# Patient Record
Sex: FEMALE | Race: WHITE | HISPANIC OR LATINO | URBAN - METROPOLITAN AREA
[De-identification: names, ages, dates, MRNs, and addresses within clinical notes are randomized per-mention and may not be internally consistent; named-entity substitution may affect disease eponyms.]

---

## 2020-10-12 ENCOUNTER — INPATIENT (INPATIENT)
Facility: HOSPITAL | Age: 85
LOS: 2 days | Discharge: HOME | DRG: 291 | End: 2020-10-15
Attending: INTERNAL MEDICINE | Admitting: INTERNAL MEDICINE
Payer: MEDICARE

## 2020-10-12 VITALS
DIASTOLIC BLOOD PRESSURE: 70 MMHG | OXYGEN SATURATION: 99 % | WEIGHT: 115.08 LBS | HEART RATE: 57 BPM | RESPIRATION RATE: 18 BRPM | HEIGHT: 62 IN | SYSTOLIC BLOOD PRESSURE: 168 MMHG | TEMPERATURE: 98 F

## 2020-10-12 DIAGNOSIS — I25.10 ATHEROSCLEROTIC HEART DISEASE OF NATIVE CORONARY ARTERY WITHOUT ANGINA PECTORIS: ICD-10-CM

## 2020-10-12 DIAGNOSIS — E11.9 TYPE 2 DIABETES MELLITUS WITHOUT COMPLICATIONS: ICD-10-CM

## 2020-10-12 DIAGNOSIS — I50.33 ACUTE ON CHRONIC DIASTOLIC (CONGESTIVE) HEART FAILURE: ICD-10-CM

## 2020-10-12 DIAGNOSIS — I10 ESSENTIAL (PRIMARY) HYPERTENSION: ICD-10-CM

## 2020-10-12 DIAGNOSIS — D64.9 ANEMIA, UNSPECIFIED: ICD-10-CM

## 2020-10-12 DIAGNOSIS — N17.9 ACUTE KIDNEY FAILURE, UNSPECIFIED: ICD-10-CM

## 2020-10-12 LAB
CK MB CFR SERPL CALC: 3.4 NG/ML — SIGNIFICANT CHANGE UP (ref 0–6.7)
CK SERPL-CCNC: 71 U/L — SIGNIFICANT CHANGE UP (ref 25–170)
CREAT ?TM UR-MCNC: 34 MG/DL — SIGNIFICANT CHANGE UP
OSMOLALITY UR: 247 MOSM/KG — LOW (ref 300–900)
PROT ?TM UR-MCNC: 196 MG/DL — HIGH (ref 0–12)
PROT/CREAT UR-RTO: 5.8 RATIO — HIGH (ref 0–0.2)
SODIUM UR-SCNC: 60 MMOL/L — SIGNIFICANT CHANGE UP
TROPONIN T SERPL-MCNC: <0.01 NG/ML — SIGNIFICANT CHANGE UP (ref 0–0.01)
UUN UR-MCNC: 262 MG/DL — SIGNIFICANT CHANGE UP

## 2020-10-12 PROCEDURE — 99285 EMERGENCY DEPT VISIT HI MDM: CPT | Mod: CS

## 2020-10-12 PROCEDURE — 99223 1ST HOSP IP/OBS HIGH 75: CPT

## 2020-10-12 PROCEDURE — 93306 TTE W/DOPPLER COMPLETE: CPT | Mod: 26

## 2020-10-12 PROCEDURE — 71045 X-RAY EXAM CHEST 1 VIEW: CPT | Mod: 26

## 2020-10-12 RX ORDER — METOPROLOL TARTRATE 50 MG
75 TABLET ORAL
Refills: 0 | Status: DISCONTINUED | OUTPATIENT
Start: 2020-10-12 | End: 2020-10-15

## 2020-10-12 RX ORDER — FUROSEMIDE 40 MG
40 TABLET ORAL
Refills: 0 | Status: DISCONTINUED | OUTPATIENT
Start: 2020-10-12 | End: 2020-10-14

## 2020-10-12 RX ORDER — DOXAZOSIN MESYLATE 4 MG
8 TABLET ORAL DAILY
Refills: 0 | Status: DISCONTINUED | OUTPATIENT
Start: 2020-10-12 | End: 2020-10-12

## 2020-10-12 RX ORDER — PANTOPRAZOLE SODIUM 20 MG/1
40 TABLET, DELAYED RELEASE ORAL
Refills: 0 | Status: DISCONTINUED | OUTPATIENT
Start: 2020-10-12 | End: 2020-10-15

## 2020-10-12 RX ORDER — LEVOTHYROXINE SODIUM 125 MCG
50 TABLET ORAL DAILY
Refills: 0 | Status: DISCONTINUED | OUTPATIENT
Start: 2020-10-12 | End: 2020-10-15

## 2020-10-12 RX ORDER — FUROSEMIDE 40 MG
40 TABLET ORAL ONCE
Refills: 0 | Status: COMPLETED | OUTPATIENT
Start: 2020-10-12 | End: 2020-10-12

## 2020-10-12 RX ORDER — POTASSIUM CHLORIDE 20 MEQ
40 PACKET (EA) ORAL ONCE
Refills: 0 | Status: COMPLETED | OUTPATIENT
Start: 2020-10-12 | End: 2020-10-12

## 2020-10-12 RX ORDER — HEPARIN SODIUM 5000 [USP'U]/ML
5000 INJECTION INTRAVENOUS; SUBCUTANEOUS EVERY 8 HOURS
Refills: 0 | Status: DISCONTINUED | OUTPATIENT
Start: 2020-10-12 | End: 2020-10-15

## 2020-10-12 RX ORDER — ASPIRIN/CALCIUM CARB/MAGNESIUM 324 MG
81 TABLET ORAL DAILY
Refills: 0 | Status: DISCONTINUED | OUTPATIENT
Start: 2020-10-12 | End: 2020-10-15

## 2020-10-12 RX ORDER — GABAPENTIN 400 MG/1
100 CAPSULE ORAL AT BEDTIME
Refills: 0 | Status: DISCONTINUED | OUTPATIENT
Start: 2020-10-12 | End: 2020-10-15

## 2020-10-12 RX ORDER — ATORVASTATIN CALCIUM 80 MG/1
20 TABLET, FILM COATED ORAL AT BEDTIME
Refills: 0 | Status: DISCONTINUED | OUTPATIENT
Start: 2020-10-12 | End: 2020-10-15

## 2020-10-12 RX ORDER — DONEPEZIL HYDROCHLORIDE 10 MG/1
5 TABLET, FILM COATED ORAL AT BEDTIME
Refills: 0 | Status: DISCONTINUED | OUTPATIENT
Start: 2020-10-12 | End: 2020-10-15

## 2020-10-12 RX ORDER — AMLODIPINE BESYLATE 2.5 MG/1
5 TABLET ORAL DAILY
Refills: 0 | Status: DISCONTINUED | OUTPATIENT
Start: 2020-10-12 | End: 2020-10-15

## 2020-10-12 RX ORDER — DOXAZOSIN MESYLATE 4 MG
8 TABLET ORAL DAILY
Refills: 0 | Status: DISCONTINUED | OUTPATIENT
Start: 2020-10-12 | End: 2020-10-13

## 2020-10-12 RX ADMIN — HEPARIN SODIUM 5000 UNIT(S): 5000 INJECTION INTRAVENOUS; SUBCUTANEOUS at 16:14

## 2020-10-12 RX ADMIN — GABAPENTIN 100 MILLIGRAM(S): 400 CAPSULE ORAL at 22:25

## 2020-10-12 RX ADMIN — Medication 40 MILLIGRAM(S): at 18:14

## 2020-10-12 RX ADMIN — Medication 40 MILLIGRAM(S): at 12:09

## 2020-10-12 RX ADMIN — Medication 81 MILLIGRAM(S): at 16:14

## 2020-10-12 RX ADMIN — HEPARIN SODIUM 5000 UNIT(S): 5000 INJECTION INTRAVENOUS; SUBCUTANEOUS at 22:25

## 2020-10-12 RX ADMIN — Medication 40 MILLIEQUIVALENT(S): at 16:14

## 2020-10-12 RX ADMIN — Medication 75 MILLIGRAM(S): at 17:41

## 2020-10-12 RX ADMIN — DONEPEZIL HYDROCHLORIDE 5 MILLIGRAM(S): 10 TABLET, FILM COATED ORAL at 22:25

## 2020-10-12 RX ADMIN — AMLODIPINE BESYLATE 5 MILLIGRAM(S): 2.5 TABLET ORAL at 16:14

## 2020-10-12 RX ADMIN — ATORVASTATIN CALCIUM 20 MILLIGRAM(S): 80 TABLET, FILM COATED ORAL at 22:25

## 2020-10-12 NOTE — ED ADULT NURSE REASSESSMENT NOTE - NS ED NURSE REASSESS COMMENT FT1
Awake and alert, cardiac monitor remains in place, when asleep HR 47-49.  Currently 57, admitting team at the bedside, tolerating nasal cannula well.

## 2020-10-12 NOTE — H&P ADULT - NSICDXFAMILYHX_GEN_ALL_CORE_FT
FAMILY HISTORY:  Family history of ischemic heart disease, Family history of coronary artery disease.

## 2020-10-12 NOTE — H&P ADULT - PROBLEM SELECTOR PLAN 2
- BP elevated on arrival, SBP 160s-170s, did not take AM meds  - continue amlodipine 5mg and Metoprolol tartrate 75mg BID

## 2020-10-12 NOTE — ED CLERICAL - NS ED CLERK NOTE PRE-ARRIVAL INFORMATION; ADDITIONAL PRE-ARRIVAL INFORMATION
86 Y/O F SHARON PEREZ BEING SENT IN BY DR. ONTIVEROS FOR PNEUMONIA AND CHF... PLEASE CALL AFTER EVALUATION AT [phone]

## 2020-10-12 NOTE — H&P ADULT - HISTORY OF PRESENT ILLNESS
84 y/o f hx HTN, HLD, CAD s/p CABG 2015, diastolic CHF, CKD (baseline creatinine 1.4) presents c/o shortness of breath, lower ext edema worsening over the past 2 weeks.  Pt afebrile in ED, +crackles on exam, no ischemic changes to EKG.  Labs significant for creatinine 3.05, BNP 17896.  Pt given lasix 40mg IVP, discussed with Dr. Crooks her cardiologist who will admit, Dr. Villegas consulted for nephrology and will see as inpatient.     In [facility] ED vital signs 168/70 57bpm, 97.7F, 99% on RA Labs significant for H/H  8.6/27.1 PT 14.3 PTT 40.5 INR 1.20 Cr 3.05, Bun 49, K 3.6 Trop negative x 1,  BNP 30k, CXR: wet read pulmonary edema, EKG:     Patient received Lasix 40mg IV x 1. Patient admitted to [facility] for acute on chronic diastolic CHF.    84 y/o female hx HTN, HLD, CAD s/p CABG 2015 Dr. Blunt , diastolic CHF, renal artery stenosis s/p renal artery stent >20yrs ago  CKD (baseline creatinine 1.4)  who presented to [facility] endorsing shortness of breath and LE edema and orthopnea requiring her to sleep in a chair for 1 week. Since experiencing the shortness of breath, Pts cardiologist Dr. Crooks and she increased her Lasix from 80mg M/W/F to daily for the last 4 days.Patient also endorsing dyspnea on exertion of less than 1 block for the last few months. Patients denies chest pain. Patient endorsing occasional papitations. Patient endorsing compliance w/ medications however daughter states that compliance with medication has been questionable over the last few months, as patient is becoming more forgetful. In [facility] ED vital signs 168/70 57bpm, 97.7F, 99% on RA Labs significant for H/H  8.6/27.1 PT 14.3 PTT 40.5 INR 1.20 Cr 3.05, Bun 49, K 3.6 Trop negative x 1,  BNP 30k, CXR: Stable cardiomegaly, status post median sternotomy, thoracic aortic and mitral annulus calcification. Bilateral opacities/pleural effusions EKG: Sinus bradycardia w/ PACs w/ incomplete LBBB and New TWI in Patient received Lasix 40mg IV x 1. Patient admitted to [facility] for acute on chronic diastolic CHF.    84 y/o female poor historian with PMHx HTN, HLD, CAD s/p CABG 2015 Dr. Blunt , diastolic CHF, renal artery stenosis s/p renal artery stent >20yrs ago, Carotid artery stenosis, Peripheral artery disease, Anemia w/ PUD,  CKD (baseline creatinine 1.4)  who presented to [facility] endorsing shortness of breath cough and LE edema and orthopnea requiring her to sleep in a chair for 2 week. She was intially started on tessalon perils which helped the cough but did not improve the shortness of breath.  Since experiencing the shortness of breath, Pts cardiologist Dr. Crooks and she increased her Lasix from 80mg M/W/F to daily for the last 4 days. Patient also endorsing dyspnea on exertion of less than 1 block for the last few months. Patients denies chest pain. Patient endorsing occasional palpitations Patient endorsing compliance w/ medications however daughter states that compliance with medication has been questionable over the last few months, as patient is becoming more forgetful. In [facility] ED vital signs 168/70 57bpm, 97.7F, 99% on RA Labs significant for H/H  8.6/27.1 PT 14.3 PTT 40.5 INR 1.20 Cr 3.05, Bun 49, K 3.6 Trop negative x 1,  BNP 30k, CXR: Stable cardiomegaly, status post median sternotomy, thoracic aortic and mitral annulus calcification. Bilateral opacities/pleural effusions EKG: Sinus bradycardia w/ PACs w/ incomplete LBBB and New TWI from office EKG earlier this AM  in Patient received Lasix 40mg IV x 1. Patient admitted to [facility] for acute on chronic diastolic CHF.

## 2020-10-12 NOTE — ED PROVIDER NOTE - CLINICAL SUMMARY MEDICAL DECISION MAKING FREE TEXT BOX
86 y/o f hx HTN, HLD, CAD s/p CABG 2015, diastolic CHF, CKD (baseline creatinine 1.4) presents c/o shortness of breath, lower ext edema worsening over the past 2 weeks.  Pt afebrile in ED, +crackles on exam, no ischemic changes to EKG.  Labs significant for creatinine 3.05, BNP 82873.  Pt given lasix 40mg IVP, discussed with Dr. Crooks her cardiologist who will admit, Dr. Villegas consulted for nephrology and will see as inpatient.

## 2020-10-12 NOTE — H&P ADULT - PROBLEM SELECTOR PLAN 1
Presented with SOB, orthopnea, +2pitting edema, Crackles in bases  BNP 30,000  trop negative x 1, f/u 6pm and 12 AM trop  CXR:  Stable cardiomegaly, status post median sternotomy, thoracic aortic and mitral annulus calcification. Bilateral opacities/pleural effusions  - Echo from 2/19/20 Normal LV systolic function EF 58%, no WMA, Borderline LVH, moderat AR, moderate MR, PulmHTN, PASP 70mmHg  Echo ordered f/u  CONTINUE Lasix 40 IV BID  Not starting ACE/ARB 2/2 NAV on CKD   Daily weight, strict I & Os, 1l fluid restriction Presented with SOB, orthopnea, +2pitting edema, Crackles in bases  BNP 30,000  trop negative x 1, f/u 6pm and 12 AM trop  CXR:  Stable cardiomegaly, status post median sternotomy, thoracic aortic and mitral annulus calcification. Bilateral opacities/pleural effusions  - Echo from 2/19/20 Normal LV systolic function EF 58%, no WMA, Borderline LVH, moderat AR, moderate MR, PulmHTN, PASP 70mmHg    Echo from 10/12/20 Mild AR, MR, TR. Pulm HTN PASP 47 There is mild concentric LVH ejection fraction of 55%. Left pleural effusion.  CONTINUE Lasix 40 IV BID  Not starting ACE/ARB 2/2 NAV on CKD   Daily weight, strict I & Os, 1l fluid restriction Presented with SOB, orthopnea, +2pitting edema, Crackles in bases  BNP 30,000  trop negative x 2, f/u 12 AM trop  CXR:  Stable cardiomegaly, status post median sternotomy, thoracic aortic and mitral annulus calcification. Bilateral opacities/pleural effusions  - Echo from 2/19/20 Normal LV systolic function EF 58%, no WMA, Borderline LVH, moderat AR, moderate MR, PulmHTN, PASP 70mmHg  Echo from 10/12/20 Mild AR, MR, TR. Pulm HTN PASP 47 There is mild concentric LVH ejection fraction of 55%. Left pleural effusion.  CONTINUE Lasix 40 IV BID  Not starting ACE/ARB 2/2 NAV on CKD   Daily weight, strict I & Os, 1l fluid restriction

## 2020-10-12 NOTE — CONSULT NOTE ADULT - SUBJECTIVE AND OBJECTIVE BOX
Patient is a 85y old  Female who presents with a chief complaint of shortness of breath (12 Oct 2020 13:11)      HPI:  85F PMHx CKD baseline Cr 1.4, HTN, HLD, CAD s/p CABG 2015, diastolic CHF, presents c/o shortness of breath, lower ext edema worsening over the past 2 weeks.  Patient received Lasix 40mg IV x 1 and has no complaints. Denies CP abdominal pain. Endorses normal urine output. Admitted for CHF exacerbation. Cr doubled from baseline. Nephrology consulted for NAV/CKD management.     PAST MEDICAL & SURGICAL HISTORY:  Essential hypertension  Hypertension    Type 2 diabetes mellitus  Diabetes    Hyperlipidemia  Hyperlipidemia    Disorder of kidney and ureter  Renal insufficiency    Peripheral vascular disease  PVD (peripheral vascular disease)    Anxiety state  Anxiety    Atherosclerosis of renal artery  with resulting stent placement    Atherosclerosis of renal artery  Renal artery stenosis          Allergies:  No Known Allergies      Home Medications:       Hospital Medications:   MEDICATIONS  (STANDING):      SOCIAL HISTORY:  Denies ETOh, Smoking,     Family History:  FAMILY HISTORY:  Family history of ischemic heart disease  Family history of coronary artery disease.          VITALS:  T(F): 97.7 (10-12-20 @ 10:27), Max: 97.7 (10-12-20 @ 10:27)  HR: 51 (10-12-20 @ 12:00)  BP: 165/72 (10-12-20 @ 12:00)  RR: 18 (10-12-20 @ 12:00)  SpO2: 99% (10-12-20 @ 12:00)  Wt(kg): --    Height (cm): 157.5 (10-12 @ 10:27)  Weight (kg): 52.2 (10-12 @ 10:27)  BMI (kg/m2): 21 (10-12 @ 10:27)  BSA (m2): 1.51 (10-12 @ 10:27)  CAPILLARY BLOOD GLUCOSE          Review of Systems:  CONSTITUTIONAL: No fever or chills.  RESPIRATORY: No shortness of breath, cough  CARDIOVASCULAR: No Chest pain, shortness of breath, palpitations  GASTROINTESTINAL: No abdominal pain, nausea, vomiting, diarrhea  GENITOURINARY: No urinary frequency, gross hematuria, dysuria  NEUROLOGICAL: No headache, weakness  SKIN: No rash or skin lesion  MUSCULOSKELETAL: No swelling  Psych: Denies suicidal or homicidal ideation    PHYSICAL EXAM:  GENERAL: Alert, awake, oriented x3   HEENT: TAJ, EOMI, neck supple, no JVP  CHEST/LUNG: Bilateral clear breath sounds  HEART: Regular rate and rhythm, no murmur, no gallops, no rub   ABDOMEN: Soft, nontender, non distended  : No flank or supra pubic tenderness.  EXTREMITIES: no pedal edema  Neurology: AAOx3, no focal neurological deficit  SKIN: No rash or skin lesion   ACCESS: LUE AVF w/bruit and thrill     LABS:  10-12    140  |  105  |  49<H>  ----------------------------<  105<H>  3.6   |  23  |  3.05<H>    Ca    8.5      12 Oct 2020 11:22    TPro  5.8<L>  /  Alb  3.2<L>  /  TBili  0.4  /  DBili      /  AST  21  /  ALT  36  /  AlkPhos  185<H>  10-12    Creatinine Trend: 3.05 <--                        8.6    6.87  )-----------( 171      ( 12 Oct 2020 11:22 )             27.1     Urine Studies:                85F PMHx CKD baseline Cr 1.4, HTN, HLD, CAD s/p CABG 2015, diastolic CHF, presents c/o shortness of breath, lower ext edema worsening over the past 2 weeks.  Nephrology consulted for NAV/CKD management.     Assessment/Plan:     #NAV on CKD, acute CHF exacerbation   baseline creatinine 1.4  NAV on CKD likely cardiorenal syndrome, pre-renal vs possibly ischemic ATN     Recommend:   daily CXR   diuresis with 40mg IV lasix q8h PRN to achieve euvolemia   q12h BMP and urine lytes including Na, Cr, and Urea   renal sono  strict I/Os   renal diet     Thank you for the opportunity to participate in the care of your patient. The nephrology service remains available to assist with any questions or concerns. Please feel free to reach us by paging the on-call nephrology fellow for urgent issues or as below.     Tobi Thompson M.D.   PGY-4, Nephrology Fellow   C: [phone]   P: [phone] Patient is a 85y old  Female who presents with a chief complaint of shortness of breath (12 Oct 2020 13:11)      HPI:  85F PMHx CKD baseline Cr 1.4, HTN, HLD, CAD s/p CABG 2015, diastolic CHF, presents c/o shortness of breath, lower ext edema worsening over the past 2 weeks.  Patient received Lasix 40mg IV x 1 and has no complaints. Denies CP abdominal pain. Endorses normal urine output. Admitted for CHF exacerbation. Cr doubled from baseline. Nephrology consulted for ANV/CKD management.     PAST MEDICAL & SURGICAL HISTORY:  Essential hypertension  Hypertension    Type 2 diabetes mellitus  Diabetes    Hyperlipidemia  Hyperlipidemia    Disorder of kidney and ureter  Renal insufficiency    Peripheral vascular disease  PVD (peripheral vascular disease)    Anxiety state  Anxiety    Atherosclerosis of renal artery  with resulting stent placement    Atherosclerosis of renal artery  Renal artery stenosis          Allergies:  No Known Allergies      Home Medications:       Hospital Medications:   MEDICATIONS  (STANDING):      SOCIAL HISTORY:  Denies ETOh, Smoking,     Family History:  FAMILY HISTORY:  Family history of ischemic heart disease  Family history of coronary artery disease.          VITALS:  T(F): 97.7 (10-12-20 @ 10:27), Max: 97.7 (10-12-20 @ 10:27)  HR: 51 (10-12-20 @ 12:00)  BP: 165/72 (10-12-20 @ 12:00)  RR: 18 (10-12-20 @ 12:00)  SpO2: 99% (10-12-20 @ 12:00)  Wt(kg): --    Height (cm): 157.5 (10-12 @ 10:27)  Weight (kg): 52.2 (10-12 @ 10:27)  BMI (kg/m2): 21 (10-12 @ 10:27)  BSA (m2): 1.51 (10-12 @ 10:27)  CAPILLARY BLOOD GLUCOSE          Review of Systems:  CONSTITUTIONAL: No fever or chills.  RESPIRATORY: No shortness of breath, cough  CARDIOVASCULAR: No Chest pain, shortness of breath, palpitations  GASTROINTESTINAL: No abdominal pain, nausea, vomiting, diarrhea  GENITOURINARY: No urinary frequency, gross hematuria, dysuria  NEUROLOGICAL: No headache, weakness  SKIN: No rash or skin lesion  MUSCULOSKELETAL: swollen legs  Psych: Denies suicidal or homicidal ideation    PHYSICAL EXAM:  GENERAL: Alert, awake, oriented x3   HEENT: TAJ, EOMI, neck supple, no JVP  CHEST/LUNG: Bilateral clear breath sounds  HEART: Regular rate and rhythm, no murmur, no gallops, no rub   ABDOMEN: Soft, nontender, non distended  : No flank or supra pubic tenderness.  EXTREMITIES: 2+ LE edema  Neurology: AAOx3, no focal neurological deficit  SKIN: No rash or skin lesion   ACCESS: LUE AVF w/bruit and thrill     LABS:  10-12    140  |  105  |  49<H>  ----------------------------<  105<H>  3.6   |  23  |  3.05<H>    Ca    8.5      12 Oct 2020 11:22    TPro  5.8<L>  /  Alb  3.2<L>  /  TBili  0.4  /  DBili      /  AST  21  /  ALT  36  /  AlkPhos  185<H>  10-12    Creatinine Trend: 3.05 <--                        8.6    6.87  )-----------( 171      ( 12 Oct 2020 11:22 )             27.1     Urine Studies:                85F PMHx CKD baseline Cr 1.4, HTN, HLD, CAD s/p CABG 2015, diastolic CHF, presents c/o shortness of breath, lower ext edema worsening over the past 2 weeks.  Nephrology consulted for NAV/CKD management.     Assessment/Plan:     #NAV on CKD, acute CHF exacerbation   baseline creatinine 1.4  NAV on CKD likely cardiorenal syndrome, pre-renal vs possibly ischemic ATN     Recommend:   daily CXR   diuresis with 40mg IV lasix q8h PRN to achieve euvolemia   q12h BMP and urine lytes including Na, Cr, and Urea   renal sono  strict I/Os   renal diet     Thank you for the opportunity to participate in the care of your patient. The nephrology service remains available to assist with any questions or concerns. Please feel free to reach us by paging the on-call nephrology fellow for urgent issues or as below.     Tobi Thompson M.D.   PGY-4, Nephrology Fellow   C: [phone]   P: [phone]

## 2020-10-12 NOTE — ED ADULT NURSE REASSESSMENT NOTE - NS ED NURSE REASSESS COMMENT FT1
Patient transported to ECHO, on cardiac monitor, accompanied by PA.  Report given Aysha GUTIERREZ - ED holding.

## 2020-10-12 NOTE — ED ADULT NURSE NOTE - OBJECTIVE STATEMENT
Patient arrives via EMS sent from MD's office due to SOB x 1 week/ pneumonia.  Denies fever at home.  Patient states "I can't breathe."  2L o2 in place via nasal cannula, cardiac monitor in place, denies chest pain/ dizziness.  EKG completed at time of triage.

## 2020-10-12 NOTE — ED PROVIDER NOTE - ATTENDING CONTRIBUTION TO CARE
85 year old female with history of CHF, HTN, HLD presents to ED with concern for lower leg swelling and shortness of breath - sent by Dr. Godoy.  No fever or chills.  + Cough.  On my face to face ED eval, patient is non toxic in appearance.  HRRR, lungs with bibasilar crackles on auscultation.  + Pitting edema to bilateral LEs.  Will check labs, CXR, give lasix and anticipate admission for diuresis and monitoring.

## 2020-10-12 NOTE — ED PROVIDER NOTE - OBJECTIVE STATEMENT
84 y/o f hx HTN, HLD, CAD s/p CABG 2015, diastolic CHF, CKD (baseline creatinine 1.4) presents sent by her cardiologist for evaluation of sob x 2 weeks, lower ext edema.  Pt reports her sx gradually worsening, also has had a few episodes of chest pain, last was a few days ago.  Pt stating her cardiologist has been changing her meds but sx haven't improved.  Denies fever, chills, n/v/d, recent travel, sick contacts, all other ROS negative.

## 2020-10-12 NOTE — H&P ADULT - PROBLEM SELECTOR PLAN 3
Baseline Cr around 1.4 per Dr. Crooks, Cr 3.05, BUN 49, Hx of renal artery stenosis s/p stenting >20 yrs ago   -Possible cardiorenal?  - Renal consulted, f/u res  - urine lytes ordered follow up F[facility]   - Renal artery US  to evaluate stent

## 2020-10-12 NOTE — H&P ADULT - PROBLEM SELECTOR PLAN 5
per Dr. Crooks Pt anemia to 8s starting earlier this summer, had EGD showing PUD- started on Omeprazole + Tx for H. Pylori   - continue

## 2020-10-12 NOTE — H&P ADULT - PROBLEM SELECTOR PLAN 4
How much?   - S/p CABG in 2015 for 3vCAD, Currently CP free  - Trop negative x 1  - EKG: Sinus bradycardia w/ PACs w/ incomplete LBBB and New TWI from office EKG earlier this AM - S/p CABG in 2015 for 3vCAD, Currently CP free  - Trop negative x 2  - EKG: Sinus bradycardia w/ PACs w/ incomplete LBBB and New TWI from office EKG earlier this AM  -continue aspirin and atorvastatin

## 2020-10-12 NOTE — H&P ADULT - NSICDXPASTMEDICALHX_GEN_ALL_CORE_FT
PAST MEDICAL HISTORY:  Anxiety state Anxiety    Atherosclerosis of renal artery with resulting stent placement    Disorder of kidney and ureter Renal insufficiency    Essential hypertension Hypertension    Hyperlipidemia Hyperlipidemia    Peripheral vascular disease PVD (peripheral vascular disease)    Type 2 diabetes mellitus Diabetes

## 2020-10-12 NOTE — H&P ADULT - ASSESSMENT
86 y/o female poor historian with PMHx HTN, HLD, CAD s/p CABG 2015 Dr. Blunt , diastolic CHF, renal artery stenosis s/p renal artery stent >20yrs ago, Carotid artery stenosis, Peripheral artery disease, Anemia w/ PUD,  CKD (baseline creatinine 1.4)  who presented to [facility] endorsing shortness of breath cough and LE edema and orthopnea requiring her to sleep in a chair for 2 week. She was intially started on tessalon perils which helped the cough but did not improve the shortness of breath.  Since experiencing the shortness of breath, Pts cardiologist Dr. Crooks and she increased her Lasix from 80mg M/W/F to daily for the last 4 days. Patient also endorsing dyspnea on exertion of less than 1 block for the last few months. Patients denies chest pain. Patient endorsing occasional palpitations Patient endorsing compliance w/ medications however daughter states that compliance with medication has been questionable over the last few months, as patient is becoming more forgetful. In [facility] ED vital signs 168/70 57bpm, 97.7F, 99% on RA Labs significant for H/H  8.6/27.1 PT 14.3 PTT 40.5 INR 1.20 Cr 3.05, Bun 49, K 3.6 Trop negative x 1,  BNP 30k, CXR: Stable cardiomegaly, status post median sternotomy, thoracic aortic and mitral annulus calcification. Bilateral opacities/pleural effusions EKG: Sinus bradycardia w/ PACs w/ incomplete LBBB and New TWI in Patient received Lasix 40mg IV x 1. Patient admitted to [facility] for acute on chronic diastolic CHF.    86 y/o female poor historian with PMHx HTN, HLD, CAD s/p CABG 2015 Dr. Blunt , diastolic CHF, renal artery stenosis s/p renal artery stent >20yrs ago, Carotid artery stenosis, Peripheral artery disease, Anemia w/ PUD,  CKD (baseline creatinine 1.4)  who presented to [facility] endorsing shortness of breath cough and LE edema and orthopnea requiring her to sleep in a chair for 2 week. Patient admitted to [facility] for acute on chronic diastolic CHF. Echo 10/12/20 showed  Mild AR, MR, TR. Pulm HTN PASP 47, EF 55%, not worsened from prior. Patient currently on IV diuresis.

## 2020-10-13 LAB
A1C WITH ESTIMATED AVERAGE GLUCOSE RESULT: 5.9 % — HIGH (ref 4–5.6)
ALBUMIN SERPL ELPH-MCNC: 2.8 G/DL — LOW (ref 3.3–5)
ALP SERPL-CCNC: 140 U/L — HIGH (ref 40–120)
ALT FLD-CCNC: 27 U/L — SIGNIFICANT CHANGE UP (ref 10–45)
ANION GAP SERPL CALC-SCNC: 13 MMOL/L — SIGNIFICANT CHANGE UP (ref 5–17)
ANION GAP SERPL CALC-SCNC: 14 MMOL/L — SIGNIFICANT CHANGE UP (ref 5–17)
AST SERPL-CCNC: 16 U/L — SIGNIFICANT CHANGE UP (ref 10–40)
BILIRUB SERPL-MCNC: 0.3 MG/DL — SIGNIFICANT CHANGE UP (ref 0.2–1.2)
BUN SERPL-MCNC: 50 MG/DL — HIGH (ref 7–23)
BUN SERPL-MCNC: 50 MG/DL — HIGH (ref 7–23)
CALCIUM SERPL-MCNC: 8.5 MG/DL — SIGNIFICANT CHANGE UP (ref 8.4–10.5)
CALCIUM SERPL-MCNC: 8.5 MG/DL — SIGNIFICANT CHANGE UP (ref 8.4–10.5)
CHLORIDE SERPL-SCNC: 106 MMOL/L — SIGNIFICANT CHANGE UP (ref 96–108)
CHLORIDE SERPL-SCNC: 109 MMOL/L — HIGH (ref 96–108)
CHOLEST SERPL-MCNC: 119 MG/DL — SIGNIFICANT CHANGE UP (ref 10–199)
CO2 SERPL-SCNC: 22 MMOL/L — SIGNIFICANT CHANGE UP (ref 22–31)
CO2 SERPL-SCNC: 22 MMOL/L — SIGNIFICANT CHANGE UP (ref 22–31)
CREAT SERPL-MCNC: 3.07 MG/DL — HIGH (ref 0.5–1.3)
CREAT SERPL-MCNC: 3.15 MG/DL — HIGH (ref 0.5–1.3)
ESTIMATED AVERAGE GLUCOSE: 123 MG/DL — HIGH (ref 68–114)
GLUCOSE SERPL-MCNC: 131 MG/DL — HIGH (ref 70–99)
GLUCOSE SERPL-MCNC: 79 MG/DL — SIGNIFICANT CHANGE UP (ref 70–99)
HCT VFR BLD CALC: 26.5 % — LOW (ref 34.5–45)
HDLC SERPL-MCNC: 51 MG/DL — SIGNIFICANT CHANGE UP
HGB BLD-MCNC: 8.2 G/DL — LOW (ref 11.5–15.5)
LIPID PNL WITH DIRECT LDL SERPL: 49 MG/DL — SIGNIFICANT CHANGE UP
MAGNESIUM SERPL-MCNC: 1.5 MG/DL — LOW (ref 1.6–2.6)
MAGNESIUM SERPL-MCNC: 2 MG/DL — SIGNIFICANT CHANGE UP (ref 1.6–2.6)
MCHC RBC-ENTMCNC: 26.5 PG — LOW (ref 27–34)
MCHC RBC-ENTMCNC: 30.9 GM/DL — LOW (ref 32–36)
MCV RBC AUTO: 85.8 FL — SIGNIFICANT CHANGE UP (ref 80–100)
NRBC # BLD: 0 /100 WBCS — SIGNIFICANT CHANGE UP (ref 0–0)
PHOSPHATE SERPL-MCNC: 4.3 MG/DL — SIGNIFICANT CHANGE UP (ref 2.5–4.5)
PLATELET # BLD AUTO: 148 K/UL — LOW (ref 150–400)
POTASSIUM SERPL-MCNC: 3.9 MMOL/L — SIGNIFICANT CHANGE UP (ref 3.5–5.3)
POTASSIUM SERPL-MCNC: 4.1 MMOL/L — SIGNIFICANT CHANGE UP (ref 3.5–5.3)
POTASSIUM SERPL-SCNC: 3.9 MMOL/L — SIGNIFICANT CHANGE UP (ref 3.5–5.3)
POTASSIUM SERPL-SCNC: 4.1 MMOL/L — SIGNIFICANT CHANGE UP (ref 3.5–5.3)
PROT SERPL-MCNC: 5.3 G/DL — LOW (ref 6–8.3)
RBC # BLD: 3.09 M/UL — LOW (ref 3.8–5.2)
RBC # FLD: 15.8 % — HIGH (ref 10.3–14.5)
SODIUM SERPL-SCNC: 142 MMOL/L — SIGNIFICANT CHANGE UP (ref 135–145)
SODIUM SERPL-SCNC: 144 MMOL/L — SIGNIFICANT CHANGE UP (ref 135–145)
T4 AB SER-ACNC: 6.76 UG/DL — SIGNIFICANT CHANGE UP (ref 3.17–11.72)
TOTAL CHOLESTEROL/HDL RATIO MEASUREMENT: 2.3 RATIO — LOW (ref 3.3–7.1)
TRIGL SERPL-MCNC: 94 MG/DL — SIGNIFICANT CHANGE UP (ref 10–149)
TROPONIN T SERPL-MCNC: <0.01 NG/ML — SIGNIFICANT CHANGE UP (ref 0–0.01)
TSH SERPL-MCNC: 1.75 UIU/ML — SIGNIFICANT CHANGE UP (ref 0.35–4.94)
WBC # BLD: 4.64 K/UL — SIGNIFICANT CHANGE UP (ref 3.8–10.5)
WBC # FLD AUTO: 4.64 K/UL — SIGNIFICANT CHANGE UP (ref 3.8–10.5)

## 2020-10-13 PROCEDURE — 99232 SBSQ HOSP IP/OBS MODERATE 35: CPT

## 2020-10-13 PROCEDURE — 71046 X-RAY EXAM CHEST 2 VIEWS: CPT | Mod: 26

## 2020-10-13 RX ORDER — DOXAZOSIN MESYLATE 4 MG
8 TABLET ORAL
Refills: 0 | Status: DISCONTINUED | OUTPATIENT
Start: 2020-10-13 | End: 2020-10-15

## 2020-10-13 RX ORDER — IPRATROPIUM/ALBUTEROL SULFATE 18-103MCG
3 AEROSOL WITH ADAPTER (GRAM) INHALATION EVERY 6 HOURS
Refills: 0 | Status: DISCONTINUED | OUTPATIENT
Start: 2020-10-13 | End: 2020-10-15

## 2020-10-13 RX ORDER — MAGNESIUM SULFATE 500 MG/ML
2 VIAL (ML) INJECTION ONCE
Refills: 0 | Status: COMPLETED | OUTPATIENT
Start: 2020-10-13 | End: 2020-10-13

## 2020-10-13 RX ADMIN — Medication 3 MILLILITER(S): at 11:59

## 2020-10-13 RX ADMIN — Medication 3 MILLILITER(S): at 21:34

## 2020-10-13 RX ADMIN — Medication 50 GRAM(S): at 12:21

## 2020-10-13 RX ADMIN — HEPARIN SODIUM 5000 UNIT(S): 5000 INJECTION INTRAVENOUS; SUBCUTANEOUS at 13:46

## 2020-10-13 RX ADMIN — Medication 3 MILLILITER(S): at 17:34

## 2020-10-13 RX ADMIN — GABAPENTIN 100 MILLIGRAM(S): 400 CAPSULE ORAL at 21:34

## 2020-10-13 RX ADMIN — Medication 40 MILLIGRAM(S): at 05:59

## 2020-10-13 RX ADMIN — Medication 8 MILLIGRAM(S): at 18:42

## 2020-10-13 RX ADMIN — Medication 50 MICROGRAM(S): at 05:58

## 2020-10-13 RX ADMIN — HEPARIN SODIUM 5000 UNIT(S): 5000 INJECTION INTRAVENOUS; SUBCUTANEOUS at 05:58

## 2020-10-13 RX ADMIN — HEPARIN SODIUM 5000 UNIT(S): 5000 INJECTION INTRAVENOUS; SUBCUTANEOUS at 21:34

## 2020-10-13 RX ADMIN — AMLODIPINE BESYLATE 5 MILLIGRAM(S): 2.5 TABLET ORAL at 05:57

## 2020-10-13 RX ADMIN — Medication 40 MILLIGRAM(S): at 18:42

## 2020-10-13 RX ADMIN — Medication 81 MILLIGRAM(S): at 12:20

## 2020-10-13 RX ADMIN — ATORVASTATIN CALCIUM 20 MILLIGRAM(S): 80 TABLET, FILM COATED ORAL at 21:34

## 2020-10-13 RX ADMIN — PANTOPRAZOLE SODIUM 40 MILLIGRAM(S): 20 TABLET, DELAYED RELEASE ORAL at 06:21

## 2020-10-13 RX ADMIN — Medication 75 MILLIGRAM(S): at 18:42

## 2020-10-13 RX ADMIN — Medication 8 MILLIGRAM(S): at 06:57

## 2020-10-13 RX ADMIN — DONEPEZIL HYDROCHLORIDE 5 MILLIGRAM(S): 10 TABLET, FILM COATED ORAL at 21:34

## 2020-10-13 RX ADMIN — Medication 75 MILLIGRAM(S): at 05:58

## 2020-10-13 NOTE — PHYSICAL THERAPY INITIAL EVALUATION ADULT - GENERAL OBSERVATIONS, REHAB EVAL
Pt received seated at EOB +hep lock +tele +primafit. PT received consent to treat from BRENDA Dinero. Pt ambulated 50 ft x2 with HHA using portable monitor on room air, desat to 87% was able to recover to 90% after 30 sec standing break. Pt was left as received with call bell in reach RN present.

## 2020-10-13 NOTE — PROGRESS NOTE ADULT - ASSESSMENT
86 y/o female poor historian with PMHx HTN, HLD, CAD s/p CABG 2015 Dr. Blunt , diastolic CHF, renal artery stenosis s/p renal artery stent >20yrs ago, Carotid artery stenosis, Peripheral artery disease, Anemia w/ PUD,  CKD (baseline creatinine 1.4)  who presented to [facility] endorsing shortness of breath cough and LE edema and orthopnea requiring her to sleep in a chair for 2 week. Patient admitted to [facility] for acute on chronic diastolic CHF. Echo 10/12/20 showed  Mild AR, MR, TR. Pulm HTN PASP 47, EF 55%, not worsened from prior. Patient currently on IV diuresis.    84 y/o female poor historian with PMHx HTN, HLD, CAD s/p CABG 2015 Dr. Blunt , diastolic CHF, renal artery stenosis s/p renal artery stent >20yrs ago, Carotid artery stenosis, Peripheral artery disease, Anemia w/ PUD,  CKD (baseline creatinine 2.4-3)  who presented to [facility] endorsing shortness of breath cough and LE edema and orthopnea requiring her to sleep in a chair for 2 week. Patient admitted to [facility] for acute on chronic diastolic CHF. Echo 10/12/20 showed  Mild AR, MR, TR. Pulm HTN PASP 47, EF 55%, not worsened from prior. Patient currently on IV diuresis.

## 2020-10-13 NOTE — PROGRESS NOTE ADULT - PROBLEM SELECTOR PLAN 3
Baseline Cr around 1.4 per Dr. Crooks, Cr stable at 3 BUN 49, Hx of renal artery stenosis s/p stenting >20 yrs ago   NAV on CKD likely cardiorenal syndrome, pre-renal vs possibly ischemic ATN   - Renal consulted, f/u recs  - Renal artery US  to evaluate stent  - daily BMP and urine lytes Baseline Cr around 2.4-3 per Dr. Crooks, Cr stable at 3 BUN 49, Hx of renal artery stenosis s/p stenting >20 yrs ago   NAV on CKD likely cardiorenal syndrome, pre-renal vs possibly ischemic ATN   - Renal consulted, f/u recs  - Renal artery US  to evaluate stent  - daily BMP and urine lytes

## 2020-10-13 NOTE — PROGRESS NOTE ADULT - PROBLEM SELECTOR PLAN 2
- BPs 150s/60s  - continue amlodipine 5mg and Metoprolol tartrate 75mg BID  -may need additional agent - BPs 150s/60s  - continue amlodipine 5mg and Metoprolol tartrate 75mg BID  -increase doxazosin to 8mg BID - BPs 150s/60s  - continue amlodipine 5mg and Metoprolol tartrate 75mg BID  -increased doxazosin to 8mg BID

## 2020-10-13 NOTE — PROGRESS NOTE ADULT - PROBLEM SELECTOR PLAN 5
per Dr. Crooks Pt anemia to 8s starting earlier this summer, had EGD showing PUD- started on Omeprazole + Tx for H. Pylori   - continue omeprazole

## 2020-10-13 NOTE — PROGRESS NOTE ADULT - PROBLEM SELECTOR PLAN 4
- S/p CABG in 2015 for 3vCAD, Currently CP free  - Trop negative x 3  - EKG: Sinus bradycardia w/ PACs w/ incomplete LBBB and New TWI from office EKG earlier this AM  -continue aspirin and atorvastatin

## 2020-10-13 NOTE — PROGRESS NOTE ADULT - ASSESSMENT
85F PMHx CKD baseline Cr 1.4, HTN, HLD, CAD s/p CABG 2015, diastolic CHF, presents c/o shortness of breath, lower ext edema worsening over the past 2 weeks.  Nephrology consulted for NAV/CKD management.     Assessment/Plan:     #NAV on CKD, acute CHF exacerbation   baseline creatinine 1.4, Cr likely peaked ~3   adequate urine output   edema and oxygen requirements improved   NAV on CKD likely cardiorenal syndrome, pre-renal vs possibly ischemic ATN     Recommend:   daily CXR   continue with 40mg IV lasix q12h    daily BMP and urine lytes including Na, Cr, and Urea   renal sono  strict I/Os   renal diet     Thank you for the opportunity to participate in the care of your patient. The nephrology service remains available to assist with any questions or concerns. Please feel free to reach us by paging the on-call nephrology fellow for urgent issues or as below.     Tobi Thompson M.D.   PGY-4, Nephrology Fellow   C: [phone]   P: [phone]

## 2020-10-13 NOTE — PHYSICAL THERAPY INITIAL EVALUATION ADULT - PERTINENT HX OF CURRENT PROBLEM, REHAB EVAL
pt is 84 yo female who presented to [facility] endorsing shortness of breath cough and LE edema and orthopnea requiring her to sleep in a chair for 2 week. Patient admitted to [facility] for acute on chronic diastolic CHF. Echo 10/12/20 showed  Mild AR, MR, TR. Pulm HTN PASP 47, EF 55%, not worsened from prior. Patient currently on IV diuresis.

## 2020-10-13 NOTE — PROGRESS NOTE ADULT - SUBJECTIVE AND OBJECTIVE BOX
Patient is a 85y Female seen and evaluated at bedside.       Meds:    albuterol/ipratropium for Nebulization 3 every 6 hours  amLODIPine   Tablet 5 daily  aspirin enteric coated 81 daily  atorvastatin 20 at bedtime  donepezil 5 at bedtime  doxazosin 8 daily  furosemide   Injectable 40 two times a day  gabapentin 100 at bedtime  heparin   Injectable 5000 every 8 hours  levothyroxine 50 daily  metoprolol tartrate 75 two times a day  pantoprazole    Tablet 40 before breakfast      T(C): , Max: 37 (10-13-20 @ 05:43)  T(F): , Max: 98.6 (10-13-20 @ 05:43)  HR: 50 (10-13-20 @ 08:56)  BP: 142/65 (10-13-20 @ 08:56)  BP(mean): 102 (10-12-20 @ 13:11)  RR: 18 (10-13-20 @ 08:56)  SpO2: 93% (10-13-20 @ 08:56)  Wt(kg): --    10-12 @ 07:01  -  10-13 @ 07:00  --------------------------------------------------------  IN: 0 mL / OUT: 600 mL / NET: -600 mL    10-13 @ 07:01  -  10-13 @ 10:38  --------------------------------------------------------  IN: 180 mL / OUT: 0 mL / NET: 180 mL      Height (cm): 157.5 (10-12 @ 17:34)  Weight (kg): 56 (10-12 @ 17:34)  BMI (kg/m2): 22.6 (10-12 @ 17:34)  BSA (m2): 1.56 (10-12 @ 17:34)    Review of Systems:  CONSTITUTIONAL: No fever.  RESPIRATORY: No shortness of breath, cough  CARDIOVASCULAR: No Chest pain, shortness of breath  GASTROINTESTINAL: No abdominal pain, nausea, vomiting, diarrhea  MUSCULOSKELETAL: +swollen legs      PHYSICAL EXAM:  GENERAL: Alert, awake, oriented x3, off supplemental oxygen   CHEST/LUNG: Bilateral clear breath sounds  HEART: Regular rate and rhythm, no murmur, no gallops, no rub   ABDOMEN: Soft, nontender, non distended  EXTREMITIES: 1+ LE edema          LABS:                        8.2    4.64  )-----------( 148      ( 13 Oct 2020 05:49 )             26.5     10-13    144  |  109<H>  |  50<H>  ----------------------------<  79  3.9   |  22  |  3.07<H>    Ca    8.5      13 Oct 2020 05:49  Phos  4.3     10-13  Mg     1.5     10-13    TPro  5.3<L>  /  Alb  2.8<L>  /  TBili  0.3  /  DBili  x   /  AST  16  /  ALT  27  /  AlkPhos  140<H>  10-13    Cholesterol, Serum: 119 mg/dL [10 - 199] (10-13 @ 05:49)    PT/INR - ( 12 Oct 2020 11:22 )   PT: 14.3 sec;   INR: 1.20          PTT - ( 12 Oct 2020 11:22 )  PTT:40.5 sec    Osmolality, Random Urine: 247 mosm/kg (10-12 @ 17:15)  Sodium, Random Urine: 60 mmol/L (10-12 @ 17:15)  Creatinine, Random Urine: 34 mg/dL (10-12 @ 17:15)  Protein/Creatinine Ratio Calculation: 5.8 Ratio (10-12 @ 17:15)        RADIOLOGY & ADDITIONAL STUDIES:

## 2020-10-13 NOTE — PHYSICAL THERAPY INITIAL EVALUATION ADULT - ADDITIONAL COMMENTS
Pt states she lives with son and daughter in private house. Pt stays on first floor and was independent with ADL's and ambulation PTA.

## 2020-10-13 NOTE — PHYSICAL THERAPY INITIAL EVALUATION ADULT - ASSISTIVE DEVICE FOR TRANSFER: GAIT, REHAB EVAL
HHA using portable monitor on room air. Pt desat to 87% was able to recover to 90% after 30 sec st anding break

## 2020-10-13 NOTE — PROGRESS NOTE ADULT - ATTENDING COMMENTS
NAV with dCHF  creat flat at 3- good  says she feels much better this AM  less edema and lung BS improving  c/w diuresis

## 2020-10-13 NOTE — PHYSICAL THERAPY INITIAL EVALUATION ADULT - DID THE PATIENT HAVE SURGERY?
n/a
For information on Fall & Injury Prevention, visit [email or web address]

## 2020-10-13 NOTE — PROGRESS NOTE ADULT - PROBLEM SELECTOR PLAN 1
Presented with SOB, orthopnea, +2pitting edema, Crackles in bases  -Net neg 1L   BNP 30,000  trop negative x 3  CXR:  Stable cardiomegaly, status post median sternotomy, thoracic aortic and mitral annulus calcification. Bilateral opacities/pleural effusions  - Echo from 2/19/20 Normal LV systolic function EF 58%, no WMA, Borderline LVH, moderat AR, moderate MR, PulmHTN, PASP 70mmHg  Echo from 10/12/20 Mild AR, MR, TR. Pulm HTN PASP 47 There is mild concentric LVH ejection fraction of 55%. Left pleural effusion.  CONTINUE Lasix 40 IV BID  Not starting ACE/ARB 2/2 NAV on CKD   Daily weight, strict I & Os, 1l fluid restriction, core measures Presented with SOB, orthopnea, +2pitting edema, Crackles in bases  -Net neg 1L   BNP 30,000  trop negative x 3  CXR:  Stable cardiomegaly, status post median sternotomy, thoracic aortic and mitral annulus calcification. Bilateral opacities/pleural effusions  - Echo from 2/19/20 Normal LV systolic function EF 58%, no WMA, Borderline LVH, moderat AR, moderate MR, PulmHTN, PASP 70mmHg  Echo from 10/12/20 Mild AR, MR, TR. Pulm HTN PASP 47 There is mild concentric LVH ejection fraction of 55%. Left pleural effusion.  CONTINUE Lasix 40 IV BID  Not starting ACE/ARB 2/2 NAV on CKD   -F/u repeat CXR may need repeat CT scan if not improved concern for malginancy given long time smoker  Daily weight, strict I & Os, 1l fluid restriction, core measures Presented with SOB, orthopnea, +2pitting edema, Crackles in bases  -Net neg 1L   BNP 30,000  trop negative x 3  CXR:  Stable cardiomegaly, status post median sternotomy, thoracic aortic and mitral annulus calcification. Bilateral opacities/pleural effusions  - Echo from 2/19/20 Normal LV systolic function EF 58%, no WMA, Borderline LVH, moderat AR, moderate MR, PulmHTN, PASP 70mmHg  Echo from 10/12/20 Mild AR, MR, TR. Pulm HTN PASP 47 There is mild concentric LVH ejection fraction of 55%. Left pleural effusion.  CONTINUE Lasix 40 IV BID  Not starting ACE/ARB 2/2 NAV on CKD   -F/u repeat CXR may need repeat CT scan if not improved concern for malignancy given long time smoker  Daily weight, strict I & Os, 1l fluid restriction, core measures

## 2020-10-13 NOTE — PROGRESS NOTE ADULT - SUBJECTIVE AND OBJECTIVE BOX
Interventional Cardiology PA Adult Progress Note    Subjective Assessment: Patient seen and examined at bedside, patient endorsing improvements in shortness of breath. still on 2L NC  	  MEDICATIONS:  amLODIPine   Tablet 5 milliGRAM(s) Oral daily  doxazosin 8 milliGRAM(s) Oral daily  furosemide   Injectable 40 milliGRAM(s) IV Push two times a day  metoprolol tartrate 75 milliGRAM(s) Oral two times a day  albuterol/ipratropium for Nebulization 3 milliLiter(s) Nebulizer every 6 hours  donepezil 5 milliGRAM(s) Oral at bedtime  gabapentin 100 milliGRAM(s) Oral at bedtime  pantoprazole    Tablet 40 milliGRAM(s) Oral before breakfast  atorvastatin 20 milliGRAM(s) Oral at bedtime  levothyroxine 50 MICROGram(s) Oral daily  aspirin enteric coated 81 milliGRAM(s) Oral daily  heparin   Injectable 5000 Unit(s) SubCutaneous every 8 hours      	    [PHYSICAL EXAM:  TELEMETRY:  T(C): 37.1 (10-13-20 @ 14:07), Max: 37.1 (10-13-20 @ 14:07)  HR: 63 (10-13-20 @ 13:54) (50 - 63)  BP: 156/63 (10-13-20 @ 13:54) (140/61 - 167/69)  RR: 18 (10-13-20 @ 10:49) (16 - 18)  SpO2: 91% (10-13-20 @ 13:54) (90% - 98%)  Wt(kg): --  I&O's Summary    12 Oct 2020 07:01  -  13 Oct 2020 07:00  --------------------------------------------------------  IN: 0 mL / OUT: 600 mL / NET: -600 mL    13 Oct 2020 07:01  -  13 Oct 2020 15:47  --------------------------------------------------------  IN: 360 mL / OUT: 1000 mL / NET: -640 mL      Height (cm): 157.5 (10-12 @ 17:34)  Weight (kg): 56 (10-12 @ 17:34)  BMI (kg/m2): 22.6 (10-12 @ 17:34)  BSA (m2): 1.56 (10-12 @ 17:34)  Cason:  Central/PICC/Mid Line:                                         Appearance: Normal	  HEENT:   Normal oral mucosa, PERRL, EOMI	  Neck: Supple, + JVD/ - JVD; Carotid Bruit   Cardiovascular: Normal S1 S2, No JVD, No murmurs,   Respiratory: Lungs clear to auscultation/Decreased Breath Sounds/No Rales, Rhonchi, Wheezing	  Gastrointestinal:  Soft, Non-tender, + BS	  Skin: No rashes, No ecchymoses, No cyanosis  Extremities: Normal range of motion, No clubbing, cyanosis or edema  Vascular: Peripheral pulses palpable 2+ bilaterally  Neurologic: Non-focal  Psychiatry: A & O x 3, Mood & affect appropriate                                    8.2    4.64  )-----------( 148      ( 13 Oct 2020 05:49 )             26.5     10-13    144  |  109<H>  |  50<H>  ----------------------------<  79  3.9   |  22  |  3.07<H>    Ca    8.5      13 Oct 2020 05:49  Phos  4.3     10-13  Mg     1.5     10-13    TPro  5.3<L>  /  Alb  2.8<L>  /  TBili  0.3  /  DBili  x   /  AST  16  /  ALT  27  /  AlkPhos  140<H>  10-13    proBNP:   Lipid Profile:   HgA1c:   TSH: Thyroid Stimulating Hormone, Serum: 1.749 uIU/mL (10-13 @ 05:49)    PT/INR - ( 12 Oct 2020 11:22 )   PT: 14.3 sec;   INR: 1.20          PTT - ( 12 Oct 2020 11:22 )  PTT:40.5 sec    ASSESSMENT/PLAN: 	        DVT ppx:  Dispo:     Interventional Cardiology PA Adult Progress Note    Subjective Assessment: Patient seen and examined at bedside, patient endorsing improvements in shortness of breath. still on 2L NC  	  MEDICATIONS:  amLODIPine   Tablet 5 milliGRAM(s) Oral daily  doxazosin 8 milliGRAM(s) Oral daily  furosemide   Injectable 40 milliGRAM(s) IV Push two times a day  metoprolol tartrate 75 milliGRAM(s) Oral two times a day  albuterol/ipratropium for Nebulization 3 milliLiter(s) Nebulizer every 6 hours  donepezil 5 milliGRAM(s) Oral at bedtime  gabapentin 100 milliGRAM(s) Oral at bedtime  pantoprazole    Tablet 40 milliGRAM(s) Oral before breakfast  atorvastatin 20 milliGRAM(s) Oral at bedtime  levothyroxine 50 MICROGram(s) Oral daily  aspirin enteric coated 81 milliGRAM(s) Oral daily  heparin   Injectable 5000 Unit(s) SubCutaneous every 8 hours      	    [PHYSICAL EXAM:  TELEMETRY:  T(C): 37.1 (10-13-20 @ 14:07), Max: 37.1 (10-13-20 @ 14:07)  HR: 63 (10-13-20 @ 13:54) (50 - 63)  BP: 156/63 (10-13-20 @ 13:54) (140/61 - 167/69)  RR: 18 (10-13-20 @ 10:49) (16 - 18)  SpO2: 91% (10-13-20 @ 13:54) (90% - 98%)  Wt(kg): --  I&O's Summary    12 Oct 2020 07:01  -  13 Oct 2020 07:00  --------------------------------------------------------  IN: 0 mL / OUT: 600 mL / NET: -600 mL    13 Oct 2020 07:01  -  13 Oct 2020 15:47  --------------------------------------------------------  IN: 360 mL / OUT: 1000 mL / NET: -640 mL      Height (cm): 157.5 (10-12 @ 17:34)  Weight (kg): 56 (10-12 @ 17:34)  BMI (kg/m2): 22.6 (10-12 @ 17:34)  BSA (m2): 1.56 (10-12 @ 17:34)  Cason:  Central/PICC/Mid Line:                                         Appearance: Normal	  Cardiovascular: Normal S1 S2, +JVD,   Respiratory: diffuse crackles to middle feilds  Skin: No rashes, No ecchymoses, No cyanosis  Extremities: +2 pitting edema b/l   Vascular: Peripheral pulses palpable +1 bilaterally  Neurologic: Non-focal  Psychiatry: A & O x 3, Mood & affect appropriate                                    8.2    4.64  )-----------( 148      ( 13 Oct 2020 05:49 )             26.5     10-13    144  |  109<H>  |  50<H>  ----------------------------<  79  3.9   |  22  |  3.07<H>    Ca    8.5      13 Oct 2020 05:49  Phos  4.3     10-13  Mg     1.5     10-13    TPro  5.3<L>  /  Alb  2.8<L>  /  TBili  0.3  /  DBili  x   /  AST  16  /  ALT  27  /  AlkPhos  140<H>  10-13  TSH: Thyroid Stimulating Hormone, Serum: 1.749 uIU/mL (10-13 @ 05:49)  PT/INR - ( 12 Oct 2020 11:22 )   PT: 14.3 sec;   INR: 1.20     PTT - ( 12 Oct 2020 11:22 )  PTT:40.5 sec

## 2020-10-14 ENCOUNTER — TRANSCRIPTION ENCOUNTER (OUTPATIENT)
Age: 85
End: 2020-10-14

## 2020-10-14 DIAGNOSIS — R91.8 OTHER NONSPECIFIC ABNORMAL FINDING OF LUNG FIELD: ICD-10-CM

## 2020-10-14 LAB
ANION GAP SERPL CALC-SCNC: 12 MMOL/L — SIGNIFICANT CHANGE UP (ref 5–17)
BUN SERPL-MCNC: 51 MG/DL — HIGH (ref 7–23)
CALCIUM SERPL-MCNC: 8.3 MG/DL — LOW (ref 8.4–10.5)
CHLORIDE SERPL-SCNC: 105 MMOL/L — SIGNIFICANT CHANGE UP (ref 96–108)
CO2 SERPL-SCNC: 24 MMOL/L — SIGNIFICANT CHANGE UP (ref 22–31)
CREAT SERPL-MCNC: 3.02 MG/DL — HIGH (ref 0.5–1.3)
GLUCOSE SERPL-MCNC: 111 MG/DL — HIGH (ref 70–99)
HCT VFR BLD CALC: 27.2 % — LOW (ref 34.5–45)
HGB BLD-MCNC: 8.5 G/DL — LOW (ref 11.5–15.5)
MAGNESIUM SERPL-MCNC: 1.9 MG/DL — SIGNIFICANT CHANGE UP (ref 1.6–2.6)
MCHC RBC-ENTMCNC: 26.8 PG — LOW (ref 27–34)
MCHC RBC-ENTMCNC: 31.3 GM/DL — LOW (ref 32–36)
MCV RBC AUTO: 85.8 FL — SIGNIFICANT CHANGE UP (ref 80–100)
NRBC # BLD: 0 /100 WBCS — SIGNIFICANT CHANGE UP (ref 0–0)
PLATELET # BLD AUTO: 155 K/UL — SIGNIFICANT CHANGE UP (ref 150–400)
POTASSIUM SERPL-MCNC: 3.9 MMOL/L — SIGNIFICANT CHANGE UP (ref 3.5–5.3)
POTASSIUM SERPL-SCNC: 3.9 MMOL/L — SIGNIFICANT CHANGE UP (ref 3.5–5.3)
PROCALCITONIN SERPL-MCNC: 0.09 NG/ML — SIGNIFICANT CHANGE UP (ref 0.02–0.1)
RBC # BLD: 3.17 M/UL — LOW (ref 3.8–5.2)
RBC # FLD: 15.8 % — HIGH (ref 10.3–14.5)
SODIUM SERPL-SCNC: 141 MMOL/L — SIGNIFICANT CHANGE UP (ref 135–145)
WBC # BLD: 5.58 K/UL — SIGNIFICANT CHANGE UP (ref 3.8–10.5)
WBC # FLD AUTO: 5.58 K/UL — SIGNIFICANT CHANGE UP (ref 3.8–10.5)

## 2020-10-14 PROCEDURE — 76770 US EXAM ABDO BACK WALL COMP: CPT | Mod: 26,59

## 2020-10-14 PROCEDURE — 93976 VASCULAR STUDY: CPT | Mod: 26

## 2020-10-14 PROCEDURE — 99232 SBSQ HOSP IP/OBS MODERATE 35: CPT

## 2020-10-14 PROCEDURE — 71250 CT THORAX DX C-: CPT | Mod: 26

## 2020-10-14 RX ORDER — FUROSEMIDE 40 MG
40 TABLET ORAL ONCE
Refills: 0 | Status: COMPLETED | OUTPATIENT
Start: 2020-10-14 | End: 2020-10-14

## 2020-10-14 RX ORDER — FUROSEMIDE 40 MG
80 TABLET ORAL DAILY
Refills: 0 | Status: DISCONTINUED | OUTPATIENT
Start: 2020-10-15 | End: 2020-10-15

## 2020-10-14 RX ORDER — POTASSIUM CHLORIDE 20 MEQ
20 PACKET (EA) ORAL ONCE
Refills: 0 | Status: COMPLETED | OUTPATIENT
Start: 2020-10-14 | End: 2020-10-14

## 2020-10-14 RX ORDER — TIOTROPIUM BROMIDE AND OLODATEROL 3.124; 2.736 UG/1; UG/1
2 SPRAY, METERED RESPIRATORY (INHALATION) DAILY
Refills: 0 | Status: DISCONTINUED | OUTPATIENT
Start: 2020-10-14 | End: 2020-10-15

## 2020-10-14 RX ADMIN — Medication 40 MILLIGRAM(S): at 05:26

## 2020-10-14 RX ADMIN — Medication 75 MILLIGRAM(S): at 21:30

## 2020-10-14 RX ADMIN — Medication 40 MILLIGRAM(S): at 18:15

## 2020-10-14 RX ADMIN — Medication 50 MICROGRAM(S): at 05:27

## 2020-10-14 RX ADMIN — Medication 8 MILLIGRAM(S): at 07:14

## 2020-10-14 RX ADMIN — Medication 3 MILLILITER(S): at 08:37

## 2020-10-14 RX ADMIN — HEPARIN SODIUM 5000 UNIT(S): 5000 INJECTION INTRAVENOUS; SUBCUTANEOUS at 05:25

## 2020-10-14 RX ADMIN — PANTOPRAZOLE SODIUM 40 MILLIGRAM(S): 20 TABLET, DELAYED RELEASE ORAL at 06:30

## 2020-10-14 RX ADMIN — DONEPEZIL HYDROCHLORIDE 5 MILLIGRAM(S): 10 TABLET, FILM COATED ORAL at 21:30

## 2020-10-14 RX ADMIN — Medication 3 MILLILITER(S): at 18:14

## 2020-10-14 RX ADMIN — Medication 81 MILLIGRAM(S): at 11:22

## 2020-10-14 RX ADMIN — ATORVASTATIN CALCIUM 20 MILLIGRAM(S): 80 TABLET, FILM COATED ORAL at 21:30

## 2020-10-14 RX ADMIN — HEPARIN SODIUM 5000 UNIT(S): 5000 INJECTION INTRAVENOUS; SUBCUTANEOUS at 14:00

## 2020-10-14 RX ADMIN — HEPARIN SODIUM 5000 UNIT(S): 5000 INJECTION INTRAVENOUS; SUBCUTANEOUS at 21:30

## 2020-10-14 RX ADMIN — AMLODIPINE BESYLATE 5 MILLIGRAM(S): 2.5 TABLET ORAL at 05:26

## 2020-10-14 RX ADMIN — Medication 3 MILLILITER(S): at 05:26

## 2020-10-14 RX ADMIN — Medication 3 MILLILITER(S): at 21:30

## 2020-10-14 RX ADMIN — TIOTROPIUM BROMIDE AND OLODATEROL 2 PUFF(S): 3.124; 2.736 SPRAY, METERED RESPIRATORY (INHALATION) at 21:34

## 2020-10-14 RX ADMIN — Medication 75 MILLIGRAM(S): at 11:22

## 2020-10-14 RX ADMIN — Medication 20 MILLIEQUIVALENT(S): at 07:36

## 2020-10-14 RX ADMIN — Medication 8 MILLIGRAM(S): at 18:14

## 2020-10-14 RX ADMIN — GABAPENTIN 100 MILLIGRAM(S): 400 CAPSULE ORAL at 21:30

## 2020-10-14 NOTE — PROGRESS NOTE ADULT - ASSESSMENT
86 y/o female poor historian with PMHx HTN, HLD, CAD s/p CABG 2015 Dr. Blunt , diastolic CHF, renal artery stenosis s/p renal artery stent >20yrs ago, Carotid artery stenosis, Peripheral artery disease, Anemia w/ PUD,  CKD (baseline creatinine 2.4-3)  who presented to [facility] endorsing shortness of breath cough and LE edema and orthopnea requiring her to sleep in a chair for 2 week. Patient admitted to [facility] for acute on chronic diastolic CHF. Echo 10/12/20 showed  Mild AR, MR, TR. Pulm HTN PASP 47, EF 55%, not worsened from prior. Patient currently on IV diuresis.    86 y/o female poor historian with PMHx HTN, HLD, CAD s/p CABG 2015 Dr. Blunt , diastolic CHF, renal artery stenosis s/p renal artery stent >20yrs ago, Carotid artery stenosis, Peripheral artery disease, Anemia w/ PUD,  CKD (baseline creatinine 2.4-3)  who presented to [facility] endorsing shortness of breath cough and LE edema and orthopnea requiring her to sleep in a chair for 2 week. Patient admitted to [facility] for acute on chronic diastolic CHF. Echo 10/12/20 showed  Mild AR, MR, TR. Pulm HTN PASP 47, EF 55%, not worsened from prior. Patient currently on IV diuresis.  Pt had CT chest showing ground glass opacity concerning for chronic inflammatory process

## 2020-10-14 NOTE — DISCHARGE NOTE PROVIDER - CARE PROVIDER_API CALL
Hermes Crooks S  CARDIOVASCULAR DISEASE  [facility] [place] [address], [address] [place] [id number]  Phone: [phone]  Fax: [phone]  Scheduled Appointment: 10/20/2020 09:00 AM

## 2020-10-14 NOTE — PROGRESS NOTE ADULT - SUBJECTIVE AND OBJECTIVE BOX
Interventional Cardiology PA Adult Progress Note  Subjective Assessment: patient seen and examined at bedside. feeling much better, shortness of breath significantly improved. Patient requesting to go home today  	  MEDICATIONS:  amLODIPine   Tablet 5 milliGRAM(s) Oral daily  doxazosin 8 milliGRAM(s) Oral <User Schedule>  furosemide    Tablet 40 milliGRAM(s) Oral once  metoprolol tartrate 75 milliGRAM(s) Oral two times a day  albuterol/ipratropium for Nebulization 3 milliLiter(s) Nebulizer every 6 hours  donepezil 5 milliGRAM(s) Oral at bedtime  gabapentin 100 milliGRAM(s) Oral at bedtime  pantoprazole    Tablet 40 milliGRAM(s) Oral before breakfast  atorvastatin 20 milliGRAM(s) Oral at bedtime  levothyroxine 50 MICROGram(s) Oral daily  aspirin enteric coated 81 milliGRAM(s) Oral daily  heparin   Injectable 5000 Unit(s) SubCutaneous every 8 hours      	    [PHYSICAL EXAM:  TELEMETRY:  T(C): 37 (10-14-20 @ 13:15), Max: 37 (10-14-20 @ 13:15)  HR: 52 (10-14-20 @ 13:36) (48 - 67)  BP: 130/62 (10-14-20 @ 13:36) (130/62 - 167/73)  RR: 18 (10-14-20 @ 13:36) (18 - 18)  SpO2: 92% (10-14-20 @ 13:36) (92% - 98%)  Wt(kg): --  I&O's Summary    13 Oct 2020 07:01  -  14 Oct 2020 07:00  --------------------------------------------------------  IN: 720 mL / OUT: 2400 mL / NET: -1680 mL    14 Oct 2020 07:01  -  14 Oct 2020 14:16  --------------------------------------------------------  IN: 240 mL / OUT: 600 mL / NET: -360 mL        Cason:  Central/PICC/Mid Line:                                         Appearance: Normal		  Neck: Supple, - JVD;   Cardiovascular: Normal S1 S2, No JVD, No murmurs,   Respiratory: Lungs clear to auscultation No Rales, Rhonchi, Wheezing	  Extremities: +1edema  Vascular: Peripheral pulses palpable 2+ bilaterally  Neurologic: Non-focal  Psychiatry: A & O x 3, Mood & affect appropriate      	    ECG:  	  RADIOLOGY:   DIAGNOSTIC TESTING:  [ ] Echocardiogram:  [ ]  Catheterization:  [ ] Stress Test:    [ ] SANJAY  OTHER: 	    LABS:	 	  CARDIAC MARKERS:                                  8.5    5.58  )-----------( 155      ( 14 Oct 2020 05:57 )             27.2     10-14    141  |  105  |  51<H>  ----------------------------<  111<H>  3.9   |  24  |  3.02<H>    Ca    8.3<L>      14 Oct 2020 05:57  Phos  4.3     10-13  Mg     1.9     10-14    TPro  5.3<L>  /  Alb  2.8<L>  /  TBili  0.3  /  DBili  x   /  AST  16  /  ALT  27  /  AlkPhos  140<H>  10-13    proBNP: Serum Pro-Brain Natriuretic Peptide: 58675 pg/mL (10-14 @ 05:57

## 2020-10-14 NOTE — DISCHARGE NOTE PROVIDER - HOSPITAL COURSE
84 y/o female poor historian with PMHx HTN, HLD, CAD s/p CABG 2015 Dr. Blunt , diastolic CHF, renal artery stenosis s/p renal artery stent >20yrs ago, Carotid artery stenosis, Peripheral artery disease, Anemia w/ PUD,  CKD (baseline creatinine 1.4)  who presented to [facility] endorsing shortness of breath cough and LE edema and orthopnea requiring her to sleep in a chair for 2 week. She was intially started on tessalon perils which helped the cough but did not improve the shortness of breath.  Since experiencing the shortness of breath, Pts cardiologist Dr. Crooks and she increased her Lasix from 80mg M/W/F to daily for the last 4 days. Patient also endorsing dyspnea on exertion of less than 1 block for the last few months. Patients denies chest pain. Patient endorsing occasional palpitations Patient endorsing compliance w/ medications however daughter states that compliance with medication has been questionable over the last few months, as patient is becoming more forgetful. In [facility] ED vital signs 168/70 57bpm, 97.7F, 99% on RA Labs significant for H/H  8.6/27.1 PT 14.3 PTT 40.5 INR 1.20 Cr 3.05, Bun 49, K 3.6 Trop negative x 1,  BNP 30k, CXR: Stable cardiomegaly, status post median sternotomy, thoracic aortic and mitral annulus calcification. Bilateral opacities/pleural effusions EKG: Sinus bradycardia w/ PACs w/ incomplete LBBB and New TWI from office EKG earlier this AM  in Patient received Lasix 40mg IV x 1. Patient admitted to [facility] for acute on chronic diastolic CHF         Problem/Plan - 1:  ·  Problem: Acute on chronic diastolic (congestive) heart failure.  Plan: Presented with SOB, orthopnea, +1 pitting edema, Crackles in bases  -Net neg 1L   BNP 30,000  trop negative x 3  CXR:  Stable cardiomegaly, status post median sternotomy, thoracic aortic and mitral annulus calcification. Bilateral opacities/pleural effusions  - Echo from 2/19/20 Normal LV systolic function EF 58%, no WMA, Borderline LVH, moderate AR, moderate MR, PulmHTN, PASP 70mmHg  Echo from 10/12/20 Mild AR, MR, TR. Pulm HTN PASP 47 There is mild concentric LVH ejection fraction of 55%. Left pleural effusion.  - Lasix 40 IV BID will get lasix 40 po for evening dose and transition to Lasix 80mg PO daily   Not starting ACE/ARB - per renal   Daily weight, strict I & Os, 1l fluid restriction, core measures.      Problem/Plan - 2:  ·  Problem: Essential hypertension.  Plan: - BPs improved to 130s/80s w/ increased doxazosin  - continue amlodipine 5mg and Metoprolol tartrate 75mg BID  - would benefit from increase betablocker as patient has frequent Atach and some NSVT however patient also has episodes of bradycardia to 40s  - continue doxazosin 8mg BID  -continue to monitor.      Problem/Plan - 3:  ·  Problem: Ground glass opacity present on imaging of lung.  Plan: - Pt when for CT chest for evaluation of pleural effusion  - CT chest 10/14/20 Cardiomegaly. Small bibasilar pleural effusions and interlobular septal thickening compatible with pulmonary venous congestion. More peripheral reticular, groundglass and some patchy areas of peripheral consolidation more pronounced in the upper and midlung zones are noted. There may be some bronchiectasis/bronchiolectasis to suggest pulmonary fibrosis. No honeycomb lung formation. Air-trapping is noted. These findings are not consistent with UIP/IPF. Abbreviated differential includes atypical infectious and/or inflammatory etiologies such as chronic hypersensitivity pneumonia, stage IV sarcoidosis, pneumoconiosis, and subacute and/or chronic sequela of atypical and viral pneumonias such as Covid 19. Mild mediastinal lymphadenopathy.  -Covid Antibody sent f/u result   -Procal sent f/u result  - pulm Dr. Cuevas consulted.       Problem/Plan - 4:  ·  Problem: NAV (acute kidney injury).  Plan: Baseline Cr around 2.4-3 per Dr. Crooks, Cr stable at 3 BUN 49, Hx of renal artery stenosis s/p stenting >20 yrs ago   NAV on CKD likely cardiorenal syndrome, pre-renal vs possibly ischemic ATN   - Renal consulted, f/u recs  - Renal artery US  to evaluate stent  - daily BMP and urine lytes.      Problem/Plan - 5:  ·  Problem: Coronary artery disease.  Plan: - S/p CABG in 2015 for 3vCAD, Currently CP free  - Trop negative x 3  - EKG: Sinus bradycardia w/ PACs w/ incomplete LBBB and New TWI from office EKG earlier this AM  -continue aspirin and atorvastatin.      Problem/Plan - 6:  Problem: Anemia. Plan: per Dr. Crooks Pt anemia to 8s starting earlier this summer, had EGD showing PUD- started on Omeprazole + Tx for H. Pylori   -stable at 8.5 today  - continue pantoprazole.     Problem/Plan - 7:  ·  Problem: Type 2 diabetes mellitus.  Plan: -on no home meds  a1c 5.9     Case discussed w/ Dr. Crooks   VTE: sub q heparin.        INCOMPLETE**    86 y/o female poor historian with PMHx HTN, HLD, CAD s/p CABG 2015 Dr. Blunt , diastolic CHF, renal artery stenosis s/p renal artery stent >20yrs ago, Carotid artery stenosis, Peripheral artery disease, Anemia w/ PUD,  CKD (baseline creatinine 1.4)  who presented to [facility] endorsing shortness of breath cough and LE edema and orthopnea requiring her to sleep in a chair for 2 week. She was intially started on tessalon perils which helped the cough but did not improve the shortness of breath.  Since experiencing the shortness of breath, Pts cardiologist Dr. Crooks and she increased her Lasix from 80mg M/W/F to daily for the last 4 days. Patient also endorsing dyspnea on exertion of less than 1 block for the last few months. Patients denies chest pain. Patient endorsing occasional palpitations Patient endorsing compliance w/ medications however daughter states that compliance with medication has been questionable over the last few months, as patient is becoming more forgetful. In [facility] ED vital signs 168/70 57bpm, 97.7F, 99% on RA Labs significant for H/H  8.6/27.1 PT 14.3 PTT 40.5 INR 1.20 Cr 3.05, Bun 49, K 3.6 Trop negative x 1,  BNP 30k, CXR: Stable cardiomegaly, status post median sternotomy, thoracic aortic and mitral annulus calcification. Bilateral opacities/pleural effusions EKG: Sinus bradycardia w/ PACs w/ incomplete LBBB and New TWI from office EKG earlier this AM  in Patient received Lasix 40mg IV x 1. Patient admitted to [facility] for acute on chronic diastolic CHF      # Acute on chronic diastolic (congestive) heart failure.   Patient presented with SOB, orthopnea, +1 pitting edema, Crackles in bases, BNP 30,000, trop negative x 3  chest Xray showed Stable cardiomegaly, status post median sternotomy, thoracic aortic and mitral annulus calcification. Bilateral opacities/pleural effusions  Echo from 2/19/20 Normal LV systolic function EF 58%, no WMA, Borderline LVH, moderate AR, moderate MR, PulmHTN, PASP 70mmHg  Echo from 10/12/20 Mild AR, MR, TR. Pulm HTN PASP 47 There is mild concentric LVH ejection fraction of 55%. Left pleural effusion.  Patient IV diuresised w/ Lasix 40mg IV BID and has now transitioned to PO lasix 80mg daily  Patient was not started on ACE/ARB giving CKD   Patient continued on Lopressor 75mg PO BID    # HTN  Patients blood pressure was elevated during admission  Patient continued on amlodipine 5mg and Metoprolol tartrate 75mg BID  patient doxazosin was increased to doxazosin 8mg BID with improvements in blood pressure      #Ground glass opacity present on imaging of lung.    Pt underwent CT chest for evaluation of pleural effusion on 10/14/20 it showed Cardiomegaly. Small bibasilar pleural effusions and interlobular septal thickening compatible with pulmonary venous congestion. More peripheral reticular, groundglass and some patchy areas of peripheral consolidation more pronounced in the upper and midlung zones are noted. There may be some bronchiectasis/bronchiolectasis to suggest pulmonary fibrosis. No honeycomb lung formation. Air-trapping is noted. These findings are not consistent with UIP/IPF. Abbreviated differential includes atypical infectious and/or inflammatory etiologies such as chronic hypersensitivity pneumonia, stage IV sarcoidosis, pneumoconiosis, and subacute and/or chronic sequela of atypical and viral pneumonias such as Covid 19. Mild mediastinal lymphadenopathy.  -Covid Antibody was  -Procal was within normal limits  - pulm Dr. Cuevas consulted with recommendation to _____________      #CKD  Patient has a known history of CKD, the baseline Cr was 2.4-3 and has history of renal artery stenosis w/ stenting >20 yrs ago   -Patients Cr has been stable at 3  -Patient was seen by renal team   -Patient had renal artery ultra sounds    ·  Problem: NAV (acute kidney injury).  Plan: Baseline Cr around 2.4-3 per Dr. Crooks, Cr stable at 3 BUN 49, Hx of renal artery stenosis s/p stenting >20 yrs ago   NAV on CKD likely cardiorenal syndrome, pre-renal vs possibly ischemic ATN   - Renal consulted, f/u recs  - Renal artery US  to evaluate stent  - daily BMP and urine lytes.      Problem/Plan - 5:  ·  Problem: Coronary artery disease.  Plan: - S/p CABG in 2015 for 3vCAD, Currently CP free  - Trop negative x 3  - EKG: Sinus bradycardia w/ PACs w/ incomplete LBBB and New TWI from office EKG earlier this AM  -continue aspirin and atorvastatin.      Problem/Plan - 6:  Problem: Anemia. Plan: per Dr. Crooks Pt anemia to 8s starting earlier this summer, had EGD showing PUD- started on Omeprazole + Tx for H. Pylori   -stable at 8.5 today  - continue pantoprazole.     Problem/Plan - 7:  ·  Problem: Type 2 diabetes mellitus.  Plan: -on no home meds  a1c 5.9     Case discussed w/ Dr. Crooks   VTE: sub q heparin.        INCOMPLETE**    84 y/o female poor historian with PMHx HTN, HLD, CAD s/p CABG 2015 Dr. Blunt , diastolic CHF, renal artery stenosis s/p renal artery stent >20yrs ago, Carotid artery stenosis, Peripheral artery disease, Anemia w/ PUD,  CKD (baseline creatinine 1.4)  who presented to [facility] endorsing shortness of breath cough and LE edema and orthopnea requiring her to sleep in a chair for 2 week. She was intially started on tessalon perils which helped the cough but did not improve the shortness of breath.  Since experiencing the shortness of breath, Pts cardiologist Dr. Crooks and she increased her Lasix from 80mg M/W/F to daily for the last 4 days. Patient also endorsing dyspnea on exertion of less than 1 block for the last few months. Patients denies chest pain. Patient endorsing occasional palpitations Patient endorsing compliance w/ medications however daughter states that compliance with medication has been questionable over the last few months, as patient is becoming more forgetful. In [facility] ED vital signs 168/70 57bpm, 97.7F, 99% on RA Labs significant for H/H  8.6/27.1 PT 14.3 PTT 40.5 INR 1.20 Cr 3.05, Bun 49, K 3.6 Trop negative x 1,  BNP 30k, CXR: Stable cardiomegaly, status post median sternotomy, thoracic aortic and mitral annulus calcification. Bilateral opacities/pleural effusions EKG: Sinus bradycardia w/ PACs w/ incomplete LBBB and New TWI from office EKG earlier this AM  in Patient received Lasix 40mg IV x 1. Patient admitted to [facility] for acute on chronic diastolic CHF      # Acute on chronic diastolic (congestive) heart failure.   Patient presented with SOB, orthopnea, +1 pitting edema, Crackles in bases, BNP 30,000, trop negative x 3  chest Xray showed Stable cardiomegaly, status post median sternotomy, thoracic aortic and mitral annulus calcification. Bilateral opacities/pleural effusions  Echo from 2/19/20 Normal LV systolic function EF 58%, no WMA, Borderline LVH, moderate AR, moderate MR, PulmHTN, PASP 70mmHg  Echo from 10/12/20 Mild AR, MR, TR. Pulm HTN PASP 47 There is mild concentric LVH ejection fraction of 55%. Left pleural effusion.  Patient IV diuresised w/ Lasix 40mg IV BID and has now transitioned to PO lasix 80mg daily  Patient was not started on ACE/ARB giving CKD   Patient continued on Lopressor 75mg PO BID  Patient will continue Lasix 80mg daily, and Lopressor 75mg BID on discharge  # HTN  Patients blood pressure was elevated during admission  Patient continued on amlodipine 5mg and Metoprolol tartrate 75mg BID  patient doxazosin was increased to doxazosin 8mg BID with improvements in blood pressure  Patient will continue these medications on discharge      #Ground glass opacity present on imaging of lung.    Pt underwent CT chest for evaluation of pleural effusion on 10/14/20 it showed Cardiomegaly. Small bibasilar pleural effusions and interlobular septal thickening compatible with pulmonary venous congestion. More peripheral reticular, groundglass and some patchy areas of peripheral consolidation more pronounced in the upper and midlung zones are noted. There may be some bronchiectasis/bronchiolectasis to suggest pulmonary fibrosis. No honeycomb lung formation. Air-trapping is noted. These findings are not consistent with UIP/IPF. Abbreviated differential includes atypical infectious and/or inflammatory etiologies such as chronic hypersensitivity pneumonia, stage IV sarcoidosis, pneumoconiosis, and subacute and/or chronic sequela of atypical and viral pneumonias such as Covid 19. Mild mediastinal lymphadenopathy.  -Covid Antibody was  -Procal was within normal limits  - pulm Dr. Cuevas consulted with recommendation to _____________      #CKD  Patient has a known history of CKD, the baseline Cr was 2.4-3 and has history of renal artery stenosis w/ stenting >20 yrs ago   -Patients Cr has been stable at 3  -Patient was seen by renal team   -Patient had renal U/S showing No renal artery stenosis. There is a definite right renal artery stent and a possible left renal artery stent. Small kidneys with increased echogenicity, consistent with chronic medical renal disease.        #Coronary artery disease  Patient is S/p CABG in 2015 for 3vCAD and has been chest pain free Trops negative x 3, EKG: Sinus bradycardia w/ PACs w/ incomplete LBBB and New TWI from office EKG earlier prior to arrival   patient continued on aspirin and atorvastatin and will continue on discharge     #Anemia  per Dr. Crooks Pt anemia to 8s starting earlier this summer, had EGD showing PUD- started on Omeprazole + Tx for H. Pylori Patients hemoglobin has been stable >8 on admission.  patient was on pantop  - continue pantoprazole.     Problem/Plan - 7:  ·  Problem: Type 2 diabetes mellitus.  Plan: -on no home meds  a1c 5.9     Case discussed w/ Dr. Crooks   VTE: sub q heparin.      86 y/o female poor historian with PMHx HTN, HLD, CAD s/p CABG 2015 Dr. Blunt , diastolic CHF, renal artery stenosis s/p renal artery stent >20yrs ago, Carotid artery stenosis, Peripheral artery disease, Anemia w/ PUD,  CKD (baseline creatinine 1.4)  who presented to [facility] endorsing shortness of breath cough and LE edema and orthopnea requiring her to sleep in a chair for 2 week. She was intially started on tessalon perils which helped the cough but did not improve the shortness of breath.  Since experiencing the shortness of breath, Pts cardiologist Dr. Crooks and she increased her Lasix from 80mg M/W/F to daily for the last 4 days. Patient also endorsing dyspnea on exertion of less than 1 block for the last few months. Patients denies chest pain. Patient endorsing occasional palpitations Patient endorsing compliance w/ medications however daughter states that compliance with medication has been questionable over the last few months, as patient is becoming more forgetful. In [facility] ED vital signs 168/70 57bpm, 97.7F, 99% on RA Labs significant for H/H  8.6/27.1 PT 14.3 PTT 40.5 INR 1.20 Cr 3.05, Bun 49, K 3.6 Trop negative x 1,  BNP 30k, CXR: Stable cardiomegaly, status post median sternotomy, thoracic aortic and mitral annulus calcification. Bilateral opacities/pleural effusions EKG: Sinus bradycardia w/ PACs w/ incomplete LBBB and New TWI from office EKG earlier this AM  in Patient received Lasix 40mg IV x 1. Patient admitted to [facility] for acute on chronic diastolic CHF      # Acute on chronic diastolic (congestive) heart failure.   Patient presented with SOB, orthopnea, +1 pitting edema, Crackles in bases, BNP 30,000, trop negative x 3  chest Xray showed Stable cardiomegaly, status post median sternotomy, thoracic aortic and mitral annulus calcification. Bilateral opacities/pleural effusions  Echo from 2/19/20 Normal LV systolic function EF 58%, no WMA, Borderline LVH, moderate AR, moderate MR, PulmHTN, PASP 70mmHg  Echo from 10/12/20 Mild AR, MR, TR. Pulm HTN PASP 47 There is mild concentric LVH ejection fraction of 55%. Left pleural effusion.  Patient IV diuresised w/ Lasix 40mg IV BID and has now transitioned to PO lasix 80mg daily  Patient was not started on ACE/ARB giving CKD   Patient continued on Lopressor 75mg PO BID  Patient will continue Lasix 80mg daily, and Lopressor 75mg BID on discharge  # HTN  Patients blood pressure was elevated during admission  Patient continued on amlodipine 5mg and Metoprolol tartrate 75mg BID  patient doxazosin was increased to doxazosin 8mg BID with improvements in blood pressure  Patient will continue these medications on discharge      #Ground glass opacity present on imaging of lung.    Pt underwent CT chest for evaluation of pleural effusion on 10/14/20 it showed Cardiomegaly. Small bibasilar pleural effusions and interlobular septal thickening compatible with pulmonary venous congestion. More peripheral reticular, groundglass and some patchy areas of peripheral consolidation more pronounced in the upper and midlung zones are noted. There may be some bronchiectasis/bronchiolectasis to suggest pulmonary fibrosis. No honeycomb lung formation. Air-trapping is noted. These findings are not consistent with UIP/IPF. Abbreviated differential includes atypical infectious and/or inflammatory etiologies such as chronic hypersensitivity pneumonia, stage IV sarcoidosis, pneumoconiosis, and subacute and/or chronic sequela of atypical and viral pneumonias such as Covid 19. Mild mediastinal lymphadenopathy.  -Covid Antibody was  -Procal was within normal limits  - pulm Dr. Cuevas consulted with recommendation to _____________      #CKD  Patient has a known history of CKD, the baseline Cr was 2.4-3 and has history of renal artery stenosis w/ stenting >20 yrs ago   -Patients Cr has been stable at 3  -Patient was seen by renal team   -Patient had renal U/S showing No renal artery stenosis. There is a definite right renal artery stent and a possible left renal artery stent. Small kidneys with increased echogenicity, consistent with chronic medical renal disease.        #Coronary artery disease  Patient is S/p CABG in 2015 for 3vCAD and has been chest pain free Trops negative x 3, EKG: Sinus bradycardia w/ PACs w/ incomplete LBBB and New TWI from office EKG earlier prior to arrival   patient continued on aspirin and atorvastatin and will continue on discharge     #Anemia  per Dr. Crooks Pt anemia to 8s starting earlier this summer, had EGD showing PUD- started on Omeprazole + Tx for H. Pylori Patients hemoglobin has been stable >8 on admission.  patient was on pantop  - continue pantoprazole.     Problem/Plan - 7:  ·  Problem: Type 2 diabetes mellitus.  Plan: -on no home meds  a1c 5.9     Case discussed w/ Dr. Crooks   VTE: sub q heparin.      86 y/o female poor historian with PMHx HTN, HLD, CAD s/p CABG 2015 Dr. Blunt , diastolic CHF, renal artery stenosis s/p renal artery stent >20yrs ago, Carotid artery stenosis, Peripheral artery disease, Anemia w/ PUD,  CKD (baseline creatinine 1.4)  presented to [facility] ED with c/o worsening who presented to [facility] endorsing shortness of breath cough and LE edema and orthopnea requiring her to sleep in a chair for 2 week and is admitted with acute on chronic diastolic CHF exacerbation in the setting of fluid overload.   Hospital course significant for elevated /70, BNP 30k and CXR revealing cardiomegaly, status post median sternotomy, thoracic aortic and mitral annulus calcification. Bilateral opacities/pleural effusions.  EKG revealing SB w/ PACs w/ incomplete LBBB, new TWI (changed from office EKG).  Repeat ECHO 10/12/20 Mild AR, MR, TR. Pulm HTN PASP 47 There is mild concentric LVH ejection fraction of 55%. Left pleural effusion.   Prior ECHO 2/19/20 Normal LV systolic function EF 58%, no WMA, Borderline LVH, moderate AR, moderate MR, Pulm HTN, PASP 70mmHg.  Pt is s/p diuresis w/ Lasix 40mg IV BID and has now transitioned to PO Lasix 80mg daily.  She is to continue on amlodipine 5mg and Metoprolol tartrate 75mg BID and is to follow up with Dr. Medina in one week for heart failure evaluation.   In addition, pt noted with persistent cough; likely in the setting of CHF.  CT Chest revealed Cardiomegaly. Small bibasilar pleural effusions, interlobular septal thickening compatible with pulmonary venous congestion. More peripheral reticular, groundglass and some patchy areas of peripheral consolidation more pronounced in the upper and midlung zones are noted. There may be some bronchiectasis/bronchiolectasis to suggest pulmonary fibrosis. No honeycomb lung formation. Air-trapping is noted.  COVID PCR and Antibody negative, Pulm Dr. Cuevas evaluated, no evidence of COVID, positive PAH, thinks pt is possibly in COPD exacerbation given history of smoking, recommends to continue nebulizers as prescribed.  Pt will be referred to outpatient Pulm, Dr. Olmedo for further evaluation.  Pt with history of CKD, the baseline Cr was 2.4-3 and has history of renal artery stenosis w/ stenting >20 yrs ago.   Renal U/S showing No renal artery stenosis. There is a definite right renal artery stent and a possible left renal artery stent. Small kidneys with increased echogenicity, consistent with chronic medical renal disease.  Pt with history of known CAD s/p CABG in 2015 for 3vCAD and has been chest pain free since admit.  R/O ACS with Trops negative x 3.  She is recommended to continue her current maintenance dose of ASA and atorvastatin.  Her anemia has remained stable since admit with H/H ( 8.3/26.4) at discharge  Pt seen at bedside today, examined found NAD, HD stable, denies any complaints of CP, SOB or palpitations at this time.  VSS.  Appears euvolemic, lungs clear.  Tele Monitor/Labs reviewed.   hematoma or bleed.    Pt is deemed stable for discharge per DR Medina today with follow up in one week for post discharge heart failure check-up.   Rx sent to pt preferred pharmacy.  Discharge plans and instruction discussed with pt who is agreeable with plan.  Pt is advised to return to the nearest ED if worsening symptoms of CP, SOB or palpitations or severe bleeding from access site occurs.             84 y/o female poor historian with PMHx HTN, HLD, CAD s/p CABG 2015 Dr. Blunt , diastolic CHF, renal artery stenosis s/p renal artery stent >20yrs ago, Carotid artery stenosis, Peripheral artery disease, Anemia w/ PUD,  CKD (baseline creatinine 1.4)  presented to [facility] ED with c/o worsening who presented to [facility] endorsing shortness of breath cough and LE edema and orthopnea requiring her to sleep in a chair for 2 week and is admitted with acute on chronic diastolic CHF exacerbation in the setting of fluid overload.   Hospital course significant for elevated /70, BNP 30k and CXR revealing cardiomegaly, status post median sternotomy, thoracic aortic and mitral annulus calcification. Bilateral opacities/pleural effusions.  EKG revealing SB w/ PACs w/ incomplete LBBB, new TWI (changed from office EKG).  Repeat ECHO 10/12/20 Mild AR, MR, TR. Pulm HTN PASP 47 There is mild concentric LVH ejection fraction of 55%. Left pleural effusion.   Prior ECHO 2/19/20 Normal LV systolic function EF 58%, no WMA, Borderline LVH, moderate AR, moderate MR, Pulm HTN, PASP 70mmHg.  Pt is s/p diuresis w/ Lasix 40mg IV BID and has now transitioned to PO Lasix 80mg daily.  She is to continue on amlodipine 5mg and Metoprolol tartrate 75mg BID and is to follow up with Dr. Medina in one week for heart failure evaluation.   In addition, pt noted with persistent cough; likely in the setting of CHF.  CT Chest revealed Cardiomegaly. Small bibasilar pleural effusions, interlobular septal thickening compatible with pulmonary venous congestion. More peripheral reticular, groundglass and some patchy areas of peripheral consolidation more pronounced in the upper and midlung zones are noted. There may be some bronchiectasis/bronchiolectasis to suggest pulmonary fibrosis. No honeycomb lung formation. Air-trapping is noted.  COVID PCR and Antibody negative, Pulm Dr. Cuevas evaluated, no evidence of COVID, positive PAH, thinks pt is possibly in COPD exacerbation given history of smoking, recommends to continue nebulizers as prescribed.  Pt will be referred to outpatient Pulm, Dr. Olmedo for further evaluation.  Pt with history of CKD, the baseline Cr was 2.4-3 and has history of renal artery stenosis w/ stenting >20 yrs ago.   Renal U/S showing No renal artery stenosis. There is a definite right renal artery stent and a possible left renal artery stent. Small kidneys with increased echogenicity, consistent with chronic medical renal disease.  Pt with history of known CAD s/p CABG in 2015 for 3vCAD and has been chest pain free since admit.  R/O ACS with Trops negative x 3.  She is recommended to continue her current maintenance dose of ASA and atorvastatin.  Her anemia has remained stable since admit with H/H ( 8.3/26.4) at discharge  Pt seen at bedside today, examined found NAD, HD stable, denies any complaints of CP, SOB or palpitations at this time.  VSS.  Appears euvolemic, lungs clear.  Tele Monitor/Labs reviewed.  Pt is deemed stable for discharge per DR Medina today with follow up in one week for post discharge heart failure check-up.   Rx sent to pt preferred pharmacy.  Discharge plans and instruction discussed with pt who is agreeable with plan.  Pt is advised to return to the nearest ED if worsening symptoms of CP, SOB or palpitations or severe bleeding from access site occurs.

## 2020-10-14 NOTE — PROGRESS NOTE ADULT - SUBJECTIVE AND OBJECTIVE BOX
PUD CCM  86 y/o female [place]  with PMHx HTN, HLD, CAD s/p CABG 2015 Dr. Blunt , diastolic CHF, renal artery stenosis s/p renal artery stent >20yrs ago, Carotid artery stenosis, Peripheral artery disease, Anemia w/ PUD,  CKD (baseline creatinine 1.4)  who presented to [facility] endorsing shortness of breath cough and LE edema and orthopnea requiring her to sleep in a chair for 2 week. She was initially started on tessalon perils which helped the cough but did not improve the shortness of breath.  Since experiencing the shortness of breath, Pts cardiologist Dr. Crooks and she increased her Lasix from 80mg M/W/F to daily for the last 4 days. Patient also endorsing dyspnea on exertion of less than 1 block for the last few months. Patients denies chest pain. Patient endorsing occasional palpitations Patient endorsing compliance w/ medications however daughter states that compliance with medication has been questionable over the last few months, as patient is becoming more forgetful. In [facility] ED vital signs 168/70 57bpm, 97.7F, 99% on RA Labs significant for H/H  8.6/27.1 PT 14.3 PTT 40.5 INR 1.20 Cr 3.05, Bun 49, K 3.6 Trop negative x 1,  BNP 30k, CXR: Stable cardiomegaly, status post median sternotomy, thoracic aortic and mitral annulus calcification. Bilateral opacities/pleural effusions EKG: Sinus bradycardia w/ PACs w/ incomplete LBBB and New TWI from office EKG earlier this AM  in Patient received Lasix 40mg IV x 1. Patient admitted to [facility] for acute on chronic diastolic CHF.    Review of Systems:  · General	negative  · Skin/Breast	negative  · Ophthalmologic	negative  · ENMT	negative  · Negative Respiratory and Thorax Symptoms	no wheezing  · Respiratory and Thorax Symptoms	dyspnea  cough  · Negative Cardiovascular Symptoms	no chest pain; no paroxysmal nocturnal dyspnea  · Cardiovascular Symptoms	palpitations; dyspnea on exertion; orthopnea; peripheral edema  · Gastrointestinal	negative  · Genitourinary	negative  · Musculoskeletal	negative  · Neurological	negative  · Psychiatric	negative    · Hematology/Lymphatics	negative  · Endocrine	negative  · Allergic/Immunologic	negative      Allergies and Intolerances:        Allergies:  	No Known Allergies:     Home Medications:   * Incomplete Medication History as of 12-Oct-2020 12:48 documented in Structured Notes  · 	Tessalon Perles 100 mg oral capsule: Last Dose Taken:  , 1 tab(s) orally every 6 hours  · 	atorvastatin 20 mg oral tablet: Last Dose Taken:  , 1 tab(s) orally once a day  · 	omeprazole 20 mg oral delayed release capsule: Last Dose Taken:  , 1 cap(s) orally once a day  · 	Aricept 5 mg oral tablet: Last Dose Taken:  , 1 tab(s) orally once a day (at bedtime)  · 	Voltaren 1% topical gel: Last Dose Taken:  , Apply topically to affected area 3 times a day  · 	amLODIPine 5 mg oral tablet: Last Dose Taken:  , 1 tab(s) orally once a day  · 	levothyroxine 50 mcg (0.05 mg) oral tablet: 1 tab(s) orally once a day  · 	LORazepam 1 mg oral tablet: Last Dose Taken:  , 1 tab(s) orally 3 times a day, As Needed  · 	furosemide 80 mg oral tablet: 1 tab(s) orally Monday, Wednesday, and Friday  · 	doxazosin 8 mg oral tablet: 1 tab(s) orally once a day  · 	Vitamin D2 50,000 intl units (1.25 mg) oral capsule: Last Dose Taken:  , 1 cap(s) orally once a week  · 	gabapentin 100 mg oral capsule: 1 cap(s) orally once a day (at bedtime)  · 	Metoprolol Tartrate 25 mg oral tablet: Last Dose Taken:  , 3 tab(s) orally 2 times a day  · 	Aspir 81 oral delayed release tablet: Last Dose Taken:  , 1 tab(s) orally once a day    Patient History:    Past Medical, Past Surgical, and Family History:  PAST MEDICAL HISTORY:  Anxiety state Anxiety    Atherosclerosis of renal artery with resulting stent placement    Disorder of kidney and ureter Renal insufficiency    Essential hypertension Hypertension    Hyperlipidemia Hyperlipidemia    Peripheral vascular disease PVD (peripheral vascular disease)    Type 2 diabetes mellitus Diabetes.     PAST SURGICAL HISTORY:  Atherosclerosis of renal artery Renal artery stenosis.     FAMILY HISTORY:  Family history of ischemic heart disease, Family history of coronary artery disease.     Social History:  Social History (marital status, living situation, occupation, tobacco use, alcohol and drug use, and sexual history): patient former smoker 3ppd x 20 yrs, denies ETOH and illicit drug use     Tobacco Screening:  · Core Measure Site	No    Risk Assessment:    Present on Admission:  Deep Venous Thrombosis	no  Pulmonary Embolus	no     Heart Failure:  Does this patient have a history of or has been diagnosed with heart failure? yes.     LV Function Assessment (LVS function was evaluated before arrival and/or during hospitalization) yes.     Is the Ejection Fraction >40% ? yes.     normal LV function.    Physical Exam:    T/HR/RR/BP:  · Heart Rate	58 /min  · BP Systolic	168 mm Hg  · BP Diastolic	70 mm Hg  · Blood Pressure - Method	electronic  · BP Noninvasive Mean	102 mm Hg  · SpO2 (%)	98 %  · O2 Delivery/Oxygen Delivery Method	nasal cannula w/ humidification  2L     Physical Exam:  · Constitutional	Well-developed, well nourished  · Eyes	EOMI; PERRL; no drainage or redness  · ENMT	not examined  · Neck	No bruits; no thyromegaly or nodules  · Breasts	not examined  · Back	not examined  · Respiratory	detailed exam  · Respiratory Details	rales  · Rales	lower L; lower R  · Cardiovascular	detailed exam  · Cardiovascular Details	regular rate and rhythm  · Cardiovascular Details	positive S1; positive S2  · Gastrointestinal	Soft, non-tender, no hepatosplenomegaly, normal bowel sounds  · Genitourinary	not examined  · Rectal	not examined  · Extremities	detailed exam  · Extremities Details	pedal edema  · Pedal Edema Severity	2+  · Pedal Edema Type	pitting, to knee b/l  · Vascular	Equal and normal pulses (carotid, femoral, dorsalis pedis)  · Neurological	detailed exam  · Neurological Details	alert and oriented x 3  · Skin	No lesions; no rash  · Lymph Nodes	not examined  · Musculoskeletal	not examined  · Psychiatric	Affect and characteristics of appearance, verbalizations, behaviors are appropriate    Findings:    Lungs and large airways: Normal.    Pleura:  There are small bibasilar pleural effusions right greater than left. There is interlobular septal thickening which extends into the interior of the lung to suggest a component of pulmonary venous congestion. Moreover there are peripheral reticular opacities with some associated groundglass component within the peripheral aspects of both lungs. More peripheral groundglass and consolidation is seen within the right upper left upper and right middle lobes. There is some pulmonary fibrosis with traction bronchiectasis and bronchiolectasis without jesús honeycomb lung formation. There is air trapping.    Mediastinum and hilar regions: Increased number of mildly enlarged prevascular (1.1 cm in short axis), right paratracheal (1.1 cm in short axis), and subcarinal (1.7 cm in short axis).    Heart and pericardium:  Cardiomegaly. No pericardial effusion. Extensive calcification of the mitral annulus.    Vessels:  Severe coronary artery calcification/stents. Severe callus  5. Atheromatous plaque formation throughout the aortic arch descending and proximal abdominal aorta and major side branches with extensive calcification of the origin of the renal arteries right greater than left. Calcification of the origin of the brachycephalic vessels are noted. No evidence of aneurysm    Chest wall and lower neck:  Punctate microcalcifications are noted in the breasts bilaterally left greater than right. Clinical and mammographic correlation.    Upper abdomen: Cholelithiasis. Extensive arterial vascular calcification of the major side branches of the abdominal aorta as above.    Bones: Moderate multilevel degenerative disc disease involving the lower thoracic spine. Poststernotomy.    Impression:    1. Cardiomegaly. Small bibasilar pleural effusions and interlobular septal thickening compatible with pulmonary venous congestion.  2. More peripheral reticular, ground glass and some patchy areas of peripheral consolidation more pronounced in the upper and midlung zones are noted. There may be some bronchiectasis/bronchiolectasis to suggest pulmonary fibrosis. No honeycomb lung formation. Air-trapping is noted. These findings are not consistent with UIP/IPF. Abbreviated differential includes atypical infectious and/or inflammatory etiologies such as chronic hypersensitivity pneumonia, stage IV sarcoidosis, pneumoconiosis, and subacute and/or chronic sequela of atypical and viral pneumonias such as Covid 19.  3. Mild mediastinal lymphadenopathy.    CONCLUSIONS:     1. Mild aortic regurgitation.   2. Mild mitral regurgitation.   3. There is mild tricuspid regurgitation. Pulmonary hypertension is present. Pulmonary artery systolic pressure (estimated using the tricuspid regurgitant gradient and an estimate of right atrial pressure) is 47 mmHg.   4. The right ventricle is borderline dilated. Right ventricular systolic function is probably normal.   5. There is mild concentric left ventricular hypertrophy. The left ventricle is normal in size and systolic function with a calculated ejection fraction of 55%.   6. Left pleural effusion.   7. No pericardial effusion.      · Assessment	   86 y/o female poor historian with PMHx HTN, HLD, CAD s/p CABG 2015 Dr. Blunt , diastolic CHF, renal artery stenosis s/p renal artery stent >20yrs ago, Carotid artery stenosis, Peripheral artery disease, Anemia w/ PUD,  CKD (baseline creatinine 1.4)  who presented to [facility] endorsing shortness of breath cough and LE edema and orthopnea requiring her to sleep in a chair for 2 week. Patient admitted to [facility] for acute on chronic diastolic CHF. Echo 10/12/20 showed  Mild AR, MR, TR. Pulm HTN PASP 47, EF 55%, not worsened from prior. Patient currently on IV diuresis.        Problem/Plan - 1:  ·  Problem: Acute on chronic diastolic (congestive) heart failure.  High BNP  Continue diuresis       Problem/Plan - 2:  ·  Problem: Essential hypertension.  Plan: - BP elevated on arrival, SBP 160s-170s, did not take AM meds  - continue amlodipine 5mg and Metoprolol tartrate 75mg BID.      Problem/Plan - 3:  ·  Problem: NAV.CRF. Hx of renal artery stenosis s/p stenting >20 yrs ago      Problem/Plan - 4:  ·  Problem:  Pleural effusions, intrathoracic edema    Problem/Plan - 5:  ·  Problem: Anemia.      Problem/Plan - 6:  Problem: ground glass infiltrates. No evidence of Covid    Continue duoneb 4 times daily    PAH    MEDIASTINAL LAP    VTE: sub q heparin 5000 q8.   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 Blunt , diastolic CHF, renal artery stenosis s/p renal artery stent >20yrs ago, Carotid artery stenosis, Peripheral artery disease, Anemia w/ PUD,  CKD (baseline creatinine 1.4)  who presented to [facility] endorsing shortness of breath cough and LE edema and orthopnea requiring her to sleep in a chair for 2 week. She was initially started on tessalon perils which helped the cough but did not improve the shortness of breath.  Since experiencing the shortness of breath, Pts cardiologist Dr. Crooks and she increased her Lasix from 80mg M/W/F to daily for the last 4 days. Patient also endorsing dyspnea on exertion of less than 1 block for the last few months. Patients denies chest pain. Patient endorsing occasional palpitations Patient endorsing compliance w/ medications however daughter states that compliance with medication has been questionable over the last few months, as patient is becoming more forgetful. In [facility] ED vital signs 168/70 57bpm, 97.7F, 99% on RA Labs significant for H/H  8.6/27.1 PT 14.3 PTT 40.5 INR 1.20 Cr 3.05, Bun 49, K 3.6 Trop negative x 1,  BNP 30k, CXR: Stable cardiomegaly, status post median sternotomy, thoracic aortic and mitral annulus calcification. Bilateral opacities/pleural effusions EKG: Sinus bradycardia w/ PACs w/ incomplete LBBB and New TWI from office EKG earlier this AM  in Patient received Lasix 40mg IV x 1. Patient admitted to [facility] for acute on chronic diastolic CHF.    Review of Systems:  · General	negative  · Skin/Breast	negative  · Ophthalmologic	negative  · ENMT	negative  · Negative Respiratory and Thorax Symptoms	no wheezing  · Respiratory and Thorax Symptoms	dyspnea  cough  · Negative Cardiovascular Symptoms	no chest pain; no paroxysmal nocturnal dyspnea  · Cardiovascular Symptoms	palpitations; dyspnea on exertion; orthopnea; peripheral edema  · Gastrointestinal	negative  · Genitourinary	negative  · Musculoskeletal	negative  · Neurological	negative  · Psychiatric	negative    · Hematology/Lymphatics	negative  · Endocrine	negative  · Allergic/Immunologic	negative      Allergies and Intolerances:        Allergies:  	No Known Allergies:     Home Medications:   * Incomplete Medication History as of 12-Oct-2020 12:48 documented in Structured Notes  · 	Tessalon Perles 100 mg oral capsule: Last Dose Taken:  , 1 tab(s) orally every 6 hours  · 	atorvastatin 20 mg oral tablet: Last Dose Taken:  , 1 tab(s) orally once a day  · 	omeprazole 20 mg oral delayed release capsule: Last Dose Taken:  , 1 cap(s) orally once a day  · 	Aricept 5 mg oral tablet: Last Dose Taken:  , 1 tab(s) orally once a day (at bedtime)  · 	Voltaren 1% topical gel: Last Dose Taken:  , Apply topically to affected area 3 times a day  · 	amLODIPine 5 mg oral tablet: Last Dose Taken:  , 1 tab(s) orally once a day  · 	levothyroxine 50 mcg (0.05 mg) oral tablet: 1 tab(s) orally once a day  · 	LORazepam 1 mg oral tablet: Last Dose Taken:  , 1 tab(s) orally 3 times a day, As Needed  · 	furosemide 80 mg oral tablet: 1 tab(s) orally Monday, Wednesday, and Friday  · 	doxazosin 8 mg oral tablet: 1 tab(s) orally once a day  · 	Vitamin D2 50,000 intl units (1.25 mg) oral capsule: Last Dose Taken:  , 1 cap(s) orally once a week  · 	gabapentin 100 mg oral capsule: 1 cap(s) orally once a day (at bedtime)  · 	Metoprolol Tartrate 25 mg oral tablet: Last Dose Taken:  , 3 tab(s) orally 2 times a day  · 	Aspir 81 oral delayed release tablet: Last Dose Taken:  , 1 tab(s) orally once a day    Patient History:    Past Medical, Past Surgical, and Family History:  PAST MEDICAL HISTORY:  Anxiety state Anxiety    Atherosclerosis of renal artery with resulting stent placement    Disorder of kidney and ureter Renal insufficiency    Essential hypertension Hypertension    Hyperlipidemia Hyperlipidemia    Peripheral vascular disease PVD (peripheral vascular disease)    Type 2 diabetes mellitus Diabetes.     PAST SURGICAL HISTORY:  Atherosclerosis of renal artery Renal artery stenosis.     FAMILY HISTORY:  Family history of ischemic heart disease, Family history of coronary artery disease.     Social History:  Social History (marital status, living situation, occupation, tobacco use, alcohol and drug use, and sexual history): patient former smoker 3ppd x 20 yrs, denies ETOH and illicit drug use     Tobacco Screening:  · Core Measure Site	No    Risk Assessment:    Present on Admission:  Deep Venous Thrombosis	no  Pulmonary Embolus	no     Heart Failure:  Does this patient have a history of or has been diagnosed with heart failure? yes.     LV Function Assessment (LVS function was evaluated before arrival and/or during hospitalization) yes.     Is the Ejection Fraction >40% ? yes.     normal LV function.    Physical Exam:    T/HR/RR/BP:  · Heart Rate	58 /min  · BP Systolic	168 mm Hg  · BP Diastolic	70 mm Hg  · Blood Pressure - Method	electronic  · BP Noninvasive Mean	102 mm Hg  · SpO2 (%)	98 %  · O2 Delivery/Oxygen Delivery Method	nasal cannula w/ humidification  2L     Physical Exam:  · Constitutional	Well-developed, well nourished  · Eyes	EOMI; PERRL; no drainage or redness  · ENMT	not examined  · Neck	No bruits; no thyromegaly or nodules  · Breasts	not examined  · Back	not examined  · Respiratory	detailed exam  · Respiratory Details	rales  · Rales	lower L; lower R  · Cardiovascular	detailed exam  · Cardiovascular Details	regular rate and rhythm  · Cardiovascular Details	positive S1; positive S2  · Gastrointestinal	Soft, non-tender, no hepatosplenomegaly, normal bowel sounds  · Genitourinary	not examined  · Rectal	not examined  · Extremities	detailed exam  · Extremities Details	pedal edema  · Pedal Edema Severity	2+  · Pedal Edema Type	pitting, to knee b/l  · Vascular	Equal and normal pulses (carotid, femoral, dorsalis pedis)  · Neurological	detailed exam  · Neurological Details	alert and oriented x 3  · Skin	No lesions; no rash  · Lymph Nodes	not examined  · Musculoskeletal	not examined  · Psychiatric	Affect and characteristics of appearance, verbalizations, behaviors are appropriate    Findings:    Lungs and large airways: Normal.    Pleura:  There are small bibasilar pleural effusions right greater than left. There is interlobular septal thickening which extends into the interior of the lung to suggest a component of pulmonary venous congestion. Moreover there are peripheral reticular opacities with some associated groundglass component within the peripheral aspects of both lungs. More peripheral groundglass and consolidation is seen within the right upper left upper and right middle lobes. There is some pulmonary fibrosis with traction bronchiectasis and bronchiolectasis without jesús honeycomb lung formation. There is air trapping.    Mediastinum and hilar regions: Increased number of mildly enlarged prevascular (1.1 cm in short axis), right paratracheal (1.1 cm in short axis), and subcarinal (1.7 cm in short axis).    Heart and pericardium:  Cardiomegaly. No pericardial effusion. Extensive calcification of the mitral annulus.    Vessels:  Severe coronary artery calcification/stents. Severe callus  5. Atheromatous plaque formation throughout the aortic arch descending and proximal abdominal aorta and major side branches with extensive calcification of the origin of the renal arteries right greater than left. Calcification of the origin of the brachycephalic vessels are noted. No evidence of aneurysm    Chest wall and lower neck:  Punctate microcalcifications are noted in the breasts bilaterally left greater than right. Clinical and mammographic correlation.    Upper abdomen: Cholelithiasis. Extensive arterial vascular calcification of the major side branches of the abdominal aorta as above.    Bones: Moderate multilevel degenerative disc disease involving the lower thoracic spine. Poststernotomy.    Impression:    1. Cardiomegaly. Small bibasilar pleural effusions and interlobular septal thickening compatible with pulmonary venous congestion.  2. More peripheral reticular, ground glass and some patchy areas of peripheral consolidation more pronounced in the upper and midlung zones are noted. There may be some bronchiectasis/bronchiolectasis to suggest pulmonary fibrosis. No honeycomb lung formation. Air-trapping is noted. These findings are not consistent with UIP/IPF. Abbreviated differential includes atypical infectious and/or inflammatory etiologies such as chronic hypersensitivity pneumonia, stage IV sarcoidosis, pneumoconiosis, and subacute and/or chronic sequela of atypical and viral pneumonias such as Covid 19.  3. Mild mediastinal lymphadenopathy.    CONCLUSIONS:     1. Mild aortic regurgitation.   2. Mild mitral regurgitation.   3. There is mild tricuspid regurgitation. Pulmonary hypertension is present. Pulmonary artery systolic pressure (estimated using the tricuspid regurgitant gradient and an estimate of right atrial pressure) is 47 mmHg.   4. The right ventricle is borderline dilated. Right ventricular systolic function is probably normal.   5. There is mild concentric left ventricular hypertrophy. The left ventricle is normal in size and systolic function with a calculated ejection fraction of 55%.   6. Left pleural effusion.   7. No pericardial effusion.      · Assessment	   84 y/o female poor historian with PMHx HTN, HLD, CAD s/p CABG 2015 Dr. Blunt , diastolic CHF, renal artery stenosis s/p renal artery stent >20yrs ago, Carotid artery stenosis, Peripheral artery disease, Anemia w/ PUD,  CKD (baseline creatinine 1.4)  who presented to [facility] endorsing shortness of breath cough and LE edema and orthopnea requiring her to sleep in a chair for 2 week. Patient admitted to [facility] for acute on chronic diastolic CHF. Echo 10/12/20 showed  Mild AR, MR, TR. Pulm HTN PASP 47, EF 55%, not worsened from prior. Patient currently on IV diuresis.    Problem/Plan - 1:  ·  Problem: Acute on chronic diastolic (congestive) heart failure.  High BNP  Continue diuresis  CAD, CABG     Problem/Plan - 2:  ·  Problem: Essential hypertension.  Plan: - BP elevated on arrival, SBP 160s-170s, did not take AM meds  - continue amlodipine 5mg and Metoprolol tartrate 75mg BID.      Problem/Plan - 3:  ·  Problem: NAV.CRF. Hx of renal artery stenosis s/p stenting >20 yrs ago. 9 cm and 8 cm.     Problem/Plan - 4:  ·  Problem:  Pleural effusions, intrathoracic edema      Problem/Plan - 5:  ·  Problem: Anemia.      Problem/Plan - 6:  Problem: ground glass infiltrates. No evidence of Covid    Continue duoneb 4 times daily    Collect sputum for culture and cytology.    PAH    MEDIASTINAL LAP    Hx SMOKING, LIKELY COPD: stiolto    VTE: sub q heparin 5000 q8.

## 2020-10-14 NOTE — PROGRESS NOTE ADULT - PROBLEM SELECTOR PLAN 6
-on no home meds  a1c 5.9     Case discussed w/ Dr. Crooks   VTE: sub q heparin per Dr. Crooks Pt anemia to 8s starting earlier this summer, had EGD showing PUD- started on Omeprazole + Tx for H. Pylori   -stable at 8.5 today  - continue pantoprazole

## 2020-10-14 NOTE — PROGRESS NOTE ADULT - SUBJECTIVE AND OBJECTIVE BOX
Patient is a 85y Female seen and evaluated at bedside.   no complaints  on RA  feels much better  ready to go home  likely d/c tomorrow     Meds:    albuterol/ipratropium for Nebulization 3 every 6 hours  amLODIPine   Tablet 5 daily  aspirin enteric coated 81 daily  atorvastatin 20 at bedtime  donepezil 5 at bedtime  doxazosin 8 <User Schedule>  furosemide    Tablet 40 once  gabapentin 100 at bedtime  heparin   Injectable 5000 every 8 hours  levothyroxine 50 daily  metoprolol tartrate 75 two times a day  pantoprazole    Tablet 40 before breakfast      T(C): , Max: 37.1 (10-13-20 @ 14:07)  T(F): , Max: 98.8 (10-13-20 @ 14:07)  HR: 48 (10-14-20 @ 08:37)  BP: 133/84 (10-14-20 @ 08:37)  BP(mean): --  RR: 18 (10-14-20 @ 08:37)  SpO2: 93% (10-14-20 @ 08:37)  Wt(kg): --    10-13 @ 07:01  -  10-14 @ 07:00  --------------------------------------------------------  IN: 720 mL / OUT: 2400 mL / NET: -1680 mL    10-14 @ 07:01  -  10-14 @ 10:36  --------------------------------------------------------  IN: 240 mL / OUT: 0 mL / NET: 240 mL          Review of Systems:  CONSTITUTIONAL: No fever.  RESPIRATORY: No shortness of breath, cough  CARDIOVASCULAR: No Chest pain, shortness of breath  GASTROINTESTINAL: No abdominal pain, nausea, vomiting, diarrhea      PHYSICAL EXAM:  GENERAL: Alert, awake, oriented x3, on RA   CHEST/LUNG: Bilateral clear breath sounds  HEART: Regular rate and rhythm, no murmur, no gallops, no rub   ABDOMEN: Soft, nontender, non distended  EXTREMITIES: improved, now trace LE edema    LABS:                        8.5    5.58  )-----------( 155      ( 14 Oct 2020 05:57 )             27.2     10-14    141  |  105  |  51<H>  ----------------------------<  111<H>  3.9   |  24  |  3.02<H>    Ca    8.3<L>      14 Oct 2020 05:57  Phos  4.3     10-13  Mg     1.9     10-14    TPro  5.3<L>  /  Alb  2.8<L>  /  TBili  0.3  /  DBili  x   /  AST  16  /  ALT  27  /  AlkPhos  140<H>  10-13      PT/INR - ( 12 Oct 2020 11:22 )   PT: 14.3 sec;   INR: 1.20          PTT - ( 12 Oct 2020 11:22 )  PTT:40.5 sec    Osmolality, Random Urine: 247 mosm/kg (10-12 @ 17:15)  Sodium, Random Urine: 60 mmol/L (10-12 @ 17:15)  Creatinine, Random Urine: 34 mg/dL (10-12 @ 17:15)  Protein/Creatinine Ratio Calculation: 5.8 Ratio (10-12 @ 17:15)        RADIOLOGY & ADDITIONAL STUDIES:

## 2020-10-14 NOTE — DISCHARGE NOTE PROVIDER - NSDCFUADDAPPT_GEN_ALL_CORE_FT
Please follow up with your Cardiology Provider, Dr. Hermes Crooks at [address] [place] [address], [address], [place] [address] on 10/20/2020 at 9:00am.    Appointment was scheduled by Ms. GEM Sellers, Referral Coordinator.

## 2020-10-14 NOTE — PROGRESS NOTE ADULT - PROBLEM SELECTOR PLAN 2
- BPs improved to 130s/80s w/ increased doxazosin  - continue amlodipine 5mg and Metoprolol tartrate 75mg BID  - would benefit from increase betablocker as patient has frequent atachy and some NSVT however patient also has episodes of bradycardia to 40s  - continue doxazosin 8mg BID  -continue to monitor - BPs improved to 130s/80s w/ increased doxazosin  - continue amlodipine 5mg and Metoprolol tartrate 75mg BID  - would benefit from increase betablocker as patient has frequent Atach and some NSVT however patient also has episodes of bradycardia to 40s  - continue doxazosin 8mg BID  -continue to monitor

## 2020-10-14 NOTE — DISCHARGE NOTE PROVIDER - NSDCCPCAREPLAN_GEN_ALL_CORE_FT
PRINCIPAL DISCHARGE DIAGNOSIS  Diagnosis: Acute on chronic diastolic heart failure  Assessment and Plan of Treatment:        PRINCIPAL DISCHARGE DIAGNOSIS  Diagnosis: Acute on chronic diastolic heart failure  Assessment and Plan of Treatment: While you were admitted to the hospital you received an IV  medication called Lasix to help remove the fluid build up in your lungs and legs.   -Please continue Lasix 80mg oral daily at home to prevent fluid build up in lungs and legs.  It is extremely important you adhere to your medication regimen.   -Please weigh yourself daily: if you have gained more than 2-3 lbs in one day or 5 lbs in one week contact your doctor immediately as you  might be in heart failure exacerbation.     -In addition, restrict your salt intake to less than 2 grams a day   -If you develop worsening shortening of breath, leg swelling, fatigue, may be retaining water weight   chest pain, difficulty sleeping at night due to shortness of breath, contact your cardiologist immediately.  -Please make sure you follow up with your cardiologist Dr Medina on October 20th, 2020 at 9AM in office for further heart failure evaluation.           SECONDARY DISCHARGE DIAGNOSES  Diagnosis: Chronic anemia  Assessment and Plan of Treatment: Hemoglobin stable at discharge, continue to follow up with Dr. Medina for continued monitoring    Diagnosis: Acute kidney injury superimposed on CKD  Assessment and Plan of Treatment: Stable Cr 3.1 at discharge. Continue to follow up with Dr. Medina for continued monitoring.    Diagnosis: HTN (hypertension)  Assessment and Plan of Treatment: You have a history of elevated blood pressure and you should continue your blood pressure medications as prescribed.  Continue Amlodipine 5mg daily Metoprolol Tartrate 75mg twice daily and Doxazosin 8mg daily to better control your blood pressure.       Diagnosis: Ground glass opacity present on imaging of lung  Assessment and Plan of Treatment: Abnormal CT Chest showing some ground glass opacity as well as Pulmonary HTN.   Your COVID test was negative and you were evaluated by Pulmonologist Dr. Cuevas.  Continue current nebulizers as prescribed.  Dr. Medina will scheduled a follow up with Pulmonologist, Dr. Olmedo in the near future.

## 2020-10-14 NOTE — PROGRESS NOTE ADULT - PROBLEM SELECTOR PLAN 1
Presented with SOB, orthopnea, +1 pitting edema, Crackles in bases  -Net neg 1L   BNP 30,000  trop negative x 3  CXR:  Stable cardiomegaly, status post median sternotomy, thoracic aortic and mitral annulus calcification. Bilateral opacities/pleural effusions  - Echo from 2/19/20 Normal LV systolic function EF 58%, no WMA, Borderline LVH, moderate AR, moderate MR, PulmHTN, PASP 70mmHg  Echo from 10/12/20 Mild AR, MR, TR. Pulm HTN PASP 47 There is mild concentric LVH ejection fraction of 55%. Left pleural effusion.  - Lasix 40 IV BID will get lasix 40 po for evening dose and transition to Lasix 80mg PO daily   Not starting ACE/ARB 2/2 NAV on CKD     Daily weight, strict I & Os, 1l fluid restriction, core measures Presented with SOB, orthopnea, +1 pitting edema, Crackles in bases  -Net neg 1L   BNP 30,000  trop negative x 3  CXR:  Stable cardiomegaly, status post median sternotomy, thoracic aortic and mitral annulus calcification. Bilateral opacities/pleural effusions  - Echo from 2/19/20 Normal LV systolic function EF 58%, no WMA, Borderline LVH, moderate AR, moderate MR, PulmHTN, PASP 70mmHg  Echo from 10/12/20 Mild AR, MR, TR. Pulm HTN PASP 47 There is mild concentric LVH ejection fraction of 55%. Left pleural effusion.  - Lasix 40 IV BID will get lasix 40 po for evening dose and transition to Lasix 80mg PO daily   Not starting ACE/ARB - per renal   Daily weight, strict I & Os, 1l fluid restriction, core measures

## 2020-10-14 NOTE — PROGRESS NOTE ADULT - PROBLEM SELECTOR PLAN 4
- S/p CABG in 2015 for 3vCAD, Currently CP free  - Trop negative x 3  - EKG: Sinus bradycardia w/ PACs w/ incomplete LBBB and New TWI from office EKG earlier this AM  -continue aspirin and atorvastatin Baseline Cr around 2.4-3 per Dr. Crooks, Cr stable at 3 BUN 49, Hx of renal artery stenosis s/p stenting >20 yrs ago   NAV on CKD likely cardiorenal syndrome, pre-renal vs possibly ischemic ATN   - Renal consulted, f/u recs  - Renal artery US  to evaluate stent  - daily BMP and urine lytes

## 2020-10-14 NOTE — PROGRESS NOTE ADULT - PROBLEM SELECTOR PLAN 3
Baseline Cr around 2.4-3 per Dr. Crooks, Cr stable at 3 BUN 49, Hx of renal artery stenosis s/p stenting >20 yrs ago   NAV on CKD likely cardiorenal syndrome, pre-renal vs possibly ischemic ATN   - Renal consulted, f/u recs  - Renal artery US  to evaluate stent  - daily BMP and urine lytes - Pt when for CT chest for evaluation of pleural effusion  - CT chest 10/14/20 Cardiomegaly. Small bibasilar pleural effusions and interlobular septal thickening compatible with pulmonary venous congestion. More peripheral reticular, groundglass and some patchy areas of peripheral consolidation more pronounced in the upper and midlung zones are noted. There may be some bronchiectasis/bronchiolectasis to suggest pulmonary fibrosis. No honeycomb lung formation. Air-trapping is noted. These findings are not consistent with UIP/IPF. Abbreviated differential includes atypical infectious and/or inflammatory etiologies such as chronic hypersensitivity pneumonia, stage IV sarcoidosis, pneumoconiosis, and subacute and/or chronic sequela of atypical and viral pneumonias such as Covid 19. Mild mediastinal lymphadenopathy.  -Covid Antibody sent  -Procal sent  - pulm Dr. Cuevas consulted - Pt when for CT chest for evaluation of pleural effusion  - CT chest 10/14/20 Cardiomegaly. Small bibasilar pleural effusions and interlobular septal thickening compatible with pulmonary venous congestion. More peripheral reticular, groundglass and some patchy areas of peripheral consolidation more pronounced in the upper and midlung zones are noted. There may be some bronchiectasis/bronchiolectasis to suggest pulmonary fibrosis. No honeycomb lung formation. Air-trapping is noted. These findings are not consistent with UIP/IPF. Abbreviated differential includes atypical infectious and/or inflammatory etiologies such as chronic hypersensitivity pneumonia, stage IV sarcoidosis, pneumoconiosis, and subacute and/or chronic sequela of atypical and viral pneumonias such as Covid 19. Mild mediastinal lymphadenopathy.  -Covid Antibody sent f/u result   -Procal sent f/u result  - pulm Dr. Cuevas consulted

## 2020-10-14 NOTE — DISCHARGE NOTE PROVIDER - NSDCMRMEDTOKEN_GEN_ALL_CORE_FT
amLODIPine 5 mg oral tablet: 1 tab(s) orally once a day  Aricept 5 mg oral tablet: 1 tab(s) orally once a day (at bedtime)  Aspir 81 oral delayed release tablet: 1 tab(s) orally once a day  Aspir 81 oral delayed release tablet: 1 tab(s) orally once a day  atorvastatin 20 mg oral tablet: 1 tab(s) orally once a day  doxazosin 8 mg oral tablet: 1 tab(s) orally once a day  furosemide 80 mg oral tablet: 1 tab(s) orally Monday, Wednesday, and Friday  gabapentin 100 mg oral capsule: 1 cap(s) orally once a day (at bedtime)  levothyroxine 50 mcg (0.05 mg) oral tablet: 1 tab(s) orally once a day  LORazepam 1 mg oral tablet: 1 tab(s) orally 3 times a day, As Needed  Metoprolol Tartrate 25 mg oral tablet: 3 tab(s) orally 2 times a day  omeprazole 20 mg oral delayed release capsule: 1 cap(s) orally once a day  rollator: Rollator  MALIA: 99  Diagnosis: CHF   Tessalon Perles 100 mg oral capsule: 1 tab(s) orally every 6 hours  Vitamin D2 50,000 intl units (1.25 mg) oral capsule: 1 cap(s) orally once a week  Voltaren 1% topical gel: Apply topically to affected area 3 times a day   amLODIPine 5 mg oral tablet: 1 tab(s) orally once a day  Aricept 5 mg oral tablet: 1 tab(s) orally once a day (at bedtime)  Aspir 81 oral delayed release tablet: 1 tab(s) orally once a day  atorvastatin 20 mg oral tablet: 1 tab(s) orally once a day  doxazosin 8 mg oral tablet: 1 tab(s) orally once a day  furosemide 80 mg oral tablet: 1 tab(s) orally once a day   gabapentin 100 mg oral capsule: 1 cap(s) orally once a day (at bedtime)  ipratropium-albuterol 0.5 mg-2.5 mg/3 mLinhalation solution: 3 milliliter(s) inhaled every 6 hours  levothyroxine 50 mcg (0.05 mg) oral tablet: 1 tab(s) orally once a day  LORazepam 1 mg oral tablet: 1 tab(s) orally 3 times a day, As Needed  Metoprolol Tartrate 25 mg oral tablet: 3 tab(s) orally 2 times a day  omeprazole 20 mg oral delayed release capsule: 1 cap(s) orally once a day  rollator: Rollator  MALIA: 99  Diagnosis: CHF   Symbicort 80 mcg-4.5 mcg/inh inhalation aerosol: 2 puff(s) inhaled every 6 hours, As Needed   Tessalon Perles 100 mg oral capsule: 1 tab(s) orally every 6 hours  Vitamin D2 50,000 intl units (1.25 mg) oral capsule: 1 cap(s) orally once a week

## 2020-10-14 NOTE — PROGRESS NOTE ADULT - ASSESSMENT
85F PMHx CKD baseline Cr 1.4, HTN, HLD, CAD s/p CABG 2015, diastolic CHF, presents c/o shortness of breath, lower ext edema worsening over the past 2 weeks.  Nephrology consulted for NAV/CKD management.     Assessment/Plan:     #NAV on CKD, acute CHF exacerbation   baseline creatinine 1.4, Cr likely peaked ~3   adequate urine output   edema and oxygen requirements improved   NAV on CKD likely cardiorenal syndrome, pre-renal vs possibly ischemic ATN     Recommend:   daily CXR   continue with 80mg daily starting tomorrow (received 40 IV today and plan for additional 40mg PO tonight)     daily BMP and urine lytes including Na, Cr, and Urea   renal sono pending   strict I/Os   renal diet     Thank you for the opportunity to participate in the care of your patient. The nephrology service remains available to assist with any questions or concerns. Please feel free to reach us by paging the on-call nephrology fellow for urgent issues or as below.     Tobi Thompson M.D.   PGY-4, Nephrology Fellow   C: [phone]   P: [phone]

## 2020-10-14 NOTE — PROGRESS NOTE ADULT - PROBLEM SELECTOR PLAN 5
per Dr. Crooks Pt anemia to 8s starting earlier this summer, had EGD showing PUD- started on Omeprazole + Tx for H. Pylori   -stable at 8.5 today  - continue pantoprazole - S/p CABG in 2015 for 3vCAD, Currently CP free  - Trop negative x 3  - EKG: Sinus bradycardia w/ PACs w/ incomplete LBBB and New TWI from office EKG earlier this AM  -continue aspirin and atorvastatin

## 2020-10-15 ENCOUNTER — TRANSCRIPTION ENCOUNTER (OUTPATIENT)
Age: 85
End: 2020-10-15

## 2020-10-15 VITALS — TEMPERATURE: 99 F

## 2020-10-15 LAB
ANION GAP SERPL CALC-SCNC: 14 MMOL/L — SIGNIFICANT CHANGE UP (ref 5–17)
BUN SERPL-MCNC: 61 MG/DL — HIGH (ref 7–23)
CALCIUM SERPL-MCNC: 8.7 MG/DL — SIGNIFICANT CHANGE UP (ref 8.4–10.5)
CHLORIDE SERPL-SCNC: 104 MMOL/L — SIGNIFICANT CHANGE UP (ref 96–108)
CO2 SERPL-SCNC: 23 MMOL/L — SIGNIFICANT CHANGE UP (ref 22–31)
CREAT SERPL-MCNC: 3.16 MG/DL — HIGH (ref 0.5–1.3)
GLUCOSE SERPL-MCNC: 128 MG/DL — HIGH (ref 70–99)
HCT VFR BLD CALC: 26.4 % — LOW (ref 34.5–45)
HGB BLD-MCNC: 8.3 G/DL — LOW (ref 11.5–15.5)
MAGNESIUM SERPL-MCNC: 1.8 MG/DL — SIGNIFICANT CHANGE UP (ref 1.6–2.6)
MCHC RBC-ENTMCNC: 27.1 PG — SIGNIFICANT CHANGE UP (ref 27–34)
MCHC RBC-ENTMCNC: 31.4 GM/DL — LOW (ref 32–36)
MCV RBC AUTO: 86.3 FL — SIGNIFICANT CHANGE UP (ref 80–100)
NRBC # BLD: 0 /100 WBCS — SIGNIFICANT CHANGE UP (ref 0–0)
PHOSPHATE SERPL-MCNC: 4.1 MG/DL — SIGNIFICANT CHANGE UP (ref 2.5–4.5)
PLATELET # BLD AUTO: 143 K/UL — LOW (ref 150–400)
POTASSIUM SERPL-MCNC: 4 MMOL/L — SIGNIFICANT CHANGE UP (ref 3.5–5.3)
POTASSIUM SERPL-SCNC: 4 MMOL/L — SIGNIFICANT CHANGE UP (ref 3.5–5.3)
RBC # BLD: 3.06 M/UL — LOW (ref 3.8–5.2)
RBC # FLD: 16 % — HIGH (ref 10.3–14.5)
SARS-COV-2 IGG SERPL QL IA: NEGATIVE — SIGNIFICANT CHANGE UP
SARS-COV-2 IGM SERPL IA-ACNC: 0.09 INDEX — SIGNIFICANT CHANGE UP
SODIUM SERPL-SCNC: 141 MMOL/L — SIGNIFICANT CHANGE UP (ref 135–145)
WBC # BLD: 4.94 K/UL — SIGNIFICANT CHANGE UP (ref 3.8–10.5)
WBC # FLD AUTO: 4.94 K/UL — SIGNIFICANT CHANGE UP (ref 3.8–10.5)

## 2020-10-15 PROCEDURE — 99232 SBSQ HOSP IP/OBS MODERATE 35: CPT

## 2020-10-15 RX ORDER — IPRATROPIUM/ALBUTEROL SULFATE 18-103MCG
3 AEROSOL WITH ADAPTER (GRAM) INHALATION
Qty: 0 | Refills: 0 | DISCHARGE
Start: 2020-10-15

## 2020-10-15 RX ORDER — IPRATROPIUM/ALBUTEROL SULFATE 18-103MCG
3 AEROSOL WITH ADAPTER (GRAM) INHALATION
Qty: 360 | Refills: 3
Start: 2020-10-15 | End: 2021-02-11

## 2020-10-15 RX ORDER — ASPIRIN/CALCIUM CARB/MAGNESIUM 324 MG
1 TABLET ORAL
Qty: 0 | Refills: 0 | DISCHARGE

## 2020-10-15 RX ORDER — BUDESONIDE AND FORMOTEROL FUMARATE DIHYDRATE 160; 4.5 UG/1; UG/1
2 AEROSOL RESPIRATORY (INHALATION)
Qty: 240 | Refills: 0
Start: 2020-10-15 | End: 2020-11-13

## 2020-10-15 RX ORDER — FUROSEMIDE 40 MG
1 TABLET ORAL
Qty: 0 | Refills: 0 | DISCHARGE

## 2020-10-15 RX ORDER — FUROSEMIDE 40 MG
1 TABLET ORAL
Qty: 30 | Refills: 3
Start: 2020-10-15 | End: 2021-02-11

## 2020-10-15 RX ORDER — METOPROLOL TARTRATE 50 MG
3 TABLET ORAL
Qty: 180 | Refills: 3
Start: 2020-10-15 | End: 2021-02-11

## 2020-10-15 RX ORDER — AMLODIPINE BESYLATE 2.5 MG/1
1 TABLET ORAL
Qty: 0 | Refills: 0 | DISCHARGE

## 2020-10-15 RX ORDER — ASPIRIN/CALCIUM CARB/MAGNESIUM 324 MG
1 TABLET ORAL
Qty: 30 | Refills: 3
Start: 2020-10-15 | End: 2021-02-11

## 2020-10-15 RX ORDER — AMLODIPINE BESYLATE 2.5 MG/1
1 TABLET ORAL
Qty: 30 | Refills: 3
Start: 2020-10-15 | End: 2021-02-11

## 2020-10-15 RX ORDER — MAGNESIUM OXIDE 400 MG ORAL TABLET 241.3 MG
400 TABLET ORAL ONCE
Refills: 0 | Status: COMPLETED | OUTPATIENT
Start: 2020-10-15 | End: 2020-10-15

## 2020-10-15 RX ORDER — DICLOFENAC SODIUM 30 MG/G
1 GEL TOPICAL
Qty: 0 | Refills: 0 | DISCHARGE

## 2020-10-15 RX ORDER — METOPROLOL TARTRATE 50 MG
3 TABLET ORAL
Qty: 0 | Refills: 0 | DISCHARGE

## 2020-10-15 RX ADMIN — MAGNESIUM OXIDE 400 MG ORAL TABLET 400 MILLIGRAM(S): 241.3 TABLET ORAL at 08:47

## 2020-10-15 RX ADMIN — Medication 75 MILLIGRAM(S): at 06:28

## 2020-10-15 RX ADMIN — Medication 50 MICROGRAM(S): at 05:40

## 2020-10-15 RX ADMIN — AMLODIPINE BESYLATE 5 MILLIGRAM(S): 2.5 TABLET ORAL at 05:40

## 2020-10-15 RX ADMIN — PANTOPRAZOLE SODIUM 40 MILLIGRAM(S): 20 TABLET, DELAYED RELEASE ORAL at 06:20

## 2020-10-15 RX ADMIN — HEPARIN SODIUM 5000 UNIT(S): 5000 INJECTION INTRAVENOUS; SUBCUTANEOUS at 12:50

## 2020-10-15 RX ADMIN — Medication 8 MILLIGRAM(S): at 07:08

## 2020-10-15 RX ADMIN — Medication 3 MILLILITER(S): at 08:47

## 2020-10-15 RX ADMIN — Medication 81 MILLIGRAM(S): at 12:50

## 2020-10-15 RX ADMIN — TIOTROPIUM BROMIDE AND OLODATEROL 2 PUFF(S): 3.124; 2.736 SPRAY, METERED RESPIRATORY (INHALATION) at 12:50

## 2020-10-15 RX ADMIN — Medication 80 MILLIGRAM(S): at 05:40

## 2020-10-15 RX ADMIN — HEPARIN SODIUM 5000 UNIT(S): 5000 INJECTION INTRAVENOUS; SUBCUTANEOUS at 05:40

## 2020-10-15 NOTE — PROGRESS NOTE ADULT - PROBLEM SELECTOR PLAN 7
-Diet controlled, Hga1c 5.9     -VTE ppx:  HSQ    -Full Code    -Dispo:  pending clinically progression    Case discussed w/ Dr. Crooks   VTE: sub q heparin
-on no home meds  a1c 5.9     Case discussed w/ Dr. Crooks   VTE: sub q heparin

## 2020-10-15 NOTE — PROGRESS NOTE ADULT - PROBLEM SELECTOR PLAN 5
- S/p CABG in 2015 for 3vCAD, Currently CP free. R/O ACS with Trop negative x 3  - EKG: Sinus bradycardia w/ PACs w/ incomplete LBBB and New TWI from office EKG earlier this AM  -continue aspirin and atorvastatin

## 2020-10-15 NOTE — DISCHARGE NOTE NURSING/CASE MANAGEMENT/SOCIAL WORK - NSDCFUADDAPPT_GEN_ALL_CORE_FT
Please follow up with your Cardiology Provider, Dr. Hermes Crooks at [address] [place] [address], [address], [place] [address] on 10/20/2020 at 9:00am.    Appointment was scheduled by Ms. GEM Sellers, Referral Coordinator.

## 2020-10-15 NOTE — PROGRESS NOTE ADULT - PROBLEM SELECTOR PLAN 4
-CKD (Baseline Cr 2.4-3 per Dr. Crooks).  Hx of renal artery stenosis s/p stenting >20 yrs ago   -Admit BUN/Cr (45/3.06)   -NAV on CKD likely cardiorenal syndrome, pre-renal vs possibly ischemic ATN   - Renal consulted, recs appreciated.   - Retroperitoneal US 10/14:  1.  No renal artery stenosis. Definite right renal artery stent and a possible left renal artery stent.  Small kidneys with increased echogenicity, consistent with chronic medical renal disease.   Moderate pleural effusions bilaterally.  There is a 106 cc post void residual.  - Monitor daily BUN/Cr and lytes

## 2020-10-15 NOTE — PROGRESS NOTE ADULT - SUBJECTIVE AND OBJECTIVE BOX
S: Pt seen and examined bedside, found NAD, HD stable, denies C/P, SOB, N/V, dizziness, palpitations, and diaphoresis.  Pt denies fever/chills, dysuria, abdominal pain, diarrhea, and cough.    12 Point ROS otherwise negative except as per HPI/subjective.     O: Vital Signs Last 24 Hrs  T(C): 36.4 (15 Oct 2020 06:24), Max: 37 (14 Oct 2020 13:15)  T(F): 97.6 (15 Oct 2020 06:24), Max: 98.6 (14 Oct 2020 13:15)  HR: 61 (15 Oct 2020 05:26) (48 - 61)  BP: 137/63 (15 Oct 2020 05:26) (130/62 - 153/82)  BP(mean): --  RR: 18 (15 Oct 2020 05:26) (18 - 18)  SpO2: 98% (15 Oct 2020 05:26) (91% - 98%)    PHYSICAL EXAM:  GEN: NAD  HEENT: No JVD  PULM:  CTA B/L  CARD:  RRR, S1 and S2   ABD: +BS, NT, soft/ND	  EXT: No Edema B/L LE  NEURO: A+Ox3, no focal deficit  PSYCH: Mood Appropriate    LABS:                        8.3    4.94  )-----------( 143      ( 15 Oct 2020 07:32 )             26.4     10-15    141  |  104  |  61<H>  ----------------------------<  128<H>  4.0   |  23  |  3.16<H>    Ca    8.7      15 Oct 2020 07:32  Phos  4.1     10-15  Mg     1.8     10-15        Troponin T, Serum: <0.01 ng/mL (10-13-20 @ 05:49)  Troponin T, Serum: <0.01 ng/mL (10-12-20 @ 18:46)  Troponin T, Serum: <0.01 ng/mL (10-12-20 @ 11:22)      10-14 @ 07:01  -  10-15 @ 07:00  --------------------------------------------------------  IN: 420 mL / OUT: 1250 mL / NET: -830 mL      Daily     Daily Weight in k.5 (15 Oct 2020 06:24)

## 2020-10-15 NOTE — PROGRESS NOTE ADULT - PROBLEM SELECTOR PLAN 1
-Appears clinically stable, SOB resolved.   lungs congestion improving, O2 Sat @ 98% 2L NC.   -R/O ACS with trop negative x 3.  Admit BNP 30,000  CXR:  Stable cardiomegaly, status post median sternotomy, thoracic aortic and mitral annulus calcification. Bilateral opacities/pleural effusions  - Echo from 2/19/20 Normal LV systolic function EF 58%, no WMA, Borderline LVH, moderate AR, moderate MR, PulmHTN, PASP 70mmHg  Echo from 10/12/20 Mild AR, MR, TR. Pulm HTN PASP 47 There is mild concentric LVH ejection fraction of 55%. Left pleural effusion.  - Lasix 40 IV BID, transitioned to Lasix 80mg PO daily.  Holding ACE/ARB for now per renal in the setting of worsening CKD  -Daily weight, strict I & Os, Net neg:

## 2020-10-15 NOTE — PROGRESS NOTE ADULT - SUBJECTIVE AND OBJECTIVE BOX
Patient is a 85y Female seen and evaluated at bedside.   no complaints   on RA   BP acceptable   is feeling even better today   ready to go home  likely d/c today     Meds:    albuterol/ipratropium for Nebulization 3 every 6 hours  amLODIPine   Tablet 5 daily  aspirin enteric coated 81 daily  atorvastatin 20 at bedtime  donepezil 5 at bedtime  doxazosin 8 <User Schedule>  furosemide    Tablet 80 daily  gabapentin 100 at bedtime  heparin   Injectable 5000 every 8 hours  levothyroxine 50 daily  metoprolol tartrate 75 two times a day  pantoprazole    Tablet 40 before breakfast  tiotropium 2.5 MICROgram(s)/olodaterol 2.5 MICROgram(s) (STIOLTO) Inhaler 2 daily      T(C): , Max: 37 (10-14-20 @ 13:15)  T(F): , Max: 98.6 (10-14-20 @ 13:15)  HR: 61 (10-15-20 @ 05:26)  BP: 137/63 (10-15-20 @ 05:26)  BP(mean): --  RR: 18 (10-15-20 @ 05:26)  SpO2: 98% (10-15-20 @ 05:26)  Wt(kg): --    10-14 @ 07:01  -  10-15 @ 07:00  --------------------------------------------------------  IN: 420 mL / OUT: 1250 mL / NET: -830 mL    10-15 @ 07:01  -  10-15 @ 10:25  --------------------------------------------------------  IN: 180 mL / OUT: 0 mL / NET: 180 mL          Review of Systems:  CONSTITUTIONAL: No fever.  RESPIRATORY: No shortness of breath, cough  CARDIOVASCULAR: No Chest pain, shortness of breath  GASTROINTESTINAL: No abdominal pain, nausea, vomiting, diarrhea      PHYSICAL EXAM:  GENERAL: Alert, awake, oriented x3, on RA   CHEST/LUNG: Bilateral clear breath sounds  HEART: Regular rate and rhythm, no murmur, no gallops, no rub   ABDOMEN: Soft, nontender, non distended  EXTREMITIES: no LE edema      LABS:                        8.3    4.94  )-----------( 143      ( 15 Oct 2020 07:32 )             26.4     10-15    141  |  104  |  61<H>  ----------------------------<  128<H>  4.0   |  23  |  3.16<H>    Ca    8.7      15 Oct 2020 07:32  Phos  4.1     10-15  Mg     1.8     10-15                  RADIOLOGY & ADDITIONAL STUDIES:

## 2020-10-15 NOTE — PROGRESS NOTE ADULT - ASSESSMENT
85F PMHx CKD baseline Cr 1.4, renal artery stenosis s/p stent(s)?, HTN, HLD, CAD s/p CABG 2015, diastolic CHF, presents c/o shortness of breath, lower ext edema worsening over the past 2 weeks.  Nephrology consulted for NAV/CKD management.     Assessment/Plan:     #NAV on CKD, acute CHF exacerbation   baseline creatinine 1.4, Cr likely peaked ~3   adequate urine output   edema and oxygen requirements improved   NAV on CKD likely cardiorenal syndrome w/probable ischemic ATN component     Recommend:   daily CXR   continue with 80mg daily Lasix PO   daily BMP and urine lytes including Na, Cr, and Urea   renal sono non-contributory   strict I/Os   renal diet     Nephrologically stable for discharge with out-patient follow-up.     Thank you for the opportunity to participate in the care of your patient. The nephrology service remains available to assist with any questions or concerns. Please feel free to reach us by paging the on-call nephrology fellow for urgent issues or as below.     Tobi Thompson M.D.   PGY-4, Nephrology Fellow   C: [phone]   P: [phone]

## 2020-10-15 NOTE — PROGRESS NOTE ADULT - ATTENDING COMMENTS
creat 3.14-  no recovery yet-   respiratory distress and edema resolved  will f/u as outpatient  diuretic dosage as above  will d/w Dr. Crooks

## 2020-10-15 NOTE — PROGRESS NOTE ADULT - PROBLEM SELECTOR PLAN 6
-History of anemia to 8s starting earlier this summer per Dr. Medina.  s/p EGD showing PUD,  + Tx for H. Pylori.  -Stable H/H (803/26.4).  Continue to monitor daily  -On Omeprazole 40mg daily interchanged to pantoprazole 4omg daily

## 2020-10-15 NOTE — PROGRESS NOTE ADULT - PROBLEM SELECTOR PLAN 3
- Pt when for CT chest for evaluation of pleural effusion  - CT chest 10/14/20 Cardiomegaly. Small bibasilar pleural effusions and interlobular septal thickening compatible with pulmonary venous congestion. More peripheral reticular, groundglass and some patchy areas of peripheral consolidation more pronounced in the upper and midlung zones are noted. There may be some bronchiectasis/bronchiolectasis to suggest pulmonary fibrosis. No honeycomb lung formation. Air-trapping is noted. These findings are not consistent with UIP/IPF. Abbreviated differential includes atypical infectious and/or inflammatory etiologies such as chronic hypersensitivity pneumonia, stage IV sarcoidosis, pneumoconiosis, and subacute and/or chronic sequela of atypical and viral pneumonias such as Covid 19. Mild mediastinal lymphadenopathy.  -Covid Antibody neg. Procal WNL  -Pulm Dr. Cuevas consulted for abnormal CT findings

## 2020-10-15 NOTE — PROGRESS NOTE ADULT - PROBLEM SELECTOR PLAN 2
- BPs improved to 130- 150's/60-80s w/ increased doxazosin  - continue amlodipine 5mg and Metoprolol tartrate 75mg BID  - would benefit from increase betablocker as patient has frequent Atach and some NSVT however patient also has episodes of bradycardia to 40s  - continue doxazosin 8mg BID  -continue to monitor

## 2020-10-18 ENCOUNTER — NON-APPOINTMENT (OUTPATIENT)
Age: 85
End: 2020-10-18

## 2020-10-19 ENCOUNTER — NON-APPOINTMENT (OUTPATIENT)
Age: 85
End: 2020-10-19

## 2020-10-20 DIAGNOSIS — J84.10 PULMONARY FIBROSIS, UNSPECIFIED: ICD-10-CM

## 2020-10-20 DIAGNOSIS — I27.20 PULMONARY HYPERTENSION, UNSPECIFIED: ICD-10-CM

## 2020-10-20 DIAGNOSIS — D64.9 ANEMIA, UNSPECIFIED: ICD-10-CM

## 2020-10-20 DIAGNOSIS — I25.10 ATHEROSCLEROTIC HEART DISEASE OF NATIVE CORONARY ARTERY WITHOUT ANGINA PECTORIS: ICD-10-CM

## 2020-10-20 DIAGNOSIS — Z95.820 PERIPHERAL VASCULAR ANGIOPLASTY STATUS WITH IMPLANTS AND GRAFTS: ICD-10-CM

## 2020-10-20 DIAGNOSIS — I47.2 VENTRICULAR TACHYCARDIA: ICD-10-CM

## 2020-10-20 DIAGNOSIS — J44.9 CHRONIC OBSTRUCTIVE PULMONARY DISEASE, UNSPECIFIED: ICD-10-CM

## 2020-10-20 DIAGNOSIS — I44.7 LEFT BUNDLE-BRANCH BLOCK, UNSPECIFIED: ICD-10-CM

## 2020-10-20 DIAGNOSIS — N18.30 CHRONIC KIDNEY DISEASE, STAGE 3 UNSPECIFIED: ICD-10-CM

## 2020-10-20 DIAGNOSIS — I70.0 ATHEROSCLEROSIS OF AORTA: ICD-10-CM

## 2020-10-20 DIAGNOSIS — I50.33 ACUTE ON CHRONIC DIASTOLIC (CONGESTIVE) HEART FAILURE: ICD-10-CM

## 2020-10-20 DIAGNOSIS — K27.9 PEPTIC ULCER, SITE UNSPECIFIED, UNSPECIFIED AS ACUTE OR CHRONIC, WITHOUT HEMORRHAGE OR PERFORATION: ICD-10-CM

## 2020-10-20 DIAGNOSIS — Z82.49 FAMILY HISTORY OF ISCHEMIC HEART DISEASE AND OTHER DISEASES OF THE CIRCULATORY SYSTEM: ICD-10-CM

## 2020-10-20 DIAGNOSIS — I51.7 CARDIOMEGALY: ICD-10-CM

## 2020-10-20 DIAGNOSIS — J47.9 BRONCHIECTASIS, UNCOMPLICATED: ICD-10-CM

## 2020-10-20 DIAGNOSIS — Z87.891 PERSONAL HISTORY OF NICOTINE DEPENDENCE: ICD-10-CM

## 2020-10-20 DIAGNOSIS — Z95.1 PRESENCE OF AORTOCORONARY BYPASS GRAFT: ICD-10-CM

## 2020-10-20 DIAGNOSIS — N17.9 ACUTE KIDNEY FAILURE, UNSPECIFIED: ICD-10-CM

## 2020-10-20 DIAGNOSIS — E11.51 TYPE 2 DIABETES MELLITUS WITH DIABETIC PERIPHERAL ANGIOPATHY WITHOUT GANGRENE: ICD-10-CM

## 2020-10-20 DIAGNOSIS — R06.02 SHORTNESS OF BREATH: ICD-10-CM

## 2020-10-20 DIAGNOSIS — I13.0 HYPERTENSIVE HEART AND CHRONIC KIDNEY DISEASE WITH HEART FAILURE AND STAGE 1 THROUGH STAGE 4 CHRONIC KIDNEY DISEASE, OR UNSPECIFIED CHRONIC KIDNEY DISEASE: ICD-10-CM

## 2020-10-21 PROCEDURE — 94640 AIRWAY INHALATION TREATMENT: CPT

## 2020-10-21 PROCEDURE — 85610 PROTHROMBIN TIME: CPT

## 2020-10-21 PROCEDURE — 86850 RBC ANTIBODY SCREEN: CPT

## 2020-10-21 PROCEDURE — 83036 HEMOGLOBIN GLYCOSYLATED A1C: CPT

## 2020-10-21 PROCEDURE — 93306 TTE W/DOPPLER COMPLETE: CPT

## 2020-10-21 PROCEDURE — 83735 ASSAY OF MAGNESIUM: CPT

## 2020-10-21 PROCEDURE — 84540 ASSAY OF URINE/UREA-N: CPT

## 2020-10-21 PROCEDURE — 93976 VASCULAR STUDY: CPT

## 2020-10-21 PROCEDURE — 84156 ASSAY OF PROTEIN URINE: CPT

## 2020-10-21 PROCEDURE — 84484 ASSAY OF TROPONIN QUANT: CPT

## 2020-10-21 PROCEDURE — 82330 ASSAY OF CALCIUM: CPT

## 2020-10-21 PROCEDURE — 71250 CT THORAX DX C-: CPT

## 2020-10-21 PROCEDURE — 84436 ASSAY OF TOTAL THYROXINE: CPT

## 2020-10-21 PROCEDURE — 83880 ASSAY OF NATRIURETIC PEPTIDE: CPT

## 2020-10-21 PROCEDURE — 87040 BLOOD CULTURE FOR BACTERIA: CPT

## 2020-10-21 PROCEDURE — U0003: CPT

## 2020-10-21 PROCEDURE — 84443 ASSAY THYROID STIM HORMONE: CPT

## 2020-10-21 PROCEDURE — 76770 US EXAM ABDO BACK WALL COMP: CPT

## 2020-10-21 PROCEDURE — 99285 EMERGENCY DEPT VISIT HI MDM: CPT | Mod: 25

## 2020-10-21 PROCEDURE — 84100 ASSAY OF PHOSPHORUS: CPT

## 2020-10-21 PROCEDURE — 80053 COMPREHEN METABOLIC PANEL: CPT

## 2020-10-21 PROCEDURE — 71046 X-RAY EXAM CHEST 2 VIEWS: CPT

## 2020-10-21 PROCEDURE — 84300 ASSAY OF URINE SODIUM: CPT

## 2020-10-21 PROCEDURE — 82570 ASSAY OF URINE CREATININE: CPT

## 2020-10-21 PROCEDURE — 82803 BLOOD GASES ANY COMBINATION: CPT

## 2020-10-21 PROCEDURE — 86900 BLOOD TYPING SEROLOGIC ABO: CPT

## 2020-10-21 PROCEDURE — 96374 THER/PROPH/DIAG INJ IV PUSH: CPT

## 2020-10-21 PROCEDURE — 97162 PT EVAL MOD COMPLEX 30 MIN: CPT

## 2020-10-21 PROCEDURE — 85025 COMPLETE CBC W/AUTO DIFF WBC: CPT

## 2020-10-21 PROCEDURE — 80061 LIPID PANEL: CPT

## 2020-10-21 PROCEDURE — 82553 CREATINE MB FRACTION: CPT

## 2020-10-21 PROCEDURE — 85027 COMPLETE CBC AUTOMATED: CPT

## 2020-10-21 PROCEDURE — 83605 ASSAY OF LACTIC ACID: CPT

## 2020-10-21 PROCEDURE — 85730 THROMBOPLASTIN TIME PARTIAL: CPT

## 2020-10-21 PROCEDURE — 82550 ASSAY OF CK (CPK): CPT

## 2020-10-21 PROCEDURE — 84145 PROCALCITONIN (PCT): CPT

## 2020-10-21 PROCEDURE — 86901 BLOOD TYPING SEROLOGIC RH(D): CPT

## 2020-10-21 PROCEDURE — 84132 ASSAY OF SERUM POTASSIUM: CPT

## 2020-10-21 PROCEDURE — 83935 ASSAY OF URINE OSMOLALITY: CPT

## 2020-10-21 PROCEDURE — 97116 GAIT TRAINING THERAPY: CPT

## 2020-10-21 PROCEDURE — 36415 COLL VENOUS BLD VENIPUNCTURE: CPT

## 2020-10-21 PROCEDURE — 71045 X-RAY EXAM CHEST 1 VIEW: CPT

## 2020-10-21 PROCEDURE — 84295 ASSAY OF SERUM SODIUM: CPT

## 2020-10-21 PROCEDURE — 80048 BASIC METABOLIC PNL TOTAL CA: CPT

## 2020-10-21 PROCEDURE — 86769 SARS-COV-2 COVID-19 ANTIBODY: CPT

## 2020-10-23 ENCOUNTER — NON-APPOINTMENT (OUTPATIENT)
Age: 85
End: 2020-10-23

## 2020-10-30 ENCOUNTER — NON-APPOINTMENT (OUTPATIENT)
Age: 85
End: 2020-10-30

## 2020-11-06 ENCOUNTER — NON-APPOINTMENT (OUTPATIENT)
Age: 85
End: 2020-11-06

## 2020-11-14 ENCOUNTER — NON-APPOINTMENT (OUTPATIENT)
Age: 85
End: 2020-11-14

## 2021-01-15 ENCOUNTER — INPATIENT (INPATIENT)
Facility: HOSPITAL | Age: 86
LOS: 22 days | Discharge: HOME | DRG: 291 | End: 2021-02-07
Attending: INTERNAL MEDICINE | Admitting: INTERNAL MEDICINE
Payer: MEDICARE

## 2021-01-15 VITALS
HEART RATE: 80 BPM | TEMPERATURE: 98 F | DIASTOLIC BLOOD PRESSURE: 69 MMHG | SYSTOLIC BLOOD PRESSURE: 170 MMHG | OXYGEN SATURATION: 80 % | RESPIRATION RATE: 26 BRPM | HEIGHT: 62 IN | WEIGHT: 119.05 LBS

## 2021-01-15 DIAGNOSIS — N18.4 CHRONIC KIDNEY DISEASE, STAGE 4 (SEVERE): ICD-10-CM

## 2021-01-15 DIAGNOSIS — I10 ESSENTIAL (PRIMARY) HYPERTENSION: ICD-10-CM

## 2021-01-15 DIAGNOSIS — F03.90 UNSPECIFIED DEMENTIA, UNSPECIFIED SEVERITY, WITHOUT BEHAVIORAL DISTURBANCE, PSYCHOTIC DISTURBANCE, MOOD DISTURBANCE, AND ANXIETY: ICD-10-CM

## 2021-01-15 DIAGNOSIS — I25.10 ATHEROSCLEROTIC HEART DISEASE OF NATIVE CORONARY ARTERY WITHOUT ANGINA PECTORIS: ICD-10-CM

## 2021-01-15 DIAGNOSIS — J44.9 CHRONIC OBSTRUCTIVE PULMONARY DISEASE, UNSPECIFIED: ICD-10-CM

## 2021-01-15 DIAGNOSIS — I50.33 ACUTE ON CHRONIC DIASTOLIC (CONGESTIVE) HEART FAILURE: ICD-10-CM

## 2021-01-15 DIAGNOSIS — J18.9 PNEUMONIA, UNSPECIFIED ORGANISM: ICD-10-CM

## 2021-01-15 DIAGNOSIS — E11.9 TYPE 2 DIABETES MELLITUS WITHOUT COMPLICATIONS: ICD-10-CM

## 2021-01-15 DIAGNOSIS — D64.9 ANEMIA, UNSPECIFIED: ICD-10-CM

## 2021-01-15 LAB
ALBUMIN SERPL ELPH-MCNC: 3.4 G/DL — SIGNIFICANT CHANGE UP (ref 3.3–5)
ALP SERPL-CCNC: 162 U/L — HIGH (ref 40–120)
ALT FLD-CCNC: 17 U/L — SIGNIFICANT CHANGE UP (ref 10–45)
ANION GAP SERPL CALC-SCNC: 17 MMOL/L — SIGNIFICANT CHANGE UP (ref 5–17)
APTT BLD: 32.6 SEC — SIGNIFICANT CHANGE UP (ref 27.5–35.5)
AST SERPL-CCNC: 21 U/L — SIGNIFICANT CHANGE UP (ref 10–40)
BASE EXCESS BLDV CALC-SCNC: -2.2 MMOL/L — SIGNIFICANT CHANGE UP
BASOPHILS # BLD AUTO: 0.05 K/UL — SIGNIFICANT CHANGE UP (ref 0–0.2)
BASOPHILS NFR BLD AUTO: 0.3 % — SIGNIFICANT CHANGE UP (ref 0–2)
BILIRUB SERPL-MCNC: 0.7 MG/DL — SIGNIFICANT CHANGE UP (ref 0.2–1.2)
BUN SERPL-MCNC: 56 MG/DL — HIGH (ref 7–23)
CA-I SERPL-SCNC: 1.12 MMOL/L — SIGNIFICANT CHANGE UP (ref 1.12–1.3)
CALCIUM SERPL-MCNC: 9 MG/DL — SIGNIFICANT CHANGE UP (ref 8.4–10.5)
CHLORIDE SERPL-SCNC: 98 MMOL/L — SIGNIFICANT CHANGE UP (ref 96–108)
CK MB CFR SERPL CALC: 2.7 NG/ML — SIGNIFICANT CHANGE UP (ref 0–6.7)
CK MB CFR SERPL CALC: 2.8 NG/ML — SIGNIFICANT CHANGE UP (ref 0–6.7)
CK SERPL-CCNC: 56 U/L — SIGNIFICANT CHANGE UP (ref 25–170)
CK SERPL-CCNC: 64 U/L — SIGNIFICANT CHANGE UP (ref 25–170)
CO2 SERPL-SCNC: 21 MMOL/L — LOW (ref 22–31)
CREAT SERPL-MCNC: 3.54 MG/DL — HIGH (ref 0.5–1.3)
CRP SERPL-MCNC: 4.86 MG/DL — HIGH (ref 0–0.4)
D DIMER BLD IA.RAPID-MCNC: 491 NG/ML DDU — HIGH
EOSINOPHIL # BLD AUTO: 0.03 K/UL — SIGNIFICANT CHANGE UP (ref 0–0.5)
EOSINOPHIL NFR BLD AUTO: 0.2 % — SIGNIFICANT CHANGE UP (ref 0–6)
FERRITIN SERPL-MCNC: 180 NG/ML — HIGH (ref 15–150)
GAS PNL BLDV: 133 MMOL/L — LOW (ref 138–146)
GAS PNL BLDV: SIGNIFICANT CHANGE UP
GAS PNL BLDV: SIGNIFICANT CHANGE UP
GLUCOSE SERPL-MCNC: 171 MG/DL — HIGH (ref 70–99)
HCO3 BLDV-SCNC: 23 MMOL/L — SIGNIFICANT CHANGE UP (ref 20–27)
HCT VFR BLD CALC: 30.6 % — LOW (ref 34.5–45)
HGB BLD-MCNC: 9.8 G/DL — LOW (ref 11.5–15.5)
IMM GRANULOCYTES NFR BLD AUTO: 0.5 % — SIGNIFICANT CHANGE UP (ref 0–1.5)
INR BLD: 1.2 — HIGH (ref 0.88–1.16)
LACTATE SERPL-SCNC: 0.7 MMOL/L — SIGNIFICANT CHANGE UP (ref 0.5–2)
LACTATE SERPL-SCNC: 2.4 MMOL/L — HIGH (ref 0.5–2)
LYMPHOCYTES # BLD AUTO: 1.28 K/UL — SIGNIFICANT CHANGE UP (ref 1–3.3)
LYMPHOCYTES # BLD AUTO: 8.8 % — LOW (ref 13–44)
MCHC RBC-ENTMCNC: 27.4 PG — SIGNIFICANT CHANGE UP (ref 27–34)
MCHC RBC-ENTMCNC: 32 GM/DL — SIGNIFICANT CHANGE UP (ref 32–36)
MCV RBC AUTO: 85.5 FL — SIGNIFICANT CHANGE UP (ref 80–100)
MONOCYTES # BLD AUTO: 0.3 K/UL — SIGNIFICANT CHANGE UP (ref 0–0.9)
MONOCYTES NFR BLD AUTO: 2.1 % — SIGNIFICANT CHANGE UP (ref 2–14)
NEUTROPHILS # BLD AUTO: 12.77 K/UL — HIGH (ref 1.8–7.4)
NEUTROPHILS NFR BLD AUTO: 88.1 % — HIGH (ref 43–77)
NRBC # BLD: 0 /100 WBCS — SIGNIFICANT CHANGE UP (ref 0–0)
NT-PROBNP SERPL-SCNC: HIGH PG/ML (ref 0–300)
PCO2 BLDV: 41 MMHG — SIGNIFICANT CHANGE UP (ref 41–51)
PH BLDV: 7.37 — SIGNIFICANT CHANGE UP (ref 7.32–7.43)
PLATELET # BLD AUTO: 251 K/UL — SIGNIFICANT CHANGE UP (ref 150–400)
PO2 BLDV: 25 MMHG — SIGNIFICANT CHANGE UP
POTASSIUM BLDV-SCNC: 4 MMOL/L — SIGNIFICANT CHANGE UP (ref 3.5–4.9)
POTASSIUM SERPL-MCNC: 4.2 MMOL/L — SIGNIFICANT CHANGE UP (ref 3.5–5.3)
POTASSIUM SERPL-SCNC: 4.2 MMOL/L — SIGNIFICANT CHANGE UP (ref 3.5–5.3)
PROCALCITONIN SERPL-MCNC: 0.29 NG/ML — HIGH (ref 0.02–0.1)
PROT SERPL-MCNC: 6.9 G/DL — SIGNIFICANT CHANGE UP (ref 6–8.3)
PROTHROM AB SERPL-ACNC: 14.3 SEC — HIGH (ref 10.6–13.6)
RBC # BLD: 3.58 M/UL — LOW (ref 3.8–5.2)
RBC # FLD: 15.9 % — HIGH (ref 10.3–14.5)
SAO2 % BLDV: 43 % — SIGNIFICANT CHANGE UP
SARS-COV-2 RNA SPEC QL NAA+PROBE: SIGNIFICANT CHANGE UP
SODIUM SERPL-SCNC: 136 MMOL/L — SIGNIFICANT CHANGE UP (ref 135–145)
TROPONIN T SERPL-MCNC: 0.04 NG/ML — CRITICAL HIGH (ref 0–0.01)
TROPONIN T SERPL-MCNC: 0.05 NG/ML — CRITICAL HIGH (ref 0–0.01)
WBC # BLD: 14.5 K/UL — HIGH (ref 3.8–10.5)
WBC # FLD AUTO: 14.5 K/UL — HIGH (ref 3.8–10.5)

## 2021-01-15 PROCEDURE — 93010 ELECTROCARDIOGRAM REPORT: CPT

## 2021-01-15 PROCEDURE — 99291 CRITICAL CARE FIRST HOUR: CPT

## 2021-01-15 PROCEDURE — 71045 X-RAY EXAM CHEST 1 VIEW: CPT | Mod: 26

## 2021-01-15 RX ORDER — ERGOCALCIFEROL 1.25 MG/1
1 CAPSULE ORAL
Qty: 0 | Refills: 0 | DISCHARGE

## 2021-01-15 RX ORDER — BUDESONIDE AND FORMOTEROL FUMARATE DIHYDRATE 160; 4.5 UG/1; UG/1
2 AEROSOL RESPIRATORY (INHALATION)
Refills: 0 | Status: DISCONTINUED | OUTPATIENT
Start: 2021-01-15 | End: 2021-02-04

## 2021-01-15 RX ORDER — AZITHROMYCIN 500 MG/1
500 TABLET, FILM COATED ORAL ONCE
Refills: 0 | Status: COMPLETED | OUTPATIENT
Start: 2021-01-15 | End: 2021-01-15

## 2021-01-15 RX ORDER — GABAPENTIN 400 MG/1
1 CAPSULE ORAL
Qty: 0 | Refills: 0 | DISCHARGE

## 2021-01-15 RX ORDER — FUROSEMIDE 40 MG
80 TABLET ORAL ONCE
Refills: 0 | Status: COMPLETED | OUTPATIENT
Start: 2021-01-15 | End: 2021-01-15

## 2021-01-15 RX ORDER — CEFTRIAXONE 500 MG/1
1000 INJECTION, POWDER, FOR SOLUTION INTRAMUSCULAR; INTRAVENOUS ONCE
Refills: 0 | Status: COMPLETED | OUTPATIENT
Start: 2021-01-15 | End: 2021-01-15

## 2021-01-15 RX ORDER — AZITHROMYCIN 500 MG/1
500 TABLET, FILM COATED ORAL EVERY 24 HOURS
Refills: 0 | Status: DISCONTINUED | OUTPATIENT
Start: 2021-01-15 | End: 2021-01-15

## 2021-01-15 RX ORDER — DONEPEZIL HYDROCHLORIDE 10 MG/1
1 TABLET, FILM COATED ORAL
Qty: 0 | Refills: 0 | DISCHARGE

## 2021-01-15 RX ORDER — DONEPEZIL HYDROCHLORIDE 10 MG/1
5 TABLET, FILM COATED ORAL AT BEDTIME
Refills: 0 | Status: DISCONTINUED | OUTPATIENT
Start: 2021-01-15 | End: 2021-02-07

## 2021-01-15 RX ORDER — ATORVASTATIN CALCIUM 80 MG/1
20 TABLET, FILM COATED ORAL AT BEDTIME
Refills: 0 | Status: DISCONTINUED | OUTPATIENT
Start: 2021-01-15 | End: 2021-02-07

## 2021-01-15 RX ORDER — LEVOTHYROXINE SODIUM 125 MCG
1 TABLET ORAL
Qty: 0 | Refills: 0 | DISCHARGE

## 2021-01-15 RX ORDER — ATORVASTATIN CALCIUM 80 MG/1
1 TABLET, FILM COATED ORAL
Qty: 0 | Refills: 0 | DISCHARGE

## 2021-01-15 RX ORDER — FUROSEMIDE 40 MG
80 TABLET ORAL EVERY 12 HOURS
Refills: 0 | Status: DISCONTINUED | OUTPATIENT
Start: 2021-01-16 | End: 2021-01-17

## 2021-01-15 RX ORDER — CEFTRIAXONE 500 MG/1
1000 INJECTION, POWDER, FOR SOLUTION INTRAMUSCULAR; INTRAVENOUS EVERY 24 HOURS
Refills: 0 | Status: DISCONTINUED | OUTPATIENT
Start: 2021-01-16 | End: 2021-01-19

## 2021-01-15 RX ORDER — DOXAZOSIN MESYLATE 4 MG
1 TABLET ORAL
Qty: 0 | Refills: 0 | DISCHARGE

## 2021-01-15 RX ORDER — OMEPRAZOLE 10 MG/1
1 CAPSULE, DELAYED RELEASE ORAL
Qty: 0 | Refills: 0 | DISCHARGE

## 2021-01-15 RX ORDER — DEXAMETHASONE 0.5 MG/5ML
6 ELIXIR ORAL ONCE
Refills: 0 | Status: COMPLETED | OUTPATIENT
Start: 2021-01-15 | End: 2021-01-15

## 2021-01-15 RX ORDER — HEPARIN SODIUM 5000 [USP'U]/ML
5000 INJECTION INTRAVENOUS; SUBCUTANEOUS EVERY 12 HOURS
Refills: 0 | Status: DISCONTINUED | OUTPATIENT
Start: 2021-01-15 | End: 2021-02-07

## 2021-01-15 RX ORDER — METOPROLOL TARTRATE 50 MG
50 TABLET ORAL
Refills: 0 | Status: DISCONTINUED | OUTPATIENT
Start: 2021-01-15 | End: 2021-01-22

## 2021-01-15 RX ORDER — AMLODIPINE BESYLATE 2.5 MG/1
5 TABLET ORAL DAILY
Refills: 0 | Status: DISCONTINUED | OUTPATIENT
Start: 2021-01-15 | End: 2021-01-17

## 2021-01-15 RX ORDER — LEVOTHYROXINE SODIUM 125 MCG
50 TABLET ORAL DAILY
Refills: 0 | Status: DISCONTINUED | OUTPATIENT
Start: 2021-01-15 | End: 2021-02-07

## 2021-01-15 RX ORDER — AZITHROMYCIN 500 MG/1
500 TABLET, FILM COATED ORAL EVERY 24 HOURS
Refills: 0 | Status: COMPLETED | OUTPATIENT
Start: 2021-01-16 | End: 2021-01-21

## 2021-01-15 RX ORDER — NITROGLYCERIN 6.5 MG
0.4 CAPSULE, EXTENDED RELEASE ORAL ONCE
Refills: 0 | Status: COMPLETED | OUTPATIENT
Start: 2021-01-15 | End: 2021-01-15

## 2021-01-15 RX ADMIN — Medication 80 MILLIGRAM(S): at 18:43

## 2021-01-15 RX ADMIN — Medication 6 MILLIGRAM(S): at 18:50

## 2021-01-15 RX ADMIN — Medication 0.4 MILLIGRAM(S): at 18:44

## 2021-01-15 RX ADMIN — CEFTRIAXONE 100 MILLIGRAM(S): 500 INJECTION, POWDER, FOR SOLUTION INTRAMUSCULAR; INTRAVENOUS at 19:26

## 2021-01-15 RX ADMIN — AZITHROMYCIN 255 MILLIGRAM(S): 500 TABLET, FILM COATED ORAL at 20:50

## 2021-01-15 NOTE — ED ADULT NURSE NOTE - CHPI ED NUR SYMPTOMS NEG
no back pain/no chest pain/no chills/no congestion/no diaphoresis/no dizziness/no fever/no nausea/no syncope/no vomiting

## 2021-01-15 NOTE — ED PROVIDER NOTE - CRITICAL CARE PROVIDED
direct patient care (not related to procedure)/documentation/consultation with other physicians/consult w/ pt's family directly relating to pts condition

## 2021-01-15 NOTE — ED PROVIDER NOTE - CLINICAL SUMMARY MEDICAL DECISION MAKING FREE TEXT BOX
Pt is an 86F with CHF exacerbation, COPD. Pt given IV lasix, nitro sublingual. Decadron 6mg IV given. Pt O2 sat improved to 99% on NRB. Case discussed with Dr. Crooks, agrees with plan. Accepts pt to her service.

## 2021-01-15 NOTE — ED ADULT NURSE NOTE - OBJECTIVE STATEMENT
Pt presents hypoxic to the 80s, tachypneic, reporting weakness at home. Pt has hx of CHF, reports recent hx of exacerbation. Pt took 80mg of lasix at home without relief of symptoms.

## 2021-01-15 NOTE — H&P ADULT - PROBLEM SELECTOR PLAN 1
+JVD, diffuse crackles mid lobes to bases, 1+ pitting LE edema bilaterally, O2 sat 80s on RA upon arrival to ED.  --BNP >64,671, CXR with alveolar edema, bilateral patchy opacities/consolidation.  --received Lasix 80mg IV x 1 dose in ED, will continue Lasix 80mg IV q 12 hours, strict I/Os and daily weights.  --will obtain LE venous duplex.     ***Prior Echo 10/12/20 revealed mild concentric LVH, LV normal in size/systolic function, EF:55%. Mild AR/MR/TR, pulm HTN with PASP 47 mmHg.

## 2021-01-15 NOTE — ED ADULT NURSE NOTE - NSIMPLEMENTINTERV_GEN_ALL_ED
Implemented All Fall Risk Interventions:  [place] to call system. Call bell, personal items and telephone within reach. Instruct patient to call for assistance. Room bathroom lighting operational. Non-slip footwear when patient is off stretcher. Physically safe environment: no spills, clutter or unnecessary equipment. Stretcher in lowest position, wheels locked, appropriate side rails in place. Provide visual cue, wrist band, yellow gown, etc. Monitor gait and stability. Monitor for mental status changes and reorient to person, place, and time. Review medications for side effects contributing to fall risk. Reinforce activity limits and safety measures with patient and family.

## 2021-01-15 NOTE — H&P ADULT - PROBLEM SELECTOR PLAN 8
continue Aricept 5mg PO daily    VTE PPx: on Heparin subcut  PT consulted: s/p recent fall ~3 weeks ago

## 2021-01-15 NOTE — H&P ADULT - PROBLEM SELECTOR PLAN 7
Baseline Hgb 8-9, likely anemia of chronic disease. H/H currently 9.8/30.6, no signs/symptoms of bleed.  --will F/U iron panel, Vit B12 and folate levels.

## 2021-01-15 NOTE — H&P ADULT - ASSESSMENT
86 yr old F, POOR HISTORIAN/early dementia, former heavy smoker with PMHx HTN, hyperlipidemia, COPD, hypothyroidism, Stage IV CKD (baseline Cr ~3.0), anemia of chronic disease, CAD s/p CABG 2015 Dr. Blunt, chronic diastolic CHF(EF:55% by Echo 10/2020), renal artery stenosis s/p renal artery stent >20yrs ago, Carotid artery stenosis, PAD, who presents to [facility] ED 1/15/21 c/o progressively worsening SOB and cough x 3 days, admitted to cardiac telemetry for management of acute on chronic diastolic CHF exacerbation in setting of suspected CAP.

## 2021-01-15 NOTE — H&P ADULT - HISTORY OF PRESENT ILLNESS
86 yr old F, POOR HISTORIAN, former smoker with PMHx HTN, hyperlipidemia, COPD, hypothyroidism, Stage IV CKD (baseline Cr ~3.0), anemia of chronic disease, CAD s/p CABG 2015 Dr. Blunt, chronic diastolic CHF(EF:55% by Echo 10/2020), renal artery stenosis s/p renal artery stent >20yrs ago, Carotid artery stenosis, PAD, who presents to [facility] ED 1/15/21 c/o progressively worsening SOB and cough x 3 days.   Patient reports she can barely walk 10 feet prior to having to stop and rest. Patient further admits to significant orthopnea and she has been sleeping upright in a chair the past week. Patient was instructed to increase her Lasix 80mg PO daily dose to Lasix 80mg PO BID by her cardiologist Dr. Horvath.     Patient endorsing compliance w/ medications however daughter states that compliance with medication has been questionable over the last few months, as patient is becoming more forgetful.   In [facility] ED, BP: 170/68, HR: 80, RR:26, Temp: 98.4F, O2 sat: 80% on RA, improved to 99% with 10L NRB. EKG revealed NSR@67BPM with incomplete LBBB, TWI Lead I, AVL (similar to prior EKG 10/2020). CXR prelim read consistent with alveolar edema, bilateral patchy opacities/consolidation.    Labs notable for: WBC 14.5 with left shift, H/H 9.8/30.6, D-dimer 491, BUN/Cr 56/3.54, , CRP 4.86, Ferritin 180, Procalcitonin 0.29, Lactate 2.4, Troponin T 0.05, BNP 64,671.    Patient treated with Lasix 80mg IV x 1 dose, SL NTG 0.4mg PO x 1 dose, Decadron 6mg IV x 1 dose, Ceftriaxone 1g IV x 1 dose and Azithromycin 500mg IV x 1 dose.  Patient now admitted to cardiac telemetry for management of acute on chronic diastolic CHF exacerbation in setting of suspected CAP.   86 yr old F, POOR HISTORIAN/early dementia, former heavy smoker with PMHx HTN, hyperlipidemia, COPD, hypothyroidism, Stage IV CKD (baseline Cr ~3.0), anemia of chronic disease, CAD s/p CABG 2015 Dr. Blunt, chronic diastolic CHF(EF:55% by Echo 10/2020), renal artery stenosis s/p renal artery stent >20yrs ago, Carotid artery stenosis, PAD, who presents to [facility] ED 1/15/21 c/o progressively worsening SOB and cough x 3 days. Patient reports she can barely walk 10 feet prior to having to stop and rest. Patient further admits to chronic bilateral LE edema and significant orthopnea and she has been sleeping upright in a chair the past week. Patient also admits to chills and a nonproductive cough over the past few days. Patient was instructed to increase her Lasix 80mg PO daily dose to Lasix 80mg PO BID and start Metalazone 10mg PO BID prior to each dose by her cardiologist Dr. Horvath. Patient denies any chest pain, dizziness, palpitations, recent travel or sick contacts. Patient endorsing compliance w/ medications however daughter states that compliance with medication has been questionable over the last few months, as patient is becoming more forgetful.    In [facility] ED, BP: 170/68, HR: 80, RR:26, Temp: 98.4F, O2 sat: 80% on RA, improved to 99% with 10L NRB. EKG revealed NSR@67BPM with incomplete LBBB, TWI Lead I, AVL (similar to prior EKG 10/2020). CXR prelim read consistent with alveolar edema, bilateral patchy opacities/consolidation.    Labs notable for: WBC 14.5 with left shift, H/H 9.8/30.6, D-dimer 491, BUN/Cr 56/3.54, , CRP 4.86, Ferritin 180, Procalcitonin 0.29, Lactate 2.4, Troponin T 0.05, BNP 64,671, Initial COVID PCR negative.    Patient treated with Lasix 80mg IV x 1 dose, SL NTG 0.4mg PO x 1 dose, Decadron 6mg IV x 1 dose, Ceftriaxone 1g IV x 1 dose and Azithromycin 500mg IV x 1 dose.  Patient now admitted to cardiac telemetry for management of acute on chronic diastolic CHF exacerbation in setting of suspected CAP.    **Of note: Patient had a fall ~3 weeks ago for which she was admitted to [facility], CT imaging was negative as per daughter and fall thought to be secondary to hypotension.

## 2021-01-15 NOTE — H&P ADULT - PROBLEM SELECTOR PLAN 4
afebrile, WBC 14.5 with left shift, CRP 4.86, Ferritin 180, Procalcitonin 0.29, Lactate 2.4, D-dimer 491. Initial COVID PCR negative, will F/U repeat COVID swab in setting of high suspicion with elevated inflammatory markers.  --received Decadron 6mg IV x 1 dose, Ceftriaxone 1g IV x 1 dose and Azithromycin 500mg IV x 1 dose in ED.  --will continue Ceftriaxone 1g IV daily and Azithromycin 500mg IV afebrile, WBC 14.5 with left shift, CRP 4.86, Ferritin 180, Procalcitonin 0.29, Lactate 2.4, D-dimer 491. Initial COVID PCR negative, will F/U repeat COVID swab in setting of high suspicion with elevated covid inflammatory markers.  --received Decadron 6mg IV x 1 dose, Ceftriaxone 1g IV x 1 dose and Azithromycin 500mg IV x 1 dose in ED.  --will continue Ceftriaxone 1g IV daily and Azithromycin 500mg IV daily for now.  --F/U blood cultures.

## 2021-01-15 NOTE — ED PROVIDER NOTE - PMH
Anxiety state  Anxiety  Atherosclerosis of renal artery  with resulting stent placement  Disorder of kidney and ureter  Renal insufficiency  Essential hypertension  Hypertension  Hyperlipidemia  Hyperlipidemia  Peripheral vascular disease  PVD (peripheral vascular disease)  Type 2 diabetes mellitus  Diabetes

## 2021-01-15 NOTE — ED PROVIDER NOTE - CARE PLAN
Principal Discharge DX:	CHF (congestive heart failure)  Secondary Diagnosis:	Hypoxia  Secondary Diagnosis:	COPD (chronic obstructive pulmonary disease)

## 2021-01-15 NOTE — H&P ADULT - PROBLEM SELECTOR PLAN 6
baseline Cr ~3.0, BUN/Cr currently 56/3.54.  --will F/U UA and urine electrolytes.  --avoid nephrotoxic agents

## 2021-01-15 NOTE — ED PROVIDER NOTE - OBJECTIVE STATEMENT
Pt is an 86F who presents to ED for shortness of breath for the past 3 days associated with cough. Pt has significant hx of CHF and COPD, has been taking additional lasix for diureses at home with no improvement. Pt sent to ED for evaluation and admission. Pt hypoxic on RA. No fever, no chest pain.

## 2021-01-15 NOTE — ED ADULT TRIAGE NOTE - CHIEF COMPLAINT QUOTE
Patient c/o shortness of breath and cough for 3 days . history of CHF . Was sent by cardiologists for evaluation. Had lasix 80 mg po this am. Pt. noted tachypneic .

## 2021-01-15 NOTE — ED CLERICAL - NS ED CLERK NOTE PRE-ARRIVAL INFORMATION; ADDITIONAL PRE-ARRIVAL INFORMATION
- congestive heart failure  - hx of cad  - mild renal insufficiency  - had bypass surg 3 yrs ago  - call

## 2021-01-15 NOTE — H&P ADULT - PROBLEM SELECTOR PLAN 2
Hx of CABG in 2015 with Dr. Blunt.  --chest pain free, EKG revealed NSR@67BPM with incomplete LBBB, TWI Lead I, AVL (similar to prior EKG 10/2020), Initial Troponin T 0.05, likely in setting of CHF exacerbation.  --will F/U cardiac enzymes@10PM and 5AM.  --continue BB/Statin, per patient's daughter ASA was discontinued ~3 weeks ago during admission to [facility] s/p fall.

## 2021-01-16 LAB
A1C WITH ESTIMATED AVERAGE GLUCOSE RESULT: 5.1 % — SIGNIFICANT CHANGE UP (ref 4–5.6)
ANION GAP SERPL CALC-SCNC: 16 MMOL/L — SIGNIFICANT CHANGE UP (ref 5–17)
APPEARANCE UR: CLEAR — SIGNIFICANT CHANGE UP
BACTERIA # UR AUTO: PRESENT /HPF
BILIRUB UR-MCNC: NEGATIVE — SIGNIFICANT CHANGE UP
BUN SERPL-MCNC: 62 MG/DL — HIGH (ref 7–23)
CALCIUM SERPL-MCNC: 8.7 MG/DL — SIGNIFICANT CHANGE UP (ref 8.4–10.5)
CHLORIDE SERPL-SCNC: 100 MMOL/L — SIGNIFICANT CHANGE UP (ref 96–108)
CHLORIDE UR-SCNC: 126 MMOL/L — SIGNIFICANT CHANGE UP
CHLORIDE UR-SCNC: 129 MMOL/L — SIGNIFICANT CHANGE UP
CHOLEST SERPL-MCNC: 191 MG/DL — SIGNIFICANT CHANGE UP
CK MB CFR SERPL CALC: 2.9 NG/ML — SIGNIFICANT CHANGE UP (ref 0–6.7)
CK SERPL-CCNC: 44 U/L — SIGNIFICANT CHANGE UP (ref 25–170)
CO2 SERPL-SCNC: 22 MMOL/L — SIGNIFICANT CHANGE UP (ref 22–31)
COLOR SPEC: YELLOW — SIGNIFICANT CHANGE UP
CREAT ?TM UR-MCNC: 29 MG/DL — SIGNIFICANT CHANGE UP
CREAT SERPL-MCNC: 3.82 MG/DL — HIGH (ref 0.5–1.3)
DIFF PNL FLD: ABNORMAL
EPI CELLS # UR: SIGNIFICANT CHANGE UP /HPF (ref 0–5)
ESTIMATED AVERAGE GLUCOSE: 100 MG/DL — SIGNIFICANT CHANGE UP (ref 68–114)
FERRITIN SERPL-MCNC: 201 NG/ML — HIGH (ref 15–150)
FOLATE SERPL-MCNC: 10.9 NG/ML — SIGNIFICANT CHANGE UP
GLUCOSE BLDC GLUCOMTR-MCNC: 161 MG/DL — HIGH (ref 70–99)
GLUCOSE SERPL-MCNC: 155 MG/DL — HIGH (ref 70–99)
GLUCOSE UR QL: NEGATIVE — SIGNIFICANT CHANGE UP
HCT VFR BLD CALC: 27.2 % — LOW (ref 34.5–45)
HDLC SERPL-MCNC: 69 MG/DL — SIGNIFICANT CHANGE UP
HGB BLD-MCNC: 8.4 G/DL — LOW (ref 11.5–15.5)
IRON SATN MFR SERPL: 17 UG/DL — LOW (ref 30–160)
IRON SATN MFR SERPL: 8 % — LOW (ref 14–50)
KETONES UR-MCNC: NEGATIVE — SIGNIFICANT CHANGE UP
LEUKOCYTE ESTERASE UR-ACNC: NEGATIVE — SIGNIFICANT CHANGE UP
LIPID PNL WITH DIRECT LDL SERPL: 102 MG/DL — HIGH
MAGNESIUM SERPL-MCNC: 1.9 MG/DL — SIGNIFICANT CHANGE UP (ref 1.6–2.6)
MCHC RBC-ENTMCNC: 26.2 PG — LOW (ref 27–34)
MCHC RBC-ENTMCNC: 30.9 GM/DL — LOW (ref 32–36)
MCV RBC AUTO: 84.7 FL — SIGNIFICANT CHANGE UP (ref 80–100)
NITRITE UR-MCNC: NEGATIVE — SIGNIFICANT CHANGE UP
NON HDL CHOLESTEROL: 122 MG/DL — SIGNIFICANT CHANGE UP
NRBC # BLD: 0 /100 WBCS — SIGNIFICANT CHANGE UP (ref 0–0)
OSMOLALITY UR: 360 MOSM/KG — SIGNIFICANT CHANGE UP (ref 300–900)
PH UR: 6 — SIGNIFICANT CHANGE UP (ref 5–8)
PLATELET # BLD AUTO: 200 K/UL — SIGNIFICANT CHANGE UP (ref 150–400)
POTASSIUM SERPL-MCNC: 3.9 MMOL/L — SIGNIFICANT CHANGE UP (ref 3.5–5.3)
POTASSIUM SERPL-SCNC: 3.9 MMOL/L — SIGNIFICANT CHANGE UP (ref 3.5–5.3)
POTASSIUM UR-SCNC: 32 MMOL/L — SIGNIFICANT CHANGE UP
PROT ?TM UR-MCNC: 282 MG/DL — SIGNIFICANT CHANGE UP (ref 0–12)
PROT UR-MCNC: 100 MG/DL
RBC # BLD: 3.21 M/UL — LOW (ref 3.8–5.2)
RBC # FLD: 15.6 % — HIGH (ref 10.3–14.5)
RBC CASTS # UR COMP ASSIST: < 5 /HPF — SIGNIFICANT CHANGE UP
SARS-COV-2 RNA SPEC QL NAA+PROBE: SIGNIFICANT CHANGE UP
SODIUM SERPL-SCNC: 138 MMOL/L — SIGNIFICANT CHANGE UP (ref 135–145)
SODIUM UR-SCNC: 116 MMOL/L — SIGNIFICANT CHANGE UP
SP GR SPEC: >=1.03 — SIGNIFICANT CHANGE UP (ref 1–1.03)
TIBC SERPL-MCNC: 203 UG/DL — LOW (ref 220–430)
TRIGL SERPL-MCNC: 98 MG/DL — SIGNIFICANT CHANGE UP
TROPONIN T SERPL-MCNC: 0.04 NG/ML — CRITICAL HIGH (ref 0–0.01)
UIBC SERPL-MCNC: 186 UG/DL — SIGNIFICANT CHANGE UP (ref 110–370)
UROBILINOGEN FLD QL: 0.2 E.U./DL — SIGNIFICANT CHANGE UP
UUN UR-MCNC: 173 MG/DL — SIGNIFICANT CHANGE UP
VIT B12 SERPL-MCNC: 851 PG/ML — SIGNIFICANT CHANGE UP (ref 232–1245)
WBC # BLD: 4.55 K/UL — SIGNIFICANT CHANGE UP (ref 3.8–10.5)
WBC # FLD AUTO: 4.55 K/UL — SIGNIFICANT CHANGE UP (ref 3.8–10.5)
WBC UR QL: < 5 /HPF — SIGNIFICANT CHANGE UP

## 2021-01-16 RX ORDER — INFLUENZA VIRUS VACCINE 15; 15; 15; 15 UG/.5ML; UG/.5ML; UG/.5ML; UG/.5ML
0.5 SUSPENSION INTRAMUSCULAR ONCE
Refills: 0 | Status: DISCONTINUED | OUTPATIENT
Start: 2021-01-16 | End: 2021-01-16

## 2021-01-16 RX ORDER — INFLUENZA VIRUS VACCINE 15; 15; 15; 15 UG/.5ML; UG/.5ML; UG/.5ML; UG/.5ML
0.7 SUSPENSION INTRAMUSCULAR ONCE
Refills: 0 | Status: DISCONTINUED | OUTPATIENT
Start: 2021-01-16 | End: 2021-02-07

## 2021-01-16 RX ORDER — IPRATROPIUM/ALBUTEROL SULFATE 18-103MCG
3 AEROSOL WITH ADAPTER (GRAM) INHALATION EVERY 6 HOURS
Refills: 0 | Status: DISCONTINUED | OUTPATIENT
Start: 2021-01-16 | End: 2021-02-07

## 2021-01-16 RX ADMIN — HEPARIN SODIUM 5000 UNIT(S): 5000 INJECTION INTRAVENOUS; SUBCUTANEOUS at 05:36

## 2021-01-16 RX ADMIN — HEPARIN SODIUM 5000 UNIT(S): 5000 INJECTION INTRAVENOUS; SUBCUTANEOUS at 17:16

## 2021-01-16 RX ADMIN — Medication 50 MICROGRAM(S): at 05:36

## 2021-01-16 RX ADMIN — DONEPEZIL HYDROCHLORIDE 5 MILLIGRAM(S): 10 TABLET, FILM COATED ORAL at 20:34

## 2021-01-16 RX ADMIN — BUDESONIDE AND FORMOTEROL FUMARATE DIHYDRATE 2 PUFF(S): 160; 4.5 AEROSOL RESPIRATORY (INHALATION) at 02:20

## 2021-01-16 RX ADMIN — Medication 3 MILLILITER(S): at 20:34

## 2021-01-16 RX ADMIN — ATORVASTATIN CALCIUM 20 MILLIGRAM(S): 80 TABLET, FILM COATED ORAL at 20:34

## 2021-01-16 RX ADMIN — BUDESONIDE AND FORMOTEROL FUMARATE DIHYDRATE 2 PUFF(S): 160; 4.5 AEROSOL RESPIRATORY (INHALATION) at 23:10

## 2021-01-16 RX ADMIN — Medication 50 MILLIGRAM(S): at 17:17

## 2021-01-16 RX ADMIN — AZITHROMYCIN 255 MILLIGRAM(S): 500 TABLET, FILM COATED ORAL at 20:38

## 2021-01-16 RX ADMIN — BUDESONIDE AND FORMOTEROL FUMARATE DIHYDRATE 2 PUFF(S): 160; 4.5 AEROSOL RESPIRATORY (INHALATION) at 12:23

## 2021-01-16 RX ADMIN — AMLODIPINE BESYLATE 5 MILLIGRAM(S): 2.5 TABLET ORAL at 05:36

## 2021-01-16 RX ADMIN — Medication 50 MILLIGRAM(S): at 05:36

## 2021-01-16 RX ADMIN — CEFTRIAXONE 100 MILLIGRAM(S): 500 INJECTION, POWDER, FOR SOLUTION INTRAMUSCULAR; INTRAVENOUS at 18:36

## 2021-01-16 RX ADMIN — Medication 80 MILLIGRAM(S): at 17:15

## 2021-01-16 RX ADMIN — Medication 100 MILLIGRAM(S): at 20:34

## 2021-01-16 RX ADMIN — Medication 80 MILLIGRAM(S): at 05:36

## 2021-01-16 NOTE — PROGRESS NOTE ADULT - PROBLEM SELECTOR PLAN 4
afebrile, WBC 14.5 with left shift, CRP 4.86, Ferritin 180, Procalcitonin 0.29, Lactate 2.4, D-dimer 491. COVID PCR negative x2.  -CXR with alveolar edema, bilateral patchy opacities/consolidation.  -continue Ceftriaxone 1g IV daily and Azithromycin 500mg IV daily  -BCx NGTD  -Dr. Cuevas consulted f/u recs

## 2021-01-16 NOTE — PROGRESS NOTE ADULT - PROBLEM SELECTOR PLAN 2
Hx of CABG in 2015 with Dr. Blunt.  -chest pain free, EKG revealed NSR@67BPM with incomplete LBBB, TWI Lead I, AVL (similar to prior EKG 10/2020)  -Trops 0.04 x2 likely in setting of CHF exacerbation  --continue BB/Statin, per patient's daughter ASA was discontinued ~3 weeks ago during admission to [facility] s/p fall.

## 2021-01-16 NOTE — PROGRESS NOTE ADULT - PROBLEM SELECTOR PLAN 3
stable, continue Metoprolol Tartrate 50mg PO BID and Norvasc 5mg PO daily SBP 160s, continue Metoprolol Tartrate 50mg PO BID and Norvasc 5mg PO daily

## 2021-01-16 NOTE — PATIENT PROFILE ADULT - VISION (WITH CORRECTIVE LENSES IF THE PATIENT USUALLY WEARS THEM):
wears reading glasses./Normal vision: sees adequately in most situations; can see medication labels, newsprint

## 2021-01-16 NOTE — PROGRESS NOTE ADULT - ASSESSMENT
86 yr old F, POOR HISTORIAN/early dementia, former heavy smoker with PMHx HTN, hyperlipidemia, COPD, hypothyroidism, Stage IV CKD (baseline Cr ~3.0), anemia of chronic disease, CAD s/p CABG 2015 Dr. Blunt, chronic diastolic CHF(EF:55% by Echo 10/2020), renal artery stenosis s/p renal artery stent >20yrs ago, Carotid artery stenosis, PAD, who presents to [facility] ED 1/15/21 c/o progressively worsening SOB and cough x 3 days, admitted to cardiac telemetry for management of acute on chronic diastolic CHF exacerbation in setting of suspected CAP.

## 2021-01-16 NOTE — PROGRESS NOTE ADULT - PROBLEM SELECTOR PLAN 6
-Known to Dr. Nicolas  -baseline Cr ~3.0, BUN/Cr currently 62/3.82.  -UA +protein and bacteria, f/u urine lytes.  -avoid nephrotoxic agents  -if Cr. continues to worsen consult renal per Dr. Crooks

## 2021-01-16 NOTE — PROGRESS NOTE ADULT - PROBLEM SELECTOR PLAN 7
Baseline Hgb 8-9, likely anemia of chronic disease. H/H currently 8.4/27.2, no signs/symptoms of bleed.  -l F/U iron panel, Vit B12 and folate levels.

## 2021-01-16 NOTE — INPATIENT CERTIFICATION FOR MEDICARE PATIENTS - PHYSICIAN CONCUR
OK.  Erx done   I concur with the Admission Order and I certify that services are provided in accordance with Section 42 CFR § 412.3

## 2021-01-16 NOTE — PROGRESS NOTE ADULT - SUBJECTIVE AND OBJECTIVE BOX
CCM PUD   86 yr old F  retired , well known to me, early dementia, former heavy smoker with PMHx HTN, hyperlipidemia, COPD, hypothyroidism, Stage IV CKD (baseline Cr ~3.0), anemia of chronic disease, CAD s/p CABG 2015 Dr. Blunt, chronic diastolic CHF(EF:55% by Echo 10/2020), renal artery stenosis s/p renal artery stent >20yrs ago, Carotid artery stenosis, PAD, who presents to [facility] ED 1/15/21 c/o progressively worsening SOB and cough x 3 days. Patient reports she can barely walk 10 feet prior to having to stop and rest. Patient further admits to chronic bilateral LE edema and significant orthopnea and she has been sleeping upright in a chair the past week. Patient also admits to chills and a nonproductive cough over the past few days. Patient was instructed to increase her Lasix 80mg PO daily dose to Lasix 80mg PO BID and start Metalazone 10mg PO BID prior to each dose by her cardiologist Dr. Horvath. Patient denies any chest pain, dizziness, palpitations, recent travel or sick contacts. Patient endorsing compliance w/ medications however daughter states that compliance with medication has been questionable over the last few months, as patient is becoming more forgetful.    Discussed with Dr Velázquez and cardiac PA. Leucocytosis has resolved. She is receiving MARY/CEF.  In [facility] ED, BP: 170/68, HR: 80, RR:26, Temp: 98.4F, O2 sat: 80% on RA, improved to 99% with 10L NRB. EKG revealed NSR@67BPM with incomplete LBBB, TWI Lead I, AVL (similar to prior EKG 10/2020). CXR prelim read consistent with alveolar edema, bilateral patchy opacities/cosolidation.  Labs notable for: WBC 14.5 with left shift, H/H 9.8/30.6, D-dimer 491, BUN/Cr 56/3.54, , CRP 4.86, Ferritin 180, Procalcitonin 0.29, Lactate 2.4, Troponin T 0.05, BNP 64,671, Initial COVID PCR negative.  WBC has resolved and she is afebrile. She has dry cough without expectoration.  Patient treated with Lasix 80mg IV x 1 dose, SL NTG 0.4mg PO x 1 dose, Decadron 6mg IV x 1 dose, Ceftriaxone 1g IV x 1 dose and Azithromycin 500mg IV x 1 dose.  Patient now admitted to cardiac telemetry for management of acute on chronic diastolic CHF exacerbation in setting of suspected CAP.  **Of note: Patient had a fall ~3 weeks ago for which she was admitted to [facility], CT imaging was negative as per daughter and fall thought to be secondary to hypotension.   Review of Systems:  Other Review of Systems: All other review of systems negative, except as noted in HPI      Allergies and Intolerances:        Allergies:  	No Known Allergies:     Home Medications:   * Patient Currently Takes Medications as of 15-Mark-2021 22:22 documented in Structured Notes  · 	ipratropium-albuterol 0.5 mg-2.5 mg/3 mLinhalation solution: Last Dose Taken:  , 3 milliliter(s) inhaled every 6 hours  · 	furosemide 80 mg oral tablet: Last Dose Taken:  , 1 tab(s) orally once a day ( recently increased to Lasix 80mg PO BID for the past 3 days)  · 	amLODIPine 5 mg oral tablet: Last Dose Taken:  , 1 tab(s) orally once a day  · 	Symbicort 80 mcg-4.5 mcg/inh inhalation aerosol: Last Dose Taken:  , 2 puff(s) inhaled every 6 hours, As Needed   · 	Metoprolol Tartrate 50 mg oral tablet: Last Dose Taken:  , 1 tab(s) orally 2 times a day  · 	atorvastatin 20 mg oral tablet: Last Dose Taken:  , 1 tab(s) orally once a day  · 	Aricept 5 mg oral tablet: Last Dose Taken:  , 1 tab(s) orally once a day (at bedtime)  · 	levothyroxine 50 mcg (0.05 mg) oral tablet: Last Dose Taken:  , 1 tab(s) orally once a day    .    Patient History:    Past Medical, Past Surgical, and Family History:  PAST MEDICAL HISTORY:  Anxiety state Anxiety    Atherosclerosis of renal artery with resulting stent placement    Disorder of kidney and ureter Renal insufficiency    Essential hypertension Hypertension    Hyperlipidemia Hyperlipidemia    Peripheral vascular disease PVD (peripheral vascular disease)    Type 2 diabetes mellitus Diabetes.     PAST SURGICAL HISTORY:  Atherosclerosis of renal artery Renal artery stenosis.     FAMILY HISTORY:  Family history of ischemic heart disease, Family history of coronary artery disease.     Social History:  Social History (marital status, living situation, occupation, tobacco use, alcohol and drug use, and sexual history): Tobacco: Quit 20 yrs ago, previously ~1 ppd x 40 yrs  ETOH/Illicit drugs: denies     Tobacco Screening:  · Core Measure Site	No    Risk Assessment:    Present on Admission:  Deep Venous Thrombosis	no  Pulmonary Embolus	no     Heart Failure:  Does this patient have a history of or has been diagnosed with heart failure? yes.     LV Function Assessment (LVS function was evaluated before arrival and/or during hospitalization) yes.     Is the Ejection Fraction >40% ? yes.     normal LV function.    Physical Exam:    Height/Weight/BSA/BMI:  · Height (CENTIMETERS)	157.5 Centimeter(s)  · Dosing Weight (KILOGRAMS)	54 kg  · Dosing Weight  (POUNDS)	119 Pound(s)  · BSA (m2)	1.53 Meter Squared  · BMI (kG/m2)	21.8     T/HR/RR/BP:  · Temp (F)	98.4 Degrees F  · Temp (C)	36.9 Degrees C  · Heart Rate	64 /min  · Respiration Rate (breaths/min)	18 /min  · BP Systolic	141 mm Hg  · BP Diastolic	70 mm Hg  · Blood Pressure - Method	electronic  · BP Noninvasive Mean	93 mm Hg  · SpO2 (%)	99 %  · O2 Delivery/Oxygen Delivery Method	mask, nonrebreather     Physical Exam:  · Constitutional	Well-developed, well nourished  · Eyes	EOMI; PERRL; no drainage or redness  · ENMT	No oral lesions; no gross abnormalities  · Neck	detailed exam  · Neck Details	JVD  · Breasts	not examined  · Back	No deformity or limitation of movement  · Respiratory	detailed exam  · Respiratory Comments	diffuse crackles from mid lobes to bases, bibasilar rales, endexpiratory wheezing both lower lungs. Intermtttent cough. Bronchitic rhonchi.  · Cardiovascular	detailed exam  · Cardiovascular Details	regular rate and rhythm  · Cardiovascular Details	positive S1; positive S2  · Gastrointestinal	detailed exam  · GI Normal	normal; soft; nontender; no distention  · Genitourinary	not examined  · Rectal	not examined  · Extremities	detailed exam  · Extremities Details	normal; pedal edema  · Pedal Edema Severity	1+  · Pedal Edema Type	pitting, bilateral LE 1+ pitting edema  · Vascular	Equal and normal pulses (carotid, femoral, dorsalis pedis)  · Neurological	detailed exam  · Neurological Details	alert and oriented x 3  · Mental Status	A+O x 3, poor memory and concentration noted  · Skin	No lesions; no rash  · Lymph Nodes	not examined  · Musculoskeletal	No joint pain, swelling or deformity; no limitation of movement  · Psychiatric	Affect and characteristics of appearance, verbalizations, behaviors are appropriate    Assessment and Plan:    Assessment:  · Assessment	  86 yr old F, POOR HISTORIAN/early dementia, former heavy smoker with PMHx HTN, hyperlipidemia, COPD, hypothyroidism, Stage IV CKD (baseline Cr ~3.0), anemia of chronic disease, CAD s/p CABG 2015 Dr. Blunt, chronic diastolic CHF(EF:55% by Echo 10/2020), renal artery stenosis s/p renal artery stent >20yrs ago, Carotid artery stenosis, PAD, who presents to [facility] ED 1/15/21 c/o progressively worsening SOB and cough x 3 days, admitted to cardiac telemetry for management of acute on chronic diastolic CHF exacerbation in setting of suspected CAP.     Problem/Plan - 1:  ·  Problem: Acute on chronic diastolic congestive heart failure.  Plan: +JVD, diffuse crackles mid lobes to bases, 1+ pitting LE edema bilaterally, O2 sat 80s on RA upon arrival to ED.  --BNP >64,671, CXR with alveolar edema, bilateral patchy opacities/consolidation.  --received Lasix 80mg IV x 1 dose in ED, will continue Lasix 80mg IV q 12 hours, strict I/Os and daily weights.  --will obtain LE venous duplex.     ***Prior Echo 10/12/20 revealed mild concentric LVH, LV normal in size/systolic function, EF:55%. Mild AR/MR/TR, pulm HTN with PASP 47 mmHg.      Problem/Plan - 2:  ·  Problem: CAD (coronary artery disease).  Plan: Hx of CABG in 2015 with Dr. Blunt.  --chest pain free, EKG revealed NSR@67BPM with incomplete LBBB, TWI Lead I, AVL (similar to prior EKG 10/2020), Initial Troponin T 0.05, likely in setting of CHF exacerbation.  --will F/U cardiac enzymes@10PM and 5AM.  --continue BB/Statin, per patient's daughter ASA was discontinued ~3 weeks ago during admission to [facility] s/p fall.      Problem/Plan - 3:  ·  Problem: HTN (hypertension).  Plan: stable, continue Metoprolol Tartrate 50mg PO BID and Norvasc 5mg PO daily.      Problem/Plan - 4:  ·  Problem: presumed CAP (community acquired pneumonia). She has had cough for 2-3 days.  Plan: afebrile, WBC 14.5 with left shift, CRP 4.86, Ferritin 180, Procalcitonin 0.29, Lactate 2.4, D-dimer 491. Initial COVID PCR negative, will F/U repeat COVID swab in setting of high suspicion with elevated covid inflammatory markers.  --received Decadron 6mg IV x 1 dose, Ceftriaxone 1g IV x 1 dose and Azithromycin 500mg IV x 1 dose in ED.  --will continue Ceftriaxone 1g IV daily and Azithromycin 500mg IV daily for now.  --F/U blood cultures.   Problem/Plan - 5:  ·  Problem: COPD (chronic obstructive pulmonary disease).   --continue Symbicort inhaler BID. Duonebs.     Problem/Plan -   Problem: CKD (chronic kidney disease), stage IV. Plan: baseline Cr ~3.0, BUN/Cr currently 56/3.54.  --will F/U UA and urine electrolytes.  --avoid nephrotoxic agents.     Problem/Plan - 7:  ·  Problem: Anemia.  Plan: Baseline Hgb 8-9, likely anemia of chronic disease. H/H currently 9.8/30.6, no signs/symptoms of bleed.  --will F/U iron panel, Vit B12 and folate levels.      Problem/Plan - 8:  ·  Problem: Dementia. Plan: continue Aricept 5mg PO daily    VTE PPx: on Heparin subcut  PT consulted: s/p recent fall ~3 weeks ago.

## 2021-01-16 NOTE — PROGRESS NOTE ADULT - PROBLEM SELECTOR PLAN 8
continue Aricept 5mg PO daily    VTE PPx: on Heparin subcut  PT consulted: s/p recent fall ~3 weeks ago    Dispo: continue diuresis, and follow up pulm recs for CAP    Case d/w Dr. Crooks and Dr. Cuevas

## 2021-01-16 NOTE — PROGRESS NOTE ADULT - PROBLEM SELECTOR PLAN 1
-BNP >64,671  -continue IV Lasix 80mg BID, if urine output decreased will add metolazone   -Prior Echo 10/12/20 revealed mild concentric LVH, LV normal in size/systolic function, EF:55%. Mild AR/MR/TR, pulm HTN with PASP 47 mmHg. -BNP >64,671  -continue IV Lasix 80mg BID, if urine output decreased will add metolazone   -Prior Echo 10/12/20 revealed mild concentric LVH, LV normal in size/systolic function, EF:55%. Mild AR/MR/TR, pulm HTN with PASP 47 mmHg.  -core measures ordered, strict I/Os, fluid restriction, and daily weights

## 2021-01-16 NOTE — PROGRESS NOTE ADULT - SUBJECTIVE AND OBJECTIVE BOX
1. Have you been to the ER, urgent care clinic since your last visit? Hospitalized since your last visit? Yes [facility] for dialysis    2. Have you seen or consulted any other health care providers outside of the [address] [place] [address] since your last visit? Include any pap smears or colon screening.  [facility] for dialysis    Chief Complaint   Patient presents with    Follow-up     Visit Vitals  Temp 97.5 °F (36.4 °C) (Rectal)   Resp 40   Ht 1' 7\" (0.483 m)   Wt 6 lb 10.5 oz (3.019 kg)   HC 33.5 cm   BMI 12.96 kg/m² Interventional Cardiology PA Adult Progress Note    CC: SOB and cough on admission  Subjective Assessment: Patient examined at bedside this AM, continued c/o SOB and cough, although feeling better since admission. Denies CP, fever, chills, or any other complaints  ROS negative except as per subjective    	  MEDICATIONS:  amLODIPine   Tablet 5 milliGRAM(s) Oral daily  furosemide   Injectable 80 milliGRAM(s) IV Push every 12 hours  metoprolol tartrate 50 milliGRAM(s) Oral two times a day  azithromycin  IVPB 500 milliGRAM(s) IV Intermittent every 24 hours  cefTRIAXone   IVPB 1000 milliGRAM(s) IV Intermittent every 24 hours  budesonide  80 MICROgram(s)/formoterol 4.5 MICROgram(s) Inhaler 2 Puff(s) Inhalation two times a day  donepezil 5 milliGRAM(s) Oral at bedtime  atorvastatin 20 milliGRAM(s) Oral at bedtime  levothyroxine 50 MICROGram(s) Oral daily  heparin   Injectable 5000 Unit(s) SubCutaneous every 12 hours  influenza  Vaccine (HIGH DOSE) 0.7 milliLiter(s) IntraMuscular once      [PHYSICAL EXAM:  TELEMETRY:  T(C): 36.6 (01-16-21 @ 09:44), Max: 36.9 (01-15-21 @ 18:21)  HR: 57 (01-16-21 @ 09:10) (57 - 80)  BP: 155/68 (01-16-21 @ 09:10) (141/70 - 170/69)  RR: 19 (01-16-21 @ 09:10) (18 - 26)  SpO2: 100% (01-16-21 @ 06:22) (80% - 100%)    I&O's Summary    15 Mark 2021 07:01  -  16 Jan 2021 07:00  --------------------------------------------------------  IN: 0 mL / OUT: 600 mL / NET: -600 mL    16 Jan 2021 07:01  -  16 Jan 2021 10:35  --------------------------------------------------------  IN: 240 mL / OUT: 0 mL / NET: 240 mL      Height (cm): 157.5 (01-15 @ 21:30)  Weight (kg): 54 (01-15 @ 21:30)  BMI (kg/m2): 21.8 (01-15 @ 21:30)  BSA (m2): 1.53 (01-15 @ 21:30)                                      Appearance: elderly female	  HEENT:   Normal oral mucosa, PERRL, EOMI	  Neck: Supple, + JVD   Cardiovascular: Normal S1 S2, No JVD, No murmurs,   Respiratory: Lungs clear to auscultation/Decreased Breath Sounds/No Rales, Rhonchi, Wheezing	  Gastrointestinal:  Soft, Non-tender, + BS	  Skin: No rashes, No ecchymoses, No cyanosis  Extremities: 1+ pitting edema  Vascular: Peripheral pulses palpable 2+ bilaterally  Neurologic: Non-focal  Psychiatry: A & O x 3, Mood & affect appropriate      	    	    LABS:	 	                          8.4    4.55  )-----------( 200      ( 16 Jan 2021 05:27 )             27.2     01-16    138  |  100  |  62<H>  ----------------------------<  155<H>  3.9   |  22  |  3.82<H>    Ca    8.7      16 Jan 2021 05:27  Mg     1.9     01-16    TPro  6.9  /  Alb  3.4  /  TBili  0.7  /  DBili  x   /  AST  21  /  ALT  17  /  AlkPhos  162<H>  01-15    proBNP: Serum Pro-Brain Natriuretic Peptide: 89387 pg/mL (01-15 @ 18:33)      PT/INR - ( 15 Mark 2021 18:33 )   PT: 14.3 sec;   INR: 1.20          PTT - ( 15 Mark 2021 18:33 )  PTT:32.6 sec         Interventional Cardiology PA Adult Progress Note    CC: SOB and cough on admission  Subjective Assessment: Patient examined at bedside this AM, continued c/o SOB and cough, although feeling better since admission. Denies CP, fever, chills, or any other complaints  ROS negative except as per subjective    	  MEDICATIONS:  amLODIPine   Tablet 5 milliGRAM(s) Oral daily  furosemide   Injectable 80 milliGRAM(s) IV Push every 12 hours  metoprolol tartrate 50 milliGRAM(s) Oral two times a day  azithromycin  IVPB 500 milliGRAM(s) IV Intermittent every 24 hours  cefTRIAXone   IVPB 1000 milliGRAM(s) IV Intermittent every 24 hours  budesonide  80 MICROgram(s)/formoterol 4.5 MICROgram(s) Inhaler 2 Puff(s) Inhalation two times a day  donepezil 5 milliGRAM(s) Oral at bedtime  atorvastatin 20 milliGRAM(s) Oral at bedtime  levothyroxine 50 MICROGram(s) Oral daily  heparin   Injectable 5000 Unit(s) SubCutaneous every 12 hours  influenza  Vaccine (HIGH DOSE) 0.7 milliLiter(s) IntraMuscular once      [PHYSICAL EXAM:  TELEMETRY:  T(C): 36.6 (01-16-21 @ 09:44), Max: 36.9 (01-15-21 @ 18:21)  HR: 57 (01-16-21 @ 09:10) (57 - 80)  BP: 155/68 (01-16-21 @ 09:10) (141/70 - 170/69)  RR: 19 (01-16-21 @ 09:10) (18 - 26)  SpO2: 100% (01-16-21 @ 06:22) (80% - 100%)    I&O's Summary    15 Mark 2021 07:01  -  16 Jan 2021 07:00  --------------------------------------------------------  IN: 0 mL / OUT: 600 mL / NET: -600 mL    16 Jan 2021 07:01  -  16 Jan 2021 10:35  --------------------------------------------------------  IN: 240 mL / OUT: 0 mL / NET: 240 mL      Height (cm): 157.5 (01-15 @ 21:30)  Weight (kg): 54 (01-15 @ 21:30)  BMI (kg/m2): 21.8 (01-15 @ 21:30)  BSA (m2): 1.53 (01-15 @ 21:30)                                      Appearance: elderly female	  HEENT:   Normal oral mucosa, PERRL, EOMI	  Neck: Supple, + JVD   Cardiovascular: Normal S1 S2,No murmurs,   Respiratory: diffuse crackles through mid and lower lobes  Gastrointestinal:  Soft, Non-tender, + BS	  Skin: No rashes, No ecchymoses, No cyanosis  Extremities: 1+ pitting edema  Vascular: Peripheral pulses palpable 2+ bilaterally  Neurologic: Non-focal  Psychiatry: A & O x 3, Mood & affect appropriate      	    	    LABS:	 	                          8.4    4.55  )-----------( 200      ( 16 Jan 2021 05:27 )             27.2     01-16    138  |  100  |  62<H>  ----------------------------<  155<H>  3.9   |  22  |  3.82<H>    Ca    8.7      16 Jan 2021 05:27  Mg     1.9     01-16    TPro  6.9  /  Alb  3.4  /  TBili  0.7  /  DBili  x   /  AST  21  /  ALT  17  /  AlkPhos  162<H>  01-15    proBNP: Serum Pro-Brain Natriuretic Peptide: 84375 pg/mL (01-15 @ 18:33)      PT/INR - ( 15 Mark 2021 18:33 )   PT: 14.3 sec;   INR: 1.20          PTT - ( 15 Mark 2021 18:33 )  PTT:32.6 sec

## 2021-01-17 LAB
ALBUMIN SERPL ELPH-MCNC: 2.8 G/DL — LOW (ref 3.3–5)
ALP SERPL-CCNC: 117 U/L — SIGNIFICANT CHANGE UP (ref 40–120)
ALT FLD-CCNC: 10 U/L — SIGNIFICANT CHANGE UP (ref 10–45)
ANION GAP SERPL CALC-SCNC: 19 MMOL/L — HIGH (ref 5–17)
AST SERPL-CCNC: 16 U/L — SIGNIFICANT CHANGE UP (ref 10–40)
BASOPHILS # BLD AUTO: 0.03 K/UL — SIGNIFICANT CHANGE UP (ref 0–0.2)
BASOPHILS NFR BLD AUTO: 0.3 % — SIGNIFICANT CHANGE UP (ref 0–2)
BILIRUB SERPL-MCNC: 0.3 MG/DL — SIGNIFICANT CHANGE UP (ref 0.2–1.2)
BLD GP AB SCN SERPL QL: NEGATIVE — SIGNIFICANT CHANGE UP
BUN SERPL-MCNC: 78 MG/DL — HIGH (ref 7–23)
CALCIUM SERPL-MCNC: 8.3 MG/DL — LOW (ref 8.4–10.5)
CALCIUM UR-MCNC: 1.8 MG/DL — SIGNIFICANT CHANGE UP
CHLORIDE SERPL-SCNC: 96 MMOL/L — SIGNIFICANT CHANGE UP (ref 96–108)
CO2 SERPL-SCNC: 21 MMOL/L — LOW (ref 22–31)
CREAT ?TM UR-MCNC: 20 MG/DL — SIGNIFICANT CHANGE UP
CREAT SERPL-MCNC: 4.47 MG/DL — HIGH (ref 0.5–1.3)
EOSINOPHIL # BLD AUTO: 0.07 K/UL — SIGNIFICANT CHANGE UP (ref 0–0.5)
EOSINOPHIL NFR BLD AUTO: 0.6 % — SIGNIFICANT CHANGE UP (ref 0–6)
GLUCOSE SERPL-MCNC: 124 MG/DL — HIGH (ref 70–99)
HAV IGM SER-ACNC: SIGNIFICANT CHANGE UP
HBV CORE AB SER-ACNC: SIGNIFICANT CHANGE UP
HBV CORE IGM SER-ACNC: SIGNIFICANT CHANGE UP
HBV SURFACE AB SER-ACNC: SIGNIFICANT CHANGE UP
HBV SURFACE AG SER-ACNC: SIGNIFICANT CHANGE UP
HCT VFR BLD CALC: 27.6 % — LOW (ref 34.5–45)
HCV AB S/CO SERPL IA: 0.06 S/CO — SIGNIFICANT CHANGE UP
HCV AB SERPL-IMP: SIGNIFICANT CHANGE UP
HGB BLD-MCNC: 8.7 G/DL — LOW (ref 11.5–15.5)
IMM GRANULOCYTES NFR BLD AUTO: 0.6 % — SIGNIFICANT CHANGE UP (ref 0–1.5)
LYMPHOCYTES # BLD AUTO: 1.54 K/UL — SIGNIFICANT CHANGE UP (ref 1–3.3)
LYMPHOCYTES # BLD AUTO: 13.4 % — SIGNIFICANT CHANGE UP (ref 13–44)
MAGNESIUM SERPL-MCNC: 1.8 MG/DL — SIGNIFICANT CHANGE UP (ref 1.6–2.6)
MCHC RBC-ENTMCNC: 26.3 PG — LOW (ref 27–34)
MCHC RBC-ENTMCNC: 31.5 GM/DL — LOW (ref 32–36)
MCV RBC AUTO: 83.4 FL — SIGNIFICANT CHANGE UP (ref 80–100)
MONOCYTES # BLD AUTO: 0.53 K/UL — SIGNIFICANT CHANGE UP (ref 0–0.9)
MONOCYTES NFR BLD AUTO: 4.6 % — SIGNIFICANT CHANGE UP (ref 2–14)
NEUTROPHILS # BLD AUTO: 9.28 K/UL — HIGH (ref 1.8–7.4)
NEUTROPHILS NFR BLD AUTO: 80.5 % — HIGH (ref 43–77)
NRBC # BLD: 0 /100 WBCS — SIGNIFICANT CHANGE UP (ref 0–0)
OSMOLALITY UR: 313 MOSM/KG — SIGNIFICANT CHANGE UP (ref 300–900)
PHOSPHATE 24H UR-MCNC: 31.1 MG/DL — SIGNIFICANT CHANGE UP
PLATELET # BLD AUTO: 235 K/UL — SIGNIFICANT CHANGE UP (ref 150–400)
POTASSIUM SERPL-MCNC: 3.7 MMOL/L — SIGNIFICANT CHANGE UP (ref 3.5–5.3)
POTASSIUM SERPL-SCNC: 3.7 MMOL/L — SIGNIFICANT CHANGE UP (ref 3.5–5.3)
PROT ?TM UR-MCNC: 118 MG/DL — HIGH (ref 0–12)
PROT ?TM UR-MCNC: 118 MG/DL — HIGH (ref 0–12)
PROT SERPL-MCNC: 6.1 G/DL — SIGNIFICANT CHANGE UP (ref 6–8.3)
PROT/CREAT UR-RTO: 5.9 RATIO — HIGH (ref 0–0.2)
RBC # BLD: 3.31 M/UL — LOW (ref 3.8–5.2)
RBC # FLD: 15.8 % — HIGH (ref 10.3–14.5)
RH IG SCN BLD-IMP: POSITIVE — SIGNIFICANT CHANGE UP
SARS-COV-2 IGG SERPL QL IA: NEGATIVE — SIGNIFICANT CHANGE UP
SARS-COV-2 IGM SERPL IA-ACNC: <0.1 INDEX — SIGNIFICANT CHANGE UP
SODIUM SERPL-SCNC: 136 MMOL/L — SIGNIFICANT CHANGE UP (ref 135–145)
SODIUM UR-SCNC: 107 MMOL/L — SIGNIFICANT CHANGE UP
UUN UR-MCNC: 154 MG/DL — SIGNIFICANT CHANGE UP
WBC # BLD: 11.52 K/UL — HIGH (ref 3.8–10.5)
WBC # FLD AUTO: 11.52 K/UL — HIGH (ref 3.8–10.5)

## 2021-01-17 PROCEDURE — 76770 US EXAM ABDO BACK WALL COMP: CPT | Mod: 26

## 2021-01-17 PROCEDURE — 99223 1ST HOSP IP/OBS HIGH 75: CPT | Mod: GC

## 2021-01-17 PROCEDURE — 71045 X-RAY EXAM CHEST 1 VIEW: CPT | Mod: 26

## 2021-01-17 RX ORDER — AMLODIPINE BESYLATE 2.5 MG/1
5 TABLET ORAL ONCE
Refills: 0 | Status: COMPLETED | OUTPATIENT
Start: 2021-01-17 | End: 2021-01-17

## 2021-01-17 RX ORDER — FLUTICASONE PROPIONATE 50 MCG
1 SPRAY, SUSPENSION NASAL
Refills: 0 | Status: DISCONTINUED | OUTPATIENT
Start: 2021-01-17 | End: 2021-02-07

## 2021-01-17 RX ORDER — IRON SUCROSE 20 MG/ML
200 INJECTION, SOLUTION INTRAVENOUS EVERY 24 HOURS
Refills: 0 | Status: DISCONTINUED | OUTPATIENT
Start: 2021-01-17 | End: 2021-01-19

## 2021-01-17 RX ORDER — MAGNESIUM SULFATE 500 MG/ML
1 VIAL (ML) INJECTION ONCE
Refills: 0 | Status: COMPLETED | OUTPATIENT
Start: 2021-01-17 | End: 2021-01-17

## 2021-01-17 RX ORDER — AMLODIPINE BESYLATE 2.5 MG/1
10 TABLET ORAL DAILY
Refills: 0 | Status: DISCONTINUED | OUTPATIENT
Start: 2021-01-17 | End: 2021-02-07

## 2021-01-17 RX ORDER — ERYTHROPOIETIN 10000 [IU]/ML
4000 INJECTION, SOLUTION INTRAVENOUS; SUBCUTANEOUS ONCE
Refills: 0 | Status: COMPLETED | OUTPATIENT
Start: 2021-01-17 | End: 2021-01-17

## 2021-01-17 RX ORDER — FERROUS SULFATE 325(65) MG
325 TABLET ORAL DAILY
Refills: 0 | Status: DISCONTINUED | OUTPATIENT
Start: 2021-01-17 | End: 2021-01-17

## 2021-01-17 RX ADMIN — Medication 50 MICROGRAM(S): at 05:52

## 2021-01-17 RX ADMIN — Medication 3 MILLILITER(S): at 17:02

## 2021-01-17 RX ADMIN — ERYTHROPOIETIN 4000 UNIT(S): 10000 INJECTION, SOLUTION INTRAVENOUS; SUBCUTANEOUS at 15:22

## 2021-01-17 RX ADMIN — BUDESONIDE AND FORMOTEROL FUMARATE DIHYDRATE 2 PUFF(S): 160; 4.5 AEROSOL RESPIRATORY (INHALATION) at 21:12

## 2021-01-17 RX ADMIN — CEFTRIAXONE 100 MILLIGRAM(S): 500 INJECTION, POWDER, FOR SOLUTION INTRAMUSCULAR; INTRAVENOUS at 17:02

## 2021-01-17 RX ADMIN — Medication 3 MILLILITER(S): at 21:11

## 2021-01-17 RX ADMIN — AMLODIPINE BESYLATE 5 MILLIGRAM(S): 2.5 TABLET ORAL at 05:53

## 2021-01-17 RX ADMIN — Medication 3 MILLILITER(S): at 10:16

## 2021-01-17 RX ADMIN — AZITHROMYCIN 255 MILLIGRAM(S): 500 TABLET, FILM COATED ORAL at 19:00

## 2021-01-17 RX ADMIN — ATORVASTATIN CALCIUM 20 MILLIGRAM(S): 80 TABLET, FILM COATED ORAL at 21:12

## 2021-01-17 RX ADMIN — Medication 50 MILLIGRAM(S): at 17:03

## 2021-01-17 RX ADMIN — Medication 1 SPRAY(S): at 17:03

## 2021-01-17 RX ADMIN — IRON SUCROSE 110 MILLIGRAM(S): 20 INJECTION, SOLUTION INTRAVENOUS at 15:22

## 2021-01-17 RX ADMIN — HEPARIN SODIUM 5000 UNIT(S): 5000 INJECTION INTRAVENOUS; SUBCUTANEOUS at 05:53

## 2021-01-17 RX ADMIN — Medication 80 MILLIGRAM(S): at 06:13

## 2021-01-17 RX ADMIN — HEPARIN SODIUM 5000 UNIT(S): 5000 INJECTION INTRAVENOUS; SUBCUTANEOUS at 17:02

## 2021-01-17 RX ADMIN — Medication 50 MILLIGRAM(S): at 05:53

## 2021-01-17 RX ADMIN — AMLODIPINE BESYLATE 5 MILLIGRAM(S): 2.5 TABLET ORAL at 11:39

## 2021-01-17 RX ADMIN — Medication 3 MILLILITER(S): at 05:53

## 2021-01-17 RX ADMIN — Medication 325 MILLIGRAM(S): at 11:08

## 2021-01-17 RX ADMIN — Medication 100 GRAM(S): at 08:23

## 2021-01-17 RX ADMIN — DONEPEZIL HYDROCHLORIDE 5 MILLIGRAM(S): 10 TABLET, FILM COATED ORAL at 21:11

## 2021-01-17 NOTE — PROGRESS NOTE ADULT - PROBLEM SELECTOR PLAN 6
Hx of CABG in 2015 with Dr. Blunt. Currently CP free and HD stable  - EKG: SR @67BPM w/ incomplete LBBB, TWI in I, AVL (similar to prior EKG 10/2020)  - Trops 0.04 x2 likely in setting of CHF exacerbation  - C/w BB/Statin. Per Dr Crooks, pt had ASA d/c at [facility] after fall ~3 weeks ago. Dr Crooks also reports prior h/o ICH. As such holding ASA at this time

## 2021-01-17 NOTE — PROGRESS NOTE ADULT - ASSESSMENT
86 yr old F, POOR HISTORIAN/early dementia, former heavy smoker with PMHx HTN, hyperlipidemia, COPD, hypothyroidism, Stage IV CKD (baseline Cr ~3.0), anemia of chronic disease, CAD s/p CABG 2015 Dr. Blunt, chronic diastolic CHF(EF:55% by Echo 10/2020), renal artery stenosis s/p renal artery stent >20yrs ago, Carotid artery stenosis, PAD, who presents to [facility] ED 1/15/21 c/o progressively worsening SOB and cough x 3 days, admitted to cardiac telemetry for management of acute on chronic diastolic CHF exacerbation in setting of suspected CAP.

## 2021-01-17 NOTE — PROGRESS NOTE ADULT - SUBJECTIVE AND OBJECTIVE BOX
Interventional Cardiology PA Adult Progress Note    C.C.: SOB    Subjective Assessment: Pt seen and examined at bedside this AM without complaints. Pt denies fevers, chills, cough, CP, SOB, or any other symptoms at this time.      12 Point ROS Negative except as per Subjective and HPI  	  MEDICATIONS:  amLODIPine   Tablet 10 milliGRAM(s) Oral daily  metoprolol tartrate 50 milliGRAM(s) Oral two times a day  azithromycin  IVPB 500 milliGRAM(s) IV Intermittent every 24 hours  cefTRIAXone   IVPB 1000 milliGRAM(s) IV Intermittent every 24 hours  albuterol/ipratropium for Nebulization 3 milliLiter(s) Nebulizer every 6 hours  benzonatate 100 milliGRAM(s) Oral every 8 hours PRN  budesonide  80 MICROgram(s)/formoterol 4.5 MICROgram(s) Inhaler 2 Puff(s) Inhalation two times a day  donepezil 5 milliGRAM(s) Oral at bedtime  atorvastatin 20 milliGRAM(s) Oral at bedtime  levothyroxine 50 MICROGram(s) Oral daily  epoetin demetri-epbx (RETACRIT) Injectable 4000 Unit(s) SubCutaneous once  fluticasone propionate 50 MICROgram(s)/spray Nasal Spray 1 Spray(s) Both Nostrils two times a day  heparin   Injectable 5000 Unit(s) SubCutaneous every 12 hours  influenza  Vaccine (HIGH DOSE) 0.7 milliLiter(s) IntraMuscular once  iron sucrose IVPB 200 milliGRAM(s) IV Intermittent every 24 hours      PHYSICAL EXAM:  TELEMETRY:  T(C): 36.1 (01-17-21 @ 09:30), Max: 37.3 (01-17-21 @ 05:42)  HR: 77 (01-17-21 @ 13:00) (56 - 77)  BP: 165/84 (01-17-21 @ 13:00) (139/63 - 187/79)  RR: 18 (01-17-21 @ 13:00) (17 - 20)  SpO2: 92% (01-17-21 @ 13:00) (89% - 95%)  Wt(kg): --  I&O's Summary    16 Jan 2021 07:01  -  17 Jan 2021 07:00  --------------------------------------------------------  IN: 840 mL / OUT: 1600 mL / NET: -760 mL    17 Jan 2021 07:01  -  17 Jan 2021 13:15  --------------------------------------------------------  IN: 240 mL / OUT: 0 mL / NET: 240 mL                                       Appearance: NAD	  HEENT: Normal oral mucosa, PERRL, EOMI	  Neck: Supple, - JVD; No Carotid Bruit   Cardiovascular: Normal S1 S2, No JVD, No murmurs,   Respiratory: Lungs clear to auscultation/Decreased Breath Sounds/No Rales, Rhonchi, Wheezing	  Gastrointestinal:  Soft, Non-tender, + BS	  Skin: No rashes, No ecchymoses, No cyanosis  Extremities: Normal range of motion, No clubbing, cyanosis or edema  Vascular: Peripheral pulses palpable 2+ bilaterally  Neurologic: Non-focal  Psychiatry: A & O x 3, Mood & affect appropriate                            8.7    11.52 )-----------( 235      ( 17 Jan 2021 06:07 )             27.6     01-17    136  |  96  |  78<H>  ----------------------------<  124<H>  3.7   |  21<L>  |  4.47<H>    Ca    8.3<L>      17 Jan 2021 06:07  Mg     1.8     01-17    TPro  6.1  /  Alb  2.8<L>  /  TBili  0.3  /  DBili  x   /  AST  16  /  ALT  10  /  AlkPhos  117  01-17    proBNP:   Lipid Profile:   HgA1c:   TSH:   PT/INR - ( 15 Mark 2021 18:33 )   PT: 14.3 sec;   INR: 1.20          PTT - ( 15 Mark 2021 18:33 )  PTT:32.6 sec    ASSESSMENT/PLAN: 	        DVT ppx:  Dispo:     Interventional Cardiology PA Adult Progress Note    C.C.: SOB    Subjective Assessment: Pt seen and examined at bedside this AM without complaints. Pt denies fevers, chills, cough, CP, SOB, or any other symptoms at this time.      12 Point ROS Negative except as per Subjective and HPI  	  MEDICATIONS:  amLODIPine   Tablet 10 milliGRAM(s) Oral daily  metoprolol tartrate 50 milliGRAM(s) Oral two times a day  azithromycin  IVPB 500 milliGRAM(s) IV Intermittent every 24 hours  cefTRIAXone   IVPB 1000 milliGRAM(s) IV Intermittent every 24 hours  albuterol/ipratropium for Nebulization 3 milliLiter(s) Nebulizer every 6 hours  benzonatate 100 milliGRAM(s) Oral every 8 hours PRN  budesonide  80 MICROgram(s)/formoterol 4.5 MICROgram(s) Inhaler 2 Puff(s) Inhalation two times a day  donepezil 5 milliGRAM(s) Oral at bedtime  atorvastatin 20 milliGRAM(s) Oral at bedtime  levothyroxine 50 MICROGram(s) Oral daily  epoetin demetri-epbx (RETACRIT) Injectable 4000 Unit(s) SubCutaneous once  fluticasone propionate 50 MICROgram(s)/spray Nasal Spray 1 Spray(s) Both Nostrils two times a day  heparin   Injectable 5000 Unit(s) SubCutaneous every 12 hours  influenza  Vaccine (HIGH DOSE) 0.7 milliLiter(s) IntraMuscular once  iron sucrose IVPB 200 milliGRAM(s) IV Intermittent every 24 hours      PHYSICAL EXAM:  TELEMETRY:  T(C): 36.1 (01-17-21 @ 09:30), Max: 37.3 (01-17-21 @ 05:42)  HR: 77 (01-17-21 @ 13:00) (56 - 77)  BP: 165/84 (01-17-21 @ 13:00) (139/63 - 187/79)  RR: 18 (01-17-21 @ 13:00) (17 - 20)  SpO2: 92% (01-17-21 @ 13:00) (89% - 95%)  Wt(kg): --  I&O's Summary    16 Jan 2021 07:01  -  17 Jan 2021 07:00  --------------------------------------------------------  IN: 840 mL / OUT: 1600 mL / NET: -760 mL    17 Jan 2021 07:01  -  17 Jan 2021 13:15  --------------------------------------------------------  IN: 240 mL / OUT: 0 mL / NET: 240 mL                                       Appearance: NAD	  HEENT: Normal oral mucosa, PERRL, EOMI	  Neck: Supple, - JVD; No Carotid Bruit   Cardiovascular: Normal S1 S2, No JVD, No murmurs,   Respiratory: Mild wheezing, otherwise clear  Gastrointestinal:  Soft, Non-tender, + BS	  Skin: No rashes, No ecchymoses, No cyanosis  Extremities: Normal range of motion, No clubbing, cyanosis or edema  Vascular: Peripheral pulses palpable 2+ bilaterally  Neurologic: Non-focal  Psychiatry: A & O x 3, Mood & affect appropriate                            8.7    11.52 )-----------( 235      ( 17 Jan 2021 06:07 )             27.6     01-17    136  |  96  |  78<H>  ----------------------------<  124<H>  3.7   |  21<L>  |  4.47<H>    Ca    8.3<L>      17 Jan 2021 06:07  Mg     1.8     01-17    TPro  6.1  /  Alb  2.8<L>  /  TBili  0.3  /  DBili  x   /  AST  16  /  ALT  10  /  AlkPhos  117  01-17    PT/INR - ( 15 Mark 2021 18:33 )   PT: 14.3 sec;   INR: 1.20          PTT - ( 15 Mark 2021 18:33 )  PTT:32.6 sec

## 2021-01-17 NOTE — PROGRESS NOTE ADULT - SUBJECTIVE AND OBJECTIVE BOX
26-Oct-2018 18:00 CCM PUD   86 yr old F  retired , well known to me, early dementia, former heavy smoker with PMHx HTN, hyperlipidemia, COPD, hypothyroidism, Stage IV CKD (baseline Cr ~3.0), anemia of chronic disease, CAD s/p CABG 2015 Dr. Blunt, chronic diastolic CHF(EF:55% by Echo 10/2020), renal artery stenosis s/p renal artery stent >20yrs ago, Carotid artery stenosis, PAD, who presents to [facility] ED 1/15/21 c/o progressively worsening SOB and cough x 3 days. Patient reports she can barely walk 10 feet prior to having to stop and rest. Patient further admits to chronic bilateral LE edema and significant orthopnea and she has been sleeping upright in a chair the past week. Patient also admits to chills and a nonproductive cough over the past few days. Patient was instructed to increase her Lasix 80mg PO daily dose to Lasix 80mg PO BID and start Metalazone 10mg PO BID prior to each dose by her cardiologist Dr. Horvath. Patient denies any chest pain, dizziness, palpitations, recent travel or sick contacts. Patient endorsing compliance w/ medications however daughter states that compliance with medication has been questionable over the last few months, as patient is becoming more forgetful.    Discussed with Dr Velázquez and cardiac PA. Leucocytosis has resolved. She is receiving MARY/CEF.  In [facility] ED, BP: 170/68, HR: 80, RR:26, Temp: 98.4F, O2 sat: 80% on RA, improved to 99% with 10L NRB. EKG revealed NSR@67BPM with incomplete LBBB, TWI Lead I, AVL (similar to prior EKG 10/2020). CXR prelim read consistent with alveolar edema, bilateral patchy opacities/cosolidation.  Labs notable for: WBC 14.5 with left shift, H/H 9.8/30.6, D-dimer 491, BUN/Cr 56/3.54, , CRP 4.86, Ferritin 180, Procalcitonin 0.29, Lactate 2.4, Troponin T 0.05, BNP 64,671, Initial COVID PCR negative.  WBC has resolved and she is afebrile. She has dry cough without expectoration.  Patient treated with Lasix 80mg IV x 1 dose, SL NTG 0.4mg PO x 1 dose, Decadron 6mg IV x 1 dose, Ceftriaxone 1g IV x 1 dose and Azithromycin 500mg IV x 1 dose.  Patient now admitted to cardiac telemetry for management of acute on chronic diastolic CHF exacerbation in setting of suspected CAP.  **Of note: Patient had a fall ~3 weeks ago for which she was admitted to [facility], CT imaging was negative as per daughter and fall thought to be secondary to hypotension.    All new data reviewed, including VS, lab, Rx, imaging and documenation. Noter written at bedside, ongoing computer problems.   Review of Systems:  Other Review of Systems: All other review of systems negative, except as noted in HPI      Allergies and Intolerances:        Allergies:  	No Known Allergies:     Home Medications:   * Patient Currently Takes Medications as of 15-Mark-2021 22:22 documented in Structured Notes  · 	ipratropium-albuterol 0.5 mg-2.5 mg/3 mLinhalation solution: Last Dose Taken:  , 3 milliliter(s) inhaled every 6 hours  · 	furosemide 80 mg oral tablet: Last Dose Taken:  , 1 tab(s) orally once a day ( recently increased to Lasix 80mg PO BID for the past 3 days)  · 	amLODIPine 5 mg oral tablet: Last Dose Taken:  , 1 tab(s) orally once a day  · 	Symbicort 80 mcg-4.5 mcg/inh inhalation aerosol: Last Dose Taken:  , 2 puff(s) inhaled every 6 hours, As Needed   · 	Metoprolol Tartrate 50 mg oral tablet: Last Dose Taken:  , 1 tab(s) orally 2 times a day  · 	atorvastatin 20 mg oral tablet: Last Dose Taken:  , 1 tab(s) orally once a day  · 	Aricept 5 mg oral tablet: Last Dose Taken:  , 1 tab(s) orally once a day (at bedtime)  · 	levothyroxine 50 mcg (0.05 mg) oral tablet: Last Dose Taken:  , 1 tab(s) orally once a day    .    Patient History:    Past Medical, Past Surgical, and Family History:  PAST MEDICAL HISTORY:  Anxiety state Anxiety    Atherosclerosis of renal artery with resulting stent placement    Disorder of kidney and ureter Renal insufficiency    Essential hypertension Hypertension    Hyperlipidemia Hyperlipidemia    Peripheral vascular disease PVD (peripheral vascular disease)    Type 2 diabetes mellitus Diabetes.     PAST SURGICAL HISTORY:  Atherosclerosis of renal artery Renal artery stenosis.     FAMILY HISTORY:  Family history of ischemic heart disease, Family history of coronary artery disease.     Social History:  Social History (marital status, living situation, occupation, tobacco use, alcohol and drug use, and sexual history): Tobacco: Quit 20 yrs ago, previously ~1 ppd x 40 yrs  ETOH/Illicit drugs: denies     Tobacco Screening:  · Core Measure Site	No    Risk Assessment:    Present on Admission:  Deep Venous Thrombosis	no  Pulmonary Embolus	no     Heart Failure:  Does this patient have a history of or has been diagnosed with heart failure? yes.     LV Function Assessment (LVS function was evaluated before arrival and/or during hospitalization) yes.     Is the Ejection Fraction >40% ? yes.     normal LV function.    Physical Exam:    Height/Weight/BSA/BMI:  · Height (CENTIMETERS)	157.5 Centimeter(s)  · Dosing Weight (KILOGRAMS)	54 kg  · Dosing Weight  (POUNDS)	119 Pound(s)  · BSA (m2)	1.53 Meter Squared  · BMI (kG/m2)	21.8     T/HR/RR/BP:  · Temp (F)	98.4 Degrees F  · Temp (C)	36.9 Degrees C  · Heart Rate	64 /min  · Respiration Rate (breaths/min)	18 /min  · BP Systolic	141 mm Hg  · BP Diastolic	70 mm Hg  · Blood Pressure - Method	electronic  · BP Noninvasive Mean	93 mm Hg  · SpO2 (%)	99 %  · O2 Delivery/Oxygen Delivery Method	mask, nonrebreather     Physical Exam:  · Constitutional	Well-developed, well nourished  · Eyes	EOMI; PERRL; no drainage or redness  · ENMT	No oral lesions; no gross abnormalities  · Neck	detailed exam  · Neck Details	JVD  · Breasts	not examined  · Back	No deformity or limitation of movement  · Respiratory	detailed exam  · Respiratory Comments	diffuse crackles from mid lobes to bases, bibasilar rales, endexpiratory wheezing both lower lungs. Intermtttent cough. Bronchitic rhonchi.  · Cardiovascular	detailed exam  · Cardiovascular Details	regular rate and rhythm  · Cardiovascular Details	positive S1; positive S2  · Gastrointestinal	detailed exam  · GI Normal	normal; soft; nontender; no distention  · Genitourinary	not examined  · Rectal	not examined  · Extremities	detailed exam  · Extremities Details	normal; pedal edema  · Pedal Edema Severity	1+  · Pedal Edema Type	pitting, bilateral LE 1+ pitting edema  · Vascular	Equal and normal pulses (carotid, femoral, dorsalis pedis)  · Neurological	detailed exam  · Neurological Details	alert and oriented x 3  · Mental Status	A+O x 3, poor memory and concentration noted  · Skin	No lesions; no rash  · Lymph Nodes	not examined  · Musculoskeletal	No joint pain, swelling or deformity; no limitation of movement  · Psychiatric	Affect and characteristics of appearance, verbalizations, behaviors are appropriate    Assessment and Plan:    Assessment:  · Assessment	  86 yr old F, POOR HISTORIAN/early dementia, former heavy smoker with PMHx HTN, hyperlipidemia, COPD, hypothyroidism, Stage IV CKD (baseline Cr ~3.0), anemia of chronic disease, CAD s/p CABG 2015 Dr. Blunt, chronic diastolic CHF(EF:55% by Echo 10/2020), renal artery stenosis s/p renal artery stent >20yrs ago, Carotid artery stenosis, PAD, who presents to [facility] ED 1/15/21 c/o progressively worsening SOB and cough x 3 days, admitted to cardiac telemetry for management of acute on chronic diastolic CHF exacerbation in setting of suspected CAP.     Problem/Plan - 1:  ·  Problem: Acute on chronic diastolic congestive heart failure.  Plan: +JVD, diffuse crackles mid lobes to bases, 1+ pitting LE edema bilaterally, O2 sat 80s on RA upon arrival to ED.  --BNP >64,671, CXR with alveolar edema, bilateral patchy opacities/consolidation.  --received Lasix 80mg IV x 1 dose in ED, will continue Lasix 80mg IV q 12 hours, strict I/Os and daily weights.  --will obtain LE venous duplex.     ***Prior Echo 10/12/20 revealed mild concentric LVH, LV normal in size/systolic function, EF:55%. Mild AR/MR/TR, pulm HTN with PASP 47 mmHg.      Problem/Plan - 2:  ·  Problem: CAD (coronary artery disease).  Plan: Hx of CABG in 2015 with Dr. Blunt.  --chest pain free, EKG revealed NSR@67BPM with incomplete LBBB, TWI Lead I, AVL (similar to prior EKG 10/2020), Initial Troponin T 0.05, likely in setting of CHF exacerbation.  --will F/U cardiac enzymes@10PM and 5AM.  --continue BB/Statin, per patient's daughter ASA was discontinued ~3 weeks ago during admission to [facility] s/p fall.      Problem/Plan - 3:  ·  Problem: HTN (hypertension).  Plan: stable, continue Metoprolol Tartrate 50mg PO BID and Norvasc 5mg PO daily.      Problem/Plan - 4:  ·  Problem: presumed CAP (community acquired pneumonia). She has had cough for 2-3 days.  Plan: afebrile, WBC 14.5 with left shift, CRP 4.86, Ferritin 180, Procalcitonin 0.29, Lactate 2.4, D-dimer 491. Initial COVID PCR negative, will F/U repeat COVID swab in setting of high suspicion with elevated covid inflammatory markers.  --received Decadron 6mg IV x 1 dose, Ceftriaxone 1g IV x 1 dose and Azithromycin 500mg IV x 1 dose in ED.  --will continue Ceftriaxone 1g IV daily and Azithromycin 500mg IV daily for now.  --F/U blood cultures  increased leucocytosis noted; clinically improved.  -    Problem/Plan - 5:  ·  Problem: COPD (chronic obstructive pulmonary disease).   --continue Symbicort inhaler BID. Duonebs.     Problem/Plan -   Problem: CKD (chronic kidney disease), stage IV. Plan: baseline Cr ~3.0, BUN/Cr currently 56/3.54.  --will F/U UA and urine electrolytes.  --avoid nephrotoxic agents.     Problem/Plan - 7:  ·  Problem: Anemia.  Plan: Baseline Hgb 8-9, likely anemia of chronic disease. H/H currently 9.8/30.6, no signs/symptoms of bleed.  --will F/U iron panel, Vit B12 and folate levels.      Problem/Plan - 8:  ·  Problem: Dementia. Plan: continue Aricept 5mg PO daily    VTE PPx: on Heparin subcut  PT consulted: s/p recent fall ~3 weeks ago.

## 2021-01-17 NOTE — PROGRESS NOTE ADULT - PROBLEM SELECTOR PLAN 7
Baseline Hgb 8-9, likely SVETLANA and ACOD. H/H 8.8.7/27.6 on 1/17  - Iron studies reveal SVETLANA. Per Renal started IV Iron x 5 days (1/17-1/22) and EPO  - Maintain active T/S

## 2021-01-17 NOTE — PHYSICAL THERAPY INITIAL EVALUATION ADULT - ADDITIONAL COMMENTS
lives with daughter and son, first floor, daughter on second floor, no steps, no falls in past year, no use of AD inside, occasionally rolling walker outdoors

## 2021-01-17 NOTE — CONSULT NOTE ADULT - SUBJECTIVE AND OBJECTIVE BOX
Patient is a 86y old  Female who presents with a chief complaint of progressively worsening SOB and cough x 3 days (16 Jan 2021 14:07)      HPI: 86F POOR HISTORIAN/early dementia, former heavy smoker PMHx HTN, hyperlipidemia, COPD, hypothyroidism, CKD (baseline Cr ~3.0), anemia of chronic disease, CAD s/p CABG 2015 Dr. Blunt, chronic diastolic CHF(EF:55% by Echo 10/2020), renal artery stenosis s/p renal artery stent >20yrs ago, Carotid artery stenosis, PAD, who presents c/o progressively worsening SOB and cough x 3 days. Patient reports she can barely walk 10 feet prior to having to stop and rest. Patient further admits to chronic bilateral LE edema and significant orthopnea and she has been sleeping upright in a chair the past week. Patient also admits to chills and a nonproductive cough over the past few days. Patient was instructed to increase her Lasix 80mg PO daily dose to Lasix 80mg PO BID and start Metalazone 10mg PO BID prior to each dose by her cardiologist Dr. Horvath. Patient denies any chest pain, dizziness, palpitations, recent travel or sick contacts. Patient endorsing compliance w/ medications however daughter states that compliance with medication has been questionable over the last few months, as patient is becoming more forgetful. Patient currently has no complaints. SOB improved. No CP, abdominal pain, or fever. Nephrology consulted for NAV.       PAST MEDICAL & SURGICAL HISTORY:  Essential hypertension  Hypertension    Type 2 diabetes mellitus  Diabetes    Hyperlipidemia  Hyperlipidemia    Disorder of kidney and ureter  Renal insufficiency    Peripheral vascular disease  PVD (peripheral vascular disease)    Anxiety state  Anxiety    Atherosclerosis of renal artery  with resulting stent placement    Atherosclerosis of renal artery  Renal artery stenosis          Allergies:  No Known Allergies      Home Medications:   albuterol/ipratropium for Nebulization 3 milliLiter(s) Nebulizer every 6 hours  amLODIPine   Tablet 10 milliGRAM(s) Oral daily  atorvastatin 20 milliGRAM(s) Oral at bedtime  azithromycin  IVPB 500 milliGRAM(s) IV Intermittent every 24 hours  benzonatate 100 milliGRAM(s) Oral every 8 hours PRN  budesonide  80 MICROgram(s)/formoterol 4.5 MICROgram(s) Inhaler 2 Puff(s) Inhalation two times a day  cefTRIAXone   IVPB 1000 milliGRAM(s) IV Intermittent every 24 hours  donepezil 5 milliGRAM(s) Oral at bedtime  epoetin demetri-epbx (RETACRIT) Injectable 4000 Unit(s) SubCutaneous once  fluticasone propionate 50 MICROgram(s)/spray Nasal Spray 1 Spray(s) Both Nostrils two times a day  furosemide   Injectable 80 milliGRAM(s) IV Push every 12 hours  heparin   Injectable 5000 Unit(s) SubCutaneous every 12 hours  influenza  Vaccine (HIGH DOSE) 0.7 milliLiter(s) IntraMuscular once  iron sucrose IVPB 200 milliGRAM(s) IV Intermittent every 24 hours  levothyroxine 50 MICROGram(s) Oral daily  metoprolol tartrate 50 milliGRAM(s) Oral two times a day      Hospital Medications:   MEDICATIONS  (STANDING):  albuterol/ipratropium for Nebulization 3 milliLiter(s) Nebulizer every 6 hours  amLODIPine   Tablet 10 milliGRAM(s) Oral daily  atorvastatin 20 milliGRAM(s) Oral at bedtime  azithromycin  IVPB 500 milliGRAM(s) IV Intermittent every 24 hours  budesonide  80 MICROgram(s)/formoterol 4.5 MICROgram(s) Inhaler 2 Puff(s) Inhalation two times a day  cefTRIAXone   IVPB 1000 milliGRAM(s) IV Intermittent every 24 hours  donepezil 5 milliGRAM(s) Oral at bedtime  epoetin demetri-epbx (RETACRIT) Injectable 4000 Unit(s) SubCutaneous once  fluticasone propionate 50 MICROgram(s)/spray Nasal Spray 1 Spray(s) Both Nostrils two times a day  furosemide   Injectable 80 milliGRAM(s) IV Push every 12 hours  heparin   Injectable 5000 Unit(s) SubCutaneous every 12 hours  influenza  Vaccine (HIGH DOSE) 0.7 milliLiter(s) IntraMuscular once  iron sucrose IVPB 200 milliGRAM(s) IV Intermittent every 24 hours  levothyroxine 50 MICROGram(s) Oral daily  metoprolol tartrate 50 milliGRAM(s) Oral two times a day      SOCIAL HISTORY:  Denies ETOh, Smoking,     Family History:  FAMILY HISTORY:  Family history of ischemic heart disease  Family history of coronary artery disease.          VITALS:  T(F): 96.9 (01-17-21 @ 09:30), Max: 99.1 (01-17-21 @ 05:42)  HR: 66 (01-17-21 @ 12:03)  BP: 167/74 (01-17-21 @ 12:03)  RR: 17 (01-17-21 @ 12:13)  SpO2: 92% (01-17-21 @ 12:13)  Wt(kg): --    01-16 @ 07:01  -  01-17 @ 07:00  --------------------------------------------------------  IN: 840 mL / OUT: 1600 mL / NET: -760 mL    01-17 @ 07:01 - 01-17 @ 12:30  --------------------------------------------------------  IN: 240 mL / OUT: 0 mL / NET: 240 mL        CAPILLARY BLOOD GLUCOSE          Review of Systems:  CONSTITUTIONAL: No fever  RESPIRATORY: No shortness of breath, cough  CARDIOVASCULAR: No Chest pain  GASTROINTESTINAL: No abdominal pain  NEUROLOGICAL: No headache  MUSCULOSKELETAL: No swelling      PHYSICAL EXAM:  GENERAL: Alert, awake, NAD   HEENT: no JVP  CHEST/LUNG: Bilateral clear breath sounds  HEART: Regular rate and rhythm, no murmur, no gallops, no rub   ABDOMEN: Soft, nontender, non distended  EXTREMITIES: +trace pedal edema  Neurology: no asterixis     LABS:  01-17    136  |  96  |  78<H>  ----------------------------<  124<H>  3.7   |  21<L>  |  4.47<H>    Ca    8.3<L>      17 Jan 2021 06:07  Mg     1.8     01-17    TPro  6.1  /  Alb  2.8<L>  /  TBili  0.3  /  DBili      /  AST  16  /  ALT  10  /  AlkPhos  117  01-17    Creatinine Trend: 4.47 <--, 3.82 <--, 3.54 <--                        8.7    11.52 )-----------( 235      ( 17 Jan 2021 06:07 )             27.6     Urine Studies:  Urinalysis Basic - ( 16 Jan 2021 04:41 )    Color:  / Appearance:  / SG:  / pH:   Gluc:  / Ketone:   / Bili:  / Urobili:    Blood:  / Protein:  / Nitrite:    Leuk Esterase:  / RBC: < 5 /HPF / WBC < 5 /HPF   Sq Epi:  / Non Sq Epi: 0-5 /HPF / Bacteria: Present /HPF      Osmolality, Random Urine: 313 mosm/kg (01-17 @ 08:40)  Sodium, Random Urine: 107 mmol/L (01-17 @ 08:40)  Creatinine, Random Urine: 20 mg/dL (01-17 @ 08:40)  Protein/Creatinine Ratio Calculation: 5.9 Ratio (01-17 @ 08:40)  Calcium, Random Urine: 1.8 mg/dL (01-16 @ 20:23)  Chloride, Random Urine: 126 mmol/L (01-16 @ 13:07)  Sodium, Random Urine: 116 mmol/L (01-16 @ 04:37)  Chloride, Random Urine: 129 mmol/L (01-16 @ 04:37)  Creatinine, Random Urine: 29 mg/dL (01-16 @ 04:37)  Osmolality, Random Urine: 360 mosm/kg (01-16 @ 04:37)  Potassium, Random Urine: 32 mmol/L (01-16 @ 04:37)              86F POOR HISTORIAN/early dementia, former heavy smoker PMHx HTN, hyperlipidemia, COPD, hypothyroidism, CKD (baseline Cr ~3.0), anemia of chronic disease, CAD s/p CABG 2015 Dr. Blunt, chronic diastolic CHF(EF:55% by Echo 10/2020), renal artery stenosis s/p renal artery stent >20yrs ago, Carotid artery stenosis, PAD, who presents c/o progressively worsening SOB and cough x 3 days. Nephrology consulted for NAV.     Assessment/Plan:     #NAV on CKD, acute CHF exacerbation, anemia of chronic disease   baseline creatinine ~3   nephrotic syndrome, uPCR 5.9, albumin 2.8   NAV on CKD secondary to cardiorenal physiology vs underlying NS   dyspnea secondary to CRS vs underlying lung disease     Recommend:   daily CXR   hold diuresis   daily BMP + urine lytes (Na, Cr, Urea, Osm)   PETRONA serum + immunofixation urine   renal dopplers given WILBER + stent hx   DINA, ANCA, C3/4, anti-PLA2R, hepatitis panel   IV iron 200mg x5 days + EPO 4k u TIW   renal sono   TTE   strict I/Os   renal diet     Thank you for the opportunity to participate in the care of your patient. The nephrology service remains available to assist with any questions or concerns. Please feel free to reach us by paging the on-call nephrology fellow for urgent issues or as below.     Tobi Thompson M.D.   PGY-4, Nephrology Fellow   C: [phone]   P: [phone]

## 2021-01-17 NOTE — PHYSICAL THERAPY INITIAL EVALUATION ADULT - PERTINENT HX OF CURRENT PROBLEM, REHAB EVAL
86F who presents to [facility] ED 1/15/21 c/o progressively worsening SOB and cough x 3 days. Patient reports she can barely walk 10 feet prior to having to stop and rest.

## 2021-01-17 NOTE — PHYSICAL THERAPY INITIAL EVALUATION ADULT - GENERAL OBSERVATIONS, REHAB EVAL
Received supine in NAD + O2 NC 6L, EKG, IV hep. Left as found supine on stretcher +RN Maryjane aware, call ramos

## 2021-01-17 NOTE — PROGRESS NOTE ADULT - ASSESSMENT
Being asked to reevaluate for hypoxia and worsening Aa gradient.  She is on 6 L nasal cannula and her saturation is lower than 90%.  Acute on chronic renal failure and lasix on hold.  Bilateral diffuse infiltrates.  CTPA contraindicated with renal failure.  D-dimer relatively low. Legs are normal.   She is able to eat.  No evidence of pulmonary embolism at this time.   Anticoagulation currently not indicated.

## 2021-01-17 NOTE — PROGRESS NOTE ADULT - PROBLEM SELECTOR PLAN 1
Appears euvolemic on exam with worsening renal function  - s/p IV diuresis w/ Lasix 80mg IV BID d/c on 1/17 as per Renal recs  - C/w Lopressor 50mg BID  - Echo ordered, f/u results. Prior Echo 10/12/20: EF 55%, mild concentric LVH, mild AR/MR/TR, pulm HTN with PASP 47 mmHg  - Daily CXR  -  Core measures ordered, strict I/Os, fluid restriction, and daily weights

## 2021-01-17 NOTE — PROGRESS NOTE ADULT - PROBLEM SELECTOR PLAN 2
Currently afebrile w/ leukocytosis CXR w/ bilateral patchy opacities/congestion  - C/w Ceftriaxone 1g IV QD (d/c 1/22) and Azithromycin 500mg IV QD (d/c 1/21)  - Dr. Cuevas, consulted f/u recs. Per Dr Cuevas, c/w ABx w/ daily ESR/CRP levels

## 2021-01-17 NOTE — PROGRESS NOTE ADULT - PROBLEM SELECTOR PLAN 3
Known to Dr. Nicolas. Renal consulted. Baseline Cr ~3.0, BUN/Cr currently 78/4.47  - Per Renal recs, holding diuresis at this time  - Daily BMP + urine lytes (Na, Cr, Urea, Osm)   - F/u PETRONA serum + immunofixation urine, DINA, ANCA, C3/4, anti-PLA2R, Hep Panel  - F/u Renal Sono/Dopplers given WILBER + stent hx

## 2021-01-17 NOTE — PROGRESS NOTE ADULT - PROBLEM SELECTOR PLAN 5
Known to Dr. Louis Olmedo/Dr. Cuevas. Currently satting well on 4L  - Continue Symbicort inhaler BID and [place]

## 2021-01-18 DIAGNOSIS — Z86.79 PERSONAL HISTORY OF OTHER DISEASES OF THE CIRCULATORY SYSTEM: ICD-10-CM

## 2021-01-18 DIAGNOSIS — J81.0 ACUTE PULMONARY EDEMA: ICD-10-CM

## 2021-01-18 DIAGNOSIS — I10 ESSENTIAL (PRIMARY) HYPERTENSION: ICD-10-CM

## 2021-01-18 DIAGNOSIS — E87.6 HYPOKALEMIA: ICD-10-CM

## 2021-01-18 DIAGNOSIS — N17.9 ACUTE KIDNEY FAILURE, UNSPECIFIED: ICD-10-CM

## 2021-01-18 DIAGNOSIS — R09.02 HYPOXEMIA: ICD-10-CM

## 2021-01-18 LAB
% ALBUMIN: 46.2 % — SIGNIFICANT CHANGE UP
% ALPHA 1: 9.8 % — SIGNIFICANT CHANGE UP
% ALPHA 2: 18.1 % — SIGNIFICANT CHANGE UP
% BETA: 13.9 % — SIGNIFICANT CHANGE UP
% GAMMA: 12 % — SIGNIFICANT CHANGE UP
ALBUMIN SERPL ELPH-MCNC: 2.7 G/DL — LOW (ref 3.6–5.5)
ALBUMIN/GLOB SERPL ELPH: 0.8 RATIO — SIGNIFICANT CHANGE UP
ALPHA1 GLOB SERPL ELPH-MCNC: 0.6 G/DL — HIGH (ref 0.1–0.4)
ALPHA2 GLOB SERPL ELPH-MCNC: 1.1 G/DL — HIGH (ref 0.5–1)
ANION GAP SERPL CALC-SCNC: 16 MMOL/L — SIGNIFICANT CHANGE UP (ref 5–17)
ANION GAP SERPL CALC-SCNC: 17 MMOL/L — SIGNIFICANT CHANGE UP (ref 5–17)
ANION GAP SERPL CALC-SCNC: 18 MMOL/L — HIGH (ref 5–17)
B-GLOBULIN SERPL ELPH-MCNC: 0.8 G/DL — SIGNIFICANT CHANGE UP (ref 0.5–1)
BASOPHILS # BLD AUTO: 0.03 K/UL — SIGNIFICANT CHANGE UP (ref 0–0.2)
BASOPHILS NFR BLD AUTO: 0.3 % — SIGNIFICANT CHANGE UP (ref 0–2)
BLD GP AB SCN SERPL QL: NEGATIVE — SIGNIFICANT CHANGE UP
BUN SERPL-MCNC: 74 MG/DL — HIGH (ref 7–23)
BUN SERPL-MCNC: 75 MG/DL — HIGH (ref 7–23)
BUN SERPL-MCNC: 77 MG/DL — HIGH (ref 7–23)
CALCIUM SERPL-MCNC: 8.2 MG/DL — LOW (ref 8.4–10.5)
CALCIUM SERPL-MCNC: 8.3 MG/DL — LOW (ref 8.4–10.5)
CALCIUM SERPL-MCNC: 8.8 MG/DL — SIGNIFICANT CHANGE UP (ref 8.4–10.5)
CHLORIDE SERPL-SCNC: 95 MMOL/L — LOW (ref 96–108)
CHLORIDE SERPL-SCNC: 96 MMOL/L — SIGNIFICANT CHANGE UP (ref 96–108)
CHLORIDE SERPL-SCNC: 97 MMOL/L — SIGNIFICANT CHANGE UP (ref 96–108)
CO2 SERPL-SCNC: 25 MMOL/L — SIGNIFICANT CHANGE UP (ref 22–31)
CO2 SERPL-SCNC: 26 MMOL/L — SIGNIFICANT CHANGE UP (ref 22–31)
CO2 SERPL-SCNC: 26 MMOL/L — SIGNIFICANT CHANGE UP (ref 22–31)
CREAT ?TM UR-MCNC: 30 MG/DL — SIGNIFICANT CHANGE UP
CREAT SERPL-MCNC: 4.15 MG/DL — HIGH (ref 0.5–1.3)
CREAT SERPL-MCNC: 4.22 MG/DL — HIGH (ref 0.5–1.3)
CREAT SERPL-MCNC: 4.38 MG/DL — HIGH (ref 0.5–1.3)
CRP SERPL-MCNC: 14.94 MG/DL — HIGH (ref 0–0.4)
EOSINOPHIL # BLD AUTO: 0.1 K/UL — SIGNIFICANT CHANGE UP (ref 0–0.5)
EOSINOPHIL NFR BLD AUTO: 1.1 % — SIGNIFICANT CHANGE UP (ref 0–6)
ERYTHROCYTE [SEDIMENTATION RATE] IN BLOOD: 116 MM/HR — HIGH
GAMMA GLOBULIN: 0.7 G/DL — SIGNIFICANT CHANGE UP (ref 0.6–1.6)
GLUCOSE SERPL-MCNC: 108 MG/DL — HIGH (ref 70–99)
GLUCOSE SERPL-MCNC: 110 MG/DL — HIGH (ref 70–99)
GLUCOSE SERPL-MCNC: 111 MG/DL — HIGH (ref 70–99)
HCT VFR BLD CALC: 24.8 % — LOW (ref 34.5–45)
HGB BLD-MCNC: 7.8 G/DL — LOW (ref 11.5–15.5)
IMM GRANULOCYTES NFR BLD AUTO: 0.4 % — SIGNIFICANT CHANGE UP (ref 0–1.5)
LYMPHOCYTES # BLD AUTO: 0.95 K/UL — LOW (ref 1–3.3)
LYMPHOCYTES # BLD AUTO: 10.1 % — LOW (ref 13–44)
MAGNESIUM SERPL-MCNC: 2 MG/DL — SIGNIFICANT CHANGE UP (ref 1.6–2.6)
MCHC RBC-ENTMCNC: 26.4 PG — LOW (ref 27–34)
MCHC RBC-ENTMCNC: 31.5 GM/DL — LOW (ref 32–36)
MCV RBC AUTO: 83.8 FL — SIGNIFICANT CHANGE UP (ref 80–100)
MONOCYTES # BLD AUTO: 0.44 K/UL — SIGNIFICANT CHANGE UP (ref 0–0.9)
MONOCYTES NFR BLD AUTO: 4.7 % — SIGNIFICANT CHANGE UP (ref 2–14)
MYOGLOBIN UR-MCNC: 3 NG/ML — SIGNIFICANT CHANGE UP (ref 0–13)
NEUTROPHILS # BLD AUTO: 7.87 K/UL — HIGH (ref 1.8–7.4)
NEUTROPHILS NFR BLD AUTO: 83.4 % — HIGH (ref 43–77)
NRBC # BLD: 0 /100 WBCS — SIGNIFICANT CHANGE UP (ref 0–0)
OSMOLALITY UR: 328 MOSM/KG — SIGNIFICANT CHANGE UP (ref 300–900)
PHOSPHATE SERPL-MCNC: 5.7 MG/DL — HIGH (ref 2.5–4.5)
PLATELET # BLD AUTO: 220 K/UL — SIGNIFICANT CHANGE UP (ref 150–400)
POTASSIUM SERPL-MCNC: 2.9 MMOL/L — CRITICAL LOW (ref 3.5–5.3)
POTASSIUM SERPL-MCNC: 3 MMOL/L — LOW (ref 3.5–5.3)
POTASSIUM SERPL-MCNC: 3.7 MMOL/L — SIGNIFICANT CHANGE UP (ref 3.5–5.3)
POTASSIUM SERPL-SCNC: 2.9 MMOL/L — CRITICAL LOW (ref 3.5–5.3)
POTASSIUM SERPL-SCNC: 3 MMOL/L — LOW (ref 3.5–5.3)
POTASSIUM SERPL-SCNC: 3.7 MMOL/L — SIGNIFICANT CHANGE UP (ref 3.5–5.3)
PROT ?TM UR-MCNC: 189 MG/DL — HIGH (ref 0–12)
PROT ?TM UR-MCNC: 189 MG/DL — HIGH (ref 0–12)
PROT ?TM UR-MCNC: 30 MG/DL — HIGH (ref 0–12)
PROT PATTERN SERPL ELPH-IMP: SIGNIFICANT CHANGE UP
PROT SERPL-MCNC: 5.9 G/DL — LOW (ref 6–8.3)
PROT SERPL-MCNC: 5.9 G/DL — LOW (ref 6–8.3)
PROT/CREAT UR-RTO: 6.3 RATIO — HIGH (ref 0–0.2)
RBC # BLD: 2.96 M/UL — LOW (ref 3.8–5.2)
RBC # FLD: 15.8 % — HIGH (ref 10.3–14.5)
RH IG SCN BLD-IMP: POSITIVE — SIGNIFICANT CHANGE UP
SODIUM SERPL-SCNC: 137 MMOL/L — SIGNIFICANT CHANGE UP (ref 135–145)
SODIUM SERPL-SCNC: 139 MMOL/L — SIGNIFICANT CHANGE UP (ref 135–145)
SODIUM SERPL-SCNC: 140 MMOL/L — SIGNIFICANT CHANGE UP (ref 135–145)
SODIUM UR-SCNC: 98 MMOL/L — SIGNIFICANT CHANGE UP
UUN UR-MCNC: 237 MG/DL — SIGNIFICANT CHANGE UP
WBC # BLD: 9.43 K/UL — SIGNIFICANT CHANGE UP (ref 3.8–10.5)
WBC # FLD AUTO: 9.43 K/UL — SIGNIFICANT CHANGE UP (ref 3.8–10.5)

## 2021-01-18 PROCEDURE — 71045 X-RAY EXAM CHEST 1 VIEW: CPT | Mod: 26

## 2021-01-18 PROCEDURE — 99233 SBSQ HOSP IP/OBS HIGH 50: CPT | Mod: GC

## 2021-01-18 PROCEDURE — 93971 EXTREMITY STUDY: CPT | Mod: 26,LT

## 2021-01-18 RX ORDER — IPRATROPIUM/ALBUTEROL SULFATE 18-103MCG
3 AEROSOL WITH ADAPTER (GRAM) INHALATION ONCE
Refills: 0 | Status: COMPLETED | OUTPATIENT
Start: 2021-01-18 | End: 2021-01-18

## 2021-01-18 RX ORDER — POTASSIUM CHLORIDE 20 MEQ
40 PACKET (EA) ORAL ONCE
Refills: 0 | Status: COMPLETED | OUTPATIENT
Start: 2021-01-18 | End: 2021-01-18

## 2021-01-18 RX ORDER — POTASSIUM CHLORIDE 20 MEQ
10 PACKET (EA) ORAL
Refills: 0 | Status: COMPLETED | OUTPATIENT
Start: 2021-01-18 | End: 2021-01-18

## 2021-01-18 RX ORDER — SEVELAMER CARBONATE 2400 MG/1
800 POWDER, FOR SUSPENSION ORAL
Refills: 0 | Status: DISCONTINUED | OUTPATIENT
Start: 2021-01-18 | End: 2021-01-21

## 2021-01-18 RX ADMIN — Medication 100 MILLIEQUIVALENT(S): at 15:53

## 2021-01-18 RX ADMIN — BUDESONIDE AND FORMOTEROL FUMARATE DIHYDRATE 2 PUFF(S): 160; 4.5 AEROSOL RESPIRATORY (INHALATION) at 21:25

## 2021-01-18 RX ADMIN — IRON SUCROSE 110 MILLIGRAM(S): 20 INJECTION, SOLUTION INTRAVENOUS at 15:53

## 2021-01-18 RX ADMIN — Medication 3 MILLILITER(S): at 16:10

## 2021-01-18 RX ADMIN — AMLODIPINE BESYLATE 10 MILLIGRAM(S): 2.5 TABLET ORAL at 06:11

## 2021-01-18 RX ADMIN — Medication 40 MILLIEQUIVALENT(S): at 21:39

## 2021-01-18 RX ADMIN — AZITHROMYCIN 255 MILLIGRAM(S): 500 TABLET, FILM COATED ORAL at 19:16

## 2021-01-18 RX ADMIN — Medication 3 MILLILITER(S): at 06:11

## 2021-01-18 RX ADMIN — Medication 100 MILLIEQUIVALENT(S): at 09:18

## 2021-01-18 RX ADMIN — Medication 1 SPRAY(S): at 06:12

## 2021-01-18 RX ADMIN — Medication 3 MILLILITER(S): at 19:51

## 2021-01-18 RX ADMIN — Medication 50 MICROGRAM(S): at 06:11

## 2021-01-18 RX ADMIN — Medication 1 SPRAY(S): at 17:20

## 2021-01-18 RX ADMIN — Medication 100 MILLIEQUIVALENT(S): at 11:01

## 2021-01-18 RX ADMIN — Medication 100 MILLIGRAM(S): at 22:44

## 2021-01-18 RX ADMIN — CEFTRIAXONE 100 MILLIGRAM(S): 500 INJECTION, POWDER, FOR SOLUTION INTRAMUSCULAR; INTRAVENOUS at 17:19

## 2021-01-18 RX ADMIN — Medication 50 MILLIGRAM(S): at 06:11

## 2021-01-18 RX ADMIN — Medication 40 MILLIEQUIVALENT(S): at 09:18

## 2021-01-18 RX ADMIN — ATORVASTATIN CALCIUM 20 MILLIGRAM(S): 80 TABLET, FILM COATED ORAL at 21:24

## 2021-01-18 RX ADMIN — Medication 100 MILLIGRAM(S): at 18:08

## 2021-01-18 RX ADMIN — SEVELAMER CARBONATE 800 MILLIGRAM(S): 2400 POWDER, FOR SUSPENSION ORAL at 17:19

## 2021-01-18 RX ADMIN — HEPARIN SODIUM 5000 UNIT(S): 5000 INJECTION INTRAVENOUS; SUBCUTANEOUS at 06:11

## 2021-01-18 RX ADMIN — Medication 3 MILLILITER(S): at 09:19

## 2021-01-18 RX ADMIN — HEPARIN SODIUM 5000 UNIT(S): 5000 INJECTION INTRAVENOUS; SUBCUTANEOUS at 17:19

## 2021-01-18 RX ADMIN — Medication 50 MILLIGRAM(S): at 17:19

## 2021-01-18 RX ADMIN — DONEPEZIL HYDROCHLORIDE 5 MILLIGRAM(S): 10 TABLET, FILM COATED ORAL at 21:24

## 2021-01-18 NOTE — PROGRESS NOTE ADULT - PROBLEM SELECTOR PLAN 2
Currently afebrile. Leukocytosis resolved.  CXR w/ bilateral patchy opacities/congestion  - C/w Ceftriaxone 1g IV Daily (d/c 1/22) and Azithromycin 500mg IV Daily  (d/c 1/21)  - Dr. Cuevas, consulted f/u recs. Per Dr Cuevas, c/w ABx w/ daily ESR/CRP levels

## 2021-01-18 NOTE — PROGRESS NOTE ADULT - ASSESSMENT
86 yr old F, POOR HISTORIAN/early dementia, former heavy smoker with PMHx HTN, hyperlipidemia, COPD, hypothyroidism, Stage IV CKD (baseline Cr ~3.0), anemia of chronic disease, CAD s/p CABG 2015 Dr. Blunt, chronic diastolic CHF(EF:55% by Echo 10/2020), renal artery stenosis s/p renal artery stent >20yrs ago, Carotid artery stenosis, PAD, who presents to [facility] ED 1/15/21 c/o progressively worsening SOB and cough x 3 days, admitted to cardiac telemetry for management of acute on chronic diastolic CHF exacerbation in setting of suspected CAP.

## 2021-01-18 NOTE — PROGRESS NOTE ADULT - ATTENDING COMMENTS
I independently performed the key portions of the evaluation and management service provided. I agree with the above history, physical, and plan which I have reviewed and edited where appropriate. I find worsening NAV, nephrotic range proteinuria, history of DM and CKD, fluid overload. Hold diuretics. CKD likeliest DM nephropathy given d/w daughter. Pulm eval for intrinsic lung disease, consider repeat CT. Consider PE. Follow SCr. Discussed possible need for dialysis with daughter.   See full note. (Patient seen earlier in day.) .

## 2021-01-18 NOTE — PROGRESS NOTE ADULT - PROBLEM SELECTOR PLAN 1
Appears euvolemic on exam with worsening renal function. 2L urine output 24 hrs net negative 1 L   - s/p IV diuresis w/ Lasix 80mg IV BID d/c on 1/17 as per Renal recs  - C/w Lopressor 50mg BID  - Echo ordered, f/u results. Prior Echo 10/12/20: EF 55%, mild concentric LVH, mild AR/MR/TR, pulm HTN with PASP 47 mmHg  - Daily CXR  -  Core measures ordered, strict I/Os, fluid restriction, and daily weights

## 2021-01-18 NOTE — PROGRESS NOTE ADULT - PROBLEM SELECTOR PLAN 3
Known to Dr. Nicolas. Renal consulted.   Baseline Cr ~3.0, Non-oliguric NAV on CKD Stage IV-BUN/Cr currently 77/4.37  - Per Renal recs, holding diuresis at this time  - Daily BMP + urine lytes (Na, Cr, Urea, Osm)   - F/u PETRONA serum + immunofixation urine, DINA, ANCA, C3/4, anti-PLA2R, Hep Panel  - F/u Renal Sono/Dopplers given WILBER + stent hx  -AVOID NEPHROTOXIC AGENTS.   -Monitor urine output, BP, volume status, electrolytes. Known to Dr. Nicolas. Renal consulted.   Baseline Cr ~3.0, Non-oliguric NAV on CKD Stage IV-BUN/Cr currently 77/4.37  - Per Renal recs, holding diuresis at this time  - Daily BMP + urine lytes (Na, Cr, Urea, Osm)   - F/u PETRONA serum + immunofixation urine, DINA, ANCA, C3/4, anti-PLA2R, Hep Panel  - F/u Renal Sono/Dopplers given WILBER + stent hx  -AVOID NEPHROTOXIC AGENTS.   -Monitor urine output, BP, volume status, electrolytes.  Hyperphosphatemia: Phosphorous-5.7- Renagel 800 mg PO TID initiated. Daily Phosophorous.

## 2021-01-18 NOTE — PROGRESS NOTE ADULT - PROBLEM SELECTOR PLAN 8
Per Dr Crooks, pt had ASA d/c at [facility] after fall ~3 weeks ago secondary to SAH.  -Head CT Non-contrast and Neurology consult to evaluate if ASA can be restarted

## 2021-01-18 NOTE — PROGRESS NOTE ADULT - ASSESSMENT
86F POOR HISTORIAN/early dementia, former heavy smoker PMHx DM, HTN, hyperlipidemia, COPD, hypothyroidism, CKD (baseline Cr ~3.0), anemia of chronic disease, CAD s/p CABG 2015 Dr. Blunt, chronic diastolic CHF(EF:55% by Echo 10/2020), renal artery stenosis s/p renal artery stent >20yrs ago, Carotid artery stenosis, PAD, who presents c/o progressively worsening SOB and cough x 3 days. Nephrology consulted for NAV.     Assessment/Plan:     #NAV on CKD, acute CHF exacerbation, anemia of chronic disease, hypokalemia    baseline creatinine ~3   nephrotic syndrome, uPCR 5.9, albumin 2.8   NAV on CKD secondary to cardiorenal physiology vs underlying NS given DM history   dyspnea secondary to CRS vs underlying lung disease     Recommend:   replete K to > 4   ddimer 491, consider LE doppler r/o DVT and VQ scan r/o PE  consider CT chest r/o underlying pulmonary etiology   daily CXR   continue to hold diuresis   daily BMP + urine lytes (Na, Cr, Urea, Osm)   PETRONA serum + immunofixation urine   renal dopplers given WILBER + stent hx   DINA, ANCA, C3/4, anti-PLA2R pending   hepatitis panel negative   IV iron 200mg x5 days + EPO 4k u TIW   renal sono   TTE   strict I/Os   renal diet     Likely to require dialysis in near future.     Thank you for the opportunity to participate in the care of your patient. The nephrology service remains available to assist with any questions or concerns. Please feel free to reach us by paging the on-call nephrology fellow for urgent issues or as below.     Tobi Thompson M.D.   PGY-4, Nephrology Fellow   C: [phone]   P: [phone]

## 2021-01-18 NOTE — PROGRESS NOTE ADULT - PROBLEM SELECTOR PLAN 10
Continue Aricept 5mg PO daily    FULL CODE  VTE PPx: on Heparin subcut  PT consulted: s/p recent fall ~3 weeks ago-recommends home with home PT   Dispo: Pending clinical progression-renal function   Case d/w Dr. Crooks

## 2021-01-18 NOTE — PROGRESS NOTE ADULT - PROBLEM SELECTOR PLAN 1
She is not on oxygen at home. Could she have PE?  Will obtain ultrasound of legs, repeat d-dimer and obtain V/Q scan "to be safe".  Should we get repeat CT chest?

## 2021-01-18 NOTE — PROGRESS NOTE ADULT - SUBJECTIVE AND OBJECTIVE BOX
Patient is a 86y Female seen and evaluated at bedside.   2L UOP/24h w/net negative ~1L/24h   no complaints  denies CP SOB     Meds:    albuterol/ipratropium for Nebulization 3 every 6 hours  amLODIPine   Tablet 10 daily  atorvastatin 20 at bedtime  azithromycin  IVPB 500 every 24 hours  benzonatate 100 every 8 hours PRN  budesonide  80 MICROgram(s)/formoterol 4.5 MICROgram(s) Inhaler 2 two times a day  cefTRIAXone   IVPB 1000 every 24 hours  donepezil 5 at bedtime  fluticasone propionate 50 MICROgram(s)/spray Nasal Spray 1 two times a day  heparin   Injectable 5000 every 12 hours  influenza  Vaccine (HIGH DOSE) 0.7 once  iron sucrose IVPB 200 every 24 hours  levothyroxine 50 daily  metoprolol tartrate 50 two times a day  potassium chloride  10 mEq/100 mL IVPB 10 every 1 hour      T(C): , Max: 37.4 (01-18-21 @ 05:48)  T(F): , Max: 99.3 (01-18-21 @ 05:48)  HR: 72 (01-18-21 @ 12:03)  BP: 130/59 (01-18-21 @ 12:03)  BP(mean): --  RR: 18 (01-18-21 @ 12:03)  SpO2: 94% (01-18-21 @ 12:03)  Wt(kg): --    01-17 @ 07:01  -  01-18 @ 07:00  --------------------------------------------------------  IN: 690 mL / OUT: 2002 mL / NET: -1312 mL    01-18 @ 07:01 - 01-18 @ 12:24  --------------------------------------------------------  IN: 0 mL / OUT: 300 mL / NET: -300 mL          Review of Systems:  CONSTITUTIONAL: No fever  RESPIRATORY: No shortness of breath, cough  CARDIOVASCULAR: No Chest pain  GASTROINTESTINAL: No abdominal pain  MUSCULOSKELETAL: No swelling      PHYSICAL EXAM:  GENERAL: Alert, awake, NAD   CHEST/LUNG: Bilateral clear breath sounds  HEART: Regular rate and rhythm, no murmur, no gallops, no rub   ABDOMEN: Soft, nontender, non distended  EXTREMITIES: +trace pedal edema         LABS:                        7.8    9.43  )-----------( 220      ( 18 Jan 2021 06:16 )             24.8     01-18    140  |  97  |  75<H>  ----------------------------<  111<H>  3.0<L>   |  25  |  4.22<H>    Ca    8.3<L>      18 Jan 2021 07:57  Phos  5.7     01-18  Mg     2.0     01-18    TPro  5.9<L>  /  Alb  x   /  TBili  x   /  DBili  x   /  AST  x   /  ALT  x   /  AlkPhos  x   01-18    Hepatitis B Surface Antibody: Nonreact (01-17 @ 12:51)  Hepatitis C Virus S/CO Ratio: 0.06 S/CO (01-17 @ 12:51)        Sodium, Random Urine: 98 mmol/L (01-18 @ 08:09)  Creatinine, Random Urine: 30 mg/dL (01-18 @ 08:09)  Protein/Creatinine Ratio Calculation: 6.3 Ratio (01-18 @ 08:09)  Osmolality, Random Urine: 328 mosm/kg (01-18 @ 08:09)  Osmolality, Random Urine: 313 mosm/kg (01-17 @ 08:40)  Sodium, Random Urine: 107 mmol/L (01-17 @ 08:40)  Creatinine, Random Urine: 20 mg/dL (01-17 @ 08:40)  Protein/Creatinine Ratio Calculation: 5.9 Ratio (01-17 @ 08:40)  Calcium, Random Urine: 1.8 mg/dL (01-16 @ 20:23)  Chloride, Random Urine: 126 mmol/L (01-16 @ 13:07)        RADIOLOGY & ADDITIONAL STUDIES:

## 2021-01-18 NOTE — PROGRESS NOTE ADULT - SUBJECTIVE AND OBJECTIVE BOX
[facility] P[facility]    INTERVAL HPI/OVERNIGHT EVENTS:    Lasix on hold, GFR increased to 11 ml/min.  Saturation 70% on room air but asymptomatic and, surprisingly, she is not short of breath.  Nasal cannula restarted  ESR and CRP very elevated.    PAST MEDICAL & SURGICAL HISTORY:  Essential hypertension  Hypertension    Type 2 diabetes mellitus  Diabetes    Hyperlipidemia  Hyperlipidemia    Disorder of kidney and ureter  Renal insufficiency    Peripheral vascular disease  PVD (peripheral vascular disease)    Anxiety state  Anxiety    Atherosclerosis of renal artery  with resulting stent placement    Atherosclerosis of renal artery  Renal artery stenosis    FAMILY HISTORY:  Family history of ischemic heart disease  Family history of coronary artery disease.    SOCIAL HISTORY:  quit smoking 2001  no alcohol    REVIEW OF SYSTEMS:  Constitutional: (+) weight change "not too much", () fever,  () chills, () fatigue, () night sweats  Eyes: (-) discharge, () eye pain, () visual change  ENT:  (-) hearing difficulty, () vertigo, () sinus pain,  () throat pain, () epistaxis, () dysphagia, () hoarseness  Neck: (-) pain, () stiffness, () swelling  Respiratory: (+) cough, () wheezing, (-) hemoptysis      Cardiovascular: (-) chest pain, ()palpitations, () dizziness   Gastrointestinal: (-) abdominal pain, () nausea, () vomiting, () hematemesis, () diarrhea,  () constipation, () melena  Genitourinary:  (-) dysuria, () frequency, () hematuria, () incontinence      Neurologic: (-) headache, () memory loss, () loss of strength, () numbness, () tremor     Skin: (-) itching, () burning, () rash, () lesions   Lymphatic: (-) enlarged lymph nodes  Endocrine: (-) hair loss, () temperature intolerance         Musculoskeletal: (-) back pain, () joint pain,  () extremity pain  Psychiatric: () visual change, () auditory change, (-) depression, () anxiety, () suicidal  Sleep: (-) disorder, () insomnia, () sleep deprivation  Heme/Lymph: () easy bruising, () bleeding gums            Allergy and Immunologic: () hives, () eczema    Vital Signs Last 24 Hrs  T(C): 36.9 (18 Jan 2021 13:12), Max: 37.4 (18 Jan 2021 05:48)  T(F): 98.5 (18 Jan 2021 13:12), Max: 99.3 (18 Jan 2021 05:48)  HR: 72 (18 Jan 2021 12:03) (63 - 79)  BP: 130/59 (18 Jan 2021 12:03) (130/59 - 180/75)  BP(mean): --  RR: 18 (18 Jan 2021 12:03) (18 - 20)  SpO2: 94% (18 Jan 2021 12:03) (88% - 96%)    I&O's Detail    17 Jan 2021 07:01  -  18 Jan 2021 07:00  --------------------------------------------------------  IN:    Oral Fluid: 690 mL  Total IN: 690 mL    OUT:    Stool (mL): 2 mL    Voided (mL): 2000 mL  Total OUT: 2002 mL    Total NET: -1312 mL      18 Jan 2021 07:01  -  18 Jan 2021 13:56  --------------------------------------------------------  IN:  Total IN: 0 mL    OUT:    Voided (mL): 300 mL  Total OUT: 300 mL    Total NET: -300 mL    PHYSICAL EXAM:  in bed, 70% on room air. 91 on 5 L NC  Well nourished, well developed, comfortable, - acute distress; vital signs are monitored continuously  Eyes: PERRLA, EOMI, -conjunctivitis, -scleritis   Head: no focal deficit, normocephalic,  no trauma  ENMT: moist tongue, no thrush, -nasal discharge, -hoarseness, normal hearing, + cough, -hemoptysis, trachea midline  Neck: supple, - lymphadenopathy,  -masses, -JVD  Respiratory: bilateral diminished breath sounds, + ENDEXPIRATORY wheezing, -rhonchi, 30% right and 20% left rales, -crackles  Chest: -accessory muscle use, -paradoxical breathing  Cardiovascular: regular rate and sinus rhythm 70, S1 S2 normal, -S3, -S4, 3/6 murmur, -gallop, -rub  Gastrointestinal: soft, nontender, nondistended, normal bowel sounds, no hepatosplenomegaly  Genitourinary: -flank pain, -dysuria  Extremities: -clubbing, -cyanosis, -edema    Vascular: peripheral pulses palpable 2+ bilaterally  Neurological: alert, oriented x 3, no focal deficit, -tremor   Skin: warm, dry, -erythema, iv sites intact  Lymph nodes; no cervical, supraclavicular or axillary adenopathy  Psychiatric: cooperative, appropriate mood      MEDICATIONS  (STANDING):  albuterol/ipratropium for Nebulization 3 milliLiter(s) Nebulizer every 6 hours  amLODIPine   Tablet 10 milliGRAM(s) Oral daily  atorvastatin 20 milliGRAM(s) Oral at bedtime  azithromycin  IVPB 500 milliGRAM(s) IV Intermittent every 24 hours  budesonide  80 MICROgram(s)/formoterol 4.5 MICROgram(s) Inhaler 2 Puff(s) Inhalation two times a day  cefTRIAXone   IVPB 1000 milliGRAM(s) IV Intermittent every 24 hours  donepezil 5 milliGRAM(s) Oral at bedtime  fluticasone propionate 50 MICROgram(s)/spray Nasal Spray 1 Spray(s) Both Nostrils two times a day  heparin   Injectable 5000 Unit(s) SubCutaneous every 12 hours  influenza  Vaccine (HIGH DOSE) 0.7 milliLiter(s) IntraMuscular once  iron sucrose IVPB 200 milliGRAM(s) IV Intermittent every 24 hours  levothyroxine 50 MICROGram(s) Oral daily  metoprolol tartrate 50 milliGRAM(s) Oral two times a day  potassium chloride  10 mEq/100 mL IVPB 10 milliEquivalent(s) IV Intermittent every 1 hour  sevelamer carbonate 800 milliGRAM(s) Oral three times a day with meals    MEDICATIONS  (PRN):  benzonatate 100 milliGRAM(s) Oral every 8 hours PRN Cough      Allergies    No Known Allergies    Intolerances        LABS:                        7.8    9.43  )-----------( 220      ( 18 Jan 2021 06:16 )             24.8     01-18    140  |  97  |  75<H>  ----------------------------<  111<H>  3.0<L>   |  25  |  4.22<H>    Ca    8.3<L>      18 Jan 2021 07:57  Phos  5.7     01-18  Mg     2.0     01-18    TPro  5.9<L>  /  Alb  x   /  TBili  x   /  DBili  x   /  AST  x   /  ALT  x   /  AlkPhos  x   01-18    +DVT prophylaxis SC HEPARIN  +Sleep "FINE"  +Nutrition goals ORAL, "I eat everything"  -Pain  -Decubital ulcer  +GI prophylaxis (PPV, coagulopathy, Hx)  +Aspiration prophylaxis (45 degrees)  Ventilator synchronized  MAY NEED BiPAP if worsening  Tracheal cuff pressure  +Sedation/analgesia stopped once  +ID (phos, CH, mupi, SB) MARY/CEF  -Delirium  +Cardiac Beta/ACEI-ARB on hold/ASA/statin  +Prevention  +Education  +Medication reviewed (drug-drug interactions, PDA)  Medical devices IV and URINE VAC SYSTEM  Discussed with Dr Justin MANIRQUE, CCRN    RADIOLOGY & ADDITIONAL STUDIES:  EXAM:  CT CHEST                          PROCEDURE DATE:  10/14/2020      INTERPRETATION:  CT SCAN OF CHEST    History: 85-year-old female with shortness of breath and pleural effusion    Technique: CT scan of chest performed from lung apices through lung bases. Axial, coronal, and sagittal multiplanar reformatted images were produced. Thin section axial images and axial MIPS were also produced. Intravenous contrast material was not administered, as ordered.    Comparison: Chest radiograph dated 10/13/2020. No prior thoracic CT examinations.    Findings:    Lungs and large airways: Normal.    Pleura:  There are small bibasilar pleural effusions right greater than left. There is interlobular septal thickening which extends into the interior of the lung to suggest a component of pulmonary venous congestion. Moreover there are peripheral reticular opacities with some associated groundglass component within the peripheral aspects of both lungs. More peripheral groundglass and consolidation is seen within the right upper left upper and right middle lobes. There is some pulmonary fibrosis with traction bronchiectasis and bronchiolectasis without jesús honeycomb lung formation. There is air trapping.    Mediastinum and hilar regions: Increased number of mildly enlarged prevascular (1.1 cm in short axis), right paratracheal (1.1 cm in short axis), and subcarinal (1.7 cm in short axis).    Heart and pericardium:  Cardiomegaly. No pericardial effusion. Extensive calcification of the mitral annulus.    Vessels:  Severe coronary artery calcification/stents. Severe callus  5. Atheromatous plaque formation throughout the aortic arch descending and proximal abdominal aorta and major side branches with extensive calcification of the origin of the renal arteries right greater than left. Calcification of the origin of the brachycephalic vessels are noted. No evidence of aneurysm    Chest wall and lower neck:  Punctate microcalcifications are noted in the breasts bilaterally left greater than right. Clinical and mammographic correlation.    Upper abdomen: Cholelithiasis. Extensive arterial vascular calcification of the major side branches of the abdominal aorta as above.    Bones: Moderate multilevel degenerative disc disease involving the lower thoracic spine. Poststernotomy.      Impression:    1. Cardiomegaly. Small bibasilar pleural effusions and interlobular septal thickening compatible with pulmonary venous congestion.  2. More peripheral reticular, groundglass and some patchy areas of peripheral consolidation more pronounced in the upper and midlung zones are noted. There may be some bronchiectasis/bronchiolectasis to suggest pulmonary fibrosis. No honeycomb lung formation. Air-trapping is noted. These findings are not consistent with UIP/IPF. Abbreviated differential includes atypical infectious and/or inflammatory etiologies such as chronic hypersensitivity pneumonia, stage IV sarcoidosis, pneumoconiosis, and subacute and/or chronic sequela of atypical and viral pneumonias such as Covid 19.  3. Mild mediastinal lymphadenopathy.

## 2021-01-18 NOTE — PROGRESS NOTE ADULT - PROBLEM SELECTOR PLAN 5
Known to Dr. Louis Olmedo/Dr. Cuevas. Currently satting well on 4L  - Continue Symbicort inhaler BID and Duonebs  -Not currently on Home Oxygen-currently on 4 L NC saturating 93-94 and will likely need home oxygen-Assess O2 on RA.

## 2021-01-18 NOTE — CHART NOTE - NSCHARTNOTEFT_GEN_A_CORE
Alerted by RN that patient oxygen now back up to 6 L O2 sat 88-89%. Patient in NAD. Lungs CTA Bilaterally. Patient with underlying COPD and received Duoneb 4 PM. Patient has been refusing Symbicort throughout hospital stay. V/Q scan ordered to R/O PE and LE Duplex performed to R/O DVT. Follow-up results. Continue to monitor closely Alerted by RN that patient oxygen now back up to 6 L O2 sat 89-91%. Patient in NAD. Lungs CTA Bilaterally. Patient with underlying COPD and received Duoneb 4 PM. Patient has been refusing Symbicort throughout hospital stay. V/Q scan ordered to R/O PE and LE Duplex performed to R/O DVT. Follow-up results. Continue to monitor closely. Patient will likely need 1 unit pRBC to improve Oxygenation status as Anemia is likely contributing to Hypoxia.

## 2021-01-18 NOTE — PROGRESS NOTE ADULT - PROBLEM SELECTOR PLAN 6
Hx of CABG in 2015 with Dr. Blunt. Currently CP free and HD stable  - EKG: SR @67BPM w/ incomplete LBBB, TWI in I, AVL (similar to prior EKG 10/2020)  - Trops 0.04 x2 likely in setting of CHF exacerbation  - C/w BB/Statin. Per Dr Crooks, pt had ASA d/c at [facility] after fall ~3 weeks ago. Dr Crooks also reports prior h/o ICH. As such holding ASA at this time

## 2021-01-18 NOTE — PROGRESS NOTE ADULT - PROBLEM SELECTOR PLAN 7
Baseline Hgb 8-9, likely SVETLANA and Anemia of CKD. Hgb/HCT 7.8/24.8  -Maintain Active Type and Screen. Repeat CBC in AM if Hgb remains <8 will transfuse 1 unit as goal Hgb>8 given CAD.   -Stool for occult blood ordered. Monitor for signs of bleeding. Patient currently not hypotensive; no evidence of acute bleeding at this time.   - Iron studies reveal SVETLANA. Per Renal started IV Iron x 5 days (1/17-1/22) and EPO  - Maintain active T/S

## 2021-01-18 NOTE — PROGRESS NOTE ADULT - SUBJECTIVE AND OBJECTIVE BOX
Interventional Cardiology PA Adult Progress Note    CC: Acute on Chronic Diastolic CHF   Subjective Assessment: Patient seen and examined at bedside. Patient denies C/P, SOB, palpitations, dizziness, diaphoresis, N/V, BRBPR, melena, hematuria, Patient reports dry cough.     ROS otherwise negative unless otherwise stated except per HPI and Subjective   	  MEDICATIONS:  amLODIPine   Tablet 10 milliGRAM(s) Oral daily  metoprolol tartrate 50 milliGRAM(s) Oral two times a day  azithromycin  IVPB 500 milliGRAM(s) IV Intermittent every 24 hours  cefTRIAXone   IVPB 1000 milliGRAM(s) IV Intermittent every 24 hours  albuterol/ipratropium for Nebulization 3 milliLiter(s) Nebulizer every 6 hours  benzonatate 100 milliGRAM(s) Oral every 8 hours PRN  budesonide  80 MICROgram(s)/formoterol 4.5 MICROgram(s) Inhaler 2 Puff(s) Inhalation two times a day  donepezil 5 milliGRAM(s) Oral at bedtime  atorvastatin 20 milliGRAM(s) Oral at bedtime  levothyroxine 50 MICROGram(s) Oral daily  fluticasone propionate 50 MICROgram(s)/spray Nasal Spray 1 Spray(s) Both Nostrils two times a day  heparin   Injectable 5000 Unit(s) SubCutaneous every 12 hours  influenza  Vaccine (HIGH DOSE) 0.7 milliLiter(s) IntraMuscular once  iron sucrose IVPB 200 milliGRAM(s) IV Intermittent every 24 hours  potassium chloride  10 mEq/100 mL IVPB 10 milliEquivalent(s) IV Intermittent every 1 hour    [PHYSICAL EXAM:  TELEMETRY:  T(C): 36.9 (01-18-21 @ 13:12), Max: 37.4 (01-18-21 @ 05:48)  HR: 72 (01-18-21 @ 12:03) (63 - 79)  BP: 130/59 (01-18-21 @ 12:03) (130/59 - 180/75)  RR: 18 (01-18-21 @ 12:03) (18 - 20)  SpO2: 94% (01-18-21 @ 12:03) (88% - 96%)  Wt(kg): --  I&O's Summary    17 Jan 2021 07:01  -  18 Jan 2021 07:00  --------------------------------------------------------  IN: 690 mL / OUT: 2002 mL / NET: -1312 mL    18 Jan 2021 07:01  -  18 Jan 2021 13:20  --------------------------------------------------------  IN: 0 mL / OUT: 300 mL / NET: -300 mL    Cason: No  Central/PICC/Mid Line:  No                                       Appearance: Normal. NAD 	  HEENT:   Normal oral mucosa, PERRL, EOMI	  Neck: Supple. Left sided JVD.   Cardiovascular: +S1 S2. RRR. 2/6 Holosystolic Murmur LUSB.   Respiratory: CTA Bilaterally. No rhonchi, wheezes, rales. Decreased BS bases. 	  Gastrointestinal:  BS x 4. Soft NT/ND  Skin: No rashes, No ecchymoses, No cyanosis  Extremities: Normal range of motion, No clubbing, cyanosis or edema BLE. No calf tenderness BLE.   Vascular: Peripheral pulses palpable 2+ bilaterally  Neurologic: Non-focal  Psychiatry: A & O x 3, Mood & affect appropriate  	    ECG:  	  RADIOLOGY: < from: Xray Chest 1 View- PORTABLE-Routine (Xray Chest 1 View- PORTABLE-Routine in AM.) (01.18.21 @ 02:30) >  Stable cardiomegaly, status post median sternotomy, thoracic aortic and mitral annulus calcification. Bilateral opacities and bony structures are stable..    DIAGNOSTIC TESTING:  [ ] Echocardiogram:  [ ]  Catheterization:  [ ] Stress Test:    [ ] SANJAY  OTHER: 	    LABS:	 	  CARDIAC MARKERS:                     7.8    9.43  )-----------( 220      ( 18 Jan 2021 06:16 )             24.8     01-18    140  |  97  |  75<H>  ----------------------------<  111<H>  3.0<L>   |  25  |  4.22<H>    Ca    8.3<L>      18 Jan 2021 07:57  Phos  5.7     01-18  Mg     2.0     01-18    TPro  5.9<L>  /  Alb  x   /  TBili  x   /  DBili  x   /  AST  x   /  ALT  x   /  AlkPhos  x   01-18    proBNP:   Lipid Profile:   HgA1c:   TSH:

## 2021-01-18 NOTE — PROGRESS NOTE ADULT - PROBLEM SELECTOR PLAN 9
K +3.0 likely secondary to diuresis with lasix 80 mg IV BID  -KCl 10 mEq IV x 3 doses. Kdur 40 mEq PO x 1 given. Follow-up BMP 6 pm. Magnesium 2.0.

## 2021-01-19 DIAGNOSIS — J96.01 ACUTE RESPIRATORY FAILURE WITH HYPOXIA: ICD-10-CM

## 2021-01-19 DIAGNOSIS — R05 COUGH: ICD-10-CM

## 2021-01-19 LAB
ANA PAT FLD IF-IMP: ABNORMAL
ANA TITR SER: ABNORMAL
ANION GAP SERPL CALC-SCNC: 15 MMOL/L — SIGNIFICANT CHANGE UP (ref 5–17)
AUTO DIFF PNL BLD: ABNORMAL
BUN SERPL-MCNC: 70 MG/DL — HIGH (ref 7–23)
C-ANCA SER-ACNC: NEGATIVE — SIGNIFICANT CHANGE UP
C3 SERPL-MCNC: 145 MG/DL — SIGNIFICANT CHANGE UP (ref 81–157)
C4 SERPL-MCNC: 40 MG/DL — HIGH (ref 13–39)
CALCIUM SERPL-MCNC: 8.5 MG/DL — SIGNIFICANT CHANGE UP (ref 8.4–10.5)
CHLORIDE SERPL-SCNC: 99 MMOL/L — SIGNIFICANT CHANGE UP (ref 96–108)
CO2 SERPL-SCNC: 22 MMOL/L — SIGNIFICANT CHANGE UP (ref 22–31)
CREAT SERPL-MCNC: 3.84 MG/DL — HIGH (ref 0.5–1.3)
CRP SERPL-MCNC: 20.79 MG/DL — HIGH (ref 0–0.4)
D DIMER BLD IA.RAPID-MCNC: 1012 NG/ML DDU — HIGH
GLUCOSE SERPL-MCNC: 114 MG/DL — HIGH (ref 70–99)
HCT VFR BLD CALC: 27.2 % — LOW (ref 34.5–45)
HGB BLD-MCNC: 8.3 G/DL — LOW (ref 11.5–15.5)
IGA FLD-MCNC: 213 MG/DL — SIGNIFICANT CHANGE UP (ref 84–499)
IGG FLD-MCNC: 707 MG/DL — SIGNIFICANT CHANGE UP (ref 610–1660)
IGM SERPL-MCNC: 84 MG/DL — SIGNIFICANT CHANGE UP (ref 35–242)
INTERPRETATION SERPL IFE-IMP: SIGNIFICANT CHANGE UP
KAPPA LC SER QL IFE: 7.94 MG/DL — HIGH (ref 0.33–1.94)
KAPPA/LAMBDA FREE LIGHT CHAIN RATIO, SERUM: 0.74 RATIO — SIGNIFICANT CHANGE UP (ref 0.26–1.65)
LAMBDA LC SER QL IFE: 10.78 MG/DL — HIGH (ref 0.57–2.63)
MAGNESIUM SERPL-MCNC: 1.8 MG/DL — SIGNIFICANT CHANGE UP (ref 1.6–2.6)
MCHC RBC-ENTMCNC: 26.7 PG — LOW (ref 27–34)
MCHC RBC-ENTMCNC: 30.5 GM/DL — LOW (ref 32–36)
MCV RBC AUTO: 87.5 FL — SIGNIFICANT CHANGE UP (ref 80–100)
NRBC # BLD: 0 /100 WBCS — SIGNIFICANT CHANGE UP (ref 0–0)
OB PNL STL: NEGATIVE — SIGNIFICANT CHANGE UP
P-ANCA SER-ACNC: NEGATIVE — SIGNIFICANT CHANGE UP
PHOSPHATE SERPL-MCNC: 5 MG/DL — HIGH (ref 2.5–4.5)
PLATELET # BLD AUTO: 230 K/UL — SIGNIFICANT CHANGE UP (ref 150–400)
POTASSIUM SERPL-MCNC: 4.5 MMOL/L — SIGNIFICANT CHANGE UP (ref 3.5–5.3)
POTASSIUM SERPL-SCNC: 4.5 MMOL/L — SIGNIFICANT CHANGE UP (ref 3.5–5.3)
RBC # BLD: 3.11 M/UL — LOW (ref 3.8–5.2)
RBC # FLD: 15.9 % — HIGH (ref 10.3–14.5)
SODIUM SERPL-SCNC: 136 MMOL/L — SIGNIFICANT CHANGE UP (ref 135–145)
WBC # BLD: 11.47 K/UL — HIGH (ref 3.8–10.5)
WBC # FLD AUTO: 11.47 K/UL — HIGH (ref 3.8–10.5)

## 2021-01-19 PROCEDURE — 70450 CT HEAD/BRAIN W/O DYE: CPT | Mod: 26

## 2021-01-19 PROCEDURE — 71045 X-RAY EXAM CHEST 1 VIEW: CPT | Mod: 26

## 2021-01-19 PROCEDURE — 71250 CT THORAX DX C-: CPT | Mod: 26

## 2021-01-19 PROCEDURE — 99233 SBSQ HOSP IP/OBS HIGH 50: CPT | Mod: GC

## 2021-01-19 RX ORDER — PIPERACILLIN AND TAZOBACTAM 4; .5 G/20ML; G/20ML
3.38 INJECTION, POWDER, LYOPHILIZED, FOR SOLUTION INTRAVENOUS ONCE
Refills: 0 | Status: DISCONTINUED | OUTPATIENT
Start: 2021-01-19 | End: 2021-01-19

## 2021-01-19 RX ORDER — PIPERACILLIN AND TAZOBACTAM 4; .5 G/20ML; G/20ML
2.25 INJECTION, POWDER, LYOPHILIZED, FOR SOLUTION INTRAVENOUS EVERY 6 HOURS
Refills: 0 | Status: DISCONTINUED | OUTPATIENT
Start: 2021-01-19 | End: 2021-01-22

## 2021-01-19 RX ORDER — PIPERACILLIN AND TAZOBACTAM 4; .5 G/20ML; G/20ML
2.25 INJECTION, POWDER, LYOPHILIZED, FOR SOLUTION INTRAVENOUS ONCE
Refills: 0 | Status: COMPLETED | OUTPATIENT
Start: 2021-01-19 | End: 2021-01-19

## 2021-01-19 RX ORDER — PIPERACILLIN AND TAZOBACTAM 4; .5 G/20ML; G/20ML
3.38 INJECTION, POWDER, LYOPHILIZED, FOR SOLUTION INTRAVENOUS EVERY 12 HOURS
Refills: 0 | Status: DISCONTINUED | OUTPATIENT
Start: 2021-01-19 | End: 2021-01-19

## 2021-01-19 RX ORDER — ACETAMINOPHEN 500 MG
650 TABLET ORAL EVERY 6 HOURS
Refills: 0 | Status: DISCONTINUED | OUTPATIENT
Start: 2021-01-19 | End: 2021-02-07

## 2021-01-19 RX ORDER — PIPERACILLIN AND TAZOBACTAM 4; .5 G/20ML; G/20ML
2.25 INJECTION, POWDER, LYOPHILIZED, FOR SOLUTION INTRAVENOUS EVERY 6 HOURS
Refills: 0 | Status: DISCONTINUED | OUTPATIENT
Start: 2021-01-19 | End: 2021-01-19

## 2021-01-19 RX ORDER — DIAZEPAM 5 MG
1 TABLET ORAL ONCE
Refills: 0 | Status: DISCONTINUED | OUTPATIENT
Start: 2021-01-19 | End: 2021-01-19

## 2021-01-19 RX ORDER — PIPERACILLIN AND TAZOBACTAM 4; .5 G/20ML; G/20ML
2.25 INJECTION, POWDER, LYOPHILIZED, FOR SOLUTION INTRAVENOUS ONCE
Refills: 0 | Status: DISCONTINUED | OUTPATIENT
Start: 2021-01-19 | End: 2021-01-19

## 2021-01-19 RX ADMIN — Medication 3 MILLILITER(S): at 08:58

## 2021-01-19 RX ADMIN — SEVELAMER CARBONATE 800 MILLIGRAM(S): 2400 POWDER, FOR SUSPENSION ORAL at 08:55

## 2021-01-19 RX ADMIN — Medication 1 SPRAY(S): at 17:16

## 2021-01-19 RX ADMIN — Medication 3 MILLILITER(S): at 19:02

## 2021-01-19 RX ADMIN — SEVELAMER CARBONATE 800 MILLIGRAM(S): 2400 POWDER, FOR SUSPENSION ORAL at 17:16

## 2021-01-19 RX ADMIN — BUDESONIDE AND FORMOTEROL FUMARATE DIHYDRATE 2 PUFF(S): 160; 4.5 AEROSOL RESPIRATORY (INHALATION) at 20:59

## 2021-01-19 RX ADMIN — Medication 50 MICROGRAM(S): at 05:50

## 2021-01-19 RX ADMIN — PIPERACILLIN AND TAZOBACTAM 200 GRAM(S): 4; .5 INJECTION, POWDER, LYOPHILIZED, FOR SOLUTION INTRAVENOUS at 09:27

## 2021-01-19 RX ADMIN — Medication 3 MILLILITER(S): at 05:50

## 2021-01-19 RX ADMIN — Medication 100 MILLIGRAM(S): at 06:44

## 2021-01-19 RX ADMIN — Medication 50 MILLIGRAM(S): at 05:50

## 2021-01-19 RX ADMIN — ATORVASTATIN CALCIUM 20 MILLIGRAM(S): 80 TABLET, FILM COATED ORAL at 20:59

## 2021-01-19 RX ADMIN — SEVELAMER CARBONATE 800 MILLIGRAM(S): 2400 POWDER, FOR SUSPENSION ORAL at 12:45

## 2021-01-19 RX ADMIN — HEPARIN SODIUM 5000 UNIT(S): 5000 INJECTION INTRAVENOUS; SUBCUTANEOUS at 05:50

## 2021-01-19 RX ADMIN — Medication 50 MILLIGRAM(S): at 17:16

## 2021-01-19 RX ADMIN — AZITHROMYCIN 255 MILLIGRAM(S): 500 TABLET, FILM COATED ORAL at 19:02

## 2021-01-19 RX ADMIN — PIPERACILLIN AND TAZOBACTAM 200 GRAM(S): 4; .5 INJECTION, POWDER, LYOPHILIZED, FOR SOLUTION INTRAVENOUS at 14:51

## 2021-01-19 RX ADMIN — HEPARIN SODIUM 5000 UNIT(S): 5000 INJECTION INTRAVENOUS; SUBCUTANEOUS at 17:16

## 2021-01-19 RX ADMIN — Medication 1 SPRAY(S): at 05:50

## 2021-01-19 RX ADMIN — Medication 3 MILLILITER(S): at 00:24

## 2021-01-19 RX ADMIN — DONEPEZIL HYDROCHLORIDE 5 MILLIGRAM(S): 10 TABLET, FILM COATED ORAL at 20:59

## 2021-01-19 RX ADMIN — Medication 3 MILLILITER(S): at 14:51

## 2021-01-19 RX ADMIN — PIPERACILLIN AND TAZOBACTAM 200 GRAM(S): 4; .5 INJECTION, POWDER, LYOPHILIZED, FOR SOLUTION INTRAVENOUS at 20:59

## 2021-01-19 RX ADMIN — BUDESONIDE AND FORMOTEROL FUMARATE DIHYDRATE 2 PUFF(S): 160; 4.5 AEROSOL RESPIRATORY (INHALATION) at 08:56

## 2021-01-19 RX ADMIN — AMLODIPINE BESYLATE 10 MILLIGRAM(S): 2.5 TABLET ORAL at 05:50

## 2021-01-19 RX ADMIN — Medication 1 MILLIGRAM(S): at 19:02

## 2021-01-19 NOTE — PROGRESS NOTE ADULT - SUBJECTIVE AND OBJECTIVE BOX
Patient is a 86y Female seen and evaluated at bedside. Nephrotic w/u thus far non contributory.   On non rebreather INCOMPLETE    Meds:    albuterol/ipratropium for Nebulization 3 every 6 hours  amLODIPine   Tablet 10 daily  atorvastatin 20 at bedtime  azithromycin  IVPB 500 every 24 hours  benzonatate 100 every 8 hours PRN  budesonide  80 MICROgram(s)/formoterol 4.5 MICROgram(s) Inhaler 2 two times a day  donepezil 5 at bedtime  fluticasone propionate 50 MICROgram(s)/spray Nasal Spray 1 two times a day  guaiFENesin   Syrup  (Sugar-Free) 100 every 6 hours PRN  heparin   Injectable 5000 every 12 hours  influenza  Vaccine (HIGH DOSE) 0.7 once  iron sucrose IVPB 200 every 24 hours  levothyroxine 50 daily  metoprolol tartrate 50 two times a day  piperacillin/tazobactam IVPB.. 2.25 every 6 hours  sevelamer carbonate 800 three times a day with meals      T(C): , Max: 37.5 (01-18-21 @ 18:09)  T(F): , Max: 99.5 (01-18-21 @ 18:09)  HR: 74 (01-19-21 @ 08:44)  BP: 148/62 (01-19-21 @ 08:44)  BP(mean): --  RR: 24 (01-19-21 @ 08:44)  SpO2: 94% (01-19-21 @ 08:44)  Wt(kg): --    01-18 @ 07:01 - 01-19 @ 07:00  --------------------------------------------------------  IN: 1270 mL / OUT: 1700 mL / NET: -430 mL    01-19 @ 07:01 - 01-19 @ 11:11  --------------------------------------------------------  IN: 100 mL / OUT: 200 mL / NET: -100 mL          Review of Systems:  CONSTITUTIONAL: No fever or chills, No fatigue or tiredness  RESPIRATORY: No shortness of breath, cough, hemoptysis  CARDIOVASCULAR: No chest pain or shortness of breath  GASTROINTESTINAL: No abdominal or flank pain, No nausea or vomiting, No diarrhea  GENITOURINARY: No dysuria or urinary burning, No difficulty passing urine, No hematuria  NEUROLOGICAL: No headaches or blurred vision  SKIN: No skin rashes   MUSCULOSKELETAL: No arthralgia, No leg edema, No muscle pain      PHYSICAL EXAM:  GENERAL: well-developed, well nourished, alert, no acute distress at present  NECK: supple, No JVD  CHEST/LUNG: Clear to auscultation bilaterally  HEART: normal S1S2, RRR  ABDOMEN: Soft, Nontender, +BS, No flank tenderness bilateral  EXTREMITIES: No clubbing, cyanosis, or edema   NEUROLOGY: AAO x3, no focal neurological deficit  ACCESS: good thrill and bruit appreciated      LABS:                        8.3    11.47 )-----------( 230      ( 19 Jan 2021 07:05 )             27.2     01-19    136  |  99  |  70<H>  ----------------------------<  114<H>  4.5   |  22  |  3.84<H>    Ca    8.5      19 Jan 2021 07:05  Phos  5.0     01-19  Mg     1.8     01-19    TPro  5.9<L>  /  Alb  2.7<L>  /  TBili  x   /  DBili  x   /  AST  x   /  ALT  x   /  AlkPhos  x   01-18          Sodium, Random Urine: 98 mmol/L (01-18 @ 08:09)  Creatinine, Random Urine: 30 mg/dL (01-18 @ 08:09)  Protein/Creatinine Ratio Calculation: 6.3 Ratio (01-18 @ 08:09)  Osmolality, Random Urine: 328 mosm/kg (01-18 @ 08:09)        RADIOLOGY & ADDITIONAL STUDIES:           Patient is a 86y Female seen and evaluated at bedside. Comfortable on non rebreather, CXR findings not suggestive of volume overload, being managed for PNA. If oxygen status not improving with antibacterial management, would consider resuming diuresis for pulmonary optimisation. Nephrotic w/u thus far non contributory.    Meds:    albuterol/ipratropium for Nebulization 3 every 6 hours  amLODIPine   Tablet 10 daily  atorvastatin 20 at bedtime  azithromycin  IVPB 500 every 24 hours  benzonatate 100 every 8 hours PRN  budesonide  80 MICROgram(s)/formoterol 4.5 MICROgram(s) Inhaler 2 two times a day  donepezil 5 at bedtime  fluticasone propionate 50 MICROgram(s)/spray Nasal Spray 1 two times a day  guaiFENesin   Syrup  (Sugar-Free) 100 every 6 hours PRN  heparin   Injectable 5000 every 12 hours  influenza  Vaccine (HIGH DOSE) 0.7 once  iron sucrose IVPB 200 every 24 hours  levothyroxine 50 daily  metoprolol tartrate 50 two times a day  piperacillin/tazobactam IVPB.. 2.25 every 6 hours  sevelamer carbonate 800 three times a day with meals      T(C): , Max: 37.5 (01-18-21 @ 18:09)  T(F): , Max: 99.5 (01-18-21 @ 18:09)  HR: 74 (01-19-21 @ 08:44)  BP: 148/62 (01-19-21 @ 08:44)  BP(mean): --  RR: 24 (01-19-21 @ 08:44)  SpO2: 94% (01-19-21 @ 08:44)  Wt(kg): --    01-18 @ 07:01  -  01-19 @ 07:00  --------------------------------------------------------  IN: 1270 mL / OUT: 1700 mL / NET: -430 mL    01-19 @ 07:01  -  01-19 @ 11:11  --------------------------------------------------------  IN: 100 mL / OUT: 200 mL / NET: -100 mL          Review of Systems:  RESPIRATORY: +shortness of breath  CARDIOVASCULAR: No chest pain  MUSCULOSKELETAL: No leg edema      PHYSICAL EXAM:  GENERAL: well-developed, well nourished, alert, on non rebreather  CHEST/LUNG: Clear to auscultation bilaterally  HEART: normal S1S2, RRR  ABDOMEN: Soft, Nontender, non distended  EXTREMITIES: +oedema       LABS:                        8.3    11.47 )-----------( 230      ( 19 Jan 2021 07:05 )             27.2     01-19    136  |  99  |  70<H>  ----------------------------<  114<H>  4.5   |  22  |  3.84<H>    Ca    8.5      19 Jan 2021 07:05  Phos  5.0     01-19  Mg     1.8     01-19    TPro  5.9<L>  /  Alb  2.7<L>  /  TBili  x   /  DBili  x   /  AST  x   /  ALT  x   /  AlkPhos  x   01-18          Sodium, Random Urine: 98 mmol/L (01-18 @ 08:09)  Creatinine, Random Urine: 30 mg/dL (01-18 @ 08:09)  Protein/Creatinine Ratio Calculation: 6.3 Ratio (01-18 @ 08:09)  Osmolality, Random Urine: 328 mosm/kg (01-18 @ 08:09)        RADIOLOGY & ADDITIONAL STUDIES:

## 2021-01-19 NOTE — PROGRESS NOTE ADULT - SUBJECTIVE AND OBJECTIVE BOX
PUD CCM    INTERVAL HPI/OVERNIGHT EVENTS:    Now on nonrebreather.  Saturation 77% on room air.  D-dimer increased, V/Q pending.  Neurology evaluation is pending.  Vancomycin and pip/tazo started for bilateral infiltrates and high inflammatory markers.  CT head and chest to be obtained.    PAST MEDICAL & SURGICAL HISTORY:  Essential hypertension  Hypertension    Type 2 diabetes mellitus  Diabetes    Hyperlipidemia  Hyperlipidemia    Disorder of kidney and ureter  Renal insufficiency    Peripheral vascular disease  PVD (peripheral vascular disease)    Anxiety state  Anxiety    Atherosclerosis of renal artery  with resulting stent placement    Atherosclerosis of renal artery  Renal artery stenosis    FAMILY HISTORY:  Family history of ischemic heart disease  Family history of coronary artery disease.    SOCIAL HISTORY:    REVIEW OF SYSTEMS:  Constitutional: () weight change, (-) fever,  () chills, () fatigue, (-) night sweats  Eyes: (-) discharge, () eye pain, () visual change  ENT:  (-) hearing difficulty, () vertigo, () sinus pain,  () throat pain, () epistaxis, () dysphagia, () hoarseness  Neck: (-) pain, () stiffness, () swelling  Respiratory: (+++) cough, () wheezing, () hemoptysis      Cardiovascular: (-) chest pain, ()palpitations, () dizziness   Gastrointestinal: (-) abdominal pain, () nausea, () vomiting, () hematemesis, () diarrhea,  () constipation, () melena  Genitourinary:  (-) dysuria, () frequency, () hematuria, () incontinence      Neurologic: (-) headache, () memory loss, () loss of strength, () numbness, () tremor     Skin: (-) itching, () burning, () rash, () lesions   Lymphatic: (-) enlarged lymph nodes  Endocrine: (-) hair loss, () temperature intolerance         Musculoskeletal: (-) back pain, () joint pain,  () extremity pain  Psychiatric: (-) visual change, () auditory change, () depression, () anxiety, () suicidal  Sleep: (-) disorder, () insomnia, () sleep deprivation  Heme/Lymph: () easy bruising, () bleeding gums            Allergy and Immunologic: () hives, () eczema    Vital Signs Last 24 Hrs  T(C): 36.2 (19 Jan 2021 09:44), Max: 37.5 (18 Jan 2021 18:09)  T(F): 97.1 (19 Jan 2021 09:44), Max: 99.5 (18 Jan 2021 18:09)  HR: 74 (19 Jan 2021 08:44) (72 - 82)  BP: 148/62 (19 Jan 2021 08:44) (130/59 - 165/71)  BP(mean): --  RR: 24 (19 Jan 2021 08:44) (18 - 24)  SpO2: 94% (19 Jan 2021 08:44) (93% - 97%) ON NONREBREATHER    I&O's Detail    18 Jan 2021 07:01  -  19 Jan 2021 07:00  --------------------------------------------------------  IN:    IV PiggyBack: 250 mL    Oral Fluid: 1020 mL  Total IN: 1270 mL    OUT:    Voided (mL): 1700 mL  Total OUT: 1700 mL    Total NET: -430 mL      19 Jan 2021 07:01  -  19 Jan 2021 11:48  --------------------------------------------------------  IN:    IV PiggyBack: 100 mL  Total IN: 100 mL    OUT:    Voided (mL): 200 mL  Total OUT: 200 mL    Total NET: -100 mL          PHYSICAL EXAM:    Well nourished, well developed, comfortable, - acute distress; vital signs are monitored continuously  Eyes: PERRLA, EOMI, -conjunctivitis, -scleritis   Head: no focal deficit, normocephalic,  no trauma  ENMT: moist tongue, no thrush, -nasal discharge, -hoarseness, normal hearing, -cough, -hemoptysis, trachea midline  Neck: supple, - lymphadenopathy,  -masses, -JVD  Respiratory: bilateral diminished breath sounds, -wheezing, -rhonchi, -rales, -crackles  Chest: -accessory muscle use, -paradoxical breathing  Cardiovascular: regular rate and sinus rhythm, S1 S2 normal, -S3, -S4, -murmur, -gallop, -rub  Gastrointestinal: soft, nontender, nondistended, normal bowel sounds, no hepatosplenomegaly  Genitourinary: -flank pain, -dysuria  Extremities: -clubbing, -cyanosis, -edema    Vascular: peripheral pulses palpable 2+ bilaterally  Neurological: alert, oriented x 3, no focal deficit, -tremor   Skin: warm, dry, -erythema, iv sites intact  Lymph nodes; no cervical, supraclavicular or axillary adenopathy  Psychiatric: cooperative, appropriate mood      MEDICATIONS  (STANDING):  albuterol/ipratropium for Nebulization 3 milliLiter(s) Nebulizer every 6 hours  amLODIPine   Tablet 10 milliGRAM(s) Oral daily  atorvastatin 20 milliGRAM(s) Oral at bedtime  azithromycin  IVPB 500 milliGRAM(s) IV Intermittent every 24 hours  budesonide  80 MICROgram(s)/formoterol 4.5 MICROgram(s) Inhaler 2 Puff(s) Inhalation two times a day  donepezil 5 milliGRAM(s) Oral at bedtime  fluticasone propionate 50 MICROgram(s)/spray Nasal Spray 1 Spray(s) Both Nostrils two times a day  heparin   Injectable 5000 Unit(s) SubCutaneous every 12 hours  influenza  Vaccine (HIGH DOSE) 0.7 milliLiter(s) IntraMuscular once  iron sucrose IVPB 200 milliGRAM(s) IV Intermittent every 24 hours  levothyroxine 50 MICROGram(s) Oral daily  metoprolol tartrate 50 milliGRAM(s) Oral two times a day  piperacillin/tazobactam IVPB.. 2.25 Gram(s) IV Intermittent every 6 hours  sevelamer carbonate 800 milliGRAM(s) Oral three times a day with meals    MEDICATIONS  (PRN):  benzonatate 100 milliGRAM(s) Oral every 8 hours PRN Cough  guaiFENesin   Syrup  (Sugar-Free) 100 milliGRAM(s) Oral every 6 hours PRN Cough      Allergies    No Known Allergies    Intolerances        LABS:                        8.3    11.47 )-----------( 230      ( 19 Jan 2021 07:05 )             27.2     01-19    136  |  99  |  70<H>  ----------------------------<  114<H>  4.5   |  22  |  3.84<H>    Ca    8.5      19 Jan 2021 07:05  Phos  5.0     01-19  Mg     1.8     01-19    TPro  5.9<L>  /  Alb  2.7<L>  /  TBili  x   /  DBili  x   /  AST  x   /  ALT  x   /  AlkPhos  x   01-18    +DVT prophylaxis  5000 q12  +Sleep  NO ISSUES   +Nutrition goals  POOR APPETITE  -Pain DENIED  -Decubital ulcer  +GI prophylaxis (PPV, coagulopathy, Hx)  +Aspiration prophylaxis (45 degrees)  Ventilator synchronized   MAY NEED BiPAP  Tracheal cuff pressure  +Sedation/analgesia stopped once  +ID (phos, CH, mupi, SB)  -Delirium  +Cardiac Beta/ACEI-ARB on hold/ASA on hold/statin  +Prevention  +Education  +Medication reviewed (drug-drug interactions, PDA)  Medical devices  URINE SPONGE, NONREBREATHER  Discussed with Justin Brunner PA, CCRN    RADIOLOGY & ADDITIONAL STUDIES:    CXR with bilateral infiltrates, no change    EXAM:  CT CHEST                          PROCEDURE DATE:  10/14/2020      INTERPRETATION:  CT SCAN OF CHEST    History: 85-year-old female with shortness of breath and pleural effusion    Technique: CT scan of chest performed from lung apices through lung bases. Axial, coronal, and sagittal multiplanar reformatted images were produced. Thin section axial images and axial MIPS were also produced. Intravenous contrast material was not administered, as ordered.    Comparison: Chest radiograph dated 10/13/2020. No prior thoracic CT examinations.    Findings:    Lungs and large airways: Normal.    Pleura:  There are small bibasilar pleural effusions right greater than left. There is interlobular septal thickening which extends into the interior of the lung to suggest a component of pulmonary venous congestion. Moreover there are peripheral reticular opacities with some associated groundglass component within the peripheral aspects of both lungs. More peripheral groundglass and consolidation is seen within the right upper left upper and right middle lobes. There is some pulmonary fibrosis with traction bronchiectasis and bronchiolectasis without jesús honeycomb lung formation. There is air trapping.    Mediastinum and hilar regions: Increased number of mildly enlarged prevascular (1.1 cm in short axis), right paratracheal (1.1 cm in short axis), and subcarinal (1.7 cm in short axis).    Heart and pericardium:  Cardiomegaly. No pericardial effusion. Extensive calcification of the mitral annulus.    Vessels:  Severe coronary artery calcification/stents. Severe callus  5. Atheromatous plaque formation throughout the aortic arch descending and proximal abdominal aorta and major side branches with extensive calcification of the origin of the renal arteries right greater than left. Calcification of the origin of the brachycephalic vessels are noted. No evidence of aneurysm    Chest wall and lower neck:  Punctate microcalcifications are noted in the breasts bilaterally left greater than right. Clinical and mammographic correlation.    Upper abdomen: Cholelithiasis. Extensive arterial vascular calcification of the major side branches of the abdominal aorta as above.    Bones: Moderate multilevel degenerative disc disease involving the lower thoracic spine. Poststernotomy.      Impression:    1. Cardiomegaly. Small bibasilar pleural effusions and interlobular septal thickening compatible with pulmonary venous congestion.  2. More peripheral reticular, groundglass and some patchy areas of peripheral consolidation more pronounced in the upper and midlung zones are noted. There may be some bronchiectasis/bronchiolectasis to suggest pulmonary fibrosis. No honeycomb lung formation. Air-trapping is noted. These findings are not consistent with UIP/IPF. Abbreviated differential includes atypical infectious and/or inflammatory etiologies such as chronic hypersensitivity pneumonia, stage IV sarcoidosis, pneumoconiosis, and subacute and/or chronic sequela of atypical and viral pneumonias such as Covid 19.  3. Mild mediastinal lymphadenopathy.

## 2021-01-19 NOTE — PROGRESS NOTE ADULT - SUBJECTIVE AND OBJECTIVE BOX
Interventional Cardiology PA Adult Progress Note    CC: Acute on Chronic Diastolic CHF, CAP    Subjective Assessment: Patient seen and examined at bedside. She reports SOB and dry cough. She denies C/P, palpitations, dizziness, diaphoresis, N/V, BRBPR, melena, hematuria.    ROS otherwise negative unless otherwise stated except per HPI and Subjective   	  MEDICATIONS:  amLODIPine   Tablet 10 milliGRAM(s) Oral daily  metoprolol tartrate 50 milliGRAM(s) Oral two times a day  azithromycin  IVPB 500 milliGRAM(s) IV Intermittent every 24 hours  piperacillin/tazobactam IVPB.. 2.25 Gram(s) IV Intermittent every 6 hours  albuterol/ipratropium for Nebulization 3 milliLiter(s) Nebulizer every 6 hours  benzonatate 100 milliGRAM(s) Oral every 8 hours PRN  budesonide  80 MICROgram(s)/formoterol 4.5 MICROgram(s) Inhaler 2 Puff(s) Inhalation two times a day  guaiFENesin   Syrup  (Sugar-Free) 100 milliGRAM(s) Oral every 6 hours PRN  donepezil 5 milliGRAM(s) Oral at bedtime  atorvastatin 20 milliGRAM(s) Oral at bedtime  levothyroxine 50 MICROGram(s) Oral daily  fluticasone propionate 50 MICROgram(s)/spray Nasal Spray 1 Spray(s) Both Nostrils two times a day  heparin   Injectable 5000 Unit(s) SubCutaneous every 12 hours  influenza  Vaccine (HIGH DOSE) 0.7 milliLiter(s) IntraMuscular once  iron sucrose IVPB 200 milliGRAM(s) IV Intermittent every 24 hours    [PHYSICAL EXAM:  TELEMETRY:  T(C): 36.2 (01-19-21 @ 09:44), Max: 37.5 (01-18-21 @ 18:09)  HR: 74 (01-19-21 @ 08:44) (72 - 82)  BP: 148/62 (01-19-21 @ 08:44) (130/59 - 165/71)  RR: 24 (01-19-21 @ 08:44) (18 - 24)  SpO2: 94% (01-19-21 @ 08:44) (93% - 97%)  Wt(kg): --  I&O's Summary    18 Jan 2021 07:01  -  19 Jan 2021 07:00  --------------------------------------------------------  IN: 1270 mL / OUT: 1700 mL / NET: -430 mL    19 Jan 2021 07:01  -  19 Jan 2021 09:59  --------------------------------------------------------  IN: 100 mL / OUT: 200 mL / NET: -100 mL    Cason: No  Central/PICC/Mid Line: No                                        Appearance: Normal	  HEENT:   Normal oral mucosa, PERRL, EOMI	  Neck: Supple. Left JVD   Cardiovascular: + S1. S2. RRR. 2/6 Holosystolic Murmur LUSB.   Respiratory: Scattered wheezes and rales. No rhonchi. Decreased BS bases  Gastrointestinal:  BS x 4. Soft NT/ND  Skin: No rashes, No ecchymoses, No cyanosis  Extremities: Normal range of motion, No clubbing, cyanosis or edema BLE. No calf tenderness BLE  Vascular: Peripheral pulses palpable 2+ bilaterally  Neurologic: Non-focal  Psychiatry: A & O x 3, Mood & affect appropriate      	    ECG:  	  RADIOLOGY:   DIAGNOSTIC TESTING:  [ ] Echocardiogram:  [ ]  Catheterization:  [ ] Stress Test:    [ ] SANJAY  OTHER: 	    LABS:	 	  CARDIAC MARKERS:                     8.3    11.47 )-----------( 230      ( 19 Jan 2021 07:05 )             27.2     01-19    136  |  99  |  70<H>  ----------------------------<  114<H>  4.5   |  22  |  3.84<H>    Ca    8.5      19 Jan 2021 07:05  Phos  5.0     01-19  Mg     1.8     01-19    TPro  5.9<L>  /  Alb  2.7<L>  /  TBili  x   /  DBili  x   /  AST  x   /  ALT  x   /  AlkPhos  x   01-18    proBNP:   Lipid Profile:   HgA1c:   TSH:        Interventional Cardiology PA Adult Progress Note    CC: Acute on Chronic Diastolic CHF, CAP    Subjective Assessment: Patient seen and examined at bedside. She reports SOB and dry cough. She denies C/P, palpitations, dizziness, diaphoresis, N/V, BRBPR, melena, hematuria.    ROS otherwise negative unless otherwise stated except per HPI and Subjective   	  MEDICATIONS:  amLODIPine   Tablet 10 milliGRAM(s) Oral daily  metoprolol tartrate 50 milliGRAM(s) Oral two times a day  azithromycin  IVPB 500 milliGRAM(s) IV Intermittent every 24 hours  piperacillin/tazobactam IVPB.. 2.25 Gram(s) IV Intermittent every 6 hours  albuterol/ipratropium for Nebulization 3 milliLiter(s) Nebulizer every 6 hours  benzonatate 100 milliGRAM(s) Oral every 8 hours PRN  budesonide  80 MICROgram(s)/formoterol 4.5 MICROgram(s) Inhaler 2 Puff(s) Inhalation two times a day  guaiFENesin   Syrup  (Sugar-Free) 100 milliGRAM(s) Oral every 6 hours PRN  donepezil 5 milliGRAM(s) Oral at bedtime  atorvastatin 20 milliGRAM(s) Oral at bedtime  levothyroxine 50 MICROGram(s) Oral daily  fluticasone propionate 50 MICROgram(s)/spray Nasal Spray 1 Spray(s) Both Nostrils two times a day  heparin   Injectable 5000 Unit(s) SubCutaneous every 12 hours  influenza  Vaccine (HIGH DOSE) 0.7 milliLiter(s) IntraMuscular once  iron sucrose IVPB 200 milliGRAM(s) IV Intermittent every 24 hours    [PHYSICAL EXAM:  TELEMETRY:  T(C): 36.2 (01-19-21 @ 09:44), Max: 37.5 (01-18-21 @ 18:09)  HR: 74 (01-19-21 @ 08:44) (72 - 82)  BP: 148/62 (01-19-21 @ 08:44) (130/59 - 165/71)  RR: 24 (01-19-21 @ 08:44) (18 - 24)  SpO2: 94% (01-19-21 @ 08:44) (93% - 97%)  Wt(kg): --  I&O's Summary    18 Jan 2021 07:01  -  19 Jan 2021 07:00  --------------------------------------------------------  IN: 1270 mL / OUT: 1700 mL / NET: -430 mL    19 Jan 2021 07:01  -  19 Jan 2021 09:59  --------------------------------------------------------  IN: 100 mL / OUT: 200 mL / NET: -100 mL    Cason: No  Central/PICC/Mid Line: No                                        Appearance: Normal	  HEENT:   Normal oral mucosa, PERRL, EOMI	  Neck: Supple. Left JVD   Cardiovascular: + S1. S2. RRR. 2/6 Holosystolic Murmur LUSB.   Respiratory: Scattered wheezes and rales. No rhonchi. Decreased BS bases  Gastrointestinal:  BS x 4. Soft NT/ND  Skin: No rashes, No ecchymoses, No cyanosis  Extremities: Normal range of motion, No clubbing, cyanosis or edema BLE. No calf tenderness BLE  Vascular: Peripheral pulses palpable 2+ bilaterally  Neurologic: Non-focal  Psychiatry: A & O x 3, Mood & affect appropriate  	    ECG:  	  RADIOLOGY:   DIAGNOSTIC TESTING:  [ ] Echocardiogram:  [ ]  Catheterization:  [ ] Stress Test:    [ ] SANJAY  OTHER: 	    LABS:	 	  CARDIAC MARKERS:                     8.3    11.47 )-----------( 230      ( 19 Jan 2021 07:05 )             27.2     01-19    136  |  99  |  70<H>  ----------------------------<  114<H>  4.5   |  22  |  3.84<H>    Ca    8.5      19 Jan 2021 07:05  Phos  5.0     01-19  Mg     1.8     01-19    TPro  5.9<L>  /  Alb  2.7<L>  /  TBili  x   /  DBili  x   /  AST  x   /  ALT  x   /  AlkPhos  x   01-18    proBNP:   Lipid Profile:   HgA1c:   TSH:

## 2021-01-19 NOTE — PROGRESS NOTE ADULT - PROBLEM SELECTOR PLAN 4
SBPs Improved 130s-140s   C/w Lopressor 50mg BID and Amlodipine 10 mg PO Daily Likely multifactorial given CHF, COPD and CAP  -O2 sat 70s on RA per Dr. Cuevas- LE Duplex 1/19/2021 negative for DVT.  -Unable to perform CTA to r/o PE given CKD. V/Q scan recommended by Dr. Cuevas however after discussion with Dr. Louie will likely not yield additional information given multiple lung processes occurring at the same time.   -Continue to monitor O2 sat and respiratory status. Titrate to Nasal Cannula when clinically improved.

## 2021-01-19 NOTE — PROGRESS NOTE ADULT - ATTENDING COMMENTS
pt known from prior encounters  CKD 3- NAV- nephrotic syndrome, most likely related to DN  plan as outlined above.  to d/w Dr. Crooks

## 2021-01-19 NOTE — PROGRESS NOTE ADULT - PROBLEM SELECTOR PLAN 2
Dr. Cuevas following  Currently afebrile. Leukocytosis present on admission, resolved now recurred WBC 11.47 .CXR w/ bilateral patchy opacities/congestion  - C/w Ceftriaxone 1g IV Daily (d/c 1/22) and Azithromycin 500mg IV Daily (d/c 1/21)  Per Dr Cuevas, c/w ABx w/ daily ESR/CRP levels  -Given worsening hypoxia and respiratory status as patient is now requiring NRB to maintain O2 sats>90%, Zosyn 2.275 g IV q 6 hrs added to regimen to cover Pseumodonas. Dr. Cuevas following  Currently afebrile. Leukocytosis present on admission, resolved now recurred WBC 11.47 .CXR w/ bilateral patchy opacities/congestion  - s/p 4 days of Ceftriaxone now discontinued as Zosyn being added. Continue Azithromycin 500mg IV Daily (Day 4 of 7)  Per Dr Cuevas, c/w ABx w/ daily ESR/CRP levels  -Given worsening hypoxia and respiratory status as patient is now requiring NRB to maintain O2 sats>90%, Zosyn 2.275 g IV q 6 hrs added to regimen to cover Pseumodonas.

## 2021-01-19 NOTE — PROGRESS NOTE ADULT - PROBLEM SELECTOR PLAN 6
Hx of CABG in 2015 with Dr. Blunt. Currently CP free and HD stable  - EKG: SR @67BPM w/ incomplete LBBB, TWI in I, AVL (similar to prior EKG 10/2020)  - Trops 0.04 x 2 likely in setting of CHF exacerbation  - C/w BB/Statin. Per Dr Crooks, pt had ASA d/c at [facility] after fall ~3 weeks ago. Dr Crooks also reports prior h/o ICH. As such holding ASA at this time

## 2021-01-19 NOTE — PROGRESS NOTE ADULT - PROBLEM SELECTOR PLAN 7
Baseline Hgb 8-9, likely SVETLANA and Anemia of CKD. Hgb/HCT 8.3/27.2  -Maintain Active Type and Screen. Repeat CBC in AM if Hgb remains <8 will transfuse 1 unit as goal Hgb>8 given CAD.   -Stool for occult blood negative 1/18. Monitor for signs of bleeding. Patient currently not hypotensive; no evidence of acute bleeding at this time.   - Iron studies reveal SVETLANA. Per Renal started IV Iron x 5 days (1/17-1/22) and EPO  - Maintain active T/S Baseline Hgb 8-9, likely SVETLANA and Anemia of CKD. Hgb/HCT 8.3/27.2  -Maintain Active Type and Screen. Repeat CBC in AM if Hgb <8 will transfuse 1 unit as goal Hgb>8 given CAD.   -Stool for occult blood negative 1/18. Monitor for signs of bleeding. Patient currently not hypotensive; no evidence of acute bleeding at this time.   - Iron studies reveal SVETLANA. Per Renal started IV Iron x 5 days (1/17-1/22) and EPO  - Maintain active T/S

## 2021-01-19 NOTE — PROGRESS NOTE ADULT - PROBLEM SELECTOR PLAN 5
Known to Dr. Louis Olmedo/Dr. Cuevas. Currently satting well on 4L  - Continue Symbicort inhaler BID and Duonebs  -Not currently on Home Oxygen- Assess O2 on RA once treatment for CAP is complete and respiratory status improves. Known to Dr. Louis Olmedo/Dr. Cuevas. Currently on NRB 15 L   - Continue Symbicort inhaler BID and Duonebs  -Not currently on Home Oxygen- Assess O2 on RA once treatment for CAP is complete and respiratory status improves.

## 2021-01-19 NOTE — PROGRESS NOTE ADULT - PROBLEM SELECTOR PLAN 10
Continue Aricept 5mg PO daily  A and O x 3 intermittently confused. Patient removing NRB. Enhanced supervision ordered.  FULL CODE  VTE PPx: on Heparin subcut  PT consulted: s/p recent fall ~3 weeks ago-recommends home with home PT   Dispo: Pending clinical progression-renal function   Case d/w Dr. Crooks

## 2021-01-19 NOTE — PROGRESS NOTE ADULT - PROBLEM SELECTOR PLAN 8
Per Dr Crooks, pt had ASA d/c at [facility] after fall ~3 weeks ago secondary to SAH.  -Plan for Head CT Non-contrast once respiratory status improves and Neurology consult to evaluate if ASA can be restarted

## 2021-01-19 NOTE — PROGRESS NOTE ADULT - PROBLEM SELECTOR PLAN 1
Appears euvolemic on exam with worsening renal function. 1.7 L urine output 24 hrs   - s/p IV diuresis w/ Lasix 80mg IV BID d/c on 1/17 as per Renal recs for NAV on CKD stage IV   - C/w Lopressor 50mg BID  - Echo ordered, f/u results. Prior Echo 10/12/20: EF 55%, mild concentric LVH, mild AR/MR/TR, pulm HTN with PASP 47 mmHg  - Daily CXR  -  Core measures ordered, strict I/Os, fluid restriction, and daily weights

## 2021-01-19 NOTE — PROGRESS NOTE ADULT - ASSESSMENT
86 yr old F, POOR HISTORIAN/early dementia, former heavy smoker with PMHx HTN, hyperlipidemia, COPD, hypothyroidism, Stage IV CKD (baseline Cr ~3.0), anemia of chronic disease, CAD s/p CABG 2015 Dr. Blunt, chronic diastolic CHF(EF:55% by Echo 10/2020), renal artery stenosis s/p renal artery stent >20yrs ago, Carotid artery stenosis, PAD, who presents to [facility] ED 1/15/21 c/o progressively worsening SOB and cough x 3 days, admitted to cardiac telemetry for management of acute on chronic diastolic CHF exacerbation in setting of suspected CAP. 86 yr old F, POOR HISTORIAN/early dementia, former heavy smoker with PMHx HTN, hyperlipidemia, COPD, hypothyroidism, Stage IV CKD (baseline Cr ~3.0), anemia of chronic disease, CAD s/p CABG 2015 Dr. Blunt, chronic diastolic CHF(EF:55% by Echo 10/2020), renal artery stenosis s/p renal artery stent >20yrs ago, Carotid artery stenosis, PAD, who presents to [facility] ED 1/15/21 c/o progressively worsening SOB and cough x 3 days, admitted to cardiac telemetry for management of acute on chronic diastolic CHF exacerbation in setting of CAP. Patient s/p diuresis with hospital course complicated by NAV on CKD Stage IV (now improving) as well as worsening hypoxic respiratory failure requiring NRB secondary to CAP.

## 2021-01-19 NOTE — PROGRESS NOTE ADULT - PROBLEM SELECTOR PLAN 1
Now on nonrebreather. Will start enoxaparin once "cleared" by neurology.  Etiology unclear, lungs are well aerated. Mild rales RLL.  CT chest without contrast.

## 2021-01-19 NOTE — PROVIDER CONTACT NOTE (CHANGE IN STATUS NOTIFICATION) - SITUATION
Pt on 15L nonrebreather saturating 95%. Pt more confused and forgetful throughout nightshift, attempting to remove nonrebreather all night. Had to sit at bedside throughout entire shift.

## 2021-01-19 NOTE — PROGRESS NOTE ADULT - PROBLEM SELECTOR PLAN 3
Known to Dr. Nicolas. Renal consulted.   Baseline Cr ~3.0, Non-oliguric NAV on CKD Stage [id number]-FLK/Cr currently 70/3.84  - Per Renal recs, holding diuresis at this time  - Daily BMP + urine lytes (Na, Cr, Urea, Osm)   - F/u PETRONA serum + immunofixation urine, DINA, ANCA, C3/4, anti-PLA2R, Hep Panel  - F/u Renal Sono/Dopplers given WILBER + stent hx  -AVOID NEPHROTOXIC AGENTS.   -Monitor urine output, BP, volume status, electrolytes.  Hyperphosphatemia: Improved Phosphorous-5.0- Renagel 800 mg PO TID initiated on 1/18/2021. Daily Phosophorous. Known to Dr. Nicolas. Renal consulted.   Baseline Cr ~3.0, Non-oliguric NAV on CKD Stage [id number]-AFW/Cr currently 70/3.84  - Per Renal recs, holding diuresis at this time  - Daily BMP + urine lytes (Na, Cr, Urea, Osm)   - F/u PETRONA serum + immunofixation urine, DINA, ANCA, C3/4, anti-PLA2R, Hep Panel  - F/u Renal Sono/Dopplers given WILBER + stent hx  -AVOID NEPHROTOXIC AGENTS.   -Monitor urine output, BP, volume status, electrolytes.  Hyperphosphatemia: Improved Phosphorous-5.0- Renagel 800 mg PO TID initiated on 1/18/2021. Daily Phosphorous.

## 2021-01-19 NOTE — PROGRESS NOTE ADULT - ASSESSMENT
86F POOR HISTORIAN/early dementia, former heavy smoker PMHx DM, HTN, hyperlipidemia, COPD, hypothyroidism, CKD (baseline Cr ~3.0), anemia of chronic disease, CAD s/p CABG 2015 Dr. Blunt, chronic diastolic CHF(EF:55% by Echo 10/2020), renal artery stenosis s/p renal artery stent >20yrs ago, Carotid artery stenosis, PAD, who presents c/o progressively worsening SOB and cough x 3 days. Nephrology consulted for NAV.       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

## 2021-01-19 NOTE — PROGRESS NOTE ADULT - PROBLEM SELECTOR PLAN 9
Improved after IV and PO Supplementation.   Hypomagnesemia Magnesium 1.8-Mag Ox 800- mg PO x 1 given.

## 2021-01-20 LAB
ANION GAP SERPL CALC-SCNC: 17 MMOL/L — SIGNIFICANT CHANGE UP (ref 5–17)
BASOPHILS # BLD AUTO: 0.04 K/UL — SIGNIFICANT CHANGE UP (ref 0–0.2)
BASOPHILS NFR BLD AUTO: 0.4 % — SIGNIFICANT CHANGE UP (ref 0–2)
BUN SERPL-MCNC: 65 MG/DL — HIGH (ref 7–23)
CALCIUM SERPL-MCNC: 8.8 MG/DL — SIGNIFICANT CHANGE UP (ref 8.4–10.5)
CHLORIDE SERPL-SCNC: 98 MMOL/L — SIGNIFICANT CHANGE UP (ref 96–108)
CO2 SERPL-SCNC: 24 MMOL/L — SIGNIFICANT CHANGE UP (ref 22–31)
CREAT SERPL-MCNC: 4.15 MG/DL — HIGH (ref 0.5–1.3)
CRP SERPL-MCNC: 26.72 MG/DL — HIGH (ref 0–0.4)
CULTURE RESULTS: SIGNIFICANT CHANGE UP
CULTURE RESULTS: SIGNIFICANT CHANGE UP
EOSINOPHIL # BLD AUTO: 0.6 K/UL — HIGH (ref 0–0.5)
EOSINOPHIL NFR BLD AUTO: 5.6 % — SIGNIFICANT CHANGE UP (ref 0–6)
ERYTHROCYTE [SEDIMENTATION RATE] IN BLOOD: >130 MM/HR — HIGH
GLUCOSE BLDC GLUCOMTR-MCNC: 176 MG/DL — HIGH (ref 70–99)
GLUCOSE BLDC GLUCOMTR-MCNC: 302 MG/DL — HIGH (ref 70–99)
GLUCOSE SERPL-MCNC: 101 MG/DL — HIGH (ref 70–99)
HCT VFR BLD CALC: 23.6 % — LOW (ref 34.5–45)
HCT VFR BLD CALC: 28.3 % — LOW (ref 34.5–45)
HGB BLD-MCNC: 7.3 G/DL — LOW (ref 11.5–15.5)
HGB BLD-MCNC: 8.9 G/DL — LOW (ref 11.5–15.5)
IMM GRANULOCYTES NFR BLD AUTO: 0.6 % — SIGNIFICANT CHANGE UP (ref 0–1.5)
INTERPRETATION 24H UR IFE-IMP: SIGNIFICANT CHANGE UP
INTERPRETATION 24H UR IFE-IMP: SIGNIFICANT CHANGE UP
LYMPHOCYTES # BLD AUTO: 0.5 K/UL — LOW (ref 1–3.3)
LYMPHOCYTES # BLD AUTO: 4.6 % — LOW (ref 13–44)
MAGNESIUM SERPL-MCNC: 2 MG/DL — SIGNIFICANT CHANGE UP (ref 1.6–2.6)
MCHC RBC-ENTMCNC: 26.6 PG — LOW (ref 27–34)
MCHC RBC-ENTMCNC: 26.8 PG — LOW (ref 27–34)
MCHC RBC-ENTMCNC: 30.9 GM/DL — LOW (ref 32–36)
MCHC RBC-ENTMCNC: 31.4 GM/DL — LOW (ref 32–36)
MCV RBC AUTO: 84.5 FL — SIGNIFICANT CHANGE UP (ref 80–100)
MCV RBC AUTO: 86.8 FL — SIGNIFICANT CHANGE UP (ref 80–100)
MONOCYTES # BLD AUTO: 0.31 K/UL — SIGNIFICANT CHANGE UP (ref 0–0.9)
MONOCYTES NFR BLD AUTO: 2.9 % — SIGNIFICANT CHANGE UP (ref 2–14)
NEUTROPHILS # BLD AUTO: 9.25 K/UL — HIGH (ref 1.8–7.4)
NEUTROPHILS NFR BLD AUTO: 85.9 % — HIGH (ref 43–77)
NRBC # BLD: 0 /100 WBCS — SIGNIFICANT CHANGE UP (ref 0–0)
NRBC # BLD: 0 /100 WBCS — SIGNIFICANT CHANGE UP (ref 0–0)
NT-PROBNP SERPL-SCNC: HIGH PG/ML (ref 0–300)
PHOSPHATE SERPL-MCNC: 6 MG/DL — HIGH (ref 2.5–4.5)
PHOSPHOLIPASE A2 RECEPTOR ELISA: <2 RU/ML — SIGNIFICANT CHANGE UP
PHOSPHOLIPASE A2 RECEPTOR IFA: NEGATIVE — SIGNIFICANT CHANGE UP
PLATELET # BLD AUTO: 224 K/UL — SIGNIFICANT CHANGE UP (ref 150–400)
PLATELET # BLD AUTO: 237 K/UL — SIGNIFICANT CHANGE UP (ref 150–400)
POTASSIUM SERPL-MCNC: 4 MMOL/L — SIGNIFICANT CHANGE UP (ref 3.5–5.3)
POTASSIUM SERPL-SCNC: 4 MMOL/L — SIGNIFICANT CHANGE UP (ref 3.5–5.3)
RBC # BLD: 2.72 M/UL — LOW (ref 3.8–5.2)
RBC # BLD: 3.35 M/UL — LOW (ref 3.8–5.2)
RBC # FLD: 15.2 % — HIGH (ref 10.3–14.5)
RBC # FLD: 15.6 % — HIGH (ref 10.3–14.5)
SODIUM SERPL-SCNC: 139 MMOL/L — SIGNIFICANT CHANGE UP (ref 135–145)
SPECIMEN SOURCE: SIGNIFICANT CHANGE UP
SPECIMEN SOURCE: SIGNIFICANT CHANGE UP
WBC # BLD: 10.76 K/UL — HIGH (ref 3.8–10.5)
WBC # BLD: 9.91 K/UL — SIGNIFICANT CHANGE UP (ref 3.8–10.5)
WBC # FLD AUTO: 10.76 K/UL — HIGH (ref 3.8–10.5)
WBC # FLD AUTO: 9.91 K/UL — SIGNIFICANT CHANGE UP (ref 3.8–10.5)

## 2021-01-20 PROCEDURE — 99233 SBSQ HOSP IP/OBS HIGH 50: CPT | Mod: GC

## 2021-01-20 PROCEDURE — 93306 TTE W/DOPPLER COMPLETE: CPT | Mod: 26

## 2021-01-20 PROCEDURE — 99222 1ST HOSP IP/OBS MODERATE 55: CPT

## 2021-01-20 RX ORDER — DEXTROSE 50 % IN WATER 50 %
15 SYRINGE (ML) INTRAVENOUS ONCE
Refills: 0 | Status: DISCONTINUED | OUTPATIENT
Start: 2021-01-20 | End: 2021-02-07

## 2021-01-20 RX ORDER — DIAZEPAM 5 MG
1 TABLET ORAL ONCE
Refills: 0 | Status: DISCONTINUED | OUTPATIENT
Start: 2021-01-20 | End: 2021-01-20

## 2021-01-20 RX ORDER — SODIUM CHLORIDE 9 MG/ML
1000 INJECTION, SOLUTION INTRAVENOUS
Refills: 0 | Status: DISCONTINUED | OUTPATIENT
Start: 2021-01-20 | End: 2021-02-07

## 2021-01-20 RX ORDER — FUROSEMIDE 40 MG
80 TABLET ORAL ONCE
Refills: 0 | Status: COMPLETED | OUTPATIENT
Start: 2021-01-20 | End: 2021-01-20

## 2021-01-20 RX ORDER — DEXTROSE 50 % IN WATER 50 %
12.5 SYRINGE (ML) INTRAVENOUS ONCE
Refills: 0 | Status: DISCONTINUED | OUTPATIENT
Start: 2021-01-20 | End: 2021-02-07

## 2021-01-20 RX ORDER — ASPIRIN/CALCIUM CARB/MAGNESIUM 324 MG
81 TABLET ORAL DAILY
Refills: 0 | Status: DISCONTINUED | OUTPATIENT
Start: 2021-01-20 | End: 2021-02-07

## 2021-01-20 RX ORDER — INSULIN LISPRO 100/ML
VIAL (ML) SUBCUTANEOUS
Refills: 0 | Status: DISCONTINUED | OUTPATIENT
Start: 2021-01-20 | End: 2021-01-23

## 2021-01-20 RX ORDER — DEXTROSE 50 % IN WATER 50 %
25 SYRINGE (ML) INTRAVENOUS ONCE
Refills: 0 | Status: DISCONTINUED | OUTPATIENT
Start: 2021-01-20 | End: 2021-02-07

## 2021-01-20 RX ORDER — GLUCAGON INJECTION, SOLUTION 0.5 MG/.1ML
1 INJECTION, SOLUTION SUBCUTANEOUS ONCE
Refills: 0 | Status: DISCONTINUED | OUTPATIENT
Start: 2021-01-20 | End: 2021-02-07

## 2021-01-20 RX ORDER — ERYTHROPOIETIN 10000 [IU]/ML
10000 INJECTION, SOLUTION INTRAVENOUS; SUBCUTANEOUS ONCE
Refills: 0 | Status: COMPLETED | OUTPATIENT
Start: 2021-01-20 | End: 2021-01-20

## 2021-01-20 RX ADMIN — Medication 50 MILLIGRAM(S): at 05:58

## 2021-01-20 RX ADMIN — PIPERACILLIN AND TAZOBACTAM 200 GRAM(S): 4; .5 INJECTION, POWDER, LYOPHILIZED, FOR SOLUTION INTRAVENOUS at 03:50

## 2021-01-20 RX ADMIN — DONEPEZIL HYDROCHLORIDE 5 MILLIGRAM(S): 10 TABLET, FILM COATED ORAL at 21:25

## 2021-01-20 RX ADMIN — Medication 50 MICROGRAM(S): at 05:58

## 2021-01-20 RX ADMIN — Medication 3 MILLILITER(S): at 09:28

## 2021-01-20 RX ADMIN — AZITHROMYCIN 255 MILLIGRAM(S): 500 TABLET, FILM COATED ORAL at 19:14

## 2021-01-20 RX ADMIN — Medication 3 MILLILITER(S): at 13:14

## 2021-01-20 RX ADMIN — Medication 50 MILLIGRAM(S): at 17:39

## 2021-01-20 RX ADMIN — ATORVASTATIN CALCIUM 20 MILLIGRAM(S): 80 TABLET, FILM COATED ORAL at 21:25

## 2021-01-20 RX ADMIN — HEPARIN SODIUM 5000 UNIT(S): 5000 INJECTION INTRAVENOUS; SUBCUTANEOUS at 05:58

## 2021-01-20 RX ADMIN — Medication 81 MILLIGRAM(S): at 14:20

## 2021-01-20 RX ADMIN — BUDESONIDE AND FORMOTEROL FUMARATE DIHYDRATE 2 PUFF(S): 160; 4.5 AEROSOL RESPIRATORY (INHALATION) at 09:28

## 2021-01-20 RX ADMIN — Medication 40 MILLIGRAM(S): at 14:20

## 2021-01-20 RX ADMIN — PIPERACILLIN AND TAZOBACTAM 200 GRAM(S): 4; .5 INJECTION, POWDER, LYOPHILIZED, FOR SOLUTION INTRAVENOUS at 14:21

## 2021-01-20 RX ADMIN — BUDESONIDE AND FORMOTEROL FUMARATE DIHYDRATE 2 PUFF(S): 160; 4.5 AEROSOL RESPIRATORY (INHALATION) at 21:24

## 2021-01-20 RX ADMIN — SEVELAMER CARBONATE 800 MILLIGRAM(S): 2400 POWDER, FOR SUSPENSION ORAL at 09:28

## 2021-01-20 RX ADMIN — Medication 3 MILLILITER(S): at 03:51

## 2021-01-20 RX ADMIN — AMLODIPINE BESYLATE 10 MILLIGRAM(S): 2.5 TABLET ORAL at 05:58

## 2021-01-20 RX ADMIN — ERYTHROPOIETIN 10000 UNIT(S): 10000 INJECTION, SOLUTION INTRAVENOUS; SUBCUTANEOUS at 17:39

## 2021-01-20 RX ADMIN — Medication 650 MILLIGRAM(S): at 00:14

## 2021-01-20 RX ADMIN — PIPERACILLIN AND TAZOBACTAM 200 GRAM(S): 4; .5 INJECTION, POWDER, LYOPHILIZED, FOR SOLUTION INTRAVENOUS at 09:28

## 2021-01-20 RX ADMIN — SEVELAMER CARBONATE 800 MILLIGRAM(S): 2400 POWDER, FOR SUSPENSION ORAL at 13:14

## 2021-01-20 RX ADMIN — Medication 1 SPRAY(S): at 05:58

## 2021-01-20 RX ADMIN — Medication 80 MILLIGRAM(S): at 14:20

## 2021-01-20 RX ADMIN — Medication 1 MILLIGRAM(S): at 19:28

## 2021-01-20 RX ADMIN — SEVELAMER CARBONATE 800 MILLIGRAM(S): 2400 POWDER, FOR SUSPENSION ORAL at 17:39

## 2021-01-20 RX ADMIN — Medication 1 SPRAY(S): at 17:39

## 2021-01-20 RX ADMIN — Medication 4: at 21:56

## 2021-01-20 RX ADMIN — PIPERACILLIN AND TAZOBACTAM 200 GRAM(S): 4; .5 INJECTION, POWDER, LYOPHILIZED, FOR SOLUTION INTRAVENOUS at 21:24

## 2021-01-20 RX ADMIN — HEPARIN SODIUM 5000 UNIT(S): 5000 INJECTION INTRAVENOUS; SUBCUTANEOUS at 17:39

## 2021-01-20 RX ADMIN — Medication 3 MILLILITER(S): at 19:14

## 2021-01-20 RX ADMIN — Medication 40 MILLIGRAM(S): at 21:25

## 2021-01-20 NOTE — PROGRESS NOTE ADULT - PROBLEM SELECTOR PLAN 4
Likely multifactorial given CHF, COPD and CAP  -O2 sat 70s on RA per Dr. Cuevas- LE Duplex 1/19/2021 negative for DVT.  -Unable to perform CTA to r/o PE given CKD. V/Q scan recommended by Dr. Cuevas however after discussion with Dr. Louie will likely not yield additional information given multiple lung processes occurring at the same time.   -Continue to monitor O2 sat and respiratory status. Titrate to Nasal Cannula when clinically improved. Likely multifactorial given CHF, COPD and CAP  -O2 sat 70s on RA per Dr. Faith GRACE Duplex 1/19/2021 negative for DVT  -Unable to perform CTA to r/o PE given CKD and VQ scan deferred at this time per Dr. Louie will likely not yield additional information given multiple lung processes occurring at the same time.   -Started Methylprednisolone 40mg IV q 8hrs (? date - he will determine based on response) on 1/20  -Continue to monitor O2 sat and respiratory status. Titrate to Nasal Cannula when clinically improved.  -Continue ABX/steroids as above

## 2021-01-20 NOTE — H&P ADULT - BACK
Goal Outcome Evaluation:         New admit will monitor  
Goal Outcome Evaluation:  Plan of Care Reviewed With: patient  Progress: improving  Outcome Summary: VSS, pending discharge  
Goal Outcome Evaluation:  Plan of Care Reviewed With: patient  Progress: improving  Outcome Summary: VSS, pending discharge  
not examined

## 2021-01-20 NOTE — PROGRESS NOTE ADULT - PROBLEM SELECTOR PLAN 2
Dr. Cuevas following  Currently afebrile. Leukocytosis present on admission, resolved now recurred WBC 11.47 .CXR w/ bilateral patchy opacities/congestion  - s/p 4 days of Ceftriaxone now discontinued as Zosyn being added. Continue Azithromycin 500mg IV Daily (Day 4 of 7)  Per Dr Cuevas, c/w ABx w/ daily ESR/CRP levels  -Given worsening hypoxia and respiratory status as patient is now requiring NRB to maintain O2 sats>90%, Zosyn 2.275 g IV q 6 hrs added to regimen to cover Pseumodonas. -Afebrile. Leukocytosis resolving, continues to have cough  -Dr. Cuevas (pul) following  -s/p 4 days of Ceftriaxone now discontinued as Zosyn added 1/19 to cover Pseudomonas  -Continue Azithromycin 500mg IV Daily (Day 6 of 7), Zosyn 2.25 IV q 6hrs x7days (day 2 of 7)  -Per Dr Cuevas, daily ESR/CRP levels, CXR, and titrate off NRB as tolerated

## 2021-01-20 NOTE — PROGRESS NOTE ADULT - SUBJECTIVE AND OBJECTIVE BOX
Interventional Cardiology PA Adult Progress Note    Subjective Assessment:  	  MEDICATIONS:  amLODIPine   Tablet 10 milliGRAM(s) Oral daily  metoprolol tartrate 50 milliGRAM(s) Oral two times a day    azithromycin  IVPB 500 milliGRAM(s) IV Intermittent every 24 hours  piperacillin/tazobactam IVPB.. 2.25 Gram(s) IV Intermittent every 6 hours    albuterol/ipratropium for Nebulization 3 milliLiter(s) Nebulizer every 6 hours  benzonatate 100 milliGRAM(s) Oral every 8 hours PRN  budesonide  80 MICROgram(s)/formoterol 4.5 MICROgram(s) Inhaler 2 Puff(s) Inhalation two times a day  guaiFENesin   Syrup  (Sugar-Free) 100 milliGRAM(s) Oral every 6 hours PRN    acetaminophen   Tablet .. 650 milliGRAM(s) Oral every 6 hours PRN  donepezil 5 milliGRAM(s) Oral at bedtime      atorvastatin 20 milliGRAM(s) Oral at bedtime  levothyroxine 50 MICROGram(s) Oral daily    fluticasone propionate 50 MICROgram(s)/spray Nasal Spray 1 Spray(s) Both Nostrils two times a day  heparin   Injectable 5000 Unit(s) SubCutaneous every 12 hours  influenza  Vaccine (HIGH DOSE) 0.7 milliLiter(s) IntraMuscular once      	    [PHYSICAL EXAM:  TELEMETRY:  T(C): 37.1 (01-20-21 @ 08:57), Max: 38 (01-19-21 @ 22:06)  HR: 70 (01-20-21 @ 08:51) (60 - 97)  BP: 144/64 (01-20-21 @ 08:51) (130/59 - 151/65)  RR: 24 (01-20-21 @ 08:51) (22 - 26)  SpO2: 97% (01-20-21 @ 08:51) (95% - 98%)  Wt(kg): --  I&O's Summary    19 Jan 2021 07:01  -  20 Jan 2021 07:00  --------------------------------------------------------  IN: 450 mL / OUT: 1150 mL / NET: -700 mL    20 Jan 2021 07:01  -  20 Jan 2021 10:07  --------------------------------------------------------  IN: 280 mL / OUT: 200 mL / NET: 80 mL        Cason:  Central/PICC/Mid Line:                                         Appearance: Normal	  HEENT:   Normal oral mucosa, PERRL, EOMI	  Neck: Supple, + JVD/ - JVD; Carotid Bruit   Cardiovascular: Normal S1 S2, No JVD, No murmurs,   Respiratory: Lungs clear to auscultation/Decreased Breath Sounds/No Rales, Rhonchi, Wheezing	  Gastrointestinal:  Soft, Non-tender, + BS	  Skin: No rashes, No ecchymoses, No cyanosis  Extremities: Normal range of motion, No clubbing, cyanosis or edema  Vascular: Peripheral pulses palpable 2+ bilaterally  Neurologic: Non-focal  Psychiatry: A & O x 3, Mood & affect appropriate      	    ECG:  	  RADIOLOGY:   DIAGNOSTIC TESTING:  [ ] Echocardiogram:  [ ]  Catheterization:  [ ] Stress Test:    [ ] SANJAY  OTHER: 	    LABS:	 	  CARDIAC MARKERS:                                  7.3    10.76 )-----------( 237      ( 20 Jan 2021 07:25 )             23.6     01-20    139  |  98  |  65<H>  ----------------------------<  101<H>  4.0   |  24  |  4.15<H>    Ca    8.8      20 Jan 2021 07:25  Phos  6.0     01-20  Mg     2.0     01-20      proBNP:   Lipid Profile:   HgA1c:   TSH:       ASSESSMENT/PLAN: 	        DVT ppx:  Dispo:     Interventional Cardiology PA Adult Progress Note    Subjective Assessment: Patient see and examined at the bedside. Sitting comfortably on the   	  MEDICATIONS:  amLODIPine   Tablet 10 milliGRAM(s) Oral daily  metoprolol tartrate 50 milliGRAM(s) Oral two times a day    azithromycin  IVPB 500 milliGRAM(s) IV Intermittent every 24 hours  piperacillin/tazobactam IVPB.. 2.25 Gram(s) IV Intermittent every 6 hours    albuterol/ipratropium for Nebulization 3 milliLiter(s) Nebulizer every 6 hours  benzonatate 100 milliGRAM(s) Oral every 8 hours PRN  budesonide  80 MICROgram(s)/formoterol 4.5 MICROgram(s) Inhaler 2 Puff(s) Inhalation two times a day  guaiFENesin   Syrup  (Sugar-Free) 100 milliGRAM(s) Oral every 6 hours PRN    acetaminophen   Tablet .. 650 milliGRAM(s) Oral every 6 hours PRN  donepezil 5 milliGRAM(s) Oral at bedtime      atorvastatin 20 milliGRAM(s) Oral at bedtime  levothyroxine 50 MICROGram(s) Oral daily    fluticasone propionate 50 MICROgram(s)/spray Nasal Spray 1 Spray(s) Both Nostrils two times a day  heparin   Injectable 5000 Unit(s) SubCutaneous every 12 hours  influenza  Vaccine (HIGH DOSE) 0.7 milliLiter(s) IntraMuscular once      	    [PHYSICAL EXAM:  TELEMETRY:  T(C): 37.1 (01-20-21 @ 08:57), Max: 38 (01-19-21 @ 22:06)  HR: 70 (01-20-21 @ 08:51) (60 - 97)  BP: 144/64 (01-20-21 @ 08:51) (130/59 - 151/65)  RR: 24 (01-20-21 @ 08:51) (22 - 26)  SpO2: 97% (01-20-21 @ 08:51) (95% - 98%)  Wt(kg): --  I&O's Summary    19 Jan 2021 07:01  -  20 Jan 2021 07:00  --------------------------------------------------------  IN: 450 mL / OUT: 1150 mL / NET: -700 mL    20 Jan 2021 07:01  -  20 Jan 2021 10:07  --------------------------------------------------------  IN: 280 mL / OUT: 200 mL / NET: 80 mL        Cason:  Central/PICC/Mid Line:                                         Appearance: Normal	  HEENT:   Normal oral mucosa, PERRL, EOMI	  Neck: Supple, + JVD/ - JVD; Carotid Bruit   Cardiovascular: Normal S1 S2, No JVD, No murmurs,   Respiratory: Lungs clear to auscultation/Decreased Breath Sounds/No Rales, Rhonchi, Wheezing	  Gastrointestinal:  Soft, Non-tender, + BS	  Skin: No rashes, No ecchymoses, No cyanosis  Extremities: Normal range of motion, No clubbing, cyanosis or edema  Vascular: Peripheral pulses palpable 2+ bilaterally  Neurologic: Non-focal  Psychiatry: A & O x 3, Mood & affect appropriate      	    ECG:  	  RADIOLOGY:   DIAGNOSTIC TESTING:  [ ] Echocardiogram:  [ ]  Catheterization:  [ ] Stress Test:    [ ] SANJAY  OTHER: 	    LABS:	 	  CARDIAC MARKERS:                                  7.3    10.76 )-----------( 237      ( 20 Jan 2021 07:25 )             23.6     01-20    139  |  98  |  65<H>  ----------------------------<  101<H>  4.0   |  24  |  4.15<H>    Ca    8.8      20 Jan 2021 07:25  Phos  6.0     01-20  Mg     2.0     01-20      proBNP:   Lipid Profile:   HgA1c:   TSH:       ASSESSMENT/PLAN: 	        DVT ppx:  Dispo:     Interventional Cardiology PA Adult Progress Note    Subjective Assessment: Patient see and examined at the bedside. Sitting comfortably on the NRB with no complaints and aware of plan for blood transfusion today. All other ROS negative except those listed in subjective assessment.   	  MEDICATIONS:  amLODIPine   Tablet 10 milliGRAM(s) Oral daily  metoprolol tartrate 50 milliGRAM(s) Oral two times a day  Methylprednisolone 40 milliGRAM(s) every 8 hours  azithromycin  IVPB 500 milliGRAM(s) IV Intermittent every 24 hours  piperacillin/tazobactam IVPB.. 2.25 Gram(s) IV Intermittent every 6 hours  aspirin 81 milliGRAM(s) oral daily  albuterol/ipratropium for Nebulization 3 milliLiter(s) Nebulizer every 6 hours  benzonatate 100 milliGRAM(s) Oral every 8 hours PRN  budesonide  80 MICROgram(s)/formoterol 4.5 MICROgram(s) Inhaler 2 Puff(s) Inhalation two times a day  guaiFENesin   Syrup  (Sugar-Free) 100 milliGRAM(s) Oral every 6 hours PRN  acetaminophen   Tablet .. 650 milliGRAM(s) Oral every 6 hours PRN  donepezil 5 milliGRAM(s) Oral at bedtime  atorvastatin 20 milliGRAM(s) Oral at bedtime  levothyroxine 50 MICROGram(s) Oral daily  fluticasone propionate 50 MICROgram(s)/spray Nasal Spray 1 Spray(s) Both Nostrils two times a day  heparin   Injectable 5000 Unit(s) SubCutaneous every 12 hours  influenza  Vaccine (HIGH DOSE) 0.7 milliLiter(s) IntraMuscular once      [PHYSICAL EXAM:  TELEMETRY:  T(C): 37.1 (01-20-21 @ 08:57), Max: 38 (01-19-21 @ 22:06)  HR: 70 (01-20-21 @ 08:51) (60 - 97)  BP: 144/64 (01-20-21 @ 08:51) (130/59 - 151/65)  RR: 24 (01-20-21 @ 08:51) (22 - 26)  SpO2: 97% (01-20-21 @ 08:51) (95% - 98%)  I&O's Summary    19 Jan 2021 07:01  -  20 Jan 2021 07:00  --------------------------------------------------------  IN: 450 mL / OUT: 1150 mL / NET: -700 mL    20 Jan 2021 07:01  -  20 Jan 2021 10:07  --------------------------------------------------------  IN: 280 mL / OUT: 200 mL / NET: 80 mL                        Appearance: NAD on NRB	  Neck: Supple, - JVD; no Carotid Bruit b/l  Cardiovascular: Normal S1 S2, No murmurs, rubs, gallops  Respiratory: Lungs clear to auscultation/No Rales, Rhonchi, Wheezing, crackles  Gastrointestinal:  Soft, Non-tender, ND, + BS x4	  Extremities: Normal range of motion, No clubbing, cyanosis or edema b/l upper or lower extremities   Vascular: Peripheral pulses palpable 2+ bilaterally carotids, radial, DP/PT  Neurologic:  A & O x 3, Mood & affect appropriate  	    ECG:  	  RADIOLOGY:   DIAGNOSTIC TESTING:  [x ] Echocardiogram: < from: TTE Echo Complete w/o Contrast w/ Doppler (10.12.20 @ 14:31) >  CONCLUSIONS:     1. Mild aortic regurgitation.   2. Mild mitral regurgitation.   3. There is mild tricuspid regurgitation. Pulmonary hypertension is present. Pulmonary artery systolic pressure (estimated using the tricuspid regurgitant gradient and an estimate of right atrial pressure) is 47 mmHg.   4. The right ventricle is borderline dilated. Right ventricular systolic function is probably normal.   5. There is mild concentric left ventricular hypertrophy. The left ventricle is normal in size and systolic function with a calculated ejection fraction of 55%.   6. Left pleural effusion.   7. No pericardial effusion.    < end of copied text >    [ ]  Catheterization:  [ ] Stress Test:    [ ] SANJAY  OTHER: 	    LABS:	 	  CARDIAC MARKERS:                 7.3    10.76 )-----------( 237      ( 20 Jan 2021 07:25 )             23.6     01-20    139  |  98  |  65<H>  ----------------------------<  101<H>  4.0   |  24  |  4.15<H>    Ca    8.8      20 Jan 2021 07:25  Phos  6.0     01-20  Mg     2.0     01-20

## 2021-01-20 NOTE — DIETITIAN INITIAL EVALUATION ADULT. - DIET TYPE
Monitor need for PO diet being Held Should PO not be safe/feasible Pending Resp Therapy. However while PO feasible recommend low sodium, mech soft (to provide ease during meals), ensure enlive x1/day 425kcal/20gm prot (to increase chances of meeting EER).

## 2021-01-20 NOTE — PROGRESS NOTE ADULT - PROBLEM SELECTOR PLAN 9
Improved after IV and PO Supplementation.   Hypomagnesemia Magnesium 1.8-Mag Ox 800- mg PO x 1 given. -Continue Aricept 5mg PO daily    FULL CODE  VTE PPx: on Heparin subcut  PT consulted: home PT   Dispo: Pending clinical progression-renal function   Case d/w Dr. Crooks

## 2021-01-20 NOTE — DIETITIAN INITIAL EVALUATION ADULT. - OTHER INFO
86 F, POOR HISTORIAN/early dementia, former heavy smoker with PMHx HTN, hyperlipidemia, COPD, hypothyroidism, Stage IV CKD (baseline Cr ~3.0), anemia of chronic disease, CAD s/p CABG 2015 Dr. Blunt, chronic diastolic CHF(EF:55% by Echo 10/2020), renal artery stenosis s/p renal artery stent >20yrs ago, Carotid artery stenosis, PAD, who presents to [facility] ED 1/15/21 c/o progressively worsening SOB and cough x 3 days. Pt admitted to cardiac telemetry for management of acute on chronic diastolic CHF exacerbation in setting of suspected CAP. Pt s/p diuresis with hospital course complicated by NAV on CKD Stage IV (renal following), hypoxic respiratory failure requiring NRB. Unable to perform CTA to r/o PE given CKD and VQ scan deferred at this time. Started Methylprednisolone 40mg IV q 8hrs 1/20. No pain. Dustin 19. 1+Edema (left ankle; right ankle). No pressure ulcer. BM 1/29 1/18.   Spoke with RN, pt confused, no family present. No prior RD notes to pull wts from. Pt ordered for DASH cnsCHO renal diet + 1000ml FR. RN reports pt consumed little today 2/2 NRB use. Reports pt able to come off NRB for a short period of time for a few bites prior to needing to be placed back on. K WDL (low prior); Phos 6.0, Renvela ordered. A1c 5.1%. Assume Renal can be d/c based on Lytes/Renal Fnc in setting of decreased PO intake + consCHO given WDL A1c.   Please see Below for RD Recs. Recs made with team.

## 2021-01-20 NOTE — PROGRESS NOTE ADULT - PROBLEM SELECTOR PLAN 6
Hx of CABG in 2015 with Dr. Blunt. Currently CP free and HD stable  - EKG: SR @67BPM w/ incomplete LBBB, TWI in I, AVL (similar to prior EKG 10/2020)  - Trops 0.04 x 2 likely in setting of CHF exacerbation  - C/w BB/Statin. Per Dr Crooks, pt had ASA d/c at [facility] after fall ~3 weeks ago. Dr Crooks also reports prior h/o ICH. As such holding ASA at this time Hx of CABG in 2015 with Dr. Blunt. Currently CP free and HD stable  - EKG: SR @67BPM w/ incomplete LBBB, TWI in I, AVL (similar to prior EKG 10/2020)  - C/w lopressor 50mg BID, Atorvastatin 20mg QD and 81mg ASA QD (confirmed with neurosurgery on 1/20)

## 2021-01-20 NOTE — DIETITIAN INITIAL EVALUATION ADULT. - PROBLEM SELECTOR PLAN 2
Hx of CABG in 2015 with Dr. Blunt.  --chest pain free, EKG revealed NSR@67BPM with incomplete LBBB, TWI Lead I, AVL (similar to prior EKG 10/2020), Initial Troponin T 0.05, likely in setting of CHF exacerbation.  --will F/U cardiac enzymes@10PM and 5AM.  --continue BB/Statin, per patient's daughter ASA was discontinued ~3 weeks ago during admission to [facility] s/p fall.

## 2021-01-20 NOTE — PROGRESS NOTE ADULT - ASSESSMENT
86F POOR HISTORIAN/early dementia, former heavy smoker PMHx DM, HTN, hyperlipidemia, COPD, hypothyroidism, CKD (baseline Cr ~3.0), anemia of chronic disease, CAD s/p CABG 2015 Dr. Blunt, chronic diastolic CHF(EF:55% by Echo 10/2020), renal artery stenosis s/p renal artery stent >20yrs ago, Carotid artery stenosis, PAD, who presents c/o progressively worsening SOB and cough. Nephrology consulted for NAV.     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

## 2021-01-20 NOTE — PROGRESS NOTE ADULT - ATTENDING COMMENTS
no improvement in respiratory status  CKD 3- NAV- nephrotic syndrome, most likely related to DN  no improvement in renal function  as above, restart diuretics  may need to proceed with dialysis

## 2021-01-20 NOTE — PROGRESS NOTE ADULT - SUBJECTIVE AND OBJECTIVE BOX
Patient is a 86y Female seen and evaluated at bedside. Remains on non rebreather. Effusions noted on CT chest- pulm following. Would resume diuretics as pt not responding to abx. Can start lasix 80 IV.    Meds:    acetaminophen   Tablet .. 650 every 6 hours PRN  albuterol/ipratropium for Nebulization 3 every 6 hours  amLODIPine   Tablet 10 daily  atorvastatin 20 at bedtime  azithromycin  IVPB 500 every 24 hours  benzonatate 100 every 8 hours PRN  budesonide  80 MICROgram(s)/formoterol 4.5 MICROgram(s) Inhaler 2 two times a day  donepezil 5 at bedtime  fluticasone propionate 50 MICROgram(s)/spray Nasal Spray 1 two times a day  guaiFENesin   Syrup  (Sugar-Free) 100 every 6 hours PRN  heparin   Injectable 5000 every 12 hours  influenza  Vaccine (HIGH DOSE) 0.7 once  levothyroxine 50 daily  metoprolol tartrate 50 two times a day  piperacillin/tazobactam IVPB.. 2.25 every 6 hours  sevelamer carbonate 800 three times a day with meals      T(C): , Max: 38 (01-19-21 @ 22:06)  T(F): , Max: 100.4 (01-19-21 @ 22:06)  HR: 70 (01-20-21 @ 08:51)  BP: 144/64 (01-20-21 @ 08:51)  BP(mean): --  RR: 24 (01-20-21 @ 08:51)  SpO2: 97% (01-20-21 @ 08:51)  Wt(kg): --    01-19 @ 07:01  -  01-20 @ 07:00  --------------------------------------------------------  IN: 450 mL / OUT: 1150 mL / NET: -700 mL    01-20 @ 07:01  -  01-20 @ 11:19  --------------------------------------------------------  IN: 280 mL / OUT: 200 mL / NET: 80 mL    Review of Systems:  RESPIRATORY: +shortness of breath  CARDIOVASCULAR: No chest pain  MUSCULOSKELETAL: No leg edema    PHYSICAL EXAM:  GENERAL: well-developed, well nourished, alert, on non rebreather  CHEST/LUNG: Coarse breath sounds bilaterally, reduced at bases  HEART: normal S1S2, RRR  ABDOMEN: Soft, Nontender, non distended  EXTREMITIES: +oedema       LABS:                        7.3    10.76 )-----------( 237      ( 20 Jan 2021 07:25 )             23.6     01-20    139  |  98  |  65<H>  ----------------------------<  101<H>  4.0   |  24  |  4.15<H>    Ca    8.8      20 Jan 2021 07:25  Phos  6.0     01-20  Mg     2.0     01-20                  RADIOLOGY & ADDITIONAL STUDIES:

## 2021-01-20 NOTE — PROGRESS NOTE ADULT - ASSESSMENT
86 yr old F, POOR HISTORIAN/early dementia, former heavy smoker with PMHx HTN, hyperlipidemia, COPD, hypothyroidism, Stage IV CKD (baseline Cr ~3.0), anemia of chronic disease, CAD s/p CABG 2015 Dr. Blunt, chronic diastolic CHF(EF:55% by Echo 10/2020), renal artery stenosis s/p renal artery stent >20yrs ago, Carotid artery stenosis, PAD, who presents to [facility] ED 1/15/21 c/o progressively worsening SOB and cough x 3 days, admitted to cardiac telemetry for management of acute on chronic diastolic CHF exacerbation in setting of CAP. Patient s/p diuresis with hospital course complicated by NAV on CKD Stage IV (now improving) as well as worsening hypoxic respiratory failure requiring NRB secondary to CAP.    86 yr old F, POOR HISTORIAN/early dementia, former heavy smoker with PMHx HTN, hyperlipidemia, COPD, hypothyroidism, Stage IV CKD (baseline Cr ~3.0), anemia of chronic disease, CAD s/p CABG 2015 Dr. Blunt, chronic diastolic CHF(EF: 55% by Echo 10/2020), renal artery stenosis s/p renal artery stent >20yrs ago, Carotid artery stenosis, PAD, who presents to [facility] ED 1/15/21 c/o progressively worsening SOB and cough x 3 days, admitted to cardiac telemetry for management of acute on chronic diastolic CHF exacerbation in setting of CAP. Patient s/p diuresis with hospital course complicated by NAV on CKD Stage IV (renal following), hypoxic respiratory failure requiring NRB, IV steroids, ABX secondary to CAP (followed by Dr. Cuevas) and anemia s/p 1U PRBC on 1/20.

## 2021-01-20 NOTE — PROGRESS NOTE ADULT - PROBLEM SELECTOR PLAN 1
Appears euvolemic on exam with worsening renal function. 1.7 L urine output 24 hrs   - s/p IV diuresis w/ Lasix 80mg IV BID d/c on 1/17 as per Renal recs for NAV on CKD stage IV   - C/w Lopressor 50mg BID  - Echo ordered, f/u results. Prior Echo 10/12/20: EF 55%, mild concentric LVH, mild AR/MR/TR, pulm HTN with PASP 47 mmHg  - Daily CXR  -  Core measures ordered, strict I/Os, fluid restriction, and daily weights -Euvolemic on exam with worsening renal function. 3.8L out total.  -Previously diuresed with IV Lasix 80 BID however d/c per renal in setting of NAV  - Echo ordered, f/u results. (Prior Echo 10/12/20: EF 55%, mild concentric LVH, mild AR/MR/TR, pulm HTN with PASP 47 mmHg)  -Continue: Lopressor 50mg BID  -Per renal, will give 80mg IV Lasix x1 on 1/20. F/u renal recs on next doses.  -PT rec: home with home PT  -Daily CXR, Core measures ordered, strict I/Os, fluid restriction, and daily weights -Euvolemic on exam with worsening renal function. 3.8L out total.  -Previously diuresed with IV Lasix 80 BID however d/c per renal in setting of NAV  - Echo ordered, f/u results. (Prior Echo 10/12/20: EF 55%, mild concentric LVH, mild AR/MR/TR, pulm HTN with PASP 47 mmHg)  -BNP 1/20: 57,000  -Continue: Lopressor 50mg BID  -Per renal, will give 80mg IV Lasix x1 on 1/20 after 1unit PRBC. F/u renal recs on next doses.  -PT rec: home with home PT  -Daily CXR, Core measures ordered, strict I/Os, fluid restriction, and daily weights -Euvolemic on exam with worsening renal function. 3.8L out total.  -Previously diuresed with IV Lasix 80 BID however d/c per renal in setting of NAV  -Echo 1/20/21: LVEF 53%, Biatrial enlargement. Mild aortic regurgitation. Moderate-to-severe mitral regurgitation. Moderate-to-severe tricuspid regurgitation. Pulmonary artery systolic pressure is 70 mmHg.  -Consider Structural consult in AM - however likely worsened 2/2 overload  -BNP 1/20: 57,000  -Continue: Lopressor 50mg BID  -Per renal, will give 80mg IV Lasix x1 on 1/20 after 1unit PRBC. F/u renal recs on next doses.  -PT rec: home with home PT  -Daily CXR, Core measures ordered, strict I/Os, fluid restriction, and daily weights

## 2021-01-20 NOTE — PROGRESS NOTE ADULT - PROBLEM SELECTOR PLAN 8
Per Dr Crooks, pt had ASA d/c at [facility] after fall ~3 weeks ago secondary to SAH.  -Plan for Head CT Non-contrast once respiratory status improves and Neurology consult to evaluate if ASA can be restarted -Hx of SAH x3 wks ago at [facility]   -CT 1/19 negative for acute changes  -Per neurosurgery, cleared to start 81mg ASA QD on 1/20

## 2021-01-20 NOTE — PROGRESS NOTE ADULT - PROBLEM SELECTOR PLAN 3
Known to Dr. Nicolas. Renal consulted.   Baseline Cr ~3.0, Non-oliguric NAV on CKD Stage ME-Qumvkneix-ODH/Cr currently 70/3.84  - Per Renal recs, holding diuresis at this time  - Daily BMP + urine lytes (Na, Cr, Urea, Osm)   - F/u PETRONA serum + immunofixation urine, DINA, ANCA, C3/4, anti-PLA2R, Hep Panel  - F/u Renal Sono/Dopplers given WILBER + stent hx  -AVOID NEPHROTOXIC AGENTS.   -Monitor urine output, BP, volume status, electrolytes.  Hyperphosphatemia: Improved Phosphorous-5.0- Renagel 800 mg PO TID initiated on 1/18/2021. Daily Phosphorous. -Known to Dr. Nicolas. Renal following  -Baseline Cr ~3.0, Non-oliguric NAV on CKD Stage IV  -Daily BMP + urine lytes (Na, Cr, Urea, Osm), Phos  -Continue: Renagel 800 mg PO TID  -F/u PETRONA serum + immunofixation urine, DINA, ANCA, C3/4, anti-PLA2R, Hep Panel  -Renal US: atrophic kidneys, no WILBER  -Diuresis initiated as above per renal

## 2021-01-20 NOTE — PROGRESS NOTE ADULT - PROBLEM SELECTOR PLAN 5
Known to Dr. Louis Olmedo/Dr. Cuevas. Currently on NRB 15 L   - Continue Symbicort inhaler BID and Duonebs  -Not currently on Home Oxygen- Assess O2 on RA once treatment for CAP is complete and respiratory status improves.

## 2021-01-20 NOTE — DIETITIAN INITIAL EVALUATION ADULT. - ADD RECOMMEND
Monitor Skin, Wts, Labs, GI, GOC. RD to remain available for additional nutrition interventions as needed.

## 2021-01-20 NOTE — DIETITIAN INITIAL EVALUATION ADULT. - PERTINENT LABORATORY DATA
A1c 5.1% Jan 2021      Low HH  BUN/Cr 65/4.15  glucose 101  CRP 26.72  K WDL, low prior  Phos 6.0  POCT 161

## 2021-01-20 NOTE — PROGRESS NOTE ADULT - PROBLEM SELECTOR PLAN 7
Baseline Hgb 8-9, likely SVETLANA and Anemia of CKD. Hgb/HCT 8.3/27.2  -Maintain Active Type and Screen. Repeat CBC in AM if Hgb <8 will transfuse 1 unit as goal Hgb>8 given CAD.   -Stool for occult blood negative 1/18. Monitor for signs of bleeding. Patient currently not hypotensive; no evidence of acute bleeding at this time.   - Iron studies reveal SVETLANA. Per Renal started IV Iron x 5 days (1/17-1/22) and EPO  - Maintain active T/S Baseline Hgb 8-9, likely SVETLANA and Anemia of CKD.   -H/H 7.3/23.6 -- given 1U PRBC x1 on 1/20 followed by 80mg IV Lasix per renal recs   -Maintain Active Type and Screen (ordered for 1/21 AM)  -Stool for occult blood negative 1/18. Monitor for signs of bleeding. Patient currently not hypotensive; no evidence of acute bleeding at this time.   - Iron studies reveal SVETLANA however IV Iron on hold 2/2 infection. Will give Epo 10,000 x1 SQ on 1/20 per renal Baseline Hgb 8-9, likely SVETLANA and Anemia of CKD.   -H/H 7.3/23.6 -- given 1U PRBC x1 on 1/20 followed by 80mg IV Lasix per renal recs  -F/u repeat CBC @ 8pm   -Maintain Active Type and Screen (ordered for 1/21 AM)  -Stool for occult blood negative 1/18. Monitor for signs of bleeding. Patient currently not hypotensive; no evidence of acute bleeding at this time.   - Iron studies reveal SVETLANA however IV Iron on hold 2/2 infection. Will give Epo 10,000 x1 SQ on 1/20 per renal

## 2021-01-20 NOTE — DIETITIAN INITIAL EVALUATION ADULT. - PROBLEM SELECTOR PLAN 4
afebrile, WBC 14.5 with left shift, CRP 4.86, Ferritin 180, Procalcitonin 0.29, Lactate 2.4, D-dimer 491. Initial COVID PCR negative, will F/U repeat COVID swab in setting of high suspicion with elevated covid inflammatory markers.  --received Decadron 6mg IV x 1 dose, Ceftriaxone 1g IV x 1 dose and Azithromycin 500mg IV x 1 dose in ED.  --will continue Ceftriaxone 1g IV daily and Azithromycin 500mg IV daily for now.  --F/U blood cultures.

## 2021-01-20 NOTE — DIETITIAN INITIAL EVALUATION ADULT. - OTHER CALCULATIONS
%FTK=530; current body wt used for energy calculations as pt falls within % IBW, adjusted for age, CHF, Renal (lower end prot needs), Fluids per team

## 2021-01-21 ENCOUNTER — TRANSCRIPTION ENCOUNTER (OUTPATIENT)
Age: 86
End: 2021-01-21

## 2021-01-21 LAB
ANION GAP SERPL CALC-SCNC: 18 MMOL/L — HIGH (ref 5–17)
BLD GP AB SCN SERPL QL: NEGATIVE — SIGNIFICANT CHANGE UP
BUN SERPL-MCNC: 64 MG/DL — HIGH (ref 7–23)
CALCIUM SERPL-MCNC: 9.3 MG/DL — SIGNIFICANT CHANGE UP (ref 8.4–10.5)
CHLORIDE SERPL-SCNC: 92 MMOL/L — LOW (ref 96–108)
CO2 SERPL-SCNC: 24 MMOL/L — SIGNIFICANT CHANGE UP (ref 22–31)
CREAT SERPL-MCNC: 3.97 MG/DL — HIGH (ref 0.5–1.3)
CRP SERPL-MCNC: 27.11 MG/DL — HIGH (ref 0–0.4)
ERYTHROCYTE [SEDIMENTATION RATE] IN BLOOD: >130 MM/HR — HIGH
GLUCOSE BLDC GLUCOMTR-MCNC: 172 MG/DL — HIGH (ref 70–99)
GLUCOSE BLDC GLUCOMTR-MCNC: 235 MG/DL — HIGH (ref 70–99)
GLUCOSE BLDC GLUCOMTR-MCNC: 270 MG/DL — HIGH (ref 70–99)
GLUCOSE BLDC GLUCOMTR-MCNC: 283 MG/DL — HIGH (ref 70–99)
GLUCOSE SERPL-MCNC: 163 MG/DL — HIGH (ref 70–99)
HCT VFR BLD CALC: 28.3 % — LOW (ref 34.5–45)
HGB BLD-MCNC: 8.9 G/DL — LOW (ref 11.5–15.5)
MAGNESIUM SERPL-MCNC: 2 MG/DL — SIGNIFICANT CHANGE UP (ref 1.6–2.6)
MCHC RBC-ENTMCNC: 26.3 PG — LOW (ref 27–34)
MCHC RBC-ENTMCNC: 31.4 GM/DL — LOW (ref 32–36)
MCV RBC AUTO: 83.7 FL — SIGNIFICANT CHANGE UP (ref 80–100)
NRBC # BLD: 0 /100 WBCS — SIGNIFICANT CHANGE UP (ref 0–0)
PHOSPHATE SERPL-MCNC: 6.8 MG/DL — HIGH (ref 2.5–4.5)
PLATELET # BLD AUTO: 238 K/UL — SIGNIFICANT CHANGE UP (ref 150–400)
POTASSIUM SERPL-MCNC: 4 MMOL/L — SIGNIFICANT CHANGE UP (ref 3.5–5.3)
POTASSIUM SERPL-SCNC: 4 MMOL/L — SIGNIFICANT CHANGE UP (ref 3.5–5.3)
RBC # BLD: 3.38 M/UL — LOW (ref 3.8–5.2)
RBC # FLD: 15.1 % — HIGH (ref 10.3–14.5)
RH IG SCN BLD-IMP: POSITIVE — SIGNIFICANT CHANGE UP
SARS-COV-2 RNA SPEC QL NAA+PROBE: SIGNIFICANT CHANGE UP
SODIUM SERPL-SCNC: 134 MMOL/L — LOW (ref 135–145)
WBC # BLD: 6.16 K/UL — SIGNIFICANT CHANGE UP (ref 3.8–10.5)
WBC # FLD AUTO: 6.16 K/UL — SIGNIFICANT CHANGE UP (ref 3.8–10.5)

## 2021-01-21 PROCEDURE — 71045 X-RAY EXAM CHEST 1 VIEW: CPT | Mod: 26

## 2021-01-21 PROCEDURE — 99222 1ST HOSP IP/OBS MODERATE 55: CPT

## 2021-01-21 PROCEDURE — 99233 SBSQ HOSP IP/OBS HIGH 50: CPT

## 2021-01-21 RX ORDER — SEVELAMER CARBONATE 2400 MG/1
1600 POWDER, FOR SUSPENSION ORAL THREE TIMES A DAY
Refills: 0 | Status: DISCONTINUED | OUTPATIENT
Start: 2021-01-21 | End: 2021-02-02

## 2021-01-21 RX ORDER — DIAZEPAM 5 MG
1 TABLET ORAL DAILY
Refills: 0 | Status: DISCONTINUED | OUTPATIENT
Start: 2021-01-21 | End: 2021-01-27

## 2021-01-21 RX ORDER — FUROSEMIDE 40 MG
100 TABLET ORAL ONCE
Refills: 0 | Status: COMPLETED | OUTPATIENT
Start: 2021-01-21 | End: 2021-01-21

## 2021-01-21 RX ADMIN — AMLODIPINE BESYLATE 10 MILLIGRAM(S): 2.5 TABLET ORAL at 05:33

## 2021-01-21 RX ADMIN — SEVELAMER CARBONATE 1600 MILLIGRAM(S): 2400 POWDER, FOR SUSPENSION ORAL at 17:06

## 2021-01-21 RX ADMIN — Medication 40 MILLIGRAM(S): at 21:44

## 2021-01-21 RX ADMIN — Medication 1 MILLIGRAM(S): at 21:50

## 2021-01-21 RX ADMIN — Medication 40 MILLIGRAM(S): at 14:34

## 2021-01-21 RX ADMIN — Medication 3 MILLILITER(S): at 17:03

## 2021-01-21 RX ADMIN — Medication 3 MILLILITER(S): at 21:40

## 2021-01-21 RX ADMIN — PIPERACILLIN AND TAZOBACTAM 200 GRAM(S): 4; .5 INJECTION, POWDER, LYOPHILIZED, FOR SOLUTION INTRAVENOUS at 10:31

## 2021-01-21 RX ADMIN — AZITHROMYCIN 255 MILLIGRAM(S): 500 TABLET, FILM COATED ORAL at 19:19

## 2021-01-21 RX ADMIN — ATORVASTATIN CALCIUM 20 MILLIGRAM(S): 80 TABLET, FILM COATED ORAL at 21:43

## 2021-01-21 RX ADMIN — DONEPEZIL HYDROCHLORIDE 5 MILLIGRAM(S): 10 TABLET, FILM COATED ORAL at 21:43

## 2021-01-21 RX ADMIN — Medication 1 SPRAY(S): at 05:34

## 2021-01-21 RX ADMIN — Medication 50 MILLIGRAM(S): at 05:34

## 2021-01-21 RX ADMIN — Medication 50 MILLIGRAM(S): at 17:03

## 2021-01-21 RX ADMIN — Medication 2: at 12:24

## 2021-01-21 RX ADMIN — PIPERACILLIN AND TAZOBACTAM 200 GRAM(S): 4; .5 INJECTION, POWDER, LYOPHILIZED, FOR SOLUTION INTRAVENOUS at 21:41

## 2021-01-21 RX ADMIN — Medication 120 MILLIGRAM(S): at 14:34

## 2021-01-21 RX ADMIN — SEVELAMER CARBONATE 1600 MILLIGRAM(S): 2400 POWDER, FOR SUSPENSION ORAL at 21:50

## 2021-01-21 RX ADMIN — Medication 3 MILLILITER(S): at 03:18

## 2021-01-21 RX ADMIN — Medication 40 MILLIGRAM(S): at 05:34

## 2021-01-21 RX ADMIN — PIPERACILLIN AND TAZOBACTAM 200 GRAM(S): 4; .5 INJECTION, POWDER, LYOPHILIZED, FOR SOLUTION INTRAVENOUS at 03:21

## 2021-01-21 RX ADMIN — SEVELAMER CARBONATE 800 MILLIGRAM(S): 2400 POWDER, FOR SUSPENSION ORAL at 06:58

## 2021-01-21 RX ADMIN — SEVELAMER CARBONATE 800 MILLIGRAM(S): 2400 POWDER, FOR SUSPENSION ORAL at 12:25

## 2021-01-21 RX ADMIN — Medication 3: at 21:43

## 2021-01-21 RX ADMIN — HEPARIN SODIUM 5000 UNIT(S): 5000 INJECTION INTRAVENOUS; SUBCUTANEOUS at 17:04

## 2021-01-21 RX ADMIN — Medication 81 MILLIGRAM(S): at 12:24

## 2021-01-21 RX ADMIN — Medication 1: at 07:11

## 2021-01-21 RX ADMIN — HEPARIN SODIUM 5000 UNIT(S): 5000 INJECTION INTRAVENOUS; SUBCUTANEOUS at 05:34

## 2021-01-21 RX ADMIN — Medication 100 MILLIGRAM(S): at 18:09

## 2021-01-21 RX ADMIN — Medication 50 MICROGRAM(S): at 05:34

## 2021-01-21 RX ADMIN — BUDESONIDE AND FORMOTEROL FUMARATE DIHYDRATE 2 PUFF(S): 160; 4.5 AEROSOL RESPIRATORY (INHALATION) at 10:30

## 2021-01-21 RX ADMIN — Medication 3: at 17:04

## 2021-01-21 RX ADMIN — PIPERACILLIN AND TAZOBACTAM 200 GRAM(S): 4; .5 INJECTION, POWDER, LYOPHILIZED, FOR SOLUTION INTRAVENOUS at 14:34

## 2021-01-21 RX ADMIN — Medication 3 MILLILITER(S): at 10:31

## 2021-01-21 RX ADMIN — Medication 1 SPRAY(S): at 17:06

## 2021-01-21 NOTE — PROGRESS NOTE ADULT - ASSESSMENT
86 yr old F, POOR HISTORIAN/early dementia, former heavy smoker with PMHx HTN, hyperlipidemia, COPD, hypothyroidism, Stage IV CKD (baseline Cr ~3.0), anemia of chronic disease, CAD s/p CABG 2015 Dr. Blunt, chronic diastolic CHF(EF: 55% by Echo 10/2020), renal artery stenosis s/p renal artery stent >20yrs ago, Carotid artery stenosis, PAD, who presents to [facility] ED 1/15/21 c/o progressively worsening SOB and cough x 3 days, admitted to cardiac telemetry for management of acute on chronic diastolic CHF exacerbation in setting of CAP. Patient s/p diuresis with hospital course complicated by NAV on CKD Stage IV (renal following), hypoxic respiratory failure requiring NRB, IV steroids, ABX secondary to CAP (followed by Dr. Cuevas) and anemia s/p 1U PRBC on 1/20.   86 yr old F, POOR HISTORIAN/early dementia, former heavy smoker with PMHx HTN, hyperlipidemia, COPD, hypothyroidism, Stage IV CKD (baseline Cr ~3.0), anemia of chronic disease, CAD s/p CABG 2015 Dr. Blunt, chronic diastolic CHF(EF: 55% by Echo 10/2020), renal artery stenosis s/p renal artery stent >20yrs ago, Carotid artery stenosis, PAD, who presents to [facility] ED 1/15/21 c/o progressively worsening SOB and cough x 3 days, admitted to cardiac telemetry for management of acute on chronic diastolic CHF exacerbation in setting of CAP. Patient s/p diuresis with hospital course complicated by NAV on CKD Stage IV (renal following), hypoxic respiratory failure increased to HFNC, IV steroids, ABX secondary to CAP (followed by Dr. Cuevas) and anemia s/p 1U PRBC on 1/20. PT rec home with home PT.

## 2021-01-21 NOTE — PROGRESS NOTE ADULT - SUBJECTIVE AND OBJECTIVE BOX
Interventional Cardiology PA Adult Progress Note    Subjective Assessment:  	  MEDICATIONS:  amLODIPine   Tablet 10 milliGRAM(s) Oral daily  metoprolol tartrate 50 milliGRAM(s) Oral two times a day    azithromycin  IVPB 500 milliGRAM(s) IV Intermittent every 24 hours  piperacillin/tazobactam IVPB.. 2.25 Gram(s) IV Intermittent every 6 hours    albuterol/ipratropium for Nebulization 3 milliLiter(s) Nebulizer every 6 hours  benzonatate 100 milliGRAM(s) Oral every 8 hours PRN  budesonide  80 MICROgram(s)/formoterol 4.5 MICROgram(s) Inhaler 2 Puff(s) Inhalation two times a day  guaiFENesin   Syrup  (Sugar-Free) 100 milliGRAM(s) Oral every 6 hours PRN    acetaminophen   Tablet .. 650 milliGRAM(s) Oral every 6 hours PRN  donepezil 5 milliGRAM(s) Oral at bedtime      atorvastatin 20 milliGRAM(s) Oral at bedtime  dextrose 40% Gel 15 Gram(s) Oral once  dextrose 50% Injectable 25 Gram(s) IV Push once  dextrose 50% Injectable 12.5 Gram(s) IV Push once  dextrose 50% Injectable 25 Gram(s) IV Push once  glucagon  Injectable 1 milliGRAM(s) IntraMuscular once  insulin lispro (ADMELOG) corrective regimen sliding scale   SubCutaneous Before meals and at bedtime  levothyroxine 50 MICROGram(s) Oral daily  methylPREDNISolone sodium succinate Injectable 40 milliGRAM(s) IV Push every 8 hours    aspirin enteric coated 81 milliGRAM(s) Oral daily  dextrose 5%. 1000 milliLiter(s) IV Continuous <Continuous>  dextrose 5%. 1000 milliLiter(s) IV Continuous <Continuous>  fluticasone propionate 50 MICROgram(s)/spray Nasal Spray 1 Spray(s) Both Nostrils two times a day  heparin   Injectable 5000 Unit(s) SubCutaneous every 12 hours  influenza  Vaccine (HIGH DOSE) 0.7 milliLiter(s) IntraMuscular once      	    [PHYSICAL EXAM:  TELEMETRY:  T(C): 36.2 (01-21-21 @ 04:51), Max: 37.2 (01-20-21 @ 18:30)  HR: 62 (01-21-21 @ 08:33) (62 - 82)  BP: 130/59 (01-21-21 @ 08:33) (130/59 - 154/70)  RR: 25 (01-21-21 @ 08:33) (21 - 25)  SpO2: 98% (01-21-21 @ 08:33) (95% - 98%)  Wt(kg): --  I&O's Summary    20 Jan 2021 07:01  -  21 Jan 2021 07:00  --------------------------------------------------------  IN: 1040 mL / OUT: 850 mL / NET: 190 mL        Cason:  Central/PICC/Mid Line:                                         Appearance: Normal	  HEENT:   Normal oral mucosa, PERRL, EOMI	  Neck: Supple, + JVD/ - JVD; Carotid Bruit   Cardiovascular: Normal S1 S2, No JVD, No murmurs,   Respiratory: Lungs clear to auscultation/Decreased Breath Sounds/No Rales, Rhonchi, Wheezing	  Gastrointestinal:  Soft, Non-tender, + BS	  Skin: No rashes, No ecchymoses, No cyanosis  Extremities: Normal range of motion, No clubbing, cyanosis or edema  Vascular: Peripheral pulses palpable 2+ bilaterally  Neurologic: Non-focal  Psychiatry: A & O x 3, Mood & affect appropriate      	    ECG:  	  RADIOLOGY:   DIAGNOSTIC TESTING:  [ ] Echocardiogram:  [ ]  Catheterization:  [ ] Stress Test:    [ ] SANJAY  OTHER: 	    LABS:	 	  CARDIAC MARKERS:                                  8.9    6.16  )-----------( 238      ( 21 Jan 2021 08:25 )             28.3     01-21    134<L>  |  92<L>  |  64<H>  ----------------------------<  163<H>  4.0   |  24  |  3.97<H>    Ca    9.3      21 Jan 2021 08:25  Phos  6.8     01-21  Mg     2.0     01-21      proBNP: Serum Pro-Brain Natriuretic Peptide: 24636 pg/mL (01-20 @ 10:38)    Lipid Profile:   HgA1c:   TSH:       ASSESSMENT/PLAN: 	        DVT ppx:  Dispo:     Interventional Cardiology PA Adult Progress Note    Subjective Assessment: Patient seen and examined at the bedside. States that she is feeling a bit more short of breath this morning. Aware of plan to   	  MEDICATIONS:  amLODIPine   Tablet 10 milliGRAM(s) Oral daily  metoprolol tartrate 50 milliGRAM(s) Oral two times a day    azithromycin  IVPB 500 milliGRAM(s) IV Intermittent every 24 hours  piperacillin/tazobactam IVPB.. 2.25 Gram(s) IV Intermittent every 6 hours    albuterol/ipratropium for Nebulization 3 milliLiter(s) Nebulizer every 6 hours  benzonatate 100 milliGRAM(s) Oral every 8 hours PRN  budesonide  80 MICROgram(s)/formoterol 4.5 MICROgram(s) Inhaler 2 Puff(s) Inhalation two times a day  guaiFENesin   Syrup  (Sugar-Free) 100 milliGRAM(s) Oral every 6 hours PRN    acetaminophen   Tablet .. 650 milliGRAM(s) Oral every 6 hours PRN  donepezil 5 milliGRAM(s) Oral at bedtime      atorvastatin 20 milliGRAM(s) Oral at bedtime  dextrose 40% Gel 15 Gram(s) Oral once  dextrose 50% Injectable 25 Gram(s) IV Push once  dextrose 50% Injectable 12.5 Gram(s) IV Push once  dextrose 50% Injectable 25 Gram(s) IV Push once  glucagon  Injectable 1 milliGRAM(s) IntraMuscular once  insulin lispro (ADMELOG) corrective regimen sliding scale   SubCutaneous Before meals and at bedtime  levothyroxine 50 MICROGram(s) Oral daily  methylPREDNISolone sodium succinate Injectable 40 milliGRAM(s) IV Push every 8 hours    aspirin enteric coated 81 milliGRAM(s) Oral daily  dextrose 5%. 1000 milliLiter(s) IV Continuous <Continuous>  dextrose 5%. 1000 milliLiter(s) IV Continuous <Continuous>  fluticasone propionate 50 MICROgram(s)/spray Nasal Spray 1 Spray(s) Both Nostrils two times a day  heparin   Injectable 5000 Unit(s) SubCutaneous every 12 hours  influenza  Vaccine (HIGH DOSE) 0.7 milliLiter(s) IntraMuscular once      	    [PHYSICAL EXAM:  TELEMETRY:  T(C): 36.2 (01-21-21 @ 04:51), Max: 37.2 (01-20-21 @ 18:30)  HR: 62 (01-21-21 @ 08:33) (62 - 82)  BP: 130/59 (01-21-21 @ 08:33) (130/59 - 154/70)  RR: 25 (01-21-21 @ 08:33) (21 - 25)  SpO2: 98% (01-21-21 @ 08:33) (95% - 98%)  Wt(kg): --  I&O's Summary    20 Jan 2021 07:01  -  21 Jan 2021 07:00  --------------------------------------------------------  IN: 1040 mL / OUT: 850 mL / NET: 190 mL        Cason:  Central/PICC/Mid Line:                                         Appearance: Normal	  HEENT:   Normal oral mucosa, PERRL, EOMI	  Neck: Supple, + JVD/ - JVD; Carotid Bruit   Cardiovascular: Normal S1 S2, No JVD, No murmurs,   Respiratory: Lungs clear to auscultation/Decreased Breath Sounds/No Rales, Rhonchi, Wheezing	  Gastrointestinal:  Soft, Non-tender, + BS	  Skin: No rashes, No ecchymoses, No cyanosis  Extremities: Normal range of motion, No clubbing, cyanosis or edema  Vascular: Peripheral pulses palpable 2+ bilaterally  Neurologic: Non-focal  Psychiatry: A & O x 3, Mood & affect appropriate      	    ECG:  	  RADIOLOGY:   DIAGNOSTIC TESTING:  [ ] Echocardiogram:  [ ]  Catheterization:  [ ] Stress Test:    [ ] SANJAY  OTHER: 	    LABS:	 	  CARDIAC MARKERS:                                  8.9    6.16  )-----------( 238      ( 21 Jan 2021 08:25 )             28.3     01-21    134<L>  |  92<L>  |  64<H>  ----------------------------<  163<H>  4.0   |  24  |  3.97<H>    Ca    9.3      21 Jan 2021 08:25  Phos  6.8     01-21  Mg     2.0     01-21      proBNP: Serum Pro-Brain Natriuretic Peptide: 69988 pg/mL (01-20 @ 10:38)    Lipid Profile:   HgA1c:   TSH:       ASSESSMENT/PLAN: 	        DVT ppx:  Dispo:     Interventional Cardiology PA Adult Progress Note    Subjective Assessment: Patient seen and examined at the bedside. States that she is feeling a bit more short of breath with her cough this morning. Aware of plan to COVID swab, diurese and continue ABX. All other ROS negative except those listed in subjective assessment.   	  MEDICATIONS:  amLODIPine   Tablet 10 milliGRAM(s) Oral daily  metoprolol tartrate 50 milliGRAM(s) Oral two times a day  azithromycin  IVPB 500 milliGRAM(s) IV Intermittent every 24 hours  piperacillin/tazobactam IVPB.. 2.25 Gram(s) IV Intermittent every 6 hours  albuterol/ipratropium for Nebulization 3 milliLiter(s) Nebulizer every 6 hours  benzonatate 100 milliGRAM(s) Oral every 8 hours PRN  budesonide  80 MICROgram(s)/formoterol 4.5 MICROgram(s) Inhaler 2 Puff(s) Inhalation two times a day  guaiFENesin   Syrup  (Sugar-Free) 100 milliGRAM(s) Oral every 6 hours PRN  acetaminophen   Tablet .. 650 milliGRAM(s) Oral every 6 hours PRN  donepezil 5 milliGRAM(s) Oral at bedtime  atorvastatin 20 milliGRAM(s) Oral at bedtime  dextrose 40% Gel 15 Gram(s) Oral once  dextrose 50% Injectable 25 Gram(s) IV Push once  dextrose 50% Injectable 12.5 Gram(s) IV Push once  dextrose 50% Injectable 25 Gram(s) IV Push once  glucagon  Injectable 1 milliGRAM(s) IntraMuscular once  insulin lispro (ADMELOG) corrective regimen sliding scale   SubCutaneous Before meals and at bedtime  levothyroxine 50 MICROGram(s) Oral daily  methylPREDNISolone sodium succinate Injectable 40 milliGRAM(s) IV Push every 8 hours  aspirin enteric coated 81 milliGRAM(s) Oral daily  dextrose 5%. 1000 milliLiter(s) IV Continuous <Continuous>  dextrose 5%. 1000 milliLiter(s) IV Continuous <Continuous>  fluticasone propionate 50 MICROgram(s)/spray Nasal Spray 1 Spray(s) Both Nostrils two times a day  heparin   Injectable 5000 Unit(s) SubCutaneous every 12 hours  influenza  Vaccine (HIGH DOSE) 0.7 milliLiter(s) IntraMuscular once	    [PHYSICAL EXAM:  TELEMETRY:  T(C): 36.2 (01-21-21 @ 04:51), Max: 37.2 (01-20-21 @ 18:30)  HR: 62 (01-21-21 @ 08:33) (62 - 82)  BP: 130/59 (01-21-21 @ 08:33) (130/59 - 154/70)  RR: 25 (01-21-21 @ 08:33) (21 - 25)  SpO2: 98% (01-21-21 @ 08:33) (95% - 98%)  I&O's Summary    20 Jan 2021 07:01  -  21 Jan 2021 07:00  --------------------------------------------------------  IN: 1040 mL / OUT: 850 mL / NET: 190 mL                                    Appearance: NAD on NRB	  Neck: Supple, - JVD; no Carotid Bruit b/l  Cardiovascular: Normal S1 S2, No murmurs, rubs, gallops  Respiratory: Lungs clear to auscultation/No Rales, Rhonchi, Wheezing, crackles  Gastrointestinal:  Soft, Non-tender, ND, + BS x4	  Extremities: Normal range of motion, No clubbing, cyanosis or edema b/l upper or lower extremities   Vascular: Peripheral pulses palpable 2+ bilaterally carotids, radial, DP/PT  Neurologic:  A & O x 3, Mood & affect appropriate  	    ECG:  	  RADIOLOGY:   DIAGNOSTIC TESTING:  [x ] Echocardiogram: < from: TTE Echo Complete w/o Contrast w/ Doppler (01.20.21 @ 14:43) >  CONCLUSIONS:     1. Normal left ventricular size and systolic function. Abnormal septal motion seen due to right ventricular pressure overload.   2. Normal right ventricular size.   3. Probably normal right ventricular systolic function.   4. Biatrial enlargement.   5. Mild aortic regurgitation.   6. Moderate-to-severe mitral regurgitation.   7. Moderate-to-severe tricuspid regurgitation.   8. Pulmonary artery systolic pressure is 70 mmHg.   9. No pericardial effusion.  10. Left pleural effusion.  11. Consider SANJAY to better visualize the mitral valve and elucidate the mechanism of mitral regurgitation, if clinically indicated.  12. Compared to the previous TTE performed on 10/12/2020, the degree of mitral and tricuspid regurgitation have progressed and the PASP is higher.    < end of copied text >    [ ]  Catheterization:  [ ] Stress Test:    [ ] SANJAY  OTHER: 	    LABS:	 	  CARDIAC MARKERS:                     8.9    6.16  )-----------( 238      ( 21 Jan 2021 08:25 )             28.3     01-21    134<L>  |  92<L>  |  64<H>  ----------------------------<  163<H>  4.0   |  24  |  3.97<H>    Ca    9.3      21 Jan 2021 08:25  Phos  6.8     01-21  Mg     2.0     01-21      proBNP: Serum Pro-Brain Natriuretic Peptide: 53051 pg/mL (01-20 @ 10:38)

## 2021-01-21 NOTE — PROGRESS NOTE ADULT - ATTENDING COMMENTS
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

## 2021-01-21 NOTE — PROGRESS NOTE ADULT - PROBLEM SELECTOR PLAN 1
-Euvolemic on exam with worsening renal function. 3.8L out total.  -Previously diuresed with IV Lasix 80 BID however d/c per renal in setting of NAV  -Echo 1/20/21: LVEF 53%, Biatrial enlargement. Mild aortic regurgitation. Moderate-to-severe mitral regurgitation. Moderate-to-severe tricuspid regurgitation. Pulmonary artery systolic pressure is 70 mmHg.  -Consider Structural consult in AM - however likely worsened 2/2 overload  -BNP 1/20: 57,000  -Continue: Lopressor 50mg BID  -Per renal, will give 80mg IV Lasix x1 on 1/20 after 1unit PRBC. F/u renal recs on next doses.  -PT rec: home with home PT  -Daily CXR, Core measures ordered, strict I/Os, fluid restriction, and daily weights -Appears euvolemic on exam however BNP 57,000 on 1/20 3.8L out total.  -Echo 1/20/21: LVEF 53%, Biatrial enlargement. Mild aortic regurgitation. Moderate-to-severe mitral regurgitation. Moderate-to-severe tricuspid regurgitation. Pulmonary artery systolic pressure is 70 mmHg.  -Holding off on structural consult as valve disease likely worsened as not euvolemic  -Continue: Lopressor 50mg BID  -Dosing daily with renal. Will give 100mg IV Lasix x1 on 1/21. F/u renal recs on next doses.  -PT rec: home with home PT  -Daily CXR, Core measures ordered, strict I/Os, fluid restriction, and daily weights -Appears euvolemic on exam however BNP 57,000 on 1/20 3.8L out total.  -Echo 1/20/21: LVEF 53%, Biatrial enlargement. Mild aortic regurgitation. Moderate-to-severe mitral regurgitation. Moderate-to-severe tricuspid regurgitation. Pulmonary artery systolic pressure is 70 mmHg.  -Holding off on structural consult as valve disease likely worsened as not euvolemic  -Continue: Lopressor 50mg BID  -Dosing daily with renal. Will give 100mg IV Lasix x1 on 1/21. F/u renal recs on next doses.  -PT rec: home with home PT however WILL NEED new order when more stable to work with  -Daily CXR, Core measures ordered, strict I/Os, fluid restriction, and daily weights

## 2021-01-21 NOTE — PROGRESS NOTE ADULT - ASSESSMENT
86F POOR HISTORIAN/early dementia, former heavy smoker PMHx DM, HTN, hyperlipidemia, COPD, hypothyroidism, CKD (baseline Cr ~3.0), anemia of chronic disease, CAD s/p CABG 2015 Dr. Blunt, chronic diastolic CHF(EF:55% by Echo 10/2020), renal artery stenosis s/p renal artery stent >20yrs ago, Carotid artery stenosis, PAD, who presents c/o progressively worsening SOB and cough. Nephrology consulted for NAV.     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

## 2021-01-21 NOTE — PROGRESS NOTE ADULT - PROBLEM SELECTOR PLAN 2
-Afebrile. Leukocytosis resolving, continues to have cough  -Dr. Cuevas (pul) following  -s/p 4 days of Ceftriaxone now discontinued as Zosyn added 1/19 to cover Pseudomonas  -Continue Azithromycin 500mg IV Daily (Day 6 of 7), Zosyn 2.25 IV q 6hrs x7days (day 2 of 7)  -Per Dr Cuevas, daily ESR/CRP levels, CXR, and titrate off NRB as tolerated -Afebrile. Leukocytosis resolving, continues to have cough  -Dr. Cuevas (pul) following  -s/p 4 days of Ceftriaxone now discontinued as Zosyn added 1/19 to cover Pseudomonas  -Continue Azithromycin 500mg IV Daily (Day 7 of 7 on 1/21), Zosyn 2.25 IV q 6hrs x7days (day 3 of 7)  -Per Dr Cuevas, daily ESR/CRP levels, CXR, and titrate off HFNC as tolerated

## 2021-01-21 NOTE — DISCHARGE NOTE PROVIDER - CARE PROVIDER_API CALL
Hermes Crooks)  Cardiovascular Disease; Internal Medicine  [address] [place] [address], [address]  [place] [address]  Phone: [phone]  Fax: [phone]  Follow Up Time:    Hermes Crooks (MD)  Cardiovascular Disease; Internal Medicine  [address] [place] [address], [address]  [place] [address]  Phone: [phone]  Fax: [phone]  Follow Up Time:     Sonal Harmon)  Internal Medicine; Nephrology  [address] [place] [address]  Phone: [phone]  Fax: [phone]  Follow Up Time:    Hermes Crooks)  Cardiovascular Disease; Internal Medicine  [address] [place] [address], [address]  [place] [address]  Phone: [phone]  Fax: [phone]  Established Patient  Follow Up Time: 1 week    Sonal Harmon)  Internal Medicine; Nephrology  [address] [place] [address]  Phone: [phone]  Fax: [phone]  Established Patient  Follow Up Time: 1 week    Louis Olmedo)  Internal Medicine; Pulmonary Disease  [address] [facility], [address]  [place] [address]  Phone: [phone]  Fax: [phone]  Established Patient  Follow Up Time: 2 weeks   Hermes Crooks)  Cardiovascular Disease; Internal Medicine  [address] [place] [address], [address]  [place] [address]  Phone: [phone]  Fax: [phone]  Established Patient  Follow Up Time: 1 week    Sonal Harmon)  Internal Medicine; Nephrology  [address] [place] [address]  Phone: [phone]  Fax: [phone]  Established Patient  Follow Up Time: 1 week    Louis Olmedo)  Internal Medicine; Pulmonary Disease  [address] [facility], [address]  [place] [address]  Phone: [phone]  Fax: [phone]  Established Patient  Follow Up Time: 2 weeks    Maru Washington; PhD)  Internal Medicine  [address], [address]  [place] [address]  Phone: [phone]  Fax: [phone]  Follow Up Time: 1 month   Hermes Crooks (MD)  Cardiovascular Disease; Internal Medicine  [address] [place] [address], [address]  [place] [address]  Phone: [phone]  Fax: [phone]  Follow Up Time: 1 week    Sonal Harmon (MD)  Internal Medicine; Nephrology  [address] [place] [address]  Phone: [phone]  Fax: [phone]  Established Patient  Follow Up Time: 1 week    Maru Washington; PhD)  Internal Medicine  [address], [address]  [place] [address]  Phone: [phone]  Fax: [phone]  Follow Up Time: 1 month    [address])  Internal Medicine; Nephrology  [address], [address] [place] [address]  Phone: [phone]  Fax: [phone]  Scheduled Appointment: 02/23/2021 11:00 AM

## 2021-01-21 NOTE — PROGRESS NOTE ADULT - PROBLEM SELECTOR PLAN 6
Hx of CABG in 2015 with Dr. Blunt. Currently CP free and HD stable  - EKG: SR @67BPM w/ incomplete LBBB, TWI in I, AVL (similar to prior EKG 10/2020)  - C/w lopressor 50mg BID, Atorvastatin 20mg QD and 81mg ASA QD (confirmed with neurosurgery on 1/20)

## 2021-01-21 NOTE — DISCHARGE NOTE PROVIDER - NSDCMRMEDTOKEN_GEN_ALL_CORE_FT
amLODIPine 5 mg oral tablet: 1 tab(s) orally once a day  Aricept 5 mg oral tablet: 1 tab(s) orally once a day (at bedtime)  atorvastatin 20 mg oral tablet: 1 tab(s) orally once a day  furosemide 80 mg oral tablet: 1 tab(s) orally once a day ( recently increased to Lasix 80mg PO BID for the past 3 days)  ipratropium-albuterol 0.5 mg-2.5 mg/3 mLinhalation solution: 3 milliliter(s) inhaled every 6 hours  levothyroxine 50 mcg (0.05 mg) oral tablet: 1 tab(s) orally once a day  Metoprolol Tartrate 50 mg oral tablet: 1 tab(s) orally 2 times a day  Symbicort 80 mcg-4.5 mcg/inh inhalation aerosol: 2 puff(s) inhaled every 6 hours, As Needed    amLODIPine 10 mg oral tablet: 1 tab(s) orally once a day  amoxicillin-clavulanate 500 mg-125 mg oral tablet: 1 tab(s) orally 2 times a day (LAST DOSE 2/12/21)  Aricept 5 mg oral tablet: 1 tab(s) orally once a day (at bedtime)  aspirin 81 mg oral delayed release tablet: 1 tab(s) orally once a day  atorvastatin 20 mg oral tablet: 1 tab(s) orally once a day  carvedilol 12.5 mg oral tablet: 1 tab(s) orally every 12 hours  fluticasone 50 mcg/inh nasal spray: 1 spray(s) nasal 2 times a day  guaiFENesin 100 mg/5 mL oral liquid: 5 milliliter(s) orally every 6 hours, As needed, Cough  ipratropium-albuterol 0.5 mg-2.5 mg/3 mLinhalation solution: 3 milliliter(s) inhaled every 6 hours  levothyroxine 50 mcg (0.05 mg) oral tablet: 1 tab(s) orally once a day  mirtazapine 7.5 mg oral tablet: 1 tab(s) orally once a day (at bedtime)  Prandin 1 mg oral tablet: 1 tab(s) orally 3 times a day   predniSONE 20 mg oral tablet: 1 tab(s) orally once a day  sevelamer carbonate 800 mg oral tablet: 1 tab(s) orally 3 times a day  Symbicort 80 mcg-4.5 mcg/inh inhalation aerosol: 2 puff(s) inhaled every 6 hours, As Needed

## 2021-01-21 NOTE — PROGRESS NOTE ADULT - PROBLEM SELECTOR PLAN 7
Baseline Hgb 8-9, likely SVETLANA and Anemia of CKD.   -H/H 7.3/23.6 -- given 1U PRBC x1 on 1/20 followed by 80mg IV Lasix per renal recs  -F/u repeat CBC @ 8pm   -Maintain Active Type and Screen (ordered for 1/21 AM)  -Stool for occult blood negative 1/18. Monitor for signs of bleeding. Patient currently not hypotensive; no evidence of acute bleeding at this time.   - Iron studies reveal SVETLANA however IV Iron on hold 2/2 infection. Will give Epo 10,000 x1 SQ on 1/20 per renal Baseline Hgb 8-9, likely SVETLANA and Anemia of CKD.   -H/H 7.3/23.6 -- given 1U PRBC x1 on 1/20 followed by 80mg Lasix IV x1 with appropriate response  -Maintain Active Type and Screen (ordered for 1/21 AM)  -Stool for occult blood negative 1/18. Monitor for signs of bleeding. Patient currently not hypotensive; no evidence of acute bleeding at this time.   - Iron studies reveal SVETLANA however IV Iron on hold 2/2 infection.

## 2021-01-21 NOTE — DISCHARGE NOTE PROVIDER - CARE PROVIDERS DIRECT ADDRESSES
,DirectAddress_Unknown ,DirectAddress_Unknown,[email or web address] ,DirectAddress_Unknown,[email or web address],DirectAddress_Unknown ,DirectAddress_Unknown,[email or web address],DirectAddress_Unknown,DirectAddress_Unknown

## 2021-01-21 NOTE — PROGRESS NOTE ADULT - PROBLEM SELECTOR PLAN 5
Known to Dr. Louis Olmedo/Dr. Cuevas. Currently on NRB 15 L   - Continue Symbicort inhaler BID and Duonebs  -Not currently on Home Oxygen- Assess O2 on RA once treatment for CAP is complete and respiratory status improves. Known to Dr. Louis Olmedo/Dr. Cuevas. Currently on HFNC  - Continue Symbicort inhaler BID and Duonebs  -Not currently on Home Oxygen- Assess O2 on RA once treatment for CAP is complete and respiratory status improves. -Known to Dr. Nicolas. Renal following  -Baseline Cr ~3.0, currently 3.9. Non-oliguric NAV on CKD Stage IV  -Daily BMP + urine lytes (Na, Cr, Urea, Osm), Phos  -Continue: Renagel 1600 mg PO TID  -F/u PETRONA serum + immunofixation urine, DINA, ANCA, C3/4, anti-PLA2R, Hep Panel  -Renal US: atrophic kidneys, no WILBER  -Diuresis initiated as above per renal

## 2021-01-21 NOTE — PROGRESS NOTE ADULT - PROBLEM SELECTOR PLAN 4
Likely multifactorial given CHF, COPD and CAP  -O2 sat 70s on RA per Dr. Faith GRACE Duplex 1/19/2021 negative for DVT  -Unable to perform CTA to r/o PE given CKD and VQ scan deferred at this time per Dr. Louie will likely not yield additional information given multiple lung processes occurring at the same time.   -Started Methylprednisolone 40mg IV q 8hrs (? date - he will determine based on response) on 1/20  -Continue to monitor O2 sat and respiratory status. Titrate to Nasal Cannula when clinically improved.  -Continue ABX/steroids as above Likely multifactorial given CHF, COPD and CAP  -O2 sat 80s on RA  -LE Duplex 1/19/2021 negative for DVT  -Unable to perform CTA to r/o PE given CKD and VQ scan deferred at this time per Dr. Louie will likely not yield additional information given multiple lung processes occurring at the same time.   -Started Methylprednisolone 40mg IV q 8hrs on 1/20 (? date - he will determine based on response)   -REPEAT COVID swab sent 1/21 - f/u results and maintain droplet airbone precautions in meantime  -NRB changed to HFNC on 1/21 for patient comfort per Dr. Cuevas  -Continue ABX/steroids/HFNC as above Likely multifactorial given CHF, COPD and CAP  -O2 sat 80s on RA  -LE Duplex 1/19/2021 negative for DVT  -Unable to perform CTA to r/o PE given CKD and VQ scan deferred at this time per Dr. Louie will likely not yield additional information given multiple lung processes occurring at the same time.   -Started Methylprednisolone 40mg IV q 8hrs on 1/20 (? date - he will determine based on response)   -REPEAT COVID swab sent 1/21 negative (remains high suspicion)  -NRB changed to HFNC on 1/21 for patient comfort per Dr. Cuevas  -Continue ABX/steroids/HFNC as above Known to Dr. Louis Olmedo/Dr. Cuevas. Currently on HFNC  - Continue Symbicort inhaler BID and Duonebs  -Robutussin/Benonatate PRN for cough  -Not currently on Home Oxygen- Assess O2 on RA once treatment for CAP is complete and respiratory status improves.

## 2021-01-21 NOTE — PROGRESS NOTE ADULT - SUBJECTIVE AND OBJECTIVE BOX
PUD CCM    INTERVAL HPI/OVERNIGHT EVENTS:    Still requiring nonrebreather and hypoxic on 6 L oxygen.  She is in her room and took off her oxygen, saturatiion now at 67.  She states that she wants to go home.  ESR noted.  Steroids are being admiinstered  Hopefully her Aa gradient will decrease.  CT reviewed again with radiologist - ILD, sarcoidosis, HP, NSIP and miscellaneous are in the differential.  Peribronchovascular reticular pattern and ground glass nodularities.  Urine at -3.5 L  All new data reviewed, including VS, lab, imaging, Rx and documentation.    PAST MEDICAL & SURGICAL HISTORY:  Essential hypertension  Hypertension    Type 2 diabetes mellitus  Diabetes    Hyperlipidemia  Hyperlipidemia    Disorder of kidney and ureter  Renal insufficiency    Peripheral vascular disease  PVD (peripheral vascular disease)    Anxiety state  Anxiety    Atherosclerosis of renal artery  with resulting stent placement    Atherosclerosis of renal artery  Renal artery stenosis    FAMILY HISTORY:  Family history of ischemic heart disease  Family history of coronary artery disease.    SOCIAL HISTORY:    REVIEW OF SYSTEMS:  Constitutional: (+ weight change, (-) fever,  () chills, () fatigue, (-) night sweats  Eyes: (-) discharge, () eye pain, () visual change  ENT:  (-) hearing difficulty, () vertigo, () sinus pain,  () throat pain, () epistaxis, () dysphagia, () hoarseness  Neck: (-) pain, () stiffness, () swelling  Respiratory: (+++) cough, () wheezing, () hemoptysis      Cardiovascular: (-) chest pain, ()palpitations, () dizziness   Gastrointestinal: (-) abdominal pain, () nausea, () vomiting, () hematemesis, () diarrhea,  () constipation, () melena  Genitourinary:  (-) dysuria, () frequency, () hematuria, () incontinence      Neurologic: (-) headache, () memory loss, () loss of strength, () numbness, () tremor     Skin: (-) itching, () burning, () rash, () lesions   Lymphatic: (-) enlarged lymph nodes  Endocrine: (-) hair loss, () temperature intolerance         Musculoskeletal: (-) back pain, () joint pain,  () extremity pain  Psychiatric: (-) visual change, () auditory change, () depression, () anxiety, () suicidal  Sleep: (-) disorder, () insomnia, () sleep deprivation  Heme/Lymph: () easy bruising, () bleeding gums            Allergy and Immunologic: () hives, () eczema    Vital Signs Last 24 Hrs  T(C): 36.2 (19 Jan 2021 09:44), Max: 37.5 (18 Jan 2021 18:09)  T(F): 97.1 (19 Jan 2021 09:44), Max: 99.5 (18 Jan 2021 18:09)  HR: 74 (19 Jan 2021 08:44) (72 - 82)  BP: 148/62 (19 Jan 2021 08:44) (130/59 - 165/71)  BP(mean): --  RR: 24 (19 Jan 2021 08:44) (18 - 24)  SpO2: 94% (19 Jan 2021 08:44) (93% - 97%) ON NONREBREATHER    I&O's Detail    18 Jan 2021 07:01  -  19 Jan 2021 07:00  --------------------------------------------------------  IN:    IV PiggyBack: 250 mL    Oral Fluid: 1020 mL  Total IN: 1270 mL    OUT:    Voided (mL): 1700 mL  Total OUT: 1700 mL    Total NET: -430 mL      19 Jan 2021 07:01  -  19 Jan 2021 11:48  --------------------------------------------------------  IN:    IV PiggyBack: 100 mL  Total IN: 100 mL    OUT:    Voided (mL): 200 mL  Total OUT: 200 mL    Total NET: -100 mL          PHYSICAL EXAM:    Well nourished, well developed, comfortable, - acute distress; vital signs are monitored continuously  Eyes: PERRLA, EOMI, -conjunctivitis, -scleritis   Head: no focal deficit, normocephalic,  no trauma  ENMT: moist tongue, no thrush, -nasal discharge, -hoarseness, normal hearing, -cough, -hemoptysis, trachea midline  Neck: supple, - lymphadenopathy,  -masses, -JVD  Respiratory: bilateral diminished breath sounds, -wheezing, -rhonchi, -rales, -crackles  Chest: -accessory muscle use, -paradoxical breathing  Cardiovascular: regular rate and sinus rhythm, S1 S2 normal, -S3, -S4, -murmur, -gallop, -rub  Gastrointestinal: soft, nontender, nondistended, normal bowel sounds, no hepatosplenomegaly  Genitourinary: -flank pain, -dysuria  Extremities: -clubbing, -cyanosis, -edema    Vascular: peripheral pulses palpable 2+ bilaterally  Neurological: alert, oriented x 3, no focal deficit, + tremor  Skin: warm, dry, -erythema, iv sites intact  Lymph nodes; no cervical, supraclavicular or axillary adenopathy  Psychiatric: agitated      MEDICATIONS  (STANDING):  albuterol/ipratropium for Nebulization 3 milliLiter(s) Nebulizer every 6 hours  amLODIPine   Tablet 10 milliGRAM(s) Oral daily  atorvastatin 20 milliGRAM(s) Oral at bedtime  azithromycin  IVPB 500 milliGRAM(s) IV Intermittent every 24 hours  budesonide  80 MICROgram(s)/formoterol 4.5 MICROgram(s) Inhaler 2 Puff(s) Inhalation two times a day  donepezil 5 milliGRAM(s) Oral at bedtime  fluticasone propionate 50 MICROgram(s)/spray Nasal Spray 1 Spray(s) Both Nostrils two times a day  heparin   Injectable 5000 Unit(s) SubCutaneous every 12 hours  influenza  Vaccine (HIGH DOSE) 0.7 milliLiter(s) IntraMuscular once  iron sucrose IVPB 200 milliGRAM(s) IV Intermittent every 24 hours  levothyroxine 50 MICROGram(s) Oral daily  metoprolol tartrate 50 milliGRAM(s) Oral two times a day  piperacillin/tazobactam IVPB.. 2.25 Gram(s) IV Intermittent every 6 hours  sevelamer carbonate 800 milliGRAM(s) Oral three times a day with meals    MEDICATIONS  (PRN):  benzonatate 100 milliGRAM(s) Oral every 8 hours PRN Cough  guaiFENesin   Syrup  (Sugar-Free) 100 milliGRAM(s) Oral every 6 hours PRN Cough      Allergies    No Known Allergies    Intolerances        LABS:                        8.3    11.47 )-----------( 230      ( 19 Jan 2021 07:05 )             27.2     01-19    136  |  99  |  70<H>  ----------------------------<  114<H>  4.5   |  22  |  3.84<H>    Ca    8.5      19 Jan 2021 07:05  Phos  5.0     01-19  Mg     1.8     01-19    TPro  5.9<L>  /  Alb  2.7<L>  /  TBili  x   /  DBili  x   /  AST  x   /  ALT  x   /  AlkPhos  x   01-18    +DVT prophylaxis  5000 q12  +Sleep  NO ISSUES   +Nutrition goals  POOR APPETITE  -Pain DENIED  -Decubital ulcer  +GI prophylaxis (PPV, coagulopathy, Hx)  +Aspiration prophylaxis (45 degrees)  Ventilator synchronized   MAY NEED BiPAP  Tracheal cuff pressure  +Sedation/analgesia stopped once  +ID (phos, CH, mupi, SB)  -Delirium  +Cardiac Beta/ACEI-ARB on hold/ASA on hold/statin  +Prevention  +Education  +Medication reviewed (drug-drug interactions, PDA)  Medical devices  URINE SPONGE, NONREBREATHER  Discussed with Justin Brunner, PA, CCRN    RADIOLOGY & ADDITIONAL STUDIES:    CXR with bilateral infiltrates, no change    EXAM:  CT CHEST                          PROCEDURE DATE:  10/14/2020      INTERPRETATION:  CT SCAN OF CHEST    History: 85-year-old female with shortness of breath and pleural effusion    Technique: CT scan of chest performed from lung apices through lung bases. Axial, coronal, and sagittal multiplanar reformatted images were produced. Thin section axial images and axial MIPS were also produced. Intravenous contrast material was not administered, as ordered.    Comparison: Chest radiograph dated 10/13/2020. No prior thoracic CT examinations.    Findings:    Lungs and large airways: Normal.    Pleura:  There are small bibasilar pleural effusions right greater than left. There is interlobular septal thickening which extends into the interior of the lung to suggest a component of pulmonary venous congestion. Moreover there are peripheral reticular opacities with some associated groundglass component within the peripheral aspects of both lungs. More peripheral groundglass and consolidation is seen within the right upper left upper and right middle lobes. There is some pulmonary fibrosis with traction bronchiectasis and bronchiolectasis without jesús honeycomb lung formation. There is air trapping.    Mediastinum and hilar regions: Increased number of mildly enlarged prevascular (1.1 cm in short axis), right paratracheal (1.1 cm in short axis), and subcarinal (1.7 cm in short axis).    Heart and pericardium:  Cardiomegaly. No pericardial effusion. Extensive calcification of the mitral annulus.    Vessels:  Severe coronary artery calcification/stents. Severe callus  5. Atheromatous plaque formation throughout the aortic arch descending and proximal abdominal aorta and major side branches with extensive calcification of the origin of the renal arteries right greater than left. Calcification of the origin of the brachycephalic vessels are noted. No evidence of aneurysm    Chest wall and lower neck:  Punctate microcalcifications are noted in the breasts bilaterally left greater than right. Clinical and mammographic correlation.    Upper abdomen: Cholelithiasis. Extensive arterial vascular calcification of the major side branches of the abdominal aorta as above.    Bones: Moderate multilevel degenerative disc disease involving the lower thoracic spine. Poststernotomy.      Impression:    1. Cardiomegaly. Small bibasilar pleural effusions and interlobular septal thickening compatible with pulmonary venous congestion.  2. More peripheral reticular, groundglass and some patchy areas of peripheral consolidation more pronounced in the upper and midlung zones are noted. There may be some bronchiectasis/bronchiolectasis to suggest pulmonary fibrosis. No honeycomb lung formation. Air-trapping is noted. These findings are not consistent with UIP/IPF. Abbreviated differential includes atypical infectious and/or inflammatory etiologies such as chronic hypersensitivity pneumonia, stage IV sarcoidosis, pneumoconiosis, and subacute and/or chronic sequela of atypical and viral pneumonias such as Covid 19.  3. Mild mediastinal lymphadenopathy.    EXAM:  CT CHEST                          PROCEDURE DATE:  01/19/2021      INTERPRETATION:  CT of the CHEST without intravenous contrast dated 1/19/2021 2:31 PM    INDICATION: Worsening Hypoxia    TECHNIQUE: CT of the chest was performed withoutintravenous contrast.  Intravenous contrast was not used Axial and coronal and sagittal images were produced and reviewed.    PRIOR STUDIES: CT chest 10/14/2020    FINDINGS: Cardiomegaly as before. Dense mitral valve annular calcification. Coronary artery calcifications. Status post CABG. The main pulmonary artery measures 3.1 cm diameter.  No pericardial effusion is seen.  Evaluation of the vasculature is limited without intravenous contrast, but the great vessels are grossly unremarkable.  Evaluation for adenopathy is limited without intravenous contrast. Within that limitation, mild mediastinal lymphadenopathy is seen. No axillary adenopathy. Abandoned epicardial pacer wire. Low-density of cardiac chambers relative to the myocardium suggesting anemia.    Small bilateral pleural effusions are seen. Evaluation of the pulmonary parenchyma demonstrates underlying interstitial fibrosis in the upper and mid zones with bilateral mosaic attenuation. There is now new bilateral groundglass opacities  in the upper and mid and lower zones with thickening of interlobular septa at the bilateral upper zones i.e. crazy paving  appearance.. Coalescence of findings with consolidation seen in the superior segment right lower lobe.    Limited evaluation of the upper abdomen demonstrates radiopaque cholelithiasis.    Evaluation of the osseous structures demonstrates degenerative changes.      IMPRESSION: New bilateral groundglass lung opacities superimposed on chronic interstitial pulmonary fibrosis probably due to chronic hypersensitivity pneumonitis or sarcoid. The groundglass lung opacities could  be due to pulmonary edema, pulmonary hemorrhage, alveolar proteinosis, organizing pneumonia or atypical infection.      Assessment and Plan:    Problem/Plan - 1:  ·  Problem: Hypoxia.  Plan: Now on nonrebreather. New infiltrates may respond to steroids. Very high ESR noted.  Etiology unclear, lungs are well aerated. Mild rales RLL.  Hopefully her bilateral lung disease will respond to steroids.     Problem/Plan - 2:  ·  Problem: Anemia. Will get PRBC with iv lasix. Will discuss EPO.     Problem/Plan - 3:  ·  Problem: NAV (acute kidney injury).  Plan: Furosemide on hold.      Problem/Plan - 4:  ·  Problem: History of subarachnoid hemorrhage. New CT without bleed.     Problem/Plan - 5:  ·  Problem: Type 2 diabetes mellitus.      Problem/Plan - 6:  Problem: COPD (chronic obstructive pulmonary disease). Plan: Continue bronchodilators. IV steroid will help also.     Problem/Plan - 7:  ·  Problem: CAD (coronary artery disease).  Plan: BB. ASA 81     Problem/Plan - 8:  ·  Problem: Acute on chronic diastolic congestive heart failure.      Problem/Plan - 9:  ·  Problem: CAP (community acquired pneumonia).  Plan: Bilateral infiltrates are severe. Had vanco and pip/tazo.     Problem/Plan - 10:  Problem: Cough. Plan; Perhaps from pulmonary fibrosis also. Nonproductive cough.

## 2021-01-21 NOTE — DISCHARGE NOTE PROVIDER - NSDCFUADDAPPT_GEN_ALL_CORE_FT
As per office of your Cardiology Provider, Dr. Hermes Crooks, the office will contact patient directly to schedule an appointment within 1 week of discharge.    Appointment was facilitated by Ms. GEM Sellers, Referral Coordinator. As per office of your Cardiology Provider, Dr. Hermes Crooks, the office will contact patient directly to schedule an appointment within 1 week of discharge. (1) As per office of your Cardiology Provider, Dr. Hermes Crooks, the office will contact patient directly to schedule an appointment within 1 week of discharge.    Appointment was facilitated by Ms. GEM Sellers, Referral Coordinator.    (2) Please follow up with your Nephrology Provider, Dr. Babak Way at [address] [place] [address] on 02/23/2021.  Please bring your Insurance card, Photo ID and Discharge paperwork to your appointment.    Appointment was scheduled by Ms. GEM Sellers, Referral Coordinator.

## 2021-01-21 NOTE — PROGRESS NOTE ADULT - PROBLEM SELECTOR PLAN 3
-Known to Dr. Nicolas. Renal following  -Baseline Cr ~3.0, Non-oliguric NAV on CKD Stage IV  -Daily BMP + urine lytes (Na, Cr, Urea, Osm), Phos  -Continue: Renagel 800 mg PO TID  -F/u PETRONA serum + immunofixation urine, DINA, ANCA, C3/4, anti-PLA2R, Hep Panel  -Renal US: atrophic kidneys, no WILBER  -Diuresis initiated as above per renal -Known to Dr. Nicolas. Renal following  -Baseline Cr ~3.0, Non-oliguric NAV on CKD Stage IV  -Daily BMP + urine lytes (Na, Cr, Urea, Osm), Phos  -Continue: Renagel 1600 mg PO TID  -F/u PETRONA serum + immunofixation urine, DINA, ANCA, C3/4, anti-PLA2R, Hep Panel  -Renal US: atrophic kidneys, no WILBER  -Diuresis initiated as above per renal Likely multifactorial given CHF, COPD and CAP  -O2 sat 80s on RA  -LE Duplex 1/19/2021 negative for DVT  -Unable to perform CTA to r/o PE given CKD and VQ scan deferred at this time per Dr. Louie will likely not yield additional information given multiple lung processes occurring at the same time.   -Started Methylprednisolone 40mg IV q 8hrs on 1/20 (? date - he will determine based on response)   -REPEAT COVID swab sent 1/21 negative (remains high suspicion and moved to single room)  -NRB changed to HFNC on 1/21 for patient comfort per Dr. Cuevas  -Per Dr. Medina, if patient does not improve on HFNC by 1/22, should discuss with Dr. Cuevas on obtaining MICU or CCU consult    -Continue ABX/steroids/HFNC as above

## 2021-01-21 NOTE — DISCHARGE NOTE PROVIDER - PROVIDER TOKENS
PROVIDER:[TOKEN:[[id number]]] PROVIDER:[TOKEN:[[id number]]],PROVIDER:[TOKEN:[[id number]]] PROVIDER:[TOKEN:[[id number]],FOLLOWUP:[1 week],ESTABLISHEDPATIENT:[T]],PROVIDER:[TOKEN:[[id number]],FOLLOWUP:[1 week],ESTABLISHEDPATIENT:[T]],PROVIDER:[TOKEN:[[id number]],FOLLOWUP:[2 weeks],ESTABLISHEDPATIENT:[T]] PROVIDER:[TOKEN:[[id number]],FOLLOWUP:[1 week],ESTABLISHEDPATIENT:[T]],PROVIDER:[TOKEN:[[id number]],FOLLOWUP:[1 week],ESTABLISHEDPATIENT:[T]],PROVIDER:[TOKEN:[[id number]],FOLLOWUP:[2 weeks],ESTABLISHEDPATIENT:[T]],PROVIDER:[TOKEN:[[id number]],FOLLOWUP:[1 month]] PROVIDER:[TOKEN:[[id number]],FOLLOWUP:[1 week]],PROVIDER:[TOKEN:[[id number]],FOLLOWUP:[1 week],ESTABLISHEDPATIENT:[T]],PROVIDER:[TOKEN:[[id number]],FOLLOWUP:[1 month]],PROVIDER:[TOKEN:[[id number]],SCHEDULEDAPPT:[02/23/2021],SCHEDULEDAPPTTIME:[11:00 AM]]

## 2021-01-21 NOTE — DISCHARGE NOTE PROVIDER - INSTRUCTIONS
Please continue to follow a heart healthy diet, low in sodium, cholesterol and fats. Please limit to 1.5L of fluids per day

## 2021-01-21 NOTE — DISCHARGE NOTE PROVIDER - NSDCCPCAREPLAN_GEN_ALL_CORE_FT
PRINCIPAL DISCHARGE DIAGNOSIS  Diagnosis: End stage renal disease  Assessment and Plan of Treatment:       SECONDARY DISCHARGE DIAGNOSES  Diagnosis: COPD (chronic obstructive pulmonary disease)  Assessment and Plan of Treatment:     Diagnosis: CHF (congestive heart failure)  Assessment and Plan of Treatment:     Diagnosis: Hypoxia  Assessment and Plan of Treatment:      PRINCIPAL DISCHARGE DIAGNOSIS  Diagnosis: End stage renal disease  Assessment and Plan of Treatment: You were found to have abnormally high kidney levels where you were required to start dialysis during this hospitalization. You had a dialysis catheter placed to the right chest wall. You will continue dialysis as an outpatient on Tues, Thurs, and Saturdays. Follow up with Dr Nicolas for further management.      SECONDARY DISCHARGE DIAGNOSES  Diagnosis: Acute on chronic heart failure with preserved ejection fraction (HFpEF)  Assessment and Plan of Treatment:     Diagnosis: COPD (chronic obstructive pulmonary disease)  Assessment and Plan of Treatment:     Diagnosis: Hypoxia  Assessment and Plan of Treatment:      PRINCIPAL DISCHARGE DIAGNOSIS  Diagnosis: Acute on chronic heart failure with preserved ejection fraction (HFpEF)  Assessment and Plan of Treatment: You have a weak heart, also known as Congestive Heart Failure (CHF). Heart failure is a condition in which the heart does not pump or fill with blood well. As a result, the heart lags behind in its job of moving blood throughout the body. This can lead to symptoms such as swelling, trouble breathing, and feeling tired. Your Ejection Fraction (EF) is _______, a normal EF is 55-60%. Please continue ___________ exactly as listed. Avoid drinking more than 1.5L of fluid daily. Maintain a low salt diet (2grams daily) and weigh yourself daily. For any significant increases in daily weight of 2lbs/day with associated swelling in the legs or abdomen with shortness of breath, please call your doctor or go to the emergency room. Follow up with  ___ in 1 week.      SECONDARY DISCHARGE DIAGNOSES  Diagnosis: End stage renal disease  Assessment and Plan of Treatment: You were found to have abnormally high kidney levels where you were required to start dialysis during this hospitalization. You had a dialysis catheter placed to the right chest wall. You will continue dialysis as an outpatient on Tues, Thurs, and Saturdays. Follow up with Dr Nicolas for further management.    Diagnosis: COPD (chronic obstructive pulmonary disease)  Assessment and Plan of Treatment:      PRINCIPAL DISCHARGE DIAGNOSIS  Diagnosis: Acute on chronic heart failure with preserved ejection fraction (HFpEF)  Assessment and Plan of Treatment: You have a weak heart, also known as Congestive Heart Failure (CHF). Heart failure is a condition in which the heart does not pump or fill with blood well. As a result, the heart lags behind in its job of moving blood throughout the body. This can lead to symptoms such as swelling, trouble breathing, and feeling tired. Your Ejection Fraction (EF) is 53%, a normal EF is 55-60%.   -Please continue ___________ exactly as listed.  -Please STOP METOPROLOL and START CARVEDILOL 12.5mg twice a day  Avoid drinking more than 1.5L of fluid daily. Maintain a low salt diet (2grams daily) and weigh yourself daily. For any significant increases in daily weight of 2lbs/day with associated swelling in the legs or abdomen with shortness of breath, please call your doctor or go to the emergency room.  Follow up with Dr. Crooks in 1 week, her office will be calling you to set up an appointment.      SECONDARY DISCHARGE DIAGNOSES  Diagnosis: End stage renal disease  Assessment and Plan of Treatment: You were found to have abnormally high kidney levels where you were required to start dialysis during this hospitalization. You had a dialysis catheter placed to the right chest wall. You will continue dialysis as an outpatient on Tues, Thurs, and Saturdays. Follow up with Dr Nicolas for further management in 1 week.    Diagnosis: Pneumonia  Assessment and Plan of Treatment: You were treated for a possible pneumonia infection or pneumonitis (inflammation of the lungs) with IV Antibiotics, which you have now completed. Please continue Prednisone 20mg daily for 1 month and follow up with your Pulmonologist Dr. Olmedo in 2 weeks.    Diagnosis: COPD (chronic obstructive pulmonary disease)  Assessment and Plan of Treatment: Please continue to use your oxygen tank as needed for shortness of breath or when your oxygen is below 96%. Please continue Prednisone 20mg once a day for at least 1 month, and follow up with your Pulmonologist Dr. Olmedo in 2 weeks.    Diagnosis: UTI (urinary tract infection)  Assessment and Plan of Treatment: While you were in the hospital, you were found to have a urinary tract infection as well as a fungal infection. You completed 3 days of Monistat for the fungal infection. Please CONTINUE AUGMENTIN 500mg TWICE A DAY UNTIL 2/12/21. If you experience any fevers/chills, burning with urination and abdominal pain, please follow up with your primary care physician.    Diagnosis: Hypertension  Assessment and Plan of Treatment: Please continue  ___ as listed to keep your blood pressure controlled. For blood pressure that is too high or too low please see your doctor or go to the emergency room as necessary.    Diagnosis: Diabetes mellitus  Assessment and Plan of Treatment: Your Hemoglobin A1c is 5.1% and your goal A1c is less than 7.0%. This number measures your average blood sugar level over the last three months. Please continue  ____ as listed for diabetes. Maintain a low carbohydrate, low sugar diet, exercise, monitor your fingerstick blood sugars regarly and follow up with your Endocrinologist Dr. Washington in 1 month.    Diagnosis: Anemia  Assessment and Plan of Treatment: While you were in the hospital, you were found to be anemic and received 2 units of blood on 1/27/21 and 1 unit of blood on 1/20/21 with improvement and stable hemoglobin.    Diagnosis: Hypothyroid  Assessment and Plan of Treatment: Please continue Levothyroxine 50mcg daily and follow up the Endocrinologist Dr. Washington in 1 month.     PRINCIPAL DISCHARGE DIAGNOSIS  Diagnosis: Acute on chronic heart failure with preserved ejection fraction (HFpEF)  Assessment and Plan of Treatment: You have a weak heart, also known as Congestive Heart Failure (CHF). Heart failure is a condition in which the heart does not pump or fill with blood well. As a result, the heart lags behind in its job of moving blood throughout the body. This can lead to symptoms such as swelling, trouble breathing, and feeling tired. Your Ejection Fraction (EF) is 53%, a normal EF is 55-60%.   -Please STOP LASIX (FUROSEMIDE) until you see your Cardiologist  -Please STOP METOPROLOL and START CARVEDILOL 12.5mg twice a day  Avoid drinking more than 1.5L of fluid daily. Maintain a low salt diet (2grams daily) and weigh yourself daily. For any significant increases in daily weight of 2lbs/day with associated swelling in the legs or abdomen with shortness of breath, please call your doctor or go to the emergency room.  Follow up with Dr. Crooks in 1 week, her office will be calling you to set up an appointment.      SECONDARY DISCHARGE DIAGNOSES  Diagnosis: End stage renal disease  Assessment and Plan of Treatment: You were found to have abnormally high kidney levels where you were required to start dialysis during this hospitalization. You had a dialysis catheter placed to the right chest wall. You will continue dialysis as an outpatient on Tues, Thurs, and Saturdays. Follow up with Dr Nicolas for further management in 1 week.  -Please CONTINUE RENVELA 800mg THREE TIMES A DAY    Diagnosis: Pneumonia  Assessment and Plan of Treatment: You were treated for a possible pneumonia infection or pneumonitis (inflammation of the lungs) with IV Antibiotics, which you have now completed. Please continue Prednisone 20mg daily for 1 month and follow up with your Pulmonologist Dr. Olmedo in 2 weeks.    Diagnosis: COPD (chronic obstructive pulmonary disease)  Assessment and Plan of Treatment: Please continue to use your oxygen tank as needed for shortness of breath or when your oxygen is below 96%. Please continue Prednisone 20mg once a day for at least 1 month, and follow up with your Pulmonologist Dr. Olmedo in 2 weeks.    Diagnosis: UTI (urinary tract infection)  Assessment and Plan of Treatment: While you were in the hospital, you were found to have a urinary tract infection as well as a fungal infection. You completed 3 days of Monistat for the fungal infection. Please CONTINUE AUGMENTIN 500mg TWICE A DAY UNTIL 2/12/21. If you experience any fevers/chills, burning with urination and abdominal pain, please follow up with your primary care physician.    Diagnosis: Hypertension  Assessment and Plan of Treatment: Please continue  INCREASE YOUR AMLODIPINE TO 10MG DAILY AND CONTINUE CARVEDILOL 12.5MG TWICE A DAY as listed to keep your blood pressure controlled. For blood pressure that is too high or too low please see your doctor or go to the emergency room as necessary.    Diagnosis: Diabetes mellitus  Assessment and Plan of Treatment: Your Hemoglobin A1c is 5.1% and your goal A1c is less than 7.0%. This number measures your average blood sugar level over the last three months. Please START PRANDIN 1MG THREE TIMES A DAY WITH MEALS WHILE YOU ARE TAKING THE STEROIDS. Maintain a low carbohydrate, low sugar diet, exercise, monitor your fingerstick blood sugars regarly and follow up with your Endocrinologist Dr. Washington in 1 month.    Diagnosis: Anemia  Assessment and Plan of Treatment: While you were in the hospital, you were found to be anemic and received 2 units of blood on 1/27/21 and 1 unit of blood on 1/20/21 with improvement and stable hemoglobin.    Diagnosis: Hypothyroid  Assessment and Plan of Treatment: Please continue Levothyroxine 50mcg daily and follow up the Endocrinologist Dr. Washington in 1 month.

## 2021-01-21 NOTE — PROGRESS NOTE ADULT - PROBLEM SELECTOR PLAN 9
-Continue Aricept 5mg PO daily    FULL CODE  VTE PPx: on Heparin subcut  PT consulted: home PT   Dispo: Pending clinical progression-renal function   Case d/w Dr. Crooks -Continue Aricept 5mg PO daily    #Anxiety  -1mg Valium QD at bedtime    FULL CODE  VTE PPx: on Heparin subcut  PT consulted: home PT   Dispo: Pending clinical progression-renal function   Case d/w Dr. Crooks

## 2021-01-21 NOTE — DISCHARGE NOTE PROVIDER - HOSPITAL COURSE
INCOMPLETE    86 yr old F, POOR HISTORIAN/early dementia, former heavy smoker with PMHx HTN, hyperlipidemia, COPD, hypothyroidism, Stage IV CKD (baseline Cr ~3.0), anemia of chronic disease, CAD s/p CABG 2015 Dr. Blunt, chronic diastolic CHF(EF: 55% by Echo 10/2020), renal artery stenosis s/p renal artery stent >20yrs ago, Carotid artery stenosis, PAD, who presents to [facility] ED 1/15/21 c/o progressively worsening SOB and cough x 3 days, admitted to cardiac telemetry for management of acute on chronic diastolic CHF exacerbation in setting of CAP.     # Acute on chronic diastolic congestive heart failure  -Successfully diuresed based of renal recommendations with a total urine output of ____L  -Echo 1/20/21: LVEF 53%, Biatrial enlargement. Mild aortic regurgitation. Moderate-to-severe mitral regurgitation. Moderate-to-severe tricuspid regurgitation. Pulmonary artery systolic pressure is 70 mmHg.  -______? Structural consult  -Continue: Lopressor 50mg BID and _____ (diuretic) on discharge  -PT recommended: ______  -Appointment made for ______ with Dr. Crooks    # CAP (community acquired pneumonia)  -Initially started on Azithromycin and Ceftriaxone, however Ceftriaxone discontinued to add Zosyn for Pseudomonas coverage.  -Completed x7day course of Azithromycin 500mg QD on 1/21 and Zosyn 2.25mg IV q 6hrs on 1/25  -Dr. Cuevas (pulm) following on admission    # Acute respiratory failure with hypoxia.   -Likely multifactorial given CHF, COPD, CAP and interstitial lung disease  -CT chest 1/19/21: New bilateral groundglass lung opacities superimposed on chronic interstitial pulmonary fibrosis probably due to chronic hypersensitivity pneumonitis or sarcoid. The groundglass lung opacities could be due to pulmonary edema, pulmonary hemorrhage, alveolar proteinosis, organizing pneumonia or atypical infection.  -LE Duplex 1/19/2021 negative for DVT  -Unable to perform CTA to r/o PE given CKD and VQ scan deferred at this time per Dr. Louie will likely not yield additional information given multiple lung processes occurring at the same time.   -Started Methylprednisolone 40mg IV q 8hrs on 1/20 (last day _____)  -COVID negative x2 on admission   -Initially placed on 6L NC, NRB and HFNC. Weened down to _____ on discharge.     # COPD (chronic obstructive pulmonary disease)  -Known to Dr. Cuevas/Honorio as outpatient  -Continue Symbicort inhaler BID and Duonebs  -______??O2 on discharge    # CKD (chronic kidney disease), stage IV.    -Known to Dr. Nicolas -- Baseline Cr ~3.0. Non-oliguric NAV on CKD Stage IV  -Cr ____ on discharge  -Renal US 1/17/21: atrophic kidneys, no WILBER  -Continue: Renagel 1600 mg PO TID on discharge     # CAD (coronary artery disease)  - Hx of CABG in 2015 with Dr. Blunt  - EKG: SR @67BPM w/ incomplete LBBB, TWI in I, AVL (similar to prior EKG 10/2020)  - C/w lopressor 50mg BID, Atorvastatin 20mg QD and 81mg ASA QD on discharge    # SVETLANA  -Baseline Hgb 8-9 with SVETLANA  -IV iron held in setting of CAP   -Stool for occult blood negative 1/18  -S/p 1U PRBC x1 on 1/20 with appropriate response  -Hb ____ on discharge    # History of subarachnoid hemorrhage  -Hx of SAH x3 wks ago at [facility]   -[facility] 1/19: negative for acute changes  -Per neurosurgery, cleared to start 81mg ASA QD and should continue on admission/discharge     # Dementia  -Continue Aricept 5mg PO daily   INCOMPLETE  86F poor historian/early dementia and former heavy smoker with PMHx DM, HTN, hyperlipidemia, COPD, hypothyroidism, CKD (baseline Cr ~3.0), anemia of chronic disease, CAD s/p CABG 2015, chronic diastolic CHF (EF 55% via echo 10/2020), renal artery stenosis (s/p stent 20+ years ag), carotid artery stenosis, PAD, who presented to [facility] ED on 1/15 c/o progressively worsening SOB and cough x 3 days. Can barely walk 10 feet prior to having to stop and rest. Reports significant orthopnea and she has been sleeping upright in a chair the past week. Patient was instructed to increase her Lasix 80mg PO daily dose to Lasix 80mg PO BID and start Metalazone 10mg PO BID prior to each dose by her cardiologist Dr. Horvath. Per daughter, medication compliance has been questionable over the last few months, as patient is becoming more forgetful.      Admitted to cardiac telemetry for management of acute on chronic diastolic CHF exacerbation in setting of CAP. Initial procal 0.29 but increased to 1.84 on 1/22. For CAP, patient completed 7 day course of Azithromycin and will continue Zosyn 2.25mg IV q6hrs (renal dose) through 1/25/21 due to no symptomatic improvement. Course c/b anemia s/p 1U PRBC on 1/20/21, w/ improvement and maintenance of Hgb 8-9. Regarding respiratory status, pt initially low 80s on RA and required HFNC, currently weaned to 6L NC. Regarding CHF and NAV on CKD, pt still overloaded with inadequate response to Lasix IV. 1/22 received Lasix 100mg IV w/ minimal urinary output. In discussion w/ renal, administered another Lasix 100mg IV x1 w/ Metolazone 5mg PO x1 prior to Lasix dose. Upon discussion w/ Dr. Harmon, patient may require new HD this admission for fluid removal; therefore, patient upgraded to CCU for closer monitoring. While in CCU she received 2 doses of lasix 100 mg IV q12h with UOP averaging 36 cc/hr (goal 27 cc/hr). Decision was made to hold further diuresis given                # Acute on chronic diastolic congestive heart failure  -Successfully diuresed based of renal recommendations with a total urine output of ____L  -Echo 1/20/21: LVEF 53%, Biatrial enlargement. Mild aortic regurgitation. Moderate-to-severe mitral regurgitation. Moderate-to-severe tricuspid regurgitation. Pulmonary artery systolic pressure is 70 mmHg.  -______? Structural consult  -Continue: Lopressor 50mg BID and _____ (diuretic) on discharge  -PT recommended: ______  -Appointment made for ______ with Dr. Crooks    # CAP (community acquired pneumonia)  -Initially started on Azithromycin and Ceftriaxone, however Ceftriaxone discontinued to add Zosyn for Pseudomonas coverage.  -Completed x7day course of Azithromycin 500mg QD on 1/21 and Zosyn 2.25mg IV q 6hrs on 1/25  -Dr. Cuevas (pul) following on admission    # Acute respiratory failure with hypoxia.   -Likely multifactorial given CHF, COPD, CAP and interstitial lung disease  -CT chest 1/19/21: New bilateral groundglass lung opacities superimposed on chronic interstitial pulmonary fibrosis probably due to chronic hypersensitivity pneumonitis or sarcoid. The groundglass lung opacities could be due to pulmonary edema, pulmonary hemorrhage, alveolar proteinosis, organizing pneumonia or atypical infection.  -LE Duplex 1/19/2021 negative for DVT  -Unable to perform CTA to r/o PE given CKD and VQ scan deferred at this time per Dr. Louie will likely not yield additional information given multiple lung processes occurring at the same time.   -Started Methylprednisolone 40mg IV q 8hrs on 1/20 (last day _____)  -COVID negative x2 on admission   -Initially placed on 6L NC, NRB and HFNC. Weened down to _____ on discharge.     # COPD (chronic obstructive pulmonary disease)  -Known to Dr. Cuevas/Honorio as outpatient  -Continue Symbicort inhaler BID and Duonebs  -______??O2 on discharge    # CKD (chronic kidney disease), stage IV.    -Known to Dr. Harmon -- Baseline Cr ~3.0. Non-oliguric NAV on CKD Stage IV  -Cr ____ on discharge  -Renal US 1/17/21: atrophic kidneys, no WILBER  -Continue: Renagel 1600 mg PO TID on discharge     # CAD (coronary artery disease)  - Hx of CABG in 2015 with Dr. Blunt  - EKG: SR @67BPM w/ incomplete LBBB, TWI in I, AVL (similar to prior EKG 10/2020)  - C/w lopressor 50mg BID, Atorvastatin 20mg QD and 81mg ASA QD on discharge    # SVETLANA  -Baseline Hgb 8-9 with SVETLANA  -IV iron held in setting of CAP   -Stool for occult blood negative 1/18  -S/p 1U PRBC x1 on 1/20 with appropriate response  -Hb ____ on discharge    # History of subarachnoid hemorrhage  -Hx of SAH [facility]3 wks ago at [facility]   -[facility] 1/19: negative for acute changes  -Per neurosurgery, cleared to start 81mg ASA QD and should continue on admission/discharge     # Dementia  -Continue Aricept 5mg PO daily   86F poor historian/early dementia and former heavy smoker with PMHx DM, HTN, hyperlipidemia, COPD, hypothyroidism, CKD (baseline Cr ~3.0), anemia of chronic disease, CAD s/p CABG 2015, chronic diastolic CHF (EF 55% via echo 10/2020), renal artery stenosis (s/p stent 20+ years ag), carotid artery stenosis, PAD, presented to [facility] ED on 1/15 c/o progressively worsening SOB and cough x 3 days. Prior to coming to ED, she was instructed to increase her Lasix 80mg PO daily dose to Lasix 80mg PO BID and start Metolazone 10mg PO BID prior to each dose by her cardiologist Dr. Horvath. Per daughter, medication compliance has been questionable over the last few months, as patient is becoming more forgetful. She was initially admitted to [address] for acute on chronic diastolic HF exacerbation likely in the setting of medication non-compliance and CAP and was stepped up to CCU for acute hypoxic respiratory failure 1/22 likely multifactorial 2/2 CHF, COPD, ILD and CAP vs. pneumonitis.    During hospitalization, SpO2 initially 80s on RA for which she required HFNC and intermittent BIPAP, was weaned to NC and is now on ______. LE Duplex negative for DVT and no CTA was done to r/o PE 2/2 CKD; VQ deferred as likely would not yield diagnostic info given multiple lung processes. CT Chest        She completed a 7 day course of Azithromycin and was started on Zosyn which was soon discontinued and she was placed on Cefepime 1gm IV daily for 10 days which she completed. She was followed by ID. Dr. Resendez and Pulm, Dr. Levi as she was also treated with steroids for hypersensitivity pneumonitis, which was TAPER and she is now on ____.  Repeat COVID swab negative 1/28.  RVP negative. MSSA positive and fungitell negative. Of note, as the steroids were tapered she developed leukocytosis for which a repeat UA was sent s/f yeast vaginitis and she completed a 3 day course of Monistat. Urine culture also sent s/f ______.  Repeat fungitell ____   She was diuresed with IV Lasix w/minimal urinary output and in discussion with Dr. Nicolas it was determined that she required new HD this admission for fluid removal and as she remained stable she was stepped back down to [address] for further mgmt 1/23/21. ECHO performed s/f EF 53%, biatrial enlargement, mild AR, mod-severe MR/TR, PAP 70. Renal US s/f atrophic kidneys, no WILBER; Nephrotic w/u non-contributory thus far, ANCA negative.  As patient remained oliguric w/up-trending BUN/Creatinine, worsening hyponatremia and elevated phos a tunneled catheter was placed on 1/26 and HD was initiated.  She received total of ___ dialysis sessions and on discharge her dialysis schedule will be ____. QuantiFeron was indeterminate so PPD placed on 1/30 which was negative (0 mm induration).   Her hospital course was also c/b anemia requiring 1U PRBC 1/20/21 and 2U PRBC 1/27/21 and her hgb on day of discharge is ____. She had no s/sx bleeding and has hx anemia chronic disease 2/2 CKD. She did have stool occult blood positive on 1/25, however, she reportedly has hx hemorrhoids.   Of note, psych consulted as patient frequently anxious requiring increased oxygen supplementation. As per recs ______. Endo was also following as she has hx DM and was on steroids. As per recs,, sh will be discharged on ____.              86F poor historian/early dementia and former heavy smoker with PMHx DM, HTN, hyperlipidemia, COPD, hypothyroidism, CKD (baseline Cr ~3.0), anemia of chronic disease, CAD s/p CABG 2015, chronic diastolic CHF (EF 55% via echo 10/2020), renal artery stenosis (s/p stent 20+ years ag), carotid artery stenosis, PAD, presented to [facility] ED on 1/15 c/o progressively worsening SOB and cough x 3 days. Prior to coming to ED, she was instructed to increase her Lasix 80mg PO daily dose to Lasix 80mg PO BID and start Metolazone 10mg PO BID prior to each dose by her cardiologist Dr. Horvath. Per daughter, medication compliance has been questionable over the last few months, as patient is becoming more forgetful. She was initially admitted to [address] for acute on chronic diastolic HF exacerbation likely in the setting of medication non-compliance and CAP and was stepped up to CCU for acute hypoxic respiratory failure 1/22 likely multifactorial 2/2 CHF, COPD, ILD and CAP vs. pneumonitis.    During hospitalization, SpO2 initially 80s on RA for which she required HFNC and intermittent BIPAP, was weaned to NC and is now on ______. LE Duplex negative for DVT and no CTA was done to r/o PE 2/2 CKD; VQ deferred as likely would not yield diagnostic info given multiple lung processes. CT Chest w/o contrast: b/l GGO superimposed on chronic interstitial pulm fibrosis probably d/t chronic hypersensitivity pneumonitis or sarcoid. GGOs could be due to pulmonary edema, pulmonary hemorrhage, alveolar proteinosis, organizing pneumonia or atypical infection. She completed a 7 day course of Azithromycin and was started on Zosyn which was soon discontinued and she was placed on Cefepime 1gm IV daily for 10 days which she completed. She was followed by ID. Dr. Resendez and Pulm, Dr. Levi as she was also treated with steroids for hypersensitivity pneumonitis, which was TAPER and she is now on ____.  Repeat COVID swab negative 1/28.  RVP negative. MSSA positive and fungitell negative. Of note, as the steroids were tapered she developed leukocytosis for which a repeat UA was sent s/f yeast vaginitis and she completed a 3 day course of Monistat. Urine culture also sent s/f ______.  Repeat fungitell ____     She was diuresed with IV Lasix w/minimal urinary output and in discussion with Dr. Nicolas it was determined that she required new HD this admission for fluid removal and as she remained stable she was stepped back down to [address] for further mgmt 1/23/21. ECHO performed s/f EF 53%, biatrial enlargement, mild AR, mod-severe MR/TR, PAP 70. Renal US s/f atrophic kidneys, no WILBER; Nephrotic w/u non-contributory thus far, ANCA negative.  As patient remained oliguric w/up-trending BUN/Creatinine, worsening hyponatremia and elevated phos a tunneled catheter was placed on 1/26 and HD was initiated.  She received total of ___ dialysis sessions and on discharge her dialysis schedule will be ____. QuantiFeron was indeterminate so PPD placed on 1/30 which was negative (0 mm induration).   Her hospital course was also c/b anemia requiring 1U PRBC 1/20/21 and 2U PRBC 1/27/21 and her hgb on day of discharge is ____. She had no s/sx bleeding and has hx anemia chronic disease 2/2 CKD. She did have stool occult blood positive on 1/25, however, she reportedly has hx hemorrhoids.   Of note, psych consulted as patient frequently anxious requiring increased oxygen supplementation. As per recs ______. Endo was also following as she has hx DM and was on steroids. As per recs,, sh will be discharged on ____.    PT lynnette HENDRICKSON w/on site dialysis.               86F poor historian/early dementia and former heavy smoker with PMHx DM, HTN, hyperlipidemia, COPD, hypothyroidism, CKD (baseline Cr ~3.0), anemia of chronic disease, CAD s/p CABG 2015, chronic diastolic CHF (EF 55% via echo 10/2020), renal artery stenosis (s/p stent 20+ years ag), carotid artery stenosis, PAD, presented to [facility] ED on 1/15 c/o progressively worsening SOB and cough x 3 days. Prior to coming to ED, she was instructed to increase her Lasix 80mg PO daily dose to Lasix 80mg PO BID and start Metolazone 10mg PO BID prior to each dose by her cardiologist Dr. Horvath. Per daughter, medication compliance has been questionable over the last few months, as patient is becoming more forgetful. She was initially admitted to [address] for acute on chronic diastolic HF exacerbation likely in the setting of medication non-compliance and CAP and was stepped up to CCU for acute hypoxic respiratory failure 1/22 likely multifactorial 2/2 CHF, COPD, ILD and CAP vs. pneumonitis.    During hospitalization, SpO2 initially 80s on RA for which she required HFNC and intermittent BIPAP, was weaned to NC and is now on ______. LE Duplex negative for DVT and no CTA was done to r/o PE 2/2 CKD; VQ deferred as likely would not yield diagnostic info given multiple lung processes. CT Chest w/o contrast: b/l GGO superimposed on chronic interstitial pulm fibrosis probably d/t chronic hypersensitivity pneumonitis or sarcoid. GGOs could be due to pulmonary edema, pulmonary hemorrhage, alveolar proteinosis, organizing pneumonia or atypical infection. She completed a 7 day course of Azithromycin and was started on Zosyn which was soon discontinued and she was placed on Cefepime 1gm IV daily for 10 days which she completed. She was followed by ID. Dr. Resendez and Pulm, Dr. Levi as she was also treated with steroids for hypersensitivity pneumonitis, which was TAPER and she is now on ____.  Repeat COVID swab negative 1/28.  RVP negative. MSSA positive and fungitell negative. Of note, as the steroids were tapered she developed leukocytosis for which a repeat UA was sent s/f yeast vaginitis and she completed a 3 day course of Monistat. Urine culture also sent s/f ______.  Repeat fungitell ____     She was diuresed with IV Lasix w/minimal urinary output and in discussion with Dr. Nicolas it was determined that she required new HD this admission for fluid removal and as she remained stable she was stepped back down to [address] for further mgmt 1/23/21. ECHO performed s/f EF 53%, biatrial enlargement, mild AR, mod-severe MR/TR, PAP 70. Renal US s/f atrophic kidneys, no WILBER; Nephrotic w/u non-contributory thus far, ANCA negative.  As patient remained oliguric w/up-trending BUN/Creatinine, worsening hyponatremia and elevated phos a tunneled catheter was placed on 1/26 and HD was initiated.  She received total of ___ dialysis sessions and on discharge her dialysis schedule will be ____. QuantiFeron was indeterminate so PPD placed on 1/30 which was negative (0 mm induration).   Her hospital course was also c/b anemia requiring 1U PRBC 1/20/21 and 2U PRBC 1/27/21 and her hgb on day of discharge is ____. She had no s/sx bleeding and has hx anemia chronic disease 2/2 CKD. She did have stool occult blood positive on 1/25, however, she reportedly has hx hemorrhoids.   Of note, psych consulted as patient frequently anxious requiring increased oxygen supplementation. As per recs ______. Endo was also following as she has hx DM and was on steroids. As per recs,, sh will be discharged on ____.    PT lynnette HENDRICKSON w/on site dialysis.           86F poor historian/early dementia and former heavy smoker with PMHx DM, HTN, hyperlipidemia, COPD, hypothyroidism, CKD (baseline Cr ~3.0), anemia of chronic disease, CAD s/p CABG 2015, chronic diastolic CHF (EF 55% via echo 10/2020), renal artery stenosis (s/p stent 20+ years ag), carotid artery stenosis, PAD, presented to [facility] ED on 1/15 c/o progressively worsening SOB and cough x 3 days. Prior to coming to ED, she was instructed to increase her Lasix 80mg PO daily dose to Lasix 80mg PO BID and start Metolazone 10mg PO BID prior to each dose by her cardiologist Dr. Horvath. Per daughter, medication compliance has been questionable over the last few months, as patient is becoming more forgetful. She was initially admitted to [address] for acute on chronic diastolic HF exacerbation likely in the setting of medication non-compliance and CAP and was stepped up to CCU for acute hypoxic respiratory failure 1/22 likely multifactorial 2/2 CHF, COPD, ILD and CAP vs. pneumonitis.    During hospitalization, SpO2 initially 80s on RA for which she required HFNC and intermittent BIPAP, was weaned to NC and is now on ______. LE Duplex negative for DVT and no CTA was done to r/o PE 2/2 CKD; VQ deferred as likely would not yield diagnostic info given multiple lung processes. CT Chest w/o contrast: b/l GGO superimposed on chronic interstitial pulm fibrosis probably d/t chronic hypersensitivity pneumonitis or sarcoid. GGOs could be due to pulmonary edema, pulmonary hemorrhage, alveolar proteinosis, organizing pneumonia or atypical infection. She completed a 7 day course of Azithromycin and was started on Zosyn which was soon discontinued and she was placed on Cefepime 1gm IV daily for 10 days which she completed. She was followed by ID. Dr. Resendez and Pulm, Dr. Levi as she was also treated with steroids for hypersensitivity pneumonitis, which was TAPER and she is now on ____.  Repeat COVID swab negative 1/28.  RVP negative. MSSA positive and fungitell negative. Of note, as the steroids were tapered she developed leukocytosis for which a repeat UA was sent s/f yeast vaginitis and she completed a 3 day course of Monistat. Urine culture also sent s/f ______.  Repeat fungitell ____     She was diuresed with IV Lasix w/minimal urinary output and in discussion with Dr. Nicolas it was determined that she required new HD this admission for fluid removal and as she remained stable she was stepped back down to [address] for further mgmt 1/23/21. ECHO performed s/f EF 53%, biatrial enlargement, mild AR, mod-severe MR/TR, PAP 70. Renal US s/f atrophic kidneys, no WILBER; Nephrotic w/u non-contributory thus far, ANCA negative.  As patient remained oliguric w/up-trending BUN/Creatinine, worsening hyponatremia and elevated phos a tunneled catheter was placed on 1/26 and HD was initiated.  She received total of ___ dialysis sessions and on discharge her dialysis schedule will be ____. QuantiFeron was indeterminate so PPD placed on 1/30 which was negative (0 mm induration).   Her hospital course was also c/b anemia requiring 1U PRBC 1/20/21 and 2U PRBC 1/27/21 and her hgb on day of discharge is ____. She had no s/sx bleeding and has hx anemia chronic disease 2/2 CKD. She did have stool occult blood positive on 1/25, however, she reportedly has hx hemorrhoids.   Of note, psych consulted as patient frequently anxious requiring increased oxygen supplementation. As per recs ______. Endo was also following as she has hx DM and was on steroids. As per recs,, sh will be discharged on ____.  PT lynnette HENDRICKSON w/on site dialysis, HOWEVER, family wanted patient to be dc'd home- plan for HD today (2/6) prior to discharge and outpatient dialysis will be started on Tuesday 2/9.          86F poor historian/early dementia and former heavy smoker with PMHx DM, HTN, hyperlipidemia, COPD, hypothyroidism, CKD (baseline Cr ~3.0), anemia of chronic disease, CAD s/p CABG 2015, chronic diastolic CHF (EF 55% via echo 10/2020), renal artery stenosis (s/p stent 20+ years ag), carotid artery stenosis, PAD, presented to [facility] ED on 1/15 c/o progressively worsening SOB and cough x 3 days. Prior to coming to ED, she was instructed to increase her Lasix 80mg PO daily dose to Lasix 80mg PO BID and start Metolazone 10mg PO BID prior to each dose by her cardiologist Dr. Horvath. Per daughter, medication compliance has been questionable over the last few months, as patient is becoming more forgetful. She was initially admitted to [address] for acute on chronic diastolic HF exacerbation likely in the setting of medication non-compliance and CAP and was stepped up to CCU for acute hypoxic respiratory failure 1/22 likely multifactorial 2/2 CHF, COPD, ILD and CAP vs. pneumonitis.    During hospitalization, SpO2 initially 80s on RA for which she required HFNC and intermittent BIPAP and was weaned to NC, desat to 86% on RA. LE Duplex negative for DVT and no CTA was done to r/o PE 2/2 CKD; VQ deferred as likely would not yield diagnostic info given multiple lung processes. CT Chest w/o contrast: b/l GGO superimposed on chronic interstitial pulm fibrosis probably d/t chronic hypersensitivity pneumonitis or sarcoid. GGOs could be due to pulmonary edema, pulmonary hemorrhage, alveolar proteinosis, organizing pneumonia or atypical infection. She completed a 7 day course of Azithromycin and was started on Zosyn which was soon discontinued and she was placed on Cefepime 1gm IV daily for 10 days which she completed. She was followed by ID. Dr. Resendez and Pulm, Dr. Levi as she was also treated with steroids for hypersensitivity pneumonitis, which was TAPER and she is now on Prednisone 20mg qd, to be tapered as outpt per Pulm. Repeat COVID swab negative 1/28. RVP negative. MSSA positive and fungitell negative. Of note, as the steroids were tapered she developed leukocytosis for which a repeat UA was sent s/f yeast vaginitis and she completed a 3 day course of Monistat. Urine culture s/f 50k enterococcus faecalis, reports symptomatic w/ dysuria and pelvic pain. Pt started on CTX IV and subsequently switched to Augmentin 500/125mg BID for 7 days course. Repeat fungitell negative.  She was diuresed with IV Lasix w/ minimal urinary output and in discussion with Dr. Nicolas it was determined that she required new HD this admission for fluid removal and as she remained stable she was stepped back down to [address] for further mgmt 1/23/21. ECHO performed s/f EF 53%, biatrial enlargement, mild AR, mod-severe MR/TR, PASP 70. Renal US s/f atrophic kidneys, no WILBER; Nephrotic w/u non-contributory thus far, ANCA negative.  As patient remained oliguric w/up-trending BUN/Creatinine, worsening hyponatremia and elevated phos a tunneled catheter was placed on 1/26 and HD was initiated.  She received dialysis sessions inhospital and on discharge her dialysis schedule will be T/Th/Sat. QuantiFeron was indeterminate so PPD placed on 1/30 which was negative (0 mm induration).   Her hospital course was also c/b anemia requiring 1U PRBC 1/20/21 and 2U PRBC 1/27/21 and her Hgb on day of discharge is ____. She had no s/sx bleeding and has hx anemia chronic disease 2/2 CKD. She did have stool occult blood positive on 1/25, however, she reportedly has hx hemorrhoids.   Of note, psych consulted as patient frequently anxious requiring increased oxygen supplementation. As per recs started on Remeron 7.5mg qHS. Endocrine was also following as she has hx DM and was on steroids. As per recs, she will be discharged on ____.  PT devonal- EMEKA w/on site dialysis, HOWEVER, family wanted patient to be dc'd home. Outpatient dialysis will be started on Tuesday 2/9.          86F poor historian/early dementia and former heavy smoker with PMHx DM, HTN, hyperlipidemia, COPD, hypothyroidism, CKD (baseline Cr ~3.0), anemia of chronic disease, CAD s/p CABG 2015, chronic diastolic CHF (EF 55% via echo 10/2020), renal artery stenosis (s/p stent 20+ years ag), carotid artery stenosis, PAD, presented to [facility] ED on 1/15 c/o progressively worsening SOB and cough x 3 days. Prior to coming to ED, she was instructed to increase her Lasix 80mg PO daily dose to Lasix 80mg PO BID and start Metolazone 10mg PO BID prior to each dose by her cardiologist Dr. Horvath. Per daughter, medication compliance has been questionable over the last few months, as patient is becoming more forgetful. She was initially admitted to [address] for acute on chronic diastolic HF exacerbation likely in the setting of medication non-compliance and CAP and was stepped up to CCU for acute hypoxic respiratory failure 1/22 likely multifactorial 2/2 CHF, COPD, ILD and CAP vs. pneumonitis.    During hospitalization, SpO2 initially 80s on RA for which she required HFNC and intermittent BIPAP and was weaned to NC, desat to 86% on RA. LE Duplex negative for DVT and no CTA was done to r/o PE 2/2 CKD; VQ deferred as likely would not yield diagnostic info given multiple lung processes. CT Chest w/o contrast: b/l GGO superimposed on chronic interstitial pulm fibrosis probably d/t chronic hypersensitivity pneumonitis or sarcoid. GGOs could be due to pulmonary edema, pulmonary hemorrhage, alveolar proteinosis, organizing pneumonia or atypical infection. She completed a 7 day course of Azithromycin and was started on Zosyn which was soon discontinued and she was placed on Cefepime 1gm IV daily for 10 days which she completed. She was followed by ID. Dr. Resendez and Pulm, Dr. Levi as she was also treated with steroids for hypersensitivity pneumonitis, which was TAPER and she is now on Prednisone 20mg qd, to be tapered as outpt per Pulm. Repeat COVID swab negative 1/28. RVP negative. MSSA positive and fungitell negative. Of note, as the steroids were tapered she developed leukocytosis for which a repeat UA was sent s/f yeast vaginitis and she completed a 3 day course of Monistat. Urine culture s/f 50k enterococcus faecalis, reports symptomatic w/ dysuria and pelvic pain. Pt started on CTX IV and subsequently switched to Augmentin 500/125mg BID for 7 days course. Repeat fungitell negative.  She was diuresed with IV Lasix w/ minimal urinary output and in discussion with Dr. Nicolas it was determined that she required new HD this admission for fluid removal and as she remained stable she was stepped back down to [address] for further mgmt 1/23/21. ECHO performed s/f EF 53%, biatrial enlargement, mild AR, mod-severe MR/TR, PASP 70. Renal US s/f atrophic kidneys, no WILBER; Nephrotic w/u non-contributory thus far, ANCA negative.  As patient remained oliguric w/up-trending BUN/Creatinine, worsening hyponatremia and elevated phos a tunneled catheter was placed on 1/26 and HD was initiated.  She received dialysis sessions inhospital and on discharge her dialysis schedule will be T/Th/Sat. QuantiFeron was indeterminate so PPD placed on 1/30 which was negative (0 mm induration).   Her hospital course was also c/b anemia requiring 1U PRBC 1/20/21 and 2U PRBC 1/27/21 and her Hgb on day of discharge is 9.3. She had no s/sx bleeding and has hx anemia chronic disease 2/2 CKD. She did have stool occult blood positive on 1/25, however, she reportedly has hx hemorrhoids.   Of note, psych consulted as patient frequently anxious requiring increased oxygen supplementation. As per recs started on Remeron 7.5mg qHS. Endocrine was also following as she has hx DM and was on steroids. As per recs, she will be discharged on ____.  PT eval- EMEKA w/on site dialysis, HOWEVER, family wanted patient to be dc'd home. Outpatient dialysis will be started on Tuesday 2/9.          86F poor historian/early dementia and former heavy smoker with PMHx DM, HTN, hyperlipidemia, COPD, hypothyroidism, CKD (baseline Cr ~3.0), anemia of chronic disease, CAD s/p CABG 2015, chronic diastolic CHF (EF 55% via echo 10/2020), renal artery stenosis (s/p stent 20+ years ag), carotid artery stenosis, PAD, presented to [facility] ED on 1/15 c/o progressively worsening SOB and cough x 3 days. Prior to coming to ED, she was instructed to increase her Lasix 80mg PO daily dose to Lasix 80mg PO BID and start Metolazone 10mg PO BID prior to each dose by her cardiologist Dr. Horvath. Per daughter, medication compliance has been questionable over the last few months, as patient is becoming more forgetful. She was initially admitted to [address] for acute on chronic diastolic HF exacerbation likely in the setting of medication non-compliance and CAP and was stepped up to CCU for acute hypoxic respiratory failure 1/22 likely multifactorial 2/2 CHF, COPD, ILD and CAP vs. pneumonitis.    During hospitalization, SpO2 initially 80s on RA for which she required HFNC and intermittent BIPAP and was weaned to NC, desat to 86% on RA. LE Duplex negative for DVT and no CTA was done to r/o PE 2/2 CKD; VQ deferred as likely would not yield diagnostic info given multiple lung processes. CT Chest w/o contrast: b/l GGO superimposed on chronic interstitial pulm fibrosis probably d/t chronic hypersensitivity pneumonitis or sarcoid. GGOs could be due to pulmonary edema, pulmonary hemorrhage, alveolar proteinosis, organizing pneumonia or atypical infection. She completed a 7 day course of Azithromycin and was started on Zosyn which was soon discontinued and she was placed on Cefepime 1gm IV daily for 10 days which she completed. She was followed by ID. Dr. Resendez and Pulm, Dr. Levi as she was also treated with steroids for hypersensitivity pneumonitis, which was TAPER and she is now on Prednisone 20mg qd, to be tapered as outpt per Pulm. Repeat COVID swab negative 1/28. RVP negative. MSSA positive and fungitell negative. Of note, as the steroids were tapered she developed leukocytosis for which a repeat UA was sent s/f yeast vaginitis and she completed a 3 day course of Monistat. Urine culture s/f 50k enterococcus faecalis, reports symptomatic w/ dysuria and pelvic pain. Pt started on CTX IV and subsequently switched to Augmentin 500/125mg BID for 7 days course. Repeat fungitell negative.  She was diuresed with IV Lasix w/ minimal urinary output and in discussion with Dr. Nicolas it was determined that she required new HD this admission for fluid removal and as she remained stable she was stepped back down to [address] for further mgmt 1/23/21. ECHO performed s/f EF 53%, biatrial enlargement, mild AR, mod-severe MR/TR, PASP 70. Renal US s/f atrophic kidneys, no WILBER; Nephrotic w/u non-contributory thus far, ANCA negative.  As patient remained oliguric w/up-trending BUN/Creatinine, worsening hyponatremia and elevated phos a tunneled catheter was placed on 1/26 and HD was initiated.  She received dialysis sessions inhospital and on discharge her dialysis schedule will be T/Th/Sat. QuantiFeron was indeterminate so PPD placed on 1/30 which was negative (0 mm induration).   Her hospital course was also c/b anemia requiring 1U PRBC 1/20/21 and 2U PRBC 1/27/21 and her Hgb on day of discharge is 9.3. She had no s/sx bleeding and has hx anemia chronic disease 2/2 CKD. She did have stool occult blood positive on 1/25, however, she reportedly has hx hemorrhoids.   Of note, psych consulted as patient frequently anxious requiring increased oxygen supplementation. As per recs started on Remeron 7.5mg qHS. Endocrine was also following as she has hx DM and was on steroids. As per recs, she will be discharged on Prandin 1mg TID.  PT lynnette HENDRICKSON w/on site dialysis, HOWEVER, family wanted patient to be dc'd home. Outpatient dialysis will be started on Tuesday 2/9.

## 2021-01-21 NOTE — PROGRESS NOTE ADULT - SUBJECTIVE AND OBJECTIVE BOX
Patient is a 86y Female seen and evaluated at bedside. Remains on non rebreather. Creat plateau, inadequate response to lasix 80 IV, increase dose to 100. Repeat COVID swab pending.     Meds:    acetaminophen   Tablet .. 650 every 6 hours PRN  albuterol/ipratropium for Nebulization 3 every 6 hours  amLODIPine   Tablet 10 daily  aspirin enteric coated 81 daily  atorvastatin 20 at bedtime  azithromycin  IVPB 500 every 24 hours  benzonatate 100 every 8 hours PRN  budesonide  80 MICROgram(s)/formoterol 4.5 MICROgram(s) Inhaler 2 two times a day  dextrose 40% Gel 15 once  dextrose 5%. 1000 <Continuous>  dextrose 5%. 1000 <Continuous>  dextrose 50% Injectable 25 once  dextrose 50% Injectable 12.5 once  dextrose 50% Injectable 25 once  donepezil 5 at bedtime  fluticasone propionate 50 MICROgram(s)/spray Nasal Spray 1 two times a day  furosemide   IVPB 100 once  glucagon  Injectable 1 once  guaiFENesin   Syrup  (Sugar-Free) 100 every 6 hours PRN  heparin   Injectable 5000 every 12 hours  influenza  Vaccine (HIGH DOSE) 0.7 once  insulin lispro (ADMELOG) corrective regimen sliding scale  Before meals and at bedtime  levothyroxine 50 daily  methylPREDNISolone sodium succinate Injectable 40 every 8 hours  metoprolol tartrate 50 two times a day  piperacillin/tazobactam IVPB.. 2.25 every 6 hours  sevelamer carbonate 800 three times a day with meals      T(C): , Max: 37.2 (01-20-21 @ 18:30)  T(F): , Max: 98.9 (01-20-21 @ 18:30)  HR: 62 (01-21-21 @ 08:33)  BP: 130/59 (01-21-21 @ 08:33)  BP(mean): --  RR: 25 (01-21-21 @ 08:33)  SpO2: 98% (01-21-21 @ 08:33)  Wt(kg): --    01-20 @ 07:01  -  01-21 @ 07:00  --------------------------------------------------------  IN: 1040 mL / OUT: 850 mL / NET: 190 mL    Review of Systems:  RESPIRATORY: +shortness of breath  CARDIOVASCULAR: No chest pain  MUSCULOSKELETAL: No leg edema    PHYSICAL EXAM:  GENERAL: well-developed, well nourished, alert, on non rebreather  CHEST/LUNG: Coarse breath sounds bilaterally, reduced at bases  HEART: normal S1S2, RRR  ABDOMEN: Soft, Nontender, non distended  EXTREMITIES: +oedema         LABS:                        8.9    6.16  )-----------( 238      ( 21 Jan 2021 08:25 )             28.3     01-21    134<L>  |  92<L>  |  64<H>  ----------------------------<  163<H>  4.0   |  24  |  3.97<H>    Ca    9.3      21 Jan 2021 08:25  Phos  6.8     01-21  Mg     2.0     01-21                  RADIOLOGY & ADDITIONAL STUDIES:

## 2021-01-21 NOTE — DISCHARGE NOTE PROVIDER - DETAILS OF MALNUTRITION DIAGNOSIS/DIAGNOSES
This patient has been assessed with a concern for Malnutrition and was treated during this hospitalization for the following Nutrition diagnosis/diagnoses:     -  02/05/2021: Mild protein-calorie malnutrition   This patient has been assessed with a concern for Malnutrition and was treated during this hospitalization for the following Nutrition diagnosis/diagnoses:     -  02/05/2021: Mild protein-calorie malnutrition    This patient has been assessed with a concern for Malnutrition and was treated during this hospitalization for the following Nutrition diagnosis/diagnoses:     -  02/05/2021: Mild protein-calorie malnutrition   This patient has been assessed with a concern for Malnutrition and was treated during this hospitalization for the following Nutrition diagnosis/diagnoses:     -  02/05/2021: Mild protein-calorie malnutrition    This patient has been assessed with a concern for Malnutrition and was treated during this hospitalization for the following Nutrition diagnosis/diagnoses:     -  02/05/2021: Mild protein-calorie malnutrition    This patient has been assessed with a concern for Malnutrition and was treated during this hospitalization for the following Nutrition diagnosis/diagnoses:     -  02/05/2021: Mild protein-calorie malnutrition   This patient has been assessed with a concern for Malnutrition and was treated during this hospitalization for the following Nutrition diagnosis/diagnoses:     -  02/05/2021: Mild protein-calorie malnutrition    This patient has been assessed with a concern for Malnutrition and was treated during this hospitalization for the following Nutrition diagnosis/diagnoses:     -  02/05/2021: Mild protein-calorie malnutrition    This patient has been assessed with a concern for Malnutrition and was treated during this hospitalization for the following Nutrition diagnosis/diagnoses:     -  02/05/2021: Mild protein-calorie malnutrition    This patient has been assessed with a concern for Malnutrition and was treated during this hospitalization for the following Nutrition diagnosis/diagnoses:     -  02/05/2021: Mild protein-calorie malnutrition   This patient has been assessed with a concern for Malnutrition and was treated during this hospitalization for the following Nutrition diagnosis/diagnoses:     -  02/05/2021: Mild protein-calorie malnutrition    This patient has been assessed with a concern for Malnutrition and was treated during this hospitalization for the following Nutrition diagnosis/diagnoses:     -  02/05/2021: Mild protein-calorie malnutrition    This patient has been assessed with a concern for Malnutrition and was treated during this hospitalization for the following Nutrition diagnosis/diagnoses:     -  02/05/2021: Mild protein-calorie malnutrition    This patient has been assessed with a concern for Malnutrition and was treated during this hospitalization for the following Nutrition diagnosis/diagnoses:     -  02/05/2021: Mild protein-calorie malnutrition    This patient has been assessed with a concern for Malnutrition and was treated during this hospitalization for the following Nutrition diagnosis/diagnoses:     -  02/05/2021: Mild protein-calorie malnutrition   This patient has been assessed with a concern for Malnutrition and was treated during this hospitalization for the following Nutrition diagnosis/diagnoses:     -  02/05/2021: Mild protein-calorie malnutrition    This patient has been assessed with a concern for Malnutrition and was treated during this hospitalization for the following Nutrition diagnosis/diagnoses:     -  02/05/2021: Mild protein-calorie malnutrition    This patient has been assessed with a concern for Malnutrition and was treated during this hospitalization for the following Nutrition diagnosis/diagnoses:     -  02/05/2021: Mild protein-calorie malnutrition    This patient has been assessed with a concern for Malnutrition and was treated during this hospitalization for the following Nutrition diagnosis/diagnoses:     -  02/05/2021: Mild protein-calorie malnutrition    This patient has been assessed with a concern for Malnutrition and was treated during this hospitalization for the following Nutrition diagnosis/diagnoses:     -  02/05/2021: Mild protein-calorie malnutrition    This patient has been assessed with a concern for Malnutrition and was treated during this hospitalization for the following Nutrition diagnosis/diagnoses:     -  02/05/2021: Mild protein-calorie malnutrition   This patient has been assessed with a concern for Malnutrition and was treated during this hospitalization for the following Nutrition diagnosis/diagnoses:     -  02/05/2021: Mild protein-calorie malnutrition    This patient has been assessed with a concern for Malnutrition and was treated during this hospitalization for the following Nutrition diagnosis/diagnoses:     -  02/05/2021: Mild protein-calorie malnutrition    This patient has been assessed with a concern for Malnutrition and was treated during this hospitalization for the following Nutrition diagnosis/diagnoses:     -  02/05/2021: Mild protein-calorie malnutrition    This patient has been assessed with a concern for Malnutrition and was treated during this hospitalization for the following Nutrition diagnosis/diagnoses:     -  02/05/2021: Mild protein-calorie malnutrition    This patient has been assessed with a concern for Malnutrition and was treated during this hospitalization for the following Nutrition diagnosis/diagnoses:     -  02/05/2021: Mild protein-calorie malnutrition    This patient has been assessed with a concern for Malnutrition and was treated during this hospitalization for the following Nutrition diagnosis/diagnoses:     -  02/05/2021: Mild protein-calorie malnutrition    This patient has been assessed with a concern for Malnutrition and was treated during this hospitalization for the following Nutrition diagnosis/diagnoses:     -  02/05/2021: Mild protein-calorie malnutrition

## 2021-01-21 NOTE — PROGRESS NOTE ADULT - PROBLEM SELECTOR PLAN 8
-Hx of SAH x3 wks ago at [facility]   -CT 1/19 negative for acute changes  -Per neurosurgery, cleared to start 81mg ASA QD on 1/20

## 2021-01-22 LAB
ANION GAP SERPL CALC-SCNC: 21 MMOL/L — HIGH (ref 5–17)
BUN SERPL-MCNC: 74 MG/DL — HIGH (ref 7–23)
CALCIUM SERPL-MCNC: 8.7 MG/DL — SIGNIFICANT CHANGE UP (ref 8.4–10.5)
CHLORIDE SERPL-SCNC: 88 MMOL/L — LOW (ref 96–108)
CO2 SERPL-SCNC: 21 MMOL/L — LOW (ref 22–31)
CREAT SERPL-MCNC: 4.24 MG/DL — HIGH (ref 0.5–1.3)
CRP SERPL-MCNC: 13.36 MG/DL — HIGH (ref 0–0.4)
ERYTHROCYTE [SEDIMENTATION RATE] IN BLOOD: 125 MM/HR — HIGH
FERRITIN SERPL-MCNC: 1478 NG/ML — HIGH (ref 15–150)
GLUCOSE BLDC GLUCOMTR-MCNC: 141 MG/DL — HIGH (ref 70–99)
GLUCOSE BLDC GLUCOMTR-MCNC: 227 MG/DL — HIGH (ref 70–99)
GLUCOSE BLDC GLUCOMTR-MCNC: 265 MG/DL — HIGH (ref 70–99)
GLUCOSE BLDC GLUCOMTR-MCNC: 295 MG/DL — HIGH (ref 70–99)
GLUCOSE SERPL-MCNC: 218 MG/DL — HIGH (ref 70–99)
HCT VFR BLD CALC: 26 % — LOW (ref 34.5–45)
HGB BLD-MCNC: 8.4 G/DL — LOW (ref 11.5–15.5)
MAGNESIUM SERPL-MCNC: 2.1 MG/DL — SIGNIFICANT CHANGE UP (ref 1.6–2.6)
MCHC RBC-ENTMCNC: 27.2 PG — SIGNIFICANT CHANGE UP (ref 27–34)
MCHC RBC-ENTMCNC: 32.3 GM/DL — SIGNIFICANT CHANGE UP (ref 32–36)
MCV RBC AUTO: 84.1 FL — SIGNIFICANT CHANGE UP (ref 80–100)
NRBC # BLD: 0 /100 WBCS — SIGNIFICANT CHANGE UP (ref 0–0)
PHOSPHATE SERPL-MCNC: 7.3 MG/DL — HIGH (ref 2.5–4.5)
PLATELET # BLD AUTO: 228 K/UL — SIGNIFICANT CHANGE UP (ref 150–400)
POTASSIUM SERPL-MCNC: 4.1 MMOL/L — SIGNIFICANT CHANGE UP (ref 3.5–5.3)
POTASSIUM SERPL-SCNC: 4.1 MMOL/L — SIGNIFICANT CHANGE UP (ref 3.5–5.3)
PROCALCITONIN SERPL-MCNC: 1.84 NG/ML — HIGH (ref 0.02–0.1)
RBC # BLD: 3.09 M/UL — LOW (ref 3.8–5.2)
RBC # FLD: 14.9 % — HIGH (ref 10.3–14.5)
S PNEUM AG UR QL: NEGATIVE — SIGNIFICANT CHANGE UP
SODIUM SERPL-SCNC: 130 MMOL/L — LOW (ref 135–145)
WBC # BLD: 7.9 K/UL — SIGNIFICANT CHANGE UP (ref 3.8–10.5)
WBC # FLD AUTO: 7.9 K/UL — SIGNIFICANT CHANGE UP (ref 3.8–10.5)

## 2021-01-22 PROCEDURE — 99291 CRITICAL CARE FIRST HOUR: CPT

## 2021-01-22 PROCEDURE — 99233 SBSQ HOSP IP/OBS HIGH 50: CPT

## 2021-01-22 PROCEDURE — 71045 X-RAY EXAM CHEST 1 VIEW: CPT | Mod: 26

## 2021-01-22 RX ORDER — METOPROLOL TARTRATE 50 MG
50 TABLET ORAL EVERY 12 HOURS
Refills: 0 | Status: DISCONTINUED | OUTPATIENT
Start: 2021-01-23 | End: 2021-02-02

## 2021-01-22 RX ORDER — FUROSEMIDE 40 MG
100 TABLET ORAL ONCE
Refills: 0 | Status: DISCONTINUED | OUTPATIENT
Start: 2021-01-22 | End: 2021-01-22

## 2021-01-22 RX ORDER — SENNA PLUS 8.6 MG/1
1 TABLET ORAL DAILY
Refills: 0 | Status: DISCONTINUED | OUTPATIENT
Start: 2021-01-22 | End: 2021-02-07

## 2021-01-22 RX ORDER — LIDOCAINE 4 G/100G
1 CREAM TOPICAL ONCE
Refills: 0 | Status: COMPLETED | OUTPATIENT
Start: 2021-01-22 | End: 2021-01-22

## 2021-01-22 RX ORDER — FUROSEMIDE 40 MG
100 TABLET ORAL ONCE
Refills: 0 | Status: COMPLETED | OUTPATIENT
Start: 2021-01-22 | End: 2021-01-22

## 2021-01-22 RX ORDER — FUROSEMIDE 40 MG
100 TABLET ORAL EVERY 12 HOURS
Refills: 0 | Status: DISCONTINUED | OUTPATIENT
Start: 2021-01-23 | End: 2021-01-23

## 2021-01-22 RX ORDER — PIPERACILLIN AND TAZOBACTAM 4; .5 G/20ML; G/20ML
2.25 INJECTION, POWDER, LYOPHILIZED, FOR SOLUTION INTRAVENOUS EVERY 6 HOURS
Refills: 0 | Status: DISCONTINUED | OUTPATIENT
Start: 2021-01-22 | End: 2021-01-23

## 2021-01-22 RX ORDER — INSULIN LISPRO 100/ML
4 VIAL (ML) SUBCUTANEOUS
Refills: 0 | Status: DISCONTINUED | OUTPATIENT
Start: 2021-01-22 | End: 2021-01-23

## 2021-01-22 RX ORDER — INSULIN GLARGINE 100 [IU]/ML
10 INJECTION, SOLUTION SUBCUTANEOUS AT BEDTIME
Refills: 0 | Status: DISCONTINUED | OUTPATIENT
Start: 2021-01-22 | End: 2021-01-24

## 2021-01-22 RX ORDER — INSULIN GLARGINE 100 [IU]/ML
5 INJECTION, SOLUTION SUBCUTANEOUS AT BEDTIME
Refills: 0 | Status: DISCONTINUED | OUTPATIENT
Start: 2021-01-22 | End: 2021-01-22

## 2021-01-22 RX ADMIN — PIPERACILLIN AND TAZOBACTAM 200 GRAM(S): 4; .5 INJECTION, POWDER, LYOPHILIZED, FOR SOLUTION INTRAVENOUS at 14:29

## 2021-01-22 RX ADMIN — Medication 3 MILLILITER(S): at 14:29

## 2021-01-22 RX ADMIN — Medication 40 MILLIGRAM(S): at 12:23

## 2021-01-22 RX ADMIN — Medication 3: at 12:23

## 2021-01-22 RX ADMIN — AMLODIPINE BESYLATE 10 MILLIGRAM(S): 2.5 TABLET ORAL at 06:57

## 2021-01-22 RX ADMIN — Medication 3 MILLILITER(S): at 03:39

## 2021-01-22 RX ADMIN — Medication 1 SPRAY(S): at 17:06

## 2021-01-22 RX ADMIN — Medication 3 MILLILITER(S): at 23:32

## 2021-01-22 RX ADMIN — PIPERACILLIN AND TAZOBACTAM 200 GRAM(S): 4; .5 INJECTION, POWDER, LYOPHILIZED, FOR SOLUTION INTRAVENOUS at 19:44

## 2021-01-22 RX ADMIN — Medication 1 MILLIGRAM(S): at 21:32

## 2021-01-22 RX ADMIN — SEVELAMER CARBONATE 1600 MILLIGRAM(S): 2400 POWDER, FOR SUSPENSION ORAL at 14:29

## 2021-01-22 RX ADMIN — BUDESONIDE AND FORMOTEROL FUMARATE DIHYDRATE 2 PUFF(S): 160; 4.5 AEROSOL RESPIRATORY (INHALATION) at 09:16

## 2021-01-22 RX ADMIN — BUDESONIDE AND FORMOTEROL FUMARATE DIHYDRATE 2 PUFF(S): 160; 4.5 AEROSOL RESPIRATORY (INHALATION) at 01:25

## 2021-01-22 RX ADMIN — Medication 40 MILLIGRAM(S): at 06:57

## 2021-01-22 RX ADMIN — PIPERACILLIN AND TAZOBACTAM 200 GRAM(S): 4; .5 INJECTION, POWDER, LYOPHILIZED, FOR SOLUTION INTRAVENOUS at 09:16

## 2021-01-22 RX ADMIN — HEPARIN SODIUM 5000 UNIT(S): 5000 INJECTION INTRAVENOUS; SUBCUTANEOUS at 17:05

## 2021-01-22 RX ADMIN — PIPERACILLIN AND TAZOBACTAM 200 GRAM(S): 4; .5 INJECTION, POWDER, LYOPHILIZED, FOR SOLUTION INTRAVENOUS at 03:39

## 2021-01-22 RX ADMIN — ATORVASTATIN CALCIUM 20 MILLIGRAM(S): 80 TABLET, FILM COATED ORAL at 21:32

## 2021-01-22 RX ADMIN — INSULIN GLARGINE 10 UNIT(S): 100 INJECTION, SOLUTION SUBCUTANEOUS at 22:23

## 2021-01-22 RX ADMIN — SEVELAMER CARBONATE 1600 MILLIGRAM(S): 2400 POWDER, FOR SUSPENSION ORAL at 07:04

## 2021-01-22 RX ADMIN — Medication 2: at 07:03

## 2021-01-22 RX ADMIN — LIDOCAINE 1 APPLICATION(S): 4 CREAM TOPICAL at 16:12

## 2021-01-22 RX ADMIN — Medication 120 MILLIGRAM(S): at 18:00

## 2021-01-22 RX ADMIN — Medication 50 MICROGRAM(S): at 06:57

## 2021-01-22 RX ADMIN — Medication 4 UNIT(S): at 17:05

## 2021-01-22 RX ADMIN — Medication 50 MILLIGRAM(S): at 17:06

## 2021-01-22 RX ADMIN — Medication 81 MILLIGRAM(S): at 12:23

## 2021-01-22 RX ADMIN — Medication 120 MILLIGRAM(S): at 12:24

## 2021-01-22 RX ADMIN — Medication 3 MILLILITER(S): at 09:16

## 2021-01-22 RX ADMIN — Medication 50 MILLIGRAM(S): at 06:57

## 2021-01-22 RX ADMIN — Medication 650 MILLIGRAM(S): at 13:36

## 2021-01-22 RX ADMIN — HEPARIN SODIUM 5000 UNIT(S): 5000 INJECTION INTRAVENOUS; SUBCUTANEOUS at 06:56

## 2021-01-22 RX ADMIN — Medication 3: at 17:06

## 2021-01-22 RX ADMIN — Medication 20 MILLIGRAM(S): at 21:31

## 2021-01-22 RX ADMIN — SEVELAMER CARBONATE 1600 MILLIGRAM(S): 2400 POWDER, FOR SUSPENSION ORAL at 17:08

## 2021-01-22 RX ADMIN — DONEPEZIL HYDROCHLORIDE 5 MILLIGRAM(S): 10 TABLET, FILM COATED ORAL at 21:32

## 2021-01-22 NOTE — PROGRESS NOTE ADULT - PROBLEM SELECTOR PLAN 8
-Hx of SAH x3 wks ago at [facility]   -CTH 1/19 negative for acute changes  -Per neurosurgery, cleared to start 81mg ASA QD on 1/20.

## 2021-01-22 NOTE — PROVIDER CONTACT NOTE (OTHER) - BACKGROUND
See provider handoff.
Pt admitted for CHF exacerbation w/PNA. History of dementia.
Patient admitted for CHF and COPd
See provider notes, pt admitted for CHF, pt with history of COPD, diuresis stopped due to elevated creatinine

## 2021-01-22 NOTE — PROGRESS NOTE ADULT - ATTENDING COMMENTS
Pt is an 85 y/o woman c early dementia, DM< HTN, HLP, COPD, CKD, CAD s/p CABG ('15), PVD admitted for acute decompensated dCHF with acute renal failure.    afeb, HR 66, 145/66, 92-96% on 6L NC    Responded to hi dose lasix and metolazone    Hgb 8.6  Plt 236  Cr 4.2 --> 4.6  Na 125    ECG: sb c LVH and prolonged QTc  CXR: worsened b/l infiltrates    - pt with acute hypoxemic resp failure with supplemental O2  - cont rx per pulm with solumedrol for ILD  - cont abx, pt with severe pulm htn which can drive hypoxemia as well  - pt with acute renal failure and able to diuress with hi dose loop diuretics  - pt with hyponatremia this am, hold off diruesis, discuss need for HD with renal

## 2021-01-22 NOTE — CONSULT NOTE ADULT - ASSESSMENT
86 yr old F, former heavy smoker with PMHx HTN, hyperlipidemia, COPD, hypothyroidism, Stage IV CKD (baseline Cr ~3.0), anemia of chronic disease, CAD s/p CABG 2015 Dr. Blunt, chronic diastolic CHF(EF:55% by Echo 10/2020), renal artery stenosis s/p renal artery stent >20yrs ago, Carotid artery stenosis, PAD who presented with acute diastolic CHF exacerbation. ICU consulted in setting of acute hypoxic respiratory failure.    #Acute hypoxic resp failure 2/2 to CHF exacerbation  Patient presented with SOB similar to prior CHF exacerbation in oct. Patient with no signs of infection or sepsis. Currently being treated for CAP with zosyn (completed azithromycin). On exam, patient with JVD and diminished breath sounds at bases. CT scan was performed showing existing fibrosis with b/l pleural effusions and signs of fluid overload. Bedside US showing diffuse B lines b/l with no signs of consolidation, heart with good squeeze and LVH. BNP noted to be 55,000 (last admission 22,000), only net negative -3.6 L. Patient transitioned from HFNC to 6 L NC satting 91-92%  -would continue diuresis (per cards). No concerns for infectious etiology  -strict Is and Os  -please add bowel regimen (abdomen distended with no BM in 3 days)  -wean 02 as tolerated  -consider BiPAP at night if needed   -if further concerned for respiratory status consider transfer to CCU for closer monitoring.      86 yr old F, former heavy smoker with PMHx HTN, hyperlipidemia, COPD, hypothyroidism, Stage IV CKD (baseline Cr ~3.0), anemia of chronic disease, CAD s/p CABG 2015 Dr. Blunt, chronic diastolic CHF(EF:55% by Echo 10/2020), renal artery stenosis s/p renal artery stent >20yrs ago, Carotid artery stenosis, PAD who presented with acute diastolic CHF exacerbation. ICU consulted in setting of acute hypoxic respiratory failure.    #Acute hypoxic resp failure 2/2 to CHF exacerbation  Patient presented with SOB similar to prior CHF exacerbation in oct. Patient with no signs of infection or sepsis. Currently being treated for CAP with zosyn (completed azithromycin). On exam, patient with JVD and diminished breath sounds at bases. CT scan was performed showing existing fibrosis with b/l pleural effusions and signs of fluid overload. Bedside US showing diffuse B lines b/l with no signs of consolidation, heart with good squeeze and LVH. BNP noted to be 55,000 (last admission 22,000), only net negative -3.6 L. Patient transitioned from HFNC to 6 L NC satting 91-92%  -would continue diuresis (per cards). No concerns for infectious etiology  -strict Is and Os  -please add bowel regimen (abdomen distended with no BM in 3 days)  -wean 02 as tolerated  -consider BiPAP at night if needed   -if further concerned for respiratory status consider transfer to CCU for closer monitoring.     Dispo: remain on 5 uris

## 2021-01-22 NOTE — PROGRESS NOTE ADULT - ATTENDING COMMENTS
breathing better today from non rebreather to 6L NC  no overt SOB  CKD 3- NAV- nephrotic syndrome, serologies negative  now oliguric- retry IV Lasix with metolazone  primary lung process component of interstitial lung disease- responsive to steroid- now being taper  POCUS with B lines c/w with some degree of lung congestion  discussed case in detail with pt and her daughter in regard to the potential need for dialysis if U output does not improve  very comfortable right now; No N, V  tucker in place for strict I/O for next 24-48 hours

## 2021-01-22 NOTE — PROGRESS NOTE ADULT - ASSESSMENT
86 yr old F, POOR HISTORIAN/early dementia, former heavy smoker with PMHx HTN, hyperlipidemia, COPD, hypothyroidism, Stage IV CKD (baseline Cr ~3.0), anemia of chronic disease, CAD s/p CABG 2015 Dr. Blunt, chronic diastolic CHF(EF: 55% by Echo 10/2020), renal artery stenosis s/p renal artery stent >20yrs ago, Carotid artery stenosis, PAD, who presents to [facility] ED 1/15/21 c/o progressively worsening SOB and cough x 3 days, admitted to cardiac telemetry for management of acute on chronic diastolic CHF exacerbation in setting of CAP. Patient s/p diuresis with hospital course complicated by NAV on CKD Stage IV (renal following), hypoxic respiratory failure increased to HFNC, IV steroids, ABX secondary to CAP (followed by Dr. Cuevas) and anemia s/p 1U PRBC on 1/20. PT rec home with home PT.

## 2021-01-22 NOTE — PROGRESS NOTE ADULT - ASSESSMENT
86F POOR HISTORIAN/early dementia, former heavy smoker PMHx DM, HTN, hyperlipidemia, COPD, hypothyroidism, CKD (baseline Cr ~3.0), anemia of chronic disease, CAD s/p CABG 2015 Dr. Blunt, chronic diastolic CHF(EF:55% by Echo 10/2020), renal artery stenosis s/p renal artery stent >20yrs ago, Carotid artery stenosis, PAD, who presents c/o progressively worsening SOB and cough. Nephrology consulted for NAV.     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

## 2021-01-22 NOTE — PROGRESS NOTE ADULT - SUBJECTIVE AND OBJECTIVE BOX
PUD CCM    INTERVAL HPI/OVERNIGHT EVENTS:    Was on nonrebreather, now saturation 92% on 6 L nasal cannula oxygen.  ESR slightly decreased.  Steroids are being administered, hyperglycemia noted.  Hopefully her Aa gradient will decrease.  Urine at -3.5 L    All new data reviewed, including VS, lab, imaging, Rx and documentation.    PAST MEDICAL & SURGICAL HISTORY:  Essential hypertension  Hypertension    Type 2 diabetes mellitus  Diabetes    Hyperlipidemia  Hyperlipidemia    Disorder of kidney and ureter  Renal insufficiency    Peripheral vascular disease  PVD (peripheral vascular disease)    Anxiety state  Anxiety    Atherosclerosis of renal artery  with resulting stent placement    Atherosclerosis of renal artery  Renal artery stenosis    FAMILY HISTORY:  Family history of ischemic heart disease  Family history of coronary artery disease.    SOCIAL HISTORY:    REVIEW OF SYSTEMS:  Constitutional: (+ weight change, (-) fever,  () chills, () fatigue, (-) night sweats  Eyes: (-) discharge, () eye pain, () visual change  ENT:  (-) hearing difficulty, () vertigo, () sinus pain,  () throat pain, () epistaxis, () dysphagia, () hoarseness  Neck: (-) pain, () stiffness, () swelling  Respiratory: (+++) cough, () wheezing, () hemoptysis      Cardiovascular: (-) chest pain, ()palpitations, () dizziness   Gastrointestinal: (-) abdominal pain, () nausea, () vomiting, () hematemesis, () diarrhea,  () constipation, () melena  Genitourinary:  (-) dysuria, () frequency, () hematuria, () incontinence      Neurologic: (-) headache, () memory loss, () loss of strength, () numbness, () tremor     Skin: (-) itching, () burning, () rash, () lesions   Lymphatic: (-) enlarged lymph nodes  Endocrine: (-) hair loss, () temperature intolerance         Musculoskeletal: (-) back pain, () joint pain,  () extremity pain  Psychiatric: (-) visual change, () auditory change, () depression, () anxiety, () suicidal  Sleep: (-) disorder, () insomnia, () sleep deprivation  Heme/Lymph: () easy bruising, () bleeding gums            Allergy and Immunologic: () hives, () eczema    Vital Signs Last 24 Hrs  T(C): 36.2 (19 Jan 2021 09:44), Max: 37.5 (18 Jan 2021 18:09)  T(F): 97.1 (19 Jan 2021 09:44), Max: 99.5 (18 Jan 2021 18:09)  HR: 74 (19 Jan 2021 08:44) (72 - 82)  BP: 148/62 (19 Jan 2021 08:44) (130/59 - 165/71)  BP(mean): --  RR: 24 (19 Jan 2021 08:44) (18 - 24)  SpO2: 94% (19 Jan 2021 08:44) (93% - 97%) ON NONREBREATHER    I&O's Detail    18 Jan 2021 07:01  -  19 Jan 2021 07:00  --------------------------------------------------------  IN:    IV PiggyBack: 250 mL    Oral Fluid: 1020 mL  Total IN: 1270 mL    OUT:    Voided (mL): 1700 mL  Total OUT: 1700 mL    Total NET: -430 mL      19 Jan 2021 07:01  -  19 Jan 2021 11:48  --------------------------------------------------------  IN:    IV PiggyBack: 100 mL  Total IN: 100 mL    OUT:    Voided (mL): 200 mL  Total OUT: 200 mL    Total NET: -100 mL    PHYSICAL EXAM:  in bed, less confused, daughter is present.  Well nourished, well developed, comfortable, - acute distress; vital signs are monitored continuously  Eyes: PERRLA, EOMI, -conjunctivitis, -scleritis   Head: no focal deficit, normocephalic,  no trauma  ENMT: moist tongue, no thrush, -nasal discharge, -hoarseness, normal hearing, -cough, -hemoptysis, trachea midline  Neck: supple, - lymphadenopathy,  -masses, -JVD  Respiratory: bilateral diminished breath sounds, -wheezing, -rhonchi, -rales, -crackles  Chest: -accessory muscle use, -paradoxical breathing  Cardiovascular: regular rate and sinus rhythm, S1 S2 normal, -S3, -S4, -murmur, -gallop, -rub  Gastrointestinal: soft, nontender, nondistended, normal bowel sounds, no hepatosplenomegaly  Genitourinary: -flank pain, -dysuria  Extremities: -clubbing, -cyanosis, -edema    Vascular: peripheral pulses palpable 2+ bilaterally  Neurological: alert, oriented x 3, no focal deficit, + tremor  Skin: warm, dry, -erythema, iv sites intact  Lymph nodes; no cervical, supraclavicular or axillary adenopathy  Psychiatric: agitated      MEDICATIONS  (STANDING):  albuterol/ipratropium for Nebulization 3 milliLiter(s) Nebulizer every 6 hours  amLODIPine   Tablet 10 milliGRAM(s) Oral daily  atorvastatin 20 milliGRAM(s) Oral at bedtime  azithromycin  IVPB 500 milliGRAM(s) IV Intermittent every 24 hours  budesonide  80 MICROgram(s)/formoterol 4.5 MICROgram(s) Inhaler 2 Puff(s) Inhalation two times a day  donepezil 5 milliGRAM(s) Oral at bedtime  fluticasone propionate 50 MICROgram(s)/spray Nasal Spray 1 Spray(s) Both Nostrils two times a day  heparin   Injectable 5000 Unit(s) SubCutaneous every 12 hours  influenza  Vaccine (HIGH DOSE) 0.7 milliLiter(s) IntraMuscular once  iron sucrose IVPB 200 milliGRAM(s) IV Intermittent every 24 hours  levothyroxine 50 MICROGram(s) Oral daily  metoprolol tartrate 50 milliGRAM(s) Oral two times a day  piperacillin/tazobactam IVPB.. 2.25 Gram(s) IV Intermittent every 6 hours  sevelamer carbonate 800 milliGRAM(s) Oral three times a day with meals    MEDICATIONS  (PRN):  benzonatate 100 milliGRAM(s) Oral every 8 hours PRN Cough  guaiFENesin   Syrup  (Sugar-Free) 100 milliGRAM(s) Oral every 6 hours PRN Cough      Allergies    No Known Allergies    Intolerances        LABS:                        8.3    11.47 )-----------( 230      ( 19 Jan 2021 07:05 )             27.2     01-19    136  |  99  |  70<H>  ----------------------------<  114<H>  4.5   |  22  |  3.84<H>    Ca    8.5      19 Jan 2021 07:05  Phos  5.0     01-19  Mg     1.8     01-19    TPro  5.9<L>  /  Alb  2.7<L>  /  TBili  x   /  DBili  x   /  AST  x   /  ALT  x   /  AlkPhos  x   01-18    +DVT prophylaxis  5000 q12  +Sleep  NO ISSUES   +Nutrition goals  POOR APPETITE  -Pain DENIED  -Decubital ulcer  +GI prophylaxis (PPV, coagulopathy, Hx)  +Aspiration prophylaxis (45 degrees)  Ventilator synchronized   MAY NEED BiPAP  Tracheal cuff pressure  +Sedation/analgesia stopped once  +ID (phos, CH, mupi, SB)  -Delirium  +Cardiac Beta/ACEI-ARB on hold/ASA on hold/statin  +Prevention  +Education  +Medication reviewed (drug-drug interactions, PDA)  Medical devices  URINE SPONGE, NONREBREATHER  Discussed with Dr Crooks, Justin, PA, CCRN    RADIOLOGY & ADDITIONAL STUDIES:    CXR with bilateral infiltrates, no change    EXAM:  CT CHEST                          PROCEDURE DATE:  10/14/2020      INTERPRETATION:  CT SCAN OF CHEST    History: 85-year-old female with shortness of breath and pleural effusion    Technique: CT scan of chest performed from lung apices through lung bases. Axial, coronal, and sagittal multiplanar reformatted images were produced. Thin section axial images and axial MIPS were also produced. Intravenous contrast material was not administered, as ordered.    Comparison: Chest radiograph dated 10/13/2020. No prior thoracic CT examinations.    Findings:    Lungs and large airways: Normal.    Pleura:  There are small bibasilar pleural effusions right greater than left. There is interlobular septal thickening which extends into the interior of the lung to suggest a component of pulmonary venous congestion. Moreover there are peripheral reticular opacities with some associated groundglass component within the peripheral aspects of both lungs. More peripheral groundglass and consolidation is seen within the right upper left upper and right middle lobes. There is some pulmonary fibrosis with traction bronchiectasis and bronchiolectasis without jesús honeycomb lung formation. There is air trapping.    Mediastinum and hilar regions: Increased number of mildly enlarged prevascular (1.1 cm in short axis), right paratracheal (1.1 cm in short axis), and subcarinal (1.7 cm in short axis).    Heart and pericardium:  Cardiomegaly. No pericardial effusion. Extensive calcification of the mitral annulus.    Vessels:  Severe coronary artery calcification/stents. Severe callus  5. Atheromatous plaque formation throughout the aortic arch descending and proximal abdominal aorta and major side branches with extensive calcification of the origin of the renal arteries right greater than left. Calcification of the origin of the brachycephalic vessels are noted. No evidence of aneurysm    Chest wall and lower neck:  Punctate microcalcifications are noted in the breasts bilaterally left greater than right. Clinical and mammographic correlation.    Upper abdomen: Cholelithiasis. Extensive arterial vascular calcification of the major side branches of the abdominal aorta as above.    Bones: Moderate multilevel degenerative disc disease involving the lower thoracic spine. Poststernotomy.      Impression:    1. Cardiomegaly. Small bibasilar pleural effusions and interlobular septal thickening compatible with pulmonary venous congestion.  2. More peripheral reticular, groundglass and some patchy areas of peripheral consolidation more pronounced in the upper and midlung zones are noted. There may be some bronchiectasis/bronchiolectasis to suggest pulmonary fibrosis. No honeycomb lung formation. Air-trapping is noted. These findings are not consistent with UIP/IPF. Abbreviated differential includes atypical infectious and/or inflammatory etiologies such as chronic hypersensitivity pneumonia, stage IV sarcoidosis, pneumoconiosis, and subacute and/or chronic sequela of atypical and viral pneumonias such as Covid 19.  3. Mild mediastinal lymphadenopathy.    EXAM:  CT CHEST                          PROCEDURE DATE:  01/19/2021      INTERPRETATION:  CT of the CHEST without intravenous contrast dated 1/19/2021 2:31 PM    INDICATION: Worsening Hypoxia    TECHNIQUE: CT of the chest was performed withoutintravenous contrast.  Intravenous contrast was not used Axial and coronal and sagittal images were produced and reviewed.    PRIOR STUDIES: CT chest 10/14/2020    FINDINGS: Cardiomegaly as before. Dense mitral valve annular calcification. Coronary artery calcifications. Status post CABG. The main pulmonary artery measures 3.1 cm diameter.  No pericardial effusion is seen.  Evaluation of the vasculature is limited without intravenous contrast, but the great vessels are grossly unremarkable.  Evaluation for adenopathy is limited without intravenous contrast. Within that limitation, mild mediastinal lymphadenopathy is seen. No axillary adenopathy. Abandoned epicardial pacer wire. Low-density of cardiac chambers relative to the myocardium suggesting anemia.    Small bilateral pleural effusions are seen. Evaluation of the pulmonary parenchyma demonstrates underlying interstitial fibrosis in the upper and mid zones with bilateral mosaic attenuation. There is now new bilateral groundglass opacities  in the upper and mid and lower zones with thickening of interlobular septa at the bilateral upper zones i.e. crazy paving  appearance.. Coalescence of findings with consolidation seen in the superior segment right lower lobe.    Limited evaluation of the upper abdomen demonstrates radiopaque cholelithiasis.    Evaluation of the osseous structures demonstrates degenerative changes.      IMPRESSION: New bilateral groundglass lung opacities superimposed on chronic interstitial pulmonary fibrosis probably due to chronic hypersensitivity pneumonitis or sarcoid. The groundglass lung opacities could  be due to pulmonary edema, pulmonary hemorrhage, alveolar proteinosis, organizing pneumonia or atypical infection.      Assessment and Plan:    Problem/Plan - 1:  ·  Problem: Hypoxia.  Plan: Now on nonrebreather. New infiltrates may respond to steroids. Very high ESR noted.  Etiology unclear, lungs are well aerated. Mild rales RLL.  Hopefully her bilateral lung disease will respond to steroids.     Problem/Plan - 2:  ·  Problem: Anemia. Will get PRBC with iv lasix. Will discuss EPO.     Problem/Plan - 3:  ·  Problem: NAV (acute kidney injury).  Plan: Furosemide on hold.      Problem/Plan - 4:  ·  Problem: History of subarachnoid hemorrhage. New CT without bleed.     Problem/Plan - 5:  ·  Problem: Type 2 diabetes mellitus.      Problem/Plan - 6:  Problem: COPD (chronic obstructive pulmonary disease). Plan: Continue bronchodilators. IV steroid will help also.     Problem/Plan - 7:  ·  Problem: CAD (coronary artery disease).  Plan: BB. ASA 81     Problem/Plan - 8:  ·  Problem: Acute on chronic diastolic congestive heart failure.      Problem/Plan - 9:  ·  Problem: CAP (community acquired pneumonia).  Plan: Bilateral infiltrates are severe. Had vanco and pip/tazo.     Problem/Plan - 10:  Problem: Cough. Plan; Perhaps from pulmonary fibrosis also. Nonproductive cough.     PUD CCM    INTERVAL HPI/OVERNIGHT EVENTS:    Was on nonrebreather, now saturation 94% on 6 L nasal cannula oxygen.  ESR slightly decreased.  Steroids are being administered, hyperglycemia noted.  Hopefully her Aa gradient will decrease.  Urine at -3.5 L    All new data reviewed, including VS, lab, imaging, Rx and documentation.    PAST MEDICAL & SURGICAL HISTORY:  Essential hypertension  Hypertension    Type 2 diabetes mellitus  Diabetes    Hyperlipidemia  Hyperlipidemia    Disorder of kidney and ureter  Renal insufficiency    Peripheral vascular disease  PVD (peripheral vascular disease)    Anxiety state  Anxiety    Atherosclerosis of renal artery  with resulting stent placement    Atherosclerosis of renal artery  Renal artery stenosis    FAMILY HISTORY:  Family history of ischemic heart disease  Family history of coronary artery disease.    SOCIAL HISTORY:    REVIEW OF SYSTEMS:  Constitutional: (+ weight change, (-) fever,  () chills, () fatigue, (-) night sweats  Eyes: (-) discharge, () eye pain, () visual change  ENT:  (-) hearing difficulty, () vertigo, () sinus pain,  () throat pain, () epistaxis, () dysphagia, () hoarseness  Neck: (-) pain, () stiffness, () swelling  Respiratory: (+++) cough, () wheezing, () hemoptysis      Cardiovascular: (-) chest pain, ()palpitations, () dizziness   Gastrointestinal: (-) abdominal pain, () nausea, () vomiting, () hematemesis, () diarrhea,  () constipation, () melena  Genitourinary:  (-) dysuria, () frequency, () hematuria, () incontinence      Neurologic: (-) headache, () memory loss, () loss of strength, () numbness, () tremor     Skin: (-) itching, () burning, () rash, () lesions   Lymphatic: (-) enlarged lymph nodes  Endocrine: (-) hair loss, () temperature intolerance         Musculoskeletal: (-) back pain, () joint pain,  () extremity pain  Psychiatric: (-) visual change, () auditory change, () depression, () anxiety, () suicidal  Sleep: (-) disorder, () insomnia, () sleep deprivation  Heme/Lymph: () easy bruising, () bleeding gums            Allergy and Immunologic: () hives, () eczema    Vital Signs Last 24 Hrs  T(C): 36.2 (19 Jan 2021 09:44), Max: 37.5 (18 Jan 2021 18:09)  T(F): 97.1 (19 Jan 2021 09:44), Max: 99.5 (18 Jan 2021 18:09)  HR: 74 (19 Jan 2021 08:44) (72 - 82)  BP: 148/62 (19 Jan 2021 08:44) (130/59 - 165/71)  BP(mean): --  RR: 24 (19 Jan 2021 08:44) (18 - 24)  SpO2: 94% (19 Jan 2021 08:44) (93% - 97%) ON NONREBREATHER    I&O's Detail    18 Jan 2021 07:01  -  19 Jan 2021 07:00  --------------------------------------------------------  IN:    IV PiggyBack: 250 mL    Oral Fluid: 1020 mL  Total IN: 1270 mL    OUT:    Voided (mL): 1700 mL  Total OUT: 1700 mL    Total NET: -430 mL      19 Jan 2021 07:01  -  19 Jan 2021 11:48  --------------------------------------------------------  IN:    IV PiggyBack: 100 mL  Total IN: 100 mL    OUT:    Voided (mL): 200 mL  Total OUT: 200 mL    Total NET: -100 mL    PHYSICAL EXAM:  in bed, less confused, daughter is present.  Well nourished, well developed, comfortable, - acute distress; vital signs are monitored continuously  Eyes: PERRLA, EOMI, -conjunctivitis, -scleritis   Head: no focal deficit, normocephalic,  no trauma  ENMT: moist tongue, no thrush, -nasal discharge, -hoarseness, normal hearing, -cough, -hemoptysis, trachea midline  Neck: supple, - lymphadenopathy,  -masses, -JVD  Respiratory: bilateral diminished breath sounds, -wheezing, -rhonchi, -rales, -crackles  Chest: -accessory muscle use, -paradoxical breathing  Cardiovascular: regular rate and sinus rhythm, S1 S2 normal, -S3, -S4, -murmur, -gallop, -rub  Gastrointestinal: soft, nontender, nondistended, normal bowel sounds, no hepatosplenomegaly  Genitourinary: -flank pain, -dysuria  Extremities: -clubbing, -cyanosis, -edema    Vascular: peripheral pulses palpable 2+ bilaterally  Neurological: alert, oriented x 3, no focal deficit, + tremor  Skin: warm, dry, -erythema, iv sites intact  Lymph nodes; no cervical, supraclavicular or axillary adenopathy  Psychiatric: agitated      MEDICATIONS  (STANDING):  albuterol/ipratropium for Nebulization 3 milliLiter(s) Nebulizer every 6 hours  amLODIPine   Tablet 10 milliGRAM(s) Oral daily  atorvastatin 20 milliGRAM(s) Oral at bedtime  azithromycin  IVPB 500 milliGRAM(s) IV Intermittent every 24 hours  budesonide  80 MICROgram(s)/formoterol 4.5 MICROgram(s) Inhaler 2 Puff(s) Inhalation two times a day  donepezil 5 milliGRAM(s) Oral at bedtime  fluticasone propionate 50 MICROgram(s)/spray Nasal Spray 1 Spray(s) Both Nostrils two times a day  heparin   Injectable 5000 Unit(s) SubCutaneous every 12 hours  influenza  Vaccine (HIGH DOSE) 0.7 milliLiter(s) IntraMuscular once  iron sucrose IVPB 200 milliGRAM(s) IV Intermittent every 24 hours  levothyroxine 50 MICROGram(s) Oral daily  metoprolol tartrate 50 milliGRAM(s) Oral two times a day  piperacillin/tazobactam IVPB.. 2.25 Gram(s) IV Intermittent every 6 hours  sevelamer carbonate 800 milliGRAM(s) Oral three times a day with meals    MEDICATIONS  (PRN):  benzonatate 100 milliGRAM(s) Oral every 8 hours PRN Cough  guaiFENesin   Syrup  (Sugar-Free) 100 milliGRAM(s) Oral every 6 hours PRN Cough      Allergies    No Known Allergies    Intolerances        LABS:                        8.3    11.47 )-----------( 230      ( 19 Jan 2021 07:05 )             27.2     01-19    136  |  99  |  70<H>  ----------------------------<  114<H>  4.5   |  22  |  3.84<H>    Ca    8.5      19 Jan 2021 07:05  Phos  5.0     01-19  Mg     1.8     01-19    TPro  5.9<L>  /  Alb  2.7<L>  /  TBili  x   /  DBili  x   /  AST  x   /  ALT  x   /  AlkPhos  x   01-18    +DVT prophylaxis  5000 q12  +Sleep  NO ISSUES   +Nutrition goals  POOR APPETITE  -Pain DENIED  -Decubital ulcer  +GI prophylaxis (PPV, coagulopathy, Hx)  +Aspiration prophylaxis (45 degrees)  Ventilator synchronized   MAY NEED BiPAP  Tracheal cuff pressure  +Sedation/analgesia stopped once  +ID (phos, CH, mupi, SB)  -Delirium  +Cardiac Beta/ACEI-ARB on hold/ASA on hold/statin  +Prevention  +Education  +Medication reviewed (drug-drug interactions, PDA)  Medical devices  URINE catheter, [place], [place], IV  Discussed with Justin Brunner, PA, CCRN    RADIOLOGY & ADDITIONAL STUDIES:    CXR with bilateral infiltrates, no change    EXAM:  CT CHEST                          PROCEDURE DATE:  10/14/2020      INTERPRETATION:  CT SCAN OF CHEST    History: 85-year-old female with shortness of breath and pleural effusion    Technique: CT scan of chest performed from lung apices through lung bases. Axial, coronal, and sagittal multiplanar reformatted images were produced. Thin section axial images and axial MIPS were also produced. Intravenous contrast material was not administered, as ordered.    Comparison: Chest radiograph dated 10/13/2020. No prior thoracic CT examinations.    Findings:    Lungs and large airways: Normal.    Pleura:  There are small bibasilar pleural effusions right greater than left. There is interlobular septal thickening which extends into the interior of the lung to suggest a component of pulmonary venous congestion. Moreover there are peripheral reticular opacities with some associated groundglass component within the peripheral aspects of both lungs. More peripheral groundglass and consolidation is seen within the right upper left upper and right middle lobes. There is some pulmonary fibrosis with traction bronchiectasis and bronchiolectasis without jesús honeycomb lung formation. There is air trapping.    Mediastinum and hilar regions: Increased number of mildly enlarged prevascular (1.1 cm in short axis), right paratracheal (1.1 cm in short axis), and subcarinal (1.7 cm in short axis).    Heart and pericardium:  Cardiomegaly. No pericardial effusion. Extensive calcification of the mitral annulus.    Vessels:  Severe coronary artery calcification/stents. Severe callus  5. Atheromatous plaque formation throughout the aortic arch descending and proximal abdominal aorta and major side branches with extensive calcification of the origin of the renal arteries right greater than left. Calcification of the origin of the brachycephalic vessels are noted. No evidence of aneurysm    Chest wall and lower neck:  Punctate microcalcifications are noted in the breasts bilaterally left greater than right. Clinical and mammographic correlation.    Upper abdomen: Cholelithiasis. Extensive arterial vascular calcification of the major side branches of the abdominal aorta as above.    Bones: Moderate multilevel degenerative disc disease involving the lower thoracic spine. Poststernotomy.      Impression:    1. Cardiomegaly. Small bibasilar pleural effusions and interlobular septal thickening compatible with pulmonary venous congestion.  2. More peripheral reticular, groundglass and some patchy areas of peripheral consolidation more pronounced in the upper and midlung zones are noted. There may be some bronchiectasis/bronchiolectasis to suggest pulmonary fibrosis. No honeycomb lung formation. Air-trapping is noted. These findings are not consistent with UIP/IPF. Abbreviated differential includes atypical infectious and/or inflammatory etiologies such as chronic hypersensitivity pneumonia, stage IV sarcoidosis, pneumoconiosis, and subacute and/or chronic sequela of atypical and viral pneumonias such as Covid 19.  3. Mild mediastinal lymphadenopathy.    EXAM:  CT CHEST                          PROCEDURE DATE:  01/19/2021      INTERPRETATION:  CT of the CHEST without intravenous contrast dated 1/19/2021 2:31 PM    INDICATION: Worsening Hypoxia    TECHNIQUE: CT of the chest was performed withoutintravenous contrast.  Intravenous contrast was not used Axial and coronal and sagittal images were produced and reviewed.    PRIOR STUDIES: CT chest 10/14/2020    FINDINGS: Cardiomegaly as before. Dense mitral valve annular calcification. Coronary artery calcifications. Status post CABG. The main pulmonary artery measures 3.1 cm diameter.  No pericardial effusion is seen.  Evaluation of the vasculature is limited without intravenous contrast, but the great vessels are grossly unremarkable.  Evaluation for adenopathy is limited without intravenous contrast. Within that limitation, mild mediastinal lymphadenopathy is seen. No axillary adenopathy. Abandoned epicardial pacer wire. Low-density of cardiac chambers relative to the myocardium suggesting anemia.    Small bilateral pleural effusions are seen. Evaluation of the pulmonary parenchyma demonstrates underlying interstitial fibrosis in the upper and mid zones with bilateral mosaic attenuation. There is now new bilateral groundglass opacities  in the upper and mid and lower zones with thickening of interlobular septa at the bilateral upper zones i.e. crazy paving  appearance.. Coalescence of findings with consolidation seen in the superior segment right lower lobe.    Limited evaluation of the upper abdomen demonstrates radiopaque cholelithiasis.    Evaluation of the osseous structures demonstrates degenerative changes.      IMPRESSION: New bilateral groundglass lung opacities superimposed on chronic interstitial pulmonary fibrosis probably due to chronic hypersensitivity pneumonitis or sarcoid. The groundglass lung opacities could  be due to pulmonary edema, pulmonary hemorrhage, alveolar proteinosis, organizing pneumonia or atypical infection.      Assessment and Plan:    Problem/Plan - 1:  ·  Problem: Hypoxia.  Plan: Was on nonrebreather. New infiltrates appear to respond to steroids. Very high ESR noted.    Hopefully her bilateral lung disease will respond to steroids.     Problem/Plan - 2:  ·  Problem: Anemia. Received PRBC with iv lasix. Will discuss EPO.     Problem/Plan - 3:  ·  Problem: NAV (acute kidney injury).  Plan: Furosemide on hold.      Problem/Plan - 4:  ·  Problem: History of subarachnoid hemorrhage. New CT without bleed. ASA 81     Problem/Plan - 5:  ·  Problem: Type 2 diabetes mellitus. Uncontrolled with steroids. Endocrinology will evaluate.     Problem/Plan - 6:  Problem: COPD (chronic obstructive pulmonary disease). Plan: Continue bronchodilators.     Problem/Plan - 7:  ·  Problem: CAD (coronary artery disease).  Plan: BB. ASA 81     Problem/Plan - 8:  ·  Problem: Acute on chronic diastolic congestive heart failure.      Problem/Plan - 9:  ·  Problem: CAP (community acquired pneumonia).  Plan: Bilateral infiltrates are severe. Had vanco and pip/tazo.  ID consulted, hopefully we can discontinue her antimicrobials. Procalcitonin noted.     Problem/Plan - 10:  Problem: Cough. Plan; Perhaps from pulmonary fibrosis also. Nonproductive cough.

## 2021-01-22 NOTE — PROGRESS NOTE ADULT - SUBJECTIVE AND OBJECTIVE BOX
Interventional Cardiology PA Adult Progress Note    Subjective Assessment:  	  MEDICATIONS:  amLODIPine   Tablet 10 milliGRAM(s) Oral daily  furosemide   IVPB 100 milliGRAM(s) IV Intermittent once  metoprolol tartrate 50 milliGRAM(s) Oral two times a day    piperacillin/tazobactam IVPB.. 2.25 Gram(s) IV Intermittent every 6 hours    albuterol/ipratropium for Nebulization 3 milliLiter(s) Nebulizer every 6 hours  benzonatate 100 milliGRAM(s) Oral every 8 hours PRN  budesonide  80 MICROgram(s)/formoterol 4.5 MICROgram(s) Inhaler 2 Puff(s) Inhalation two times a day  guaiFENesin   Syrup  (Sugar-Free) 100 milliGRAM(s) Oral every 6 hours PRN    acetaminophen   Tablet .. 650 milliGRAM(s) Oral every 6 hours PRN  diazepam    Tablet 1 milliGRAM(s) Oral daily  donepezil 5 milliGRAM(s) Oral at bedtime      atorvastatin 20 milliGRAM(s) Oral at bedtime  dextrose 40% Gel 15 Gram(s) Oral once  dextrose 50% Injectable 25 Gram(s) IV Push once  dextrose 50% Injectable 12.5 Gram(s) IV Push once  dextrose 50% Injectable 25 Gram(s) IV Push once  glucagon  Injectable 1 milliGRAM(s) IntraMuscular once  insulin lispro (ADMELOG) corrective regimen sliding scale   SubCutaneous Before meals and at bedtime  levothyroxine 50 MICROGram(s) Oral daily  methylPREDNISolone sodium succinate Injectable 40 milliGRAM(s) IV Push every 8 hours    aspirin enteric coated 81 milliGRAM(s) Oral daily  dextrose 5%. 1000 milliLiter(s) IV Continuous <Continuous>  dextrose 5%. 1000 milliLiter(s) IV Continuous <Continuous>  fluticasone propionate 50 MICROgram(s)/spray Nasal Spray 1 Spray(s) Both Nostrils two times a day  heparin   Injectable 5000 Unit(s) SubCutaneous every 12 hours  influenza  Vaccine (HIGH DOSE) 0.7 milliLiter(s) IntraMuscular once      	    [PHYSICAL EXAM:  TELEMETRY:  T(C): 36.4 (01-22-21 @ 09:28), Max: 36.9 (01-21-21 @ 18:45)  HR: 54 (01-22-21 @ 09:20) (54 - 74)  BP: 116/61 (01-22-21 @ 09:20) (116/61 - 168/74)  RR: 20 (01-22-21 @ 09:20) (16 - 22)  SpO2: 90% (01-22-21 @ 09:20) (90% - 100%)  Wt(kg): --  I&O's Summary    21 Jan 2021 07:01  -  22 Jan 2021 07:00  --------------------------------------------------------  IN: 180 mL / OUT: 220 mL / NET: -40 mL        Cason:  Central/PICC/Mid Line:                                         Appearance: Normal	  HEENT:   Normal oral mucosa, PERRL, EOMI	  Neck: Supple, + JVD/ - JVD; Carotid Bruit   Cardiovascular: Normal S1 S2, No JVD, No murmurs,   Respiratory: Lungs clear to auscultation/Decreased Breath Sounds/No Rales, Rhonchi, Wheezing	  Gastrointestinal:  Soft, Non-tender, + BS	  Skin: No rashes, No ecchymoses, No cyanosis  Extremities: Normal range of motion, No clubbing, cyanosis or edema  Vascular: Peripheral pulses palpable 2+ bilaterally  Neurologic: Non-focal  Psychiatry: A & O x 3, Mood & affect appropriate        LABS:	 	  CARDIAC MARKERS:                        8.4    7.90  )-----------( 228      ( 22 Jan 2021 06:52 )             26.0     130<L>  |  88<L>  |  74<H>  ----------------------------<  218<H>  4.1   |  21<L>  |  4.24<H>    Ca    8.7      22 Jan 2021 06:52  Phos  7.3     01-22  Mg     2.1     01-22      proBNP:   Lipid Profile:   HgA1c:   TSH:       ASSESSMENT/PLAN: 	        DVT ppx:  Dispo:

## 2021-01-22 NOTE — PROGRESS NOTE ADULT - PROBLEM SELECTOR PLAN 2
-Afebrile. Leukocytosis resolving, continues to have cough  -Dr. Cuevas (pul) following  -s/p 4 days of Ceftriaxone now discontinued as Zosyn added 1/19 to cover Pseudomonas  -Continue Azithromycin 500mg IV Daily (Day 7 of 7 on 1/21), Zosyn 2.25 IV q 6hrs x7days (day 3 of 7)  -Per Dr Cuevas, daily ESR/CRP levels, CXR, and titrate off HFNC as tolerated.  - Currently on 6L NC satting mid 90s. -Afebrile. Leukocytosis resolving, continues to have cough  -Dr. Cuevas (pul) following  -s/p 4 days of Ceftriaxone now discontinued as Zosyn added 1/19 to cover Pseudomonas  -Continue Azithromycin 500mg IV Daily (Day 7 of 7 on 1/21), Zosyn 2.25 IV q 6hrs x7days (through 1/25/21)  -Per Dr Cuevas, daily ESR/CRP levels, CXR, and titrate off HFNC as tolerated.  - Currently on 6L NC satting mid 90s.

## 2021-01-22 NOTE — PROVIDER CONTACT NOTE (OTHER) - REASON
Low sp02 sat
ESR lab result not done due to insufficient amount of blood draw.
Pt has no IV access. Pt refusing more needlestick at this time.
insufficient blood for ESR

## 2021-01-22 NOTE — PROGRESS NOTE ADULT - PROBLEM SELECTOR PLAN 5
- Known to Dr. Nicolas. Renal following  - Baseline Cr ~3.0, currently 4.24. Non-oliguric NAV on CKD Stage IV  - Daily BMP + urine lytes (Na, Cr, Urea, Osm), Phos  -CONT: Renagel 1600 mg PO TID  -F/u PETRONA serum + immunofixation urine, DINA, ANCA, C3/4, anti-PLA2R, Hep Panel  -Renal US: atrophic kidneys, no WILBER  -Diuresis initiated as above per renal.

## 2021-01-22 NOTE — PROGRESS NOTE ADULT - PROBLEM SELECTOR PLAN 7
- Baseline Hgb 8-9, likely SVETLANA and Anemia of CKD.   - H/H 8.4/26.0 -- given 1U PRBC x1 on 1/20 followed by 80mg Lasix IV x1 with appropriate response  - Maintain Active Type and Screen (ordered for 1/21 AM)  - Stool for occult blood negative 1/18. Monitor for signs of bleeding. Patient currently not hypotensive; no evidence of acute bleeding at this time.   - Iron studies reveal SVETLANA however IV Iron on hold 2/2 infection.

## 2021-01-22 NOTE — PROGRESS NOTE ADULT - PROBLEM SELECTOR PLAN 9
-Continue Aricept 5mg PO daily    #Anxiety  - 1mg Valium QD at bedtime    FULL CODE  VTE PPx: on Heparin subcut  PT consulted: home PT; need RE-EVAL once more euvolemic.   Dispo: transfer to CCU  Case d/w Dr. Crooks. -Continue Aricept 5mg PO daily    #Anxiety  - 1mg Valium QD at bedtime    FULL CODE  VTE PPx: on Heparin subcut  PT consulted: home PT; need RE-EVAL once more euvolemic.   Dispo: transfer to CCU for closer monitoring    Case d/w Dr. Crooks.

## 2021-01-22 NOTE — PROGRESS NOTE ADULT - PROBLEM SELECTOR PLAN 3
-Likely multifactorial given CHF, COPD and CAP  -O2 sat 80s on RA ON ADMIT  -LE Duplex 1/19/2021 negative for DVT  -Unable to perform CTA to r/o PE given CKD and VQ scan deferred at this time per Dr. Louie will likely not yield additional information given multiple lung processes occurring at the same time.   -Started Methylprednisolone 40mg IV q 8hrs on 1/20; DECREASED to Methylprednisolone 20mg IV q8hrs per Dr. Faith alcantara.  - Currently 93-95% on 6L NC.

## 2021-01-22 NOTE — PROVIDER CONTACT NOTE (OTHER) - ASSESSMENT
Pt getting agitated and forgetful. AAOx1-2. Called daughter to help reorient and calm pt down - pt upset with daughter and still refusing new IV access.
A+OX2 with periods of confusion and anxiety
Pt sating 87-90% on 6L nasal cannula, other VSS, pt with crackles at bases, pt in no apparent respiratory distress, pt denies shortness of breath. Pt urinating regularly.

## 2021-01-22 NOTE — PROGRESS NOTE ADULT - ASSESSMENT
Nuero:   Dementia  - donepezil 5mg    Anxiety  - valium 1mg qhs    History of subarachnoid hemorrhage    -3 wks ago at [facility]   -CTH 1/19 negative for acute changes  -Per neurosurgery, cleared to start 81mg ASA QD on 1/20    Cardio:  CHF exacerbation  - currently overloaded  - c/w diuresis, furosemide, hold off on metalzone due to hyponatremia  - Strict I+O's  - Echo 1/20/21: LVEF 53%, Biatrial enlargement. Mild aortic regurgitation. Moderate-to-severe mitral regurgitation. Moderate-to-severe tricuspid regurgitation. Pulmonary artery systolic pressure is 70 mmHg.    CAD:  - history of PCI  - c/w ASA, statin 20  - c/w lopressor 50BID    HTN:   - c/w amlodpine 10mg  - lopressor 50mg BID    Pulm:  Pulmonary fibrosis  - seen on CT  - Mehtyprednisone 40mg q8hr (1/20-)  - robitussin/tessolon pears for cough    CAP  -s/p 4 days of Ceftriaxone + azithro, now on Zosyn (1/19-)  - no symptoms of infectious etiology, but concerning cxr and ct    COPD  - not in acute exacerbation  - c/w symbicort    ENDO  Hypothyroid  - levothyroxine 50mg    Hyponatremia  - f/u urine lytes  - switch all meds to NS from D5    DM  - lantus 5qhs, lispro 4u before meals, ISS  - now on steriods    RENAL  NAV on CKD vs worsening CKD  - most bee worsening CKD 2/2 to DM  - renal following  - renal US with atrophic kineys w/o hydro  - will continue with diuresis as needed  - Bicarb PRN for acidosis if needed  - sevelamer    HEME  Anemia: Baseline Hgb 8-9  - bee 2/2 to CKD and Chronic disease  -Maintain Active Type and Screen   -Stool for occult blood negative 1/18.     DVT PPX:  Heparin sub-q           Nuero:   Dementia  - donepezil 5mg    Anxiety  - valium 1mg qhs    History of subarachnoid hemorrhage    -3 wks ago at [facility]   -CTH 1/19 negative for acute changes  -Per neurosurgery, cleared to start 81mg ASA QD on 1/20    Cardio:  CHF  - grossly euvolemic  - c/w diuresis, furosemide 100mg 6am/6pm and metalzone 5mg 5:30pm; monitor sodium  - Strict I+O's  - Echo 1/20/21: LVEF 53%, Biatrial enlargement. Mild aortic regurgitation. Moderate-to-severe mitral regurgitation. Moderate-to-severe tricuspid regurgitation. Pulmonary artery systolic pressure is 70 mmHg.    CAD:  - history of PCI  - c/w ASA, statin 20  - c/w lopressor 50BID    HTN:   - c/w amlodpine 10mg  - lopressor 50mg BID    Pulm:  Pulmonary fibrosis  - seen on CT  - Mehtyprednisone taper (1/20-) for pneumonitis  - robitussin/tessolon pears for cough    CAP  -s/p 4 days of Ceftriaxone + azithro, now on Zosyn (1/19-)  - no symptoms of infectious etiology, but concerning cxr and ct    COPD  - not in acute exacerbation  - c/w symbicort    ENDO  Hypothyroid  - levothyroxine 50mg    Hyponatremia  - f/u urine lytes  - switch all meds to NS from D5    DM  - lantus 5qhs, lispro 4u before meals, ISS  - now on steriods will continue to taper    RENAL  NAV on CKD vs worsening CKD  - most likley worsening CKD 2/2 to DM  - renal following  - renal US with atrophic kineys w/o hydro  - will continue with diuresis as needed  - Bicarb PRN for acidosis if needed  - sevelamer    HEME  Anemia: Baseline Hgb 8-9  - likley 2/2 to CKD and Chronic disease  -Maintain Active Type and Screen   -Stool for occult blood negative 1/18.     DVT PPX:  Heparin sub-q

## 2021-01-22 NOTE — PROGRESS NOTE ADULT - SUBJECTIVE AND OBJECTIVE BOX
Interventional Cardiology PA Adult Progress Note    Subjective Assessment:  	  MEDICATIONS:  amLODIPine   Tablet 10 milliGRAM(s) Oral daily  furosemide   IVPB 100 milliGRAM(s) IV Intermittent once  metoprolol tartrate 50 milliGRAM(s) Oral two times a day  piperacillin/tazobactam IVPB.. 2.25 Gram(s) IV Intermittent every 6 hours  albuterol/ipratropium for Nebulization 3 milliLiter(s) Nebulizer every 6 hours  benzonatate 100 milliGRAM(s) Oral every 8 hours PRN  budesonide  80 MICROgram(s)/formoterol 4.5 MICROgram(s) Inhaler 2 Puff(s) Inhalation two times a day  guaiFENesin   Syrup  (Sugar-Free) 100 milliGRAM(s) Oral every 6 hours PRN  acetaminophen   Tablet .. 650 milliGRAM(s) Oral every 6 hours PRN  diazepam    Tablet 1 milliGRAM(s) Oral daily  donepezil 5 milliGRAM(s) Oral at bedtime  atorvastatin 20 milliGRAM(s) Oral at bedtime  dextrose 40% Gel 15 Gram(s) Oral once  dextrose 50% Injectable 25 Gram(s) IV Push once  dextrose 50% Injectable 12.5 Gram(s) IV Push once  dextrose 50% Injectable 25 Gram(s) IV Push once  glucagon  Injectable 1 milliGRAM(s) IntraMuscular once  insulin lispro (ADMELOG) corrective regimen sliding scale   SubCutaneous Before meals and at bedtime  levothyroxine 50 MICROGram(s) Oral daily  methylPREDNISolone sodium succinate Injectable 40 milliGRAM(s) IV Push every 8 hours  aspirin enteric coated 81 milliGRAM(s) Oral daily  dextrose 5%. 1000 milliLiter(s) IV Continuous <Continuous>  dextrose 5%. 1000 milliLiter(s) IV Continuous <Continuous>  fluticasone propionate 50 MICROgram(s)/spray Nasal Spray 1 Spray(s) Both Nostrils two times a day  heparin   Injectable 5000 Unit(s) SubCutaneous every 12 hours  influenza  Vaccine (HIGH DOSE) 0.7 milliLiter(s) IntraMuscular once      	    [PHYSICAL EXAM:  TELEMETRY:  T(C): 36.4 (01-22-21 @ 09:28), Max: 36.9 (01-21-21 @ 18:45)  HR: 54 (01-22-21 @ 09:20) (54 - 74)  BP: 116/61 (01-22-21 @ 09:20) (116/61 - 168/74)  RR: 20 (01-22-21 @ 09:20) (16 - 22)  SpO2: 90% (01-22-21 @ 09:20) (90% - 100%)  Wt(kg): --  I&O's Summary    21 Jan 2021 07:01  -  22 Jan 2021 07:00  --------------------------------------------------------  IN: 180 mL / OUT: 220 mL / NET: -40 mL        Cason:  Central/PICC/Mid Line:                                         Appearance: Normal	  HEENT:   Normal oral mucosa, PERRL, EOMI	  Neck: Supple, + JVD/ - JVD; Carotid Bruit   Cardiovascular: Normal S1 S2, No JVD, No murmurs,   Respiratory: Lungs clear to auscultation/Decreased Breath Sounds/No Rales, Rhonchi, Wheezing	  Gastrointestinal:  Soft, Non-tender, + BS	  Skin: No rashes, No ecchymoses, No cyanosis  Extremities: Normal range of motion, No clubbing, cyanosis or edema  Vascular: Peripheral pulses palpable 2+ bilaterally  Neurologic: Non-focal  Psychiatry: A & O x 3, Mood & affect appropriate      	    ECG:  	  RADIOLOGY:   DIAGNOSTIC TESTING:  [ ] Echocardiogram:  [ ]  Catheterization:  [ ] Stress Test:    [ ] SANJAY  OTHER: 	    LABS:	 	  CARDIAC MARKERS:               8.4    7.90  )-----------( 228      ( 22 Jan 2021 06:52 )             26.0     130<L>  |  88<L>  |  74<H>  ----------------------------<  218<H>  4.1   |  21<L>  |  4.24<H>    Ca    8.7      22 Jan 2021 06:52  Phos  7.3     01-22  Mg     2.1     01-22      proBNP:   Lipid Profile:   HgA1c:   TSH:       ASSESSMENT/PLAN: 	        DVT ppx:  Dispo:     Interventional Cardiology PA Adult Progress Note    Subjective Assessment:  	  MEDICATIONS:  amLODIPine   Tablet 10 milliGRAM(s) Oral daily  furosemide   IVPB 100 milliGRAM(s) IV Intermittent once  metoprolol tartrate 50 milliGRAM(s) Oral two times a day  piperacillin/tazobactam IVPB.. 2.25 Gram(s) IV Intermittent every 6 hours  albuterol/ipratropium for Nebulization 3 milliLiter(s) Nebulizer every 6 hours  benzonatate 100 milliGRAM(s) Oral every 8 hours PRN  budesonide  80 MICROgram(s)/formoterol 4.5 MICROgram(s) Inhaler 2 Puff(s) Inhalation two times a day  guaiFENesin   Syrup  (Sugar-Free) 100 milliGRAM(s) Oral every 6 hours PRN  acetaminophen   Tablet .. 650 milliGRAM(s) Oral every 6 hours PRN  diazepam    Tablet 1 milliGRAM(s) Oral daily  donepezil 5 milliGRAM(s) Oral at bedtime  atorvastatin 20 milliGRAM(s) Oral at bedtime  dextrose 40% Gel 15 Gram(s) Oral once  dextrose 50% Injectable 25 Gram(s) IV Push once  dextrose 50% Injectable 12.5 Gram(s) IV Push once  dextrose 50% Injectable 25 Gram(s) IV Push once  glucagon  Injectable 1 milliGRAM(s) IntraMuscular once  insulin lispro (ADMELOG) corrective regimen sliding scale   SubCutaneous Before meals and at bedtime  levothyroxine 50 MICROGram(s) Oral daily  methylPREDNISolone sodium succinate Injectable 40 milliGRAM(s) IV Push every 8 hours  aspirin enteric coated 81 milliGRAM(s) Oral daily  dextrose 5%. 1000 milliLiter(s) IV Continuous <Continuous>  dextrose 5%. 1000 milliLiter(s) IV Continuous <Continuous>  fluticasone propionate 50 MICROgram(s)/spray Nasal Spray 1 Spray(s) Both Nostrils two times a day  heparin   Injectable 5000 Unit(s) SubCutaneous every 12 hours  influenza  Vaccine (HIGH DOSE) 0.7 milliLiter(s) IntraMuscular once      	    PHYSICAL EXAM:  TELEMETRY:  T(C): 36.4 (01-22-21 @ 09:28), Max: 36.9 (01-21-21 @ 18:45)  HR: 54 (01-22-21 @ 09:20) (54 - 74)  BP: 116/61 (01-22-21 @ 09:20) (116/61 - 168/74)  RR: 20 (01-22-21 @ 09:20) (16 - 22)  SpO2: 90% (01-22-21 @ 09:20) (90% - 100%)  Wt(kg): --  I&O's Summary    21 Jan 2021 07:01  -  22 Jan 2021 07:00  --------------------------------------------------------  IN: 180 mL / OUT: 220 mL / NET: -40 mL        Appearance: NAD on NRB	  Neck: Supple, - JVD; no Carotid Bruit b/l  Cardiovascular: Normal S1 S2, No murmurs, rubs, gallops  Respiratory: Lungs clear to auscultation/No Rales, Rhonchi, Wheezing, crackles  Gastrointestinal:  Soft, Non-tender, ND, + BS x4	  Extremities: Normal range of motion, No clubbing, cyanosis or edema b/l upper or lower extremities   Vascular: Peripheral pulses palpable 2+ bilaterally carotids, radial, DP/PT  Neurologic:  A & O x 3, Mood & affect appropriate      LABS:	 	  CARDIAC MARKERS:               8.4    7.90  )-----------( 228      ( 22 Jan 2021 06:52 )             26.0     130<L>  |  88<L>  |  74<H>  ----------------------------<  218<H>  4.1   |  21<L>  |  4.24<H>    Ca    8.7      22 Jan 2021 06:52  Phos  7.3     01-22  Mg     2.1     01-22       Interventional Cardiology PA Adult Progress Note    TRANSFER FROM TELEMETRY --> CCU  Hospital Course:  86 yr old F, POOR HISTORIAN/early dementia, former heavy smoker with PMHx HTN, hyperlipidemia, COPD, hypothyroidism, Stage IV CKD (baseline Cr ~3.0), anemia of chronic disease, CAD s/p CABG 2015 Dr. Blunt, chronic diastolic CHF(EF: 55% by Echo 10/2020), renal artery stenosis s/p renal artery stent >20yrs ago, Carotid artery stenosis, PAD, who presents to [facility] ED 1/15/21 c/o progressively worsening SOB and cough x 3 days, admitted to cardiac telemetry for management of acute on chronic diastolic CHF exacerbation in setting of CAP. For CAP, patient completed 7 day course of Azithromycin and will continue Zosyn 2.25mg IV q6hrs (renal dose) through 1/25/21. Course c/b anemia s/p 1U PRBC on 1/20/21, w/ improvement and maintenance of Hgb 8-9, continue to monitor and maintain active T&S. Regarding respiratory status, pt initially ow 80s on RA, and required HFNC, which was weaned to 6L NC 1/22/21. Regarding CHF and NAV on CKD, pt still overloaded with inadequate response to Lasix IV. 1/22/21 received Lasix 100mg IV w/ minimal urinary output. In discussion w/ renal, administered another Lasix 100mg IV x1 w/ Metolazone 5mg PO x1 prior to Lasix dose – will continue to monitor urinary output. Upon discussion w/ Dr. Harmon, patient may require new HD this admission for fluid removal; therefore, patient upgraded at this time to CCU for closer monitoring, as pt would likely require emergent HD in setting of decompensation.  	  MEDICATIONS:  amLODIPine   Tablet 10 milliGRAM(s) Oral daily  furosemide   IVPB 100 milliGRAM(s) IV Intermittent once  metoprolol tartrate 50 milliGRAM(s) Oral two times a day  piperacillin/tazobactam IVPB.. 2.25 Gram(s) IV Intermittent every 6 hours  albuterol/ipratropium for Nebulization 3 milliLiter(s) Nebulizer every 6 hours  benzonatate 100 milliGRAM(s) Oral every 8 hours PRN  budesonide  80 MICROgram(s)/formoterol 4.5 MICROgram(s) Inhaler 2 Puff(s) Inhalation two times a day  guaiFENesin   Syrup  (Sugar-Free) 100 milliGRAM(s) Oral every 6 hours PRN  acetaminophen   Tablet .. 650 milliGRAM(s) Oral every 6 hours PRN  diazepam    Tablet 1 milliGRAM(s) Oral daily  donepezil 5 milliGRAM(s) Oral at bedtime  atorvastatin 20 milliGRAM(s) Oral at bedtime  dextrose 40% Gel 15 Gram(s) Oral once  dextrose 50% Injectable 25 Gram(s) IV Push once  dextrose 50% Injectable 12.5 Gram(s) IV Push once  dextrose 50% Injectable 25 Gram(s) IV Push once  glucagon  Injectable 1 milliGRAM(s) IntraMuscular once  insulin lispro (ADMELOG) corrective regimen sliding scale   SubCutaneous Before meals and at bedtime  levothyroxine 50 MICROGram(s) Oral daily  methylPREDNISolone sodium succinate Injectable 40 milliGRAM(s) IV Push every 8 hours  aspirin enteric coated 81 milliGRAM(s) Oral daily  dextrose 5%. 1000 milliLiter(s) IV Continuous <Continuous>  dextrose 5%. 1000 milliLiter(s) IV Continuous <Continuous>  fluticasone propionate 50 MICROgram(s)/spray Nasal Spray 1 Spray(s) Both Nostrils two times a day  heparin   Injectable 5000 Unit(s) SubCutaneous every 12 hours  influenza  Vaccine (HIGH DOSE) 0.7 milliLiter(s) IntraMuscular once      	    PHYSICAL EXAM:  TELEMETRY:  T(C): 36.4 (01-22-21 @ 09:28), Max: 36.9 (01-21-21 @ 18:45)  HR: 54 (01-22-21 @ 09:20) (54 - 74)  BP: 116/61 (01-22-21 @ 09:20) (116/61 - 168/74)  RR: 20 (01-22-21 @ 09:20) (16 - 22)  SpO2: 90% (01-22-21 @ 09:20) (90% - 100%)  Wt(kg): --  I&O's Summary    21 Jan 2021 07:01  -  22 Jan 2021 07:00  --------------------------------------------------------  IN: 180 mL / OUT: 220 mL / NET: -40 mL        Appearance: NAD on NRB	  Neck: Supple, - JVD; no Carotid Bruit b/l  Cardiovascular: Normal S1 S2, No murmurs, rubs, gallops  Respiratory: Lungs clear to auscultation/No Rales, Rhonchi, Wheezing, crackles  Gastrointestinal:  Soft, Non-tender, ND, + BS x4	  Extremities: Normal range of motion, No clubbing, cyanosis or edema b/l upper or lower extremities   Vascular: Peripheral pulses palpable 2+ bilaterally carotids, radial, DP/PT  Neurologic:  A & O x 3, Mood & affect appropriate      LABS:	 	  CARDIAC MARKERS:               8.4    7.90  )-----------( 228      ( 22 Jan 2021 06:52 )             26.0     130<L>  |  88<L>  |  74<H>  ----------------------------<  218<H>  4.1   |  21<L>  |  4.24<H>    Ca    8.7      22 Jan 2021 06:52  Phos  7.3     01-22  Mg     2.1     01-22

## 2021-01-22 NOTE — PROGRESS NOTE ADULT - PROBLEM SELECTOR PLAN 6
- Hx of CABG in 2015 with Dr. Blunt. Currently CP free and HD stable  - EKG: SR @67BPM w/ incomplete LBBB, TWI in I, AVL (similar to prior EKG 10/2020)  - C/w lopressor 50mg BID, Atorvastatin 20mg QD and 81mg ASA QD (confirmed with neurosurgery on 1/20).

## 2021-01-22 NOTE — CONSULT NOTE ADULT - SUBJECTIVE AND OBJECTIVE BOX
Consult reason: acute hypoxic resp failure      HPI:  86 yr old F, former heavy smoker with PMHx HTN, hyperlipidemia, COPD, hypothyroidism, Stage IV CKD (baseline Cr ~3.0), anemia of chronic disease, CAD s/p CABG 2015 Dr. Blunt, chronic diastolic CHF(EF:55% by Echo 10/2020), renal artery stenosis s/p renal artery stent >20yrs ago, Carotid artery stenosis, PAD, who presents to [facility] ED 1/15/21 c/o progressively worsening SOB and cough x 3 days. Patient reports she can barely walk 10 feet prior to having to stop and rest. Patient further admits to chronic bilateral LE edema and significant orthopnea and she has been sleeping upright in a chair the past week. Patient also admits to chills and a nonproductive cough over the past few days. Patient was instructed to increase her Lasix 80mg PO daily dose to Lasix 80mg PO BID and start Metalazone 10mg PO BID prior to each dose by her cardiologist Dr. Horvath. Patient denies any chest pain, dizziness, palpitations, recent travel or sick contacts. Patient endorsing compliance w/ medications however daughter states that compliance with medication has been questionable over the last few months, as patient is becoming more forgetful.    In [facility] ED, BP: 170/68, HR: 80, RR:26, Temp: 98.4F, O2 sat: 80% on RA, improved to 99% with 10L NRB. EKG revealed NSR@67BPM with incomplete LBBB, TWI Lead I, AVL (similar to prior EKG 10/2020). CXR prelim read consistent with alveolar edema, bilateral patchy opacities/consolidation.    Labs notable for: WBC 14.5 with left shift, H/H 9.8/30.6, D-dimer 491, BUN/Cr 56/3.54, , CRP 4.86, Ferritin 180, Procalcitonin 0.29, Lactate 2.4, Troponin T 0.05, BNP 64,671, Initial COVID PCR negative.    Patient treated with Lasix 80mg IV x 1 dose, SL NTG 0.4mg PO x 1 dose, Decadron 6mg IV x 1 dose, Ceftriaxone 1g IV x 1 dose and Azithromycin 500mg IV x 1 dose.  Patient now admitted to cardiac telemetry for management of acute on chronic diastolic CHF exacerbation in setting of suspected CAP.    **Of note: Patient had a fall ~3 weeks ago for which she was admitted to [facility], CT imaging was negative as per daughter and fall thought to be secondary to hypotension.   (15 Mark 2021 21:30)    Interval HPI: patient states that she came into the hospital because she felt like she couldnt breath. She states it felt to her previous admission in october and she couldnt lay flat. Patient denies any exposure to mold, allergens, pets, new medications. Reports she had a dry cough before she came in but it has improved. Denies fever, chills, nausea, vomiting, CP, diarrhea. Reports constipation. Of note patient just had tucker placed today and it is causing some irritation. Overall feels a bit better than on admission    Allergies    No Known Allergies    Intolerances      Home Medications:  Aricept 5 mg oral tablet: 1 tab(s) orally once a day (at bedtime) (15 Mark 2021 22:21)  atorvastatin 20 mg oral tablet: 1 tab(s) orally once a day (15 Mark 2021 22:21)  levothyroxine 50 mcg (0.05 mg) oral tablet: 1 tab(s) orally once a day (15 Mark 2021 22:21)  Metoprolol Tartrate 50 mg oral tablet: 1 tab(s) orally 2 times a day (15 Mark 2021 22:21)      SOCIAL HX:     Smoking          ETOH/Illicit drugs          Occupation    PAST MEDICAL & SURGICAL HISTORY:  Essential hypertension  Hypertension    Type 2 diabetes mellitus  Diabetes    Hyperlipidemia  Hyperlipidemia    Disorder of kidney and ureter  Renal insufficiency    Peripheral vascular disease  PVD (peripheral vascular disease)    Anxiety state  Anxiety    Atherosclerosis of renal artery  with resulting stent placement    Atherosclerosis of renal artery  Renal artery stenosis        FAMILY HISTORY:  Family history of ischemic heart disease  Family history of coronary artery disease.    :    No known cardiovascular or pulmonary family history     ROS:  See HPI     PHYSICAL EXAM    ICU Vital Signs Last 24 Hrs  T(C): 36.4 (22 Jan 2021 09:28), Max: 36.9 (21 Jan 2021 18:45)  T(F): 97.5 (22 Jan 2021 09:28), Max: 98.4 (21 Jan 2021 18:45)  HR: 54 (22 Jan 2021 09:20) (54 - 74)  BP: 116/61 (22 Jan 2021 09:20) (116/61 - 168/74)  BP(mean): --  ABP: --  ABP(mean): --  RR: 20 (22 Jan 2021 09:20) (16 - 22)  SpO2: 90% (22 Jan 2021 09:20) (90% - 100%)      General: Not in distress  HEENT:  TAJ              Neck: no lymphadenopathy, JVD present   Lungs: diminished breath sounds at the bases, able to speak full sentences, satting well on 6 L  Cardiovascular: RRR  Gastrointestinal: Soft, distended, no rebound or rigidity  Musculoskeletal: No clubbing.  Moves all extremities.    Skin: Warm.  Intact  Neurological: No motor or sensory deficit , AOx3      01-21-21 @ 07:01  -  01-22-21 @ 07:00  --------------------------------------------------------  IN:    Oral Fluid: 180 mL  Total IN: 180 mL    OUT:    Voided (mL): 220 mL  Total OUT: 220 mL    Total NET: -40 mL          LABS:                          8.4    7.90  )-----------( 228      ( 22 Jan 2021 06:52 )             26.0                                               01-22    130<L>  |  88<L>  |  74<H>  ----------------------------<  218<H>  4.1   |  21<L>  |  4.24<H>    Ca    8.7      22 Jan 2021 06:52  Phos  7.3     01-22  Mg     2.1     01-22                                                                                                                                                                                                                               CXR:    ECHO:    MEDICATIONS  (STANDING):  albuterol/ipratropium for Nebulization 3 milliLiter(s) Nebulizer every 6 hours  amLODIPine   Tablet 10 milliGRAM(s) Oral daily  aspirin enteric coated 81 milliGRAM(s) Oral daily  atorvastatin 20 milliGRAM(s) Oral at bedtime  budesonide  80 MICROgram(s)/formoterol 4.5 MICROgram(s) Inhaler 2 Puff(s) Inhalation two times a day  dextrose 40% Gel 15 Gram(s) Oral once  dextrose 5%. 1000 milliLiter(s) (50 mL/Hr) IV Continuous <Continuous>  dextrose 5%. 1000 milliLiter(s) (100 mL/Hr) IV Continuous <Continuous>  dextrose 50% Injectable 25 Gram(s) IV Push once  dextrose 50% Injectable 12.5 Gram(s) IV Push once  dextrose 50% Injectable 25 Gram(s) IV Push once  diazepam    Tablet 1 milliGRAM(s) Oral daily  donepezil 5 milliGRAM(s) Oral at bedtime  fluticasone propionate 50 MICROgram(s)/spray Nasal Spray 1 Spray(s) Both Nostrils two times a day  furosemide   IVPB 100 milliGRAM(s) IV Intermittent once  glucagon  Injectable 1 milliGRAM(s) IntraMuscular once  heparin   Injectable 5000 Unit(s) SubCutaneous every 12 hours  influenza  Vaccine (HIGH DOSE) 0.7 milliLiter(s) IntraMuscular once  insulin lispro (ADMELOG) corrective regimen sliding scale   SubCutaneous Before meals and at bedtime  levothyroxine 50 MICROGram(s) Oral daily  methylPREDNISolone sodium succinate Injectable 40 milliGRAM(s) IV Push every 8 hours  metoprolol tartrate 50 milliGRAM(s) Oral two times a day  piperacillin/tazobactam IVPB.. 2.25 Gram(s) IV Intermittent every 6 hours  sevelamer carbonate 1600 milliGRAM(s) Oral three times a day    MEDICATIONS  (PRN):  acetaminophen   Tablet .. 650 milliGRAM(s) Oral every 6 hours PRN Moderate Pain (4 - 6)  benzonatate 100 milliGRAM(s) Oral every 8 hours PRN Cough  guaiFENesin   Syrup  (Sugar-Free) 100 milliGRAM(s) Oral every 6 hours PRN Cough

## 2021-01-22 NOTE — PROGRESS NOTE ADULT - SUBJECTIVE AND OBJECTIVE BOX
Patient is a 86y Female seen and evaluated at bedside. No improvement in respiratory status. Wt unchanged for last few days, unable to quantify urine output. Discussed with team- will place tucker and give 100mg IV lasix to guage response.     Meds:    acetaminophen   Tablet .. 650 every 6 hours PRN  albuterol/ipratropium for Nebulization 3 every 6 hours  amLODIPine   Tablet 10 daily  aspirin enteric coated 81 daily  atorvastatin 20 at bedtime  benzonatate 100 every 8 hours PRN  budesonide  80 MICROgram(s)/formoterol 4.5 MICROgram(s) Inhaler 2 two times a day  dextrose 40% Gel 15 once  dextrose 5%. 1000 <Continuous>  dextrose 5%. 1000 <Continuous>  dextrose 50% Injectable 25 once  dextrose 50% Injectable 12.5 once  dextrose 50% Injectable 25 once  diazepam    Tablet 1 daily  donepezil 5 at bedtime  fluticasone propionate 50 MICROgram(s)/spray Nasal Spray 1 two times a day  glucagon  Injectable 1 once  guaiFENesin   Syrup  (Sugar-Free) 100 every 6 hours PRN  heparin   Injectable 5000 every 12 hours  influenza  Vaccine (HIGH DOSE) 0.7 once  insulin lispro (ADMELOG) corrective regimen sliding scale  Before meals and at bedtime  levothyroxine 50 daily  methylPREDNISolone sodium succinate Injectable 40 every 8 hours  metoprolol tartrate 50 two times a day  piperacillin/tazobactam IVPB.. 2.25 every 6 hours  sevelamer carbonate 1600 three times a day      T(C): , Max: 36.9 (01-21-21 @ 18:45)  T(F): , Max: 98.4 (01-21-21 @ 18:45)  HR: 54 (01-22-21 @ 09:20)  BP: 116/61 (01-22-21 @ 09:20)  BP(mean): --  RR: 20 (01-22-21 @ 09:20)  SpO2: 90% (01-22-21 @ 09:20)  Wt(kg): --    01-21 @ 07:01  -  01-22 @ 07:00  --------------------------------------------------------  IN: 180 mL / OUT: 220 mL / NET: -40 mL    Review of Systems:  RESPIRATORY: +shortness of breath  CARDIOVASCULAR: No chest pain  MUSCULOSKELETAL: No leg edema    PHYSICAL EXAM:  GENERAL: well-developed, well nourished, alert, on non rebreather  CHEST/LUNG: Coarse breath sounds bilaterally, reduced at bases  HEART: normal S1S2, RRR  ABDOMEN: Soft, Nontender, non distended  EXTREMITIES: +oedema       LABS:                        8.4    7.90  )-----------( 228      ( 22 Jan 2021 06:52 )             26.0     01-22    130<L>  |  88<L>  |  74<H>  ----------------------------<  218<H>  4.1   |  21<L>  |  4.24<H>    Ca    8.7      22 Jan 2021 06:52  Phos  7.3     01-22  Mg     2.1     01-22                  RADIOLOGY & ADDITIONAL STUDIES:

## 2021-01-22 NOTE — PROGRESS NOTE ADULT - PROBLEM SELECTOR PLAN 4
- Known to Dr. Louis Olmedo/Dr. Cuevas.   - Continue Symbicort inhaler BID and Duonebs  -Robutussin/Benonatate PRN for cough  -Not currently on Home Oxygen- Assess O2 on RA once treatment for CAP is complete and respiratory status improves.

## 2021-01-22 NOTE — PROVIDER CONTACT NOTE (OTHER) - RECOMMENDATIONS
Notify HILARIA Sanabria, continually assess patient
Pa made aware awaiting order, endorse to day nurse Xochilt
Evaluate.

## 2021-01-22 NOTE — PROVIDER CONTACT NOTE (OTHER) - ACTION/TREATMENT ORDERED:
As per HILARIA Coats, hold off on repeat lab draw, pt is anemic and if need lab draw later today, will reassess.
HILARIA Sanabria made aware, due to pt's COPD history 02 saturation 87% and above okay on 6L (do not want to give higher oxygen), RN to update PA with any changes and to continue to monitor
As per HILARIA Gutierrez, reattempt to obtain new IV access in morning.
Pending

## 2021-01-22 NOTE — PROGRESS NOTE ADULT - PROBLEM SELECTOR PLAN 1
- Diminished lung sounds B/L bases; (+) JVD; BNP 57k 1/20; US by MICU consult w/ B-lines.   - TTE (1/20): LVEF 53%, mild AR, mod-severe MR, mod-severe TR, PASP 70mmHg.   - Structural consulted – disease likely worsened 2/2 overload.   - CONT: Lopressor 50mg PO BID.   - DIURESIS: dosing daily in discussion w/ Nephrology; received Lasix 100mg IV x1 1/22/21 with minimal output. ANOTHER Lasix 100mg IV x1 given in PM with Metolazone 5mg x1 prior to dose.    - Monitor OUTPUT; core measures, strict I/O.     **Cason inserted 1/22/21 per nephrology recs.

## 2021-01-22 NOTE — PROVIDER CONTACT NOTE (OTHER) - SITUATION
Lab cancel ESR for insufficient blood collection
Pt sating 87% to 90% on 6L nasal cannula
ESR lab result not done due to insufficient amount of blood draw.
L AC IV access no longer intact. Pt received half of evening dose of zithromax. Attempted multiple sticks w/two nurses to obtain new IV access w/no success. Pt refusing to get stuck any more.

## 2021-01-22 NOTE — PROGRESS NOTE ADULT - SUBJECTIVE AND OBJECTIVE BOX
INTERVAL HPI/OVERNIGHT EVENTS:    SUBJECTIVE:   Patient seen and examined at bedside.    OBJECTIVE:    VITAL SIGNS:  ICU Vital Signs Last 24 Hrs  T(C): 36.6 (22 Jan 2021 12:49), Max: 36.9 (21 Jan 2021 18:45)  T(F): 97.8 (22 Jan 2021 12:49), Max: 98.4 (21 Jan 2021 18:45)  HR: 66 (22 Jan 2021 16:10) (54 - 73)  BP: 116/61 (22 Jan 2021 09:20) (116/61 - 168/74)  BP(mean): --  ABP: --  ABP(mean): --  RR: 17 (22 Jan 2021 16:10) (16 - 22)  SpO2: 94% (22 Jan 2021 16:10) (90% - 100%)        01-21 @ 07:01  -  01-22 @ 07:00  --------------------------------------------------------  IN: 180 mL / OUT: 220 mL / NET: -40 mL    01-22 @ 07:01  -  01-22 @ 18:07  --------------------------------------------------------  IN: 0 mL / OUT: 50 mL / NET: -50 mL      CAPILLARY BLOOD GLUCOSE      POCT Blood Glucose.: 265 mg/dL (22 Jan 2021 16:53)      PHYSICAL EXAM:    General: NAD  HEENT: NC/AT; PERRL, clear conjunctiva  Neck: Supple.  Respiratory: CTA b/l. No wheezes, rhonchi, rales.  Cardiovascular: +S1/S2; RRR  Abdomen: soft, NT/ND; +BS x4  Extremities: WWP, 2+ peripheral pulses b/l; no LE edema  Skin: normal color and turgor; no rash  Neurological:     MEDICATIONS:  MEDICATIONS  (STANDING):  albuterol/ipratropium for Nebulization 3 milliLiter(s) Nebulizer every 6 hours  amLODIPine   Tablet 10 milliGRAM(s) Oral daily  aspirin enteric coated 81 milliGRAM(s) Oral daily  atorvastatin 20 milliGRAM(s) Oral at bedtime  budesonide  80 MICROgram(s)/formoterol 4.5 MICROgram(s) Inhaler 2 Puff(s) Inhalation two times a day  dextrose 40% Gel 15 Gram(s) Oral once  dextrose 5%. 1000 milliLiter(s) (50 mL/Hr) IV Continuous <Continuous>  dextrose 5%. 1000 milliLiter(s) (100 mL/Hr) IV Continuous <Continuous>  dextrose 50% Injectable 25 Gram(s) IV Push once  dextrose 50% Injectable 12.5 Gram(s) IV Push once  dextrose 50% Injectable 25 Gram(s) IV Push once  diazepam    Tablet 1 milliGRAM(s) Oral daily  donepezil 5 milliGRAM(s) Oral at bedtime  fluticasone propionate 50 MICROgram(s)/spray Nasal Spray 1 Spray(s) Both Nostrils two times a day  furosemide   IVPB 100 milliGRAM(s) IV Intermittent once  glucagon  Injectable 1 milliGRAM(s) IntraMuscular once  heparin   Injectable 5000 Unit(s) SubCutaneous every 12 hours  influenza  Vaccine (HIGH DOSE) 0.7 milliLiter(s) IntraMuscular once  insulin glargine Injectable (LANTUS) 5 Unit(s) SubCutaneous at bedtime  insulin lispro (ADMELOG) corrective regimen sliding scale   SubCutaneous Before meals and at bedtime  insulin lispro Injectable (ADMELOG) 4 Unit(s) SubCutaneous three times a day with meals  levothyroxine 50 MICROGram(s) Oral daily  methylPREDNISolone sodium succinate Injectable 40 milliGRAM(s) IV Push every 8 hours  metoprolol tartrate 50 milliGRAM(s) Oral two times a day  piperacillin/tazobactam IVPB.. 2.25 Gram(s) IV Intermittent every 6 hours  senna 1 Tablet(s) Oral daily  sevelamer carbonate 1600 milliGRAM(s) Oral three times a day    MEDICATIONS  (PRN):  acetaminophen   Tablet .. 650 milliGRAM(s) Oral every 6 hours PRN Moderate Pain (4 - 6)  benzonatate 100 milliGRAM(s) Oral every 8 hours PRN Cough  guaiFENesin   Syrup  (Sugar-Free) 100 milliGRAM(s) Oral every 6 hours PRN Cough      ALLERGIES:  Allergies    No Known Allergies    Intolerances        LABS:                        8.4    7.90  )-----------( 228      ( 22 Jan 2021 06:52 )             26.0     01-22    130<L>  |  88<L>  |  74<H>  ----------------------------<  218<H>  4.1   |  21<L>  |  4.24<H>    Ca    8.7      22 Jan 2021 06:52  Phos  7.3     01-22  Mg     2.1     01-22            RADIOLOGY & ADDITIONAL TESTS: Reviewed. 86 yr old F, former heavy smoker with PMHx HTN, hyperlipidemia, COPD, hypothyroidism, Stage IV CKD (baseline Cr ~3.0), anemia of chronic disease, CAD s/p CABG 2015 Dr. Blunt, chronic diastolic CHF, renal artery stenosis s/p renal artery stent >20yrs ago, Carotid artery stenosis, PAD, SAH (3weeks ago at [facility]) who presents to [facility] ED 1/15/21 c/o progressively worsening SOB and cough x 3 days in the setting of possible non-compliance with home diuretic medication (lasix 80qd) 2/2 to dementia. Admitted for CHF exacerbation. Patient has been treated with diuresis during hospital stay, but with poor response due to worsening renal function, for which renal has been consulted. Renal has encouraged more aggressive diuretic therapy with 100 Lasix and stricter measurements of I +O as patient Uop has decreased to oliguric progressively Patient also with concerns for CAP and underlying worsening pulmonary fibrosis so treated with ceft+ azithro, transition to zosyn 1/20 due to acute worsening hypoxia in addition to methyprednsione for the pulmonary fibrosis.           Interval HPI: patient states that she came into the hospital because she felt like she couldnt breath. She states it felt to her previous admission in october and she couldnt lay flat. Patient denies any exposure to mold, allergens, pets, new medications. Reports she had a dry cough before she came in but it has improved. Denies fever, chills, nausea, vomiting, CP, diarrhea. Reports constipation. Of note patient just had tucker placed today and it is causing some irritation. Overall feels a bit better than on admission            SUBJECTIVE:   Patient seen and examined at bedside.    OBJECTIVE:    VITAL SIGNS:  ICU Vital Signs Last 24 Hrs  T(C): 36.6 (22 Jan 2021 12:49), Max: 36.9 (21 Jan 2021 18:45)  T(F): 97.8 (22 Jan 2021 12:49), Max: 98.4 (21 Jan 2021 18:45)  HR: 66 (22 Jan 2021 16:10) (54 - 73)  BP: 116/61 (22 Jan 2021 09:20) (116/61 - 168/74)  BP(mean): --  ABP: --  ABP(mean): --  RR: 17 (22 Jan 2021 16:10) (16 - 22)  SpO2: 94% (22 Jan 2021 16:10) (90% - 100%)        01-21 @ 07:01 - 01-22 @ 07:00  --------------------------------------------------------  IN: 180 mL / OUT: 220 mL / NET: -40 mL    01-22 @ 07:01 - 01-22 @ 18:07  --------------------------------------------------------  IN: 0 mL / OUT: 50 mL / NET: -50 mL      CAPILLARY BLOOD GLUCOSE      POCT Blood Glucose.: 265 mg/dL (22 Jan 2021 16:53)      PHYSICAL EXAM:    General: NAD  HEENT: NC/AT; PERRL, clear conjunctiva  Neck: Supple.  Respiratory: CTA b/l. No wheezes, rhonchi, rales.  Cardiovascular: +S1/S2; RRR  Abdomen: soft, NT/ND; +BS x4  Extremities: WWP, 2+ peripheral pulses b/l; no LE edema  Skin: normal color and turgor; no rash  Neurological:     MEDICATIONS:  MEDICATIONS  (STANDING):  albuterol/ipratropium for Nebulization 3 milliLiter(s) Nebulizer every 6 hours  amLODIPine   Tablet 10 milliGRAM(s) Oral daily  aspirin enteric coated 81 milliGRAM(s) Oral daily  atorvastatin 20 milliGRAM(s) Oral at bedtime  budesonide  80 MICROgram(s)/formoterol 4.5 MICROgram(s) Inhaler 2 Puff(s) Inhalation two times a day  dextrose 40% Gel 15 Gram(s) Oral once  dextrose 5%. 1000 milliLiter(s) (50 mL/Hr) IV Continuous <Continuous>  dextrose 5%. 1000 milliLiter(s) (100 mL/Hr) IV Continuous <Continuous>  dextrose 50% Injectable 25 Gram(s) IV Push once  dextrose 50% Injectable 12.5 Gram(s) IV Push once  dextrose 50% Injectable 25 Gram(s) IV Push once  diazepam    Tablet 1 milliGRAM(s) Oral daily  donepezil 5 milliGRAM(s) Oral at bedtime  fluticasone propionate 50 MICROgram(s)/spray Nasal Spray 1 Spray(s) Both Nostrils two times a day  furosemide   IVPB 100 milliGRAM(s) IV Intermittent once  glucagon  Injectable 1 milliGRAM(s) IntraMuscular once  heparin   Injectable 5000 Unit(s) SubCutaneous every 12 hours  influenza  Vaccine (HIGH DOSE) 0.7 milliLiter(s) IntraMuscular once  insulin glargine Injectable (LANTUS) 5 Unit(s) SubCutaneous at bedtime  insulin lispro (ADMELOG) corrective regimen sliding scale   SubCutaneous Before meals and at bedtime  insulin lispro Injectable (ADMELOG) 4 Unit(s) SubCutaneous three times a day with meals  levothyroxine 50 MICROGram(s) Oral daily  methylPREDNISolone sodium succinate Injectable 40 milliGRAM(s) IV Push every 8 hours  metoprolol tartrate 50 milliGRAM(s) Oral two times a day  piperacillin/tazobactam IVPB.. 2.25 Gram(s) IV Intermittent every 6 hours  senna 1 Tablet(s) Oral daily  sevelamer carbonate 1600 milliGRAM(s) Oral three times a day    MEDICATIONS  (PRN):  acetaminophen   Tablet .. 650 milliGRAM(s) Oral every 6 hours PRN Moderate Pain (4 - 6)  benzonatate 100 milliGRAM(s) Oral every 8 hours PRN Cough  guaiFENesin   Syrup  (Sugar-Free) 100 milliGRAM(s) Oral every 6 hours PRN Cough      ALLERGIES:  Allergies    No Known Allergies    Intolerances        LABS:                        8.4    7.90  )-----------( 228      ( 22 Jan 2021 06:52 )             26.0     01-22    130<L>  |  88<L>  |  74<H>  ----------------------------<  218<H>  4.1   |  21<L>  |  4.24<H>    Ca    8.7      22 Jan 2021 06:52  Phos  7.3     01-22  Mg     2.1     01-22            RADIOLOGY & ADDITIONAL TESTS: Reviewed. 86 yr old F, POOR HISTORIAN/early dementia, former heavy smoker with PMHx HTN, hyperlipidemia, COPD, hypothyroidism, Stage IV CKD (baseline Cr ~3.0), anemia of chronic disease, CAD s/p CABG 2015 Dr. Blunt, chronic diastolic CHF(EF: 55% by Echo 10/2020), renal artery stenosis s/p renal artery stent >20yrs ago, Carotid artery stenosis, PAD, who presents to [facility] ED 1/15/21 c/o progressively worsening SOB and cough x 3 days, admitted to cardiac telemetry for management of acute on chronic diastolic CHF exacerbation in setting of CAP. For CAP, patient completed 7 day course of Azithromycin and will continue Zosyn 2.25mg IV q6hrs (renal dose) through 1/25/21. Course c/b anemia s/p 1U PRBC on 1/20/21, w/ improvement and maintenance of Hgb 8-9, continue to monitor and maintain active T&S. Regarding respiratory status, pt initially ow 80s on RA, and required HFNC, which was weaned to 6L NC 1/22/21. Regarding CHF and NAV on CKD, pt still overloaded with inadequate response to Lasix IV. 1/22/21 received Lasix 100mg IV w/ minimal urinary output. In discussion w/ renal, administered another Lasix 100mg IV x1 w/ Metolazone 5mg PO x1 prior to Lasix dose – will continue to monitor urinary output. Upon discussion w/ Dr. Harmon, patient may require new HD this admission for fluid removal; therefore, patient upgraded at this time to CCU for closer monitoring, as pt would likely require emergent HD in setting of decompensation. Patient also treated with methyprednisone taper for pneumonitis. Patient on droplet due to covid exposure    Interval HPI: patient examined while lying in bed. says she feels well but has a dry cough. no headache, fever, chills, chest pain, sob, abdominal pain. Patient says she has not been making much urine. complained of some burning with urination    OBJECTIVE:    VITAL SIGNS:  ICU Vital Signs Last 24 Hrs  T(C): 36.6 (22 Jan 2021 12:49), Max: 36.9 (21 Jan 2021 18:45)  T(F): 97.8 (22 Jan 2021 12:49), Max: 98.4 (21 Jan 2021 18:45)  HR: 66 (22 Jan 2021 16:10) (54 - 73)  BP: 116/61 (22 Jan 2021 09:20) (116/61 - 168/74)  BP(mean): --  ABP: --  ABP(mean): --  RR: 17 (22 Jan 2021 16:10) (16 - 22)  SpO2: 94% (22 Jan 2021 16:10) (90% - 100%)        01-21 @ 07:01 - 01-22 @ 07:00  --------------------------------------------------------  IN: 180 mL / OUT: 220 mL / NET: -40 mL    01-22 @ 07:01  - 01-22 @ 18:07  --------------------------------------------------------  IN: 0 mL / OUT: 50 mL / NET: -50 mL      CAPILLARY BLOOD GLUCOSE      POCT Blood Glucose.: 265 mg/dL (22 Jan 2021 16:53)      PHYSICAL EXAM:    General: NAD, resting comfotably with NC on  HEENT: NC/AT; PERRL, clear conjunctiva  Neck: Supple.  Respiratory: minimal crackles bilateral bases, mild fine crackles mid juan on right, left lung with decreased breath sounds in region of LLL and expiatory wheezing  Cardiovascular: +S1/S2; RRR, diastolic murmur in pulmonic valve region as well as in the tricuspid region with systolic mitral murmur  Abdomen: soft, NT/ND; +BS x4  Extremities: WWP, 2+ peripheral pulses b/l; trace bilateral pitting edema  Skin: normal color and turgor; no rash  Neurological: mentaing well will go in and out of spanisih    MEDICATIONS:  MEDICATIONS  (STANDING):  albuterol/ipratropium for Nebulization 3 milliLiter(s) Nebulizer every 6 hours  amLODIPine   Tablet 10 milliGRAM(s) Oral daily  aspirin enteric coated 81 milliGRAM(s) Oral daily  atorvastatin 20 milliGRAM(s) Oral at bedtime  budesonide  80 MICROgram(s)/formoterol 4.5 MICROgram(s) Inhaler 2 Puff(s) Inhalation two times a day  dextrose 40% Gel 15 Gram(s) Oral once  dextrose 5%. 1000 milliLiter(s) (50 mL/Hr) IV Continuous <Continuous>  dextrose 5%. 1000 milliLiter(s) (100 mL/Hr) IV Continuous <Continuous>  dextrose 50% Injectable 25 Gram(s) IV Push once  dextrose 50% Injectable 12.5 Gram(s) IV Push once  dextrose 50% Injectable 25 Gram(s) IV Push once  diazepam    Tablet 1 milliGRAM(s) Oral daily  donepezil 5 milliGRAM(s) Oral at bedtime  fluticasone propionate 50 MICROgram(s)/spray Nasal Spray 1 Spray(s) Both Nostrils two times a day  furosemide   IVPB 100 milliGRAM(s) IV Intermittent once  glucagon  Injectable 1 milliGRAM(s) IntraMuscular once  heparin   Injectable 5000 Unit(s) SubCutaneous every 12 hours  influenza  Vaccine (HIGH DOSE) 0.7 milliLiter(s) IntraMuscular once  insulin glargine Injectable (LANTUS) 5 Unit(s) SubCutaneous at bedtime  insulin lispro (ADMELOG) corrective regimen sliding scale   SubCutaneous Before meals and at bedtime  insulin lispro Injectable (ADMELOG) 4 Unit(s) SubCutaneous three times a day with meals  levothyroxine 50 MICROGram(s) Oral daily  methylPREDNISolone sodium succinate Injectable 40 milliGRAM(s) IV Push every 8 hours  metoprolol tartrate 50 milliGRAM(s) Oral two times a day  piperacillin/tazobactam IVPB.. 2.25 Gram(s) IV Intermittent every 6 hours  senna 1 Tablet(s) Oral daily  sevelamer carbonate 1600 milliGRAM(s) Oral three times a day    MEDICATIONS  (PRN):  acetaminophen   Tablet .. 650 milliGRAM(s) Oral every 6 hours PRN Moderate Pain (4 - 6)  benzonatate 100 milliGRAM(s) Oral every 8 hours PRN Cough  guaiFENesin   Syrup  (Sugar-Free) 100 milliGRAM(s) Oral every 6 hours PRN Cough      ALLERGIES:  Allergies    No Known Allergies    Intolerances        LABS:                        8.4    7.90  )-----------( 228      ( 22 Jan 2021 06:52 )             26.0     01-22    130<L>  |  88<L>  |  74<H>  ----------------------------<  218<H>  4.1   |  21<L>  |  4.24<H>    Ca    8.7      22 Jan 2021 06:52  Phos  7.3     01-22  Mg     2.1     01-22            RADIOLOGY & ADDITIONAL TESTS: Reviewed.

## 2021-01-23 DIAGNOSIS — N18.9 CHRONIC KIDNEY DISEASE, UNSPECIFIED: ICD-10-CM

## 2021-01-23 DIAGNOSIS — E87.1 HYPO-OSMOLALITY AND HYPONATREMIA: ICD-10-CM

## 2021-01-23 DIAGNOSIS — I25.10 ATHEROSCLEROTIC HEART DISEASE OF NATIVE CORONARY ARTERY WITHOUT ANGINA PECTORIS: ICD-10-CM

## 2021-01-23 DIAGNOSIS — J84.10 PULMONARY FIBROSIS, UNSPECIFIED: ICD-10-CM

## 2021-01-23 DIAGNOSIS — E03.9 HYPOTHYROIDISM, UNSPECIFIED: ICD-10-CM

## 2021-01-23 LAB
ACANTHOCYTES BLD QL SMEAR: SLIGHT — SIGNIFICANT CHANGE UP
ALBUMIN SERPL ELPH-MCNC: 2.7 G/DL — LOW (ref 3.3–5)
ALP SERPL-CCNC: 184 U/L — HIGH (ref 40–120)
ALT FLD-CCNC: 37 U/L — SIGNIFICANT CHANGE UP (ref 10–45)
ANION GAP SERPL CALC-SCNC: 22 MMOL/L — HIGH (ref 5–17)
ANION GAP SERPL CALC-SCNC: 23 MMOL/L — HIGH (ref 5–17)
ANION GAP SERPL CALC-SCNC: 26 MMOL/L — HIGH (ref 5–17)
AST SERPL-CCNC: 45 U/L — HIGH (ref 10–40)
BASOPHILS # BLD AUTO: 0.02 K/UL — SIGNIFICANT CHANGE UP (ref 0–0.2)
BASOPHILS NFR BLD AUTO: 0.2 % — SIGNIFICANT CHANGE UP (ref 0–2)
BILIRUB SERPL-MCNC: 0.4 MG/DL — SIGNIFICANT CHANGE UP (ref 0.2–1.2)
BUN SERPL-MCNC: 84 MG/DL — HIGH (ref 7–23)
BUN SERPL-MCNC: 87 MG/DL — HIGH (ref 7–23)
BUN SERPL-MCNC: 87 MG/DL — HIGH (ref 7–23)
BURR CELLS BLD QL SMEAR: PRESENT — SIGNIFICANT CHANGE UP
CALCIUM SERPL-MCNC: 8.5 MG/DL — SIGNIFICANT CHANGE UP (ref 8.4–10.5)
CALCIUM SERPL-MCNC: 8.7 MG/DL — SIGNIFICANT CHANGE UP (ref 8.4–10.5)
CALCIUM SERPL-MCNC: 9 MG/DL — SIGNIFICANT CHANGE UP (ref 8.4–10.5)
CHLORIDE SERPL-SCNC: 77 MMOL/L — LOW (ref 96–108)
CHLORIDE SERPL-SCNC: 82 MMOL/L — LOW (ref 96–108)
CHLORIDE SERPL-SCNC: 82 MMOL/L — LOW (ref 96–108)
CO2 SERPL-SCNC: 20 MMOL/L — LOW (ref 22–31)
CO2 SERPL-SCNC: 21 MMOL/L — LOW (ref 22–31)
CO2 SERPL-SCNC: 22 MMOL/L — SIGNIFICANT CHANGE UP (ref 22–31)
CREAT ?TM UR-MCNC: 18 MG/DL — SIGNIFICANT CHANGE UP
CREAT SERPL-MCNC: 4.81 MG/DL — HIGH (ref 0.5–1.3)
CREAT SERPL-MCNC: 4.82 MG/DL — HIGH (ref 0.5–1.3)
CREAT SERPL-MCNC: 4.82 MG/DL — HIGH (ref 0.5–1.3)
EOSINOPHIL # BLD AUTO: 0 K/UL — SIGNIFICANT CHANGE UP (ref 0–0.5)
EOSINOPHIL NFR BLD AUTO: 0 % — SIGNIFICANT CHANGE UP (ref 0–6)
GLUCOSE BLDC GLUCOMTR-MCNC: 125 MG/DL — HIGH (ref 70–99)
GLUCOSE BLDC GLUCOMTR-MCNC: 127 MG/DL — HIGH (ref 70–99)
GLUCOSE BLDC GLUCOMTR-MCNC: 149 MG/DL — HIGH (ref 70–99)
GLUCOSE BLDC GLUCOMTR-MCNC: 221 MG/DL — HIGH (ref 70–99)
GLUCOSE BLDC GLUCOMTR-MCNC: 286 MG/DL — HIGH (ref 70–99)
GLUCOSE BLDC GLUCOMTR-MCNC: 293 MG/DL — HIGH (ref 70–99)
GLUCOSE SERPL-MCNC: 138 MG/DL — HIGH (ref 70–99)
GLUCOSE SERPL-MCNC: 201 MG/DL — HIGH (ref 70–99)
GLUCOSE SERPL-MCNC: 222 MG/DL — HIGH (ref 70–99)
HCT VFR BLD CALC: 26.6 % — LOW (ref 34.5–45)
HGB BLD-MCNC: 8.6 G/DL — LOW (ref 11.5–15.5)
IMM GRANULOCYTES NFR BLD AUTO: 1.2 % — SIGNIFICANT CHANGE UP (ref 0–1.5)
LDH SERPL L TO P-CCNC: 474 U/L — HIGH (ref 50–242)
LEGIONELLA AG UR QL: NEGATIVE — SIGNIFICANT CHANGE UP
LYMPHOCYTES # BLD AUTO: 0.4 K/UL — LOW (ref 1–3.3)
LYMPHOCYTES # BLD AUTO: 4.8 % — LOW (ref 13–44)
MAGNESIUM SERPL-MCNC: 1.9 MG/DL — SIGNIFICANT CHANGE UP (ref 1.6–2.6)
MANUAL SMEAR VERIFICATION: SIGNIFICANT CHANGE UP
MCHC RBC-ENTMCNC: 26.3 PG — LOW (ref 27–34)
MCHC RBC-ENTMCNC: 32.3 GM/DL — SIGNIFICANT CHANGE UP (ref 32–36)
MCV RBC AUTO: 81.3 FL — SIGNIFICANT CHANGE UP (ref 80–100)
MONOCYTES # BLD AUTO: 0.14 K/UL — SIGNIFICANT CHANGE UP (ref 0–0.9)
MONOCYTES NFR BLD AUTO: 1.7 % — LOW (ref 2–14)
MRSA PCR RESULT.: NEGATIVE — SIGNIFICANT CHANGE UP
NEUTROPHILS # BLD AUTO: 7.68 K/UL — HIGH (ref 1.8–7.4)
NEUTROPHILS NFR BLD AUTO: 92.1 % — HIGH (ref 43–77)
NRBC # BLD: 0 /100 WBCS — SIGNIFICANT CHANGE UP (ref 0–0)
OSMOLALITY SERPL: 294 MOSM/KG — SIGNIFICANT CHANGE UP (ref 280–301)
OSMOLALITY UR: 254 MOSM/KG — LOW (ref 300–900)
OSMOLALITY UR: 255 MOSM/KG — LOW (ref 300–900)
OVALOCYTES BLD QL SMEAR: SLIGHT — SIGNIFICANT CHANGE UP
PHOSPHATE SERPL-MCNC: 7.5 MG/DL — HIGH (ref 2.5–4.5)
PLAT MORPH BLD: NORMAL — SIGNIFICANT CHANGE UP
PLATELET # BLD AUTO: 236 K/UL — SIGNIFICANT CHANGE UP (ref 150–400)
POIKILOCYTOSIS BLD QL AUTO: SLIGHT — SIGNIFICANT CHANGE UP
POTASSIUM SERPL-MCNC: 3.9 MMOL/L — SIGNIFICANT CHANGE UP (ref 3.5–5.3)
POTASSIUM SERPL-MCNC: 3.9 MMOL/L — SIGNIFICANT CHANGE UP (ref 3.5–5.3)
POTASSIUM SERPL-MCNC: 4.3 MMOL/L — SIGNIFICANT CHANGE UP (ref 3.5–5.3)
POTASSIUM SERPL-SCNC: 3.9 MMOL/L — SIGNIFICANT CHANGE UP (ref 3.5–5.3)
POTASSIUM SERPL-SCNC: 3.9 MMOL/L — SIGNIFICANT CHANGE UP (ref 3.5–5.3)
POTASSIUM SERPL-SCNC: 4.3 MMOL/L — SIGNIFICANT CHANGE UP (ref 3.5–5.3)
PROCALCITONIN SERPL-MCNC: 1.15 NG/ML — HIGH (ref 0.02–0.1)
PROT SERPL-MCNC: 6.1 G/DL — SIGNIFICANT CHANGE UP (ref 6–8.3)
RBC # BLD: 3.27 M/UL — LOW (ref 3.8–5.2)
RBC # FLD: 14.6 % — HIGH (ref 10.3–14.5)
RBC BLD AUTO: ABNORMAL
S AUREUS DNA NOSE QL NAA+PROBE: POSITIVE
SODIUM SERPL-SCNC: 124 MMOL/L — LOW (ref 135–145)
SODIUM SERPL-SCNC: 125 MMOL/L — LOW (ref 135–145)
SODIUM SERPL-SCNC: 126 MMOL/L — LOW (ref 135–145)
SODIUM UR-SCNC: 50 MMOL/L — SIGNIFICANT CHANGE UP
SODIUM UR-SCNC: 72 MMOL/L — SIGNIFICANT CHANGE UP
WBC # BLD: 8.34 K/UL — SIGNIFICANT CHANGE UP (ref 3.8–10.5)
WBC # FLD AUTO: 8.34 K/UL — SIGNIFICANT CHANGE UP (ref 3.8–10.5)

## 2021-01-23 PROCEDURE — 71045 X-RAY EXAM CHEST 1 VIEW: CPT | Mod: 26

## 2021-01-23 PROCEDURE — 99233 SBSQ HOSP IP/OBS HIGH 50: CPT

## 2021-01-23 RX ORDER — FUROSEMIDE 40 MG
100 TABLET ORAL ONCE
Refills: 0 | Status: COMPLETED | OUTPATIENT
Start: 2021-01-23 | End: 2021-01-23

## 2021-01-23 RX ORDER — SODIUM CHLORIDE 9 MG/ML
1000 INJECTION INTRAMUSCULAR; INTRAVENOUS; SUBCUTANEOUS
Refills: 0 | Status: DISCONTINUED | OUTPATIENT
Start: 2021-01-23 | End: 2021-01-23

## 2021-01-23 RX ORDER — SODIUM CHLORIDE 9 MG/ML
250 INJECTION INTRAMUSCULAR; INTRAVENOUS; SUBCUTANEOUS ONCE
Refills: 0 | Status: COMPLETED | OUTPATIENT
Start: 2021-01-23 | End: 2021-01-23

## 2021-01-23 RX ORDER — CEFEPIME 1 G/1
1000 INJECTION, POWDER, FOR SOLUTION INTRAMUSCULAR; INTRAVENOUS EVERY 24 HOURS
Refills: 0 | Status: DISCONTINUED | OUTPATIENT
Start: 2021-01-23 | End: 2021-01-25

## 2021-01-23 RX ORDER — INSULIN LISPRO 100/ML
6 VIAL (ML) SUBCUTANEOUS ONCE
Refills: 0 | Status: COMPLETED | OUTPATIENT
Start: 2021-01-23 | End: 2021-01-23

## 2021-01-23 RX ORDER — INSULIN LISPRO 100/ML
5 VIAL (ML) SUBCUTANEOUS
Refills: 0 | Status: DISCONTINUED | OUTPATIENT
Start: 2021-01-23 | End: 2021-01-26

## 2021-01-23 RX ORDER — INSULIN LISPRO 100/ML
VIAL (ML) SUBCUTANEOUS
Refills: 0 | Status: DISCONTINUED | OUTPATIENT
Start: 2021-01-23 | End: 2021-02-07

## 2021-01-23 RX ORDER — SODIUM CHLORIDE 5 G/100ML
1000 INJECTION, SOLUTION INTRAVENOUS
Refills: 0 | Status: DISCONTINUED | OUTPATIENT
Start: 2021-01-23 | End: 2021-01-25

## 2021-01-23 RX ORDER — ACETYLCYSTEINE 200 MG/ML
3 VIAL (ML) MISCELLANEOUS ONCE
Refills: 0 | Status: COMPLETED | OUTPATIENT
Start: 2021-01-23 | End: 2021-01-23

## 2021-01-23 RX ADMIN — BUDESONIDE AND FORMOTEROL FUMARATE DIHYDRATE 2 PUFF(S): 160; 4.5 AEROSOL RESPIRATORY (INHALATION) at 21:21

## 2021-01-23 RX ADMIN — PIPERACILLIN AND TAZOBACTAM 200 GRAM(S): 4; .5 INJECTION, POWDER, LYOPHILIZED, FOR SOLUTION INTRAVENOUS at 06:42

## 2021-01-23 RX ADMIN — Medication 120 MILLIGRAM(S): at 05:24

## 2021-01-23 RX ADMIN — Medication 120 MILLIGRAM(S): at 23:04

## 2021-01-23 RX ADMIN — HEPARIN SODIUM 5000 UNIT(S): 5000 INJECTION INTRAVENOUS; SUBCUTANEOUS at 06:42

## 2021-01-23 RX ADMIN — Medication 3 MILLILITER(S): at 16:01

## 2021-01-23 RX ADMIN — Medication 6 UNIT(S): at 14:29

## 2021-01-23 RX ADMIN — Medication 3 MILLILITER(S): at 21:20

## 2021-01-23 RX ADMIN — Medication 1 SPRAY(S): at 06:41

## 2021-01-23 RX ADMIN — Medication 81 MILLIGRAM(S): at 12:07

## 2021-01-23 RX ADMIN — Medication 1 MILLIGRAM(S): at 12:07

## 2021-01-23 RX ADMIN — Medication 50 MILLIGRAM(S): at 06:42

## 2021-01-23 RX ADMIN — SEVELAMER CARBONATE 1600 MILLIGRAM(S): 2400 POWDER, FOR SUSPENSION ORAL at 06:42

## 2021-01-23 RX ADMIN — ATORVASTATIN CALCIUM 20 MILLIGRAM(S): 80 TABLET, FILM COATED ORAL at 21:21

## 2021-01-23 RX ADMIN — PIPERACILLIN AND TAZOBACTAM 200 GRAM(S): 4; .5 INJECTION, POWDER, LYOPHILIZED, FOR SOLUTION INTRAVENOUS at 17:30

## 2021-01-23 RX ADMIN — Medication 50 MICROGRAM(S): at 06:42

## 2021-01-23 RX ADMIN — Medication 5 UNIT(S): at 17:31

## 2021-01-23 RX ADMIN — SEVELAMER CARBONATE 1600 MILLIGRAM(S): 2400 POWDER, FOR SUSPENSION ORAL at 21:20

## 2021-01-23 RX ADMIN — Medication 20 MILLIGRAM(S): at 21:21

## 2021-01-23 RX ADMIN — SEVELAMER CARBONATE 1600 MILLIGRAM(S): 2400 POWDER, FOR SUSPENSION ORAL at 14:32

## 2021-01-23 RX ADMIN — DONEPEZIL HYDROCHLORIDE 5 MILLIGRAM(S): 10 TABLET, FILM COATED ORAL at 21:20

## 2021-01-23 RX ADMIN — PIPERACILLIN AND TAZOBACTAM 200 GRAM(S): 4; .5 INJECTION, POWDER, LYOPHILIZED, FOR SOLUTION INTRAVENOUS at 00:04

## 2021-01-23 RX ADMIN — Medication 1 SPRAY(S): at 17:30

## 2021-01-23 RX ADMIN — Medication 20 MILLIGRAM(S): at 06:42

## 2021-01-23 RX ADMIN — Medication 4 UNIT(S): at 09:21

## 2021-01-23 RX ADMIN — Medication 3 MILLILITER(S): at 08:45

## 2021-01-23 RX ADMIN — Medication 3: at 12:08

## 2021-01-23 RX ADMIN — PIPERACILLIN AND TAZOBACTAM 200 GRAM(S): 4; .5 INJECTION, POWDER, LYOPHILIZED, FOR SOLUTION INTRAVENOUS at 12:07

## 2021-01-23 RX ADMIN — AMLODIPINE BESYLATE 10 MILLIGRAM(S): 2.5 TABLET ORAL at 06:42

## 2021-01-23 RX ADMIN — Medication 3 MILLILITER(S): at 06:38

## 2021-01-23 RX ADMIN — HEPARIN SODIUM 5000 UNIT(S): 5000 INJECTION INTRAVENOUS; SUBCUTANEOUS at 17:30

## 2021-01-23 RX ADMIN — BUDESONIDE AND FORMOTEROL FUMARATE DIHYDRATE 2 PUFF(S): 160; 4.5 AEROSOL RESPIRATORY (INHALATION) at 08:45

## 2021-01-23 RX ADMIN — Medication 50 MILLIGRAM(S): at 17:31

## 2021-01-23 RX ADMIN — SODIUM CHLORIDE 20 MILLILITER(S): 5 INJECTION, SOLUTION INTRAVENOUS at 23:04

## 2021-01-23 RX ADMIN — SENNA PLUS 1 TABLET(S): 8.6 TABLET ORAL at 12:07

## 2021-01-23 RX ADMIN — Medication 20 MILLIGRAM(S): at 14:30

## 2021-01-23 RX ADMIN — Medication 4: at 17:31

## 2021-01-23 RX ADMIN — SODIUM CHLORIDE 50 MILLILITER(S): 9 INJECTION INTRAMUSCULAR; INTRAVENOUS; SUBCUTANEOUS at 14:00

## 2021-01-23 RX ADMIN — CEFEPIME 1000 MILLIGRAM(S): 1 INJECTION, POWDER, FOR SOLUTION INTRAMUSCULAR; INTRAVENOUS at 21:20

## 2021-01-23 NOTE — PROGRESS NOTE ADULT - PROBLEM SELECTOR PLAN 3
- Known to Dr. Harmon; Rneal following; unknown etiology at this time.   - Cr 1/23 4.82.   - Renal US = atrophic kidneys, no WILBER.   - HOLDING DIURESIS at this time – continue to follow closely w/ renal recs regarding diuresis; pt believed to be euvolemic vs volume depleted.   - Nephrotic w/u non-contributory thus far, pending ANCA   - Strict I/Os, if worsening renal function with electrolyte derangements may need HD for fluid removal  - tucker inserted 1/22   - f/u renal recs

## 2021-01-23 NOTE — PROGRESS NOTE ADULT - ASSESSMENT
86 year old female with pulmonary process and acute diastolic heart failure in the setting of CAD, NAV and hypoxemia.

## 2021-01-23 NOTE — CONSULT NOTE ADULT - ASSESSMENT
RECOMMEND  Trend procalcitonin  Check nasopharyngeal swab for full RVP  Serum Mycoplasma IgM  Swab nostrils for MRSA PCR  Manual differential on WBC (blood smear) - rule out bands/immature neutrophilic line cells  Serum galactomannan and Fungitell level  In the meantime, change Pip-Tazo to Cefepime 1 gm IV daily - less risk for fluid and electrolytes imbalance    Bilateral pneumonitis - presumed hypersensitivity pneumonitis  Concern that infection is present  Pip-Tazo probably not the most optimal antimicrobial in this patient with CHF and electrolytes abnormalities        RECOMMEND  Trend procalcitonin  Check nasopharyngeal swab for full RVP  Check LDH  Serum Mycoplasma IgM  Swab nostrils for MRSA PCR  Manual differential on WBC (blood smear) - rule out bands/immature neutrophilic line cells  Serum galactomannan and Fungitell level  In the meantime, change Pip-Tazo to Cefepime 1 gm IV daily - less risk for fluid and electrolytes imbalance   If able to tolerate, could consider Gallium scan

## 2021-01-23 NOTE — PROGRESS NOTE ADULT - PROBLEM SELECTOR PLAN 3
Running a bit high but this is actually good for her.  There is also an emotional component - when anxious BP's are much higher.

## 2021-01-23 NOTE — PROGRESS NOTE ADULT - ASSESSMENT
86F poor historian/early dementia and former heavy smoker with PMHx DM, HTN, hyperlipidemia, COPD, hypothyroidism, CKD (baseline Cr ~3.0), anemia of chronic disease, CAD s/p CABG 2015, chronic diastolic CHF (EF 55% via echo 10/2020), renal artery stenosis (s/p stent 20+ years ag), carotid artery stenosis, PAD, who presents c/o progressively worsening SOB and cough. Admitted for acute on chronic diastolic HF, found to have worsening renal function and poor urine output. Stepped up to CCU for strict fluid status monitoring and potential HD.     NEURO:   #History of subarachnoid hemorrhage    - diagnosed 3 weeks ago at [facility]   - CTH 1/19 negative for acute changes  - Per neurosurgery, cleared to start 81mg ASA QD on 1/20. No neurosurgical interventions at this time.    #Dementia  - c/w donepezil 5mg    #Anxiety  - c/w valium 1mg qhs    CARDIOVASCULAR:  #Acute on chronic diastolic CHF   - Patient p/w diminished b/l breath sounds, +JVD, BNP 57k  - POCUS by MICU consult +ve for B-lines throughout  - TTE 1/20: LVEF 53%, biatrial enlargement, mild AR, mod-severe MR, mod-severe TR, PASP 70 mmHg  - c/w Lopressor 50 mg PO BID  - monitor urine output, tucker inserted 1/22   - Diuresis plan as below.     #CAD:  - history of CAD s/p CABG in 2015  - c/w ASA 81 mg  - c/w atorvastatin 20 mg   - c/w lopressor 50BID    #HTN:   - c/w amlodipine 10mg  - c/w lopressor 50mg BID    PULMONARY:  #Acute hypoxemic respiratory failure   - Pt presented with O2 sats 80s on room air on admission. Likely multifactorial given CHF, COPD and CAP  -LE Duplex 1/19/2021 negative for DVT  -Unable to perform CTA to r/o PE given CKD and VQ scan deferred at this time per Dr. Louie will likely not yield additional information given multiple lung processes occurring at the same time.   -Started Methylprednisolone 40mg IV q 8hrs on 1/20, but decreased to 20mg IV q8hrs per Dr. Cuevas recs    #Pulmonary fibrosis  - CT chest w/o contrast: b/l GGO superimposed on chronic interstitial pulm fibrosis probably d/t chronic hypersensitivity pneumonitis or sarcoid. GGOs could be due to pulmonary edema, pulmonary hemorrhage, alveolar proteinosis, organizing pneumonia or atypical infection.  - c/w methylprednisolone taper for pneumonitis (start 1/20 - )  - PRN robitussin/tessalon pearls for cough    #CAP  - s/p 4 days of Ceftriaxone + 7 days azithro  - On 1/19 started on 7 day renally dosed zosyn 2.25g q6h to cover pseudomonas given no clinical improvement with above regimen. Plan to continue until 1/25.   - no symptoms of infectious etiology, but concerning CXR and CT findings  - Currently afebrile and without leukocytosis  - O2 requirements: 6L NC satting mid 90s.  - Per Dr Cuevas, daily ESR/CRP levels, CXR, and titrate O2 requirements as tolerated.    #COPD  - c/w symbicort inhaler BID and Duonebs  - PRN Robutussin/Benonatate for cough  - not currently on Home Oxygen. Will assess O2 on room air once treatment for CAP is complete and respiratory status improves.    ENDO  #Hypothyroid  -  c/w levothyroxine 50mg    #Hyponatremia  - f/u urine lytes  - switch all meds to NS from D5  - see renal plan below.    #DM  - reported h/o DM. A1c 5.1% on admission.    - c/w lantus 5U qhs, lispro 4u premeal, ISS  - insulin regimen likely to need adjustment given steroid regimen    RENAL  #NAV on CKD vs worsening CKD Stage IV (baseline Cr ~3.0)  - Known to Dr. Nicolas, renal following. Unknown etiology at this time, but likely 2/2 DM  - Creatinine currently 4.24  - Renal US: atrophic kidneys, no WILBER  - Aggressive diuresis per nephrology given minimal urine output. Given Lasix 100 mg IV x1 1/22 AM with minimal UOP and another PM dose with metolazone 5mg x1 prior to dose.   - c/w Lasix 100 mg IV q12h  - c/w metolazone 5mg PO prior to afternoon Lasix dose  - Nephrotic w/u non-contributory thus far, pending ANCA   - Strict I/Os, if worsening renal function with electrolyte derangements may need HD for fluid removal  - tucker inserted 1/22     ID  -see CAP treatment under pulmonary section    HEME  #Anemia: Baseline Hgb 8-9  - Likely 2/2 to CKD and anemia of chronic disease  - s/p 1u pRBC transfusion on 1/20   - holding IV iron due to treatment for presumed CAP  -Stool for occult blood negative 1/18  -Maintain active T&S (since 1/21)     F: none   E: renal patient   N: dysphagia 2 mechanical soft-thin liquids  DVT ppx: Hep SubQ    Dispo: CCU   86F poor historian/early dementia and former heavy smoker with PMHx DM, HTN, hyperlipidemia, COPD, hypothyroidism, CKD (baseline Cr ~3.0), anemia of chronic disease, CAD s/p CABG 2015, chronic diastolic CHF (EF 55% via echo 10/2020), renal artery stenosis (s/p stent 20+ years ag), carotid artery stenosis, PAD, who presents c/o progressively worsening SOB and cough. Admitted for acute on chronic diastolic HF, found to have worsening renal function and poor urine output. Stepped up to CCU for strict fluid status monitoring and potential HD.     NEURO:   #History of subarachnoid hemorrhage    - diagnosed 3 weeks ago at [facility]   - CTH 1/19 negative for acute changes  - Per neurosurgery, c/w ASA 81 QD. No neurosurgical interventions at this time.    #Dementia  - c/w donepezil 5mg    #Anxiety  - c/w valium 1mg qhs    CARDIOVASCULAR:  #Acute on chronic diastolic CHF   - Patient p/w diminished b/l breath sounds, +JVD, BNP 57k  - POCUS by MICU consult +ve for B-lines throughout  - TTE 1/20: LVEF 53%, biatrial enlargement, mild AR, mod-severe MR, mod-severe TR, PASP 70 mmHg  - c/w Lopressor 50 mg PO BID  - monitor urine output, tucker inserted 1/22   - Diuresis plan as below.     #CAD:  - history of CAD s/p CABG in 2015  - c/w ASA 81 mg  - c/w atorvastatin 20 mg   - c/w lopressor 50BID    #HTN:   - c/w amlodipine 10mg  - c/w lopressor 50mg BID    PULMONARY:  #Acute hypoxemic respiratory failure   - Pt presented with O2 sats 80s on room air on admission. Likely multifactorial given CHF, COPD and CAP  -LE Duplex 1/19/2021 negative for DVT  -Unable to perform CTA to r/o PE given CKD and VQ scan deferred at this time per Dr. Louie will likely not yield additional information given multiple lung processes occurring at the same time.   -Started Methylprednisolone 40mg IV q 8hrs on 1/20, but decreased to 20mg IV q8hrs per Dr. Cuevas recs    #Pulmonary fibrosis  - CT chest w/o contrast: b/l GGO superimposed on chronic interstitial pulm fibrosis probably d/t chronic hypersensitivity pneumonitis or sarcoid. GGOs could be due to pulmonary edema, pulmonary hemorrhage, alveolar proteinosis, organizing pneumonia or atypical infection.  - c/w methylprednisolone taper for pneumonitis (start 1/20 - )  - PRN robitussin/tessalon pearls for cough    #CAP  - s/p 4 days of Ceftriaxone + 7 days azithro  - On 1/19 started on 7 day renally dosed zosyn 2.25g q6h to cover pseudomonas given no clinical improvement with above regimen. Plan to continue until 1/25.   - no symptoms of infectious etiology, but concerning CXR and CT findings  - Currently afebrile and without leukocytosis  - O2 requirements: 6L NC satting mid 90s.  - Per Dr Cuevas, daily ESR/CRP levels, CXR, and titrate O2 requirements as tolerated.    #COPD  - c/w symbicort inhaler BID and Duonebs  - PRN Robutussin/Benonatate for cough  - not currently on Home Oxygen. Will assess O2 on room air once treatment for CAP is complete and respiratory status improves.    ENDO  #Hypothyroid  -  c/w levothyroxine 50mg    #Hyponatremia  - f/u urine lytes  - switch all meds to NS from D5  - AM 1/22 sodium 125  - see renal plan below.    #DM  - reported h/o DM. A1c 5.1% on admission.    - c/w lantus 10 qhs, lispro 4u premeal, ISS  - insulin regimen likely to need adjustment given steroid regimen and renal failure    RENAL  #NAV on CKD vs worsening CKD Stage IV (baseline Cr ~3.0)  - Known to Dr. Nicolas, renal following. Unknown etiology at this time, but likely 2/2 DM  - Creatinine currently 4.24  - Renal US: atrophic kidneys, no WILBER  - Aggressive diuresis per nephrology given minimal urine output. Given Lasix 100 mg IV x1 1/22 AM with minimal UOP and another PM dose with metolazone 5mg x1 prior to dose.   - c/w Lasix 100 mg IV q12h  - c/w metolazone 5mg PO prior to afternoon Lasix dose  - Nephrotic w/u non-contributory thus far, pending ANCA   - Strict I/Os, if worsening renal function with electrolyte derangements may need HD for fluid removal  - tucker inserted 1/22     ID  -see CAP treatment under pulmonary section    HEME  #Anemia: Baseline Hgb 8-9  - Likely 2/2 to CKD and anemia of chronic disease  - s/p 1u pRBC transfusion on 1/20   - holding IV iron due to treatment for presumed CAP  -Stool for occult blood negative 1/18  -Maintain active T&S (since 1/21)     F: none   E: renal patient   N: dysphagia 2 mechanical soft-thin liquids  DVT ppx: Hep SubQ    Dispo: CCU   86F poor historian/early dementia and former heavy smoker with PMHx DM, HTN, hyperlipidemia, COPD, hypothyroidism, CKD (baseline Cr ~3.0), anemia of chronic disease, CAD s/p CABG 2015, chronic diastolic CHF (EF 55% via echo 10/2020), renal artery stenosis (s/p stent 20+ years ag), carotid artery stenosis, PAD, who presents c/o progressively worsening SOB and cough. Admitted for acute on chronic diastolic HF, found to have worsening renal function and poor urine output. Stepped up to CCU for strict fluid status monitoring and potential HD.     NEURO:   #History of subarachnoid hemorrhage    - diagnosed 3 weeks ago at [facility]   - CTH 1/19 negative for acute changes  - Per neurosurgery, c/w ASA 81 QD. No neurosurgical interventions at this time.    #Dementia  - c/w donepezil 5mg    #Anxiety  - c/w valium 1mg qhs    CARDIOVASCULAR:  #Acute on chronic diastolic CHF   - Patient p/w diminished b/l breath sounds, +JVD, BNP 57k  - POCUS by MICU consult +ve for B-lines throughout  - TTE 1/20: LVEF 53%, biatrial enlargement, mild AR, mod-severe MR, mod-severe TR, PASP 70 mmHg  - c/w Lopressor 50 mg PO BID  - monitor urine output, tucker inserted 1/22   - Diuresis plan as below.     #CAD:  - history of CAD s/p CABG in 2015  - c/w ASA 81 mg  - c/w atorvastatin 20 mg   - c/w lopressor 50BID    #HTN:   - c/w amlodipine 10mg  - c/w lopressor 50mg BID    PULMONARY:  #Acute hypoxemic respiratory failure   - Pt presented with O2 sats 80s on room air on admission. Likely multifactorial given CHF, COPD and CAP  - LE Duplex 1/19/2021 negative for DVT  -Unable to perform CTA to r/o PE given CKD and VQ scan deferred at this time per Dr. Louie will likely not yield additional information given multiple lung processes occurring at the same time.   -Started Methylprednisolone 40mg IV q 8hrs on 1/20, but decreased to 20mg IV q8hrs per Dr. Cuevas recs    #Pulmonary fibrosis  - CT chest w/o contrast: b/l GGO superimposed on chronic interstitial pulm fibrosis probably d/t chronic hypersensitivity pneumonitis or sarcoid. GGOs could be due to pulmonary edema, pulmonary hemorrhage, alveolar proteinosis, organizing pneumonia or atypical infection.  - c/w methylprednisolone taper for pneumonitis (start 1/20 - )  - PRN robitussin/tessalon pearls for cough    #CAP  - s/p 4 days of Ceftriaxone + 7 days azithro  - On 1/19 started on 7 day renally dosed zosyn 2.25g q6h to cover pseudomonas given no clinical improvement with above regimen. Plan to continue until 1/25.   - no symptoms of infectious etiology, but concerning CXR and CT findings  - Currently afebrile and without leukocytosis  - O2 requirements: 6L NC satting mid 90s.  - Per Dr Cuevas, daily ESR/CRP levels, CXR, and titrate O2 requirements as tolerated.    #COPD  - c/w symbicort inhaler BID and Duonebs  - PRN Robutussin/Benonatate for cough  - not currently on Home Oxygen. Will assess O2 on room air once treatment for CAP is complete and respiratory status improves.    ENDO  #Hypothyroid  -  c/w levothyroxine 50mg    #Hyponatremia  - f/u urine lytes  - switch all meds to NS from D5  - AM 1/22 sodium 125  - see renal plan below.    #DM  - reported h/o DM. A1c 5.1% on admission.    - c/w lantus 10 qhs, lispro 4u premeal, ISS  - insulin regimen likely to need adjustment given steroid regimen and renal failure    RENAL  #NAV on CKD vs worsening CKD Stage IV (baseline Cr ~3.0)  - Known to Dr. Nicolas, renal following. Unknown etiology at this time, but likely 2/2 DM  - Creatinine currently 4.24  - Renal US: atrophic kidneys, no WILBER  - Aggressive diuresis per nephrology given minimal urine output. Given Lasix 100 mg IV x1 1/22 AM with minimal UOP and another PM dose with metolazone 5mg x1 prior to dose.   - 1/22 overnight UOP averaging 36 cc/hr (goal 27 cc/hr based on weight)  - held Lasix 100 mg IV q12h after AM 1/23 dose given patient appears dry on exam  - holding afternoon metolazone 5mg PO dose  - Nephrotic w/u non-contributory thus far, pending ANCA   - Strict I/Os, if worsening renal function with electrolyte derangements may need HD for fluid removal  - tucker inserted 1/22     ID  -see CAP treatment under pulmonary section    HEME  #Anemia: Baseline Hgb 8-9  - Likely 2/2 to CKD and anemia of chronic disease  - s/p 1u pRBC transfusion on 1/20   - holding IV iron due to treatment for presumed CAP  -Stool for occult blood negative 1/18  -Maintain active T&S (since 1/21)     F: none   E: renal patient   N: dysphagia 2 mechanical soft-thin liquids  DVT ppx: Hep SubQ    Dispo: CCU   86F poor historian/early dementia and former heavy smoker with PMHx DM, HTN, hyperlipidemia, COPD, hypothyroidism, CKD (baseline Cr ~3.0), anemia of chronic disease, CAD s/p CABG 2015, chronic diastolic CHF (EF 55% via echo 10/2020), renal artery stenosis (s/p stent 20+ years ag), carotid artery stenosis, PAD, who presents c/o progressively worsening SOB and cough. Admitted for acute on chronic diastolic HF, found to have worsening renal function and poor urine output. Stepped up to CCU for strict fluid status monitoring and potential HD.     NEURO:   #History of subarachnoid hemorrhage    - diagnosed 3 weeks ago at [facility]   - CTH 1/19 negative for acute changes  - Per neurosurgery, c/w ASA 81 QD. No neurosurgical interventions at this time.    #Dementia  - c/w donepezil 5mg    #Anxiety  - c/w valium 1mg qhs    CARDIOVASCULAR:  #Acute on chronic diastolic CHF   - Patient p/w diminished b/l breath sounds, +JVD, BNP 57k  - POCUS by MICU consult +ve for B-lines throughout  - TTE 1/20: LVEF 53%, biatrial enlargement, mild AR, mod-severe MR, mod-severe TR, PASP 70 mmHg  - c/w Lopressor 50 mg PO BID  - monitor urine output, tucker inserted 1/22   - Diuresis plan as below.     #CAD:  - history of CAD s/p CABG in 2015  - c/w ASA 81 mg  - c/w atorvastatin 20 mg   - c/w lopressor 50BID    #HTN:   - c/w amlodipine 10mg  - c/w lopressor 50mg BID    PULMONARY:  #Acute hypoxemic respiratory failure   - Pt presented with O2 sats 80s on room air on admission. Likely multifactorial given CHF, COPD and CAP  - LE Duplex 1/19/2021 negative for DVT  -Unable to perform CTA to r/o PE given CKD and VQ scan deferred at this time per Dr. Louie will likely not yield additional information given multiple lung processes occurring at the same time.   -Started Methylprednisolone 40mg IV q 8hrs on 1/20, but decreased to 20mg IV q8hrs per Dr. Cuevas recs    #Pulmonary fibrosis  - CT chest w/o contrast: b/l GGO superimposed on chronic interstitial pulm fibrosis probably d/t chronic hypersensitivity pneumonitis or sarcoid. GGOs could be due to pulmonary edema, pulmonary hemorrhage, alveolar proteinosis, organizing pneumonia or atypical infection.  - c/w methylprednisolone taper for pneumonitis (start 1/20 - )  - PRN robitussin/tessalon pearls for cough    #CAP  - s/p 4 days of Ceftriaxone + 7 days azithro  - On 1/19 started on 7 day renally dosed zosyn 2.25g q6h to cover pseudomonas given no clinical improvement with above regimen. Plan to continue until 1/25.   - no symptoms of infectious etiology, but concerning CXR and CT findings  - Currently afebrile and without leukocytosis  - O2 requirements: 6L NC satting mid 90s.  - Per Dr Cuevas, daily ESR/CRP levels, CXR, and titrate O2 requirements as tolerated.    #COPD  - c/w symbicort inhaler BID and Duonebs  - PRN Robutussin/Benonatate for cough  - not currently on Home Oxygen. Will assess O2 on room air once treatment for CAP is complete and respiratory status improves.    ENDO  #Hypothyroid  -  c/w levothyroxine 50mg    #Hyponatremia  - f/u urine lytes  - switch all meds to NS from D5  - AM 1/22 sodium 125  - see renal plan below.    #DM  - reported h/o DM. A1c 5.1% on admission.    - c/w lantus 10 qhs, lispro 4u premeal, ISS  - insulin regimen likely to need adjustment given steroid regimen and renal failure    RENAL  #NAV on CKD vs worsening CKD Stage IV (baseline Cr ~3.0)  - Known to Dr. Nicloas, renal following. Unknown etiology at this time, but likely 2/2 DM  - Creatinine currently 4.24  - Renal US: atrophic kidneys, no WILBER  - Aggressive diuresis per nephrology given minimal urine output. Given Lasix 100 mg IV x1 1/22 AM with minimal UOP and another PM dose with metolazone 5mg x1 prior to dose.   - 1/22 overnight UOP averaging 36 cc/hr (goal 27 cc/hr based on weight)  - held Lasix 100 mg IV q12h after AM 1/23 dose given patient appears dry on exam and worsening hyponatremia.   - holding afternoon metolazone 5mg PO dose  - Nephrotic w/u non-contributory thus far, pending ANCA   - Strict I/Os, if worsening renal function with electrolyte derangements may need HD for fluid removal  - tucker inserted 1/22     ID  -see CAP treatment under pulmonary section    HEME  #Anemia: Baseline Hgb 8-9  - Likely 2/2 to CKD and anemia of chronic disease  - s/p 1u pRBC transfusion on 1/20   - holding IV iron due to treatment for presumed CAP  -Stool for occult blood negative 1/18  -Maintain active T&S (since 1/21)     F: none   E: renal patient   N: dysphagia 2 mechanical soft-thin liquids  DVT ppx: Hep SubQ    Dispo: CCU   86F poor historian/early dementia and former heavy smoker with PMHx DM, HTN, hyperlipidemia, COPD, hypothyroidism, CKD (baseline Cr ~3.0), anemia of chronic disease, CAD s/p CABG 2015, chronic diastolic CHF (EF 55% via echo 10/2020), renal artery stenosis (s/p stent 20+ years ag), carotid artery stenosis, PAD, who presents c/o progressively worsening SOB and cough. Admitted for acute on chronic diastolic HF, found to have worsening renal function and poor urine output. Stepped up to CCU for strict fluid status monitoring and potential HD.     NEURO:   #History of subarachnoid hemorrhage    - diagnosed 3 weeks ago at [facility]   - CTH 1/19 negative for acute changes  - Per neurosurgery, c/w ASA 81 QD. No neurosurgical interventions at this time.    #Dementia  - c/w donepezil 5mg    #Anxiety  - c/w valium 1mg qhs    CARDIOVASCULAR:  #Acute on chronic diastolic CHF   - Patient p/w diminished b/l breath sounds, +JVD, BNP 57k  - POCUS by MICU consult +ve for B-lines throughout  - TTE 1/20: LVEF 53%, biatrial enlargement, mild AR, mod-severe MR, mod-severe TR, PASP 70 mmHg  - c/w Lopressor 50 mg PO BID  - monitor urine output, tucker inserted 1/22   - Diuresis plan as below.     #CAD:  - history of CAD s/p CABG in 2015  - c/w ASA 81 mg  - c/w atorvastatin 20 mg   - c/w lopressor 50BID    #HTN:   - c/w amlodipine 10mg  - c/w lopressor 50mg BID    PULMONARY:  #Acute hypoxemic respiratory failure   - Pt presented with O2 sats 80s on room air on admission. Likely multifactorial given CHF, COPD and CAP  - LE Duplex 1/19/2021 negative for DVT  -Unable to perform CTA to r/o PE given CKD and VQ scan deferred at this time per Dr. Louie will likely not yield additional information given multiple lung processes occurring at the same time.   -Started Methylprednisolone 40mg IV q 8hrs on 1/20, but decreased to 20mg IV q8hrs per Dr. Cuevas recs    #Pulmonary fibrosis  - CT chest w/o contrast: b/l GGO superimposed on chronic interstitial pulm fibrosis probably d/t chronic hypersensitivity pneumonitis or sarcoid. GGOs could be due to pulmonary edema, pulmonary hemorrhage, alveolar proteinosis, organizing pneumonia or atypical infection.  - c/w methylprednisolone taper for pneumonitis (start 1/20 - )  - PRN robitussin/tessalon pearls for cough    #CAP  - s/p 4 days of Ceftriaxone + 7 days azithro  - On 1/19 started on 7 day renally dosed zosyn 2.25g q6h to cover pseudomonas given no clinical improvement with above regimen. Plan to continue until 1/25.   - no symptoms of infectious etiology, but concerning CXR and CT findings  - Currently afebrile and without leukocytosis  - O2 requirements: 6L NC satting mid 90s.  - Per Dr Cuevas, daily ESR/CRP levels, CXR, and titrate O2 requirements as tolerated.    #COPD  - c/w symbicort inhaler BID and Duonebs  - PRN Robutussin/Benonatate for cough  - not currently on Home Oxygen. Will assess O2 on room air once treatment for CAP is complete and respiratory status improves.    ENDO  #Hypothyroid  -  c/w levothyroxine 50mg    #Hyponatremia  - f/u urine lytes  - switch all meds to NS from D5  - AM 1/22 sodium 125  - see renal plan below.    #DM  - reported h/o DM. A1c 5.1% on admission.    - c/w lantus 10 qhs, lispro 4u premeal, ISS  - insulin regimen likely to need adjustment given steroid regimen and renal failure    RENAL  #NAV on CKD vs worsening CKD Stage IV (baseline Cr ~3.0)  - Known to Dr. Nicolas, renal following. Unknown etiology at this time, but likely 2/2 DM  - Creatinine currently 4.24  - Renal US: atrophic kidneys, no WILBER  - Aggressive diuresis per nephrology given minimal urine output. Given Lasix 100 mg IV x1 1/22 AM with minimal UOP and another PM dose with metolazone 5mg x1 prior to dose.   - 1/22 overnight UOP averaging 36 cc/hr (goal 27 cc/hr based on weight)  - held Lasix 100 mg IV q12h after AM 1/23 dose given patient appears dry on exam and worsening hyponatremia.   - holding afternoon metolazone 5mg PO dose  - Nephrotic w/u non-contributory thus far, pending ANCA   - Strict I/Os, if worsening renal function with electrolyte derangements may need HD for fluid removal  - tucker inserted 1/22   - f/u renal recs    ID  -see CAP treatment under pulmonary section    HEME  #Anemia: Baseline Hgb 8-9  - Likely 2/2 to CKD and anemia of chronic disease  - s/p 1u pRBC transfusion on 1/20   - holding IV iron due to treatment for presumed CAP  -Stool for occult blood negative 1/18  -Maintain active T&S (since 1/21)     F: none   E: renal patient   N: dysphagia 2 mechanical soft-thin liquids  DVT ppx: Hep SubQ    Dispo: CCU

## 2021-01-23 NOTE — PROGRESS NOTE ADULT - SUBJECTIVE AND OBJECTIVE BOX
Interventional Cardiology PA Adult Progress Note    TRANSFER NOTE CCU --> TELEMETRY    Hospital Course:   86F poor historian/early dementia and former heavy smoker with PMHx DM, HTN, hyperlipidemia, COPD, hypothyroidism, CKD (baseline Cr ~3.0), anemia of chronic disease, CAD s/p CABG 2015, chronic diastolic CHF (EF 55% via echo 10/2020), renal artery stenosis (s/p stent 20+ years ag), carotid artery stenosis, PAD, who presented to [facility] ED on 1/15 c/o progressively worsening SOB and cough x 3 days. Can barely walk 10 feet prior to having to stop and rest. Reports significant orthopnea and she has been sleeping upright in a chair the past week. Patient was instructed to increase her Lasix 80mg PO daily dose to Lasix 80mg PO BID and start Metalazone 10mg PO BID prior to each dose by her cardiologist Dr. Horvath. Per daughter, medication compliance has been questionable over the last few months, as patient is becoming more forgetful.      Admitted to cardiac telemetry for management of acute on chronic diastolic CHF exacerbation in setting of CAP. Initial procal 0.29 but increased to 1.84 on 1/22. For CAP, patient completed 7 day course of Azithromycin and will continue Zosyn 2.25mg IV q6hrs (renal dose) through 1/25/21 due to no symptomatic improvement. Course c/b anemia s/p 1U PRBC on 1/20/21, w/ improvement and maintenance of Hgb 8-9. Regarding respiratory status, pt initially low 80s on RA and required HFNC, currently weaned to 6L NC. Regarding CHF and NAV on CKD, pt still overloaded with inadequate response to Lasix IV. 1/22 received Lasix 100mg IV w/ minimal urinary output. In discussion w/ renal, administered another Lasix 100mg IV x1 w/ Metolazone 5mg PO x1 prior to Lasix dose. Upon discussion w/ Dr. Harmon, patient may require new HD this admission for fluid removal; therefore, patient upgraded to CCU for closer monitoring. While in CCU she received 2 doses of lasix 100 mg IV q12h with UOP averaging 36 cc/hr (goal 27 cc/hr). Decision was made to hold further diuresis given worsening hyponatremia, elevated phos, and increased BUN/Cr. Per renal team, diuretics and free water intake could be culprit for hypoNa. If no improvement on repeat BMP may need saline repletion. Since patient making adequate urine, decision was made to stepdown to telemetry for further medical optimization, including possibility for HD.      OVERNIGHT EVENTS:  - Saturating well (>91%) on 6L NC with no need for BiPAP  - Night team attempted to consent for HD cath using  but patient became very anxious, asking to speak with daughter first (has poor understanding of why she needs HD).   - Increased Lantus from 5U to 10U at bedtime for elevated FS (>200s).   - Strep pneumo antigen negative.   - By 3AM, producing on average 36 cc/hour overnight (goal UOP: at least 27 cc/hour).   - No electrolyte repletion given ESRD  - AM Sodium 125 with increasing phosphate and worsening BUN/Cr. Nephrology fellow paged and made aware.       SUBJECTIVE/ INTERVAL HPI: Lasix 100 mg IV BID and metolazone discontinued given adequate urine output and patient appearing dry on exam. Patient seen and examined at bedside. Currently complaining of a sore throat. Admits to poor PO intake since last night. Says she is nervous about the thought of HD despite explaining the risks and benefits to her, prefers to speak to daughter. Denies difficulty breathing, SOB, chest pain, lightheadedness, dizziness, N/V/D, abdominal pain.   	  MEDICATIONS:  amLODIPine   Tablet 10 milliGRAM(s) Oral daily  metoprolol tartrate 50 milliGRAM(s) Oral every 12 hours  piperacillin/tazobactam IVPB.. 2.25 Gram(s) IV Intermittent every 6 hours  acetylcysteine 10%  Inhalation 3 milliLiter(s) Inhalation once  albuterol/ipratropium for Nebulization 3 milliLiter(s) Nebulizer every 6 hours  benzonatate 100 milliGRAM(s) Oral every 8 hours PRN  budesonide  80 MICROgram(s)/formoterol 4.5 MICROgram(s) Inhaler 2 Puff(s) Inhalation two times a day  guaiFENesin   Syrup  (Sugar-Free) 100 milliGRAM(s) Oral every 6 hours PRN  acetaminophen   Tablet .. 650 milliGRAM(s) Oral every 6 hours PRN  diazepam    Tablet 1 milliGRAM(s) Oral daily  donepezil 5 milliGRAM(s) Oral at bedtime  senna 1 Tablet(s) Oral daily  atorvastatin 20 milliGRAM(s) Oral at bedtime  dextrose 40% Gel 15 Gram(s) Oral once  dextrose 50% Injectable 25 Gram(s) IV Push once  dextrose 50% Injectable 12.5 Gram(s) IV Push once  dextrose 50% Injectable 25 Gram(s) IV Push once  glucagon  Injectable 1 milliGRAM(s) IntraMuscular once  insulin glargine Injectable (LANTUS) 10 Unit(s) SubCutaneous at bedtime  insulin lispro (ADMELOG) corrective regimen sliding scale   SubCutaneous Before meals and at bedtime  insulin lispro Injectable (ADMELOG) 4 Unit(s) SubCutaneous three times a day with meals  levothyroxine 50 MICROGram(s) Oral daily  methylPREDNISolone sodium succinate Injectable 20 milliGRAM(s) IV Push every 8 hours  aspirin enteric coated 81 milliGRAM(s) Oral daily  dextrose 5%. 1000 milliLiter(s) IV Continuous <Continuous>  dextrose 5%. 1000 milliLiter(s) IV Continuous <Continuous>  fluticasone propionate 50 MICROgram(s)/spray Nasal Spray 1 Spray(s) Both Nostrils two times a day  heparin   Injectable 5000 Unit(s) SubCutaneous every 12 hours  influenza  Vaccine (HIGH DOSE) 0.7 milliLiter(s) IntraMuscular once    	    PHYSICAL EXAM:  TELEMETRY:  T(C): 36.6 (01-23-21 @ 10:00), Max: 36.8 (01-22-21 @ 19:30)  HR: 64 (01-23-21 @ 12:00) (57 - 67)  BP: 142/70 (01-23-21 @ 12:00) (126/58 - 166/74)  RR: 20 (01-23-21 @ 12:00) (15 - 37)  SpO2: 91% (01-23-21 @ 12:00) (85% - 100%)  Wt(kg): --  I&O's Summary    22 Jan 2021 07:01  -  23 Jan 2021 07:00  --------------------------------------------------------  IN: 460 mL / OUT: 640 mL / NET: -180 mL    23 Jan 2021 07:01  -  23 Jan 2021 12:42  --------------------------------------------------------  IN: 460 mL / OUT: 124 mL / NET: 336 mL      Height (cm): 157.5 (01-22 @ 19:30)  Weight (kg): 54.2 (01-22 @ 19:30)  BMI (kg/m2): 21.8 (01-22 @ 19:30)  BSA (m2): 1.54 (01-22 @ 19:30)    General: A&Ox 3; NAD satting 96% on 6L NC  HEENT: NC/AT, PERRL, EOMI, anicteric sclera, mucus membranes dry   Neck: Supple; no JVD  Respiratory: bilateral crackles from mid lungs to base; no wheezes auscultated, no accessory muscle use   Cardiovascular: Regular rhythm/rate; S1/S2; no gallops or murmurs auscultated  Gastrointestinal: Soft; NTND w/out rebound tenderness or guarding; bowel sounds normal  Genitourinary: tucker in place, draining clear yellow urine, no suprapubic tenderness   Extremities: WWP; no edema or cyanosis; radial/pedal pulses palpable  Neurological:  CNII-XII grossly intact; no obvious focal deficits  	    ECG:  	  RADIOLOGY:   DIAGNOSTIC TESTING:  [ ] Echocardiogram:  [ ]  Catheterization:  [ ] Stress Test:    [ ] SANJAY  OTHER: 	    LABS:	 	  CARDIAC MARKERS:                          8.6    8.34  )-----------( 236      ( 23 Jan 2021 05:15 )             26.6     126<L>  |  82<L>  |  87<H>  ----------------------------<  222<H>  4.3   |  22  |  4.82<H>    Ca    9.0      23 Jan 2021 11:40  Phos  7.5     01-23  Mg     1.9     01-23    TPro  6.1  /  Alb  2.7<L>  /  TBili  0.4  /  DBili  x   /  AST  45<H>  /  ALT  37  /  AlkPhos  184<H>  01-23

## 2021-01-23 NOTE — PROGRESS NOTE ADULT - PROBLEM SELECTOR PLAN 1
- Pt w/ diminished B/L breath sounds; (+) JVD; BNP 57k.   - POCUS by MICU consult 1/22/21 (+) for B-lines throughout.   - TTE 1/20: EF 53%, biatrial enlargement, mild AR, mod-severe MR, mod-severe TR, PASP 70.   - CONT: Lopressor 50mg PO BID.   - **MONITOR urine output; tucker inserted 1/22/21.   - Holding diuresis at this time; diuresis per renal recs.

## 2021-01-23 NOTE — PROGRESS NOTE ADULT - PROBLEM SELECTOR PLAN 1
The oxygenation has improved over the past 24 hours with changes in antibiotics and addition of steroids.  Continue current plan as per Dr. Cuevas

## 2021-01-23 NOTE — PROGRESS NOTE ADULT - ASSESSMENT
86F poor historian/early dementia and former heavy smoker with PMHx DM, HTN, hyperlipidemia, COPD, hypothyroidism, CKD (baseline Cr ~3.0), anemia of chronic disease, CAD s/p CABG 2015, chronic diastolic CHF (EF 55% via echo 10/2020), renal artery stenosis (s/p stent 20+ years ag), carotid artery stenosis, PAD, who presents c/o progressively worsening SOB and cough. Admitted for acute on chronic diastolic HF, found to have worsening renal function and poor urine output. Stepped up to CCU for strict fluid status monitoring and potential HD. Now stepped down to 1/23/21 to telemetry. Continuing ID recommendations for infectious work up. Continue w/ Cefepime (started 1/23/21). Continue to monitor urine output closely, and follow renal recs. No HD at this time, but clinical status may progress to requiring HD.

## 2021-01-23 NOTE — PROGRESS NOTE ADULT - PROBLEM SELECTOR PLAN 10
- CONT: Levothyroxine 50mg PO QD.     #DM  - Hx of DM. A1c 5.1% on admission.    - CONT: lantus 10 qhs, lispro 5u premeal, ISS  - Endocrine following        F: none   E: renal patient   N: dysphagia 2 mechanical soft-thin liquids  DVT ppx: Hep SubQ    Dispo: cardiac telemetry

## 2021-01-23 NOTE — PROGRESS NOTE ADULT - PROBLEM SELECTOR PLAN 2
- On admit, was O2 sat 80s RA.   - Likely multifactorial 2/2 CHF, COPD, and CAP.   - LE Duplex 1/19 (-) for DVT.   - No CTA done to r/o PE due to CKD; VQ deferred as likely would not yield diagnostic info given multiple lung processes.   - CONT: Methylprednisolone 20mg IV q8hrs per Dr. Cuevas.        - s/p 4 days of Ceftriaxone + 7 days azithromycin.   - 1/19 – started 7 day course of renally dosed zosyn.   - ID consulted – 1/23 DISCONTINUED Zosyn and STARTED Cefepime 1000mg IV q24hrs (1/23-____; duration to be determined).   - ID recs: RVP, MRSA swab, procalcitonin, galactomannan, LDH, fungitell – labs ordered.   - Continue to monitor for fever, increased leukocytosis, etc. - On admit, was O2 sat 80s RA.   - Likely multifactorial 2/2 CHF, COPD, and CAP.   - LE Duplex 1/19 (-) for DVT.   - No CTA done to r/o PE due to CKD; VQ deferred as likely would not yield diagnostic info given multiple lung processes.   - CONT: Methylprednisolone 20mg IV q8hrs per Dr. Cuevas.      ## PNEUMONIA  - s/p 4 days of Ceftriaxone + 7 days azithromycin.   - 1/19 – started 7 day course of renally dosed zosyn.   - ID consulted – 1/23 DISCONTINUED Zosyn and STARTED Cefepime 1000mg IV q24hrs (1/23-____; duration to be determined).   - ID recs: RVP, MRSA swab, procalcitonin, galactomannan, LDH, fungitell – labs ordered.   - Continue to monitor for fever, increased leukocytosis, etc.

## 2021-01-23 NOTE — PROGRESS NOTE ADULT - ASSESSMENT
86F POOR HISTORIAN/early dementia, former heavy smoker PMHx DM, HTN, hyperlipidemia, COPD, hypothyroidism, CKD (baseline Cr ~3.0), anemia of chronic disease, CAD s/p CABG 2015 Dr. Blunt, chronic diastolic CHF(EF:55% by Echo 10/2020), renal artery stenosis s/p renal artery stent >20yrs ago, Carotid artery stenosis, PAD, who presents c/o progressively worsening SOB and cough. Nephrology consulted for NAV.     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  Baseline creatinine ~3s  Creat fluctuating in high 3s-low 4s; BUN/Cr up to 84/4.8 from 74/4.2, GFR <10  Acceptable volume status and electrolytes - no acute absolute indication for RRT at present - will follow closely  Monitor BUN/Cr, lytes, uop   Nephrotic w/u non contributory thus far, pending ANCA   Renal US atrophied, echogenic kidneys  HypoNa 125 am from 130 - switch IV meds in D5W to NS, free water restriction - repeat sNa 126 - would recommend NS at 50 ml/hr over 5 hr, repeat bmp thereafter, obtain sOsm, uOsm, Telma   HTN - stable on norvasc 10, lopressor 50BID  Anemia - s/p pRBC iron sat 8%- repeat iron panel and ferritin on Monday  BMD - renvela 1600 TID

## 2021-01-23 NOTE — PROGRESS NOTE ADULT - SUBJECTIVE AND OBJECTIVE BOX
s/p furosemide 100 mg IVP this am, uop 25-35 ml/hr   BUN/Cr 84/4.8 from 74/4.2   sNa 125 from 130  stable respiratory status  acceptable electrolytes and volume     Meds:  acetaminophen   Tablet .. 650 every 6 hours PRN  albuterol/ipratropium for Nebulization 3 every 6 hours  amLODIPine   Tablet 10 daily  aspirin enteric coated 81 daily  atorvastatin 20 at bedtime  budesonide  80 MICROgram(s)/formoterol 4.5 MICROgram(s) Inhaler 2 two times a day  dextrose 40% Gel 15 once  dextrose 5%. 1000 <Continuous>  dextrose 5%. 1000 <Continuous>  dextrose 50% Injectable 25 once  dextrose 50% Injectable 12.5 once  dextrose 50% Injectable 25 once  diazepam    Tablet 1 daily  donepezil 5 at bedtime  fluticasone propionate 50 MICROgram(s)/spray Nasal Spray 1 two times a day  glucagon  Injectable 1 once  guaiFENesin   Syrup  (Sugar-Free) 100 every 6 hours PRN  heparin   Injectable 5000 every 12 hours  influenza  Vaccine (HIGH DOSE) 0.7 once  insulin glargine Injectable (LANTUS) 10 at bedtime  insulin lispro (ADMELOG) corrective regimen sliding scale  Before meals and at bedtime  insulin lispro Injectable (ADMELOG) 4 three times a day with meals  levothyroxine 50 daily  methylPREDNISolone sodium succinate Injectable 20 every 8 hours  metoprolol tartrate 50 every 12 hours  piperacillin/tazobactam IVPB.. 2.25 every 6 hours  senna 1 daily  sevelamer carbonate 1600 three times a day      T(C): , Max: 36.8 (01-22-21 @ 19:30)  T(F): , Max: 98.2 (01-22-21 @ 19:30)  HR: 68 (01-23-21 @ 15:03)  BP: 142/67 (01-23-21 @ 15:03)  BP(mean): 84 (01-23-21 @ 15:00)  RR: 20 (01-23-21 @ 15:03)  SpO2: 92% (01-23-21 @ 15:03)  Wt(kg): --    01-22 @ 07:01  -  01-23 @ 07:00  --------------------------------------------------------  IN: 460 mL / OUT: 640 mL / NET: -180 mL    01-23 @ 07:01  -  01-23 @ 16:06  --------------------------------------------------------  IN: 850 mL / OUT: 145 mL / NET: 705 mL      Height (cm): 157.5 (01-22 @ 19:30)  Weight (kg): 54.2 (01-22 @ 19:30)  BMI (kg/m2): 21.8 (01-22 @ 19:30)  BSA (m2): 1.54 (01-22 @ 19:30)    Review of Systems:  RESPIRATORY: +shortness of breath  CARDIOVASCULAR: No chest pain  MUSCULOSKELETAL: No leg edema    PHYSICAL EXAM:  GENERAL: alert, NAD  CHEST/LUNG: Coarse breath sounds bilaterally, reduced at bases  HEART: normal S1S2, RRR  ABDOMEN: Soft, Nontender, non distended  EXTREMITIES: +edema       LABS:                        8.6    8.34  )-----------( 236      ( 23 Jan 2021 05:15 )             26.6     01-23    126<L>  |  82<L>  |  87<H>  ----------------------------<  222<H>  4.3   |  22  |  4.82<H>    Ca    9.0      23 Jan 2021 11:40  Phos  7.5     01-23  Mg     1.9     01-23    TPro  6.1  /  Alb  2.7<L>  /  TBili  0.4  /  DBili  x   /  AST  45<H>  /  ALT  37  /  AlkPhos  184<H>  01-23          Sodium, Random Urine: 72 mmol/L (01-23 @ 09:08)  Osmolality, Random Urine: 254 mosm/kg (01-23 @ 09:08)  Creatinine, Random Urine: 18 mg/dL (01-23 @ 09:08)        RADIOLOGY & ADDITIONAL STUDIES:    reviewed        s/p furosemide 100 mg IVP this am (prior to being held by team) uop 25-35 ml/hr   BUN/Cr 84/4.8 from 74/4.2   sNa 125 from 130  stable respiratory status  acceptable electrolytes and volume   has no complaints --comfortable on NCO2    Meds:  acetaminophen   Tablet .. 650 every 6 hours PRN  albuterol/ipratropium for Nebulization 3 every 6 hours  amLODIPine   Tablet 10 daily  aspirin enteric coated 81 daily  atorvastatin 20 at bedtime  budesonide  80 MICROgram(s)/formoterol 4.5 MICROgram(s) Inhaler 2 two times a day  dextrose 40% Gel 15 once  dextrose 5%. 1000 <Continuous>  dextrose 5%. 1000 <Continuous>  dextrose 50% Injectable 25 once  dextrose 50% Injectable 12.5 once  dextrose 50% Injectable 25 once  diazepam    Tablet 1 daily  donepezil 5 at bedtime  fluticasone propionate 50 MICROgram(s)/spray Nasal Spray 1 two times a day  glucagon  Injectable 1 once  guaiFENesin   Syrup  (Sugar-Free) 100 every 6 hours PRN  heparin   Injectable 5000 every 12 hours  influenza  Vaccine (HIGH DOSE) 0.7 once  insulin glargine Injectable (LANTUS) 10 at bedtime  insulin lispro (ADMELOG) corrective regimen sliding scale  Before meals and at bedtime  insulin lispro Injectable (ADMELOG) 4 three times a day with meals  levothyroxine 50 daily  methylPREDNISolone sodium succinate Injectable 20 every 8 hours  metoprolol tartrate 50 every 12 hours  piperacillin/tazobactam IVPB.. 2.25 every 6 hours  senna 1 daily  sevelamer carbonate 1600 three times a day      T(C): , Max: 36.8 (01-22-21 @ 19:30)  T(F): , Max: 98.2 (01-22-21 @ 19:30)  HR: 68 (01-23-21 @ 15:03)  BP: 142/67 (01-23-21 @ 15:03)  BP(mean): 84 (01-23-21 @ 15:00)  RR: 20 (01-23-21 @ 15:03)  SpO2: 92% (01-23-21 @ 15:03)  Wt(kg): --    01-22 @ 07:01  -  01-23 @ 07:00  --------------------------------------------------------  IN: 460 mL / OUT: 640 mL / NET: -180 mL    01-23 @ 07:01  -  01-23 @ 16:06  --------------------------------------------------------  IN: 850 mL / OUT: 145 mL / NET: 705 mL      Height (cm): 157.5 (01-22 @ 19:30)  Weight (kg): 54.2 (01-22 @ 19:30)  BMI (kg/m2): 21.8 (01-22 @ 19:30)  BSA (m2): 1.54 (01-22 @ 19:30)    Review of Systems:  RESPIRATORY: no shortness of breath now  CARDIOVASCULAR: No chest pain  MUSCULOSKELETAL: No leg edema    PHYSICAL EXAM:  GENERAL: alert, NAD  CHEST/LUNG: Coarse breath sounds bilaterally, reduced at bases  HEART: normal S1S2, RRR  ABDOMEN: Soft, Nontender, non distended  EXTREMITIES: +edema       LABS:                        8.6    8.34  )-----------( 236      ( 23 Jan 2021 05:15 )             26.6     01-23    126<L>  |  82<L>  |  87<H>  ----------------------------<  222<H>  4.3   |  22  |  4.82<H>    Ca    9.0      23 Jan 2021 11:40  Phos  7.5     01-23  Mg     1.9     01-23    TPro  6.1  /  Alb  2.7<L>  /  TBili  0.4  /  DBili  x   /  AST  45<H>  /  ALT  37  /  AlkPhos  184<H>  01-23          Sodium, Random Urine: 72 mmol/L (01-23 @ 09:08)  Osmolality, Random Urine: 254 mosm/kg (01-23 @ 09:08)  Creatinine, Random Urine: 18 mg/dL (01-23 @ 09:08)        RADIOLOGY & ADDITIONAL STUDIES:    reviewed

## 2021-01-23 NOTE — PROGRESS NOTE ADULT - PROBLEM SELECTOR PLAN 2
Urine output remains minimal with worsening BUN and Creatinine.  For probable dialysis as per Dr. Harmon

## 2021-01-23 NOTE — PROGRESS NOTE ADULT - ATTENDING COMMENTS
acute drop in Na diuresis - may be on dry side and breathing comfortably  no uremic sx   diuretics held by team - to get 250 cc NS slowly if tolerates-- recheck na   may need dialysis soon sathya if breathing worsens again -- not needed urgently now

## 2021-01-23 NOTE — PROGRESS NOTE ADULT - SUBJECTIVE AND OBJECTIVE BOX
PUD CCM    INTERVAL HPI/OVERNIGHT EVENTS:    Now in CCU with failing kidneys, eGFR at 8 ml/min.  Saturation 95% on 6 L nasal cannula oxygen.  Steroids at 20 mg q8 due to hyperglycemia  Hyperglycemia treated with insulin.  Poor appetite due to kidney failure and thrush.  Hopefully her Aa gradient will decrease.  Will probably need hemodialysis.  Discussed at length with Dr Nicolas.    All new data reviewed, including VS, lab, imaging, Rx and documentation.    PAST MEDICAL & SURGICAL HISTORY:  Essential hypertension  Hypertension    Type 2 diabetes mellitus  Diabetes    Hyperlipidemia  Hyperlipidemia    Disorder of kidney and ureter  Renal insufficiency    Peripheral vascular disease  PVD (peripheral vascular disease)    Anxiety state  Anxiety    Atherosclerosis of renal artery  with resulting stent placement    Atherosclerosis of renal artery  Renal artery stenosis    FAMILY HISTORY:  Family history of ischemic heart disease  Family history of coronary artery disease.    SOCIAL HISTORY:    REVIEW OF SYSTEMS:  Constitutional: (+) weight change, (-) fever,  () chills, () fatigue, (-) night sweats  Eyes: (-) discharge, () eye pain, () visual change  ENT:  (-) hearing difficulty, () vertigo, () sinus pain,  () throat pain, () epistaxis, () dysphagia, () hoarseness  Neck: (-) pain, () stiffness, () swelling  Respiratory: (+++) cough, () wheezing, () hemoptysis      Cardiovascular: (-) chest pain, ()palpitations, () dizziness   Gastrointestinal: (-) abdominal pain, (+) nausea, () vomiting, () hematemesis, () diarrhea,  () constipation, () melena  Genitourinary:  (-) dysuria, () frequency, () hematuria, () incontinence      Neurologic: (-) headache, () memory loss, () loss of strength, () numbness, () tremor     Skin: (-) itching, () burning, () rash, () lesions   Lymphatic: (-) enlarged lymph nodes  Endocrine: (-) hair loss, () temperature intolerance         Musculoskeletal: (-) back pain, () joint pain,  () extremity pain  Psychiatric: (-) visual change, () auditory change, () depression, () anxiety, () suicidal  Sleep: (-) disorder, () insomnia, () sleep deprivation  Heme/Lymph: () easy bruising, () bleeding gums            Allergy and Immunologic: () hives, () eczema    Vital Signs Last 24 Hrs  T(C): 36.2 (19 Jan 2021 09:44), Max: 37.5 (18 Jan 2021 18:09)  T(F): 97.1 (19 Jan 2021 09:44), Max: 99.5 (18 Jan 2021 18:09)  HR: 74 (19 Jan 2021 08:44) (72 - 82)  BP: 148/62 (19 Jan 2021 08:44) (130/59 - 165/71)  BP(mean): --  RR: 24 (19 Jan 2021 08:44) (18 - 24)  SpO2: 94% (19 Jan 2021 08:44) (93% - 97%) ON NONREBREATHER    I&O's Detail    18 Jan 2021 07:01  -  19 Jan 2021 07:00  --------------------------------------------------------  IN:    IV PiggyBack: 250 mL    Oral Fluid: 1020 mL  Total IN: 1270 mL    OUT:    Voided (mL): 1700 mL  Total OUT: 1700 mL    Total NET: -430 mL      19 Jan 2021 07:01  -  19 Jan 2021 11:48  --------------------------------------------------------  IN:    IV PiggyBack: 100 mL  Total IN: 100 mL    OUT:    Voided (mL): 200 mL  Total OUT: 200 mL    Total NET: -100 mL    PHYSICAL EXAM:  in bed, less confused, second daughter is present.  Developing muscle atrophy uncomfortable, - acute distress; vital signs are monitored continuously  Eyes: PERRLA, EOMI, -conjunctivitis, -scleritis   Head: no focal deficit, normocephalic,  no trauma  ENMT: moist tongue, ++ thrush, -nasal discharge, -hoarseness, normal hearing, -cough, -hemoptysis, trachea midline  Neck: supple, - lymphadenopathy,  -masses, -JVD  Respiratory: bilateral diminished breath sounds, -wheezing, -rhonchi, + rales both lower lungs, + crackles both lower lungs  Chest: -accessory muscle use, -paradoxical breathing  Cardiovascular: regular rate and sinus rhythm, S1 S2 normal, -S3, -S4, -murmur, -gallop, -rub  Gastrointestinal: soft, nontender, nondistended, normal bowel sounds, no hepatosplenomegaly  Genitourinary: -flank pain, -dysuria  Extremities: -clubbing, -cyanosis, -edema    Vascular: peripheral pulses palpable 2+ bilaterally  Neurological: alert, oriented x 3, no focal deficit, - tremor  Skin: warm, dry, -erythema, iv sites intact  Lymph nodes; no cervical, supraclavicular or axillary adenopathy  Psychiatric: cooperative    MEDICATIONS  (STANDING):  albuterol/ipratropium for Nebulization 3 milliLiter(s) Nebulizer every 6 hours  amLODIPine   Tablet 10 milliGRAM(s) Oral daily  atorvastatin 20 milliGRAM(s) Oral at bedtime  azithromycin  IVPB 500 milliGRAM(s) IV Intermittent every 24 hours  budesonide  80 MICROgram(s)/formoterol 4.5 MICROgram(s) Inhaler 2 Puff(s) Inhalation two times a day  donepezil 5 milliGRAM(s) Oral at bedtime  fluticasone propionate 50 MICROgram(s)/spray Nasal Spray 1 Spray(s) Both Nostrils two times a day  heparin   Injectable 5000 Unit(s) SubCutaneous every 12 hours  influenza  Vaccine (HIGH DOSE) 0.7 milliLiter(s) IntraMuscular once  iron sucrose IVPB 200 milliGRAM(s) IV Intermittent every 24 hours  levothyroxine 50 MICROGram(s) Oral daily  metoprolol tartrate 50 milliGRAM(s) Oral two times a day  piperacillin/tazobactam IVPB.. 2.25 Gram(s) IV Intermittent every 6 hours  sevelamer carbonate 800 milliGRAM(s) Oral three times a day with meals    MEDICATIONS  (PRN):  benzonatate 100 milliGRAM(s) Oral every 8 hours PRN Cough  guaiFENesin   Syrup  (Sugar-Free) 100 milliGRAM(s) Oral every 6 hours PRN Cough      Allergies    No Known Allergies    Intolerances        LABS:                        8.3    11.47 )-----------( 230      ( 19 Jan 2021 07:05 )             27.2     01-19    136  |  99  |  70<H>  ----------------------------<  114<H>  4.5   |  22  |  3.84<H>    Ca    8.5      19 Jan 2021 07:05  Phos  5.0     01-19  Mg     1.8     01-19    TPro  5.9<L>  /  Alb  2.7<L>  /  TBili  x   /  DBili  x   /  AST  x   /  ALT  x   /  AlkPhos  x   01-18    +DVT prophylaxis  5000 q12  +Sleep  DECREASED, may add melatonin   +Nutrition goals  POOR APPETITE   -Pain DENIED  -Decubital ulcer  +GI prophylaxis (PPV, coagulopathy, Hx)  +Aspiration prophylaxis (45 degrees)  Ventilator synchronized   MAY NEED BiPAP  Tracheal cuff pressure  +Sedation/analgesia stopped once  +ID (phos, CH, mupi, SB)  Candida, AGREE WITH CEFEPIME  -Delirium  +Cardiac Beta/ACEI-ARB on hold/ASA 81/statin  +Prevention  +Education  +Medication reviewed (drug-drug interactions, PDA)  Medical devices  URINE catheter- [place], [place], IV  Discussed with Dr Crooks, Justin, Fidencio PA, CCRN    RADIOLOGY & ADDITIONAL STUDIES:    CXR with bilateral infiltrates, no change    EXAM:  CT CHEST                          PROCEDURE DATE:  10/14/2020      INTERPRETATION:  CT SCAN OF CHEST    History: 85-year-old female with shortness of breath and pleural effusion    Technique: CT scan of chest performed from lung apices through lung bases. Axial, coronal, and sagittal multiplanar reformatted images were produced. Thin section axial images and axial MIPS were also produced. Intravenous contrast material was not administered, as ordered.    Comparison: Chest radiograph dated 10/13/2020. No prior thoracic CT examinations.    Findings:    Lungs and large airways: Normal.    Pleura:  There are small bibasilar pleural effusions right greater than left. There is interlobular septal thickening which extends into the interior of the lung to suggest a component of pulmonary venous congestion. Moreover there are peripheral reticular opacities with some associated groundglass component within the peripheral aspects of both lungs. More peripheral groundglass and consolidation is seen within the right upper left upper and right middle lobes. There is some pulmonary fibrosis with traction bronchiectasis and bronchiolectasis without jesús honeycomb lung formation. There is air trapping.    Mediastinum and hilar regions: Increased number of mildly enlarged prevascular (1.1 cm in short axis), right paratracheal (1.1 cm in short axis), and subcarinal (1.7 cm in short axis).    Heart and pericardium:  Cardiomegaly. No pericardial effusion. Extensive calcification of the mitral annulus.    Vessels:  Severe coronary artery calcification/stents. Severe callus  5. Atheromatous plaque formation throughout the aortic arch descending and proximal abdominal aorta and major side branches with extensive calcification of the origin of the renal arteries right greater than left. Calcification of the origin of the brachycephalic vessels are noted. No evidence of aneurysm    Chest wall and lower neck:  Punctate microcalcifications are noted in the breasts bilaterally left greater than right. Clinical and mammographic correlation.    Upper abdomen: Cholelithiasis. Extensive arterial vascular calcification of the major side branches of the abdominal aorta as above.    Bones: Moderate multilevel degenerative disc disease involving the lower thoracic spine. Poststernotomy.      Impression:    1. Cardiomegaly. Small bibasilar pleural effusions and interlobular septal thickening compatible with pulmonary venous congestion.  2. More peripheral reticular, groundglass and some patchy areas of peripheral consolidation more pronounced in the upper and midlung zones are noted. There may be some bronchiectasis/bronchiolectasis to suggest pulmonary fibrosis. No honeycomb lung formation. Air-trapping is noted. These findings are not consistent with UIP/IPF. Abbreviated differential includes atypical infectious and/or inflammatory etiologies such as chronic hypersensitivity pneumonia, stage IV sarcoidosis, pneumoconiosis, and subacute and/or chronic sequela of atypical and viral pneumonias such as Covid 19.  3. Mild mediastinal lymphadenopathy.    EXAM:  CT CHEST                          PROCEDURE DATE:  01/19/2021      INTERPRETATION:  CT of the CHEST without intravenous contrast dated 1/19/2021 2:31 PM    INDICATION: Worsening Hypoxia    TECHNIQUE: CT of the chest was performed withoutintravenous contrast.  Intravenous contrast was not used Axial and coronal and sagittal images were produced and reviewed.    PRIOR STUDIES: CT chest 10/14/2020    FINDINGS: Cardiomegaly as before. Dense mitral valve annular calcification. Coronary artery calcifications. Status post CABG. The main pulmonary artery measures 3.1 cm diameter.  No pericardial effusion is seen.  Evaluation of the vasculature is limited without intravenous contrast, but the great vessels are grossly unremarkable.  Evaluation for adenopathy is limited without intravenous contrast. Within that limitation, mild mediastinal lymphadenopathy is seen. No axillary adenopathy. Abandoned epicardial pacer wire. Low-density of cardiac chambers relative to the myocardium suggesting anemia.    Small bilateral pleural effusions are seen. Evaluation of the pulmonary parenchyma demonstrates underlying interstitial fibrosis in the upper and mid zones with bilateral mosaic attenuation. There is now new bilateral groundglass opacities  in the upper and mid and lower zones with thickening of interlobular septa at the bilateral upper zones i.e. crazy paving  appearance.. Coalescence of findings with consolidation seen in the superior segment right lower lobe.    Limited evaluation of the upper abdomen demonstrates radiopaque cholelithiasis.    Evaluation of the osseous structures demonstrates degenerative changes.      IMPRESSION: New bilateral groundglass lung opacities superimposed on chronic interstitial pulmonary fibrosis probably due to chronic hypersensitivity pneumonitis or sarcoid. The groundglass lung opacities could  be due to pulmonary edema, pulmonary hemorrhage, alveolar proteinosis, organizing pneumonia or atypical infection.    Assessment and Plan:    Problem/Plan - 1:  ·  Problem: Hypoxia.  Plan: Was on nonrebreather. New infiltrates appear to respond to steroids. Very high ESR noted.    Hopefully her bilateral lung disease will respond to steroids. Continue 20 q8     Problem/Plan - 2:  ·  Problem: Anemia.     Problem/Plan - 3:  ·  Problem: NAV (acute kidney injury).  Plan: Furosemide for anuria. Will probably need hemodialysis     Problem/Plan - 4:  ·  Problem: Hyponatremia     Problem/Plan - 5:  ·  Problem: Type 2 diabetes mellitus. Uncontrolled with steroids. Endocrinology very much appreciated.     Problem/Plan - 6:  Problem: COPD (chronic obstructive pulmonary disease). Plan: Continue bronchodilators.     Problem/Plan - 7:  ·  Problem: CAD (coronary artery disease).  Plan: BB. ASA 81     Problem/Plan - 8:  ·  Problem: Acute on chronic diastolic congestive heart failure.      Problem/Plan - 9:  ·  Problem: CAP (community acquired pneumonia).  Plan: Bilateral infiltrates are severe.  ID very much appreciated. Continue cefepime. Nystatin. Procalcitonin noted.     Problem/Plan - 10:  Problem: Cough. Plan; Perhaps from pulmonary fibrosis also. Nonproductive cough. Benzonatate discontinued to avoid adverse effects.

## 2021-01-23 NOTE — PROGRESS NOTE ADULT - SUBJECTIVE AND OBJECTIVE BOX
*INCOMPLETE NOTE* *INCOMPLETE NOTE*    OVERNIGHT EVENTS:  - Saturating well (>91%) on 6L NC with no need for BiPAP  - Night team attempted to consent for HD cath using  but patient became very anxious, asking to speak with daughter first (has poor understanding of why she needs HD).   - Increased Lantus from 5U to 10U at bedtime for elevated FS (>200s).   - Strep pneumo antigen negative.   - By 3AM, producing on average 36 cc/hour overnight (goal UOP: at least 27 cc/hour).   - No electrolyte repletion given ESRD  - AM Sodium 125 with increasing phosphate and worsening BUN/Cr. Nephrology fellow paged and made aware.       SUBJECTIVE/ INTERVAL HPI: Patient seen and examined at bedside. Currently complaining of a sore throat. Admits to poor PO intake since last night. Says she is nervous about the thought of HD despite explaining the risks and benefits to her, prefers to speak to daughter. Denies difficulty breathing, SOB, chest pain, lightheadedness, dizziness, N/V/D, abdominal pain.     VITALS  Vital Signs Last 24 Hrs  T(C): 35.8 (23 Jan 2021 07:00), Max: 36.8 (22 Jan 2021 19:30)  T(F): 96.4 (23 Jan 2021 07:00), Max: 98.2 (22 Jan 2021 19:30)  HR: 66 (23 Jan 2021 07:00) (54 - 67)  BP: 159/72 (23 Jan 2021 07:00) (116/61 - 166/74)  BP(mean): 103 (23 Jan 2021 07:00) (92 - 109)  RR: 24 (23 Jan 2021 07:00) (15 - 35)  SpO2: 92% (23 Jan 2021 07:00) (90% - 100%)    I&O's Summary    22 Jan 2021 07:01  -  23 Jan 2021 07:00  --------------------------------------------------------  IN: 460 mL / OUT: 640 mL / NET: -180 mL        CAPILLARY BLOOD GLUCOSE      POCT Blood Glucose.: 125 mg/dL (23 Jan 2021 07:48)  POCT Blood Glucose.: 141 mg/dL (22 Jan 2021 22:21)  POCT Blood Glucose.: 265 mg/dL (22 Jan 2021 16:53)  POCT Blood Glucose.: 295 mg/dL (22 Jan 2021 11:46)      PHYSICAL EXAM  General: A&Ox 3; NAD satting 96% on 6L NC  HEENT: NC/AT, PERRL, EOMI, anicteric sclera, mucus membranes dry   Neck: Supple; no JVD  Respiratory: bilateral crackles from mid lungs to base; no wheezes auscultated, no accessory muscle use   Cardiovascular: Regular rhythm/rate; S1/S2; no gallops or murmurs auscultated  Gastrointestinal: Soft; NTND w/out rebound tenderness or guarding; bowel sounds normal  Genitourinary: tucker in place, draining clear yellow urine, no suprapubic tenderness   Extremities: WWP; no edema or cyanosis; radial/pedal pulses palpable  Neurological:  CNII-XII grossly intact; no obvious focal deficits    MEDICATIONS  (STANDING):  albuterol/ipratropium for Nebulization 3 milliLiter(s) Nebulizer every 6 hours  amLODIPine   Tablet 10 milliGRAM(s) Oral daily  aspirin enteric coated 81 milliGRAM(s) Oral daily  atorvastatin 20 milliGRAM(s) Oral at bedtime  budesonide  80 MICROgram(s)/formoterol 4.5 MICROgram(s) Inhaler 2 Puff(s) Inhalation two times a day  dextrose 40% Gel 15 Gram(s) Oral once  dextrose 5%. 1000 milliLiter(s) (50 mL/Hr) IV Continuous <Continuous>  dextrose 5%. 1000 milliLiter(s) (100 mL/Hr) IV Continuous <Continuous>  dextrose 50% Injectable 25 Gram(s) IV Push once  dextrose 50% Injectable 25 Gram(s) IV Push once  dextrose 50% Injectable 12.5 Gram(s) IV Push once  diazepam    Tablet 1 milliGRAM(s) Oral daily  donepezil 5 milliGRAM(s) Oral at bedtime  fluticasone propionate 50 MICROgram(s)/spray Nasal Spray 1 Spray(s) Both Nostrils two times a day  furosemide   IVPB 100 milliGRAM(s) IV Intermittent every 12 hours  glucagon  Injectable 1 milliGRAM(s) IntraMuscular once  heparin   Injectable 5000 Unit(s) SubCutaneous every 12 hours  influenza  Vaccine (HIGH DOSE) 0.7 milliLiter(s) IntraMuscular once  insulin glargine Injectable (LANTUS) 10 Unit(s) SubCutaneous at bedtime  insulin lispro (ADMELOG) corrective regimen sliding scale   SubCutaneous Before meals and at bedtime  insulin lispro Injectable (ADMELOG) 4 Unit(s) SubCutaneous three times a day with meals  levothyroxine 50 MICROGram(s) Oral daily  methylPREDNISolone sodium succinate Injectable 20 milliGRAM(s) IV Push every 8 hours  metolazone 5 milliGRAM(s) Oral every 24 hours  metoprolol tartrate 50 milliGRAM(s) Oral every 12 hours  piperacillin/tazobactam IVPB.. 2.25 Gram(s) IV Intermittent every 6 hours  senna 1 Tablet(s) Oral daily  sevelamer carbonate 1600 milliGRAM(s) Oral three times a day    MEDICATIONS  (PRN):  acetaminophen   Tablet .. 650 milliGRAM(s) Oral every 6 hours PRN Moderate Pain (4 - 6)  benzonatate 100 milliGRAM(s) Oral every 8 hours PRN Cough  guaiFENesin   Syrup  (Sugar-Free) 100 milliGRAM(s) Oral every 6 hours PRN Cough      LABS                        8.6    8.34  )-----------( 236      ( 23 Jan 2021 05:15 )             26.6     01-23    125<L>  |  82<L>  |  84<H>  ----------------------------<  138<H>  3.9   |  20<L>  |  4.82<H>    Ca    8.7      23 Jan 2021 05:15  Phos  7.5     01-23  Mg     1.9     01-23    TPro  6.1  /  Alb  2.7<L>  /  TBili  0.4  /  DBili  x   /  AST  45<H>  /  ALT  37  /  AlkPhos  184<H>  01-23    LIVER FUNCTIONS - ( 23 Jan 2021 05:15 )  Alb: 2.7 g/dL / Pro: 6.1 g/dL / ALK PHOS: 184 U/L / ALT: 37 U/L / AST: 45 U/L / GGT: x                     RADIOLOGY & ADDITIONAL TESTS: Reviewed.  STEPDOWN FROM 5 EASET (CCU) TO TELEMETRY:    Hospital Course:  86F poor historian/early dementia and former heavy smoker with PMHx DM, HTN, hyperlipidemia, COPD, hypothyroidism, CKD (baseline Cr ~3.0), anemia of chronic disease, CAD s/p CABG 2015, chronic diastolic CHF (EF 55% via echo 10/2020), renal artery stenosis (s/p stent 20+ years ag), carotid artery stenosis, PAD, who presented to [facility] ED on 1/15 c/o progressively worsening SOB and cough x 3 days. Can barely walk 10 feet prior to having to stop and rest. Reports significant orthopnea and she has been sleeping upright in a chair the past week. Patient was instructed to increase her Lasix 80mg PO daily dose to Lasix 80mg PO BID and start Metalazone 10mg PO BID prior to each dose by her cardiologist Dr. Horvath. Per daughter, medication compliance has been questionable over the last few months, as patient is becoming more forgetful.      Admitted to cardiac telemetry for management of acute on chronic diastolic CHF exacerbation in setting of CAP. Initial procal 0.29 but increased to 1.84 on 1/22. For CAP, patient completed 7 day course of Azithromycin and will continue Zosyn 2.25mg IV q6hrs (renal dose) through 1/25/21 due to no symptomatic improvement. Course c/b anemia s/p 1U PRBC on 1/20/21, w/ improvement and maintenance of Hgb 8-9. Regarding respiratory status, pt initially low 80s on RA and required HFNC, currently weaned to 6L NC. Regarding CHF and NAV on CKD, pt still overloaded with inadequate response to Lasix IV. 1/22 received Lasix 100mg IV w/ minimal urinary output. In discussion w/ renal, administered another Lasix 100mg IV x1 w/ Metolazone 5mg PO x1 prior to Lasix dose. Upon discussion w/ Dr. Harmon, patient may require new HD this admission for fluid removal; therefore, patient upgraded to CCU for closer monitoring. While in CCU she received 2 doses of lasix 100 mg IV q12h with UOP averaging 36 cc/hr (goal 27 cc/hr). Decision was made to hold further diuresis given worsening hyponatremia, elevated phos, and increased BUN/Cr. Since patient making adequate urine, decision was made to stepdown to telemetry for further medical optimization.      OVERNIGHT EVENTS:  - Saturating well (>91%) on 6L NC with no need for BiPAP  - Night team attempted to consent for HD cath using  but patient became very anxious, asking to speak with daughter first (has poor understanding of why she needs HD).   - Increased Lantus from 5U to 10U at bedtime for elevated FS (>200s).   - Strep pneumo antigen negative.   - By 3AM, producing on average 36 cc/hour overnight (goal UOP: at least 27 cc/hour).   - No electrolyte repletion given ESRD  - AM Sodium 125 with increasing phosphate and worsening BUN/Cr. Nephrology fellow paged and made aware.       SUBJECTIVE/ INTERVAL HPI: Lasix 100 mg IV BID and metolazone discontinued given adequate urine output and patient appearing dry on exam. Patient seen and examined at bedside. Currently complaining of a sore throat. Admits to poor PO intake since last night. Says she is nervous about the thought of HD despite explaining the risks and benefits to her, prefers to speak to daughter. Denies difficulty breathing, SOB, chest pain, lightheadedness, dizziness, N/V/D, abdominal pain.     VITALS  Vital Signs Last 24 Hrs  T(C): 35.8 (23 Jan 2021 07:00), Max: 36.8 (22 Jan 2021 19:30)  T(F): 96.4 (23 Jan 2021 07:00), Max: 98.2 (22 Jan 2021 19:30)  HR: 66 (23 Jan 2021 07:00) (54 - 67)  BP: 159/72 (23 Jan 2021 07:00) (116/61 - 166/74)  BP(mean): 103 (23 Jan 2021 07:00) (92 - 109)  RR: 24 (23 Jan 2021 07:00) (15 - 35)  SpO2: 92% (23 Jan 2021 07:00) (90% - 100%)    I&O's Summary    22 Jan 2021 07:01  -  23 Jan 2021 07:00  --------------------------------------------------------  IN: 460 mL / OUT: 640 mL / NET: -180 mL        CAPILLARY BLOOD GLUCOSE      POCT Blood Glucose.: 125 mg/dL (23 Jan 2021 07:48)  POCT Blood Glucose.: 141 mg/dL (22 Jan 2021 22:21)  POCT Blood Glucose.: 265 mg/dL (22 Jan 2021 16:53)  POCT Blood Glucose.: 295 mg/dL (22 Jan 2021 11:46)      PHYSICAL EXAM  General: A&Ox 3; NAD satting 96% on 6L NC  HEENT: NC/AT, PERRL, EOMI, anicteric sclera, mucus membranes dry   Neck: Supple; no JVD  Respiratory: bilateral crackles from mid lungs to base; no wheezes auscultated, no accessory muscle use   Cardiovascular: Regular rhythm/rate; S1/S2; no gallops or murmurs auscultated  Gastrointestinal: Soft; NTND w/out rebound tenderness or guarding; bowel sounds normal  Genitourinary: tucker in place, draining clear yellow urine, no suprapubic tenderness   Extremities: WWP; no edema or cyanosis; radial/pedal pulses palpable  Neurological:  CNII-XII grossly intact; no obvious focal deficits    MEDICATIONS  (STANDING):  albuterol/ipratropium for Nebulization 3 milliLiter(s) Nebulizer every 6 hours  amLODIPine   Tablet 10 milliGRAM(s) Oral daily  aspirin enteric coated 81 milliGRAM(s) Oral daily  atorvastatin 20 milliGRAM(s) Oral at bedtime  budesonide  80 MICROgram(s)/formoterol 4.5 MICROgram(s) Inhaler 2 Puff(s) Inhalation two times a day  dextrose 40% Gel 15 Gram(s) Oral once  dextrose 5%. 1000 milliLiter(s) (50 mL/Hr) IV Continuous <Continuous>  dextrose 5%. 1000 milliLiter(s) (100 mL/Hr) IV Continuous <Continuous>  dextrose 50% Injectable 25 Gram(s) IV Push once  dextrose 50% Injectable 25 Gram(s) IV Push once  dextrose 50% Injectable 12.5 Gram(s) IV Push once  diazepam    Tablet 1 milliGRAM(s) Oral daily  donepezil 5 milliGRAM(s) Oral at bedtime  fluticasone propionate 50 MICROgram(s)/spray Nasal Spray 1 Spray(s) Both Nostrils two times a day  furosemide   IVPB 100 milliGRAM(s) IV Intermittent every 12 hours  glucagon  Injectable 1 milliGRAM(s) IntraMuscular once  heparin   Injectable 5000 Unit(s) SubCutaneous every 12 hours  influenza  Vaccine (HIGH DOSE) 0.7 milliLiter(s) IntraMuscular once  insulin glargine Injectable (LANTUS) 10 Unit(s) SubCutaneous at bedtime  insulin lispro (ADMELOG) corrective regimen sliding scale   SubCutaneous Before meals and at bedtime  insulin lispro Injectable (ADMELOG) 4 Unit(s) SubCutaneous three times a day with meals  levothyroxine 50 MICROGram(s) Oral daily  methylPREDNISolone sodium succinate Injectable 20 milliGRAM(s) IV Push every 8 hours  metolazone 5 milliGRAM(s) Oral every 24 hours  metoprolol tartrate 50 milliGRAM(s) Oral every 12 hours  piperacillin/tazobactam IVPB.. 2.25 Gram(s) IV Intermittent every 6 hours  senna 1 Tablet(s) Oral daily  sevelamer carbonate 1600 milliGRAM(s) Oral three times a day    MEDICATIONS  (PRN):  acetaminophen   Tablet .. 650 milliGRAM(s) Oral every 6 hours PRN Moderate Pain (4 - 6)  benzonatate 100 milliGRAM(s) Oral every 8 hours PRN Cough  guaiFENesin   Syrup  (Sugar-Free) 100 milliGRAM(s) Oral every 6 hours PRN Cough      LABS                        8.6    8.34  )-----------( 236      ( 23 Jan 2021 05:15 )             26.6     01-23    125<L>  |  82<L>  |  84<H>  ----------------------------<  138<H>  3.9   |  20<L>  |  4.82<H>    Ca    8.7      23 Jan 2021 05:15  Phos  7.5     01-23  Mg     1.9     01-23    TPro  6.1  /  Alb  2.7<L>  /  TBili  0.4  /  DBili  x   /  AST  45<H>  /  ALT  37  /  AlkPhos  184<H>  01-23    LIVER FUNCTIONS - ( 23 Jan 2021 05:15 )  Alb: 2.7 g/dL / Pro: 6.1 g/dL / ALK PHOS: 184 U/L / ALT: 37 U/L / AST: 45 U/L / GGT: x                     RADIOLOGY & ADDITIONAL TESTS: Reviewed.  STEPDOWN FROM  EAST (CCU) TO TELEMETRY:    Hospital Course:  86F poor historian/early dementia and former heavy smoker with PMHx DM, HTN, hyperlipidemia, COPD, hypothyroidism, CKD (baseline Cr ~3.0), anemia of chronic disease, CAD s/p CABG 2015, chronic diastolic CHF (EF 55% via echo 10/2020), renal artery stenosis (s/p stent 20+ years ag), carotid artery stenosis, PAD, who presented to [facility] ED on 1/15 c/o progressively worsening SOB and cough x 3 days. Can barely walk 10 feet prior to having to stop and rest. Reports significant orthopnea and she has been sleeping upright in a chair the past week. Patient was instructed to increase her Lasix 80mg PO daily dose to Lasix 80mg PO BID and start Metalazone 10mg PO BID prior to each dose by her cardiologist Dr. Horvath. Per daughter, medication compliance has been questionable over the last few months, as patient is becoming more forgetful.      Admitted to cardiac telemetry for management of acute on chronic diastolic CHF exacerbation in setting of CAP. Initial procal 0.29 but increased to 1.84 on 1/22. For CAP, patient completed 7 day course of Azithromycin and will continue Zosyn 2.25mg IV q6hrs (renal dose) through 1/25/21 due to no symptomatic improvement. Course c/b anemia s/p 1U PRBC on 1/20/21, w/ improvement and maintenance of Hgb 8-9. Regarding respiratory status, pt initially low 80s on RA and required HFNC, currently weaned to 6L NC. Regarding CHF and NAV on CKD, pt still overloaded with inadequate response to Lasix IV. 1/22 received Lasix 100mg IV w/ minimal urinary output. In discussion w/ renal, administered another Lasix 100mg IV x1 w/ Metolazone 5mg PO x1 prior to Lasix dose. Upon discussion w/ Dr. Harmon, patient may require new HD this admission for fluid removal; therefore, patient upgraded to CCU for closer monitoring. While in CCU she received 2 doses of lasix 100 mg IV q12h with UOP averaging 36 cc/hr (goal 27 cc/hr). Decision was made to hold further diuresis given worsening hyponatremia, elevated phos, and increased BUN/Cr. Since patient making adequate urine, decision was made to stepdown to telemetry for further medical optimization.      OVERNIGHT EVENTS:  - Saturating well (>91%) on 6L NC with no need for BiPAP  - Night team attempted to consent for HD cath using  but patient became very anxious, asking to speak with daughter first (has poor understanding of why she needs HD).   - Increased Lantus from 5U to 10U at bedtime for elevated FS (>200s).   - Strep pneumo antigen negative.   - By 3AM, producing on average 36 cc/hour overnight (goal UOP: at least 27 cc/hour).   - No electrolyte repletion given ESRD  - AM Sodium 125 with increasing phosphate and worsening BUN/Cr. Nephrology fellow paged and made aware.       SUBJECTIVE/ INTERVAL HPI: Lasix 100 mg IV BID and metolazone discontinued given adequate urine output and patient appearing dry on exam. Patient seen and examined at bedside. Currently complaining of a sore throat. Admits to poor PO intake since last night. Says she is nervous about the thought of HD despite explaining the risks and benefits to her, prefers to speak to daughter. Denies difficulty breathing, SOB, chest pain, lightheadedness, dizziness, N/V/D, abdominal pain.     VITALS  Vital Signs Last 24 Hrs  T(C): 35.8 (23 Jan 2021 07:00), Max: 36.8 (22 Jan 2021 19:30)  T(F): 96.4 (23 Jan 2021 07:00), Max: 98.2 (22 Jan 2021 19:30)  HR: 66 (23 Jan 2021 07:00) (54 - 67)  BP: 159/72 (23 Jan 2021 07:00) (116/61 - 166/74)  BP(mean): 103 (23 Jan 2021 07:00) (92 - 109)  RR: 24 (23 Jan 2021 07:00) (15 - 35)  SpO2: 92% (23 Jan 2021 07:00) (90% - 100%)    I&O's Summary    22 Jan 2021 07:01  -  23 Jan 2021 07:00  --------------------------------------------------------  IN: 460 mL / OUT: 640 mL / NET: -180 mL        CAPILLARY BLOOD GLUCOSE      POCT Blood Glucose.: 125 mg/dL (23 Jan 2021 07:48)  POCT Blood Glucose.: 141 mg/dL (22 Jan 2021 22:21)  POCT Blood Glucose.: 265 mg/dL (22 Jan 2021 16:53)  POCT Blood Glucose.: 295 mg/dL (22 Jan 2021 11:46)      PHYSICAL EXAM  General: A&Ox 3; NAD satting 96% on 6L NC  HEENT: NC/AT, PERRL, EOMI, anicteric sclera, mucus membranes dry   Neck: Supple; no JVD  Respiratory: bilateral crackles from mid lungs to base; no wheezes auscultated, no accessory muscle use   Cardiovascular: Regular rhythm/rate; S1/S2; no gallops or murmurs auscultated  Gastrointestinal: Soft; NTND w/out rebound tenderness or guarding; bowel sounds normal  Genitourinary: tucker in place, draining clear yellow urine, no suprapubic tenderness   Extremities: WWP; no edema or cyanosis; radial/pedal pulses palpable  Neurological:  CNII-XII grossly intact; no obvious focal deficits    MEDICATIONS  (STANDING):  albuterol/ipratropium for Nebulization 3 milliLiter(s) Nebulizer every 6 hours  amLODIPine   Tablet 10 milliGRAM(s) Oral daily  aspirin enteric coated 81 milliGRAM(s) Oral daily  atorvastatin 20 milliGRAM(s) Oral at bedtime  budesonide  80 MICROgram(s)/formoterol 4.5 MICROgram(s) Inhaler 2 Puff(s) Inhalation two times a day  dextrose 40% Gel 15 Gram(s) Oral once  dextrose 5%. 1000 milliLiter(s) (50 mL/Hr) IV Continuous <Continuous>  dextrose 5%. 1000 milliLiter(s) (100 mL/Hr) IV Continuous <Continuous>  dextrose 50% Injectable 25 Gram(s) IV Push once  dextrose 50% Injectable 25 Gram(s) IV Push once  dextrose 50% Injectable 12.5 Gram(s) IV Push once  diazepam    Tablet 1 milliGRAM(s) Oral daily  donepezil 5 milliGRAM(s) Oral at bedtime  fluticasone propionate 50 MICROgram(s)/spray Nasal Spray 1 Spray(s) Both Nostrils two times a day  furosemide   IVPB 100 milliGRAM(s) IV Intermittent every 12 hours  glucagon  Injectable 1 milliGRAM(s) IntraMuscular once  heparin   Injectable 5000 Unit(s) SubCutaneous every 12 hours  influenza  Vaccine (HIGH DOSE) 0.7 milliLiter(s) IntraMuscular once  insulin glargine Injectable (LANTUS) 10 Unit(s) SubCutaneous at bedtime  insulin lispro (ADMELOG) corrective regimen sliding scale   SubCutaneous Before meals and at bedtime  insulin lispro Injectable (ADMELOG) 4 Unit(s) SubCutaneous three times a day with meals  levothyroxine 50 MICROGram(s) Oral daily  methylPREDNISolone sodium succinate Injectable 20 milliGRAM(s) IV Push every 8 hours  metolazone 5 milliGRAM(s) Oral every 24 hours  metoprolol tartrate 50 milliGRAM(s) Oral every 12 hours  piperacillin/tazobactam IVPB.. 2.25 Gram(s) IV Intermittent every 6 hours  senna 1 Tablet(s) Oral daily  sevelamer carbonate 1600 milliGRAM(s) Oral three times a day    MEDICATIONS  (PRN):  acetaminophen   Tablet .. 650 milliGRAM(s) Oral every 6 hours PRN Moderate Pain (4 - 6)  benzonatate 100 milliGRAM(s) Oral every 8 hours PRN Cough  guaiFENesin   Syrup  (Sugar-Free) 100 milliGRAM(s) Oral every 6 hours PRN Cough      LABS                        8.6    8.34  )-----------( 236      ( 23 Jan 2021 05:15 )             26.6     01-23    125<L>  |  82<L>  |  84<H>  ----------------------------<  138<H>  3.9   |  20<L>  |  4.82<H>    Ca    8.7      23 Jan 2021 05:15  Phos  7.5     01-23  Mg     1.9     01-23    TPro  6.1  /  Alb  2.7<L>  /  TBili  0.4  /  DBili  x   /  AST  45<H>  /  ALT  37  /  AlkPhos  184<H>  01-23    LIVER FUNCTIONS - ( 23 Jan 2021 05:15 )  Alb: 2.7 g/dL / Pro: 6.1 g/dL / ALK PHOS: 184 U/L / ALT: 37 U/L / AST: 45 U/L / GGT: x                     RADIOLOGY & ADDITIONAL TESTS: Reviewed.  STEPDOWN FROM  EAST (CCU) TO TELEMETRY:    Hospital Course:  86F poor historian/early dementia and former heavy smoker with PMHx DM, HTN, hyperlipidemia, COPD, hypothyroidism, CKD (baseline Cr ~3.0), anemia of chronic disease, CAD s/p CABG 2015, chronic diastolic CHF (EF 55% via echo 10/2020), renal artery stenosis (s/p stent 20+ years ag), carotid artery stenosis, PAD, who presented to [facility] ED on 1/15 c/o progressively worsening SOB and cough x 3 days. Can barely walk 10 feet prior to having to stop and rest. Reports significant orthopnea and she has been sleeping upright in a chair the past week. Patient was instructed to increase her Lasix 80mg PO daily dose to Lasix 80mg PO BID and start Metalazone 10mg PO BID prior to each dose by her cardiologist Dr. Horvath. Per daughter, medication compliance has been questionable over the last few months, as patient is becoming more forgetful.      Admitted to cardiac telemetry for management of acute on chronic diastolic CHF exacerbation in setting of CAP. Initial procal 0.29 but increased to 1.84 on 1/22. For CAP, patient completed 7 day course of Azithromycin and will continue Zosyn 2.25mg IV q6hrs (renal dose) through 1/25/21 due to no symptomatic improvement. Course c/b anemia s/p 1U PRBC on 1/20/21, w/ improvement and maintenance of Hgb 8-9. Regarding respiratory status, pt initially low 80s on RA and required HFNC, currently weaned to 6L NC. Regarding CHF and NAV on CKD, pt still overloaded with inadequate response to Lasix IV. 1/22 received Lasix 100mg IV w/ minimal urinary output. In discussion w/ renal, administered another Lasix 100mg IV x1 w/ Metolazone 5mg PO x1 prior to Lasix dose. Upon discussion w/ Dr. Harmon, patient may require new HD this admission for fluid removal; therefore, patient upgraded to CCU for closer monitoring. While in CCU she received 2 doses of lasix 100 mg IV q12h with UOP averaging 36 cc/hr (goal 27 cc/hr). Decision was made to hold further diuresis given worsening hyponatremia, elevated phos, and increased BUN/Cr. Per renal team, diuretics and free water intake could be culprit for hypoNa. If no improvement on repeat BMP may need saline repletion. Since patient making adequate urine, decision was made to stepdown to telemetry for further medical optimization, including possibility for HD.      OVERNIGHT EVENTS:  - Saturating well (>91%) on 6L NC with no need for BiPAP  - Night team attempted to consent for HD cath using  but patient became very anxious, asking to speak with daughter first (has poor understanding of why she needs HD).   - Increased Lantus from 5U to 10U at bedtime for elevated FS (>200s).   - Strep pneumo antigen negative.   - By 3AM, producing on average 36 cc/hour overnight (goal UOP: at least 27 cc/hour).   - No electrolyte repletion given ESRD  - AM Sodium 125 with increasing phosphate and worsening BUN/Cr. Nephrology fellow paged and made aware.       SUBJECTIVE/ INTERVAL HPI: Lasix 100 mg IV BID and metolazone discontinued given adequate urine output and patient appearing dry on exam. Patient seen and examined at bedside. Currently complaining of a sore throat. Admits to poor PO intake since last night. Says she is nervous about the thought of HD despite explaining the risks and benefits to her, prefers to speak to daughter. Denies difficulty breathing, SOB, chest pain, lightheadedness, dizziness, N/V/D, abdominal pain.     VITALS  Vital Signs Last 24 Hrs  T(C): 35.8 (23 Jan 2021 07:00), Max: 36.8 (22 Jan 2021 19:30)  T(F): 96.4 (23 Jan 2021 07:00), Max: 98.2 (22 Jan 2021 19:30)  HR: 66 (23 Jan 2021 07:00) (54 - 67)  BP: 159/72 (23 Jan 2021 07:00) (116/61 - 166/74)  BP(mean): 103 (23 Jan 2021 07:00) (92 - 109)  RR: 24 (23 Jan 2021 07:00) (15 - 35)  SpO2: 92% (23 Jan 2021 07:00) (90% - 100%)    I&O's Summary    22 Jan 2021 07:01  -  23 Jan 2021 07:00  --------------------------------------------------------  IN: 460 mL / OUT: 640 mL / NET: -180 mL        CAPILLARY BLOOD GLUCOSE      POCT Blood Glucose.: 125 mg/dL (23 Jan 2021 07:48)  POCT Blood Glucose.: 141 mg/dL (22 Jan 2021 22:21)  POCT Blood Glucose.: 265 mg/dL (22 Jan 2021 16:53)  POCT Blood Glucose.: 295 mg/dL (22 Jan 2021 11:46)      PHYSICAL EXAM  General: A&Ox 3; NAD satting 96% on 6L NC  HEENT: NC/AT, PERRL, EOMI, anicteric sclera, mucus membranes dry   Neck: Supple; no JVD  Respiratory: bilateral crackles from mid lungs to base; no wheezes auscultated, no accessory muscle use   Cardiovascular: Regular rhythm/rate; S1/S2; no gallops or murmurs auscultated  Gastrointestinal: Soft; NTND w/out rebound tenderness or guarding; bowel sounds normal  Genitourinary: tucker in place, draining clear yellow urine, no suprapubic tenderness   Extremities: WWP; no edema or cyanosis; radial/pedal pulses palpable  Neurological:  CNII-XII grossly intact; no obvious focal deficits    MEDICATIONS  (STANDING):  albuterol/ipratropium for Nebulization 3 milliLiter(s) Nebulizer every 6 hours  amLODIPine   Tablet 10 milliGRAM(s) Oral daily  aspirin enteric coated 81 milliGRAM(s) Oral daily  atorvastatin 20 milliGRAM(s) Oral at bedtime  budesonide  80 MICROgram(s)/formoterol 4.5 MICROgram(s) Inhaler 2 Puff(s) Inhalation two times a day  dextrose 40% Gel 15 Gram(s) Oral once  dextrose 5%. 1000 milliLiter(s) (50 mL/Hr) IV Continuous <Continuous>  dextrose 5%. 1000 milliLiter(s) (100 mL/Hr) IV Continuous <Continuous>  dextrose 50% Injectable 25 Gram(s) IV Push once  dextrose 50% Injectable 25 Gram(s) IV Push once  dextrose 50% Injectable 12.5 Gram(s) IV Push once  diazepam    Tablet 1 milliGRAM(s) Oral daily  donepezil 5 milliGRAM(s) Oral at bedtime  fluticasone propionate 50 MICROgram(s)/spray Nasal Spray 1 Spray(s) Both Nostrils two times a day  furosemide   IVPB 100 milliGRAM(s) IV Intermittent every 12 hours  glucagon  Injectable 1 milliGRAM(s) IntraMuscular once  heparin   Injectable 5000 Unit(s) SubCutaneous every 12 hours  influenza  Vaccine (HIGH DOSE) 0.7 milliLiter(s) IntraMuscular once  insulin glargine Injectable (LANTUS) 10 Unit(s) SubCutaneous at bedtime  insulin lispro (ADMELOG) corrective regimen sliding scale   SubCutaneous Before meals and at bedtime  insulin lispro Injectable (ADMELOG) 4 Unit(s) SubCutaneous three times a day with meals  levothyroxine 50 MICROGram(s) Oral daily  methylPREDNISolone sodium succinate Injectable 20 milliGRAM(s) IV Push every 8 hours  metolazone 5 milliGRAM(s) Oral every 24 hours  metoprolol tartrate 50 milliGRAM(s) Oral every 12 hours  piperacillin/tazobactam IVPB.. 2.25 Gram(s) IV Intermittent every 6 hours  senna 1 Tablet(s) Oral daily  sevelamer carbonate 1600 milliGRAM(s) Oral three times a day    MEDICATIONS  (PRN):  acetaminophen   Tablet .. 650 milliGRAM(s) Oral every 6 hours PRN Moderate Pain (4 - 6)  benzonatate 100 milliGRAM(s) Oral every 8 hours PRN Cough  guaiFENesin   Syrup  (Sugar-Free) 100 milliGRAM(s) Oral every 6 hours PRN Cough      LABS                        8.6    8.34  )-----------( 236      ( 23 Jan 2021 05:15 )             26.6     01-23    125<L>  |  82<L>  |  84<H>  ----------------------------<  138<H>  3.9   |  20<L>  |  4.82<H>    Ca    8.7      23 Jan 2021 05:15  Phos  7.5     01-23  Mg     1.9     01-23    TPro  6.1  /  Alb  2.7<L>  /  TBili  0.4  /  DBili  x   /  AST  45<H>  /  ALT  37  /  AlkPhos  184<H>  01-23    LIVER FUNCTIONS - ( 23 Jan 2021 05:15 )  Alb: 2.7 g/dL / Pro: 6.1 g/dL / ALK PHOS: 184 U/L / ALT: 37 U/L / AST: 45 U/L / GGT: x                     RADIOLOGY & ADDITIONAL TESTS: Reviewed.

## 2021-01-23 NOTE — PROGRESS NOTE ADULT - PROBLEM SELECTOR PLAN 7
- CT chest w/o contrast: b/l GGO superimposed on chronic interstitial pulm fibrosis probably d/t chronic hypersensitivity pneumonitis or sarcoid. GGOs could be due to pulmonary edema, pulmonary hemorrhage, alveolar proteinosis, organizing pneumonia or atypical infection.  - CONT: Methylprednisolone taper for pneumonitis (start 1/20 - )  - PRN robitussin/tessalon pearls for cough

## 2021-01-23 NOTE — PROGRESS NOTE ADULT - PROBLEM SELECTOR PLAN 8
- Switched meds to NS from D5.   - Na 125 1/22; repeat 1/23.   - Pt given NS 50cc/hr x5hrs, with repeat BMP.   - Continue to monitor.   - F/u renal recs.

## 2021-01-23 NOTE — PROGRESS NOTE ADULT - PROBLEM SELECTOR PLAN 4
- Hx of CAD, s/p CABG 2015.   - CONT: ASA 81mg PO QD, Atorvastatin 20mg PO QD, Lopressor 50mg PO BID.       ## HTN  - VSS.   - CONT: Amlodipine 10mg PO QD, Lopressor 50mg PO BID.

## 2021-01-23 NOTE — PROGRESS NOTE ADULT - PROBLEM SELECTOR PLAN 5
- Baseline Hgb 8-9  - Likely 2/2 to CKD and anemia of chronic disease  - s/p 1u pRBC transfusion on 1/20   - holding IV iron due to treatment for presumed CAP  -Stool for occult blood negative 1/18  -Maintain active T&S (since 1/21)

## 2021-01-23 NOTE — PROGRESS NOTE ADULT - PROBLEM SELECTOR PLAN 6
- CONT: symbicort inhaler BID and Duonebs  - PRN Robutussin/Benonatate for cough  - not currently on Home Oxygen. Will assess O2 on room air once treatment for CAP is complete and respiratory status improves.

## 2021-01-23 NOTE — PROGRESS NOTE ADULT - SUBJECTIVE AND OBJECTIVE BOX
Patient is a 86y old  Female who presents with a chief complaint of progressively worsening SOB and cough x 3 days (23 Jan 2021 05:47) who was admitted with acute diastolic heart failure secondary to pneumonitis (infectious and/or inflammatory).  Hospital course has been complicated by worsening kidney failure and hypoxemia.  She is oxygenation better on nasal cannular with modification of antibiotics and the addition of steroids.  Her urine output however has been worsening.  She is seen in CCU where is is tearful and upset about the prospect of possible HD.  She has mild dementia and there is no real cognizance of extent of illness.      Vital Signs Last 24 Hrs  T(C): 36.6 (23 Jan 2021 10:00), Max: 36.8 (22 Jan 2021 19:30)  T(F): 97.8 (23 Jan 2021 10:00), Max: 98.2 (22 Jan 2021 19:30)  HR: 66 (23 Jan 2021 10:00) (55 - 67)  BP: 148/65 (23 Jan 2021 10:00) (132/61 - 166/74)  BP(mean): 94 (23 Jan 2021 10:00) (92 - 109)  RR: 26 (23 Jan 2021 10:00) (15 - 35)  SpO2: 88% (23 Jan 2021 10:00) (85% - 100%)    acetaminophen   Tablet .. 650 milliGRAM(s) Oral every 6 hours PRN  albuterol/ipratropium for Nebulization 3 milliLiter(s) Nebulizer every 6 hours  amLODIPine   Tablet 10 milliGRAM(s) Oral daily  aspirin enteric coated 81 milliGRAM(s) Oral daily  atorvastatin 20 milliGRAM(s) Oral at bedtime  benzonatate 100 milliGRAM(s) Oral every 8 hours PRN  budesonide  80 MICROgram(s)/formoterol 4.5 MICROgram(s) Inhaler 2 Puff(s) Inhalation two times a day  dextrose 40% Gel 15 Gram(s) Oral once  dextrose 5%. 1000 milliLiter(s) IV Continuous <Continuous>  dextrose 5%. 1000 milliLiter(s) IV Continuous <Continuous>  dextrose 50% Injectable 25 Gram(s) IV Push once  dextrose 50% Injectable 25 Gram(s) IV Push once  dextrose 50% Injectable 12.5 Gram(s) IV Push once  diazepam    Tablet 1 milliGRAM(s) Oral daily  donepezil 5 milliGRAM(s) Oral at bedtime  fluticasone propionate 50 MICROgram(s)/spray Nasal Spray 1 Spray(s) Both Nostrils two times a day  glucagon  Injectable 1 milliGRAM(s) IntraMuscular once  guaiFENesin   Syrup  (Sugar-Free) 100 milliGRAM(s) Oral every 6 hours PRN  heparin   Injectable 5000 Unit(s) SubCutaneous every 12 hours  influenza  Vaccine (HIGH DOSE) 0.7 milliLiter(s) IntraMuscular once  insulin glargine Injectable (LANTUS) 10 Unit(s) SubCutaneous at bedtime  insulin lispro (ADMELOG) corrective regimen sliding scale   SubCutaneous Before meals and at bedtime  insulin lispro Injectable (ADMELOG) 4 Unit(s) SubCutaneous three times a day with meals  levothyroxine 50 MICROGram(s) Oral daily  methylPREDNISolone sodium succinate Injectable 20 milliGRAM(s) IV Push every 8 hours  metoprolol tartrate 50 milliGRAM(s) Oral every 12 hours  piperacillin/tazobactam IVPB.. 2.25 Gram(s) IV Intermittent every 6 hours  senna 1 Tablet(s) Oral daily  sevelamer carbonate 1600 milliGRAM(s) Oral three times a day    PHYSICAL EXAM:  Constitutional: tearful and upset but otherwise in NAD  Eyes:  sclera anicteric  ENMT:  mmm  Neck:   no JVD at 30degrees  Respiratory:    mostly clear to P&A, few scatterred rhonchi  Cardiovascular:   RRR, Si S2 normal systolic murmur at the left sternal border.  Gastrointestinal:   soft N/T  Genitourinary:  No suprapubic masses or tenderness  Extremities:   no cyanosis, clubbing or edemal  Vascular:   no palpable cords or varicositites  Neurological:  grossly normal  Musculoskeletal:  normal  Psychiatric:   appropriately concerned, also fearful

## 2021-01-23 NOTE — PROGRESS NOTE ADULT - PROBLEM SELECTOR PLAN 9
- Dx 3wks ago @ [facility].   - CTH 1/19 (-) for acute changes.   - Per neurosurgery, cont ASA 81 PO QD; no neuro interventions at this time.       #DEMENTIA  - CONT: Donepezil 5mg PO QD      ## ANXIETY  - CONT: Valium 1mg QHS.

## 2021-01-23 NOTE — PROGRESS NOTE ADULT - PROBLEM SELECTOR PLAN 6
Acute diastolic CHF secondary to combination of pulmonary process and NAV.  Consider HD as option for treatment.  Will discuss with renal

## 2021-01-23 NOTE — CONSULT NOTE ADULT - SUBJECTIVE AND OBJECTIVE BOX
HPI:  86 yr old F, POOR HISTORIAN/early dementia, former heavy smoker with PMHx HTN, hyperlipidemia, COPD, hypothyroidism, Stage IV CKD (baseline Cr ~3.0), anemia of chronic disease, CAD s/p CABG 2015 Dr. Blunt, chronic diastolic CHF(EF:55% by Echo 10/2020), renal artery stenosis s/p renal artery stent >20yrs ago, Carotid artery stenosis, PAD, who presents to [facility] ED 1/15/21 c/o progressively worsening SOB and cough x 3 days. Patient reports she can barely walk 10 feet prior to having to stop and rest. Patient further admits to chronic bilateral LE edema and significant orthopnea and she has been sleeping upright in a chair the past week. Patient also admits to chills and a nonproductive cough over the past few days. Patient was instructed to increase her Lasix 80mg PO daily dose to Lasix 80mg PO BID and start Metalazone 10mg PO BID prior to each dose by her cardiologist Dr. Horvaht. Patient denies any chest pain, dizziness, palpitations, recent travel or sick contacts. Patient endorsing compliance w/ medications however daughter states that compliance with medication has been questionable over the last few months, as patient is becoming more forgetful.    In [facility] ED, BP: 170/68, HR: 80, RR:26, Temp: 98.4F, O2 sat: 80% on RA, improved to 99% with 10L NRB. EKG revealed NSR@67BPM with incomplete LBBB, TWI Lead I, AVL (similar to prior EKG 10/2020). CXR prelim read consistent with alveolar edema, bilateral patchy opacities/consolidation.    Labs notable for: WBC 14.5 with left shift, H/H 9.8/30.6, D-dimer 491, BUN/Cr 56/3.54, , CRP 4.86, Ferritin 180, Procalcitonin 0.29, Lactate 2.4, Troponin T 0.05, BNP 64,671, Initial COVID PCR negative.    Patient treated with Lasix 80mg IV x 1 dose, SL NTG 0.4mg PO x 1 dose, Decadron 6mg IV x 1 dose, Ceftriaxone 1g IV x 1 dose and Azithromycin 500mg IV x 1 dose.  Patient now admitted to cardiac telemetry for management of acute on chronic diastolic CHF exacerbation in setting of suspected CAP.    **Of note: Patient had a fall ~3 weeks ago for which she was admitted to [facility], CT imaging was negative as per daughter and fall thought to be secondary to hypotension.   (15 Mark 2021 21:30)      PAST MEDICAL & SURGICAL HISTORY:  Essential hypertension  Hypertension    Type 2 diabetes mellitus  Diabetes    Hyperlipidemia  Hyperlipidemia    Disorder of kidney and ureter  Renal insufficiency    Peripheral vascular disease  PVD (peripheral vascular disease)    Anxiety state  Anxiety    Atherosclerosis of renal artery  with resulting stent placement    Atherosclerosis of renal artery  Renal artery stenosis        REVIEW OF SYSTEMS      General:	    Skin/Breast:  	  Ophthalmologic:  	  ENMT:	    Respiratory and Thorax:  	  Cardiovascular:	    Gastrointestinal:	    Genitourinary:	    Musculoskeletal:	    Neurological:	    Psychiatric:	    Hematology/Lymphatics:	    Endocrine:	    Allergic/Immunologic:	    MEDICATIONS  (STANDING):  acetylcysteine 10%  Inhalation 3 milliLiter(s) Inhalation once  albuterol/ipratropium for Nebulization 3 milliLiter(s) Nebulizer every 6 hours  amLODIPine   Tablet 10 milliGRAM(s) Oral daily  aspirin enteric coated 81 milliGRAM(s) Oral daily  atorvastatin 20 milliGRAM(s) Oral at bedtime  budesonide  80 MICROgram(s)/formoterol 4.5 MICROgram(s) Inhaler 2 Puff(s) Inhalation two times a day  dextrose 40% Gel 15 Gram(s) Oral once  dextrose 5%. 1000 milliLiter(s) (50 mL/Hr) IV Continuous <Continuous>  dextrose 5%. 1000 milliLiter(s) (100 mL/Hr) IV Continuous <Continuous>  dextrose 50% Injectable 25 Gram(s) IV Push once  dextrose 50% Injectable 12.5 Gram(s) IV Push once  dextrose 50% Injectable 25 Gram(s) IV Push once  diazepam    Tablet 1 milliGRAM(s) Oral daily  donepezil 5 milliGRAM(s) Oral at bedtime  fluticasone propionate 50 MICROgram(s)/spray Nasal Spray 1 Spray(s) Both Nostrils two times a day  glucagon  Injectable 1 milliGRAM(s) IntraMuscular once  heparin   Injectable 5000 Unit(s) SubCutaneous every 12 hours  influenza  Vaccine (HIGH DOSE) 0.7 milliLiter(s) IntraMuscular once  insulin glargine Injectable (LANTUS) 10 Unit(s) SubCutaneous at bedtime  insulin lispro (ADMELOG) corrective regimen sliding scale   SubCutaneous Before meals and at bedtime  insulin lispro Injectable (ADMELOG) 4 Unit(s) SubCutaneous three times a day with meals  levothyroxine 50 MICROGram(s) Oral daily  methylPREDNISolone sodium succinate Injectable 20 milliGRAM(s) IV Push every 8 hours  metoprolol tartrate 50 milliGRAM(s) Oral every 12 hours  piperacillin/tazobactam IVPB.. 2.25 Gram(s) IV Intermittent every 6 hours  senna 1 Tablet(s) Oral daily  sevelamer carbonate 1600 milliGRAM(s) Oral three times a day    MEDICATIONS  (PRN):  acetaminophen   Tablet .. 650 milliGRAM(s) Oral every 6 hours PRN Moderate Pain (4 - 6)  benzonatate 100 milliGRAM(s) Oral every 8 hours PRN Cough  guaiFENesin   Syrup  (Sugar-Free) 100 milliGRAM(s) Oral every 6 hours PRN Cough      Allergies    No Known Allergies    Intolerances        SOCIAL HISTORY:    FAMILY HISTORY:  Family history of ischemic heart disease  Family history of coronary artery disease.        Vital Signs Last 24 Hrs  T(C): 36.6 (23 Jan 2021 12:00), Max: 36.8 (22 Jan 2021 19:30)  T(F): 97.9 (23 Jan 2021 12:00), Max: 98.2 (22 Jan 2021 19:30)  HR: 64 (23 Jan 2021 12:00) (57 - 67)  BP: 142/70 (23 Jan 2021 12:00) (126/58 - 166/74)  BP(mean): 99 (23 Jan 2021 12:00) (84 - 109)  RR: 20 (23 Jan 2021 12:00) (15 - 37)  SpO2: 91% (23 Jan 2021 12:00) (85% - 100%)    PHYSICAL EXAM:      Constitutional:          LABS:                        8.6    8.34  )-----------( 236      ( 23 Jan 2021 05:15 )             26.6     01-23    126<L>  |  82<L>  |  87<H>  ----------------------------<  222<H>  4.3   |  22  |  4.82<H>    Ca    9.0      23 Jan 2021 11:40  Phos  7.5     01-23  Mg     1.9     01-23    TPro  6.1  /  Alb  2.7<L>  /  TBili  0.4  /  DBili  x   /  AST  45<H>  /  ALT  37  /  AlkPhos  184<H>  01-23    Procalcitonin, Serum (01.22.21 @ 06:52)    Procalcitonin, Serum: 1.84: Procalcitonin (PCT) Interpretation (ng/mL) - Diagnosis of systemic  bacterial infection/sepsis  PCT < 0.5: Systemic infection (sepsis) is not likely and risk for  progression to severe systemic infection is low. Local bacterial  infection is possible. If early sepsis is suspected clinically, PCT  should be re-assessed in 6-24 hours.  PCT >/= 0.5 but < 2.0: Systemic infection (sepsis) is possible, but other  conditions are known to elevate PCT as well. Moderate risk for  progression to severe systemic infection. The patient should be closely  monitored both clinically and by re-assessing PCT within 6-24 hours.  PCT >/= 2.0 but < 10.0: Systemic infection (sepsis) is likely, unless  other causes are known. High risk of progression to severe systemic  infection (severe sepsis/septic shock).  PCT >/= 10.0: Important systemic inflammatory response, almost  exclusively due to severe bacterial sepsis or septic shock. High  likelihood of severe sepsis or septic shock. ng/mL    COVID-19 PCR (01.21.21 @ 10:55)    COVID-19 PCR: NotDetec: You can help in the fight against COVID-19. [facility] may contact  you to see if you are interested in voluntarily participating in one of  our clinical trials.  Testing is performed using polymerase chain reaction (PCR) or  transcription mediated amplification (TMA). This COVID-19 (SARS-CoV-2)  nucleic acid amplification test was validated by [facility] and is in use under the FDA Emergency Use Authorization  (EUA) for clinical labs CLIA-certified to perform high complexity  testing. Test results should be correlated with clinical presentation,  patient history, and epidemiology.          RADIOLOGY & ADDITIONAL STUDIES:      < from: CT Chest No Cont (01.19.21 @ 14:31) >  EXAM:  CT CHEST                          PROCEDURE DATE:  01/19/2021          INTERPRETATION:  CT of the CHEST without intravenous contrast dated 1/19/2021 2:31 PM    INDICATION: Worsening Hypoxia    TECHNIQUE: CT of the chest was performed withoutintravenous contrast.  Intravenous contrast was not used Axial and coronal and sagittal images were produced and reviewed.    PRIOR STUDIES: CT chest 10/14/2020    FINDINGS: Cardiomegaly as before. Dense mitral valve annular calcification. Coronary artery calcifications. Status post CABG. The main pulmonary artery measures 3.1 cm diameter.  No pericardial effusion is seen.  Evaluation of the vasculature is limited without intravenous contrast, but the great vessels are grossly unremarkable.  Evaluation for adenopathy is limited without intravenous contrast. Within that limitation, mild mediastinal lymphadenopathy is seen. No axillary adenopathy. Abandoned epicardial pacer wire. Low-density of cardiac chambers relative to the myocardium suggesting anemia.    Small bilateral pleural effusions are seen. Evaluation of the pulmonary parenchyma demonstrates underlying interstitial fibrosis in the upper and mid zones with bilateral mosaic attenuation. There is now new bilateral groundglass opacities  in the upper and mid and lower zones with thickening of interlobular septa at the bilateral upper zones i.e. crazy paving  appearance.. Coalescence of findings with consolidation seen in the superior segment right lower lobe.    Limited evaluation of the upper abdomen demonstrates radiopaque cholelithiasis.    Evaluation of the osseous structures demonstrates degenerative changes.      IMPRESSION: New bilateral groundglass lung opacities superimposed on chronic interstitial pulmonary fibrosis probably due to chronic hypersensitivity pneumonitis or sarcoid. The groundglass lung opacities could  be due to pulmonary edema, pulmonary hemorrhage, alveolar proteinosis, organizing pneumonia or atypical infection.       HPI:  86 yr old F, POOR HISTORIAN/early dementia, former heavy smoker with PMHx HTN, hyperlipidemia, COPD, hypothyroidism, Stage IV CKD (baseline Cr ~3.0), anemia of chronic disease, CAD s/p CABG 2015 Dr. Blunt, chronic diastolic CHF(EF:55% by Echo 10/2020), renal artery stenosis s/p renal artery stent >20yrs ago, Carotid artery stenosis, PAD, who presents to [facility] ED 1/15/21 c/o progressively worsening SOB and cough x 3 days. Patient reports she can barely walk 10 feet prior to having to stop and rest. Patient further admits to chronic bilateral LE edema and significant orthopnea and she has been sleeping upright in a chair the past week. Patient also admits to chills and a nonproductive cough over the past few days. Patient was instructed to increase her Lasix 80mg PO daily dose to Lasix 80mg PO BID and start Metalazone 10mg PO BID prior to each dose by her cardiologist Dr. Horvath. Patient denies any chest pain, dizziness, palpitations, recent travel or sick contacts. Patient endorsing compliance w/ medications however daughter states that compliance with medication has been questionable over the last few months, as patient is becoming more forgetful.    In [facility] ED, BP: 170/68, HR: 80, RR:26, Temp: 98.4F, O2 sat: 80% on RA, improved to 99% with 10L NRB. EKG revealed NSR@67BPM with incomplete LBBB, TWI Lead I, AVL (similar to prior EKG 10/2020). CXR prelim read consistent with alveolar edema, bilateral patchy opacities/consolidation.    Labs notable for: WBC 14.5 with left shift, H/H 9.8/30.6, D-dimer 491, BUN/Cr 56/3.54, , CRP 4.86, Ferritin 180, Procalcitonin 0.29, Lactate 2.4, Troponin T 0.05, BNP 64,671, Initial COVID PCR negative.    Patient treated with Lasix 80mg IV x 1 dose, SL NTG 0.4mg PO x 1 dose, Decadron 6mg IV x 1 dose, Ceftriaxone 1g IV x 1 dose and Azithromycin 500mg IV x 1 dose.  Patient now admitted to cardiac telemetry for management of acute on chronic diastolic CHF exacerbation in setting of suspected CAP.    **Of note: Patient had a fall ~3 weeks ago for which she was admitted to [facility], CT imaging was negative as per daughter and fall thought to be secondary to hypotension    -----------------------------------    Patient now in CCU with electrolytes abnormalities and worsening renal function.  S/P Azithro and Ceftriaxone, now on Pip-Tazo but worsening procalcitonin  Also treated with steroids for hypersensitivity pneumonitis      PAST MEDICAL & SURGICAL HISTORY:  Essential hypertension  Hypertension    Type 2 diabetes mellitus  Diabetes    Hyperlipidemia  Hyperlipidemia    Disorder of kidney and ureter  Renal insufficiency    Peripheral vascular disease  PVD (peripheral vascular disease)    Anxiety state  Anxiety    Atherosclerosis of renal artery  with resulting stent placement    Atherosclerosis of renal artery  Renal artery stenosis        REVIEW OF SYSTEMS  Otherwise negative    MEDICATIONS  (STANDING):  acetylcysteine 10%  Inhalation 3 milliLiter(s) Inhalation once  albuterol/ipratropium for Nebulization 3 milliLiter(s) Nebulizer every 6 hours  amLODIPine   Tablet 10 milliGRAM(s) Oral daily  aspirin enteric coated 81 milliGRAM(s) Oral daily  atorvastatin 20 milliGRAM(s) Oral at bedtime  budesonide  80 MICROgram(s)/formoterol 4.5 MICROgram(s) Inhaler 2 Puff(s) Inhalation two times a day  dextrose 40% Gel 15 Gram(s) Oral once  dextrose 5%. 1000 milliLiter(s) (50 mL/Hr) IV Continuous <Continuous>  dextrose 5%. 1000 milliLiter(s) (100 mL/Hr) IV Continuous <Continuous>  dextrose 50% Injectable 25 Gram(s) IV Push once  dextrose 50% Injectable 12.5 Gram(s) IV Push once  dextrose 50% Injectable 25 Gram(s) IV Push once  diazepam    Tablet 1 milliGRAM(s) Oral daily  donepezil 5 milliGRAM(s) Oral at bedtime  fluticasone propionate 50 MICROgram(s)/spray Nasal Spray 1 Spray(s) Both Nostrils two times a day  glucagon  Injectable 1 milliGRAM(s) IntraMuscular once  heparin   Injectable 5000 Unit(s) SubCutaneous every 12 hours  influenza  Vaccine (HIGH DOSE) 0.7 milliLiter(s) IntraMuscular once  insulin glargine Injectable (LANTUS) 10 Unit(s) SubCutaneous at bedtime  insulin lispro (ADMELOG) corrective regimen sliding scale   SubCutaneous Before meals and at bedtime  insulin lispro Injectable (ADMELOG) 4 Unit(s) SubCutaneous three times a day with meals  levothyroxine 50 MICROGram(s) Oral daily  methylPREDNISolone sodium succinate Injectable 20 milliGRAM(s) IV Push every 8 hours  metoprolol tartrate 50 milliGRAM(s) Oral every 12 hours  piperacillin/tazobactam IVPB.. 2.25 Gram(s) IV Intermittent every 6 hours  senna 1 Tablet(s) Oral daily  sevelamer carbonate 1600 milliGRAM(s) Oral three times a day    MEDICATIONS  (PRN):  acetaminophen   Tablet .. 650 milliGRAM(s) Oral every 6 hours PRN Moderate Pain (4 - 6)  benzonatate 100 milliGRAM(s) Oral every 8 hours PRN Cough  guaiFENesin   Syrup  (Sugar-Free) 100 milliGRAM(s) Oral every 6 hours PRN Cough      Allergies    No Known Allergies        SOCIAL HISTORY:  Lives at home  Hx of smoking  Unclear whether pets at home    FAMILY HISTORY:  Family history of ischemic heart disease  Family history of coronary artery disease.        Vital Signs Last 24 Hrs  T(C): 36.6 (23 Jan 2021 12:00), Max: 36.8 (22 Jan 2021 19:30)  T(F): 97.9 (23 Jan 2021 12:00), Max: 98.2 (22 Jan 2021 19:30)  HR: 64 (23 Jan 2021 12:00) (57 - 67)  BP: 142/70 (23 Jan 2021 12:00) (126/58 - 166/74)  BP(mean): 99 (23 Jan 2021 12:00) (84 - 109)  RR: 20 (23 Jan 2021 12:00) (15 - 37)  SpO2: 91% (23 Jan 2021 12:00) (85% - 100%)  Awake and alert  Responding appropriately  NC O2 at 6 l  Neck supple  Oral mucosa dry  No rash or jaundice  RR  Chest with crackles at bases  Abd soft ND NT  LE no edema  Cason in place with clear yellow urine output        LABS:                        8.6    8.34  )-----------( 236      ( 23 Jan 2021 05:15 )             26.6     01-23    126<L>  |  82<L>  |  87<H>  ----------------------------<  222<H>  4.3   |  22  |  4.82<H>    Ca    9.0      23 Jan 2021 11:40  Phos  7.5     01-23  Mg     1.9     01-23    TPro  6.1  /  Alb  2.7<L>  /  TBili  0.4  /  DBili  x   /  AST  45<H>  /  ALT  37  /  AlkPhos  184<H>  01-23    Procalcitonin, Serum (01.22.21 @ 06:52)    Procalcitonin, Serum: 1.84: Procalcitonin (PCT) Interpretation (ng/mL) - Diagnosis of systemic  bacterial infection/sepsis  PCT < 0.5: Systemic infection (sepsis) is not likely and risk for  progression to severe systemic infection is low. Local bacterial  infection is possible. If early sepsis is suspected clinically, PCT  should be re-assessed in 6-24 hours.  PCT >/= 0.5 but < 2.0: Systemic infection (sepsis) is possible, but other  conditions are known to elevate PCT as well. Moderate risk for  progression to severe systemic infection. The patient should be closely  monitored both clinically and by re-assessing PCT within 6-24 hours.  PCT >/= 2.0 but < 10.0: Systemic infection (sepsis) is likely, unless  other causes are known. High risk of progression to severe systemic  infection (severe sepsis/septic shock).  PCT >/= 10.0: Important systemic inflammatory response, almost  exclusively due to severe bacterial sepsis or septic shock. High  likelihood of severe sepsis or septic shock. ng/mL    COVID-19 PCR (01.21.21 @ 10:55)    COVID-19 PCR: NotDetec: You can help in the fight against COVID-19. [facility] may contact  you to see if you are interested in voluntarily participating in one of  our clinical trials.  Testing is performed using polymerase chain reaction (PCR) or  transcription mediated amplification (TMA). This COVID-19 (SARS-CoV-2)  nucleic acid amplification test was validated by [facility]  [employer] and is in use under the FDA Emergency Use Authorization  (EUA) for clinical labs CLIA-certified to perform high complexity  testing. Test results should be correlated with clinical presentation,  patient history, and epidemiology.    -----------------------    Legionella pneumophila Antigen, Urine (01.23.21 @ 09:08)    Legionella Antigen, Urine: Negative: Negative Testing method: Immunochromatographic Assay. L. pneumpohila  serogroup 1 antigen in urine NOT detected, suggesting NO recent or  current infection. Infection due to Legionella cannot be ruled out: other  serogroups and species may cause disease, antigen may not be present in  urine in early infection, or the level of antigens in urine may be below  the detection limit of the test.  Order “Culture –Legionella” is  recommended for uncommon cases of suspected Legionella pneumonia due to  organisms other than L. pneumophila serogroup 1.  Please submit a first morning sputum specimen for Legionella culture.    Sedimentation Rate, Erythrocyte (01.22.21 @ 10:43)    Sedimentation Rate, Erythrocyte: 125 mm/Hr    C-Reactive Protein, Serum (01.22.21 @ 06:52)    C-Reactive Protein, Serum: 13.36 mg/dL          RADIOLOGY & ADDITIONAL STUDIES:      CT Chest No Cont (01.19.21 @ 14:31) >  EXAM:  CT CHEST                          PROCEDURE DATE:  01/19/2021          INTERPRETATION:  CT of the CHEST without intravenous contrast dated 1/19/2021 2:31 PM    INDICATION: Worsening Hypoxia    TECHNIQUE: CT of the chest was performed withoutintravenous contrast.  Intravenous contrast was not used Axial and coronal and sagittal images were produced and reviewed.    PRIOR STUDIES: CT chest 10/14/2020    FINDINGS: Cardiomegaly as before. Dense mitral valve annular calcification. Coronary artery calcifications. Status post CABG. The main pulmonary artery measures 3.1 cm diameter.  No pericardial effusion is seen.  Evaluation of the vasculature is limited without intravenous contrast, but the great vessels are grossly unremarkable.  Evaluation for adenopathy is limited without intravenous contrast. Within that limitation, mild mediastinal lymphadenopathy is seen. No axillary adenopathy. Abandoned epicardial pacer wire. Low-density of cardiac chambers relative to the myocardium suggesting anemia.    Small bilateral pleural effusions are seen. Evaluation of the pulmonary parenchyma demonstrates underlying interstitial fibrosis in the upper and mid zones with bilateral mosaic attenuation. There is now new bilateral groundglass opacities  in the upper and mid and lower zones with thickening of interlobular septa at the bilateral upper zones i.e. crazy paving  appearance.. Coalescence of findings with consolidation seen in the superior segment right lower lobe.    Limited evaluation of the upper abdomen demonstrates radiopaque cholelithiasis.    Evaluation of the osseous structures demonstrates degenerative changes.      IMPRESSION: New bilateral ground-glass lung opacities superimposed on chronic interstitial pulmonary fibrosis probably due to chronic hypersensitivity pneumonitis or sarcoid. The ground-glass lung opacities could  be due to pulmonary edema, pulmonary hemorrhage, alveolar proteinosis, organizing pneumonia or atypical infection.

## 2021-01-24 LAB
ALBUMIN SERPL ELPH-MCNC: 2.7 G/DL — LOW (ref 3.3–5)
ALP SERPL-CCNC: 125 U/L — HIGH (ref 40–120)
ALT FLD-CCNC: 38 U/L — SIGNIFICANT CHANGE UP (ref 10–45)
ANION GAP SERPL CALC-SCNC: 22 MMOL/L — HIGH (ref 5–17)
APTT BLD: 30.8 SEC — SIGNIFICANT CHANGE UP (ref 27.5–35.5)
AST SERPL-CCNC: 32 U/L — SIGNIFICANT CHANGE UP (ref 10–40)
BASOPHILS # BLD AUTO: 0.02 K/UL — SIGNIFICANT CHANGE UP (ref 0–0.2)
BASOPHILS NFR BLD AUTO: 0.2 % — SIGNIFICANT CHANGE UP (ref 0–2)
BILIRUB SERPL-MCNC: 0.5 MG/DL — SIGNIFICANT CHANGE UP (ref 0.2–1.2)
BLD GP AB SCN SERPL QL: NEGATIVE — SIGNIFICANT CHANGE UP
BUN SERPL-MCNC: 93 MG/DL — HIGH (ref 7–23)
CALCIUM SERPL-MCNC: 9 MG/DL — SIGNIFICANT CHANGE UP (ref 8.4–10.5)
CHLORIDE SERPL-SCNC: 83 MMOL/L — LOW (ref 96–108)
CO2 SERPL-SCNC: 20 MMOL/L — LOW (ref 22–31)
CREAT SERPL-MCNC: 5.04 MG/DL — HIGH (ref 0.5–1.3)
CRP SERPL-MCNC: 5.71 MG/DL — HIGH (ref 0–0.4)
EOSINOPHIL # BLD AUTO: 0 K/UL — SIGNIFICANT CHANGE UP (ref 0–0.5)
EOSINOPHIL NFR BLD AUTO: 0 % — SIGNIFICANT CHANGE UP (ref 0–6)
ERYTHROCYTE [SEDIMENTATION RATE] IN BLOOD: 92 MM/HR — HIGH
GLUCOSE BLDC GLUCOMTR-MCNC: 144 MG/DL — HIGH (ref 70–99)
GLUCOSE BLDC GLUCOMTR-MCNC: 162 MG/DL — HIGH (ref 70–99)
GLUCOSE BLDC GLUCOMTR-MCNC: 206 MG/DL — HIGH (ref 70–99)
GLUCOSE BLDC GLUCOMTR-MCNC: 233 MG/DL — HIGH (ref 70–99)
GLUCOSE SERPL-MCNC: 120 MG/DL — HIGH (ref 70–99)
HCT VFR BLD CALC: 26.7 % — LOW (ref 34.5–45)
HGB BLD-MCNC: 9 G/DL — LOW (ref 11.5–15.5)
IMM GRANULOCYTES NFR BLD AUTO: 1.2 % — SIGNIFICANT CHANGE UP (ref 0–1.5)
INR BLD: 1.23 — HIGH (ref 0.88–1.16)
LYMPHOCYTES # BLD AUTO: 0.55 K/UL — LOW (ref 1–3.3)
LYMPHOCYTES # BLD AUTO: 4.3 % — LOW (ref 13–44)
MAGNESIUM SERPL-MCNC: 1.7 MG/DL — SIGNIFICANT CHANGE UP (ref 1.6–2.6)
MCHC RBC-ENTMCNC: 27.4 PG — SIGNIFICANT CHANGE UP (ref 27–34)
MCHC RBC-ENTMCNC: 33.7 GM/DL — SIGNIFICANT CHANGE UP (ref 32–36)
MCV RBC AUTO: 81.2 FL — SIGNIFICANT CHANGE UP (ref 80–100)
MONOCYTES # BLD AUTO: 0.35 K/UL — SIGNIFICANT CHANGE UP (ref 0–0.9)
MONOCYTES NFR BLD AUTO: 2.7 % — SIGNIFICANT CHANGE UP (ref 2–14)
NEUTROPHILS # BLD AUTO: 11.74 K/UL — HIGH (ref 1.8–7.4)
NEUTROPHILS NFR BLD AUTO: 91.6 % — HIGH (ref 43–77)
NRBC # BLD: 0 /100 WBCS — SIGNIFICANT CHANGE UP (ref 0–0)
OSMOLALITY SERPL: 292 MOSM/KG — SIGNIFICANT CHANGE UP (ref 280–301)
PHOSPHATE SERPL-MCNC: 7.4 MG/DL — HIGH (ref 2.5–4.5)
PLATELET # BLD AUTO: 311 K/UL — SIGNIFICANT CHANGE UP (ref 150–400)
POTASSIUM SERPL-MCNC: 4.4 MMOL/L — SIGNIFICANT CHANGE UP (ref 3.5–5.3)
POTASSIUM SERPL-SCNC: 4.4 MMOL/L — SIGNIFICANT CHANGE UP (ref 3.5–5.3)
PROCALCITONIN SERPL-MCNC: 1.08 NG/ML — HIGH (ref 0.02–0.1)
PROT SERPL-MCNC: 5.9 G/DL — LOW (ref 6–8.3)
PROTHROM AB SERPL-ACNC: 14.6 SEC — HIGH (ref 10.6–13.6)
RAPID RVP RESULT: SIGNIFICANT CHANGE UP
RBC # BLD: 3.29 M/UL — LOW (ref 3.8–5.2)
RBC # FLD: 14.6 % — HIGH (ref 10.3–14.5)
RH IG SCN BLD-IMP: POSITIVE — SIGNIFICANT CHANGE UP
SARS-COV-2 RNA SPEC QL NAA+PROBE: SIGNIFICANT CHANGE UP
SODIUM SERPL-SCNC: 125 MMOL/L — LOW (ref 135–145)
WBC # BLD: 12.81 K/UL — HIGH (ref 3.8–10.5)
WBC # FLD AUTO: 12.81 K/UL — HIGH (ref 3.8–10.5)

## 2021-01-24 PROCEDURE — 71045 X-RAY EXAM CHEST 1 VIEW: CPT | Mod: 26

## 2021-01-24 RX ORDER — ZALEPLON 10 MG
5 CAPSULE ORAL AT BEDTIME
Refills: 0 | Status: DISCONTINUED | OUTPATIENT
Start: 2021-01-24 | End: 2021-01-25

## 2021-01-24 RX ORDER — FUROSEMIDE 40 MG
100 TABLET ORAL ONCE
Refills: 0 | Status: DISCONTINUED | OUTPATIENT
Start: 2021-01-24 | End: 2021-01-24

## 2021-01-24 RX ORDER — FUROSEMIDE 40 MG
80 TABLET ORAL ONCE
Refills: 0 | Status: DISCONTINUED | OUTPATIENT
Start: 2021-01-24 | End: 2021-01-24

## 2021-01-24 RX ORDER — FUROSEMIDE 40 MG
100 TABLET ORAL
Refills: 0 | Status: DISCONTINUED | OUTPATIENT
Start: 2021-01-24 | End: 2021-01-24

## 2021-01-24 RX ORDER — INSULIN GLARGINE 100 [IU]/ML
5 INJECTION, SOLUTION SUBCUTANEOUS AT BEDTIME
Refills: 0 | Status: DISCONTINUED | OUTPATIENT
Start: 2021-01-24 | End: 2021-01-26

## 2021-01-24 RX ORDER — LANOLIN ALCOHOL/MO/W.PET/CERES
3 CREAM (GRAM) TOPICAL ONCE
Refills: 0 | Status: COMPLETED | OUTPATIENT
Start: 2021-01-24 | End: 2021-01-24

## 2021-01-24 RX ORDER — FUROSEMIDE 40 MG
100 TABLET ORAL ONCE
Refills: 0 | Status: COMPLETED | OUTPATIENT
Start: 2021-01-24 | End: 2021-01-24

## 2021-01-24 RX ADMIN — SEVELAMER CARBONATE 1600 MILLIGRAM(S): 2400 POWDER, FOR SUSPENSION ORAL at 06:37

## 2021-01-24 RX ADMIN — BUDESONIDE AND FORMOTEROL FUMARATE DIHYDRATE 2 PUFF(S): 160; 4.5 AEROSOL RESPIRATORY (INHALATION) at 08:49

## 2021-01-24 RX ADMIN — Medication 1 SPRAY(S): at 17:06

## 2021-01-24 RX ADMIN — SEVELAMER CARBONATE 1600 MILLIGRAM(S): 2400 POWDER, FOR SUSPENSION ORAL at 15:01

## 2021-01-24 RX ADMIN — Medication 3 MILLIGRAM(S): at 00:59

## 2021-01-24 RX ADMIN — Medication 4: at 17:07

## 2021-01-24 RX ADMIN — HEPARIN SODIUM 5000 UNIT(S): 5000 INJECTION INTRAVENOUS; SUBCUTANEOUS at 06:37

## 2021-01-24 RX ADMIN — Medication 3 MILLILITER(S): at 08:49

## 2021-01-24 RX ADMIN — INSULIN GLARGINE 5 UNIT(S): 100 INJECTION, SOLUTION SUBCUTANEOUS at 21:27

## 2021-01-24 RX ADMIN — ATORVASTATIN CALCIUM 20 MILLIGRAM(S): 80 TABLET, FILM COATED ORAL at 21:26

## 2021-01-24 RX ADMIN — Medication 1 SPRAY(S): at 06:23

## 2021-01-24 RX ADMIN — CEFEPIME 1000 MILLIGRAM(S): 1 INJECTION, POWDER, FOR SOLUTION INTRAMUSCULAR; INTRAVENOUS at 21:26

## 2021-01-24 RX ADMIN — Medication 20 MILLIGRAM(S): at 15:01

## 2021-01-24 RX ADMIN — Medication 5 MILLIGRAM(S): at 21:26

## 2021-01-24 RX ADMIN — Medication 3 MILLILITER(S): at 21:26

## 2021-01-24 RX ADMIN — Medication 50 MICROGRAM(S): at 06:37

## 2021-01-24 RX ADMIN — Medication 50 MILLIGRAM(S): at 06:37

## 2021-01-24 RX ADMIN — Medication 3 MILLILITER(S): at 06:36

## 2021-01-24 RX ADMIN — SENNA PLUS 1 TABLET(S): 8.6 TABLET ORAL at 11:14

## 2021-01-24 RX ADMIN — Medication 81 MILLIGRAM(S): at 11:14

## 2021-01-24 RX ADMIN — BUDESONIDE AND FORMOTEROL FUMARATE DIHYDRATE 2 PUFF(S): 160; 4.5 AEROSOL RESPIRATORY (INHALATION) at 21:27

## 2021-01-24 RX ADMIN — DONEPEZIL HYDROCHLORIDE 5 MILLIGRAM(S): 10 TABLET, FILM COATED ORAL at 21:26

## 2021-01-24 RX ADMIN — Medication 50 MILLIGRAM(S): at 17:08

## 2021-01-24 RX ADMIN — Medication 5 UNIT(S): at 11:37

## 2021-01-24 RX ADMIN — Medication 3 MILLILITER(S): at 15:01

## 2021-01-24 RX ADMIN — Medication 5 UNIT(S): at 17:07

## 2021-01-24 RX ADMIN — Medication 20 MILLIGRAM(S): at 06:37

## 2021-01-24 RX ADMIN — SEVELAMER CARBONATE 1600 MILLIGRAM(S): 2400 POWDER, FOR SUSPENSION ORAL at 21:26

## 2021-01-24 RX ADMIN — AMLODIPINE BESYLATE 10 MILLIGRAM(S): 2.5 TABLET ORAL at 06:37

## 2021-01-24 RX ADMIN — Medication 2: at 21:35

## 2021-01-24 RX ADMIN — Medication 1 MILLIGRAM(S): at 11:37

## 2021-01-24 RX ADMIN — Medication 4: at 11:37

## 2021-01-24 RX ADMIN — HEPARIN SODIUM 5000 UNIT(S): 5000 INJECTION INTRAVENOUS; SUBCUTANEOUS at 17:07

## 2021-01-24 RX ADMIN — Medication 120 MILLIGRAM(S): at 11:14

## 2021-01-24 RX ADMIN — Medication 20 MILLIGRAM(S): at 21:25

## 2021-01-24 RX ADMIN — Medication 5 UNIT(S): at 09:00

## 2021-01-24 NOTE — PROGRESS NOTE ADULT - PROBLEM SELECTOR PLAN 9
- Dx 3wks ago @ [facility].   - CTH 1/19 (-) for acute changes.   - Per neurosurgery, cont ASA 81 PO QD; no neuro interventions at this time.       #DEMENTIA  - CONT: Donepezil 5mg PO QD      ## ANXIETY  - CONT: Valium 1mg QHS.

## 2021-01-24 NOTE — PROGRESS NOTE ADULT - PROBLEM SELECTOR PLAN 10
- CONT: Levothyroxine 50mg PO QD.     #DM  - Hx of DM. A1c 5.1% on admission.    - CONT: lantus 10 qhs, lispro 5u premeal, ISS  - Endocrine following        F: none   E: renal patient   N: dysphagia 2 mechanical soft-thin liquids  DVT ppx: Hep SubQ    Dispo: cardiac telemetry - CONT: Levothyroxine 50mg PO QD.     #DM  - Hx of DM. A1c 5.1% on admission.    - CONT: lantus 10 qhs, lispro 5u premeal, ISS  - Endocrine following        F: none   E: renal patient   N: dysphagia 2 mechanical soft-thin liquids, not eating a lot and poor appetite. Encourage PO fluids.   DVT ppx: Hep SubQ    Dispo: cardiac telemetry

## 2021-01-24 NOTE — PROGRESS NOTE ADULT - SUBJECTIVE AND OBJECTIVE BOX
CARDIOLOGY NP PROGRESS NOTE    Subjective: Patient seen and examined by me. Denies complaint but looks in mild distress. Remainder ROS otherwise negative.    Overnight Events: no acute events overnight. Remains on 6L NC with sats low 90's.     Today: sat low 80's on 6L on AM exam with JVD to chin. Warm throughout, making urine. Placed on NRB in interim, lasix 100mg IV ordered stat. Neb given. Dr. Calderon at bedside at the same time. Patient will likely require dialysis in the imminent future. Decision to start lasix 100mg IV BID. Patient remained in low 80's on 6L so transitioned to HF NC with sats 90-91%. Total net NEG 3.1L; (+) ~500cc this morning.     TELEMETRY: SR 60's, PACs, PVCs, occasional bigeminy.     EKG: sinus kamryn 56bpm, biphasic p waves.     VITAL SIGNS:  T(C): 35.9 (01-24-21 @ 10:00), Max: 36.3 (01-23-21 @ 20:35)  HR: 69 (01-24-21 @ 11:44) (57 - 69)  BP: 138/88 (01-24-21 @ 11:44) (120/57 - 162/77)  RR: 20 (01-24-21 @ 11:44) (16 - 24)  SpO2: 97% (01-24-21 @ 11:44) (91% - 99%)  Wt(kg): 49.9<<49.7<<52.9    I&O's Summary    23 Jan 2021 07:01  -  24 Jan 2021 07:00  --------------------------------------------------------  IN: 1000 mL / OUT: 294 mL / NET: 706 mL    24 Jan 2021 07:01  -  24 Jan 2021 12:54  --------------------------------------------------------  IN: 0 mL / OUT: 200 mL / NET: -200 mL    PHYSICAL EXAM:  Awake, responds to question with short answer, in NAD  AXOX3, follows simple commands, appropriate  Neck supple, JVD to chin (previous notes show exam to have no JVD).  PEERL, nasal/buccal mucosa moist and well perfused  BS diminished bilaterally with crackles, unlabored, symmetrical, low 80's on 6L NC  S1/S2, no S3, RRR, no M/G/R noted  Abdomen soft, non tender, non distended  No edema noted, perfusion brisk  Pulses palpable throughout  Skin warm and dry, no rashes/lesions noted        LABS:                          9.0    12.81 )-----------( 311      ( 24 Jan 2021 06:34 )             26.7                              01-24    125<L>  |  83<L>  |  93<H>  ----------------------------<  120<H>  4.4   |  20<L>  |  5.04<H>    Ca    9.0      24 Jan 2021 06:34  Phos  7.4     01-24  Mg     1.7     01-24    TPro  5.9<L>  /  Alb  2.7<L>  /  TBili  0.5  /  DBili  x   /  AST  32  /  ALT  38  /  AlkPhos  125<H>  01-24    LIVER FUNCTIONS - ( 24 Jan 2021 06:34 )  Alb: 2.7 g/dL / Pro: 5.9 g/dL / ALK PHOS: 125 U/L / ALT: 38 U/L / AST: 32 U/L / GGT: x         PT/INR - ( 24 Jan 2021 06:34 )   PT: 14.6 sec;   INR: 1.23       PTT - ( 24 Jan 2021 06:34 )  PTT:30.8 sec  CAPILLARY BLOOD GLUCOSE    Allergies:  No Known Allergies    MEDICATIONS  (STANDING):  albuterol/ipratropium for Nebulization 3 milliLiter(s) Nebulizer every 6 hours  amLODIPine   Tablet 10 milliGRAM(s) Oral daily  aspirin enteric coated 81 milliGRAM(s) Oral daily  atorvastatin 20 milliGRAM(s) Oral at bedtime  budesonide  80 MICROgram(s)/formoterol 4.5 MICROgram(s) Inhaler 2 Puff(s) Inhalation two times a day  cefepime  Injectable. 1000 milliGRAM(s) IV Push every 24 hours  dextrose 40% Gel 15 Gram(s) Oral once  dextrose 5%. 1000 milliLiter(s) (50 mL/Hr) IV Continuous <Continuous>  dextrose 5%. 1000 milliLiter(s) (100 mL/Hr) IV Continuous <Continuous>  dextrose 50% Injectable 25 Gram(s) IV Push once  dextrose 50% Injectable 12.5 Gram(s) IV Push once  dextrose 50% Injectable 25 Gram(s) IV Push once  diazepam    Tablet 1 milliGRAM(s) Oral daily  donepezil 5 milliGRAM(s) Oral at bedtime  fluticasone propionate 50 MICROgram(s)/spray Nasal Spray 1 Spray(s) Both Nostrils two times a day  furosemide   IVPB 100 milliGRAM(s) IV Intermittent once  glucagon  Injectable 1 milliGRAM(s) IntraMuscular once  heparin   Injectable 5000 Unit(s) SubCutaneous every 12 hours  influenza  Vaccine (HIGH DOSE) 0.7 milliLiter(s) IntraMuscular once  insulin glargine Injectable (LANTUS) 10 Unit(s) SubCutaneous at bedtime  insulin lispro (ADMELOG) corrective regimen sliding scale   SubCutaneous Before meals and at bedtime  insulin lispro Injectable (ADMELOG) 5 Unit(s) SubCutaneous three times a day before meals  levothyroxine 50 MICROGram(s) Oral daily  methylPREDNISolone sodium succinate Injectable 20 milliGRAM(s) IV Push every 8 hours  metoprolol tartrate 50 milliGRAM(s) Oral every 12 hours  senna 1 Tablet(s) Oral daily  sevelamer carbonate 1600 milliGRAM(s) Oral three times a day  sodium chloride 2% . 1000 milliLiter(s) (20 mL/Hr) IV Continuous <Continuous>    MEDICATIONS  (PRN):  acetaminophen   Tablet .. 650 milliGRAM(s) Oral every 6 hours PRN Moderate Pain (4 - 6)  guaiFENesin   Syrup  (Sugar-Free) 100 milliGRAM(s) Oral every 6 hours PRN Cough        DIAGNOSTIC TESTS:        CARDIOLOGY NP PROGRESS NOTE    Subjective: Patient seen and examined by me. Denies complaint but looks in mild distress. Remainder ROS otherwise negative.    Overnight Events: no acute events overnight. Remains on 6L NC with sats low 90's.     Today: sat low 80's on 6L on AM exam with JVD to chin. Warm throughout, making urine, mild bibasilar crackles. Placed on NRB in interim, lasix 100mg IV ordered stat. Neb given. Dr. Calderon at bedside at the same time. Patient will likely require dialysis. Patient remained in low 80's on 6L so transitioned to HF NC with sats 90-91%. Total net NEG 3.1L; (+) ~500cc this morning.     TELEMETRY: SR 60's, PACs, PVCs, occasional bigeminy.     EKG: sinus kamryn 56bpm, biphasic p waves.     VITAL SIGNS:  T(C): 35.9 (01-24-21 @ 10:00), Max: 36.3 (01-23-21 @ 20:35)  HR: 69 (01-24-21 @ 11:44) (57 - 69)  BP: 138/88 (01-24-21 @ 11:44) (120/57 - 162/77)  RR: 20 (01-24-21 @ 11:44) (16 - 24)  SpO2: 97% (01-24-21 @ 11:44) (91% - 99%)  Wt(kg): 49.9<<49.7<<52.9    I&O's Summary    23 Jan 2021 07:01  -  24 Jan 2021 07:00  --------------------------------------------------------  IN: 1000 mL / OUT: 294 mL / NET: 706 mL    24 Jan 2021 07:01  -  24 Jan 2021 12:54  --------------------------------------------------------  IN: 0 mL / OUT: 200 mL / NET: -200 mL    PHYSICAL EXAM:  Awake, responds to question with short answer, in NAD  AXOX3, follows simple commands, appropriate  Neck supple, JVD to chin (previous notes show exam to have no JVD).  PEERL, nasal/buccal mucosa moist and well perfused  BS diminished bilaterally with crackles, unlabored, symmetrical, low 80's on 6L NC  S1/S2, no S3, RRR, no M/G/R noted  Abdomen soft, non tender, non distended  No edema noted, perfusion brisk  Pulses palpable throughout  Skin warm and dry, no rashes/lesions noted        LABS:                          9.0    12.81 )-----------( 311      ( 24 Jan 2021 06:34 )             26.7                              01-24    125<L>  |  83<L>  |  93<H>  ----------------------------<  120<H>  4.4   |  20<L>  |  5.04<H>    Ca    9.0      24 Jan 2021 06:34  Phos  7.4     01-24  Mg     1.7     01-24    TPro  5.9<L>  /  Alb  2.7<L>  /  TBili  0.5  /  DBili  x   /  AST  32  /  ALT  38  /  AlkPhos  125<H>  01-24    LIVER FUNCTIONS - ( 24 Jan 2021 06:34 )  Alb: 2.7 g/dL / Pro: 5.9 g/dL / ALK PHOS: 125 U/L / ALT: 38 U/L / AST: 32 U/L / GGT: x         PT/INR - ( 24 Jan 2021 06:34 )   PT: 14.6 sec;   INR: 1.23       PTT - ( 24 Jan 2021 06:34 )  PTT:30.8 sec  CAPILLARY BLOOD GLUCOSE    Allergies:  No Known Allergies    MEDICATIONS  (STANDING):  albuterol/ipratropium for Nebulization 3 milliLiter(s) Nebulizer every 6 hours  amLODIPine   Tablet 10 milliGRAM(s) Oral daily  aspirin enteric coated 81 milliGRAM(s) Oral daily  atorvastatin 20 milliGRAM(s) Oral at bedtime  budesonide  80 MICROgram(s)/formoterol 4.5 MICROgram(s) Inhaler 2 Puff(s) Inhalation two times a day  cefepime  Injectable. 1000 milliGRAM(s) IV Push every 24 hours  dextrose 40% Gel 15 Gram(s) Oral once  dextrose 5%. 1000 milliLiter(s) (50 mL/Hr) IV Continuous <Continuous>  dextrose 5%. 1000 milliLiter(s) (100 mL/Hr) IV Continuous <Continuous>  dextrose 50% Injectable 25 Gram(s) IV Push once  dextrose 50% Injectable 12.5 Gram(s) IV Push once  dextrose 50% Injectable 25 Gram(s) IV Push once  diazepam    Tablet 1 milliGRAM(s) Oral daily  donepezil 5 milliGRAM(s) Oral at bedtime  fluticasone propionate 50 MICROgram(s)/spray Nasal Spray 1 Spray(s) Both Nostrils two times a day  furosemide   IVPB 100 milliGRAM(s) IV Intermittent once  glucagon  Injectable 1 milliGRAM(s) IntraMuscular once  heparin   Injectable 5000 Unit(s) SubCutaneous every 12 hours  influenza  Vaccine (HIGH DOSE) 0.7 milliLiter(s) IntraMuscular once  insulin glargine Injectable (LANTUS) 10 Unit(s) SubCutaneous at bedtime  insulin lispro (ADMELOG) corrective regimen sliding scale   SubCutaneous Before meals and at bedtime  insulin lispro Injectable (ADMELOG) 5 Unit(s) SubCutaneous three times a day before meals  levothyroxine 50 MICROGram(s) Oral daily  methylPREDNISolone sodium succinate Injectable 20 milliGRAM(s) IV Push every 8 hours  metoprolol tartrate 50 milliGRAM(s) Oral every 12 hours  senna 1 Tablet(s) Oral daily  sevelamer carbonate 1600 milliGRAM(s) Oral three times a day  sodium chloride 2% . 1000 milliLiter(s) (20 mL/Hr) IV Continuous <Continuous>    MEDICATIONS  (PRN):  acetaminophen   Tablet .. 650 milliGRAM(s) Oral every 6 hours PRN Moderate Pain (4 - 6)  guaiFENesin   Syrup  (Sugar-Free) 100 milliGRAM(s) Oral every 6 hours PRN Cough        DIAGNOSTIC TESTS:

## 2021-01-24 NOTE — PROGRESS NOTE ADULT - ASSESSMENT
86F poor historian/early dementia and former heavy smoker with PMHx DM, HTN, hyperlipidemia, COPD, hypothyroidism, CKD (baseline Cr ~3.0), anemia of chronic disease, CAD s/p CABG 2015, chronic diastolic CHF (EF 55% via echo 10/2020), renal artery stenosis (s/p stent 20+ years ag), carotid artery stenosis, PAD, who presents c/o progressively worsening SOB and cough. Admitted for acute on chronic diastolic HF, found to have worsening renal function and poor urine output. Stepped up to CCU for strict fluid status monitoring and potential HD and stepped down to 5L 1/23/21. Continuing ID recommendations for infectious work up. Continue w/ Cefepime (started 1/23/21). Patients multiple comorbidities continue to progress, requiring more oxygen and addition of routine IV diuretics. Condition guarded.

## 2021-01-24 NOTE — PROGRESS NOTE ADULT - SUBJECTIVE AND OBJECTIVE BOX
worsening renal function, oliguric   stable hyponatremia  has no complaints, comfortable on supplemental O2        Meds:  acetaminophen   Tablet .. 650 every 6 hours PRN  albuterol/ipratropium for Nebulization 3 every 6 hours  amLODIPine   Tablet 10 daily  aspirin enteric coated 81 daily  atorvastatin 20 at bedtime  budesonide  80 MICROgram(s)/formoterol 4.5 MICROgram(s) Inhaler 2 two times a day  cefepime  Injectable. 1000 every 24 hours  dextrose 40% Gel 15 once  dextrose 5%. 1000 <Continuous>  dextrose 5%. 1000 <Continuous>  dextrose 50% Injectable 25 once  dextrose 50% Injectable 12.5 once  dextrose 50% Injectable 25 once  diazepam    Tablet 1 daily  donepezil 5 at bedtime  fluticasone propionate 50 MICROgram(s)/spray Nasal Spray 1 two times a day  furosemide   IVPB 100 once  glucagon  Injectable 1 once  guaiFENesin   Syrup  (Sugar-Free) 100 every 6 hours PRN  heparin   Injectable 5000 every 12 hours  influenza  Vaccine (HIGH DOSE) 0.7 once  insulin glargine Injectable (LANTUS) 10 at bedtime  insulin lispro (ADMELOG) corrective regimen sliding scale  Before meals and at bedtime  insulin lispro Injectable (ADMELOG) 5 three times a day before meals  levothyroxine 50 daily  methylPREDNISolone sodium succinate Injectable 20 every 8 hours  metoprolol tartrate 50 every 12 hours  senna 1 daily  sevelamer carbonate 1600 three times a day  sodium chloride 2% . 1000 <Continuous>      T(C): , Max: 36.3 (01-23-21 @ 20:35)  T(F): , Max: 97.4 (01-23-21 @ 20:35)  HR: 69 (01-24-21 @ 11:44)  BP: 138/88 (01-24-21 @ 11:44)  BP(mean): 108 (01-24-21 @ 11:44)  RR: 20 (01-24-21 @ 11:44)  SpO2: 97% (01-24-21 @ 11:44)  Wt(kg): --    01-23 @ 07:01  -  01-24 @ 07:00  --------------------------------------------------------  IN: 1000 mL / OUT: 294 mL / NET: 706 mL    01-24 @ 07:01  -  01-24 @ 13:50  --------------------------------------------------------  IN: 0 mL / OUT: 200 mL / NET: -200 mL      PHYSICAL EXAM:  GENERAL: alert, NAD  CHEST/LUNG: Coarse breath sounds bilaterally, reduced at bases  HEART: normal S1S2, RRR  ABDOMEN: Soft, Nontender, non distended  EXTREMITIES: +edema       LABS:                        9.0    12.81 )-----------( 311      ( 24 Jan 2021 06:34 )             26.7     01-24    125<L>  |  83<L>  |  93<H>  ----------------------------<  120<H>  4.4   |  20<L>  |  5.04<H>    Ca    9.0      24 Jan 2021 06:34  Phos  7.4     01-24  Mg     1.7     01-24    TPro  5.9<L>  /  Alb  2.7<L>  /  TBili  0.5  /  DBili  x   /  AST  32  /  ALT  38  /  AlkPhos  125<H>  01-24    Osmolality, Serum: 292 mosm/kg [280 - 301] (01-24 @ 06:34)    PT/INR - ( 24 Jan 2021 06:34 )   PT: 14.6 sec;   INR: 1.23          PTT - ( 24 Jan 2021 06:34 )  PTT:30.8 sec    Osmolality, Random Urine: 255 mosm/kg (01-23 @ 20:44)  Sodium, Random Urine: 50 mmol/L (01-23 @ 20:44)  Sodium, Random Urine: 72 mmol/L (01-23 @ 09:08)  Osmolality, Random Urine: 254 mosm/kg (01-23 @ 09:08)  Creatinine, Random Urine: 18 mg/dL (01-23 @ 09:08)        RADIOLOGY & ADDITIONAL STUDIES:    reviewed        worsening renal function, oliguric   stable hyponatremia  denies sob, comfortable on supplemental O2  admits to lack of appetite, not eating and drinking  does not feel comfortable at the same position         Meds:  acetaminophen   Tablet .. 650 every 6 hours PRN  albuterol/ipratropium for Nebulization 3 every 6 hours  amLODIPine   Tablet 10 daily  aspirin enteric coated 81 daily  atorvastatin 20 at bedtime  budesonide  80 MICROgram(s)/formoterol 4.5 MICROgram(s) Inhaler 2 two times a day  cefepime  Injectable. 1000 every 24 hours  dextrose 40% Gel 15 once  dextrose 5%. 1000 <Continuous>  dextrose 5%. 1000 <Continuous>  dextrose 50% Injectable 25 once  dextrose 50% Injectable 12.5 once  dextrose 50% Injectable 25 once  diazepam    Tablet 1 daily  donepezil 5 at bedtime  fluticasone propionate 50 MICROgram(s)/spray Nasal Spray 1 two times a day  furosemide   IVPB 100 once  glucagon  Injectable 1 once  guaiFENesin   Syrup  (Sugar-Free) 100 every 6 hours PRN  heparin   Injectable 5000 every 12 hours  influenza  Vaccine (HIGH DOSE) 0.7 once  insulin glargine Injectable (LANTUS) 10 at bedtime  insulin lispro (ADMELOG) corrective regimen sliding scale  Before meals and at bedtime  insulin lispro Injectable (ADMELOG) 5 three times a day before meals  levothyroxine 50 daily  methylPREDNISolone sodium succinate Injectable 20 every 8 hours  metoprolol tartrate 50 every 12 hours  senna 1 daily  sevelamer carbonate 1600 three times a day  sodium chloride 2% . 1000 <Continuous>      T(C): , Max: 36.3 (01-23-21 @ 20:35)  T(F): , Max: 97.4 (01-23-21 @ 20:35)  HR: 69 (01-24-21 @ 11:44)  BP: 138/88 (01-24-21 @ 11:44)  BP(mean): 108 (01-24-21 @ 11:44)  RR: 20 (01-24-21 @ 11:44)  SpO2: 97% (01-24-21 @ 11:44)  Wt(kg): --    01-23 @ 07:01  -  01-24 @ 07:00  --------------------------------------------------------  IN: 1000 mL / OUT: 294 mL / NET: 706 mL    01-24 @ 07:01  -  01-24 @ 13:50  --------------------------------------------------------  IN: 0 mL / OUT: 200 mL / NET: -200 mL      PHYSICAL EXAM:  GENERAL: alert, NAD  CHEST/LUNG: Coarse breath sounds bilaterally, reduced at bases  HEART: normal S1S2, RRR  ABDOMEN: Soft, Nontender, non distended  EXTREMITIES: trace edema       LABS:                        9.0    12.81 )-----------( 311      ( 24 Jan 2021 06:34 )             26.7     01-24    125<L>  |  83<L>  |  93<H>  ----------------------------<  120<H>  4.4   |  20<L>  |  5.04<H>    Ca    9.0      24 Jan 2021 06:34  Phos  7.4     01-24  Mg     1.7     01-24    TPro  5.9<L>  /  Alb  2.7<L>  /  TBili  0.5  /  DBili  x   /  AST  32  /  ALT  38  /  AlkPhos  125<H>  01-24    Osmolality, Serum: 292 mosm/kg [280 - 301] (01-24 @ 06:34)    PT/INR - ( 24 Jan 2021 06:34 )   PT: 14.6 sec;   INR: 1.23          PTT - ( 24 Jan 2021 06:34 )  PTT:30.8 sec    Osmolality, Random Urine: 255 mosm/kg (01-23 @ 20:44)  Sodium, Random Urine: 50 mmol/L (01-23 @ 20:44)  Sodium, Random Urine: 72 mmol/L (01-23 @ 09:08)  Osmolality, Random Urine: 254 mosm/kg (01-23 @ 09:08)  Creatinine, Random Urine: 18 mg/dL (01-23 @ 09:08)        RADIOLOGY & ADDITIONAL STUDIES:    reviewed

## 2021-01-24 NOTE — PROGRESS NOTE ADULT - SUBJECTIVE AND OBJECTIVE BOX
PUD CCM    INTERVAL HPI/OVERNIGHT EVENTS:    Failing kidneys, eGFR at 7 ml/min.  Saturation 98% on 67% high flow with 40 L.  Decreased to 44% FiO2.  Lungs are well aerated and she has less rales and crackles  Steroids at 20 mg q8 due to hyperglycemia.  Hyperglycemia treated with insulin.  Poor appetite due to kidney failure and improving thrush.  Will probably need hemodialysis.  Remains oliguric.  Discussed at length.    All new data reviewed, including VS, lab, imaging, Rx and documentation.    PAST MEDICAL & SURGICAL HISTORY:  Essential hypertension  Hypertension    Type 2 diabetes mellitus  Diabetes    Hyperlipidemia  Hyperlipidemia    Disorder of kidney and ureter  Renal insufficiency    Peripheral vascular disease  PVD (peripheral vascular disease)    Anxiety state  Anxiety    Atherosclerosis of renal artery  with resulting stent placement    Atherosclerosis of renal artery  Renal artery stenosis    FAMILY HISTORY:  Family history of ischemic heart disease  Family history of coronary artery disease.    SOCIAL HISTORY:    REVIEW OF SYSTEMS:  Constitutional: (+) weight change, (-) fever,  () chills, (+) fatigue, (-) night sweats  Eyes: (-) discharge, () eye pain, () visual change  ENT:  (-) hearing difficulty, () vertigo, () sinus pain,  () throat pain, () epistaxis, () dysphagia, () hoarseness  Neck: (-) pain, () stiffness, () swelling  Respiratory: (+) cough, (-) wheezing, () hemoptysis      Cardiovascular: (-) chest pain, ()palpitations, () dizziness   Gastrointestinal: (-) abdominal pain, (+) nausea, () vomiting, () hematemesis, () diarrhea,  () constipation, () melena  Genitourinary:  (-) dysuria, () frequency, () hematuria, () incontinence      Neurologic: (-) headache, () memory loss, () loss of strength, () numbness, () tremor     Skin: (-) itching, () burning, () rash, () lesions   Lymphatic: (-) enlarged lymph nodes  Endocrine: (-) hair loss, () temperature intolerance         Musculoskeletal: (-) back pain, () joint pain,  () extremity pain  Psychiatric: (-) visual change, () auditory change, () depression, () anxiety, () suicidal  Sleep: (-) disorder, () insomnia, () sleep deprivation  Heme/Lymph: () easy bruising, () bleeding gums            Allergy and Immunologic: () hives, () eczema    Vital Signs Last 24 Hrs  T(C): 36.2 (19 Jan 2021 09:44), Max: 37.5 (18 Jan 2021 18:09)  T(F): 97.1 (19 Jan 2021 09:44), Max: 99.5 (18 Jan 2021 18:09)  HR: 74 (19 Jan 2021 08:44) (72 - 82)  BP: 148/62 (19 Jan 2021 08:44) (130/59 - 165/71)  BP(mean): --  RR: 24 (19 Jan 2021 08:44) (18 - 24)  SpO2: 94% (19 Jan 2021 08:44) (93% - 97%) ON NONREBREATHER    I&O's Detail    18 Jan 2021 07:01  -  19 Jan 2021 07:00  --------------------------------------------------------  IN:    IV PiggyBack: 250 mL    Oral Fluid: 1020 mL  Total IN: 1270 mL    OUT:    Voided (mL): 1700 mL  Total OUT: 1700 mL    Total NET: -430 mL      19 Jan 2021 07:01  -  19 Jan 2021 11:48  --------------------------------------------------------  IN:    IV PiggyBack: 100 mL  Total IN: 100 mL    OUT:    Voided (mL): 200 mL  Total OUT: 200 mL    Total NET: -100 mL    PHYSICAL EXAM:  in bed, less confused, in isolation due to Covid-19  Developing muscle atrophy uncomfortable, - acute distress; vital signs are monitored continuously  Eyes: PERRLA, EOMI, -conjunctivitis, -scleritis   Head: no focal deficit, normocephalic,  no trauma  ENMT: moist tongue, + thrush, -nasal discharge, -hoarseness, normal hearing, -cough, -hemoptysis, trachea midline  Neck: supple, - lymphadenopathy,  -masses, -JVD  Respiratory: bilateral diminished breath sounds, -wheezing, -rhonchi, + rales both lower lungs improving, + crackles both lower lungs  Chest: -accessory muscle use, -paradoxical breathing  Cardiovascular: irregular rate and sinus rhythm, S1 S2 normal, -S3, -S4, -murmur, -gallop, -rub  Gastrointestinal: soft, nontender, nondistended, normal bowel sounds, no hepatosplenomegaly  Genitourinary: -flank pain, -dysuria, + Cason  Extremities: -clubbing, -cyanosis, -edema    Vascular: peripheral pulses palpable 2+ bilaterally  Neurological: alert, oriented x 3, no focal deficit, - tremor  Skin: warm, dry, -erythema, iv site intact  Lymph nodes; no cervical, supraclavicular or axillary adenopathy  Psychiatric: cooperative but wants to go home    MEDICATIONS  (STANDING):  albuterol/ipratropium for Nebulization 3 milliLiter(s) Nebulizer every 6 hours  amLODIPine   Tablet 10 milliGRAM(s) Oral daily  atorvastatin 20 milliGRAM(s) Oral at bedtime  azithromycin  IVPB 500 milliGRAM(s) IV Intermittent every 24 hours  budesonide  80 MICROgram(s)/formoterol 4.5 MICROgram(s) Inhaler 2 Puff(s) Inhalation two times a day  donepezil 5 milliGRAM(s) Oral at bedtime  fluticasone propionate 50 MICROgram(s)/spray Nasal Spray 1 Spray(s) Both Nostrils two times a day  heparin   Injectable 5000 Unit(s) SubCutaneous every 12 hours  influenza  Vaccine (HIGH DOSE) 0.7 milliLiter(s) IntraMuscular once  iron sucrose IVPB 200 milliGRAM(s) IV Intermittent every 24 hours  levothyroxine 50 MICROGram(s) Oral daily  metoprolol tartrate 50 milliGRAM(s) Oral two times a day  cefepime  sevelamer carbonate 800 milliGRAM(s) Oral three times a day with meals    MEDICATIONS  (PRN):  benzonatate 100 milliGRAM(s) Oral every 8 hours PRN Cough  guaiFENesin   Syrup  (Sugar-Free) 100 milliGRAM(s) Oral every 6 hours PRN Cough    Allergies    No Known Allergies    Intolerances    LABS:                        9 12 )-----------( 230                   125  |  99  |  70<H>  ----------------------------<  114<H>  4   |  22  |  4.3 <H>    Ca    8.5  Phos  7  Mg     1.8      TPro  5.9<L>  /  Alb  2.7<L>  /  TBili  x   /  DBili  x   /  AST  x   /  ALT  x   /  AlkPhos  x   01-18    +DVT prophylaxis  5000 q12  +Sleep  DECREASED, may add melatonin   +Nutrition goals  POOR APPETITE   -Pain DENIED  -Decubital ulcer  +GI prophylaxis (PPV, coagulopathy, Hx)  +Aspiration prophylaxis (45 degrees)  Ventilator synchronized   MAY NEED BiPAP PRN  Tracheal cuff pressure  +Sedation/analgesia stopped once  +ID (phos, CH, mupi, SB)  Candida, AGREE WITH CEFEPIME  -Delirium  +Cardiac Beta/ACEI-ARB on hold/ASA 81/statin  +Prevention  +Education  +Medication reviewed (drug-drug interactions, PDA)  Medical devices  URINE catheter- Cason, NC, IV  Discussed with Dr Anderson, CCRN, RT    RADIOLOGY & ADDITIONAL STUDIES:    CXR with bilateral infiltrates, no change    EXAM:  CT CHEST                          PROCEDURE DATE:  10/14/2020      INTERPRETATION:  CT SCAN OF CHEST    History: 85-year-old female with shortness of breath and pleural effusion    Technique: CT scan of chest performed from lung apices through lung bases. Axial, coronal, and sagittal multiplanar reformatted images were produced. Thin section axial images and axial MIPS were also produced. Intravenous contrast material was not administered, as ordered.    Comparison: Chest radiograph dated 10/13/2020. No prior thoracic CT examinations.    Findings:    Lungs and large airways: Normal.    Pleura:  There are small bibasilar pleural effusions right greater than left. There is interlobular septal thickening which extends into the interior of the lung to suggest a component of pulmonary venous congestion. Moreover there are peripheral reticular opacities with some associated groundglass component within the peripheral aspects of both lungs. More peripheral groundglass and consolidation is seen within the right upper left upper and right middle lobes. There is some pulmonary fibrosis with traction bronchiectasis and bronchiolectasis without jesús honeycomb lung formation. There is air trapping.    Mediastinum and hilar regions: Increased number of mildly enlarged prevascular (1.1 cm in short axis), right paratracheal (1.1 cm in short axis), and subcarinal (1.7 cm in short axis).    Heart and pericardium:  Cardiomegaly. No pericardial effusion. Extensive calcification of the mitral annulus.    Vessels:  Severe coronary artery calcification/stents. Severe callus  5. Atheromatous plaque formation throughout the aortic arch descending and proximal abdominal aorta and major side branches with extensive calcification of the origin of the renal arteries right greater than left. Calcification of the origin of the brachycephalic vessels are noted. No evidence of aneurysm    Chest wall and lower neck:  Punctate microcalcifications are noted in the breasts bilaterally left greater than right. Clinical and mammographic correlation.    Upper abdomen: Cholelithiasis. Extensive arterial vascular calcification of the major side branches of the abdominal aorta as above.    Bones: Moderate multilevel degenerative disc disease involving the lower thoracic spine. Poststernotomy.      Impression:    1. Cardiomegaly. Small bibasilar pleural effusions and interlobular septal thickening compatible with pulmonary venous congestion.  2. More peripheral reticular, groundglass and some patchy areas of peripheral consolidation more pronounced in the upper and midlung zones are noted. There may be some bronchiectasis/bronchiolectasis to suggest pulmonary fibrosis. No honeycomb lung formation. Air-trapping is noted. These findings are not consistent with UIP/IPF. Abbreviated differential includes atypical infectious and/or inflammatory etiologies such as chronic hypersensitivity pneumonia, stage IV sarcoidosis, pneumoconiosis, and subacute and/or chronic sequela of atypical and viral pneumonias such as Covid 19.  3. Mild mediastinal lymphadenopathy.    EXAM:  CT CHEST                          PROCEDURE DATE:  01/19/2021      INTERPRETATION:  CT of the CHEST without intravenous contrast dated 1/19/2021 2:31 PM    INDICATION: Worsening Hypoxia    TECHNIQUE: CT of the chest was performed withoutintravenous contrast.  Intravenous contrast was not used Axial and coronal and sagittal images were produced and reviewed.    PRIOR STUDIES: CT chest 10/14/2020    FINDINGS: Cardiomegaly as before. Dense mitral valve annular calcification. Coronary artery calcifications. Status post CABG. The main pulmonary artery measures 3.1 cm diameter.  No pericardial effusion is seen.  Evaluation of the vasculature is limited without intravenous contrast, but the great vessels are grossly unremarkable.  Evaluation for adenopathy is limited without intravenous contrast. Within that limitation, mild mediastinal lymphadenopathy is seen. No axillary adenopathy. Abandoned epicardial pacer wire. Low-density of cardiac chambers relative to the myocardium suggesting anemia.    Small bilateral pleural effusions are seen. Evaluation of the pulmonary parenchyma demonstrates underlying interstitial fibrosis in the upper and mid zones with bilateral mosaic attenuation. There is now new bilateral groundglass opacities  in the upper and mid and lower zones with thickening of interlobular septa at the bilateral upper zones i.e. crazy paving  appearance.. Coalescence of findings with consolidation seen in the superior segment right lower lobe.    Limited evaluation of the upper abdomen demonstrates radiopaque cholelithiasis.    Evaluation of the osseous structures demonstrates degenerative changes.      IMPRESSION: New bilateral groundglass lung opacities superimposed on chronic interstitial pulmonary fibrosis probably due to chronic hypersensitivity pneumonitis or sarcoid. The groundglass lung opacities could  be due to pulmonary edema, pulmonary hemorrhage, alveolar proteinosis, organizing pneumonia or atypical infection.    Assessment and Plan:    Problem/Plan - 1:  ·  Problem: Hypoxia.  Plan: Was on nonrebreather. New infiltrates appear to respond to steroids. Very high ESR is decreasing.  Continue 20 q8     Problem/Plan - 2:  ·  Problem: Anemia.     Problem/Plan - 3:  ·  Problem: NAV (acute kidney injury).  Plan: Furosemide for anuria. Will probably need hemodialysis     Problem/Plan - 4:  ·  Problem: Hyponatremia     Problem/Plan - 5:  ·  Problem: Type 2 diabetes mellitus, uncontrolled. Endocrinology management very much appreciated.     Problem/Plan - 6:  Problem: COPD (chronic obstructive pulmonary disease). Plan: Continue bronchodilators.     Problem/Plan - 7:  ·  Problem: CAD (coronary artery disease).  Plan: BB. ASA 81     Problem/Plan - 8:  ·  Problem: Acute on chronic diastolic congestive heart failure.      Problem/Plan - 9:  ·  Problem: CAP (community acquired pneumonia).  Plan: Bilateral infiltrates are severe.  ID very much appreciated. Continue cefepime. Nystatin. Procalcitonin noted.     Problem/Plan - 10:  Problem: Cough. Plan; Perhaps from pulmonary fibrosis also. Nonproductive cough. Benzonatate discontinued to avoid adverse effects.

## 2021-01-24 NOTE — PROGRESS NOTE ADULT - PROBLEM SELECTOR PLAN 3
- Known to Dr. Harmon; Renal following; unknown etiology at this time but likely DKD  - Cr now over 5, GFR 7.   - Renal US = atrophic kidneys, no WILBER.   - Lasix 100mg IV BID started 1/24 given worsening clinical picture.   - Nephrotic w/u non-contributory thus far, pending ANCA   - Strict I/Os  -Worsening renal function--> will likely need HD for fluid removal. Spoke to Dr. Calderon at bedside --> **plan for IR AVF placement tomorrow but clear with Dr. Villegas first. No IR orders have been placed at this time.   - tucker inserted 1/22   - renal following. - Known to Dr. Harmon; Renal following; unknown etiology at this time but likely DKD  - Cr now over 5, GFR 7.   - Renal US = atrophic kidneys, no WILBER.   - Lasix 100mg IV x 1 this morning; overall worsening clinical picture.   - Nephrotic w/u non-contributory thus far, pending ANCA   - Strict I/Os  -Worsening renal function--> will likely need HD for fluid removal. Spoke to Dr. Calderon at bedside --> **plan for IR AVF placement tomorrow but clear with Dr. Villegas first. No IR orders have been placed at this time. Will make NPO MN incase.   - tucker inserted 1/22   - renal following.

## 2021-01-24 NOTE — PROGRESS NOTE ADULT - PROBLEM SELECTOR PLAN 1
- Pt w/ diminished B/L breath sounds; (+) JVD; BNP 57k.   - POCUS by MICU consult 1/22/21 (+) for B-lines throughout.   - TTE 1/20: EF 53%, biatrial enlargement, mild AR, mod-severe MR, mod-severe TR, PASP 70.   -Start Lasix 100mg IV BID per renal.   - CONT: Lopressor 50mg PO BID.   - **MONITOR urine output; tucker inserted 1/22/21. - Pt w/ diminished B/L breath sounds; (+) JVD; BNP 57k.   - POCUS by MICU consult 1/22/21 (+) for B-lines throughout.   - TTE 1/20: EF 53%, biatrial enlargement, mild AR, mod-severe MR, mod-severe TR, PASP 70.   -100mg IV lasix given x 1  - CONT: Lopressor 50mg PO BID.   - **MONITOR urine output; tucker inserted 1/22/21. - Pt w/ diminished B/L breath sounds; (+) JVD; BNP 57k.   - POCUS by MICU consult 1/22/21 (+) for B-lines throughout.   - TTE 1/20: EF 53%, biatrial enlargement, mild AR, mod-severe MR, mod-severe TR, PASP 70.   -100mg IV lasix challenge given x 1  - CONT: Lopressor 50mg PO BID.   - **MONITOR urine output; tucker inserted 1/22/21.

## 2021-01-24 NOTE — PROGRESS NOTE ADULT - ASSESSMENT
86F POOR HISTORIAN/early dementia, former heavy smoker PMHx DM, HTN, hyperlipidemia, COPD, hypothyroidism, CKD (baseline Cr ~3.0), anemia of chronic disease, CAD s/p CABG 2015 Dr. Blunt, chronic diastolic CHF(EF:55% by Echo 10/2020), renal artery stenosis s/p renal artery stent >20yrs ago, Carotid artery stenosis, PAD, who presents c/o progressively worsening SOB and cough. Nephrology consulted for NAV.     # Oliguric NAV on CKD3 2/2 CRS vs CKD progression; likely DM nephropathy  baseline creatinine ~3s  rising BUN/Cr w oliguric range uop   diuresis challenge w furosemide 100mg IVP   monitor closely BUN/Cr, lytes, uop   no acute absolute indication for RRT at present - however need for dialysis might arise soon - keep NO after midnight for possible PC placement by IR tomorrow, 1/25  daily weight, strict in/outs  renal diet     Nephrotic w/u non contributory thus far   Renal US atrophied, echogenic kidneys  HypoNa stable at 125   HTN - on norvasc 10, lopressor 50BID  Anemia - s/p pRBC iron sat 8%- repeat iron panel and ferritin on Monday  BMD - renvela 1600 TID 86F POOR HISTORIAN/early dementia, former heavy smoker PMHx DM, HTN, hyperlipidemia, COPD, hypothyroidism, CKD (baseline Cr ~3.0), anemia of chronic disease, CAD s/p CABG 2015 Dr. Blunt, chronic diastolic CHF(EF:55% by Echo 10/2020), renal artery stenosis s/p renal artery stent >20yrs ago, Carotid artery stenosis, PAD, who presents c/o progressively worsening SOB and cough. Nephrology consulted for NAV.     # Oliguric NAV on CKD3 2/2 CRS vs CKD progression; likely DM nephropathy  baseline creatinine ~3s  rising BUN/Cr w oliguric range uop   diuresis challenge w furosemide 100mg IVP   monitor closely BUN/Cr, lytes, uop   no acute absolute indication for RRT at present - however need for dialysis might arise soon - keep NPO after midnight for possible PC placement by IR tomorrow, 1/25  daily weight, strict in/outs  renal diet, encourage oral intake    Nephrotic w/u non contributory thus far   Renal US atrophied, echogenic kidneys  HypoNa stable at 125   HTN - on norvasc 10, lopressor 50BID  Anemia - s/p pRBC iron sat 8%- repeat iron panel and ferritin on Monday  BMD - renvela 1600 TID

## 2021-01-24 NOTE — PROGRESS NOTE ADULT - PROBLEM SELECTOR PLAN 6
- CONT: symbicort inhaler BID and Duonebs  - PRN Robutussin/Benonatate for cough  - not currently on Home Oxygen. Will assess O2 on room air once treatment for CAP is complete and respiratory status improves.  -Nystatin

## 2021-01-24 NOTE — PROGRESS NOTE ADULT - PROBLEM SELECTOR PLAN 2
Becoming mre dependant on oxygen to maintain sats low 90's; now on HF.   - Likely multifactorial 2/2 CHF, COPD, and CAP.   - LE Duplex 1/19 (-) for DVT.   - No CTA done to r/o PE due to CKD; VQ deferred as likely would not yield diagnostic info given multiple lung processes.   - CONT: Methylprednisolone 20mg IV q8hrs per Dr. Cuevas.      ## PNEUMONIA  - s/p 4 days of Ceftriaxone + 7 days azithromycin.   - 1/19 – started 7 day course of renally dosed zosyn.   - ID consulted – 1/23 DISCONTINUED Zosyn and STARTED Cefepime 1000mg IV q24hrs (1/23-____; duration to be determined).   - ID recs: RVP pending, MRSA swab--> (+) MSSA, procalcitonin--> 1.08, galactomannan pending, LDH-->474, fungitell pending  - Continue to monitor for fever, increased leukocytosis, etc. Becoming more dependant on oxygen to maintain sats low 90's; now on HF and tolerating well.  - Likely multifactorial 2/2 CHF, COPD, and CAP.   - LE Duplex 1/19 (-) for DVT.   - No CTA done to r/o PE due to CKD; VQ deferred as likely would not yield diagnostic info given multiple lung processes.   - CONT: Methylprednisolone 20mg IV q8hrs per Dr. Cuevas.      ## PNEUMONIA  - s/p 4 days of Ceftriaxone + 7 days azithromycin.   - 1/19 – started 7 day course of renally dosed zosyn.   - ID consulted – 1/23 DISCONTINUED Zosyn and STARTED Cefepime 1000mg IV q24hrs (1/23-____; duration to be determined).   - ID recs: RVP pending, MRSA swab--> (+) MSSA, procalcitonin--> 1.08, galactomannan pending, LDH-->474, fungitell pending  - Continue to monitor for fever, increased leukocytosis, etc.

## 2021-01-25 DIAGNOSIS — J44.9 CHRONIC OBSTRUCTIVE PULMONARY DISEASE, UNSPECIFIED: ICD-10-CM

## 2021-01-25 LAB
ALBUMIN SERPL ELPH-MCNC: 2.9 G/DL — LOW (ref 3.3–5)
ALP SERPL-CCNC: 115 U/L — SIGNIFICANT CHANGE UP (ref 40–120)
ALT FLD-CCNC: 34 U/L — SIGNIFICANT CHANGE UP (ref 10–45)
ANION GAP SERPL CALC-SCNC: 22 MMOL/L — HIGH (ref 5–17)
AST SERPL-CCNC: 22 U/L — SIGNIFICANT CHANGE UP (ref 10–40)
BASOPHILS # BLD AUTO: 0.02 K/UL — SIGNIFICANT CHANGE UP (ref 0–0.2)
BASOPHILS NFR BLD AUTO: 0.1 % — SIGNIFICANT CHANGE UP (ref 0–2)
BILIRUB SERPL-MCNC: 0.4 MG/DL — SIGNIFICANT CHANGE UP (ref 0.2–1.2)
BUN SERPL-MCNC: 101 MG/DL — HIGH (ref 7–23)
C DIFF GDH STL QL: NEGATIVE — SIGNIFICANT CHANGE UP
C DIFF GDH STL QL: SIGNIFICANT CHANGE UP
CALCIUM SERPL-MCNC: 8.6 MG/DL — SIGNIFICANT CHANGE UP (ref 8.4–10.5)
CHLORIDE SERPL-SCNC: 80 MMOL/L — LOW (ref 96–108)
CHLORIDE UR-SCNC: 49 MMOL/L — SIGNIFICANT CHANGE UP
CO2 SERPL-SCNC: 20 MMOL/L — LOW (ref 22–31)
CREAT ?TM UR-MCNC: 37 MG/DL — SIGNIFICANT CHANGE UP
CREAT SERPL-MCNC: 5.49 MG/DL — HIGH (ref 0.5–1.3)
EOSINOPHIL # BLD AUTO: 0 K/UL — SIGNIFICANT CHANGE UP (ref 0–0.5)
EOSINOPHIL NFR BLD AUTO: 0 % — SIGNIFICANT CHANGE UP (ref 0–6)
GLUCOSE BLDC GLUCOMTR-MCNC: 103 MG/DL — HIGH (ref 70–99)
GLUCOSE BLDC GLUCOMTR-MCNC: 104 MG/DL — HIGH (ref 70–99)
GLUCOSE BLDC GLUCOMTR-MCNC: 135 MG/DL — HIGH (ref 70–99)
GLUCOSE BLDC GLUCOMTR-MCNC: 171 MG/DL — HIGH (ref 70–99)
GLUCOSE SERPL-MCNC: 129 MG/DL — HIGH (ref 70–99)
HCT VFR BLD CALC: 25 % — LOW (ref 34.5–45)
HGB BLD-MCNC: 8.5 G/DL — LOW (ref 11.5–15.5)
IMM GRANULOCYTES NFR BLD AUTO: 1.5 % — SIGNIFICANT CHANGE UP (ref 0–1.5)
LYMPHOCYTES # BLD AUTO: 0.92 K/UL — LOW (ref 1–3.3)
LYMPHOCYTES # BLD AUTO: 5.3 % — LOW (ref 13–44)
MAGNESIUM SERPL-MCNC: 1.7 MG/DL — SIGNIFICANT CHANGE UP (ref 1.6–2.6)
MCHC RBC-ENTMCNC: 27.2 PG — SIGNIFICANT CHANGE UP (ref 27–34)
MCHC RBC-ENTMCNC: 34 GM/DL — SIGNIFICANT CHANGE UP (ref 32–36)
MCV RBC AUTO: 79.9 FL — LOW (ref 80–100)
MONOCYTES # BLD AUTO: 0.52 K/UL — SIGNIFICANT CHANGE UP (ref 0–0.9)
MONOCYTES NFR BLD AUTO: 3 % — SIGNIFICANT CHANGE UP (ref 2–14)
NEUTROPHILS # BLD AUTO: 15.62 K/UL — HIGH (ref 1.8–7.4)
NEUTROPHILS NFR BLD AUTO: 90.1 % — HIGH (ref 43–77)
NRBC # BLD: 0 /100 WBCS — SIGNIFICANT CHANGE UP (ref 0–0)
OB PNL STL: POSITIVE
OSMOLALITY UR: 286 MOSM/KG — LOW (ref 300–900)
PHOSPHATE SERPL-MCNC: 7.6 MG/DL — HIGH (ref 2.5–4.5)
PLATELET # BLD AUTO: 349 K/UL — SIGNIFICANT CHANGE UP (ref 150–400)
POTASSIUM SERPL-MCNC: 4.6 MMOL/L — SIGNIFICANT CHANGE UP (ref 3.5–5.3)
POTASSIUM SERPL-SCNC: 4.6 MMOL/L — SIGNIFICANT CHANGE UP (ref 3.5–5.3)
PROT SERPL-MCNC: 5.8 G/DL — LOW (ref 6–8.3)
RBC # BLD: 3.13 M/UL — LOW (ref 3.8–5.2)
RBC # FLD: 14.8 % — HIGH (ref 10.3–14.5)
SODIUM SERPL-SCNC: 122 MMOL/L — LOW (ref 135–145)
SODIUM UR-SCNC: 52 MMOL/L — SIGNIFICANT CHANGE UP
WBC # BLD: 17.34 K/UL — HIGH (ref 3.8–10.5)
WBC # FLD AUTO: 17.34 K/UL — HIGH (ref 3.8–10.5)

## 2021-01-25 PROCEDURE — 99233 SBSQ HOSP IP/OBS HIGH 50: CPT

## 2021-01-25 PROCEDURE — 93010 ELECTROCARDIOGRAM REPORT: CPT

## 2021-01-25 RX ORDER — FUROSEMIDE 40 MG
120 TABLET ORAL ONCE
Refills: 0 | Status: DISCONTINUED | OUTPATIENT
Start: 2021-01-25 | End: 2021-01-25

## 2021-01-25 RX ORDER — FUROSEMIDE 40 MG
100 TABLET ORAL ONCE
Refills: 0 | Status: COMPLETED | OUTPATIENT
Start: 2021-01-25 | End: 2021-01-25

## 2021-01-25 RX ORDER — FUROSEMIDE 40 MG
160 TABLET ORAL ONCE
Refills: 0 | Status: COMPLETED | OUTPATIENT
Start: 2021-01-25 | End: 2021-01-25

## 2021-01-25 RX ORDER — NYSTATIN 500MM UNIT
500000 POWDER (EA) MISCELLANEOUS
Refills: 0 | Status: DISCONTINUED | OUTPATIENT
Start: 2021-01-25 | End: 2021-02-07

## 2021-01-25 RX ORDER — CEFEPIME 1 G/1
1000 INJECTION, POWDER, FOR SOLUTION INTRAMUSCULAR; INTRAVENOUS EVERY 24 HOURS
Refills: 0 | Status: DISCONTINUED | OUTPATIENT
Start: 2021-01-25 | End: 2021-01-29

## 2021-01-25 RX ADMIN — Medication 50 MILLIGRAM(S): at 05:50

## 2021-01-25 RX ADMIN — Medication 132 MILLIGRAM(S): at 18:45

## 2021-01-25 RX ADMIN — Medication 50 MICROGRAM(S): at 05:50

## 2021-01-25 RX ADMIN — Medication 20 MILLIGRAM(S): at 13:04

## 2021-01-25 RX ADMIN — Medication 3 MILLILITER(S): at 21:12

## 2021-01-25 RX ADMIN — SENNA PLUS 1 TABLET(S): 8.6 TABLET ORAL at 12:30

## 2021-01-25 RX ADMIN — Medication 5 UNIT(S): at 12:29

## 2021-01-25 RX ADMIN — BUDESONIDE AND FORMOTEROL FUMARATE DIHYDRATE 2 PUFF(S): 160; 4.5 AEROSOL RESPIRATORY (INHALATION) at 21:24

## 2021-01-25 RX ADMIN — HEPARIN SODIUM 5000 UNIT(S): 5000 INJECTION INTRAVENOUS; SUBCUTANEOUS at 17:55

## 2021-01-25 RX ADMIN — CEFEPIME 100 MILLIGRAM(S): 1 INJECTION, POWDER, FOR SOLUTION INTRAMUSCULAR; INTRAVENOUS at 21:12

## 2021-01-25 RX ADMIN — Medication 50 MILLIGRAM(S): at 17:55

## 2021-01-25 RX ADMIN — Medication 500000 UNIT(S): at 17:55

## 2021-01-25 RX ADMIN — Medication 650 MILLIGRAM(S): at 21:47

## 2021-01-25 RX ADMIN — SEVELAMER CARBONATE 1600 MILLIGRAM(S): 2400 POWDER, FOR SUSPENSION ORAL at 13:04

## 2021-01-25 RX ADMIN — Medication 1 MILLIGRAM(S): at 12:29

## 2021-01-25 RX ADMIN — AMLODIPINE BESYLATE 10 MILLIGRAM(S): 2.5 TABLET ORAL at 05:50

## 2021-01-25 RX ADMIN — Medication 1 SPRAY(S): at 06:36

## 2021-01-25 RX ADMIN — Medication 20 MILLIGRAM(S): at 21:12

## 2021-01-25 RX ADMIN — Medication 120 MILLIGRAM(S): at 13:04

## 2021-01-25 RX ADMIN — DONEPEZIL HYDROCHLORIDE 5 MILLIGRAM(S): 10 TABLET, FILM COATED ORAL at 21:13

## 2021-01-25 RX ADMIN — Medication 5 UNIT(S): at 08:35

## 2021-01-25 RX ADMIN — SEVELAMER CARBONATE 1600 MILLIGRAM(S): 2400 POWDER, FOR SUSPENSION ORAL at 06:35

## 2021-01-25 RX ADMIN — Medication 3 MILLILITER(S): at 06:35

## 2021-01-25 RX ADMIN — Medication 2: at 12:29

## 2021-01-25 RX ADMIN — BUDESONIDE AND FORMOTEROL FUMARATE DIHYDRATE 2 PUFF(S): 160; 4.5 AEROSOL RESPIRATORY (INHALATION) at 08:36

## 2021-01-25 RX ADMIN — Medication 20 MILLIGRAM(S): at 06:35

## 2021-01-25 RX ADMIN — Medication 3 MILLILITER(S): at 10:08

## 2021-01-25 RX ADMIN — INSULIN GLARGINE 5 UNIT(S): 100 INJECTION, SOLUTION SUBCUTANEOUS at 21:23

## 2021-01-25 RX ADMIN — Medication 3 MILLILITER(S): at 15:07

## 2021-01-25 RX ADMIN — Medication 650 MILLIGRAM(S): at 06:35

## 2021-01-25 RX ADMIN — Medication 500000 UNIT(S): at 23:55

## 2021-01-25 RX ADMIN — Medication 5 UNIT(S): at 17:52

## 2021-01-25 RX ADMIN — Medication 1 SPRAY(S): at 17:53

## 2021-01-25 RX ADMIN — ATORVASTATIN CALCIUM 20 MILLIGRAM(S): 80 TABLET, FILM COATED ORAL at 21:13

## 2021-01-25 RX ADMIN — HEPARIN SODIUM 5000 UNIT(S): 5000 INJECTION INTRAVENOUS; SUBCUTANEOUS at 06:35

## 2021-01-25 RX ADMIN — SEVELAMER CARBONATE 1600 MILLIGRAM(S): 2400 POWDER, FOR SUSPENSION ORAL at 21:15

## 2021-01-25 RX ADMIN — Medication 81 MILLIGRAM(S): at 12:29

## 2021-01-25 NOTE — PROGRESS NOTE ADULT - SUBJECTIVE AND OBJECTIVE BOX
CARDIOLOGY NP PROGRESS NOTE    Subjective:   Remainder ROS otherwise negative.    Overnight Events:     TELEMETRY:    EKG:      VITAL SIGNS:  T(C): 36.6 (01-25-21 @ 13:28), Max: 36.6 (01-25-21 @ 06:00)  HR: 66 (01-25-21 @ 15:18) (60 - 71)  BP: 148/70 (01-25-21 @ 13:10) (148/70 - 190/84)  RR: 22 (01-25-21 @ 15:18) (20 - 28)  SpO2: 94% (01-25-21 @ 15:18) (90% - 96%)  Wt(kg): --    I&O's Summary    24 Jan 2021 07:01  -  25 Jan 2021 07:00  --------------------------------------------------------  IN: 537 mL / OUT: 1001 mL / NET: -464 mL    25 Jan 2021 07:01  -  25 Jan 2021 16:29  --------------------------------------------------------  IN: 100 mL / OUT: 475 mL / NET: -375 mL          PHYSICAL EXAM:    General: A/ox 3, No acute Distress  Neck: Supple, NO JVD  Cardiac: S1 S2, No M/R/G  Pulmonary: CTAB, Breathing unlabored, No Rhonchi/Rales/Wheezing  Abdomen: Soft, Non -tender, +BS x 4 quads  Extremities: No Rashes, No edema  Neuro: A/o x 3, No focal deficits          LABS:                          8.5    17.34 )-----------( 349      ( 25 Jan 2021 07:17 )             25.0                              01-25    122<L>  |  80<L>  |  101<H>  ----------------------------<  129<H>  4.6   |  20<L>  |  5.49<H>    Ca    8.6      25 Jan 2021 07:17  Phos  7.6     01-25  Mg     1.7     01-25    TPro  5.8<L>  /  Alb  2.9<L>  /  TBili  0.4  /  DBili  x   /  AST  22  /  ALT  34  /  AlkPhos  115  01-25    LIVER FUNCTIONS - ( 25 Jan 2021 07:17 )  Alb: 2.9 g/dL / Pro: 5.8 g/dL / ALK PHOS: 115 U/L / ALT: 34 U/L / AST: 22 U/L / GGT: x                                 PT/INR - ( 24 Jan 2021 06:34 )   PT: 14.6 sec;   INR: 1.23          PTT - ( 24 Jan 2021 06:34 )  PTT:30.8 sec  CAPILLARY BLOOD GLUCOSE      POCT Blood Glucose.: 171 mg/dL (25 Jan 2021 11:48)  POCT Blood Glucose.: 135 mg/dL (25 Jan 2021 06:58)  POCT Blood Glucose.: 162 mg/dL (24 Jan 2021 21:10)  POCT Blood Glucose.: 233 mg/dL (24 Jan 2021 17:03)            Allergies:  No Known Allergies    MEDICATIONS  (STANDING):  albuterol/ipratropium for Nebulization 3 milliLiter(s) Nebulizer every 6 hours  amLODIPine   Tablet 10 milliGRAM(s) Oral daily  aspirin enteric coated 81 milliGRAM(s) Oral daily  atorvastatin 20 milliGRAM(s) Oral at bedtime  budesonide  80 MICROgram(s)/formoterol 4.5 MICROgram(s) Inhaler 2 Puff(s) Inhalation two times a day  cefepime   IVPB 1000 milliGRAM(s) IV Intermittent every 24 hours  dextrose 40% Gel 15 Gram(s) Oral once  dextrose 5%. 1000 milliLiter(s) (50 mL/Hr) IV Continuous <Continuous>  dextrose 5%. 1000 milliLiter(s) (100 mL/Hr) IV Continuous <Continuous>  dextrose 50% Injectable 25 Gram(s) IV Push once  dextrose 50% Injectable 12.5 Gram(s) IV Push once  dextrose 50% Injectable 25 Gram(s) IV Push once  diazepam    Tablet 1 milliGRAM(s) Oral daily  donepezil 5 milliGRAM(s) Oral at bedtime  fluticasone propionate 50 MICROgram(s)/spray Nasal Spray 1 Spray(s) Both Nostrils two times a day  glucagon  Injectable 1 milliGRAM(s) IntraMuscular once  heparin   Injectable 5000 Unit(s) SubCutaneous every 12 hours  influenza  Vaccine (HIGH DOSE) 0.7 milliLiter(s) IntraMuscular once  insulin glargine Injectable (LANTUS) 5 Unit(s) SubCutaneous at bedtime  insulin lispro (ADMELOG) corrective regimen sliding scale   SubCutaneous Before meals and at bedtime  insulin lispro Injectable (ADMELOG) 5 Unit(s) SubCutaneous three times a day before meals  levothyroxine 50 MICROGram(s) Oral daily  methylPREDNISolone sodium succinate Injectable 20 milliGRAM(s) IV Push every 8 hours  metoprolol tartrate 50 milliGRAM(s) Oral every 12 hours  nystatin    Suspension 670658 Unit(s) Oral four times a day  senna 1 Tablet(s) Oral daily  sevelamer carbonate 1600 milliGRAM(s) Oral three times a day    MEDICATIONS  (PRN):  acetaminophen   Tablet .. 650 milliGRAM(s) Oral every 6 hours PRN Moderate Pain (4 - 6)  guaiFENesin   Syrup  (Sugar-Free) 100 milliGRAM(s) Oral every 6 hours PRN Cough  zaleplon 5 milliGRAM(s) Oral at bedtime PRN Insomnia        DIAGNOSTIC TESTS:        CARDIOLOGY NP PROGRESS NOTE    Subjective: Pt seen and examined at bedside. Reports feeling sob at rest and when laying flat briefly on HFNC. Denies chest pain, lightheadedness, dizziness, palpitations, fever, chills.  Remainder ROS otherwise negative.    Overnight Events: Desat to 80s on 6L NC, placed on HFNC yesterday AM. Na downtrending 125 -> 122. Net negative 400cc over last 24hrs.    TELEMETRY: SR 60s, occasional PVCs         VITAL SIGNS:  T(C): 36.6 (01-25-21 @ 13:28), Max: 36.6 (01-25-21 @ 06:00)  HR: 66 (01-25-21 @ 15:18) (60 - 71)  BP: 148/70 (01-25-21 @ 13:10) (148/70 - 190/84)  RR: 22 (01-25-21 @ 15:18) (20 - 28)  SpO2: 94% (01-25-21 @ 15:18) (90% - 96%)  Wt(kg): --    I&O's Summary    24 Jan 2021 07:01  -  25 Jan 2021 07:00  --------------------------------------------------------  IN: 537 mL / OUT: 1001 mL / NET: -464 mL    25 Jan 2021 07:01  -  25 Jan 2021 16:29  --------------------------------------------------------  IN: 100 mL / OUT: 475 mL / NET: -375 mL          PHYSICAL EXAM:    General: A/ox 2-3, No acute Distress, answers questions appropriately  Neck: Supple, + pronounced JVD to up mandible  Cardiac: S1 S2, No M/R/G  Pulmonary: Lungs diminished radha diffusely, tachypneic on HFNJC, No Rhonchi/Rales/Wheezing  Abdomen: Soft, Non -tender, +BS x 4 quads  Extremities: No Rashes, No edema  Neuro: A/o x 2-3, No focal deficits          LABS:                          8.5    17.34 )-----------( 349      ( 25 Jan 2021 07:17 )             25.0                              01-25    122<L>  |  80<L>  |  101<H>  ----------------------------<  129<H>  4.6   |  20<L>  |  5.49<H>    Ca    8.6      25 Jan 2021 07:17  Phos  7.6     01-25  Mg     1.7     01-25    TPro  5.8<L>  /  Alb  2.9<L>  /  TBili  0.4  /  DBili  x   /  AST  22  /  ALT  34  /  AlkPhos  115  01-25    LIVER FUNCTIONS - ( 25 Jan 2021 07:17 )  Alb: 2.9 g/dL / Pro: 5.8 g/dL / ALK PHOS: 115 U/L / ALT: 34 U/L / AST: 22 U/L / GGT: x         PT/INR - ( 24 Jan 2021 06:34 )   PT: 14.6 sec;   INR: 1.23          PTT - ( 24 Jan 2021 06:34 )  PTT:30.8 sec  CAPILLARY BLOOD GLUCOSE      POCT Blood Glucose.: 171 mg/dL (25 Jan 2021 11:48)  POCT Blood Glucose.: 135 mg/dL (25 Jan 2021 06:58)  POCT Blood Glucose.: 162 mg/dL (24 Jan 2021 21:10)  POCT Blood Glucose.: 233 mg/dL (24 Jan 2021 17:03)            Allergies:  No Known Allergies    MEDICATIONS  (STANDING):  albuterol/ipratropium for Nebulization 3 milliLiter(s) Nebulizer every 6 hours  amLODIPine   Tablet 10 milliGRAM(s) Oral daily  aspirin enteric coated 81 milliGRAM(s) Oral daily  atorvastatin 20 milliGRAM(s) Oral at bedtime  budesonide  80 MICROgram(s)/formoterol 4.5 MICROgram(s) Inhaler 2 Puff(s) Inhalation two times a day  cefepime   IVPB 1000 milliGRAM(s) IV Intermittent every 24 hours  dextrose 40% Gel 15 Gram(s) Oral once  dextrose 5%. 1000 milliLiter(s) (50 mL/Hr) IV Continuous <Continuous>  dextrose 5%. 1000 milliLiter(s) (100 mL/Hr) IV Continuous <Continuous>  dextrose 50% Injectable 25 Gram(s) IV Push once  dextrose 50% Injectable 12.5 Gram(s) IV Push once  dextrose 50% Injectable 25 Gram(s) IV Push once  diazepam    Tablet 1 milliGRAM(s) Oral daily  donepezil 5 milliGRAM(s) Oral at bedtime  fluticasone propionate 50 MICROgram(s)/spray Nasal Spray 1 Spray(s) Both Nostrils two times a day  glucagon  Injectable 1 milliGRAM(s) IntraMuscular once  heparin   Injectable 5000 Unit(s) SubCutaneous every 12 hours  influenza  Vaccine (HIGH DOSE) 0.7 milliLiter(s) IntraMuscular once  insulin glargine Injectable (LANTUS) 5 Unit(s) SubCutaneous at bedtime  insulin lispro (ADMELOG) corrective regimen sliding scale   SubCutaneous Before meals and at bedtime  insulin lispro Injectable (ADMELOG) 5 Unit(s) SubCutaneous three times a day before meals  levothyroxine 50 MICROGram(s) Oral daily  methylPREDNISolone sodium succinate Injectable 20 milliGRAM(s) IV Push every 8 hours  metoprolol tartrate 50 milliGRAM(s) Oral every 12 hours  nystatin    Suspension 805474 Unit(s) Oral four times a day  senna 1 Tablet(s) Oral daily  sevelamer carbonate 1600 milliGRAM(s) Oral three times a day    MEDICATIONS  (PRN):  acetaminophen   Tablet .. 650 milliGRAM(s) Oral every 6 hours PRN Moderate Pain (4 - 6)  guaiFENesin   Syrup  (Sugar-Free) 100 milliGRAM(s) Oral every 6 hours PRN Cough  zaleplon 5 milliGRAM(s) Oral at bedtime PRN Insomnia        DIAGNOSTIC TESTS:        CARDIOLOGY NP PROGRESS NOTE    Subjective: Pt seen and examined at bedside. Reports feeling sob at rest and when laying flat briefly on HFNC. Denies chest pain, lightheadedness, dizziness, palpitations, fever, chills.  Remainder ROS otherwise negative.    Overnight Events: Desat to 80s on 6L NC, placed on HFNC yesterday AM. Na downtrending 125 -> 122. Net negative 400cc over last 24hrs.    TELEMETRY: SR 60s, occasional PVCs         VITAL SIGNS:  T(C): 36.6 (01-25-21 @ 13:28), Max: 36.6 (01-25-21 @ 06:00)  HR: 66 (01-25-21 @ 15:18) (60 - 71)  BP: 148/70 (01-25-21 @ 13:10) (148/70 - 190/84)  RR: 22 (01-25-21 @ 15:18) (20 - 28)  SpO2: 94% (01-25-21 @ 15:18) (90% - 96%)  Wt(kg): --    I&O's Summary    24 Jan 2021 07:01  -  25 Jan 2021 07:00  --------------------------------------------------------  IN: 537 mL / OUT: 1001 mL / NET: -464 mL    25 Jan 2021 07:01  -  25 Jan 2021 16:29  --------------------------------------------------------  IN: 100 mL / OUT: 475 mL / NET: -375 mL          PHYSICAL EXAM:    General: A/ox 2-3, No acute Distress, answers questions appropriately  Neck: Supple, + pronounced JVD to up mandible  Cardiac: S1 S2, No M/R/G  Pulmonary: Lungs diminished radha diffusely, tachypneic on HFNC, No Rhonchi/Rales/Wheezing  Abdomen: Soft, Non -tender, +BS x 4 quads  Extremities: No Rashes, No edema  Neuro: A/o x 2-3, No focal deficits          LABS:                          8.5    17.34 )-----------( 349      ( 25 Jan 2021 07:17 )             25.0                              01-25    122<L>  |  80<L>  |  101<H>  ----------------------------<  129<H>  4.6   |  20<L>  |  5.49<H>    Ca    8.6      25 Jan 2021 07:17  Phos  7.6     01-25  Mg     1.7     01-25    TPro  5.8<L>  /  Alb  2.9<L>  /  TBili  0.4  /  DBili  x   /  AST  22  /  ALT  34  /  AlkPhos  115  01-25    LIVER FUNCTIONS - ( 25 Jan 2021 07:17 )  Alb: 2.9 g/dL / Pro: 5.8 g/dL / ALK PHOS: 115 U/L / ALT: 34 U/L / AST: 22 U/L / GGT: x         PT/INR - ( 24 Jan 2021 06:34 )   PT: 14.6 sec;   INR: 1.23          PTT - ( 24 Jan 2021 06:34 )  PTT:30.8 sec  CAPILLARY BLOOD GLUCOSE      POCT Blood Glucose.: 171 mg/dL (25 Jan 2021 11:48)  POCT Blood Glucose.: 135 mg/dL (25 Jan 2021 06:58)  POCT Blood Glucose.: 162 mg/dL (24 Jan 2021 21:10)  POCT Blood Glucose.: 233 mg/dL (24 Jan 2021 17:03)            Allergies:  No Known Allergies    MEDICATIONS  (STANDING):  albuterol/ipratropium for Nebulization 3 milliLiter(s) Nebulizer every 6 hours  amLODIPine   Tablet 10 milliGRAM(s) Oral daily  aspirin enteric coated 81 milliGRAM(s) Oral daily  atorvastatin 20 milliGRAM(s) Oral at bedtime  budesonide  80 MICROgram(s)/formoterol 4.5 MICROgram(s) Inhaler 2 Puff(s) Inhalation two times a day  cefepime   IVPB 1000 milliGRAM(s) IV Intermittent every 24 hours  dextrose 40% Gel 15 Gram(s) Oral once  dextrose 5%. 1000 milliLiter(s) (50 mL/Hr) IV Continuous <Continuous>  dextrose 5%. 1000 milliLiter(s) (100 mL/Hr) IV Continuous <Continuous>  dextrose 50% Injectable 25 Gram(s) IV Push once  dextrose 50% Injectable 12.5 Gram(s) IV Push once  dextrose 50% Injectable 25 Gram(s) IV Push once  diazepam    Tablet 1 milliGRAM(s) Oral daily  donepezil 5 milliGRAM(s) Oral at bedtime  fluticasone propionate 50 MICROgram(s)/spray Nasal Spray 1 Spray(s) Both Nostrils two times a day  glucagon  Injectable 1 milliGRAM(s) IntraMuscular once  heparin   Injectable 5000 Unit(s) SubCutaneous every 12 hours  influenza  Vaccine (HIGH DOSE) 0.7 milliLiter(s) IntraMuscular once  insulin glargine Injectable (LANTUS) 5 Unit(s) SubCutaneous at bedtime  insulin lispro (ADMELOG) corrective regimen sliding scale   SubCutaneous Before meals and at bedtime  insulin lispro Injectable (ADMELOG) 5 Unit(s) SubCutaneous three times a day before meals  levothyroxine 50 MICROGram(s) Oral daily  methylPREDNISolone sodium succinate Injectable 20 milliGRAM(s) IV Push every 8 hours  metoprolol tartrate 50 milliGRAM(s) Oral every 12 hours  nystatin    Suspension 910032 Unit(s) Oral four times a day  senna 1 Tablet(s) Oral daily  sevelamer carbonate 1600 milliGRAM(s) Oral three times a day    MEDICATIONS  (PRN):  acetaminophen   Tablet .. 650 milliGRAM(s) Oral every 6 hours PRN Moderate Pain (4 - 6)  guaiFENesin   Syrup  (Sugar-Free) 100 milliGRAM(s) Oral every 6 hours PRN Cough  zaleplon 5 milliGRAM(s) Oral at bedtime PRN Insomnia        DIAGNOSTIC TESTS:

## 2021-01-25 NOTE — PROGRESS NOTE ADULT - PROBLEM SELECTOR PLAN 3
- Known to Dr. Harmon; Renal following; unknown etiology at this time but likely DKD  - Cr now over 5, GFR 7.   - Renal US = atrophic kidneys, no WILBER.   - Lasix 100mg IV x 1 this morning; overall worsening clinical picture.   - Nephrotic w/u non-contributory thus far, pending ANCA   - Strict I/Os  -Worsening renal function--> will likely need HD for fluid removal. Spoke to Dr. Calderon at bedside --> **plan for IR AVF placement tomorrow but clear with Dr. Villegas first. No IR orders have been placed at this time. Will make NPO MN incase.   - tucker inserted 1/22   - renal following. - Known to Dr. Harmon; Renal following; unknown etiology at this time but likely DKD. overall worsening clinical picture, progressed to ARF and now requires HD initiation 1/26/21.   - Cr now over 5, GFR 7.   - Renal US shows atrophic kidneys, no WILBER.   - Lasix 100mg IV x 1 w/ Metolazone 5mg PO, and IV Lasix 160mg IV tonight  - Nephrotic w/u non-contributory thus far, pending ANCA   - Monitor Strict I/Os via Cason placed 1/22  - Plan for HD catheter insertion w/ IR tomorrow 1/26. Per Pulm Dr Levi, will need BIPAP and anesthesia present during procedure given tenuous pulm function

## 2021-01-25 NOTE — PROGRESS NOTE ADULT - SUBJECTIVE AND OBJECTIVE BOX
Patient is a 86y Female seen and evaluated at bedside. Frail appearing, on HFNC. Non oliguric. Lasix and metolazone challenge this AM, will likely require HD tomorrow. Plan for temp HD catheter placement.    Meds:    acetaminophen   Tablet .. 650 every 6 hours PRN  albuterol/ipratropium for Nebulization 3 every 6 hours  amLODIPine   Tablet 10 daily  aspirin enteric coated 81 daily  atorvastatin 20 at bedtime  budesonide  80 MICROgram(s)/formoterol 4.5 MICROgram(s) Inhaler 2 two times a day  cefepime  Injectable. 1000 every 24 hours  dextrose 40% Gel 15 once  dextrose 5%. 1000 <Continuous>  dextrose 5%. 1000 <Continuous>  dextrose 50% Injectable 25 once  dextrose 50% Injectable 12.5 once  dextrose 50% Injectable 25 once  diazepam    Tablet 1 daily  donepezil 5 at bedtime  fluticasone propionate 50 MICROgram(s)/spray Nasal Spray 1 two times a day  glucagon  Injectable 1 once  guaiFENesin   Syrup  (Sugar-Free) 100 every 6 hours PRN  heparin   Injectable 5000 every 12 hours  influenza  Vaccine (HIGH DOSE) 0.7 once  insulin glargine Injectable (LANTUS) 5 at bedtime  insulin lispro (ADMELOG) corrective regimen sliding scale  Before meals and at bedtime  insulin lispro Injectable (ADMELOG) 5 three times a day before meals  levothyroxine 50 daily  methylPREDNISolone sodium succinate Injectable 20 every 8 hours  metoprolol tartrate 50 every 12 hours  senna 1 daily  sevelamer carbonate 1600 three times a day  zaleplon 5 at bedtime PRN      T(C): , Max: 36.6 (01-25-21 @ 06:00)  T(F): , Max: 97.8 (01-25-21 @ 06:00)  HR: 63 (01-25-21 @ 13:10)  BP: 148/70 (01-25-21 @ 13:10)  BP(mean): 100 (01-25-21 @ 13:10)  RR: 20 (01-25-21 @ 13:10)  SpO2: 96% (01-25-21 @ 13:10)  Wt(kg): --    01-24 @ 07:01  -  01-25 @ 07:00  --------------------------------------------------------  IN: 537 mL / OUT: 1001 mL / NET: -464 mL    01-25 @ 07:01  -  01-25 @ 13:35  --------------------------------------------------------  IN: 100 mL / OUT: 425 mL / NET: -325 mL    Review of Systems:  RESPIRATORY: +shortness of breath  CARDIOVASCULAR: No chest pain  MUSCULOSKELETAL: No leg edema    PHYSICAL EXAM:  GENERAL: well-developed, well nourished, alert, on non rebreather  CHEST/LUNG: Coarse breath sounds bilaterally, reduced at bases  HEART: normal S1S2, RRR  ABDOMEN: Soft, Nontender, non distended  EXTREMITIES: +oedema       LABS:                        8.5    17.34 )-----------( 349      ( 25 Jan 2021 07:17 )             25.0     01-25    122<L>  |  80<L>  |  101<H>  ----------------------------<  129<H>  4.6   |  20<L>  |  5.49<H>    Ca    8.6      25 Jan 2021 07:17  Phos  7.6     01-25  Mg     1.7     01-25    TPro  5.8<L>  /  Alb  2.9<L>  /  TBili  0.4  /  DBili  x   /  AST  22  /  ALT  34  /  AlkPhos  115  01-25      PT/INR - ( 24 Jan 2021 06:34 )   PT: 14.6 sec;   INR: 1.23          PTT - ( 24 Jan 2021 06:34 )  PTT:30.8 sec    Osmolality, Random Urine: 255 mosm/kg (01-23 @ 20:44)  Sodium, Random Urine: 50 mmol/L (01-23 @ 20:44)        RADIOLOGY & ADDITIONAL STUDIES:

## 2021-01-25 NOTE — PROGRESS NOTE ADULT - PROBLEM SELECTOR PLAN 1
She had been oxygenating better with higher saturation levels on nasal cannula but now is requiring high flow oxygen.

## 2021-01-25 NOTE — PROGRESS NOTE ADULT - ASSESSMENT
86F poor historian/early dementia and former heavy smoker with PMHx DM, HTN, hyperlipidemia, COPD, hypothyroidism, CKD (baseline Cr ~3.0), anemia of chronic disease, CAD s/p CABG 2015, chronic diastolic CHF (EF 55% via echo 10/2020), renal artery stenosis (s/p stent 20+ years ag), carotid artery stenosis, PAD, who presents c/o progressively worsening SOB and cough. Admitted for acute on chronic diastolic HF, found to have worsening renal function and poor urine output. Stepped up to CCU for strict fluid status monitoring and potential HD and stepped down to 5L 1/23/21. Continuing ID recommendations for infectious work up. Continue w/ Cefepime (started 1/23/21). Patients multiple comorbidities continue to progress, requiring more oxygen and addition of routine IV diuretics. Condition guarded.    86F poor historian/early dementia and former heavy smoker with PMHx DM, HTN, hyperlipidemia, COPD, hypothyroidism, CKD (baseline Cr ~3.0), anemia of chronic disease, CAD s/p CABG 2015, chronic diastolic CHF (EF 55% via echo 10/2020), renal artery stenosis (s/p stent 20+ years ag), carotid artery stenosis, PAD, who presents c/o progressively worsening SOB and cough. Admitted for acute on chronic diastolic HF, found to have worsening renal function and poor urine output. Stepped up to CCU for strict fluid status monitoring and potential HD and stepped down to 5Lach 1/23/21. ID following for PNA vs radha pneumonitis on Cefepime IV (started 1/23/21). Patients multiple comorbidities continue to progress, acute hypoxic respiratory failure requiring HFNC, acute renal failure, now plan for HD initiation tomorrow 1/26/21. 86F poor historian/early dementia and former heavy smoker with PMHx DM, HTN, hyperlipidemia, COPD, hypothyroidism, CKD (baseline Cr ~3.0), anemia of chronic disease, CAD s/p CABG 2015, chronic diastolic CHF (EF 55% via echo 10/2020), renal artery stenosis (s/p stent 20+ years ag), carotid artery stenosis, PAD, who presents c/o progressively worsening SOB and cough. Admitted for acute on chronic diastolic HF, found to have worsening renal function and poor urine output. Stepped up to CCU for strict fluid status monitoring and potential HD and stepped down to 5Lach 1/23/21. ID following for PNA vs radha pneumonitis on Cefepime IV (started 1/23/21). Patients multiple comorbidities continue to progress, acute hypoxic respiratory failure requiring HFNC, acute renal failure, now plan for HD initiation tomorrow 1/26/21. NPO s/p MN for HD catheter placement tomorrow.

## 2021-01-25 NOTE — CHART NOTE - NSCHARTNOTEFT_GEN_A_CORE
Admitting Diagnosis:   Patient is a 86y old  Female who presents with a chief complaint of progressively worsening SOB and cough x 3 days (25 Jan 2021 13:35)      PAST MEDICAL & SURGICAL HISTORY:  Essential hypertension  Hypertension    Type 2 diabetes mellitus  Diabetes    Hyperlipidemia  Hyperlipidemia    Disorder of kidney and ureter  Renal insufficiency    Peripheral vascular disease  PVD (peripheral vascular disease)    Anxiety state  Anxiety    Atherosclerosis of renal artery  with resulting stent placement    Atherosclerosis of renal artery  Renal artery stenosis        Current Nutrition Order:  Dys 2 Mech Soft thins  Consistent Carbohydrate with evening snack   Low sodium   Ensure Enlive x1/day      PO Intake: Good (%) [   ]  Fair (50-75%) [   ] Poor (<25%) [   ]  Please see Below for RD Recs    GI Issues:   BM 1/24, 1/25 (bright red)    Pain:  none per flow sheets     Skin Integrity:  1+ BL ankle edema (2+ o/a)  Dustin 19  No pressure ulcers     Labs:   01-25    122<L>  |  80<L>  |  101<H>  ----------------------------<  129<H>  4.6   |  20<L>  |  5.49<H>    Ca    8.6      25 Jan 2021 07:17  Phos  7.6     01-25  Mg     1.7     01-25    TPro  5.8<L>  /  Alb  2.9<L>  /  TBili  0.4  /  DBili  x   /  AST  22  /  ALT  34  /  AlkPhos  115  01-25    CAPILLARY BLOOD GLUCOSE      POCT Blood Glucose.: 171 mg/dL (25 Jan 2021 11:48)  POCT Blood Glucose.: 135 mg/dL (25 Jan 2021 06:58)  POCT Blood Glucose.: 162 mg/dL (24 Jan 2021 21:10)  POCT Blood Glucose.: 233 mg/dL (24 Jan 2021 17:03)      Medications:  MEDICATIONS  (STANDING):  albuterol/ipratropium for Nebulization 3 milliLiter(s) Nebulizer every 6 hours  amLODIPine   Tablet 10 milliGRAM(s) Oral daily  aspirin enteric coated 81 milliGRAM(s) Oral daily  atorvastatin 20 milliGRAM(s) Oral at bedtime  budesonide  80 MICROgram(s)/formoterol 4.5 MICROgram(s) Inhaler 2 Puff(s) Inhalation two times a day  cefepime   IVPB 1000 milliGRAM(s) IV Intermittent every 24 hours  dextrose 40% Gel 15 Gram(s) Oral once  dextrose 5%. 1000 milliLiter(s) (50 mL/Hr) IV Continuous <Continuous>  dextrose 5%. 1000 milliLiter(s) (100 mL/Hr) IV Continuous <Continuous>  dextrose 50% Injectable 25 Gram(s) IV Push once  dextrose 50% Injectable 12.5 Gram(s) IV Push once  dextrose 50% Injectable 25 Gram(s) IV Push once  diazepam    Tablet 1 milliGRAM(s) Oral daily  donepezil 5 milliGRAM(s) Oral at bedtime  fluticasone propionate 50 MICROgram(s)/spray Nasal Spray 1 Spray(s) Both Nostrils two times a day  glucagon  Injectable 1 milliGRAM(s) IntraMuscular once  heparin   Injectable 5000 Unit(s) SubCutaneous every 12 hours  influenza  Vaccine (HIGH DOSE) 0.7 milliLiter(s) IntraMuscular once  insulin glargine Injectable (LANTUS) 5 Unit(s) SubCutaneous at bedtime  insulin lispro (ADMELOG) corrective regimen sliding scale   SubCutaneous Before meals and at bedtime  insulin lispro Injectable (ADMELOG) 5 Unit(s) SubCutaneous three times a day before meals  levothyroxine 50 MICROGram(s) Oral daily  methylPREDNISolone sodium succinate Injectable 20 milliGRAM(s) IV Push every 8 hours  metoprolol tartrate 50 milliGRAM(s) Oral every 12 hours  senna 1 Tablet(s) Oral daily  sevelamer carbonate 1600 milliGRAM(s) Oral three times a day    MEDICATIONS  (PRN):  acetaminophen   Tablet .. 650 milliGRAM(s) Oral every 6 hours PRN Moderate Pain (4 - 6)  guaiFENesin   Syrup  (Sugar-Free) 100 milliGRAM(s) Oral every 6 hours PRN Cough  zaleplon 5 milliGRAM(s) Oral at bedtime PRN Insomnia      5'2''  pounds +-10%  Weight 119 pounds, BMI 21.9, %ZZK=474%     1/22 110 pounds  1/20 109.5 pounds   1/18 102.5 pounds   Varying EMR wts, likely in setting of Fluid Shifts. Wt overall down trended during admission. Edema higher o/a then compared to at this time.     Estimated energy needs:   IBW for EER 2/2 Varying EMR wts  Adjusted for age, CHF, Renal (will increase prot use PRN Pending HD status); Fluids per team  1250-1500kcal/day 25-30kcal/kg  50-60gm/day 1.0-1.2gm/kg    Subjective:   86 F, POOR HISTORIAN/early dementia, former heavy smoker with PMHx HTN, hyperlipidemia, COPD, hypothyroidism, Stage IV CKD (baseline Cr ~3.0), anemia of chronic disease, CAD s/p CABG 2015 Dr. Blunt, chronic diastolic CHF(EF:55% by Echo 10/2020), renal artery stenosis s/p renal artery stent >20yrs ago, Carotid artery stenosis, PAD, who presents to [facility] ED 1/15 c/o progressively worsening SOB and cough x3 days. Pt admitted to cardiac telemetry for management of acute on chronic diastolic CHF exacerbation in setting of suspected CAP. Pt s/p diuresis with hospital course complicated by NAV on CKD Stage IV (renal following), hypoxic respiratory failure requiring NRB. Stepped up to CCU for strict fluid status monitoring and potential HD and stepped down to 5LA 1/23. Continuing ID recommendations for infectious work up. Nephrology consulted for NAV, Noted Non oliguric NAV on CKD3 2/2 CRS vs CKD progression; likely DM nephropathy. Noted will likely require HD tomorrow for fluid removal. Plan for temp HD catheter placement.    Spoke with RN; face to face deferred, pt on exposure COVID precautions. RN reports pt with overall decreased PO intake at this time. Reports pt on HFNC however very SOB which makes eating difficult. Ordered currently for dys 2 mech soft low sodium Consistent Carbohydrate with evening snack diet + Ensure enlivex1/day. Ordered for Steroids as well as Night time Insulin, last 3 POCT 171 135 162. BUN/Cr 101/5.49, Phos 4.7.   Please see below for nutritions recommendations. Recs made with team.     Recommendations:  1. Monitor need for PO diet being Held Should PO not be safe/feasible Pending Resp Therapy.  2. However while PO feasible recommend low sodium, mech soft (to provide ease during meals).  >> Suggest d/c Consistent Carbohydrate from order, adjust insulin PRN.  >> Suggest change from ensure to Nepro based on Lytes (425kcal/20gm prot per 1 can).  3. Monitor %PO intake, Will increase use PRN of oral nutrition supplements.  4. Monitor Skin, Wts daily, GI, GOC.  5. Labs: monitor BMP, CBC, glucose, lytes, trend renal indices, LFTs, POCT.   6. Phos Binder PRN.   7. RD to remain available for additional nutrition interventions as needed.      Education:   NA @ this time    Risk Level: High [  x ] Moderate [   ] Low [   ]

## 2021-01-25 NOTE — PROGRESS NOTE ADULT - PROBLEM SELECTOR PLAN 4
- Hx of CAD, s/p CABG 2015.   - CONT: ASA 81mg PO QD, Atorvastatin 20mg PO QD, Lopressor 50mg PO BID.       ## HTN  - VSS.   - CONT: Amlodipine 10mg PO QD, Lopressor 50mg PO BID. - Hx of CAD, s/p CABG 2015.   - CONT: ASA 81mg PO QD, Atorvastatin 20mg PO QD, Lopressor 50mg PO BID.     ## HTN  - VSS.   - CONT: Amlodipine 10mg PO QD, Lopressor 50mg PO BID.

## 2021-01-25 NOTE — PROGRESS NOTE ADULT - ATTENDING COMMENTS
Agree with above.   pt moved to CCU for respiratory distress on Friday evening, now back on 5-lachman, requiring hi dylan nasal canula  poor PO intake at this time  insulin administration reviewed  Steroid administration removed  planned for HD cath placement tomorrow, and likely will proceed with HD  Can hold lantus as pt planned to procedure tomorrow, and can restart 5 units lispro tid with meals post procedure  continue levothyroxine 50mcg daily  continue statin  will follow

## 2021-01-25 NOTE — PROGRESS NOTE ADULT - PROBLEM SELECTOR PLAN 9
- Dx 3wks ago @ [facility].   - CTH 1/19 (-) for acute changes.   - Per neurosurgery, cont ASA 81 PO QD; no neuro interventions at this time.       #DEMENTIA  - CONT: Donepezil 5mg PO QD      ## ANXIETY  - CONT: Valium 1mg QHS. - Dx 3wks ago @ [facility].   - CTH 1/19 (-) for acute changes.   - Per neurosurgery, cont ASA 81 PO QD; no neuro interventions at this time.     #DEMENTIA  - CONT: Donepezil 5mg PO QD    ## ANXIETY  - CONT: Valium 1mg QHS.

## 2021-01-25 NOTE — PROGRESS NOTE ADULT - PROBLEM SELECTOR PLAN 3
CAP vs pnemonitis (inflammatory) and pulmonary fibrosis - on broad spectrum antibiotics and steroids

## 2021-01-25 NOTE — PROGRESS NOTE ADULT - ATTENDING COMMENTS
weekend events noted and discussed with MDs involved  1L UO yesterday, but low again today  O2 sats acceptable on high flow NC  if no improvement in urine output over next 24-48 hours will need to start dialysis

## 2021-01-25 NOTE — PROGRESS NOTE ADULT - SUBJECTIVE AND OBJECTIVE BOX
INTERVAL HPI/OVERNIGHT EVENTS:    Patient is a 86y old  Female who presents with a chief complaint of progressively worsening SOB and cough x 3 days (24 Jan 2021 13:49)      Pt reports the following symptoms:    CONSTITUTIONAL:  Negative fever or chills, feels well, good appetite  EYES:  Negative  blurry vision or double vision  CARDIOVASCULAR:  Negative for chest pain or palpitations  RESPIRATORY:  Negative for cough, wheezing, or SOB   GASTROINTESTINAL:  Negative for nausea, vomiting, diarrhea, constipation, or abdominal pain  GENITOURINARY:  Negative frequency, urgency or dysuria  NEUROLOGIC:  No headache, confusion, dizziness, lightheadedness    MEDICATIONS  (STANDING):  albuterol/ipratropium for Nebulization 3 milliLiter(s) Nebulizer every 6 hours  amLODIPine   Tablet 10 milliGRAM(s) Oral daily  aspirin enteric coated 81 milliGRAM(s) Oral daily  atorvastatin 20 milliGRAM(s) Oral at bedtime  budesonide  80 MICROgram(s)/formoterol 4.5 MICROgram(s) Inhaler 2 Puff(s) Inhalation two times a day  cefepime  Injectable. 1000 milliGRAM(s) IV Push every 24 hours  dextrose 40% Gel 15 Gram(s) Oral once  dextrose 5%. 1000 milliLiter(s) (50 mL/Hr) IV Continuous <Continuous>  dextrose 5%. 1000 milliLiter(s) (100 mL/Hr) IV Continuous <Continuous>  dextrose 50% Injectable 25 Gram(s) IV Push once  dextrose 50% Injectable 12.5 Gram(s) IV Push once  dextrose 50% Injectable 25 Gram(s) IV Push once  diazepam    Tablet 1 milliGRAM(s) Oral daily  donepezil 5 milliGRAM(s) Oral at bedtime  fluticasone propionate 50 MICROgram(s)/spray Nasal Spray 1 Spray(s) Both Nostrils two times a day  furosemide   IVPB 100 milliGRAM(s) IV Intermittent once  glucagon  Injectable 1 milliGRAM(s) IntraMuscular once  heparin   Injectable 5000 Unit(s) SubCutaneous every 12 hours  influenza  Vaccine (HIGH DOSE) 0.7 milliLiter(s) IntraMuscular once  insulin glargine Injectable (LANTUS) 5 Unit(s) SubCutaneous at bedtime  insulin lispro (ADMELOG) corrective regimen sliding scale   SubCutaneous Before meals and at bedtime  insulin lispro Injectable (ADMELOG) 5 Unit(s) SubCutaneous three times a day before meals  levothyroxine 50 MICROGram(s) Oral daily  methylPREDNISolone sodium succinate Injectable 20 milliGRAM(s) IV Push every 8 hours  metolazone 5 milliGRAM(s) Oral once  metoprolol tartrate 50 milliGRAM(s) Oral every 12 hours  senna 1 Tablet(s) Oral daily  sevelamer carbonate 1600 milliGRAM(s) Oral three times a day    MEDICATIONS  (PRN):  acetaminophen   Tablet .. 650 milliGRAM(s) Oral every 6 hours PRN Moderate Pain (4 - 6)  guaiFENesin   Syrup  (Sugar-Free) 100 milliGRAM(s) Oral every 6 hours PRN Cough  zaleplon 5 milliGRAM(s) Oral at bedtime PRN Insomnia      PHYSICAL EXAM  Vital Signs Last 24 Hrs  T(C): 36.3 (25 Jan 2021 09:22), Max: 36.6 (25 Jan 2021 06:00)  T(F): 97.4 (25 Jan 2021 09:22), Max: 97.8 (25 Jan 2021 06:00)  HR: 60 (25 Jan 2021 08:45) (60 - 71)  BP: 164/59 (25 Jan 2021 08:45) (153/72 - 190/84)  BP(mean): 104 (25 Jan 2021 08:45) (103 - 151)  RR: 22 (25 Jan 2021 08:45) (21 - 28)  SpO2: 91% (25 Jan 2021 08:45) (90% - 93%)    Constitutional: wn/wd in NAD.   HEENT: NCAT, MMM, OP clear, EOMI, no proptosis or lid retraction  Neck: no thyromegaly or palpable thyroid nodules   Respiratory: lungs CTAB.  Cardiovascular: regular rhythm, normal S1 and S2, no audible murmurs, no peripheral edema  GI: soft, NT/ND, no masses/HSM appreciated.  Neurology: no tremors, DTR 2+  Skin: no visible rashes/lesions  Psychiatric: AAO x 3, normal affect/mood.    LABS:                        8.5    17.34 )-----------( 349      ( 25 Jan 2021 07:17 )             25.0     01-25    122<L>  |  80<L>  |  101<H>  ----------------------------<  129<H>  4.6   |  20<L>  |  5.49<H>    Ca    8.6      25 Jan 2021 07:17  Phos  7.6     01-25  Mg     1.7     01-25    TPro  5.8<L>  /  Alb  2.9<L>  /  TBili  0.4  /  DBili  x   /  AST  22  /  ALT  34  /  AlkPhos  115  01-25    PT/INR - ( 24 Jan 2021 06:34 )   PT: 14.6 sec;   INR: 1.23          PTT - ( 24 Jan 2021 06:34 )  PTT:30.8 sec    Thyroid Stimulating Hormone, Serum: 1.749 uIU/mL (10-13 @ 05:49)      HbA1C:   CAPILLARY BLOOD GLUCOSE      POCT Blood Glucose.: 135 mg/dL (25 Jan 2021 06:58)  POCT Blood Glucose.: 162 mg/dL (24 Jan 2021 21:10)  POCT Blood Glucose.: 233 mg/dL (24 Jan 2021 17:03)      Insulin Sliding Scale requirements X 24 Hours:    RADIOLOGY & ADDITIONAL TESTS:    A/P: 86y Female with history of DM type II presenting for       1.  DM -     Please continue           units lantus at bedtime  / in the morning and        units lispro with meals and lispro moderate / low dose sliding scale 4 times daily with meals and at bedtime.  Please continue consistent carbohydrate diet.      Goal FSG is   Will continue to monitor   For discharge, pt can continue    Pt can follow up at discharge with [facility] by calling  to make an appointment.   Will discuss case with     and update primary team INTERVAL HPI/OVERNIGHT EVENTS:    Patient is a 86y old  Female who presents with a chief complaint of progressively worsening SOB and cough x 3 days (2021 13:49)  Patient seen and examined at the bedside  He is being planned for possible permacath placement by IR tomorrow. Will be NPO from midnight  on solumedrol 20 Q8H  FSG & Insulin received:  Yesterday:  pre-dinner fs, 5 nutritional lispro   units + 4  units lispro SS  bedtime fs, 5 lantus   units + 2   units lispro SS    Today:  pre-breakfast fs, 5 nutritional lispro   units  pre-lunch fs      ROS otherwise negative    MEDICATIONS  (STANDING):  albuterol/ipratropium for Nebulization 3 milliLiter(s) Nebulizer every 6 hours  amLODIPine   Tablet 10 milliGRAM(s) Oral daily  aspirin enteric coated 81 milliGRAM(s) Oral daily  atorvastatin 20 milliGRAM(s) Oral at bedtime  budesonide  80 MICROgram(s)/formoterol 4.5 MICROgram(s) Inhaler 2 Puff(s) Inhalation two times a day  cefepime  Injectable. 1000 milliGRAM(s) IV Push every 24 hours  dextrose 40% Gel 15 Gram(s) Oral once  dextrose 5%. 1000 milliLiter(s) (50 mL/Hr) IV Continuous <Continuous>  dextrose 5%. 1000 milliLiter(s) (100 mL/Hr) IV Continuous <Continuous>  dextrose 50% Injectable 25 Gram(s) IV Push once  dextrose 50% Injectable 12.5 Gram(s) IV Push once  dextrose 50% Injectable 25 Gram(s) IV Push once  diazepam    Tablet 1 milliGRAM(s) Oral daily  donepezil 5 milliGRAM(s) Oral at bedtime  fluticasone propionate 50 MICROgram(s)/spray Nasal Spray 1 Spray(s) Both Nostrils two times a day  furosemide   IVPB 100 milliGRAM(s) IV Intermittent once  glucagon  Injectable 1 milliGRAM(s) IntraMuscular once  heparin   Injectable 5000 Unit(s) SubCutaneous every 12 hours  influenza  Vaccine (HIGH DOSE) 0.7 milliLiter(s) IntraMuscular once  insulin glargine Injectable (LANTUS) 5 Unit(s) SubCutaneous at bedtime  insulin lispro (ADMELOG) corrective regimen sliding scale   SubCutaneous Before meals and at bedtime  insulin lispro Injectable (ADMELOG) 5 Unit(s) SubCutaneous three times a day before meals  levothyroxine 50 MICROGram(s) Oral daily  methylPREDNISolone sodium succinate Injectable 20 milliGRAM(s) IV Push every 8 hours  metolazone 5 milliGRAM(s) Oral once  metoprolol tartrate 50 milliGRAM(s) Oral every 12 hours  senna 1 Tablet(s) Oral daily  sevelamer carbonate 1600 milliGRAM(s) Oral three times a day    MEDICATIONS  (PRN):  acetaminophen   Tablet .. 650 milliGRAM(s) Oral every 6 hours PRN Moderate Pain (4 - 6)  guaiFENesin   Syrup  (Sugar-Free) 100 milliGRAM(s) Oral every 6 hours PRN Cough  zaleplon 5 milliGRAM(s) Oral at bedtime PRN Insomnia      PHYSICAL EXAM  Vital Signs Last 24 Hrs  T(C): 36.3 (2021 09:22), Max: 36.6 (2021 06:00)  T(F): 97.4 (2021 09:22), Max: 97.8 (2021 06:00)  HR: 60 (2021 08:45) (60 - 71)  BP: 164/59 (2021 08:45) (153/72 - 190/84)  BP(mean): 104 (2021 08:45) (103 - 151)  RR: 22 (2021 08:45) (21 - 28)  SpO2: 91% (2021 08:45) (90% - 93%)    Due to the nature of this patient’s COVID-19 isolation status (either confirmed or suspected), this note was prepared without a bedside physical examination to prevent spread of infection and to conserve personal protective equipment during this nationwide pandemic. If possible, direct patient communication occurred via electronic measures or telephone conversation. Examination highlights were provided by a bedside nurse wearing personal protective equipment and review of pertinent medical records. Objective data (vital signs, urine output, lab results, imaging studies, medications, etc) were reviewed in detail. Face to face visits and physical examination have been limited only to patients for whom it is required for medical decision making.    LABS:                        8.5    17.34 )-----------( 349      ( 2021 07:17 )             25.0     01-25    122<L>  |  80<L>  |  101<H>  ----------------------------<  129<H>  4.6   |  20<L>  |  5.49<H>    Ca    8.6      2021 07:17  Phos  7.6       Mg     1.7         TPro  5.8<L>  /  Alb  2.9<L>  /  TBili  0.4  /  DBili  x   /  AST  22  /  ALT  34  /  AlkPhos  115      PT/INR - ( 2021 06:34 )   PT: 14.6 sec;   INR: 1.23          PTT - ( 2021 06:34 )  PTT:30.8 sec    Thyroid Stimulating Hormone, Serum: 1.749 uIU/mL (10-13 @ 05:49)      HbA1C:   CAPILLARY BLOOD GLUCOSE      POCT Blood Glucose.: 135 mg/dL (2021 06:58)  POCT Blood Glucose.: 162 mg/dL (2021 21:10)  POCT Blood Glucose.: 233 mg/dL (2021 17:03)      Insulin Sliding Scale requirements X 24 Hours:    RADIOLOGY & ADDITIONAL TESTS:    A/P: 86 yr old F, POOR HISTORIAN/early dementia, former heavy smoker with PMHx HTN, hyperlipidemia, COPD, hypothyroidism, Stage IV CKD (baseline Cr ~3.0), anemia of chronic disease, CAD s/p CABG  Dr. Blunt, chronic diastolic CHF(EF:55% by Echo 10/2020), renal artery stenosis s/p renal artery stent >20yrs ago, Carotid artery stenosis, PAD, who presents to [facility] ED 1/15/21 c/o progressively worsening SOB and cough x 3 days. Currently on steroids from pneumonitis    1.  Steroid induced hyperglycemia  - hba1c 5.1  Cr 5.49 and GFr 20.     Please hold off Lantus tonight since the patient is going to be NPO.  Please continue 5  units lispro with meals and lispro moderate dose sliding scale 4 times daily with meals and at bedtime.    Please continue consistent carbohydrate diet.    on solumedrol 20mg Q8H    2. Hypothyroidism  - TSH 1.7  please obtain free T4  Please continue levothyroxine 50mcg once daily for now    Will continue to monitor       Discussed case with     and updated primary team

## 2021-01-25 NOTE — PROGRESS NOTE ADULT - PROBLEM SELECTOR PLAN 10
- CONT: Levothyroxine 50mg PO QD.     #DM  - Hx of DM. A1c 5.1% on admission.    - CONT: lantus 10 qhs, lispro 5u premeal, ISS  - Endocrine following        F: none   E: renal patient   N: dysphagia 2 mechanical soft-thin liquids, not eating a lot and poor appetite. Encourage PO fluids.   DVT ppx: Hep SubQ    Dispo: cardiac telemetry - CONT: Levothyroxine 50mg PO QD.     #DM  - Hx of DM. A1c 5.1% on admission.    - CONT: lantus 10 qhs, lispro 5u premeal, ISS  - Endocrine following    F: none   E: renal patient   N: dysphagia 2 mechanical soft-thin liquids, not eating a lot and poor appetite. Encourage PO fluids.   DVT ppx: Hep SubQ  Dispo: cardiac telemetry

## 2021-01-25 NOTE — PROGRESS NOTE ADULT - SUBJECTIVE AND OBJECTIVE BOX
PUD CCM    INTERVAL HPI/OVERNIGHT EVENTS:    Failing kidneys, eGFR still at 7 ml/min.  Saturation 94% on 71% high flow with 40 L.  Not possible to change to nasal cannula.  Lungs are well aerated with bilateral rhonchi, rales.   Steroids at 20 mg q8 for ILD, lower due to hyperglycemia.  Hyperglycemia treated with insulin.  Poor appetite due to kidney failure.  Thrush being treated with nystatin.  Will probably need hemodialysis.  Remains oliguric.  CXR pending    Discussed at length.    All new data reviewed, including VS, lab, imaging, Rx and documentation.    PAST MEDICAL & SURGICAL HISTORY:  Essential hypertension  Hypertension    Type 2 diabetes mellitus  Diabetes    Hyperlipidemia  Hyperlipidemia    Disorder of kidney and ureter  Renal insufficiency    Peripheral vascular disease  PVD (peripheral vascular disease)    Anxiety state  Anxiety    Atherosclerosis of renal artery  with resulting stent placement    Atherosclerosis of renal artery  Renal artery stenosis    FAMILY HISTORY:  Family history of ischemic heart disease  Family history of coronary artery disease.    SOCIAL HISTORY:    REVIEW OF SYSTEMS:  Constitutional: (+) weight change, (-) fever,  () chills, (+) fatigue, (-) night sweats  Eyes: (-) discharge, () eye pain, () visual change  ENT:  (-) hearing difficulty, () vertigo, () sinus pain,  () throat pain, () epistaxis, () dysphagia, () hoarseness  Neck: (-) pain, () stiffness, () swelling  Respiratory: (+) cough, (-) wheezing, () hemoptysis      Cardiovascular: (-) chest pain, ()palpitations, () dizziness   Gastrointestinal: (-) abdominal pain, (+) nausea, () vomiting, () hematemesis, () diarrhea,  () constipation, () melena  Genitourinary:  (-) dysuria, () frequency, () hematuria, () incontinence      Neurologic: (-) headache, () memory loss, () loss of strength, () numbness, () tremor     Skin: (-) itching, () burning, () rash, () lesions   Lymphatic: (-) enlarged lymph nodes  Endocrine: (-) hair loss, () temperature intolerance         Musculoskeletal: (-) back pain, () joint pain,  () extremity pain  Psychiatric: (-) visual change, () auditory change, () depression, () anxiety, () suicidal  Sleep: (-) disorder, () insomnia, () sleep deprivation  Heme/Lymph: () easy bruising, () bleeding gums            Allergy and Immunologic: () hives, () eczema    Vital Signs Last 24 Hrs  T(C): 36.2 (19 Jan 2021 09:44), Max: 37.5 (18 Jan 2021 18:09)  T(F): 97.1 (19 Jan 2021 09:44), Max: 99.5 (18 Jan 2021 18:09)  HR: 74 (19 Jan 2021 08:44) (72 - 82)  BP: 148/62 (19 Jan 2021 08:44) (130/59 - 165/71)  BP(mean): --  RR: 24 (19 Jan 2021 08:44) (18 - 24)  SpO2: 94% (19 Jan 2021 08:44) (93% - 97%) ON NONREBREATHER    I&O's Detail    18 Jan 2021 07:01  -  19 Jan 2021 07:00  --------------------------------------------------------  IN:    IV PiggyBack: 250 mL    Oral Fluid: 1020 mL  Total IN: 1270 mL    OUT:    Voided (mL): 1700 mL  Total OUT: 1700 mL    Total NET: -430 mL      19 Jan 2021 07:01  -  19 Jan 2021 11:48  --------------------------------------------------------  IN:    IV PiggyBack: 100 mL  Total IN: 100 mL    OUT:    Voided (mL): 200 mL  Total OUT: 200 mL    Total NET: -100 mL    PHYSICAL EXAM:  in bed, less confused, in isolation due to Covid-19  Developing muscle atrophy uncomfortable, - acute distress; vital signs are monitored continuously  Eyes: PERRLA, EOMI, -conjunctivitis, -scleritis   Head: no focal deficit, normocephalic,  no trauma  ENMT: moist tongue, + thrush, -nasal discharge, -hoarseness, normal hearing, -cough, -hemoptysis, trachea midline  Neck: supple, - lymphadenopathy,  -masses, -JVD  Respiratory: bilateral diminished breath sounds, -wheezing, -rhonchi, + rales both lower lungs improving, + crackles both lower lungs  Chest: -accessory muscle use, -paradoxical breathing  Cardiovascular: irregular rate and sinus rhythm, S1 S2 normal, -S3, -S4, -murmur, -gallop, -rub  Gastrointestinal: soft, nontender, nondistended, normal bowel sounds, no hepatosplenomegaly  Genitourinary: -flank pain, -dysuria, + Cason  Extremities: -clubbing, -cyanosis, -edema    Vascular: peripheral pulses palpable 2+ bilaterally  Neurological: alert, oriented x 3, no focal deficit, - tremor  Skin: warm, dry, -erythema, iv site intact  Lymph nodes; no cervical, supraclavicular or axillary adenopathy  Psychiatric: cooperative but wants to go home    MEDICATIONS  (STANDING):  albuterol/ipratropium for Nebulization 3 milliLiter(s) Nebulizer every 6 hours  amLODIPine   Tablet 10 milliGRAM(s) Oral daily  atorvastatin 20 milliGRAM(s) Oral at bedtime  azithromycin  IVPB 500 milliGRAM(s) IV Intermittent every 24 hours  budesonide  80 MICROgram(s)/formoterol 4.5 MICROgram(s) Inhaler 2 Puff(s) Inhalation two times a day  donepezil 5 milliGRAM(s) Oral at bedtime  fluticasone propionate 50 MICROgram(s)/spray Nasal Spray 1 Spray(s) Both Nostrils two times a day  heparin   Injectable 5000 Unit(s) SubCutaneous every 12 hours  influenza  Vaccine (HIGH DOSE) 0.7 milliLiter(s) IntraMuscular once  iron sucrose IVPB 200 milliGRAM(s) IV Intermittent every 24 hours  levothyroxine 50 MICROGram(s) Oral daily  metoprolol tartrate 50 milliGRAM(s) Oral two times a day  cefepime  sevelamer carbonate 800 milliGRAM(s) Oral three times a day with meals    MEDICATIONS  (PRN):  benzonatate 100 milliGRAM(s) Oral every 8 hours PRN Cough  guaiFENesin   Syrup  (Sugar-Free) 100 milliGRAM(s) Oral every 6 hours PRN Cough    Allergies    No Known Allergies    Intolerances    LABS:                        9    12 )-----------( 230                   125  |  99  |  70<H>  ----------------------------<  114<H>  4   |  22  |  4.3 <H>    Ca    8.5  Phos  7  Mg     1.8      TPro  5.9<L>  /  Alb  2.7<L>  /  TBili  x   /  DBili  x   /  AST  x   /  ALT  x   /  AlkPhos  x   01-18    +DVT prophylaxis  5000 q12  +Sleep  DECREASED, may add melatonin   +Nutrition goals  POOR APPETITE   -Pain DENIED  -Decubital ulcer  +GI prophylaxis (PPV, coagulopathy, Hx)  +Aspiration prophylaxis (45 degrees)  Ventilator synchronized   MAY NEED BiPAP PRN  Tracheal cuff pressure  +Sedation/analgesia stopped once  +ID (phos, CH, mupi, SB)  Candida, AGREE WITH CEFEPIME  -Delirium  +Cardiac Beta/ACEI-ARB on hold/ASA 81/statin  +Prevention  +Education  +Medication reviewed (drug-drug interactions, PDA)  Medical devices  URINE catheter- Cason, NC, IV  Discussed with Dr Anderson, CCRN, RT    RADIOLOGY & ADDITIONAL STUDIES:    CXR with bilateral infiltrates, no change    EXAM:  CT CHEST                          PROCEDURE DATE:  10/14/2020      INTERPRETATION:  CT SCAN OF CHEST    History: 85-year-old female with shortness of breath and pleural effusion    Technique: CT scan of chest performed from lung apices through lung bases. Axial, coronal, and sagittal multiplanar reformatted images were produced. Thin section axial images and axial MIPS were also produced. Intravenous contrast material was not administered, as ordered.    Comparison: Chest radiograph dated 10/13/2020. No prior thoracic CT examinations.    Findings:    Lungs and large airways: Normal.    Pleura:  There are small bibasilar pleural effusions right greater than left. There is interlobular septal thickening which extends into the interior of the lung to suggest a component of pulmonary venous congestion. Moreover there are peripheral reticular opacities with some associated groundglass component within the peripheral aspects of both lungs. More peripheral groundglass and consolidation is seen within the right upper left upper and right middle lobes. There is some pulmonary fibrosis with traction bronchiectasis and bronchiolectasis without jesús honeycomb lung formation. There is air trapping.    Mediastinum and hilar regions: Increased number of mildly enlarged prevascular (1.1 cm in short axis), right paratracheal (1.1 cm in short axis), and subcarinal (1.7 cm in short axis).    Heart and pericardium:  Cardiomegaly. No pericardial effusion. Extensive calcification of the mitral annulus.    Vessels:  Severe coronary artery calcification/stents. Severe callus  5. Atheromatous plaque formation throughout the aortic arch descending and proximal abdominal aorta and major side branches with extensive calcification of the origin of the renal arteries right greater than left. Calcification of the origin of the brachycephalic vessels are noted. No evidence of aneurysm    Chest wall and lower neck:  Punctate microcalcifications are noted in the breasts bilaterally left greater than right. Clinical and mammographic correlation.    Upper abdomen: Cholelithiasis. Extensive arterial vascular calcification of the major side branches of the abdominal aorta as above.    Bones: Moderate multilevel degenerative disc disease involving the lower thoracic spine. Poststernotomy.      Impression:    1. Cardiomegaly. Small bibasilar pleural effusions and interlobular septal thickening compatible with pulmonary venous congestion.  2. More peripheral reticular, groundglass and some patchy areas of peripheral consolidation more pronounced in the upper and midlung zones are noted. There may be some bronchiectasis/bronchiolectasis to suggest pulmonary fibrosis. No honeycomb lung formation. Air-trapping is noted. These findings are not consistent with UIP/IPF. Abbreviated differential includes atypical infectious and/or inflammatory etiologies such as chronic hypersensitivity pneumonia, stage IV sarcoidosis, pneumoconiosis, and subacute and/or chronic sequela of atypical and viral pneumonias such as Covid 19.  3. Mild mediastinal lymphadenopathy.    EXAM:  CT CHEST                          PROCEDURE DATE:  01/19/2021      INTERPRETATION:  CT of the CHEST without intravenous contrast dated 1/19/2021 2:31 PM    INDICATION: Worsening Hypoxia    TECHNIQUE: CT of the chest was performed withoutintravenous contrast.  Intravenous contrast was not used Axial and coronal and sagittal images were produced and reviewed.    PRIOR STUDIES: CT chest 10/14/2020    FINDINGS: Cardiomegaly as before. Dense mitral valve annular calcification. Coronary artery calcifications. Status post CABG. The main pulmonary artery measures 3.1 cm diameter.  No pericardial effusion is seen.  Evaluation of the vasculature is limited without intravenous contrast, but the great vessels are grossly unremarkable.  Evaluation for adenopathy is limited without intravenous contrast. Within that limitation, mild mediastinal lymphadenopathy is seen. No axillary adenopathy. Abandoned epicardial pacer wire. Low-density of cardiac chambers relative to the myocardium suggesting anemia.    Small bilateral pleural effusions are seen. Evaluation of the pulmonary parenchyma demonstrates underlying interstitial fibrosis in the upper and mid zones with bilateral mosaic attenuation. There is now new bilateral groundglass opacities  in the upper and mid and lower zones with thickening of interlobular septa at the bilateral upper zones i.e. crazy paving  appearance.. Coalescence of findings with consolidation seen in the superior segment right lower lobe.    Limited evaluation of the upper abdomen demonstrates radiopaque cholelithiasis.    Evaluation of the osseous structures demonstrates degenerative changes.      IMPRESSION: New bilateral groundglass lung opacities superimposed on chronic interstitial pulmonary fibrosis probably due to chronic hypersensitivity pneumonitis or sarcoid. The groundglass lung opacities could  be due to pulmonary edema, pulmonary hemorrhage, alveolar proteinosis, organizing pneumonia or atypical infection.    Assessment and Plan:    Problem/Plan - 1:  ·  Problem: Hypoxia.  Plan: Very high ESR and CRP are decreasing.  Continue 20 q8     Problem/Plan - 2:  ·  Problem: Anemia.     Problem/Plan - 3:  ·  Problem: NAV (acute kidney injury) on chronic renal failure.  Plan: Furosemide for anuria. Will probably need hemodialysis     Problem/Plan - 4:  ·  Problem: Hyponatremia, worsening     Problem/Plan - 5:  ·  Problem: Type 2 diabetes mellitus, uncontrolled. Endocrinology management very much appreciated.     Problem/Plan - 6:  Problem: COPD (chronic obstructive pulmonary disease). Plan: Continue bronchodilators.     Problem/Plan - 7:  ·  Problem: CAD (coronary artery disease).  Plan: BB. ASA 81     Problem/Plan - 8:  ·  Problem: Acute on chronic diastolic congestive heart failure.      Problem/Plan - 9:  ·  Problem: CAP (community acquired pneumonia).  Plan: Bilateral infiltrates are severe.  ID very much appreciated. Continue cefepime. Nystatin. Procalcitonin decreasing.     Problem/Plan - 10:  Problem: Cough. Plan; Perhaps from pulmonary fibrosis also. Nonproductive cough. Benzonatate discontinued to avoid adverse effects.      CXR pending  BiPAP for hemodialysis catheter insertion.

## 2021-01-25 NOTE — PROGRESS NOTE ADULT - PROBLEM SELECTOR PLAN 6
- CONT: symbicort inhaler BID and Duonebs  - PRN Robutussin/Benonatate for cough  - not currently on Home Oxygen. Will assess O2 on room air once treatment for CAP is complete and respiratory status improves.  -Nystatin - CONT: symbicort inhaler BID and Duonebs  - PRN Robutussin/Benonatate for cough  - not on Home Oxygen. Currently on HFNC 50/60 per Dr Levi.  - started Nystatin swish and spit for thrush  - Check QTc on EKG. Switch Nystatin to Fluconazole 200mg IV tonight if QTC acceptable, and then 50mg PO qd in AM

## 2021-01-25 NOTE — PROGRESS NOTE ADULT - ASSESSMENT
Acute respiratory failure with pneumonitis  Concern for infectious component  Leukocytosis while on steroids  Thrush  Renal failure with consideration for HD      RECOMMEND  Continue Cefepime  Would treat thrush systemically with Fluconazole especially in light of planned CVC placement for HD, which will place the patient at risk for candidemia,  Would give 200 mg IV once and then 50 mg PO daily.  When patient of HD, dosing will need to be further adjusted.  Please assure nl QT intrval  Obtain manual differential / blood smear Acute respiratory failure with pneumonitis  Concern for infectious component  Leukocytosis while on steroids  Thrush   Renal failure with consideration for HD      RECOMMEND  Continue Cefepime  Would treat thrush systemically with Fluconazole especially in light of planned CVC placement for HD, which will place the patient at risk for candidemia,  Would give 200 mg IV once and then 50 mg PO daily.  When patient of HD, dosing will need to be further adjusted.  Please assure nl QT intrval  Obtain manual differential / blood smear

## 2021-01-25 NOTE — PROGRESS NOTE ADULT - ASSESSMENT
86 year old female admitted with pneumonic process complicated by acute on chronic diastolic heart failure.  Ongoing problems include oliguria in  the setting of worsening BUN and creatinine and electrolytes and now worsening hypoxia once again.

## 2021-01-25 NOTE — PROGRESS NOTE ADULT - PROBLEM SELECTOR PLAN 8
- Switched meds to NS from D5.   - Na 125 1/22; repeat 1/23.   - Pt given NS 50cc/hr x5hrs, with repeat BMP.   - Continue to monitor.   - F/u renal recs. - Switched meds to NS from D5.   - Na 122  - Continue to monitor.   - F/u renal recs.

## 2021-01-25 NOTE — PROGRESS NOTE ADULT - PROBLEM SELECTOR PLAN 5
- Baseline Hgb 8-9  - Likely 2/2 to CKD and anemia of chronic disease  - s/p 1u pRBC transfusion on 1/20   - holding IV iron due to treatment for presumed CAP  -Stool for occult blood negative 1/18  -Maintain active T&S (since 1/21) - Baseline Hgb 8-9  - Likely 2/2 to CKD and anemia of chronic disease  - s/p 1u pRBC transfusion on 1/20  - holding IV iron due to treatment for presumed CAP  -Stool for occult blood negative 1/18  -Maintain active T&S (since 1/21)

## 2021-01-25 NOTE — PROGRESS NOTE ADULT - PROBLEM SELECTOR PLAN 2
Scant amounts of urine in the setting of worsening renal parameters.  Awaiting possible HD as per renal

## 2021-01-25 NOTE — PROGRESS NOTE ADULT - PROBLEM SELECTOR PLAN 1
- Pt w/ diminished B/L breath sounds; (+) JVD; BNP 57k.   - POCUS by MICU consult 1/22/21 (+) for B-lines throughout.   - TTE 1/20: EF 53%, biatrial enlargement, mild AR, mod-severe MR, mod-severe TR, PASP 70.   -100mg IV lasix challenge given x 1  - CONT: Lopressor 50mg PO BID.   - **MONITOR urine output; tucker inserted 1/22/21. - Pt w/ diminished B/L breath sounds; (+) JVD; BNP 57k.   - POCUS by MICU consult 1/22/21 (+) for B-lines throughout.   - TTE 1/20: EF 53%, biatrial enlargement, mild AR, mod-severe MR, mod-severe TR, PASP 70.   - Given IV Lasix 100mg w/ Metolazone 5mg PO in AM, 160mg in PM per Renal on 1/25  - CONT: Lopressor 50mg PO BID.   - **MONITOR urine output; tucker inserted 1/22/21.

## 2021-01-25 NOTE — PROGRESS NOTE ADULT - SUBJECTIVE AND OBJECTIVE BOX
Patient is a 86y old  Female who presents with a chief complaint of progressively worsening SOB and cough x 3 days (25 Jan 2021 16:24) whose hospital course has been complicated by NAV and hypoxia.  She is confused today - she says she is aware that she is confused and family is aware as well.  SHe is also tachypneic on high flow nasal cannula with oxygenation varying from 90% to100% depending on when she mouth breathes.    Vital Signs Last 24 Hrs  T(C): 36.6 (25 Jan 2021 13:28), Max: 36.6 (25 Jan 2021 06:00)  T(F): 97.8 (25 Jan 2021 13:28), Max: 97.8 (25 Jan 2021 06:00)  HR: 66 (25 Jan 2021 15:18) (60 - 71)  BP: 148/70 (25 Jan 2021 13:10) (148/70 - 190/84)  BP(mean): 100 (25 Jan 2021 13:10) (100 - 151)  RR: 22 (25 Jan 2021 15:18) (20 - 28)  SpO2: 94% (25 Jan 2021 15:18) (90% - 96%)    acetaminophen   Tablet .. 650 milliGRAM(s) Oral every 6 hours PRN  albuterol/ipratropium for Nebulization 3 milliLiter(s) Nebulizer every 6 hours  amLODIPine   Tablet 10 milliGRAM(s) Oral daily  aspirin enteric coated 81 milliGRAM(s) Oral daily  atorvastatin 20 milliGRAM(s) Oral at bedtime  budesonide  80 MICROgram(s)/formoterol 4.5 MICROgram(s) Inhaler 2 Puff(s) Inhalation two times a day  cefepime   IVPB 1000 milliGRAM(s) IV Intermittent every 24 hours  dextrose 40% Gel 15 Gram(s) Oral once  dextrose 5%. 1000 milliLiter(s) IV Continuous <Continuous>  dextrose 5%. 1000 milliLiter(s) IV Continuous <Continuous>  dextrose 50% Injectable 25 Gram(s) IV Push once  dextrose 50% Injectable 12.5 Gram(s) IV Push once  dextrose 50% Injectable 25 Gram(s) IV Push once  diazepam    Tablet 1 milliGRAM(s) Oral daily  donepezil 5 milliGRAM(s) Oral at bedtime  fluticasone propionate 50 MICROgram(s)/spray Nasal Spray 1 Spray(s) Both Nostrils two times a day  furosemide   IVPB 120 milliGRAM(s) IV Intermittent once  glucagon  Injectable 1 milliGRAM(s) IntraMuscular once  guaiFENesin   Syrup  (Sugar-Free) 100 milliGRAM(s) Oral every 6 hours PRN  heparin   Injectable 5000 Unit(s) SubCutaneous every 12 hours  influenza  Vaccine (HIGH DOSE) 0.7 milliLiter(s) IntraMuscular once  insulin glargine Injectable (LANTUS) 5 Unit(s) SubCutaneous at bedtime  insulin lispro (ADMELOG) corrective regimen sliding scale   SubCutaneous Before meals and at bedtime  insulin lispro Injectable (ADMELOG) 5 Unit(s) SubCutaneous three times a day before meals  levothyroxine 50 MICROGram(s) Oral daily  methylPREDNISolone sodium succinate Injectable 20 milliGRAM(s) IV Push every 8 hours  metoprolol tartrate 50 milliGRAM(s) Oral every 12 hours  nystatin    Suspension [id number] Unit(s) Oral four times a day  senna 1 Tablet(s) Oral daily  sevelamer carbonate 1600 milliGRAM(s) Oral three times a day    PHYSICAL EXAM:  Constitutional: tachypneic  Eyes:  sclera anicteric  ENMT: mmm  Neck:  no JVD  Respiratory:   basilar rales, scattered rhonchi  Cardiovascular:  RRR  Gastrointestinal:  softly distended  Genitourinary:  no suprapubic masses  Extremities:  no pedal edema  Vascular:   no palpable cords  Neurological:  grossly normal but confused at time today  Skin:  warm, no rashes  Musculoskeletal:  no joint effusions or deformities  Psychiatric:  tearful and upset, wants to go home

## 2021-01-25 NOTE — PROGRESS NOTE ADULT - SUBJECTIVE AND OBJECTIVE BOX
INTERVAL HPI/OVERNIGHT EVENTS:    Events reviewed  Remains with dyspnea  Consideration of HD  S/P COVID exposure     MEDICATIONS  (STANDING):  albuterol/ipratropium for Nebulization 3 milliLiter(s) Nebulizer every 6 hours  amLODIPine   Tablet 10 milliGRAM(s) Oral daily  aspirin enteric coated 81 milliGRAM(s) Oral daily  atorvastatin 20 milliGRAM(s) Oral at bedtime  budesonide  80 MICROgram(s)/formoterol 4.5 MICROgram(s) Inhaler 2 Puff(s) Inhalation two times a day  cefepime   IVPB 1000 milliGRAM(s) IV Intermittent every 24 hours  dextrose 40% Gel 15 Gram(s) Oral once  dextrose 5%. 1000 milliLiter(s) (50 mL/Hr) IV Continuous <Continuous>  dextrose 5%. 1000 milliLiter(s) (100 mL/Hr) IV Continuous <Continuous>  dextrose 50% Injectable 25 Gram(s) IV Push once  dextrose 50% Injectable 12.5 Gram(s) IV Push once  dextrose 50% Injectable 25 Gram(s) IV Push once  diazepam    Tablet 1 milliGRAM(s) Oral daily  donepezil 5 milliGRAM(s) Oral at bedtime  fluticasone propionate 50 MICROgram(s)/spray Nasal Spray 1 Spray(s) Both Nostrils two times a day  glucagon  Injectable 1 milliGRAM(s) IntraMuscular once  heparin   Injectable 5000 Unit(s) SubCutaneous every 12 hours  influenza  Vaccine (HIGH DOSE) 0.7 milliLiter(s) IntraMuscular once  insulin glargine Injectable (LANTUS) 5 Unit(s) SubCutaneous at bedtime  insulin lispro (ADMELOG) corrective regimen sliding scale   SubCutaneous Before meals and at bedtime  insulin lispro Injectable (ADMELOG) 5 Unit(s) SubCutaneous three times a day before meals  levothyroxine 50 MICROGram(s) Oral daily  methylPREDNISolone sodium succinate Injectable 20 milliGRAM(s) IV Push every 8 hours  metoprolol tartrate 50 milliGRAM(s) Oral every 12 hours  nystatin    Suspension 420233 Unit(s) Oral four times a day  senna 1 Tablet(s) Oral daily  sevelamer carbonate 1600 milliGRAM(s) Oral three times a day    MEDICATIONS  (PRN):  acetaminophen   Tablet .. 650 milliGRAM(s) Oral every 6 hours PRN Moderate Pain (4 - 6)  guaiFENesin   Syrup  (Sugar-Free) 100 milliGRAM(s) Oral every 6 hours PRN Cough      Allergies    No Known Allergies      EXAM  Vital Signs Last 24 Hrs  T(C): 35.7 (25 Jan 2021 17:57), Max: 36.6 (25 Jan 2021 06:00)  T(F): 96.2 (25 Jan 2021 17:57), Max: 97.8 (25 Jan 2021 06:00)  HR: 65 (25 Jan 2021 18:02) (60 - 70)  BP: 165/76 (25 Jan 2021 18:02) (148/70 - 190/84)  BP(mean): 109 (25 Jan 2021 18:02) (100 - 151)  RR: 20 (25 Jan 2021 18:02) (20 - 28)  SpO2: 92% (25 Jan 2021 18:02) (90% - 96%)  On high flow O2  With some dyspnea  Awake and alert  Responding to questions  No rash  Thrush and dry oral mucosa  CV RR with ectopy  Chest with rhonchi  Abd soft NT  No edema  Cason cath with urine output      LABS:                        8.5    17.34 )-----------( 349      ( 25 Jan 2021 07:17 )             25.0     01-25    122<L>  |  80<L>  |  101<H>  ----------------------------<  129<H>  4.6   |  20<L>  |  5.49<H>    Ca    8.6      25 Jan 2021 07:17  Phos  7.6     01-25  Mg     1.7     01-25    TPro  5.8<L>  /  Alb  2.9<L>  /  TBili  0.4  /  DBili  x   /  AST  22  /  ALT  34  /  AlkPhos  115  01-25    PT/INR - ( 24 Jan 2021 06:34 )   PT: 14.6 sec;   INR: 1.23          PTT - ( 24 Jan 2021 06:34 )  PTT:30.8 sec      MICROBIOLOGY:    Respiratory Viral Panel with COVID-19 by ELANA (01.24.21 @ 18:42)    Rapid RVP Result: [facility]    SARS-CoV-2: NotDete: This Respiratory Panel uses polymerase chain reaction (PCR) to detect for  adenovirus; coronavirus (HKU1, NL63, 229E, OC43); human metapneumovirus  (hMPV); human enterovirus/rhinovirus (Entero/RV); influenza A; influenza  A/H1; influenza A/H3; influenza A/H1-2009; influenza B; parainfluenza  viruses 1, 2, 3, 4; respiratory syncytial virus; Mycoplasma pneumoniae;  Chlamydophila pneumoniae; and SARS-CoV-2.      Procalcitonin, Serum (01.24.21 @ 06:34)    Procalcitonin, Serum: 1.08: Procalcitonin (PCT) Interpretation (ng/mL) - Diagnosis of systemic  bacterial infection/sepsis  PCT < 0.5: Systemic infection (sepsis) is not likely and risk for  progression to severe systemic infection is low. Local bacterial  infection is possible. If early sepsis is suspected clinically, PCT  should be re-assessed in 6-24 hours.  PCT >/= 0.5 but < 2.0: Systemic infection (sepsis) is possible, but other  conditions are known to elevate PCT as well. Moderate risk for  progression to severe systemic infection. The patient should be closely  monitored both clinically and by re-assessing PCT within 6-24 hours.  PCT >/= 2.0 but < 10.0: Systemic infection (sepsis) is likely, unless  other causes are known. High risk of progression to severe systemic  infection (severe sepsis/septic shock).  PCT >/= 10.0: Important systemic inflammatory response, almost  exclusively due to severe bacterial sepsis or septic shock. High  likelihood of severe sepsis or septic shock. ng/mL      Legionella pneumophila Antigen, Urine (01.23.21 @ 09:08)    Legionella Antigen, Urine: Negative: Negative Testing method: Immunochromatographic Assay. L. pneumpohila  serogroup 1 antigen in urine NOT detected, suggesting NO recent or  current infection. Infection due to Legionella cannot be ruled out: other  serogroups and species may cause disease, antigen may not be present in  urine in early infection, or the level of antigens in urine may be below  the detection limit of the test.  Order “Culture –Legionella” is  recommended for uncommon cases of suspected Legionella pneumonia due to  organisms other than L. pneumophila serogroup 1.  Please submit a first morning sputum specimen for Legionella culture.    -------------    MRSA/MSSA PCR (01.23.21 @ 21:09)    MRSA PCR Result.: Negative: The Cepheid Xpert SA Nasal Complete Assay performed on the [employer]  System is a qualitative in vitro diagnostic test using PCR for the rapid  detection of Staphylococcus Aureus(SA)and methicillin - resistant  Staphylococcus Aureus(MRSA)DNA from nasal swabs in patients at risk for  nasal colonization.  It is not intended to diagnose, guide or monitor  treatment for MRSA/SA infections.  A negative result does not preclude  MRSA/SA nasal colonization.  The assay is FDA-approved and its  performance has been established by [employer], [employer] and the [facility], [place], [place]    Staph Aureus PCR Result: Positive    -------------------------------------------      RADIOLOGY & ADDITIONAL STUDIES:      Xray Chest 1 View- PORTABLE-Routine (Xray Chest 1 View- PORTABLE-Routine in AM.) (01.24.21 @ 08:33) >    EXAM:  XR CHEST PORTABLE ROUTINE 1V                          PROCEDURE DATE:  01/24/2021          INTERPRETATION:  Clinical History: Pneumonia    Frontal examination of the chest demonstrates mild cardiac. No interval change lung infiltrates in comparison to prior examination of the chest 1/23/2021. Dextroscoliosis thoracic spine with degenerative changes. Status post sternotomy. Calcification noted involving thoracic aorta.    Impression: No interval change lung infiltrates     INTERVAL HPI/OVERNIGHT EVENTS:    Events reviewed  Remains with dyspnea  Consideration of HD  S/P COVID exposure     MEDICATIONS  (STANDING):  albuterol/ipratropium for Nebulization 3 milliLiter(s) Nebulizer every 6 hours  amLODIPine   Tablet 10 milliGRAM(s) Oral daily  aspirin enteric coated 81 milliGRAM(s) Oral daily  atorvastatin 20 milliGRAM(s) Oral at bedtime  budesonide  80 MICROgram(s)/formoterol 4.5 MICROgram(s) Inhaler 2 Puff(s) Inhalation two times a day  cefepime   IVPB 1000 milliGRAM(s) IV Intermittent every 24 hours  dextrose 40% Gel 15 Gram(s) Oral once  dextrose 5%. 1000 milliLiter(s) (50 mL/Hr) IV Continuous <Continuous>  dextrose 5%. 1000 milliLiter(s) (100 mL/Hr) IV Continuous <Continuous>  dextrose 50% Injectable 25 Gram(s) IV Push once  dextrose 50% Injectable 12.5 Gram(s) IV Push once  dextrose 50% Injectable 25 Gram(s) IV Push once  diazepam    Tablet 1 milliGRAM(s) Oral daily  donepezil 5 milliGRAM(s) Oral at bedtime  fluticasone propionate 50 MICROgram(s)/spray Nasal Spray 1 Spray(s) Both Nostrils two times a day  glucagon  Injectable 1 milliGRAM(s) IntraMuscular once  heparin   Injectable 5000 Unit(s) SubCutaneous every 12 hours  influenza  Vaccine (HIGH DOSE) 0.7 milliLiter(s) IntraMuscular once  insulin glargine Injectable (LANTUS) 5 Unit(s) SubCutaneous at bedtime  insulin lispro (ADMELOG) corrective regimen sliding scale   SubCutaneous Before meals and at bedtime  insulin lispro Injectable (ADMELOG) 5 Unit(s) SubCutaneous three times a day before meals  levothyroxine 50 MICROGram(s) Oral daily  methylPREDNISolone sodium succinate Injectable 20 milliGRAM(s) IV Push every 8 hours  metoprolol tartrate 50 milliGRAM(s) Oral every 12 hours  nystatin    Suspension [id number] Unit(s) Oral four times a day  senna 1 Tablet(s) Oral daily  sevelamer carbonate 1600 milliGRAM(s) Oral three times a day    MEDICATIONS  (PRN):  acetaminophen   Tablet .. 650 milliGRAM(s) Oral every 6 hours PRN Moderate Pain (4 - 6)  guaiFENesin   Syrup  (Sugar-Free) 100 milliGRAM(s) Oral every 6 hours PRN Cough      Allergies    No Known Allergies      EXAM  Vital Signs Last 24 Hrs  T(C): 35.7 (25 Jan 2021 17:57), Max: 36.6 (25 Jan 2021 06:00)  T(F): 96.2 (25 Jan 2021 17:57), Max: 97.8 (25 Jan 2021 06:00)  HR: 65 (25 Jan 2021 18:02) (60 - 70)  BP: 165/76 (25 Jan 2021 18:02) (148/70 - 190/84)  BP(mean): 109 (25 Jan 2021 18:02) (100 - 151)  RR: 20 (25 Jan 2021 18:02) (20 - 28)  SpO2: 92% (25 Jan 2021 18:02) (90% - 96%)  On high flow O2  With some dyspnea  Awake and alert  Responding to questions  No rash  Oral mucosa dry and erythematous   CV RR with ectopy  Chest with rhonchi  Abd soft NT  No edema  Cason cath with urine output      LABS:                        8.5    17.34 )-----------( 349      ( 25 Jan 2021 07:17 )             25.0     01-25    122<L>  |  80<L>  |  101<H>  ----------------------------<  129<H>  4.6   |  20<L>  |  5.49<H>    Ca    8.6      25 Jan 2021 07:17  Phos  7.6     01-25  Mg     1.7     01-25    TPro  5.8<L>  /  Alb  2.9<L>  /  TBili  0.4  /  DBili  x   /  AST  22  /  ALT  34  /  AlkPhos  115  01-25    PT/INR - ( 24 Jan 2021 06:34 )   PT: 14.6 sec;   INR: 1.23          PTT - ( 24 Jan 2021 06:34 )  PTT:30.8 sec      MICROBIOLOGY:    Respiratory Viral Panel with COVID-19 by ELANA (01.24.21 @ 18:42)    Rapid RVP Result: [facility]    SARS-CoV-2: NotDete: This Respiratory Panel uses polymerase chain reaction (PCR) to detect for  adenovirus; coronavirus (HKU1, NL63, 229E, OC43); human metapneumovirus  (hMPV); human enterovirus/rhinovirus (Entero/RV); influenza A; influenza  A/H1; influenza A/H3; influenza A/H1-2009; influenza B; parainfluenza  viruses 1, 2, 3, 4; respiratory syncytial virus; Mycoplasma pneumoniae;  Chlamydophila pneumoniae; and SARS-CoV-2.      Procalcitonin, Serum (01.24.21 @ 06:34)    Procalcitonin, Serum: 1.08: Procalcitonin (PCT) Interpretation (ng/mL) - Diagnosis of systemic  bacterial infection/sepsis  PCT < 0.5: Systemic infection (sepsis) is not likely and risk for  progression to severe systemic infection is low. Local bacterial  infection is possible. If early sepsis is suspected clinically, PCT  should be re-assessed in 6-24 hours.  PCT >/= 0.5 but < 2.0: Systemic infection (sepsis) is possible, but other  conditions are known to elevate PCT as well. Moderate risk for  progression to severe systemic infection. The patient should be closely  monitored both clinically and by re-assessing PCT within 6-24 hours.  PCT >/= 2.0 but < 10.0: Systemic infection (sepsis) is likely, unless  other causes are known. High risk of progression to severe systemic  infection (severe sepsis/septic shock).  PCT >/= 10.0: Important systemic inflammatory response, almost  exclusively due to severe bacterial sepsis or septic shock. High  likelihood of severe sepsis or septic shock. ng/mL      Legionella pneumophila Antigen, Urine (01.23.21 @ 09:08)    Legionella Antigen, Urine: Negative: Negative Testing method: Immunochromatographic Assay. L. pneumpohila  serogroup 1 antigen in urine NOT detected, suggesting NO recent or  current infection. Infection due to Legionella cannot be ruled out: other  serogroups and species may cause disease, antigen may not be present in  urine in early infection, or the level of antigens in urine may be below  the detection limit of the test.  Order “Culture –Legionella” is  recommended for uncommon cases of suspected Legionella pneumonia due to  organisms other than L. pneumophila serogroup 1.  Please submit a first morning sputum specimen for Legionella culture.    -------------    MRSA/MSSA PCR (01.23.21 @ 21:09)    MRSA PCR Result.: Negative: The Cepheid Xpert SA Nasal Complete Assay performed on the [employer]  System is a qualitative in vitro diagnostic test using PCR for the rapid  detection of Staphylococcus Aureus(SA)and methicillin - resistant  Staphylococcus Aureus(MRSA)DNA from nasal swabs in patients at risk for  nasal colonization.  It is not intended to diagnose, guide or monitor  treatment for MRSA/SA infections.  A negative result does not preclude  MRSA/SA nasal colonization.  The assay is FDA-approved and its  performance has been established by [employer], [employer] and the [facility], [place], [place]    Staph Aureus PCR Result: Positive    -------------------------------------------      RADIOLOGY & ADDITIONAL STUDIES:      Xray Chest 1 View- PORTABLE-Routine (Xray Chest 1 View- PORTABLE-Routine in AM.) (01.24.21 @ 08:33) >    EXAM:  XR CHEST PORTABLE ROUTINE 1V                          PROCEDURE DATE:  01/24/2021          INTERPRETATION:  Clinical History: Pneumonia    Frontal examination of the chest demonstrates mild cardiac. No interval change lung infiltrates in comparison to prior examination of the chest 1/23/2021. Dextroscoliosis thoracic spine with degenerative changes. Status post sternotomy. Calcification noted involving thoracic aorta.    Impression: No interval change lung infiltrates

## 2021-01-25 NOTE — PROGRESS NOTE ADULT - ASSESSMENT
86F POOR HISTORIAN/early dementia, former heavy smoker PMHx DM, HTN, hyperlipidemia, COPD, hypothyroidism, CKD (baseline Cr ~3.0), anemia of chronic disease, CAD s/p CABG 2015 Dr. Blunt, chronic diastolic CHF(EF:55% by Echo 10/2020), renal artery stenosis s/p renal artery stent >20yrs ago, Carotid artery stenosis, PAD, who presents c/o progressively worsening SOB and cough. Nephrology consulted for NAV.     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  Baseline creatinine ~3s; Creat in 5s  Nephrotic w/u non contributory thus far, ANCA neg, atypical ANCA indet  UA- atrophied, echogenic kidneys    Give lasix 100 IV and metolazone 5; more UrOut today before yesterday  HypoNa- change cefepime to saline solution  Bicarb acceptable  BP: lopressor 50BID  Anaemia- repeat iron panel and ferritin today  BMD: on renvela to 1600 TID    Daily weights, strict I&Os, renally dose meds

## 2021-01-25 NOTE — PROGRESS NOTE ADULT - PROBLEM SELECTOR PLAN 2
Becoming more dependant on oxygen to maintain sats low 90's; now on HF and tolerating well.  - Likely multifactorial 2/2 CHF, COPD, and CAP.   - LE Duplex 1/19 (-) for DVT.   - No CTA done to r/o PE due to CKD; VQ deferred as likely would not yield diagnostic info given multiple lung processes.   - CONT: Methylprednisolone 20mg IV q8hrs per Dr. Cuevas.      ## PNEUMONIA  - s/p 4 days of Ceftriaxone + 7 days azithromycin.   - 1/19 – started 7 day course of renally dosed zosyn.   - ID consulted – 1/23 DISCONTINUED Zosyn and STARTED Cefepime 1000mg IV q24hrs (1/23-____; duration to be determined).   - ID recs: RVP pending, MRSA swab--> (+) MSSA, procalcitonin--> 1.08, galactomannan pending, LDH-->474, fungitell pending  - Continue to monitor for fever, increased leukocytosis, etc. Becoming more dependant on oxygen to maintain sats low 90's; now on HFNC and tolerating well.  - Likely multifactorial 2/2 CHF, COPD, ILD, and CAP vs pneumonitis.   - LE Duplex 1/19 (-) for DVT.   - No CTA done to r/o PE due to CKD; VQ deferred as likely would not yield diagnostic info given multiple lung processes.   - CONT: Methylprednisolone 20mg IV q8hrs per Dr. Cuevas.    ## Pneumonia vs radha Pneumonitis  - WBC uptrending, possible 2/2 IV steroids. F/u manual diff for bandemia  - s/p 4 days of Ceftriaxone + 7 days azithromycin.   - 1/19 – started 7 day course of renally dosed Zosyn.   - ID consulted – 1/23 DISCONTINUED Zosyn and STARTED Cefepime 1000mg IV q24hrs (1/23-____; duration to be determined).   - ID recs: RVP negative, (+) MSSA, procalcitonin--> 1.08, galactomannan pending, LDH-->474, fungitell pending  - Continue to monitor for fever

## 2021-01-26 DIAGNOSIS — J96.00 ACUTE RESPIRATORY FAILURE, UNSPECIFIED WHETHER WITH HYPOXIA OR HYPERCAPNIA: ICD-10-CM

## 2021-01-26 DIAGNOSIS — J90 PLEURAL EFFUSION, NOT ELSEWHERE CLASSIFIED: ICD-10-CM

## 2021-01-26 DIAGNOSIS — I73.9 PERIPHERAL VASCULAR DISEASE, UNSPECIFIED: ICD-10-CM

## 2021-01-26 DIAGNOSIS — J84.9 INTERSTITIAL PULMONARY DISEASE, UNSPECIFIED: ICD-10-CM

## 2021-01-26 LAB
ALBUMIN SERPL ELPH-MCNC: 2.9 G/DL — LOW (ref 3.3–5)
ALP SERPL-CCNC: 117 U/L — SIGNIFICANT CHANGE UP (ref 40–120)
ALT FLD-CCNC: 33 U/L — SIGNIFICANT CHANGE UP (ref 10–45)
ANION GAP SERPL CALC-SCNC: 26 MMOL/L — HIGH (ref 5–17)
APTT BLD: 28.2 SEC — SIGNIFICANT CHANGE UP (ref 27.5–35.5)
AST SERPL-CCNC: 25 U/L — SIGNIFICANT CHANGE UP (ref 10–40)
BASOPHILS # BLD AUTO: 0.03 K/UL — SIGNIFICANT CHANGE UP (ref 0–0.2)
BASOPHILS NFR BLD AUTO: 0.2 % — SIGNIFICANT CHANGE UP (ref 0–2)
BILIRUB SERPL-MCNC: 0.5 MG/DL — SIGNIFICANT CHANGE UP (ref 0.2–1.2)
BUN SERPL-MCNC: 110 MG/DL — HIGH (ref 7–23)
CALCIUM SERPL-MCNC: 8.9 MG/DL — SIGNIFICANT CHANGE UP (ref 8.4–10.5)
CHLORIDE SERPL-SCNC: 78 MMOL/L — LOW (ref 96–108)
CO2 SERPL-SCNC: 18 MMOL/L — LOW (ref 22–31)
CREAT SERPL-MCNC: 5.79 MG/DL — HIGH (ref 0.5–1.3)
EOSINOPHIL # BLD AUTO: 0 K/UL — SIGNIFICANT CHANGE UP (ref 0–0.5)
EOSINOPHIL NFR BLD AUTO: 0 % — SIGNIFICANT CHANGE UP (ref 0–6)
GLUCOSE BLDC GLUCOMTR-MCNC: 106 MG/DL — HIGH (ref 70–99)
GLUCOSE BLDC GLUCOMTR-MCNC: 110 MG/DL — HIGH (ref 70–99)
GLUCOSE BLDC GLUCOMTR-MCNC: 112 MG/DL — HIGH (ref 70–99)
GLUCOSE BLDC GLUCOMTR-MCNC: 120 MG/DL — HIGH (ref 70–99)
GLUCOSE BLDC GLUCOMTR-MCNC: 123 MG/DL — HIGH (ref 70–99)
GLUCOSE BLDC GLUCOMTR-MCNC: 131 MG/DL — HIGH (ref 70–99)
GLUCOSE SERPL-MCNC: 99 MG/DL — SIGNIFICANT CHANGE UP (ref 70–99)
HBV CORE AB SER-ACNC: SIGNIFICANT CHANGE UP
HBV SURFACE AB SER-ACNC: SIGNIFICANT CHANGE UP
HBV SURFACE AG SER-ACNC: SIGNIFICANT CHANGE UP
HCT VFR BLD CALC: 24.5 % — LOW (ref 34.5–45)
HCV AB S/CO SERPL IA: 0.05 S/CO — SIGNIFICANT CHANGE UP
HCV AB SERPL-IMP: SIGNIFICANT CHANGE UP
HGB BLD-MCNC: 8.3 G/DL — LOW (ref 11.5–15.5)
IMM GRANULOCYTES NFR BLD AUTO: 1.6 % — HIGH (ref 0–1.5)
INR BLD: 1.23 — HIGH (ref 0.88–1.16)
LYMPHOCYTES # BLD AUTO: 0.74 K/UL — LOW (ref 1–3.3)
LYMPHOCYTES # BLD AUTO: 4.2 % — LOW (ref 13–44)
MAGNESIUM SERPL-MCNC: 1.6 MG/DL — SIGNIFICANT CHANGE UP (ref 1.6–2.6)
MCHC RBC-ENTMCNC: 26.8 PG — LOW (ref 27–34)
MCHC RBC-ENTMCNC: 33.9 GM/DL — SIGNIFICANT CHANGE UP (ref 32–36)
MCV RBC AUTO: 79 FL — LOW (ref 80–100)
MONOCYTES # BLD AUTO: 0.42 K/UL — SIGNIFICANT CHANGE UP (ref 0–0.9)
MONOCYTES NFR BLD AUTO: 2.4 % — SIGNIFICANT CHANGE UP (ref 2–14)
NEUTROPHILS # BLD AUTO: 16.22 K/UL — HIGH (ref 1.8–7.4)
NEUTROPHILS NFR BLD AUTO: 91.6 % — HIGH (ref 43–77)
NRBC # BLD: 0 /100 WBCS — SIGNIFICANT CHANGE UP (ref 0–0)
PHOSPHATE SERPL-MCNC: 8.2 MG/DL — HIGH (ref 2.5–4.5)
PLATELET # BLD AUTO: 306 K/UL — SIGNIFICANT CHANGE UP (ref 150–400)
POTASSIUM SERPL-MCNC: 4.6 MMOL/L — SIGNIFICANT CHANGE UP (ref 3.5–5.3)
POTASSIUM SERPL-SCNC: 4.6 MMOL/L — SIGNIFICANT CHANGE UP (ref 3.5–5.3)
PROT SERPL-MCNC: 5.9 G/DL — LOW (ref 6–8.3)
PROTHROM AB SERPL-ACNC: 14.6 SEC — HIGH (ref 10.6–13.6)
RBC # BLD: 3.1 M/UL — LOW (ref 3.8–5.2)
RBC # FLD: 14.8 % — HIGH (ref 10.3–14.5)
SODIUM SERPL-SCNC: 122 MMOL/L — LOW (ref 135–145)
T4 FREE SERPL-MCNC: 0.78 NG/DL — SIGNIFICANT CHANGE UP (ref 0.7–1.48)
WBC # BLD: 17.7 K/UL — HIGH (ref 3.8–10.5)
WBC # FLD AUTO: 17.7 K/UL — HIGH (ref 3.8–10.5)

## 2021-01-26 PROCEDURE — 77001 FLUOROGUIDE FOR VEIN DEVICE: CPT | Mod: 26

## 2021-01-26 PROCEDURE — 71045 X-RAY EXAM CHEST 1 VIEW: CPT | Mod: 26

## 2021-01-26 PROCEDURE — 76937 US GUIDE VASCULAR ACCESS: CPT | Mod: 26

## 2021-01-26 PROCEDURE — 90935 HEMODIALYSIS ONE EVALUATION: CPT

## 2021-01-26 PROCEDURE — 36558 INSERT TUNNELED CV CATH: CPT

## 2021-01-26 PROCEDURE — 99152 MOD SED SAME PHYS/QHP 5/>YRS: CPT

## 2021-01-26 RX ORDER — INSULIN LISPRO 100/ML
4 VIAL (ML) SUBCUTANEOUS
Refills: 0 | Status: DISCONTINUED | OUTPATIENT
Start: 2021-01-26 | End: 2021-01-27

## 2021-01-26 RX ORDER — ERYTHROPOIETIN 10000 [IU]/ML
5000 INJECTION, SOLUTION INTRAVENOUS; SUBCUTANEOUS ONCE
Refills: 0 | Status: COMPLETED | OUTPATIENT
Start: 2021-01-26 | End: 2021-01-27

## 2021-01-26 RX ADMIN — Medication 50 MILLIGRAM(S): at 18:30

## 2021-01-26 RX ADMIN — Medication 50 MICROGRAM(S): at 06:39

## 2021-01-26 RX ADMIN — SEVELAMER CARBONATE 1600 MILLIGRAM(S): 2400 POWDER, FOR SUSPENSION ORAL at 06:39

## 2021-01-26 RX ADMIN — Medication 1 SPRAY(S): at 18:33

## 2021-01-26 RX ADMIN — Medication 500000 UNIT(S): at 06:39

## 2021-01-26 RX ADMIN — Medication 650 MILLIGRAM(S): at 20:26

## 2021-01-26 RX ADMIN — Medication 1 MILLIGRAM(S): at 14:15

## 2021-01-26 RX ADMIN — Medication 20 MILLIGRAM(S): at 06:38

## 2021-01-26 RX ADMIN — Medication 50 MILLIGRAM(S): at 06:38

## 2021-01-26 RX ADMIN — Medication 500000 UNIT(S): at 14:15

## 2021-01-26 RX ADMIN — CEFEPIME 100 MILLIGRAM(S): 1 INJECTION, POWDER, FOR SOLUTION INTRAMUSCULAR; INTRAVENOUS at 21:19

## 2021-01-26 RX ADMIN — BUDESONIDE AND FORMOTEROL FUMARATE DIHYDRATE 2 PUFF(S): 160; 4.5 AEROSOL RESPIRATORY (INHALATION) at 14:46

## 2021-01-26 RX ADMIN — ATORVASTATIN CALCIUM 20 MILLIGRAM(S): 80 TABLET, FILM COATED ORAL at 20:34

## 2021-01-26 RX ADMIN — AMLODIPINE BESYLATE 10 MILLIGRAM(S): 2.5 TABLET ORAL at 06:38

## 2021-01-26 RX ADMIN — Medication 3 MILLILITER(S): at 14:15

## 2021-01-26 RX ADMIN — Medication 4 UNIT(S): at 19:01

## 2021-01-26 RX ADMIN — DONEPEZIL HYDROCHLORIDE 5 MILLIGRAM(S): 10 TABLET, FILM COATED ORAL at 20:34

## 2021-01-26 RX ADMIN — Medication 20 MILLIGRAM(S): at 21:20

## 2021-01-26 RX ADMIN — Medication 3 MILLILITER(S): at 06:39

## 2021-01-26 RX ADMIN — HEPARIN SODIUM 5000 UNIT(S): 5000 INJECTION INTRAVENOUS; SUBCUTANEOUS at 06:38

## 2021-01-26 RX ADMIN — SEVELAMER CARBONATE 1600 MILLIGRAM(S): 2400 POWDER, FOR SUSPENSION ORAL at 14:13

## 2021-01-26 RX ADMIN — Medication 20 MILLIGRAM(S): at 14:14

## 2021-01-26 RX ADMIN — SENNA PLUS 1 TABLET(S): 8.6 TABLET ORAL at 14:14

## 2021-01-26 RX ADMIN — Medication 3 MILLILITER(S): at 19:02

## 2021-01-26 RX ADMIN — SEVELAMER CARBONATE 1600 MILLIGRAM(S): 2400 POWDER, FOR SUSPENSION ORAL at 20:34

## 2021-01-26 RX ADMIN — Medication 500000 UNIT(S): at 18:30

## 2021-01-26 RX ADMIN — Medication 100 MILLIGRAM(S): at 06:38

## 2021-01-26 NOTE — PROGRESS NOTE ADULT - ASSESSMENT
86F PO[employer] HISTORIAN/early dementia, former heavy smoker PMHx DM, HTN, hyperlipidemia, COPD, hypothyroidism, CKD (baseline Cr ~3.0), anemia of chronic disease, CAD s/p CABG 2015 Dr. Blunt, chronic diastolic CHF(EF:55% by Echo 10/2020), renal artery stenosis s/p renal artery stent >20yrs ago, Carotid artery stenosis, PAD, who presents c/o progressively worsening SOB and cough. Nephrology consulted for NAV.     #Non oliguric NAV on CKD3 2/2 CRS vs CKD progression from DM nephropathy  Baseline creatinine ~3s; Creat in 5s, volume overloaded  Plan for HD initiation today  IR for tunneled cath placement    Nephrotic w/u non contributory thus far, ANCA neg, atypical ANCA indet  US- atrophied, echogenic kidneys    Electrolytes, volume and bicarb management with HD  BP: lopressor 50BID  Anaemia- check iron panel and ferritin  BMD: on renvela to 1600 TID    Daily weights, strict I&Os, renally dose meds

## 2021-01-26 NOTE — PROGRESS NOTE ADULT - ATTENDING COMMENTS
seen and evaluated while on dialysis  tolerating procedure  son present at bedside- reviewed plan and procedure  continue full treatment as prescribed  is bleeding from tunneled HD catheter exit site- discussed with Dr. Guerrero  to continue to monitor

## 2021-01-26 NOTE — PROGRESS NOTE ADULT - PROBLEM SELECTOR PLAN 1
- Pt w/ diminished B/L breath sounds; (+) JVD; BNP 57k.   - POCUS by MICU consult 1/22/21 (+) for B-lines throughout.   - TTE 1/20: EF 53%, biatrial enlargement, mild AR, mod-severe MR, mod-severe TR, PASP 70.   - Given IV Lasix 100mg w/ Metolazone 5mg PO in AM, 160mg in PM per Renal on 1/25  - CONT: Lopressor 50mg PO BID.   - **MONITOR urine output; tucker inserted 1/22/21. BNP 57k.   - POCUS by MICU consult 1/22/21 (+) for B-lines throughout.   - TTE 1/20: EF 53%, biatrial enlargement, mild AR, mod-severe MR, mod-severe TR, PASP 70.   - s/p IV Lasix 100mg w/ Metolazone 5mg PO in AM, 160mg in PM per Renal 1/25  - CONT: Lopressor 50mg PO BID.   - Strict I/Os, daily weights. Core measures.

## 2021-01-26 NOTE — PROGRESS NOTE ADULT - PROBLEM SELECTOR PLAN 3
- Known to Dr. Harmon; Renal following; unknown etiology at this time but likely DKD. overall worsening clinical picture, progressed to ARF and now requires HD initiation 1/26/21.   - Cr now over 5, GFR 7.   - Renal US shows atrophic kidneys, no WILBER.   - Lasix 100mg IV x 1 w/ Metolazone 5mg PO, and IV Lasix 160mg IV tonight  - Nephrotic w/u non-contributory thus far, pending ANCA   - Monitor Strict I/Os via Cason placed 1/22  - Plan for HD catheter insertion w/ IR tomorrow 1/26. Per Pulm Dr Levi, will need BIPAP and anesthesia present during procedure given tenuous pulm function - Known to Dr. Harmon; Renal following; unknown etiology at this time but likely DKD. overall worsening clinical picture, progressed to ARF and now requires HD initiation 1/26/21.   - Cr 5.79, GFR 7.   - Renal US shows atrophic kidneys, no WILBER.   - Nephrotic w/u non-contributory thus far, pending ANCA   - Monitor Strict I/Os  - Plan for HD catheter insertion w/ IR today. Per Pulm Dr Levi, will need BIPAP and anesthesia present during procedure given tenuous pulm function

## 2021-01-26 NOTE — PROGRESS NOTE ADULT - PROBLEM SELECTOR PLAN 2
BP is acceptable at current readings - would rather they run high than low given PAD and renal function

## 2021-01-26 NOTE — PROGRESS NOTE ADULT - PROBLEM SELECTOR PLAN 5
- Baseline Hgb 8-9  - Likely 2/2 to CKD and anemia of chronic disease  - s/p 1u pRBC transfusion on 1/20  - holding IV iron due to treatment for presumed CAP  -Stool for occult blood negative 1/18  -Maintain active T&S (since 1/21) HX Anemia. Baseline Hgb 8-9  - Likely 2/2 to CKD and anemia of chronic disease  - hgb 8.3 today   - s/p 1u pRBC transfusion on 1/20  - holding IV iron due to treatment for presumed CAP  - Stool for occult blood negative 1/18  - Maintain active T&S (since 1/21)

## 2021-01-26 NOTE — PROGRESS NOTE ADULT - SUBJECTIVE AND OBJECTIVE BOX
Patient was seen and evaluated on dialysis.   HR: 61 (01-26-21 @ 14:51)  BP: 161/72 (01-26-21 @ 14:51)  Wt(kg): --  01-26    122<L>  |  78<L>  |  110<H>  ----------------------------<  99  4.6   |  18<L>  |  5.79<H>    Ca    8.9      26 Jan 2021 06:37  Phos  8.2     01-26  Mg     1.6     01-26    TPro  5.9<L>  /  Alb  2.9<L>  /  TBili  0.5  /  DBili  x   /  AST  25  /  ALT  33  /  AlkPhos  117  01-26      Dialyzer: [employer] [id number]         QB: 200 mL/min         QD: 400 mL/min      K bath: 2  Na bath: 130  Goal UF: 2L                Duration: 120 min     Patient is tolerating the procedure well. Oozing noted at stitching site of RTC placement, guaze applied. /60s. Continue full hemodialysis treatment as prescribed.

## 2021-01-26 NOTE — PROGRESS NOTE ADULT - SUBJECTIVE AND OBJECTIVE BOX
CARDIOLOGY NP PROGRESS NOTE    Subjective:  Received pt awake in bed in NAD. States breathing is ok. Denies CP, dizziness/diaphoresis, n/v, palpitations.   Remainder ROS otherwise negative.    Overnight Events:  Cason celestino'd and passed TOV    TELEMETRY: SR w/occasional PVCs (HR 60s)         VITAL SIGNS:  T(C): 35.9 (01-26-21 @ 09:22), Max: 36.6 (01-25-21 @ 13:28)  HR: 58 (01-26-21 @ 09:29) (58 - 66)  BP: 183/73 (01-26-21 @ 09:04) (148/70 - 183/73)  RR: 18 (01-26-21 @ 09:04) (18 - 24)  SpO2: 93% (01-26-21 @ 09:29) (90% - 96%)  Wt(kg): --    I&O's Summary    25 Jan 2021 07:01  -  26 Jan 2021 07:00  --------------------------------------------------------  IN: 270 mL / OUT: 950 mL / NET: -680 mL          PHYSICAL EXAM:    General: Alert to self, place and time. Disoriented to situation at times. No acute Distress   Neck: Supple, +JVD  Cardiac: S1 S2, No M/R/G  Pulmonary: Diminished breath sounds RLL. Scattered Rhonchi. Tachypneic on HFNC,   Abdomen: Soft, Non -tender, +BS x 4 quads  Extremities: No Rashes, No edema  Neuro: Alert to self, place and time. Disoriented to situation at times.  No focal deficits            LABS:                          8.3    17.70 )-----------( 306      ( 26 Jan 2021 06:37 )             24.5                              01-26    122<L>  |  78<L>  |  110<H>  ----------------------------<  99  4.6   |  18<L>  |  5.79<H>    Ca    8.9      26 Jan 2021 06:37  Phos  8.2     01-26  Mg     1.6     01-26    TPro  5.9<L>  /  Alb  2.9<L>  /  TBili  0.5  /  DBili  x   /  AST  25  /  ALT  33  /  AlkPhos  117  01-26    LIVER FUNCTIONS - ( 26 Jan 2021 06:37 )  Alb: 2.9 g/dL / Pro: 5.9 g/dL / ALK PHOS: 117 U/L / ALT: 33 U/L / AST: 25 U/L / GGT: x                                 PT/INR - ( 26 Jan 2021 06:37 )   PT: 14.6 sec;   INR: 1.23          PTT - ( 26 Jan 2021 06:37 )  PTT:28.2 sec  CAPILLARY BLOOD GLUCOSE      POCT Blood Glucose.: 110 mg/dL (26 Jan 2021 09:17)  POCT Blood Glucose.: 106 mg/dL (26 Jan 2021 06:37)  POCT Blood Glucose.: 103 mg/dL (25 Jan 2021 21:15)  POCT Blood Glucose.: 104 mg/dL (25 Jan 2021 17:32)  POCT Blood Glucose.: 171 mg/dL (25 Jan 2021 11:48)            Allergies:  No Known Allergies    MEDICATIONS  (STANDING):  albuterol/ipratropium for Nebulization 3 milliLiter(s) Nebulizer every 6 hours  amLODIPine   Tablet 10 milliGRAM(s) Oral daily  aspirin enteric coated 81 milliGRAM(s) Oral daily  atorvastatin 20 milliGRAM(s) Oral at bedtime  budesonide  80 MICROgram(s)/formoterol 4.5 MICROgram(s) Inhaler 2 Puff(s) Inhalation two times a day  cefepime   IVPB 1000 milliGRAM(s) IV Intermittent every 24 hours  dextrose 40% Gel 15 Gram(s) Oral once  dextrose 5%. 1000 milliLiter(s) (50 mL/Hr) IV Continuous <Continuous>  dextrose 5%. 1000 milliLiter(s) (100 mL/Hr) IV Continuous <Continuous>  dextrose 50% Injectable 25 Gram(s) IV Push once  dextrose 50% Injectable 12.5 Gram(s) IV Push once  dextrose 50% Injectable 25 Gram(s) IV Push once  diazepam    Tablet 1 milliGRAM(s) Oral daily  donepezil 5 milliGRAM(s) Oral at bedtime  fluticasone propionate 50 MICROgram(s)/spray Nasal Spray 1 Spray(s) Both Nostrils two times a day  glucagon  Injectable 1 milliGRAM(s) IntraMuscular once  heparin   Injectable 5000 Unit(s) SubCutaneous every 12 hours  influenza  Vaccine (HIGH DOSE) 0.7 milliLiter(s) IntraMuscular once  insulin glargine Injectable (LANTUS) 5 Unit(s) SubCutaneous at bedtime  insulin lispro (ADMELOG) corrective regimen sliding scale   SubCutaneous Before meals and at bedtime  insulin lispro Injectable (ADMELOG) 5 Unit(s) SubCutaneous three times a day before meals  levothyroxine 50 MICROGram(s) Oral daily  methylPREDNISolone sodium succinate Injectable 20 milliGRAM(s) IV Push every 8 hours  metoprolol tartrate 50 milliGRAM(s) Oral every 12 hours  nystatin    Suspension 324335 Unit(s) Oral four times a day  senna 1 Tablet(s) Oral daily  sevelamer carbonate 1600 milliGRAM(s) Oral three times a day    MEDICATIONS  (PRN):  acetaminophen   Tablet .. 650 milliGRAM(s) Oral every 6 hours PRN Moderate Pain (4 - 6)  guaiFENesin   Syrup  (Sugar-Free) 100 milliGRAM(s) Oral every 6 hours PRN Cough        DIAGNOSTIC TESTS:

## 2021-01-26 NOTE — PROGRESS NOTE ADULT - ATTENDING COMMENTS
Pt seen at bedside  Agree with above.  Pt sp HD cath, seen on dialysis  pt with poor PO intake today  insulin administration reviewed  not OOB  steroid administration reviewed  can stop standing lantus and reduce lispro to 4 units tid with meals.   at dischrage, pt will not need insulin if not on steroid tx  will follow

## 2021-01-26 NOTE — PROGRESS NOTE ADULT - SUBJECTIVE AND OBJECTIVE BOX
INTERVAL HPI/OVERNIGHT EVENTS:      Patient is a 86y old  Female who presents with a chief complaint of progressively worsening SOB and cough x 3 days (2021 13:49)  NPO for permacath placement by IR today.  on solumedrol 20 Q8H    FSG & Insulin received:  Yesterday:  pre-dinner fs, 5 nutritional lispro   units   bedtime fs, 5 lantus   units    Today:  pre-breakfast fs, NPO  pre-lunch fs      ROS otherwise negative    MEDICATIONS  (STANDING):  albuterol/ipratropium for Nebulization 3 milliLiter(s) Nebulizer every 6 hours  amLODIPine   Tablet 10 milliGRAM(s) Oral daily  aspirin enteric coated 81 milliGRAM(s) Oral daily  atorvastatin 20 milliGRAM(s) Oral at bedtime  budesonide  80 MICROgram(s)/formoterol 4.5 MICROgram(s) Inhaler 2 Puff(s) Inhalation two times a day  cefepime   IVPB 1000 milliGRAM(s) IV Intermittent every 24 hours  dextrose 40% Gel 15 Gram(s) Oral once  dextrose 5%. 1000 milliLiter(s) (50 mL/Hr) IV Continuous <Continuous>  dextrose 5%. 1000 milliLiter(s) (100 mL/Hr) IV Continuous <Continuous>  dextrose 50% Injectable 25 Gram(s) IV Push once  dextrose 50% Injectable 12.5 Gram(s) IV Push once  dextrose 50% Injectable 25 Gram(s) IV Push once  diazepam    Tablet 1 milliGRAM(s) Oral daily  donepezil 5 milliGRAM(s) Oral at bedtime  fluticasone propionate 50 MICROgram(s)/spray Nasal Spray 1 Spray(s) Both Nostrils two times a day  glucagon  Injectable 1 milliGRAM(s) IntraMuscular once  heparin   Injectable 5000 Unit(s) SubCutaneous every 12 hours  influenza  Vaccine (HIGH DOSE) 0.7 milliLiter(s) IntraMuscular once  insulin glargine Injectable (LANTUS) 5 Unit(s) SubCutaneous at bedtime  insulin lispro (ADMELOG) corrective regimen sliding scale   SubCutaneous Before meals and at bedtime  insulin lispro Injectable (ADMELOG) 5 Unit(s) SubCutaneous three times a day before meals  levothyroxine 50 MICROGram(s) Oral daily  methylPREDNISolone sodium succinate Injectable 20 milliGRAM(s) IV Push every 8 hours  metoprolol tartrate 50 milliGRAM(s) Oral every 12 hours  nystatin    Suspension [id number] Unit(s) Oral four times a day  senna 1 Tablet(s) Oral daily  sevelamer carbonate 1600 milliGRAM(s) Oral three times a day    MEDICATIONS  (PRN):  acetaminophen   Tablet .. 650 milliGRAM(s) Oral every 6 hours PRN Moderate Pain (4 - 6)  guaiFENesin   Syrup  (Sugar-Free) 100 milliGRAM(s) Oral every 6 hours PRN Cough      PHYSICAL EXAM  Vital Signs Last 24 Hrs  T(C): 35.9 (2021 09:22), Max: 36.4 (2021 04:30)  T(F): 96.7 (2021 09:22), Max: 97.6 (2021 04:30)  HR: 55 (2021 12:05) (55 - 66)  BP: 153/60 (2021 10:00) (151/70 - 183/73)  BP(mean): 97 (2021 10:00) (97 - 111)  RR: 18 (2021 09:04) (18 - 22)  SpO2: 93% (2021 12:05) (90% - 95%)    Due to the nature of this patient’s COVID-19 isolation status (either confirmed or suspected), this note was prepared without a bedside physical examination to prevent spread of infection and to conserve personal protective equipment during this nationwide pandemic. If possible, direct patient communication occurred via electronic measures or telephone conversation. Examination highlights were provided by a bedside nurse wearing personal protective equipment and review of pertinent medical records. Objective data (vital signs, urine output, lab results, imaging studies, medications, etc) were reviewed in detail. Face to face visits and physical examination have been limited only to patients for whom it is required for medical decision making.    LABS:                        8.3    17.70 )-----------( 306      ( 2021 06:37 )             24.5     -    122<L>  |  78<L>  |  110<H>  ----------------------------<  99  4.6   |  18<L>  |  5.79<H>    Ca    8.9      2021 06:37  Phos  8.2       Mg     1.6         TPro  5.9<L>  /  Alb  2.9<L>  /  TBili  0.5  /  DBili  x   /  AST  25  /  ALT  33  /  AlkPhos  117      PT/INR - ( 2021 06:37 )   PT: 14.6 sec;   INR: 1.23          PTT - ( 2021 06:37 )  PTT:28.2 sec    Thyroid Stimulating Hormone, Serum: 1.749 uIU/mL (10-13 @ 05:49)      HbA1C:   CAPILLARY BLOOD GLUCOSE      POCT Blood Glucose.: 112 mg/dL (2021 12:07)  POCT Blood Glucose.: 110 mg/dL (2021 09:17)  POCT Blood Glucose.: 106 mg/dL (2021 06:37)  POCT Blood Glucose.: 103 mg/dL (2021 21:15)  POCT Blood Glucose.: 104 mg/dL (2021 17:32)    Will discuss in rounds  A/P: 86 yr old F, POOR HISTORIAN/early dementia, former heavy smoker with PMHx HTN, hyperlipidemia, COPD, hypothyroidism, Stage IV CKD (baseline Cr ~3.0), anemia of chronic disease, CAD s/p CABG  Dr. Blunt, chronic diastolic CHF(EF:55% by Echo 10/2020), renal artery stenosis s/p renal artery stent >20yrs ago, Carotid artery stenosis, PAD, who presents [facility] ED 1/15/21 c/o progressively worsening SOB and cough x 3 days. Currently on steroids from pneumonitis    1.  Steroid induced hyperglycemia  - hba1c 5.1  Cr 5.49 and GFr 20.     Please give    units Lantus tonight.  Please continue 5  units lispro with meals and lispro moderate dose sliding scale 4 times daily with meals and at bedtime.    Please continue consistent carbohydrate diet.    on solumedrol 20mg Q8H    2. Hypothyroidism  - TSH 1.7  please obtain free T4  Please continue levothyroxine 50mcg once daily for now    Will continue to monitor       Discussed case with     and updated primary team   INTERVAL HPI/OVERNIGHT EVENTS:      Patient is a 86y old  Female who presents with a chief complaint of progressively worsening SOB and cough x 3 days (2021 13:49)  NPO for permacath placement by IR today.  on solumedrol 20 Q8H    FSG & Insulin received:  Yesterday:  pre-dinner fs, 5 nutritional lispro   units   bedtime fs, 5 lantus   units    Today:  pre-breakfast fs, NPO  pre-lunch fs      ROS otherwise negative    MEDICATIONS  (STANDING):  albuterol/ipratropium for Nebulization 3 milliLiter(s) Nebulizer every 6 hours  amLODIPine   Tablet 10 milliGRAM(s) Oral daily  aspirin enteric coated 81 milliGRAM(s) Oral daily  atorvastatin 20 milliGRAM(s) Oral at bedtime  budesonide  80 MICROgram(s)/formoterol 4.5 MICROgram(s) Inhaler 2 Puff(s) Inhalation two times a day  cefepime   IVPB 1000 milliGRAM(s) IV Intermittent every 24 hours  dextrose 40% Gel 15 Gram(s) Oral once  dextrose 5%. 1000 milliLiter(s) (50 mL/Hr) IV Continuous <Continuous>  dextrose 5%. 1000 milliLiter(s) (100 mL/Hr) IV Continuous <Continuous>  dextrose 50% Injectable 25 Gram(s) IV Push once  dextrose 50% Injectable 12.5 Gram(s) IV Push once  dextrose 50% Injectable 25 Gram(s) IV Push once  diazepam    Tablet 1 milliGRAM(s) Oral daily  donepezil 5 milliGRAM(s) Oral at bedtime  fluticasone propionate 50 MICROgram(s)/spray Nasal Spray 1 Spray(s) Both Nostrils two times a day  glucagon  Injectable 1 milliGRAM(s) IntraMuscular once  heparin   Injectable 5000 Unit(s) SubCutaneous every 12 hours  influenza  Vaccine (HIGH DOSE) 0.7 milliLiter(s) IntraMuscular once  insulin glargine Injectable (LANTUS) 5 Unit(s) SubCutaneous at bedtime  insulin lispro (ADMELOG) corrective regimen sliding scale   SubCutaneous Before meals and at bedtime  insulin lispro Injectable (ADMELOG) 5 Unit(s) SubCutaneous three times a day before meals  levothyroxine 50 MICROGram(s) Oral daily  methylPREDNISolone sodium succinate Injectable 20 milliGRAM(s) IV Push every 8 hours  metoprolol tartrate 50 milliGRAM(s) Oral every 12 hours  nystatin    Suspension [id number] Unit(s) Oral four times a day  senna 1 Tablet(s) Oral daily  sevelamer carbonate 1600 milliGRAM(s) Oral three times a day    MEDICATIONS  (PRN):  acetaminophen   Tablet .. 650 milliGRAM(s) Oral every 6 hours PRN Moderate Pain (4 - 6)  guaiFENesin   Syrup  (Sugar-Free) 100 milliGRAM(s) Oral every 6 hours PRN Cough      PHYSICAL EXAM  Vital Signs Last 24 Hrs  T(C): 35.9 (2021 09:22), Max: 36.4 (2021 04:30)  T(F): 96.7 (2021 09:22), Max: 97.6 (2021 04:30)  HR: 55 (2021 12:05) (55 - 66)  BP: 153/60 (2021 10:00) (151/70 - 183/73)  BP(mean): 97 (2021 10:00) (97 - 111)  RR: 18 (2021 09:04) (18 - 22)  SpO2: 93% (2021 12:05) (90% - 95%)    Due to the nature of this patient’s COVID-19 isolation status (either confirmed or suspected), this note was prepared without a bedside physical examination to prevent spread of infection and to conserve personal protective equipment during this nationwide pandemic. If possible, direct patient communication occurred via electronic measures or telephone conversation. Examination highlights were provided by a bedside nurse wearing personal protective equipment and review of pertinent medical records. Objective data (vital signs, urine output, lab results, imaging studies, medications, etc) were reviewed in detail. Face to face visits and physical examination have been limited only to patients for whom it is required for medical decision making.    LABS:                        8.3    17.70 )-----------( 306      ( 2021 06:37 )             24.5     -    122<L>  |  78<L>  |  110<H>  ----------------------------<  99  4.6   |  18<L>  |  5.79<H>    Ca    8.9      2021 06:37  Phos  8.2       Mg     1.6         TPro  5.9<L>  /  Alb  2.9<L>  /  TBili  0.5  /  DBili  x   /  AST  25  /  ALT  33  /  AlkPhos  117      PT/INR - ( 2021 06:37 )   PT: 14.6 sec;   INR: 1.23          PTT - ( 2021 06:37 )  PTT:28.2 sec    Thyroid Stimulating Hormone, Serum: 1.749 uIU/mL (10-13 @ 05:49)      HbA1C:   CAPILLARY BLOOD GLUCOSE      POCT Blood Glucose.: 112 mg/dL (2021 12:07)  POCT Blood Glucose.: 110 mg/dL (2021 09:17)  POCT Blood Glucose.: 106 mg/dL (2021 06:37)  POCT Blood Glucose.: 103 mg/dL (2021 21:15)  POCT Blood Glucose.: 104 mg/dL (2021 17:32)    A/P: 86 yr old F, POOR HISTORIAN/early dementia, former heavy smoker with PMHx HTN, hyperlipidemia, COPD, hypothyroidism, Stage IV CKD (baseline Cr ~3.0), anemia of chronic disease, CAD s/p CABG  Dr. Blunt, chronic diastolic CHF(EF:55% by Echo 10/2020), renal artery stenosis s/p renal artery stent >20yrs ago, Carotid artery stenosis, PAD, who presents to [facility] ED 1/15/21 c/o progressively worsening SOB and cough x 3 days. Currently on steroids from pneumonitis    1.  Steroid induced hyperglycemia  - hba1c 5.1  Cr 5.49 and GFr 20.     Please STOP Lantus.  Please decrease to 4  units lispro with meals and lispro moderate dose sliding scale 4 times daily with meals and at bedtime.    Please continue consistent carbohydrate diet.    on solumedrol 20mg Q8H    2. Hypothyroidism  - TSH 1.7  please obtain free T4  Please continue levothyroxine 50mcg once daily for now    Will continue to monitor       Discussed case with     and updated primary team

## 2021-01-26 NOTE — PROCEDURE NOTE - PROCEDURE FINDINGS AND DETAILS
tunneled hemodialysis catheter placed in right internal jugular vein, no immediate complications. pt tolerated procedure well

## 2021-01-26 NOTE — PROGRESS NOTE ADULT - SUBJECTIVE AND OBJECTIVE BOX
PUD CCM    INTERVAL HPI/OVERNIGHT EVENTS:    patient in interventional radiology  for hemodialysis catheter insertion  RIJ tunneled  on nonrebreather with 100% saturation  BiPAP in room  CXR with unchanged bilateral infiltrates    PAST MEDICAL & SURGICAL HISTORY:  Essential hypertension  Hypertension    Type 2 diabetes mellitus  Diabetes    Hyperlipidemia  Hyperlipidemia    Disorder of kidney and ureter  Renal insufficiency    Peripheral vascular disease  PVD (peripheral vascular disease)    Anxiety state  Anxiety    Atherosclerosis of renal artery  with resulting stent placement    Atherosclerosis of renal artery  Renal artery stenosis    FAMILY HISTORY:  Family history of ischemic heart disease  Family history of coronary artery disease.    SOCIAL HISTORY:    REVIEW OF SYSTEMS:  Constitutional: (+) weight change, (-) fever,  (-) chills, (+) fatigue, () night sweats  Eyes: (-) discharge, () eye pain, () visual change  ENT:  (-) hearing difficulty, () vertigo, () sinus pain,  () throat pain, () epistaxis, () dysphagia, () hoarseness  Neck: (-) pain, () stiffness, () swelling  Respiratory: (+) dry cough, () wheezing, () hemoptysis      Cardiovascular: (-) chest pain, ()palpitations, () dizziness   Gastrointestinal: (-) abdominal pain, () nausea, () vomiting, () hematemesis, () diarrhea,  () constipation, () melena  Genitourinary:  (-) dysuria, () frequency, () hematuria, () incontinence      Neurologic: (-) headache, () memory loss, () loss of strength, () numbness, () tremor     Skin: (-) itching, () burning, () rash, () lesions   Lymphatic: () enlarged lymph nodes  Endocrine: (-) hair loss, () temperature intolerance         Musculoskeletal: (-) back pain, () joint pain,  () extremity pain  Psychiatric: (-) visual change, () auditory change, () depression, () anxiety, () suicidal  Sleep: (+) disorder, () insomnia, () sleep deprivation  Heme/Lymph: () easy bruising, () bleeding gums            Allergy and Immunologic: () hives, () eczema    Vital Signs Last 24 Hrs  T(C): 35.9 (26 Jan 2021 09:22), Max: 36.6 (25 Jan 2021 13:28)  T(F): 96.7 (26 Jan 2021 09:22), Max: 97.8 (25 Jan 2021 13:28)  HR: 58 (26 Jan 2021 09:29) (58 - 66)  BP: 183/73 (26 Jan 2021 09:04) (148/70 - 183/73)  BP(mean): 105 (26 Jan 2021 09:04) (100 - 111)  RR: 18 (26 Jan 2021 09:04) (18 - 24)  SpO2: 93% (26 Jan 2021 09:29) (90% - 96%)    I&O's Detail    25 Jan 2021 07:01  -  26 Jan 2021 07:00  --------------------------------------------------------  IN:    IV PiggyBack: 50 mL    Oral Fluid: 220 mL  Total IN: 270 mL    OUT:    Indwelling Catheter - Urethral (mL): 750 mL    Voided (mL): 200 mL  Total OUT: 950 mL    Total NET: -680 mL    PHYSICAL EXAM:  poor appetite due to renal failure  Muscle atrophy, developed, comfortable on nonrebreather, - acute distress; vital signs are monitored continuously  Eyes: PERRLA, EOMI, -conjunctivitis, -scleritis   Head: no focal deficit, normocephalic,  no trauma  ENMT: moist tongue with white lesions, + thrush, -nasal discharge, -hoarseness, normal hearing, + cough, -hemoptysis, trachea midline  Neck: supple, - lymphadenopathy,  -masses, -JVD  Respiratory: bilateral diminished breath sounds, -wheezing, + rhonchi,  rales, + bilateral 50% crackles lower lungs  Chest: -accessory muscle use, -paradoxical breathing  Cardiovascular: irregular rate and sinus rhythm, S1 S2 normal, -S3, -S4, -murmur, -gallop, -rub  Gastrointestinal: soft, nontender, nondistended, normal bowel sounds, no hepatosplenomegaly  Genitourinary: -flank pain, -dysuria, + Cason  Extremities: -clubbing, -cyanosis, -edema    Vascular: peripheral pulses palpable 2+ bilaterally  Neurological: alert, oriented x 3, no focal deficit, -tremor   Skin: warm, dry, -erythema, iv sites intact  Lymph nodes; no cervical, supraclavicular or axillary adenopathy  Psychiatric: cooperative, appropriate mood    MEDICATIONS  (STANDING):  albuterol/ipratropium for Nebulization 3 milliLiter(s) Nebulizer every 6 hours  amLODIPine   Tablet 10 milliGRAM(s) Oral daily  aspirin enteric coated 81 milliGRAM(s) Oral daily  atorvastatin 20 milliGRAM(s) Oral at bedtime  budesonide  80 MICROgram(s)/formoterol 4.5 MICROgram(s) Inhaler 2 Puff(s) Inhalation two times a day  cefepime   IVPB 1000 milliGRAM(s) IV Intermittent every 24 hours  dextrose 40% Gel 15 Gram(s) Oral once  dextrose 5%. 1000 milliLiter(s) (50 mL/Hr) IV Continuous <Continuous>  dextrose 5%. 1000 milliLiter(s) (100 mL/Hr) IV Continuous <Continuous>  dextrose 50% Injectable 25 Gram(s) IV Push once  dextrose 50% Injectable 12.5 Gram(s) IV Push once  dextrose 50% Injectable 25 Gram(s) IV Push once  diazepam    Tablet 1 milliGRAM(s) Oral daily  donepezil 5 milliGRAM(s) Oral at bedtime  fluticasone propionate 50 MICROgram(s)/spray Nasal Spray 1 Spray(s) Both Nostrils two times a day  glucagon  Injectable 1 milliGRAM(s) IntraMuscular once  heparin   Injectable 5000 Unit(s) SubCutaneous every 12 hours  influenza  Vaccine (HIGH DOSE) 0.7 milliLiter(s) IntraMuscular once  insulin glargine Injectable (LANTUS) 5 Unit(s) SubCutaneous at bedtime  insulin lispro (ADMELOG) corrective regimen sliding scale   SubCutaneous Before meals and at bedtime  insulin lispro Injectable (ADMELOG) 5 Unit(s) SubCutaneous three times a day before meals  levothyroxine 50 MICROGram(s) Oral daily  methylPREDNISolone sodium succinate Injectable 20 milliGRAM(s) IV Push every 8 hours  metoprolol tartrate 50 milliGRAM(s) Oral every 12 hours  nystatin    Suspension 914857 Unit(s) Oral four times a day  senna 1 Tablet(s) Oral daily  sevelamer carbonate 1600 milliGRAM(s) Oral three times a day    MEDICATIONS  (PRN):  acetaminophen   Tablet .. 650 milliGRAM(s) Oral every 6 hours PRN Moderate Pain (4 - 6)  guaiFENesin   Syrup  (Sugar-Free) 100 milliGRAM(s) Oral every 6 hours PRN Cough      Allergies    No Known Allergies    Intolerances        LABS:                        8.3    17.70 )-----------( 306      ( 26 Jan 2021 06:37 )             24.5     01-26    122<L>  |  78<L>  |  110<H>  ----------------------------<  99  4.6   |  18<L>  |  5.79<H>    Ca    8.9      26 Jan 2021 06:37  Phos  8.2     01-26  Mg     1.6     01-26    TPro  5.9<L>  /  Alb  2.9<L>  /  TBili  0.5  /  DBili  x   /  AST  25  /  ALT  33  /  AlkPhos  117  01-26    PT/INR - ( 26 Jan 2021 06:37 )   PT: 14.6 sec;   INR: 1.23     PTT - ( 26 Jan 2021 06:37 )  PTT:28.2 sec    +DVT prophylaxis  5000 q12  +Sleep   +Nutrition goals  NPO for catheter  -Pain  DENIED  -Decubital ulcer  +GI prophylaxis (PPV, coagulopathy, Hx)  +Aspiration prophylaxis (45 degrees)  Ventilator synchronized  BED SIDE, 10/5  Tracheal cuff pressure  ORONASAL INTERFACE  +Sedation/analgesia stopped once  +ID (phos, CH, mupi, SB)  -Delirium  +Cardiac Beta/ACEI-ARB on hold/ASA low dose/statin  +Prevention  +Education  +Medication reviewed (drug-drug interactions, PDA)  Medical devices  F, IV, nonRB, BiPAP  Discussed with IR, RNs, Dr Anderson    RADIOLOGY & ADDITIONAL STUDIES:    CXR: no change      EXAM:  CT CHEST                          PROCEDURE DATE:  01/19/2021      INTERPRETATION:  CT of the CHEST without intravenous contrast dated 1/19/2021 2:31 PM    INDICATION: Worsening Hypoxia    TECHNIQUE: CT of the chest was performed withoutintravenous contrast.  Intravenous contrast was not used Axial and coronal and sagittal images were produced and reviewed.    PRIOR STUDIES: CT chest 10/14/2020    FINDINGS: Cardiomegaly as before. Dense mitral valve annular calcification. Coronary artery calcifications. Status post CABG. The main pulmonary artery measures 3.1 cm diameter.  No pericardial effusion is seen.  Evaluation of the vasculature is limited without intravenous contrast, but the great vessels are grossly unremarkable.  Evaluation for adenopathy is limited without intravenous contrast. Within that limitation, mild mediastinal lymphadenopathy is seen. No axillary adenopathy. Abandoned epicardial pacer wire. Low-density of cardiac chambers relative to the myocardium suggesting anemia.    Small bilateral pleural effusions are seen. Evaluation of the pulmonary parenchyma demonstrates underlying interstitial fibrosis in the upper and mid zones with bilateral mosaic attenuation. There is now new bilateral groundglass opacities  in the upper and mid and lower zones with thickening of interlobular septa at the bilateral upper zones i.e. crazy paving  appearance.. Coalescence of findings with consolidation seen in the superior segment right lower lobe.    Limited evaluation of the upper abdomen demonstrates radiopaque cholelithiasis.    Evaluation of the osseous structures demonstrates degenerative changes.      IMPRESSION: New bilateral groundglass lung opacities superimposed on chronic interstitial pulmonary fibrosis probably due to chronic hypersensitivity pneumonitis or sarcoid. The groundglass lung opacities could  be due to pulmonary edema, pulmonary hemorrhage, alveolar proteinosis, organizing pneumonia or atypical infection.

## 2021-01-26 NOTE — PROGRESS NOTE ADULT - PROBLEM SELECTOR PLAN 9
- Dx 3wks ago @ [facility].   - CTH 1/19 (-) for acute changes.   - Per neurosurgery, cont ASA 81 PO QD; no neuro interventions at this time.     #DEMENTIA  - CONT: Donepezil 5mg PO QD    ## ANXIETY  - CONT: Valium 1mg QHS. Dx 3wks ago @ [facility].   - CTH 1/19 (-) for acute changes.   - Per neurosurgery, cont ASA 81 PO QD; no neuro interventions at this time.     #DEMENTIA  - CONT: Donepezil 5mg PO QD    ## ANXIETY  - CONT: Valium 1mg QHS.

## 2021-01-26 NOTE — PROGRESS NOTE ADULT - ATTENDING COMMENTS
oliguric NAV- probable ATN  no improvement in renal function  discussed in detail with Dr. Crooks and with patient's daughter  to proceed with acute dialysis treatment

## 2021-01-26 NOTE — PROGRESS NOTE ADULT - SUBJECTIVE AND OBJECTIVE BOX
Patient is a 86y Female seen and evaluated at bedside. Plan for HD initiation today. IR for tunneled cath. Daughter consented.     Meds:    acetaminophen   Tablet .. 650 every 6 hours PRN  albuterol/ipratropium for Nebulization 3 every 6 hours  amLODIPine   Tablet 10 daily  aspirin enteric coated 81 daily  atorvastatin 20 at bedtime  budesonide  80 MICROgram(s)/formoterol 4.5 MICROgram(s) Inhaler 2 two times a day  cefepime   IVPB 1000 every 24 hours  dextrose 40% Gel 15 once  dextrose 5%. 1000 <Continuous>  dextrose 5%. 1000 <Continuous>  dextrose 50% Injectable 25 once  dextrose 50% Injectable 12.5 once  dextrose 50% Injectable 25 once  diazepam    Tablet 1 daily  donepezil 5 at bedtime  fluticasone propionate 50 MICROgram(s)/spray Nasal Spray 1 two times a day  glucagon  Injectable 1 once  guaiFENesin   Syrup  (Sugar-Free) 100 every 6 hours PRN  heparin   Injectable 5000 every 12 hours  influenza  Vaccine (HIGH DOSE) 0.7 once  insulin glargine Injectable (LANTUS) 5 at bedtime  insulin lispro (ADMELOG) corrective regimen sliding scale  Before meals and at bedtime  insulin lispro Injectable (ADMELOG) 5 three times a day before meals  levothyroxine 50 daily  methylPREDNISolone sodium succinate Injectable 20 every 8 hours  metoprolol tartrate 50 every 12 hours  nystatin    Suspension [id number] four times a day  senna 1 daily  sevelamer carbonate 1600 three times a day      T(C): , Max: 36.4 (01-26-21 @ 04:30)  T(F): , Max: 97.6 (01-26-21 @ 04:30)  HR: 61 (01-26-21 @ 14:51)  BP: 161/72 (01-26-21 @ 14:51)  BP(mean): 103 (01-26-21 @ 14:51)  RR: 18 (01-26-21 @ 14:51)  SpO2: 77% (01-26-21 @ 14:51)  Wt(kg): --    01-25 @ 07:01  -  01-26 @ 07:00  --------------------------------------------------------  IN: 270 mL / OUT: 950 mL / NET: -680 mL    Review of Systems:  RESPIRATORY: +shortness of breath  CARDIOVASCULAR: No chest pain  MUSCULOSKELETAL: No leg edema    PHYSICAL EXAM:  GENERAL: well-developed, well nourished, alert, on non rebreather 70%  CHEST/LUNG: Coarse breath sounds bilaterally, reduced at bases  HEART: normal S1S2, RRR  ABDOMEN: Soft, Nontender, non distended  EXTREMITIES: +oedema       LABS:                        8.3    17.70 )-----------( 306      ( 26 Jan 2021 06:37 )             24.5     01-26    122<L>  |  78<L>  |  110<H>  ----------------------------<  99  4.6   |  18<L>  |  5.79<H>    Ca    8.9      26 Jan 2021 06:37  Phos  8.2     01-26  Mg     1.6     01-26    TPro  5.9<L>  /  Alb  2.9<L>  /  TBili  0.5  /  DBili  x   /  AST  25  /  ALT  33  /  AlkPhos  117  01-26      PT/INR - ( 26 Jan 2021 06:37 )   PT: 14.6 sec;   INR: 1.23          PTT - ( 26 Jan 2021 06:37 )  PTT:28.2 sec    Sodium, Random Urine: 52 mmol/L (01-25 @ 15:27)  Osmolality, Random Urine: 286 mosm/kg (01-25 @ 15:27)  Creatinine, Random Urine: 37 mg/dL (01-25 @ 15:27)  Chloride, Random Urine: 49 mmol/L (01-25 @ 15:27)        RADIOLOGY & ADDITIONAL STUDIES:

## 2021-01-26 NOTE — PROGRESS NOTE ADULT - ASSESSMENT
86 year old female admitted with acute on chronic diastolic CHF secondary to pneumonitis/ pulmonary infection now with NAV with oliguria and worsening BUN and creatine values.

## 2021-01-26 NOTE — PROGRESS NOTE ADULT - PROBLEM SELECTOR PLAN 4
- Hx of CAD, s/p CABG 2015.   - CONT: ASA 81mg PO QD, Atorvastatin 20mg PO QD, Lopressor 50mg PO BID.     ## HTN  - VSS.   - CONT: Amlodipine 10mg PO QD, Lopressor 50mg PO BID. Hx of CAD, s/p CABG 2015.   - CONT: ASA 81mg PO QD, Atorvastatin 20mg PO QD, Lopressor 50mg PO BID.     ## HTN  - VSS.   - CONT: Amlodipine 10mg PO QD, Lopressor 50mg PO BID.

## 2021-01-26 NOTE — PROGRESS NOTE ADULT - PROBLEM SELECTOR PLAN 6
- CONT: symbicort inhaler BID and Duonebs  - PRN Robutussin/Benonatate for cough  - not on Home Oxygen. Currently on HFNC 50/60 per Dr Levi.  - started Nystatin swish and spit for thrush  - Check QTc on EKG. Switch Nystatin to Fluconazole 200mg IV tonight if QTC acceptable, and then 50mg PO qd in AM - CONT: symbicort inhaler BID and Duonebs  - PRN Robutussin/Benonatate for cough  - not on Home Oxygen. Currently on HFNC 50/60 per Dr Levi.  - c/w Nystatin swish and spit for thrush  - QTc 500.  Unable to Switch Nystatin to Fluconazole 200mg IV

## 2021-01-26 NOTE — PROGRESS NOTE ADULT - PROBLEM SELECTOR PLAN 10
- CONT: Levothyroxine 50mg PO QD.     #DM  - Hx of DM. A1c 5.1% on admission.    - CONT: lantus 10 qhs, lispro 5u premeal, ISS  - Endocrine following    F: none   E: renal patient   N: dysphagia 2 mechanical soft-thin liquids, not eating a lot and poor appetite. Encourage PO fluids.   DVT ppx: Hep SubQ  Dispo: cardiac telemetry - CONT: Levothyroxine 50mg PO QD.     #DM  - Hx of DM. A1c 5.1% on admission.    - CONT: lantus 10 qhs, lispro 5u premeal, ISS  - Endocrine following    F: none   E: renal patient   N: dysphagia 2 mechanical soft-thin liquids, not eating a lot and poor appetite. Encourage PO fluids.  NPO pending tunneled HD cath   DVT ppx: Hep SubQ  Dispo: cardiac telemetry    pending PT re-eval

## 2021-01-26 NOTE — PROGRESS NOTE ADULT - SUBJECTIVE AND OBJECTIVE BOX
Patient is a 86y old  Female who presents with a chief complaint of progressively worsening SOB and cough x 3 days (25 Jan 2021 19:18) with worsening renal function and hypoxia.  She appears comfortable this morning.  Voided only 200 cc overnight.  Bladder scan this morning shows 70cc.  For placement of HD cath    Vital Signs Last 24 Hrs  T(C): 36.4 (26 Jan 2021 04:30), Max: 36.6 (25 Jan 2021 13:28)  T(F): 97.6 (26 Jan 2021 04:30), Max: 97.8 (25 Jan 2021 13:28)  HR: 63 (26 Jan 2021 05:57) (60 - 66)  BP: 151/70 (26 Jan 2021 05:02) (148/70 - 174/77)  BP(mean): 101 (26 Jan 2021 05:02) (100 - 111)  RR: 18 (26 Jan 2021 05:57) (18 - 24)  SpO2: 94% (26 Jan 2021 05:57) (90% - 96%)    acetaminophen   Tablet .. 650 milliGRAM(s) Oral every 6 hours PRN  albuterol/ipratropium for Nebulization 3 milliLiter(s) Nebulizer every 6 hours  amLODIPine   Tablet 10 milliGRAM(s) Oral daily  aspirin enteric coated 81 milliGRAM(s) Oral daily  atorvastatin 20 milliGRAM(s) Oral at bedtime  budesonide  80 MICROgram(s)/formoterol 4.5 MICROgram(s) Inhaler 2 Puff(s) Inhalation two times a day  cefepime   IVPB 1000 milliGRAM(s) IV Intermittent every 24 hours  dextrose 40% Gel 15 Gram(s) Oral once  dextrose 5%. 1000 milliLiter(s) IV Continuous <Continuous>  dextrose 5%. 1000 milliLiter(s) IV Continuous <Continuous>  dextrose 50% Injectable 25 Gram(s) IV Push once  dextrose 50% Injectable 12.5 Gram(s) IV Push once  dextrose 50% Injectable 25 Gram(s) IV Push once  diazepam    Tablet 1 milliGRAM(s) Oral daily  donepezil 5 milliGRAM(s) Oral at bedtime  fluticasone propionate 50 MICROgram(s)/spray Nasal Spray 1 Spray(s) Both Nostrils two times a day  glucagon  Injectable 1 milliGRAM(s) IntraMuscular once  guaiFENesin   Syrup  (Sugar-Free) 100 milliGRAM(s) Oral every 6 hours PRN  heparin   Injectable 5000 Unit(s) SubCutaneous every 12 hours  influenza  Vaccine (HIGH DOSE) 0.7 milliLiter(s) IntraMuscular once  insulin glargine Injectable (LANTUS) 5 Unit(s) SubCutaneous at bedtime  insulin lispro (ADMELOG) corrective regimen sliding scale   SubCutaneous Before meals and at bedtime  insulin lispro Injectable (ADMELOG) 5 Unit(s) SubCutaneous three times a day before meals  levothyroxine 50 MICROGram(s) Oral daily  methylPREDNISolone sodium succinate Injectable 20 milliGRAM(s) IV Push every 8 hours  metoprolol tartrate 50 milliGRAM(s) Oral every 12 hours  nystatin    Suspension 114269 Unit(s) Oral four times a day  senna 1 Tablet(s) Oral daily  sevelamer carbonate 1600 milliGRAM(s) Oral three times a day    PHYSICAL EXAM:  Constitutional:  observed sleeping comfortably, easily arroused and responsive  Eyes:  scera anicteric  ENMT:  mmm  Neck:  no JVD at 40 degrees  Respiratory:   few scattered rhonchi, rales at both bases  Cardiovascular:   RRR  Gastrointestinal:    softly distended  Genitourinary:  no suprapubic masses or tenderness, tucker removed  Extremities:  no edema or cyanosis  Vascular:  no palpable cords  Neurological:   grossly normal  Skin:   no rases  Musculoskeletal:   no effusions  Psychiatric:  appropriately concerned

## 2021-01-26 NOTE — PROGRESS NOTE ADULT - PROBLEM SELECTOR PLAN 8
- Switched meds to NS from D5.   - Na 122  - Continue to monitor.   - F/u renal recs. - Na 122; asx.   - Switched meds to NS from D5.   - Continue to monitor.   - Renal following, appreciate recs.

## 2021-01-26 NOTE — PROGRESS NOTE ADULT - ASSESSMENT
ASSESSMENT/PLAN 86 yr old F, POOR HISTORIAN/early dementia, former heavy smoker with PMHx HTN, hyperlipidemia, COPD, hypothyroidism, Stage IV CKD (baseline Cr ~3.0), anemia of chronic disease, CAD s/p CABG 2015 Dr. Blunt, acute on chronic diastolic CHF(EF:55% by Echo 10/2020), renal artery stenosis s/p renal artery stent >20yrs ago, Carotid artery stenosis, PAD, who presents to [facility] ED 1/15/21 c/o progressively worsening SOB and cough x 3 days. Currently on steroids from pneumonitis    1. O2 Continue NRB, at this time, attempt transition to HF nasal prongs after HD   2. Bronchodilators:  Atrovent/ albuterol q 4 – 6 hours as needed  3. Corticosteroids: SoluMedrol to continue current dioses today, no taper  4. ID/Antibiotics: Cefepime  5. Cardiac/HTN: Optimize CHF mngmnt, dialyze today, discussed c Renal   6. GI: Rx/ prophylaxis c PPI/H2B  7. Heme: Rx/VT prophylaxis c SQH/SCD/ASA  8. Aspiration precautions at all times  9, Oozing at HD catheter site to be addressed by IR /discussed c /  Discussed with managing team

## 2021-01-26 NOTE — PROGRESS NOTE ADULT - PROBLEM SELECTOR PLAN 1
For probable placement of HD cath - would recommend attempt at neck or subclavian if possible as she has PVD of lower extremities.  SHe remains with low urine output.

## 2021-01-26 NOTE — PROGRESS NOTE ADULT - PROBLEM SELECTOR PLAN 2
Becoming more dependant on oxygen to maintain sats low 90's; now on HFNC and tolerating well.  - Likely multifactorial 2/2 CHF, COPD, ILD, and CAP vs pneumonitis.   - LE Duplex 1/19 (-) for DVT.   - No CTA done to r/o PE due to CKD; VQ deferred as likely would not yield diagnostic info given multiple lung processes.   - CONT: Methylprednisolone 20mg IV q8hrs per Dr. Cuevas.    ## Pneumonia vs radha Pneumonitis  - WBC uptrending, possible 2/2 IV steroids. F/u manual diff for bandemia  - s/p 4 days of Ceftriaxone + 7 days azithromycin.   - 1/19 – started 7 day course of renally dosed Zosyn.   - ID consulted – 1/23 DISCONTINUED Zosyn and STARTED Cefepime 1000mg IV q24hrs (1/23-____; duration to be determined).   - ID recs: RVP negative, (+) MSSA, procalcitonin--> 1.08, galactomannan pending, LDH-->474, fungitell pending  - Continue to monitor for fever Becoming more dependant on oxygen to maintain sats low 90's; now on HFNC and tolerating well.  - Likely multifactorial 2/2 CHF, COPD, ILD, and CAP vs pneumonitis.   - LE Duplex 1/19 (-) for DVT.   - No CTA done to r/o PE due to CKD; VQ deferred as likely would not yield diagnostic info given multiple lung processes.   - CONT: Methylprednisolone 20mg IV q8hrs per Dr. Cuevas.    ## Pneumonia vs radha Pneumonitis  - WBC uptrending, possible 2/2 IV steroids. F/u manual diff for bandemia  - s/p 4 days of Ceftriaxone + 7 days azithromycin.   - ID consulted – 1/23 DISCONTINUED Zosyn and STARTED Cefepime 1000mg IV q24hrs (1/23-____; duration to be determined).   - ID recs: RVP negative, (+) MSSA, procalcitonin--> 1.08, galactomannan pending, LDH-->474, fungitell pending  - Continue to monitor for fever

## 2021-01-26 NOTE — PROGRESS NOTE ADULT - SUBJECTIVE AND OBJECTIVE BOX
Interventional, Pulmonary, Critical, Chest Special Procedures.    Pt was seen and fully examined by myself.     Time spent with patient in minutes:178    Patient is a 86y old  Female who presents with a chief complaint of progressively worsening SOB and cough x 3 days (26 Jan 2021 16:15) Asked to manage this high risk pt periop HD catheter placemnt. Chart reviewed, events of this hospitalization reviewed. The patient on verge respiratory failure in IR,now improved, better inspiratory effort, tolerating NRB, pain free, oozing at he HD catheter site.     HPI:  86 yr old F, POOR HISTORIAN/early dementia, former heavy smoker with PMHx HTN, hyperlipidemia, COPD, hypothyroidism, Stage IV CKD (baseline Cr ~3.0), anemia of chronic disease, CAD s/p CABG 2015 Dr. Blunt, chronic diastolic CHF(EF:55% by Echo 10/2020), renal artery stenosis s/p renal artery stent >20yrs ago, Carotid artery stenosis, PAD, who presents to [facility] ED 1/15/21 c/o progressively worsening SOB and cough x 3 days. Patient reports she can barely walk 10 feet prior to having to stop and rest. Patient further admits to chronic bilateral LE edema and significant orthopnea and she has been sleeping upright in a chair the past week. Patient also admits to chills and a nonproductive cough over the past few days. Patient was instructed to increase her Lasix 80mg PO daily dose to Lasix 80mg PO BID and start Metalazone 10mg PO BID prior to each dose by her cardiologist Dr. Horvath. Patient denies any chest pain, dizziness, palpitations, recent travel or sick contacts. Patient endorsing compliance w/ medications however daughter states that compliance with medication has been questionable over the last few months, as patient is becoming more forgetful.    In [facility] ED, BP: 170/68, HR: 80, RR:26, Temp: 98.4F, O2 sat: 80% on RA, improved to 99% with 10L NRB. EKG revealed NSR@67BPM with incomplete LBBB, TWI Lead I, AVL (similar to prior EKG 10/2020). CXR prelim read consistent with alveolar edema, bilateral patchy opacities/consolidation.    Labs notable for: WBC 14.5 with left shift, H/H 9.8/30.6, D-dimer 491, BUN/Cr 56/3.54, , CRP 4.86, Ferritin 180, Procalcitonin 0.29, Lactate 2.4, Troponin T 0.05, BNP 64,671, Initial COVID PCR negative.    Patient treated with Lasix 80mg IV x 1 dose, SL NTG 0.4mg PO x 1 dose, Decadron 6mg IV x 1 dose, Ceftriaxone 1g IV x 1 dose and Azithromycin 500mg IV x 1 dose.  Patient now admitted to cardiac telemetry for management of acute on chronic diastolic CHF exacerbation in setting of suspected CAP.    **Of note: Patient had a fall ~3 weeks ago for which she was admitted to [facility], CT imaging was negative as per daughter and fall thought to be secondary to hypotension.   (15 Mark 2021 21:30)      REVIEW OF SYSTEMS:  Constitutional: (+) weight change, (-) fever,  (-) chills, (+) fatigue, () night sweats  Eyes: (-) discharge, () eye pain, () visual change  ENT:  (-) hearing difficulty, () vertigo, () sinus pain,  () throat pain, () epistaxis, () dysphagia, () hoarseness  Neck: (-) pain, () stiffness, () swelling  Respiratory: (+) dry cough, () wheezing, () hemoptysis      Cardiovascular: (-) chest pain, ()palpitations, () dizziness   Gastrointestinal: (-) abdominal pain, () nausea, () vomiting, () hematemesis, () diarrhea,  () constipation, () melena  Genitourinary:  (-) dysuria, () frequency, () hematuria, () incontinence      Neurologic: (-) headache, () memory loss, () loss of strength, () numbness, () tremor     Skin: (-) itching, () burning, () rash, () lesions   Lymphatic: () enlarged lymph nodes  Endocrine: (-) hair loss, () temperature intolerance         Musculoskeletal: (-) back pain, () joint pain,  () extremity pain  Psychiatric: (-) visual change, () auditory change, () depression, () anxiety, () suicidal  Sleep: (+) disorder, () insomnia, () sleep deprivation  Heme/Lymph: () easy bruising, () bleeding gums            Allergy and Immunologic: () hives, () eczema    PAST MEDICAL & SURGICAL HISTORY:  Essential hypertension  Hypertension    Type 2 diabetes mellitus  Diabetes    Hyperlipidemia  Hyperlipidemia    Disorder of kidney and ureter  Renal insufficiency    Peripheral vascular disease  PVD (peripheral vascular disease)    Anxiety state  Anxiety    Atherosclerosis of renal artery  with resulting stent placement    Atherosclerosis of renal artery  Renal artery stenosis      FAMILY HISTORY:  Family history of ischemic heart disease  Family history of coronary artery disease.      SOCIAL HISTORY:      - Tobacco     - ETOH    Allergies    No Known Allergies    Intolerances      Vital Signs Last 24 Hrs  T(C): 36.3 (26 Jan 2021 16:51), Max: 36.4 (26 Jan 2021 04:30)  T(F): 97.4 (26 Jan 2021 16:51), Max: 97.6 (26 Jan 2021 04:30)  HR: 61 (26 Jan 2021 14:51) (55 - 66)  BP: 161/72 (26 Jan 2021 14:51) (151/70 - 183/73)  BP(mean): 103 (26 Jan 2021 14:51) (97 - 111)  RR: 18 (26 Jan 2021 14:51) (18 - 20)  SpO2: 77% (26 Jan 2021 14:51) (77% - 95%)    01-25 @ 07:01 - 01-26 @ 07:00  --------------------------------------------------------  IN: 270 mL / OUT: 950 mL / NET: -680 mL    01-26 @ 07:01 - 01-26 @ 17:09  --------------------------------------------------------  IN: 0 mL / OUT: 200 mL / NET: -200 mL          MEDICATIONS:  MEDICATIONS  (STANDING):  albuterol/ipratropium for Nebulization 3 milliLiter(s) Nebulizer every 6 hours  amLODIPine   Tablet 10 milliGRAM(s) Oral daily  aspirin enteric coated 81 milliGRAM(s) Oral daily  atorvastatin 20 milliGRAM(s) Oral at bedtime  budesonide  80 MICROgram(s)/formoterol 4.5 MICROgram(s) Inhaler 2 Puff(s) Inhalation two times a day  cefepime   IVPB 1000 milliGRAM(s) IV Intermittent every 24 hours  dextrose 40% Gel 15 Gram(s) Oral once  dextrose 5%. 1000 milliLiter(s) (50 mL/Hr) IV Continuous <Continuous>  dextrose 5%. 1000 milliLiter(s) (100 mL/Hr) IV Continuous <Continuous>  dextrose 50% Injectable 25 Gram(s) IV Push once  dextrose 50% Injectable 12.5 Gram(s) IV Push once  dextrose 50% Injectable 25 Gram(s) IV Push once  diazepam    Tablet 1 milliGRAM(s) Oral daily  donepezil 5 milliGRAM(s) Oral at bedtime  epoetin demetri-epbx (RETACRIT) Injectable 5000 Unit(s) IV Push once  fluticasone propionate 50 MICROgram(s)/spray Nasal Spray 1 Spray(s) Both Nostrils two times a day  glucagon  Injectable 1 milliGRAM(s) IntraMuscular once  heparin   Injectable 5000 Unit(s) SubCutaneous every 12 hours  influenza  Vaccine (HIGH DOSE) 0.7 milliLiter(s) IntraMuscular once  insulin lispro (ADMELOG) corrective regimen sliding scale   SubCutaneous Before meals and at bedtime  insulin lispro Injectable (ADMELOG) 4 Unit(s) SubCutaneous three times a day with meals  levothyroxine 50 MICROGram(s) Oral daily  methylPREDNISolone sodium succinate Injectable 20 milliGRAM(s) IV Push every 8 hours  metoprolol tartrate 50 milliGRAM(s) Oral every 12 hours  nystatin    Suspension 704480 Unit(s) Oral four times a day  senna 1 Tablet(s) Oral daily  sevelamer carbonate 1600 milliGRAM(s) Oral three times a day    MEDICATIONS  (PRN):  acetaminophen   Tablet .. 650 milliGRAM(s) Oral every 6 hours PRN Moderate Pain (4 - 6)  guaiFENesin   Syrup  (Sugar-Free) 100 milliGRAM(s) Oral every 6 hours PRN Cough      PHYSICAL EXAM:  Uncomfortable, no immediate distress  Muscle atrophy, developed,  Eyes: PERRLA, EOMI, -conjunctivitis, -scleritis   Head: no focal deficit, normocephalic,  no trauma  ENMT: moist tongue with white lesions, + thrush, -nasal discharge, -hoarseness, normal hearing, + cough, -hemoptysis, trachea midline  Neck: supple, - lymphadenopathy,  -masses, -JVD  Respiratory: bilateral diminished breath sounds, -wheezing, + rhonchi,  rales, + bilateral 50% crackles lower lungs  Chest: -accessory muscle use, -paradoxical breathing  Cardiovascular: irregular rate and sinus rhythm, S1 S2 normal, -S3, -S4, -murmur, -gallop, -rub  Gastrointestinal: soft, nontender, nondistended, normal bowel sounds, no hepatosplenomegaly  Genitourinary: -flank pain, -dysuria, + Cason  Extremities: -clubbing, -cyanosis, -edema    Vascular: peripheral pulses palpable 2+ bilaterally  Neurological: alert, oriented x 3, no focal deficit, -tremor   Skin: warm, dry, -erythema, iv sites intact  Lymph nodes; no cervical, supraclavicular or axillary adenopathy  Psychiatric: cooperative, appropriate mood    DEVICES:  - DENTURES   +IV, + R  HD CW cathter new /     - ETUBE   -TRACH   -CTUBE  R / L    LABS:                          8.3    17.70 )-----------( 306      ( 26 Jan 2021 06:37 )             24.5     01-26    122<L>  |  78<L>  |  110<H>  ----------------------------<  99  4.6   |  18<L>  |  5.79<H>    Ca    8.9      26 Jan 2021 06:37  Phos  8.2     01-26  Mg     1.6     01-26    TPro  5.9<L>  /  Alb  2.9<L>  /  TBili  0.5  /  DBili  x   /  AST  25  /  ALT  33  /  AlkPhos  117  01-26    PT/INR - ( 26 Jan 2021 06:37 )   PT: 14.6 sec;   INR: 1.23          PTT - ( 26 Jan 2021 06:37 )  PTT:28.2 sec  RADIOLOGY & ADDITIONAL STUDIES (The following images were personally reviewed):

## 2021-01-26 NOTE — PROGRESS NOTE ADULT - ASSESSMENT
86F poor historian/early dementia and former heavy smoker with PMHx DM, HTN, hyperlipidemia, COPD, hypothyroidism, CKD (baseline Cr ~3.0), anemia of chronic disease, CAD s/p CABG 2015, chronic diastolic CHF (EF 55% via echo 10/2020), renal artery stenosis (s/p stent 20+ years ag), carotid artery stenosis, PAD presented 1/15/21 w/ progressively worsening SOB and cough, admitted for acute on chronic diastolic HF, found to have worsening renal function and poor urine output for which she was stepped up to CCU and stepped back down to tele 1/23 whose hospital course c/b       Stepped up to CCU for strict fluid status monitoring and potential HD and stepped down to [facility] 1/23/21. ID following for PNA vs radha pneumonitis on Cefepime IV (started 1/23/21). Patients multiple comorbidities continue to progress, acute hypoxic respiratory failure requiring HFNC, acute renal failure, now plan for HD initiation tomorrow 1/26/21. NPO s/p MN for HD catheter placement tomorrow. 86F poor historian/early dementia and former heavy smoker with PMHx DM, HTN, hyperlipidemia, COPD, hypothyroidism, CKD (baseline Cr ~3.0), anemia of chronic disease, CAD s/p CABG 2015, chronic diastolic CHF (EF 55% via echo 10/2020), renal artery stenosis (s/p stent 20+ years ag), carotid artery stenosis, PAD presented 1/15/21 w/ progressively worsening SOB and cough, admitted for acute on chronic diastolic HF, found to have worsening renal function and poor urine output for which she was stepped up to CCU and stepped back down to tele 1/23 whose hospital course c/b PNA vs. radha pneumonitis on Cefepime IV and acute hypoxic respiratory failure requiring HFNC and acute renal failure w/plan for tunneled cath placement today and initiation of HD. ID and Renal are following.

## 2021-01-27 DIAGNOSIS — F41.9 ANXIETY DISORDER, UNSPECIFIED: ICD-10-CM

## 2021-01-27 DIAGNOSIS — R41.9 UNSPECIFIED SYMPTOMS AND SIGNS INVOLVING COGNITIVE FUNCTIONS AND AWARENESS: ICD-10-CM

## 2021-01-27 LAB
ANION GAP SERPL CALC-SCNC: 20 MMOL/L — HIGH (ref 5–17)
BUN SERPL-MCNC: 91 MG/DL — HIGH (ref 7–23)
CALCIUM SERPL-MCNC: 8.5 MG/DL — SIGNIFICANT CHANGE UP (ref 8.4–10.5)
CHLORIDE SERPL-SCNC: 83 MMOL/L — LOW (ref 96–108)
CO2 SERPL-SCNC: 22 MMOL/L — SIGNIFICANT CHANGE UP (ref 22–31)
CREAT SERPL-MCNC: 4.43 MG/DL — HIGH (ref 0.5–1.3)
FERRITIN SERPL-MCNC: 1248 NG/ML — HIGH (ref 15–150)
GLUCOSE BLDC GLUCOMTR-MCNC: 85 MG/DL — SIGNIFICANT CHANGE UP (ref 70–99)
GLUCOSE BLDC GLUCOMTR-MCNC: 87 MG/DL — SIGNIFICANT CHANGE UP (ref 70–99)
GLUCOSE BLDC GLUCOMTR-MCNC: 90 MG/DL — SIGNIFICANT CHANGE UP (ref 70–99)
GLUCOSE BLDC GLUCOMTR-MCNC: 95 MG/DL — SIGNIFICANT CHANGE UP (ref 70–99)
GLUCOSE SERPL-MCNC: 85 MG/DL — SIGNIFICANT CHANGE UP (ref 70–99)
HCT VFR BLD CALC: 22.1 % — LOW (ref 34.5–45)
HGB BLD-MCNC: 7.2 G/DL — LOW (ref 11.5–15.5)
IRON SATN MFR SERPL: 20 % — SIGNIFICANT CHANGE UP (ref 14–50)
IRON SATN MFR SERPL: 44 UG/DL — SIGNIFICANT CHANGE UP (ref 30–160)
MAGNESIUM SERPL-MCNC: 1.9 MG/DL — SIGNIFICANT CHANGE UP (ref 1.6–2.6)
MCHC RBC-ENTMCNC: 26.7 PG — LOW (ref 27–34)
MCHC RBC-ENTMCNC: 32.6 GM/DL — SIGNIFICANT CHANGE UP (ref 32–36)
MCV RBC AUTO: 81.9 FL — SIGNIFICANT CHANGE UP (ref 80–100)
NRBC # BLD: 0 /100 WBCS — SIGNIFICANT CHANGE UP (ref 0–0)
PHOSPHATE SERPL-MCNC: 7.8 MG/DL — HIGH (ref 2.5–4.5)
PLATELET # BLD AUTO: 238 K/UL — SIGNIFICANT CHANGE UP (ref 150–400)
POTASSIUM SERPL-MCNC: 4.5 MMOL/L — SIGNIFICANT CHANGE UP (ref 3.5–5.3)
POTASSIUM SERPL-SCNC: 4.5 MMOL/L — SIGNIFICANT CHANGE UP (ref 3.5–5.3)
RBC # BLD: 2.7 M/UL — LOW (ref 3.8–5.2)
RBC # FLD: 15.1 % — HIGH (ref 10.3–14.5)
SODIUM SERPL-SCNC: 125 MMOL/L — LOW (ref 135–145)
TIBC SERPL-MCNC: 218 UG/DL — LOW (ref 220–430)
TRANSFERRIN SERPL-MCNC: 174 MG/DL — LOW (ref 200–360)
UIBC SERPL-MCNC: 174 UG/DL — SIGNIFICANT CHANGE UP (ref 110–370)
WBC # BLD: 14.23 K/UL — HIGH (ref 3.8–10.5)
WBC # FLD AUTO: 14.23 K/UL — HIGH (ref 3.8–10.5)

## 2021-01-27 PROCEDURE — 99223 1ST HOSP IP/OBS HIGH 75: CPT

## 2021-01-27 PROCEDURE — 71045 X-RAY EXAM CHEST 1 VIEW: CPT | Mod: 26

## 2021-01-27 PROCEDURE — 99233 SBSQ HOSP IP/OBS HIGH 50: CPT

## 2021-01-27 RX ORDER — MIRTAZAPINE 45 MG/1
7.5 TABLET, ORALLY DISINTEGRATING ORAL AT BEDTIME
Refills: 0 | Status: DISCONTINUED | OUTPATIENT
Start: 2021-01-27 | End: 2021-02-07

## 2021-01-27 RX ORDER — ACETAMINOPHEN 500 MG
1000 TABLET ORAL ONCE
Refills: 0 | Status: COMPLETED | OUTPATIENT
Start: 2021-01-27 | End: 2021-01-27

## 2021-01-27 RX ADMIN — SEVELAMER CARBONATE 1600 MILLIGRAM(S): 2400 POWDER, FOR SUSPENSION ORAL at 21:34

## 2021-01-27 RX ADMIN — Medication 50 MILLIGRAM(S): at 18:58

## 2021-01-27 RX ADMIN — Medication 81 MILLIGRAM(S): at 18:58

## 2021-01-27 RX ADMIN — ERYTHROPOIETIN 5000 UNIT(S): 10000 INJECTION, SOLUTION INTRAVENOUS; SUBCUTANEOUS at 12:34

## 2021-01-27 RX ADMIN — Medication 3 MILLILITER(S): at 06:15

## 2021-01-27 RX ADMIN — Medication 3 MILLILITER(S): at 06:14

## 2021-01-27 RX ADMIN — Medication 650 MILLIGRAM(S): at 22:12

## 2021-01-27 RX ADMIN — Medication 400 MILLIGRAM(S): at 02:40

## 2021-01-27 RX ADMIN — MIRTAZAPINE 7.5 MILLIGRAM(S): 45 TABLET, ORALLY DISINTEGRATING ORAL at 21:35

## 2021-01-27 RX ADMIN — DONEPEZIL HYDROCHLORIDE 5 MILLIGRAM(S): 10 TABLET, FILM COATED ORAL at 21:34

## 2021-01-27 RX ADMIN — Medication 0.25 MILLIGRAM(S): at 18:57

## 2021-01-27 RX ADMIN — Medication 100 MILLIGRAM(S): at 22:12

## 2021-01-27 RX ADMIN — Medication 1 SPRAY(S): at 18:59

## 2021-01-27 RX ADMIN — HEPARIN SODIUM 5000 UNIT(S): 5000 INJECTION INTRAVENOUS; SUBCUTANEOUS at 18:58

## 2021-01-27 RX ADMIN — Medication 3 MILLILITER(S): at 18:58

## 2021-01-27 RX ADMIN — Medication 20 MILLIGRAM(S): at 06:15

## 2021-01-27 RX ADMIN — HEPARIN SODIUM 5000 UNIT(S): 5000 INJECTION INTRAVENOUS; SUBCUTANEOUS at 06:15

## 2021-01-27 RX ADMIN — CEFEPIME 100 MILLIGRAM(S): 1 INJECTION, POWDER, FOR SOLUTION INTRAMUSCULAR; INTRAVENOUS at 21:34

## 2021-01-27 RX ADMIN — AMLODIPINE BESYLATE 10 MILLIGRAM(S): 2.5 TABLET ORAL at 18:58

## 2021-01-27 RX ADMIN — Medication 500000 UNIT(S): at 18:58

## 2021-01-27 RX ADMIN — BUDESONIDE AND FORMOTEROL FUMARATE DIHYDRATE 2 PUFF(S): 160; 4.5 AEROSOL RESPIRATORY (INHALATION) at 21:36

## 2021-01-27 RX ADMIN — Medication 20 MILLIGRAM(S): at 21:34

## 2021-01-27 RX ADMIN — ATORVASTATIN CALCIUM 20 MILLIGRAM(S): 80 TABLET, FILM COATED ORAL at 21:34

## 2021-01-27 NOTE — PROGRESS NOTE ADULT - PROBLEM SELECTOR PLAN 8
- Na 125; asx.   - Switched meds to NS from D5.   - Continue to monitor.   - Renal following, appreciate recs.

## 2021-01-27 NOTE — PROGRESS NOTE ADULT - SUBJECTIVE AND OBJECTIVE BOX
PUD CCM    INTERVAL HPI/OVERNIGHT EVENTS:    patient with hemodialysis catheter in isolation room  F160, goal 2 L with 2 units of PRBC  300 ml/min  2 K, calcium 2.5  2.5 hours    now on high flow with 58% and 50 L, nasal pillow  BiPAP in room  CXR with unchanged bilateral infiltrates    All new data reviewed, including VS, lab, imaging, Rx and documentation.    PAST MEDICAL & SURGICAL HISTORY:  Essential hypertension  Hypertension    Type 2 diabetes mellitus  Diabetes    Hyperlipidemia  Hyperlipidemia    Disorder of kidney and ureter  Renal insufficiency    Peripheral vascular disease  PVD (peripheral vascular disease)    Anxiety state  Anxiety    Atherosclerosis of renal artery  with resulting stent placement    Atherosclerosis of renal artery  Renal artery stenosis    FAMILY HISTORY:  Family history of ischemic heart disease  Family history of coronary artery disease.    SOCIAL HISTORY:    REVIEW OF SYSTEMS:  Constitutional: (+) weight change, (-) fever,  (-) chills, (+) fatigue, () night sweats  Eyes: (-) discharge, () eye pain, () visual change  ENT:  (-) hearing difficulty, () vertigo, () sinus pain,  () throat pain, () epistaxis, () dysphagia, () hoarseness  Neck: (-) pain, () stiffness, () swelling  Respiratory: (+) dry cough, () wheezing, () hemoptysis      Cardiovascular: (-) chest pain, ()palpitations, () dizziness   Gastrointestinal: (-) abdominal pain, () nausea, () vomiting, () hematemesis, () diarrhea,  () constipation, () melena  Genitourinary:  (-) dysuria, () frequency, () hematuria, () incontinence      Neurologic: (-) headache, () memory loss, () loss of strength, () numbness, () tremor     Skin: (-) itching, () burning, () rash, () lesions   Lymphatic: () enlarged lymph nodes  Endocrine: (-) hair loss, () temperature intolerance         Musculoskeletal: (-) back pain, () joint pain,  () extremity pain  Psychiatric: (-) visual change, () auditory change, () depression, () anxiety, () suicidal  Sleep: (+) disorder, () insomnia, () sleep deprivation  Heme/Lymph: () easy bruising, () bleeding gums            Allergy and Immunologic: () hives, () eczema    Vital Signs Last 24 Hrs  T(C): 35.9 (26 Jan 2021 09:22), Max: 36.6 (25 Jan 2021 13:28)  T(F): 96.7 (26 Jan 2021 09:22), Max: 97.8 (25 Jan 2021 13:28)  HR: 58 (26 Jan 2021 09:29) (58 - 66)  BP: 183/73 (26 Jan 2021 09:04) (148/70 - 183/73)  BP(mean): 105 (26 Jan 2021 09:04) (100 - 111)  RR: 18 (26 Jan 2021 09:04) (18 - 24)  SpO2: 93% (26 Jan 2021 09:29) (90% - 96%)    I&O's Detail    25 Jan 2021 07:01  -  26 Jan 2021 07:00  --------------------------------------------------------  IN:    IV PiggyBack: 50 mL    Oral Fluid: 220 mL  Total IN: 270 mL    OUT:    Indwelling Catheter - Urethral (mL): 750 mL    Voided (mL): 200 mL  Total OUT: 950 mL    Total NET: -680 mL    PHYSICAL EXAM:  poor appetite due to renal failure  Muscle atrophy, developed, comfortable on nonrebreather, - acute distress; vital signs are monitored continuously  Eyes: PERRLA, EOMI, -conjunctivitis, -scleritis   Head: no focal deficit, normocephalic,  no trauma  ENMT: moist tongue with white lesions, + thrush, -nasal discharge, -hoarseness, normal hearing, + cough, -hemoptysis, trachea midline  Neck: supple, - lymphadenopathy,  -masses, -JVD  Respiratory: bilateral diminished breath sounds, -wheezing, + rhonchi,  rales, + bilateral 50% crackles lower lungs  Chest: -accessory muscle use, -paradoxical breathing  Cardiovascular: irregular rate and sinus rhythm, S1 S2 normal, -S3, -S4, -murmur, -gallop, -rub  Gastrointestinal: soft, nontender, nondistended, normal bowel sounds, no hepatosplenomegaly  Genitourinary: -flank pain, -dysuria, + Cason  Extremities: -clubbing, -cyanosis, -edema    Vascular: peripheral pulses palpable 2+ bilaterally  Neurological: alert, oriented x 3, no focal deficit, -tremor   Skin: warm, dry, -erythema, iv sites intact  Lymph nodes; no cervical, supraclavicular or axillary adenopathy  Psychiatric: cooperative, appropriate mood    MEDICATIONS  (STANDING):  albuterol/ipratropium for Nebulization 3 milliLiter(s) Nebulizer every 6 hours  amLODIPine   Tablet 10 milliGRAM(s) Oral daily  aspirin enteric coated 81 milliGRAM(s) Oral daily  atorvastatin 20 milliGRAM(s) Oral at bedtime  budesonide  80 MICROgram(s)/formoterol 4.5 MICROgram(s) Inhaler 2 Puff(s) Inhalation two times a day  cefepime   IVPB 1000 milliGRAM(s) IV Intermittent every 24 hours  dextrose 40% Gel 15 Gram(s) Oral once  dextrose 5%. 1000 milliLiter(s) (50 mL/Hr) IV Continuous <Continuous>  dextrose 5%. 1000 milliLiter(s) (100 mL/Hr) IV Continuous <Continuous>  dextrose 50% Injectable 25 Gram(s) IV Push once  dextrose 50% Injectable 12.5 Gram(s) IV Push once  dextrose 50% Injectable 25 Gram(s) IV Push once  diazepam    Tablet 1 milliGRAM(s) Oral daily  donepezil 5 milliGRAM(s) Oral at bedtime  fluticasone propionate 50 MICROgram(s)/spray Nasal Spray 1 Spray(s) Both Nostrils two times a day  glucagon  Injectable 1 milliGRAM(s) IntraMuscular once  heparin   Injectable 5000 Unit(s) SubCutaneous every 12 hours  influenza  Vaccine (HIGH DOSE) 0.7 milliLiter(s) IntraMuscular once  insulin glargine Injectable (LANTUS) 5 Unit(s) SubCutaneous at bedtime  insulin lispro (ADMELOG) corrective regimen sliding scale   SubCutaneous Before meals and at bedtime  insulin lispro Injectable (ADMELOG) 5 Unit(s) SubCutaneous three times a day before meals  levothyroxine 50 MICROGram(s) Oral daily  methylPREDNISolone sodium succinate Injectable 20 milliGRAM(s) IV Push every 8 hours  metoprolol tartrate 50 milliGRAM(s) Oral every 12 hours  nystatin    Suspension [id number] Unit(s) Oral four times a day  senna 1 Tablet(s) Oral daily  sevelamer carbonate 1600 milliGRAM(s) Oral three times a day    MEDICATIONS  (PRN):  acetaminophen   Tablet .. 650 milliGRAM(s) Oral every 6 hours PRN Moderate Pain (4 - 6)  guaiFENesin   Syrup  (Sugar-Free) 100 milliGRAM(s) Oral every 6 hours PRN Cough      Allergies    No Known Allergies    Intolerances        LABS:                          hb 7.2 for 2 units                01-26    122<L>  |  78<L>  |  110<H>  ----------------------------<  99  4.6   |  18<L>  |  5.79<H>    Ca    8.9      26 Jan 2021 06:37  Phos  8.2     01-26  Mg     1.6     01-26    TPro  5.9<L>  /  Alb  2.9<L>  /  TBili  0.5  /  DBili  x   /  AST  25  /  ALT  33  /  AlkPhos  117  01-26    PT/INR - ( 26 Jan 2021 06:37 )   PT: 14.6 sec;   INR: 1.23     PTT - ( 26 Jan 2021 06:37 )  PTT:28.2 sec    +DVT prophylaxis  5000 q12  +Sleep   +Nutrition goals  NPO for catheter  -Pain  DENIED  -Decubital ulcer  +GI prophylaxis (PPV, coagulopathy, Hx)  +Aspiration prophylaxis (45 degrees)  Ventilator synchronized  BED SIDE, 10/5  Tracheal cuff pressure  ORONASAL INTERFACE  +Sedation/analgesia stopped once  +ID (phos, CH, mupi, SB)  -Delirium  +Cardiac Beta/ACEI-ARB on hold/ASA low dose/statin  +Prevention  +Education  +Medication reviewed (drug-drug interactions, PDA)  Medical devices  F, IV, nonRB, BiPAP  Discussed with IR, RNs, Dr Anderson    RADIOLOGY & ADDITIONAL STUDIES:    CXR: no change      EXAM:  CT CHEST                          PROCEDURE DATE:  01/19/2021      INTERPRETATION:  CT of the CHEST without intravenous contrast dated 1/19/2021 2:31 PM    INDICATION: Worsening Hypoxia    TECHNIQUE: CT of the chest was performed withoutintravenous contrast.  Intravenous contrast was not used Axial and coronal and sagittal images were produced and reviewed.    PRIOR STUDIES: CT chest 10/14/2020    FINDINGS: Cardiomegaly as before. Dense mitral valve annular calcification. Coronary artery calcifications. Status post CABG. The main pulmonary artery measures 3.1 cm diameter.  No pericardial effusion is seen.  Evaluation of the vasculature is limited without intravenous contrast, but the great vessels are grossly unremarkable.  Evaluation for adenopathy is limited without intravenous contrast. Within that limitation, mild mediastinal lymphadenopathy is seen. No axillary adenopathy. Abandoned epicardial pacer wire. Low-density of cardiac chambers relative to the myocardium suggesting anemia.    Small bilateral pleural effusions are seen. Evaluation of the pulmonary parenchyma demonstrates underlying interstitial fibrosis in the upper and mid zones with bilateral mosaic attenuation. There is now new bilateral groundglass opacities  in the upper and mid and lower zones with thickening of interlobular septa at the bilateral upper zones i.e. crazy paving  appearance.. Coalescence of findings with consolidation seen in the superior segment right lower lobe.    Limited evaluation of the upper abdomen demonstrates radiopaque cholelithiasis.    Evaluation of the osseous structures demonstrates degenerative changes.      IMPRESSION: New bilateral groundglass lung opacities superimposed on chronic interstitial pulmonary fibrosis probably due to chronic hypersensitivity pneumonitis or sarcoid. The groundglass lung opacities could  be due to pulmonary edema, pulmonary hemorrhage, alveolar proteinosis, organizing pneumonia or atypical infection.      Assessment and Plan:    Problem/Plan - 1:  ·  Problem: Acute respiratory failure.  Plan: BiPAP prn for procedure.      Problem/Plan - 2:  ·  Problem: Pulmonary fibrosis.      Problem/Plan - 3:  ·  Problem: ILD (interstitial lung disease).  Plan: hopefully responsive to steroids.      Problem/Plan - 4:  ·  Problem: CKD (chronic kidney disease).  Plan: will need hemodialysis catheter.      Problem/Plan - 5:  ·  Problem: CAD (coronary artery disease).  Plan: on treatment, asymptomatic.      Problem/Plan - 6:  Problem: COPD (chronic obstructive pulmonary disease). Plan: bronchodilators.     Problem/Plan - 7:  ·  Problem: Pleural effusion. Hemofiltration.     Problem/Plan - 8:  ·  Problem: Type 2 diabetes mellitus.  Plan: endocrine f/u very much appreciated.     Problem/Plan - 9:  ·  Problem: Anemia. Continue PPI.     Problem/Plan - 10:  Problem: Acute on chronic diastolic congestive heart failure. Plan; may need lasix prn.

## 2021-01-27 NOTE — PROGRESS NOTE ADULT - PROBLEM SELECTOR PLAN 5
Hx of CAD, s/p CABG 2015.   - CONT: ASA 81mg PO QD, Atorvastatin 20mg PO QD, Lopressor 50mg PO BID.     ## HTN  - SBP 160s  - CONT: Amlodipine 10mg PO QD, Lopressor 50mg PO BID.

## 2021-01-27 NOTE — BEHAVIORAL HEALTH ASSESSMENT NOTE - RISK ASSESSMENT
Static: age, medical dx, hx of anxiety, dx of dementia  Modifiable: current medical status, access to care  Protective: involved family, able to make needs known Low Acute Suicide Risk

## 2021-01-27 NOTE — PROGRESS NOTE ADULT - PROBLEM SELECTOR PLAN 10
- CONT: Levothyroxine 50mg PO QD.     #DM  - Hx of DM. A1c 5.1% on admission.    - CONT: lantus 10 qhs, ISS  - Lispro pre-meal dc'd   - Endocrine following    F: none   E: renal patient   N: dysphagia 2 mechanical soft-thin liquids, not eating a lot and poor appetite. Encourage PO fluids.    DVT ppx: Hep SubQ  Dispo: cardiac telemetry    PT lynnette HENDRICKSON w/on site dialysis

## 2021-01-27 NOTE — PROGRESS NOTE ADULT - PROBLEM SELECTOR PLAN 9
Dx 3wks ago @ [facility].   - CTH 1/19 (-) for acute changes.   - Per neurosurgery, cont ASA 81 PO QD; no neuro interventions at this time.     #DEMENTIA  - CONT: Donepezil 5mg PO QD    ## ANXIETY  - psych consulted as anxiety thought to be worsening during hospitalization, appreciate recs.   - ordered for Remeron 7.5mg qhs and Xanax 0.25mg BID; home Valium dc'd

## 2021-01-27 NOTE — BEHAVIORAL HEALTH ASSESSMENT NOTE - ORIENTATION OTHER
Pt aware that she in the hospital but didn't know which one, aware of month but not day of week or year, could not recall her year of birth or age.

## 2021-01-27 NOTE — PROGRESS NOTE ADULT - ATTENDING COMMENTS
Pt seen at bedside  Agree with above.   Pt s/p HD today  no insulin required  poor PO intake  steroid administration reviewed  not OOB  no acute events  can hold all standing insulin, will restart lispro prn  continue statin  will follow

## 2021-01-27 NOTE — BEHAVIORAL HEALTH ASSESSMENT NOTE - SUMMARY
Patient is a 85y/o single, domiciled with family, retired / female with medical history significant for HTN, Hyperlipidemia, COPD, Hypothyroidism, Anemia, CAD s/p CABG, PAD, Stage IV CKD, former heavy smoker. Psychiatric hx of anxiety, not currently in treatment and dementia- currently on Aricept 5mg qd. Patient noted to be depressed and expressing c/o of both sadness and anxiety due to medical problems and being in the hospital. She acknowledges changes in appetite and sleep. Demonstrated moderate cognitive impairment and has minimal understand of her treatment and care in the hospital. Is amenable to medications to address her concerns.    Plan  -Recommend starting mirtazepine 7.5mg QHS to target sxs of anxiety, depression and low appetite  -Recommend starting lorazepam 0.25mg BID to target sxs of anxiety  -[employer] team contacted to provide additional support and therapy Patient is a 87y/o single, domiciled with family, retired / female with medical history significant for HTN, Hyperlipidemia, COPD, Hypothyroidism, Anemia, CAD s/p CABG, PAD, Stage IV CKD, former heavy smoker. Psychiatric hx of anxiety, not currently in treatment and dementia- currently on Aricept 5mg qd. Patient noted to be depressed and expressing c/o of both sadness and anxiety due to medical problems and being in the hospital. She acknowledges changes in appetite and sleep. Demonstrated moderate cognitive impairment and has minimal understand of her treatment and care in the hospital. Is amenable to medications to address her concerns.    Plan  -Recommend starting mirtazepine 7.5mg QHS to target sxs of anxiety, depression and low appetite  -Recommend starting lorazepam 0.25mg BID to target sxs of anxiety  -Discontinue Diazepam 1mg qhs  -[employer] team contacted to provide additional support and therapy Patient is a 85y/o single, domiciled with family, retired / female with medical history significant for HTN, Hyperlipidemia, COPD, Hypothyroidism, Anemia, CAD s/p CABG, PAD, Stage IV CKD, former heavy smoker. Psychiatric hx of anxiety, not currently in treatment and dementia- currently on Aricept 5mg qd. Patient noted to be depressed and expressing c/o of both sadness and anxiety due to medical problems and being in the hospital. She acknowledges changes in appetite and sleep. Demonstrated moderate cognitive impairment and has minimal understand of her treatment and care in the hospital. Is amenable to medications to address her concerns.    Plan  -Recommend starting mirtazepine 7.5mg QHS to target sxs of anxiety, depression and low appetite  -Recommend starting lorazepam 0.25mg BID to target sxs of anxiety  -Discontinue Diazepam 1mg qhs  -Continue Aricept 5mg daily  -[employer] team contacted to provide additional support and therapy

## 2021-01-27 NOTE — BEHAVIORAL HEALTH ASSESSMENT NOTE - NSBHCHARTREVIEWINVESTIGATE_PSY_A_CORE FT
Ventricular Rate 67 BPM    Atrial Rate 67 BPM    P-R Interval 158 ms    QRS Duration 120 ms    Q-T Interval 472 ms    QTC Calculation(Bazett) 498 ms    P Axis 50 degrees    R Axis -25 degrees    T Axis 150 degrees    Diagnosis Line Normal sinus rhythm  Left Anterior Fasicular Block  ST & T wave abnormality, consider lateral ischemia  Nonspecific intraventricular conduction delay

## 2021-01-27 NOTE — PROGRESS NOTE ADULT - ASSESSMENT
86F POOR HISTORIAN/early dementia, former heavy smoker PMHx DM, HTN, hyperlipidemia, COPD, hypothyroidism, CKD (baseline Cr ~3.0), anemia of chronic disease, CAD s/p CABG 2015 Dr. Blunt, chronic diastolic CHF(EF:55% by Echo 10/2020), renal artery stenosis s/p renal artery stent >20yrs ago, Carotid artery stenosis, PAD, who presents c/o progressively worsening SOB and cough. Nephrology consulted for NAV.     #Oliguric NAV on CKD3 2/2 ATN with CKD progression from DM nephropathy  S/p HD #1 on 1/26  HD today    Electrolytes, volume and bicarb management with HD  BP: lopressor 50BID, UF with HD  Anaemia- remains on cefepime for PNA management, epo with HD  BMD: on renvela to 1600 TID    Dialyzer: Optiflux [id number]         QB: 250 mL/min         QD: 500 mL/min      K bath: 2  Goal UF: 2L                Duration: 150 min     Patient tolerated he procedure well. Daily weights, strict I&Os, renally dose meds

## 2021-01-27 NOTE — BEHAVIORAL HEALTH ASSESSMENT NOTE - CASE SUMMARY
87y/o single, domiciled with family, retired / female with medical history significant for HTN, Hyperlipidemia, COPD, Hypothyroidism, Anemia, CAD s/p CABG, PAD, Stage IV CKD, former heavy smoker. Psychiatry was consulted to evaluate the pt for anxiety/depression. Patient currently endorses low mood, insomnia, low appetite and high anxiety re current medical treatment. She would benefit from starting mirtazapine to target the above sx and being switched from valium to a shorter acting benzo, with the goal to eventually taper off (once the benefits on anxiety from mirtazapine are noticed).

## 2021-01-27 NOTE — BEHAVIORAL HEALTH ASSESSMENT NOTE - NSBHCHARTREVIEWIMAGING_PSY_A_CORE FT
EXAM:  CT BRAIN                          PROCEDURE DATE:  01/19/2021          INTERPRETATION:  CLINICAL INFORMATION: Status post fall 3 weeks ago with history of subarachnoid hemorrhage.    TECHNIQUE: Multiple axial images were obtained and viewed at 5 mm intervals from the skull base to the vertex. The images were reviewed in brain and bone windows. Sagittal and coronal reformations are provided at 3 mm intervals.    COMPARISON: None    FINDINGS:    The ventricles, cisternal spaces, and cortical sulci are appropriate in size and configuration for the age of the patient with mild parenchymal volume loss.    There is no acute intracranial hemorrhage. There is no mass effect, midline shift or extra axial collection.    The gray white differentiation appears preserved without evidence of an acute transcortical infarction. There is extensive confluent and patchy hypoattenuation present diffusely throughout the periventricular and subcortical cerebral white matter, compatible with long-standing small vessel ischemic change. There are several small sites of rounded hypoattenuation in the bilateral basal ganglia nuclei compatible with lacunar infarcts of strictly indeterminate age without prior imaging for comparison.    The bony windows demonstrates no fractures. The visualized paranasal sinuses and mastoid air cells are predominantly clear. The native ocular lenses are replaced. Minimal scalp induration is noted at the right posterior parietal region; correlate for site of reported trauma. No radiopaque foreign body.    IMPRESSION:    No acute intracranial hemorrhage or calvarial fracture is demonstrated in this patient with recent trauma.    Advanced small vessel ischemic changes are present throughout the cerebral white matter with several age-indeterminate lacunar infarctions in the basal ganglia nuclei.

## 2021-01-27 NOTE — BEHAVIORAL HEALTH ASSESSMENT NOTE - SUICIDE PROTECTIVE FACTORS
Responsibility to family and others/Supportive social network of family or friends/Cultural, spiritual and/or moral attitudes against suicide

## 2021-01-27 NOTE — PROGRESS NOTE ADULT - PROBLEM SELECTOR PLAN 1
Becoming more dependant on oxygen to maintain sats low 90's; now on HFNC and tolerating well.  - Likely multifactorial 2/2 CHF, COPD, ILD, and CAP vs pneumonitis.   - LE Duplex 1/19 (-) for DVT.   - No CTA done to r/o PE due to CKD; VQ deferred as likely would not yield diagnostic info given multiple lung processes.   - CONT: Methylprednisolone 20mg IV q8hrs per Dr. Cuevas.  - psych consulted as anxiety thought to be contributing factor as well, appreciate recs.   - ordered for Remeron 7.5mg qhs and Xanax 0.25mg BID; home Valium dc'd    ## Pneumonia vs radha Pneumonitis  - WBC initially up-trending, possible 2/2 IV steroids. F/u manual diff for bandemia  - WBC 14.23 today   - s/p 4 days of Ceftriaxone + 7 days azithromycin.   - ID consulted – 1/23 DISCONTINUED Zosyn and STARTED Cefepime 1000mg IV q24hrs (1/23-____; duration to be determined).   - ID recs: RVP negative, (+) MSSA, procalcitonin--> 1.08, galactomannan pending, LDH-->474, fungitell pending  - Continue to monitor for fever

## 2021-01-27 NOTE — PROGRESS NOTE ADULT - ASSESSMENT
86F poor historian/early dementia and former heavy smoker with PMHx DM, HTN, hyperlipidemia, COPD, hypothyroidism, CKD (baseline Cr ~3.0), anemia of chronic disease, CAD s/p CABG 2015, chronic diastolic CHF (EF 55% via echo 10/2020), renal artery stenosis (s/p stent 20+ years ag), carotid artery stenosis, PAD presented 1/15/21 w/ progressively worsening SOB and cough, admitted for acute on chronic diastolic HF, found to have worsening renal function and poor urine output for which she was stepped up to CCU and stepped back down to tele 1/23 whose hospital course c/b PNA vs. radha pneumonitis on Cefepime IV and acute hypoxic respiratory failure requiring HFNC and acute renal failure now s/p Tunneled Cath placement w/initiation of HD 1/26 and Anemia requiring total 3U PRBC during hospitalization. ID, Renal and Psych are following.

## 2021-01-27 NOTE — PROGRESS NOTE ADULT - SUBJECTIVE AND OBJECTIVE BOX
INTERVAL HPI/OVERNIGHT EVENTS:      Patient is a 86y old  Female who presents with a chief complaint of progressively worsening SOB and cough x 3 days (2021 13:49)  permacath placement by IR yesterday. Pt seen and examined at bedside. Pt sleeping and receiving HD.  on solumedrol 20 Q8H  Na 125    FSG & Insulin received:  Yesterday:  pre-dinner fs, 4 nutritional lispro   units   bedtime fs,     Today:  pre-breakfast fs, NPO on bipap  pre-lunch fs      ROS otherwise negative    MEDICATIONS  (STANDING):  albuterol/ipratropium for Nebulization 3 milliLiter(s) Nebulizer every 6 hours  amLODIPine   Tablet 10 milliGRAM(s) Oral daily  aspirin enteric coated 81 milliGRAM(s) Oral daily  atorvastatin 20 milliGRAM(s) Oral at bedtime  budesonide  80 MICROgram(s)/formoterol 4.5 MICROgram(s) Inhaler 2 Puff(s) Inhalation two times a day  cefepime   IVPB 1000 milliGRAM(s) IV Intermittent every 24 hours  dextrose 40% Gel 15 Gram(s) Oral once  dextrose 5%. 1000 milliLiter(s) (50 mL/Hr) IV Continuous <Continuous>  dextrose 5%. 1000 milliLiter(s) (100 mL/Hr) IV Continuous <Continuous>  dextrose 50% Injectable 25 Gram(s) IV Push once  dextrose 50% Injectable 12.5 Gram(s) IV Push once  dextrose 50% Injectable 25 Gram(s) IV Push once  donepezil 5 milliGRAM(s) Oral at bedtime  fluticasone propionate 50 MICROgram(s)/spray Nasal Spray 1 Spray(s) Both Nostrils two times a day  glucagon  Injectable 1 milliGRAM(s) IntraMuscular once  heparin   Injectable 5000 Unit(s) SubCutaneous every 12 hours  influenza  Vaccine (HIGH DOSE) 0.7 milliLiter(s) IntraMuscular once  insulin lispro (ADMELOG) corrective regimen sliding scale   SubCutaneous Before meals and at bedtime  levothyroxine 50 MICROGram(s) Oral daily  LORazepam     Tablet 0.25 milliGRAM(s) Oral two times a day  methylPREDNISolone sodium succinate Injectable 20 milliGRAM(s) IV Push every 8 hours  metoprolol tartrate 50 milliGRAM(s) Oral every 12 hours  mirtazapine 7.5 milliGRAM(s) Oral at bedtime  nystatin    Suspension 706331 Unit(s) Oral four times a day  senna 1 Tablet(s) Oral daily  sevelamer carbonate 1600 milliGRAM(s) Oral three times a day    MEDICATIONS  (PRN):  acetaminophen   Tablet .. 650 milliGRAM(s) Oral every 6 hours PRN Moderate Pain (4 - 6)  guaiFENesin   Syrup  (Sugar-Free) 100 milliGRAM(s) Oral every 6 hours PRN Cough      PHYSICAL EXAM  Vital Signs Last 24 Hrs  T(C): 35.8 (2021 13:40), Max: 37.1 (2021 10:25)  T(F): 96.4 (2021 13:40), Max: 98.7 (2021 10:25)  HR: 72 (2021 17:25) (52 - 72)  BP: 169/77 (2021 17:25) (132/67 - 179/74)  BP(mean): 111 (2021 17:25) (89 - 112)  RR: 20 (2021 17:25) (16 - 61)  SpO2: 100% (2021 17:25) (91% - 100%)    Constitutional: wn/wd in NAD.   HEENT: NCAT, MMM, OP clear, EOMI, no proptosis or lid retraction  Neck: no thyromegaly or palpable thyroid nodules   Respiratory: mild crackles  Cardiovascular: regular rhythm, normal S1 and S2, no audible murmurs, no peripheral edema  GI: soft, NT/ND, no masses/HSM appreciated.  Neurology: no tremors, DTR 2+  Skin: no visible rashes/lesions.   Psychiatric: AAO x 3, normal affect/mood.    LABS:                        7.2    14.23 )-----------( 238      ( 2021 06:23 )             22.1         125<L>  |  83<L>  |  91<H>  ----------------------------<  85  4.5   |  22  |  4.43<H>    Ca    8.5      2021 06:23  Phos  7.8       Mg     1.9     01-27    TPro  5.9<L>  /  Alb  2.9<L>  /  TBili  0.5  /  DBili  x   /  AST  25  /  ALT  33  /  AlkPhos  117      PT/INR - ( 2021 06:37 )   PT: 14.6 sec;   INR: 1.23          PTT - ( 2021 06:37 )  PTT:28.2 sec    Thyroid Stimulating Hormone, Serum: 1.749 uIU/mL (10-13 @ 05:49)      HbA1C:   CAPILLARY BLOOD GLUCOSE      POCT Blood Glucose.: 87 mg/dL (2021 16:31)  POCT Blood Glucose.: 85 mg/dL (2021 11:52)  POCT Blood Glucose.: 90 mg/dL (2021 06:50)  POCT Blood Glucose.: 120 mg/dL (2021 21:14)  POCT Blood Glucose.: 123 mg/dL (2021 18:46)      A/P: 86 yr old F, POOR HISTORIAN/early dementia, former heavy smoker with PMHx HTN, hyperlipidemia, COPD, hypothyroidism, Stage IV CKD (baseline Cr ~3.0), anemia of chronic disease, CAD s/p CABG  Dr. Blunt, chronic diastolic CHF(EF:55% by Echo 10/2020), renal artery stenosis s/p renal artery stent >20yrs ago, Carotid artery stenosis, PAD, who presents to [facility] ED 1/15/21 c/o progressively worsening SOB and cough x 3 days. Currently on steroids from pneumonitis    1.  Steroid induced hyperglycemia  - hba1c 5.1  Cr 5.49 and GFr 20.     Please STOP Lantus.  Please STOP lispro with meals. Continue lispro moderate dose sliding scale 4 times daily with meals and at bedtime.    Please continue consistent carbohydrate diet.    on solumedrol 20mg Q8H    2. Hypothyroidism  - TSH 1.7. free T4 0.78  Please continue levothyroxine 50mcg once daily for now    3. Hyponatremia - possible SIADH from infectious disease process.  Na 125  Continue to monitor.  Fluid restrict, though pt with poor PO intake.    Will continue to monitor       Discussed case with     and updated primary team

## 2021-01-27 NOTE — PROGRESS NOTE ADULT - PROBLEM SELECTOR PLAN 4
BNP 57k.   - POCUS by MICU consult 1/22/21 (+) for B-lines throughout.   - TTE 1/20: EF 53%, biatrial enlargement, mild AR, mod-severe MR, mod-severe TR, PASP 70.   - CONT: Lopressor 50mg PO BID.   - Strict I/Os, daily weights. Core measures.

## 2021-01-27 NOTE — PROGRESS NOTE ADULT - SUBJECTIVE AND OBJECTIVE BOX
Patient is a 86y Female seen and evaluated at bedside. Cath oozing rate decreased, clean dressing. HD today with 2U PRBC.    Meds:    acetaminophen   Tablet .. 650 every 6 hours PRN  albuterol/ipratropium for Nebulization 3 every 6 hours  amLODIPine   Tablet 10 daily  aspirin enteric coated 81 daily  atorvastatin 20 at bedtime  budesonide  80 MICROgram(s)/formoterol 4.5 MICROgram(s) Inhaler 2 two times a day  cefepime   IVPB 1000 every 24 hours  dextrose 40% Gel 15 once  dextrose 5%. 1000 <Continuous>  dextrose 5%. 1000 <Continuous>  dextrose 50% Injectable 25 once  dextrose 50% Injectable 12.5 once  dextrose 50% Injectable 25 once  diazepam    Tablet 1 daily  donepezil 5 at bedtime  fluticasone propionate 50 MICROgram(s)/spray Nasal Spray 1 two times a day  glucagon  Injectable 1 once  guaiFENesin   Syrup  (Sugar-Free) 100 every 6 hours PRN  heparin   Injectable 5000 every 12 hours  influenza  Vaccine (HIGH DOSE) 0.7 once  insulin lispro (ADMELOG) corrective regimen sliding scale  Before meals and at bedtime  insulin lispro Injectable (ADMELOG) 4 three times a day with meals  levothyroxine 50 daily  methylPREDNISolone sodium succinate Injectable 20 every 8 hours  metoprolol tartrate 50 every 12 hours  nystatin    Suspension [id number] four times a day  senna 1 daily  sevelamer carbonate 1600 three times a day      T(C): , Max: 37.1 (01-27-21 @ 10:25)  T(F): , Max: 98.7 (01-27-21 @ 10:25)  HR: 67 (01-27-21 @ 13:40)  BP: 147/63 (01-27-21 @ 13:40)  BP(mean): 89 (01-27-21 @ 12:26)  RR: 18 (01-27-21 @ 13:40)  SpO2: 100% (01-27-21 @ 13:40)  Wt(kg): --    01-26 @ 07:01  -  01-27 @ 07:00  --------------------------------------------------------  IN: 0 mL / OUT: 2050 mL / NET: -2050 mL    01-27 @ 07:01  -  01-27 @ 15:38  --------------------------------------------------------  IN: 1600 mL / OUT: 3000 mL / NET: -1400 mL    Review of Systems:  RESPIRATORY: +shortness of breath- improving  CARDIOVASCULAR: No chest pain  MUSCULOSKELETAL: No leg edema    PHYSICAL EXAM:  GENERAL: well-developed, well nourished, alert, on non HFNC 60%  CHEST/LUNG: Coarse breath sounds bilaterally, reduced at bases  HEART: normal S1S2, RRR  ABDOMEN: Soft, Nontender, non distended  EXTREMITIES: +oedema   ACCESS: RTC- oozing and bandage site    LABS:                        7.2    14.23 )-----------( 238      ( 27 Jan 2021 06:23 )             22.1     01-27    125<L>  |  83<L>  |  91<H>  ----------------------------<  85  4.5   |  22  |  4.43<H>    Ca    8.5      27 Jan 2021 06:23  Phos  7.8     01-27  Mg     1.9     01-27    TPro  5.9<L>  /  Alb  2.9<L>  /  TBili  0.5  /  DBili  x   /  AST  25  /  ALT  33  /  AlkPhos  117  01-26    Hepatitis B Surface Antibody: Nonreact (01-26 @ 16:58)  Hepatitis C Virus S/CO Ratio: 0.05 S/CO (01-26 @ 16:58)    PT/INR - ( 26 Jan 2021 06:37 )   PT: 14.6 sec;   INR: 1.23          PTT - ( 26 Jan 2021 06:37 )  PTT:28.2 sec          RADIOLOGY & ADDITIONAL STUDIES:

## 2021-01-27 NOTE — PROGRESS NOTE ADULT - ATTENDING COMMENTS
still requiring O2- bipap  hgb down and for prbc with repeat HD today  reviewed with [facility] team  urine output poor- needs daily bladder scans  more comfortable without tucker

## 2021-01-27 NOTE — PROGRESS NOTE ADULT - SUBJECTIVE AND OBJECTIVE BOX
CARDIOLOGY NP PROGRESS NOTE    Subjective: Received pt awake in bed on BIPAP. Patient withdrawn, states feeling "OK". Denies CP, dizziness/diaphoresis, n/v, palpitations.  Remainder ROS otherwise negative.    Overnight Events:  Patient tachypneic and de- sating on non Rebreather for which she was placed back on BiPAP setting  14/7 FiO2 70% w/improvement     TELEMETRY: SB- SR (HR 50s-60s)      VITAL SIGNS:  T(C): 35.8 (01-27-21 @ 13:40), Max: 37.1 (01-27-21 @ 10:25)  HR: 70 (01-27-21 @ 17:51) (52 - 72)  BP: 169/77 (01-27-21 @ 17:25) (132/67 - 179/74)  RR: 18 (01-27-21 @ 17:51) (16 - 61)  SpO2: 100% (01-27-21 @ 17:51) (91% - 100%)  Wt(kg): --    I&O's Summary    26 Jan 2021 07:01  -  27 Jan 2021 07:00  --------------------------------------------------------  IN: 0 mL / OUT: 2050 mL / NET: -2050 mL    27 Jan 2021 07:01  -  27 Jan 2021 17:54  --------------------------------------------------------  IN: 1600 mL / OUT: 3000 mL / NET: -1400 mL          PHYSICAL EXAM:    General: Alert to self, place and time. Disoriented to situation at times. No acute Distress   Neck: Supple, +JVD  Cardiac: S1 S2, No M/R/G  Pulmonary: Diminished breath sounds RLL. Scattered Rhonchi. Tachypneic on HFNC,   Abdomen: Soft, Non -tender, +BS x 4 quads  Extremities: No Rashes, No edema  Neuro: Alert to self, place and time. Disoriented to situation at times.  No focal deficits                    LABS:                          7.2    14.23 )-----------( 238      ( 27 Jan 2021 06:23 )             22.1                              01-27    125<L>  |  83<L>  |  91<H>  ----------------------------<  85  4.5   |  22  |  4.43<H>    Ca    8.5      27 Jan 2021 06:23  Phos  7.8     01-27  Mg     1.9     01-27    TPro  5.9<L>  /  Alb  2.9<L>  /  TBili  0.5  /  DBili  x   /  AST  25  /  ALT  33  /  AlkPhos  117  01-26    LIVER FUNCTIONS - ( 26 Jan 2021 06:37 )  Alb: 2.9 g/dL / Pro: 5.9 g/dL / ALK PHOS: 117 U/L / ALT: 33 U/L / AST: 25 U/L / GGT: x                                 PT/INR - ( 26 Jan 2021 06:37 )   PT: 14.6 sec;   INR: 1.23          PTT - ( 26 Jan 2021 06:37 )  PTT:28.2 sec  CAPILLARY BLOOD GLUCOSE      POCT Blood Glucose.: 87 mg/dL (27 Jan 2021 16:31)  POCT Blood Glucose.: 85 mg/dL (27 Jan 2021 11:52)  POCT Blood Glucose.: 90 mg/dL (27 Jan 2021 06:50)  POCT Blood Glucose.: 120 mg/dL (26 Jan 2021 21:14)  POCT Blood Glucose.: 123 mg/dL (26 Jan 2021 18:46)            Allergies:  No Known Allergies    MEDICATIONS  (STANDING):  albuterol/ipratropium for Nebulization 3 milliLiter(s) Nebulizer every 6 hours  amLODIPine   Tablet 10 milliGRAM(s) Oral daily  aspirin enteric coated 81 milliGRAM(s) Oral daily  atorvastatin 20 milliGRAM(s) Oral at bedtime  budesonide  80 MICROgram(s)/formoterol 4.5 MICROgram(s) Inhaler 2 Puff(s) Inhalation two times a day  cefepime   IVPB 1000 milliGRAM(s) IV Intermittent every 24 hours  dextrose 40% Gel 15 Gram(s) Oral once  dextrose 5%. 1000 milliLiter(s) (50 mL/Hr) IV Continuous <Continuous>  dextrose 5%. 1000 milliLiter(s) (100 mL/Hr) IV Continuous <Continuous>  dextrose 50% Injectable 25 Gram(s) IV Push once  dextrose 50% Injectable 12.5 Gram(s) IV Push once  dextrose 50% Injectable 25 Gram(s) IV Push once  donepezil 5 milliGRAM(s) Oral at bedtime  fluticasone propionate 50 MICROgram(s)/spray Nasal Spray 1 Spray(s) Both Nostrils two times a day  glucagon  Injectable 1 milliGRAM(s) IntraMuscular once  heparin   Injectable 5000 Unit(s) SubCutaneous every 12 hours  influenza  Vaccine (HIGH DOSE) 0.7 milliLiter(s) IntraMuscular once  insulin lispro (ADMELOG) corrective regimen sliding scale   SubCutaneous Before meals and at bedtime  levothyroxine 50 MICROGram(s) Oral daily  LORazepam     Tablet 0.25 milliGRAM(s) Oral two times a day  methylPREDNISolone sodium succinate Injectable 20 milliGRAM(s) IV Push every 8 hours  metoprolol tartrate 50 milliGRAM(s) Oral every 12 hours  mirtazapine 7.5 milliGRAM(s) Oral at bedtime  nystatin    Suspension [id number] Unit(s) Oral four times a day  senna 1 Tablet(s) Oral daily  sevelamer carbonate 1600 milliGRAM(s) Oral three times a day    MEDICATIONS  (PRN):  acetaminophen   Tablet .. 650 milliGRAM(s) Oral every 6 hours PRN Moderate Pain (4 - 6)  guaiFENesin   Syrup  (Sugar-Free) 100 milliGRAM(s) Oral every 6 hours PRN Cough        DIAGNOSTIC TESTS:

## 2021-01-27 NOTE — BEHAVIORAL HEALTH ASSESSMENT NOTE - HPI (INCLUDE ILLNESS QUALITY, SEVERITY, DURATION, TIMING, CONTEXT, MODIFYING FACTORS, ASSOCIATED SIGNS AND SYMPTOMS)
Patient is a 87y/o single, domiciled with family, retired / female with medical history significant for HTN, Hyperlipidemia, COPD, Hypothyroidism, Anemia, CAD s/p CABG, PAD, Stage IV CKD, former heavy smoker. Psychiatric hx of anxiety, not currently in treatment and dementia- currently on Aricept 5mg qd. Patient had a fall 3 weeks ago and was admitted to [facility]. Patient presents to [facility] ED 1/15/21 c/o worsening SOB and cough x3days. Was admitted to cardiac telemetry for management of acute on chronic diastolic CHF exacerbation in setting of suspected CAP. Regarding respiratory status, pt initially low 80s on RA and required HFNC, currently receiving O2 via nasal prongs. Currently on steroids for pneumonitis. Pt had cath placed for dialysis yesterday  Psych consult for anxiety    Collateral attained from son Tobi Coats who was present at bedside. He states that pt was dx with Dementia over a year ago. They live together in a studio apartment. He is on disability so he is with her always. Other family members live upstairs. He states that she has been increasing more forgetful, not remembering that she already too her medications or ate a meal. She is able to shower/clothe herself and go to bathroom without difficulty. He has been giving her her medications. He states that his mother has always been a little anxious. Remote memory is intact. As far as he knows no other psychiatric hx.    Patient seen at the bedside, receiving treatment of dialysis, resting comfortable. Upon approach she is cooperative, appears depressed. Unable to remember what brought her to the hospital or provide any details of treatment or care since being in the hospital. With some prompting she recalls that she is receiving dialysis to 'clean the blood.' She does state that she has been in the hospital for 2 weeks and wants to go home. She describes feeling, 'scared and alone' being in the hospital. When asked about depression she affirmatively shakes her head and reports feeling sad because she doesn't think she is getting any better after being so long in the hospital. Doesn't feel that her receiving supplemental oxygen is helping her breathing 'I don't notice it.'    Reports poor sleep, unable to sleep at night, instead sleeping during the day. States that sleep was fine before admission Appetite is also reported as poor, though she reports feeling hunger.  No reported hx of a/v h or paranoia no si or hi.

## 2021-01-27 NOTE — PROGRESS NOTE ADULT - PROBLEM SELECTOR PLAN 6
- CONT: symbicort inhaler BID and Duonebs  - PRN Robutussin/Benonatate for cough  - not on Home Oxygen. Currently on HFNC 50/60 per Dr Levi.  - c/w Nystatin swish and spit for thrush  - QTc 500.  Unable to Switch Nystatin to Fluconazole 200mg IV

## 2021-01-27 NOTE — CHART NOTE - NSCHARTNOTEFT_GEN_A_CORE
Admitting Diagnosis:   Patient is a 86y old  Female who presents with a chief complaint of progressively worsening SOB and cough x 3 days (27 Jan 2021 15:38)      PAST MEDICAL & SURGICAL HISTORY:  Essential hypertension  Hypertension    Type 2 diabetes mellitus  Diabetes    Hyperlipidemia  Hyperlipidemia    Disorder of kidney and ureter  Renal insufficiency    Peripheral vascular disease  PVD (peripheral vascular disease)    Anxiety state  Anxiety    Atherosclerosis of renal artery  with resulting stent placement    Atherosclerosis of renal artery  Renal artery stenosis        Current Nutrition Order:  Dys 2 mech soft low sodium Consistent Carbohydrate with evening snack diet + Ensure enlivex1/day.     PO Intake: Good (%) [   ]  Fair (50-75%) [   ] Poor (<25%) [   ]  Please see Below     GI Issues:   WDL  BM 1/24 1/25     Pain:  none per flow sheets     Skin Integrity:  2+ BL Leg edema  No pressure ulcers  Dustin 18     Labs:   01-27    125<L>  |  83<L>  |  91<H>  ----------------------------<  85  4.5   |  22  |  4.43<H>    Ca    8.5      27 Jan 2021 06:23  Phos  7.8     01-27  Mg     1.9     01-27    TPro  5.9<L>  /  Alb  2.9<L>  /  TBili  0.5  /  DBili  x   /  AST  25  /  ALT  33  /  AlkPhos  117  01-26    CAPILLARY BLOOD GLUCOSE      POCT Blood Glucose.: 85 mg/dL (27 Jan 2021 11:52)  POCT Blood Glucose.: 90 mg/dL (27 Jan 2021 06:50)  POCT Blood Glucose.: 120 mg/dL (26 Jan 2021 21:14)  POCT Blood Glucose.: 123 mg/dL (26 Jan 2021 18:46)  POCT Blood Glucose.: 131 mg/dL (26 Jan 2021 16:29)      Medications:  MEDICATIONS  (STANDING):  albuterol/ipratropium for Nebulization 3 milliLiter(s) Nebulizer every 6 hours  amLODIPine   Tablet 10 milliGRAM(s) Oral daily  aspirin enteric coated 81 milliGRAM(s) Oral daily  atorvastatin 20 milliGRAM(s) Oral at bedtime  budesonide  80 MICROgram(s)/formoterol 4.5 MICROgram(s) Inhaler 2 Puff(s) Inhalation two times a day  cefepime   IVPB 1000 milliGRAM(s) IV Intermittent every 24 hours  dextrose 40% Gel 15 Gram(s) Oral once  dextrose 5%. 1000 milliLiter(s) (50 mL/Hr) IV Continuous <Continuous>  dextrose 5%. 1000 milliLiter(s) (100 mL/Hr) IV Continuous <Continuous>  dextrose 50% Injectable 25 Gram(s) IV Push once  dextrose 50% Injectable 12.5 Gram(s) IV Push once  dextrose 50% Injectable 25 Gram(s) IV Push once  diazepam    Tablet 1 milliGRAM(s) Oral daily  donepezil 5 milliGRAM(s) Oral at bedtime  fluticasone propionate 50 MICROgram(s)/spray Nasal Spray 1 Spray(s) Both Nostrils two times a day  glucagon  Injectable 1 milliGRAM(s) IntraMuscular once  heparin   Injectable 5000 Unit(s) SubCutaneous every 12 hours  influenza  Vaccine (HIGH DOSE) 0.7 milliLiter(s) IntraMuscular once  insulin lispro (ADMELOG) corrective regimen sliding scale   SubCutaneous Before meals and at bedtime  insulin lispro Injectable (ADMELOG) 4 Unit(s) SubCutaneous three times a day with meals  levothyroxine 50 MICROGram(s) Oral daily  LORazepam     Tablet 0.25 milliGRAM(s) Oral two times a day  methylPREDNISolone sodium succinate Injectable 20 milliGRAM(s) IV Push every 8 hours  metoprolol tartrate 50 milliGRAM(s) Oral every 12 hours  mirtazapine 7.5 milliGRAM(s) Oral at bedtime  nystatin    Suspension 794347 Unit(s) Oral four times a day  senna 1 Tablet(s) Oral daily  sevelamer carbonate 1600 milliGRAM(s) Oral three times a day    MEDICATIONS  (PRN):  acetaminophen   Tablet .. 650 milliGRAM(s) Oral every 6 hours PRN Moderate Pain (4 - 6)  guaiFENesin   Syrup  (Sugar-Free) 100 milliGRAM(s) Oral every 6 hours PRN Cough      5'2''  pounds +-10%  Weight 119 pounds, BMI 21.9, %JQL=910%     1/22 110 pounds  1/20 109.5 pounds   1/18 102.5 pounds   Varying EMR wts, likely in setting of Fluid Shifts in CHF / ESRD/HD pt. Wt overall down trended during admission.     Estimated energy needs:   IBW for EER 2/2 Varying EMR wts  Adjusted for age, CHF, Renal; Fluids per team  1250-1500kcal/day 25-30kcal/kg  50-60gm/day 1.0-1.2gm/kg -- recommend upper end of needs, if HD to remain will adjust     Subjective:   86 F, POOR HISTORIAN/early dementia, former heavy smoker with PMHx HTN, hyperlipidemia, COPD, hypothyroidism, Stage IV CKD (baseline Cr ~3.0), anemia of chronic disease, CAD s/p CABG 2015 Dr. Blunt, chronic diastolic CHF(EF:55% by Echo 10/2020), renal artery stenosis s/p renal artery stent >20yrs ago, Carotid artery stenosis, PAD, who presents to [facility] ED 1/15 c/o progressively worsening SOB and cough x3 days. Pt admitted to cardiac telemetry for management of acute on chronic diastolic CHF exacerbation in setting of suspected CAP. Pt s/p diuresis with hospital course complicated by NAV on CKD Stage IV (renal following), hypoxic respiratory failure requiring NRB. Stepped up to CCU for strict fluid status monitoring and potential HD and stepped down to 5LA 1/23. Continuing ID recommendations for infectious work up. Nephrology consulted for NAV, Noted Non oliguric NAV on CKD3 2/2 CRS vs CKD progression; likely DM nephropathy. HD Began 1/26. Plan for HD today. Anton following for Anxiety.     Spoke with RN; face to face deferred, pt on exposure COVID precautions. During time of last RD visit decreased PO intake 2/2 SOB during meals reported. Pt remains ordered for PO diet presently in EMR however per RN NPO and not being fed 2/2 BiPAP use. Reports has been on BiPAP since last night. Remains Ordered for Steroids ([id number]) as well as mealtime Insulin + silding scale, last 3 POCT 90 120 123. BUN/Cr 91/4.43 (trended down since last RD visit), Phos 7.8 (increased since last RD visit), Na 125.    Please see below for nutritions recommendations.    Recommendations:  1. Reccomend NPO While on BiPAP.   2. Should pt be able to come off BiPAP safely for meals: recommend low sodium, mech soft (to provide ease during meals).  >> Suggest d/c Consistent Carbohydrate from order, adjust insulin PRN.  >> Suggest change from ensure to Nepro based on Lytes (425kcal/20gm prot per 1 can).  3. Monitor %PO intake and diet tolerance.   >> Will increase use PRN of oral nutrition supplements.  >> Should pt be noted with s/s of issues swallowing, recommend NPO/SLP.   4. Monitor Skin, Wts daily, GI, GOC.  5. Labs: monitor BMP, CBC, glucose, lytes, trend renal indices, LFTs, POCT.   6. Phos Binder PRN.   7. RD to remain available for additional nutrition interventions as needed.      Education:   NA @ this time    Risk Level: High [  x ] Moderate [   ] Low [   ].

## 2021-01-27 NOTE — PROGRESS NOTE ADULT - ASSESSMENT
ASSESSMENT/PLAN 86 yr old F, POOR HISTORIAN/early dementia, former heavy smoker with PMHx HTN, hyperlipidemia, COPD, hypothyroidism, Stage IV CKD (baseline Cr ~3.0), anemia of chronic disease, CAD s/p CABG 2015 Dr. Blunt, acute on chronic diastolic CHF(EF:55% by Echo 10/2020), renal artery stenosis s/p renal artery stent >20yrs ago, Carotid artery stenosis, PAD, who presents to [facility] ED 1/15/21 c/o progressively worsening SOB and cough x 3 days. Currently on steroids from pneumonitis    1. O2 Continue HF nasal prongs after HD   2. Bronchodilators:  Atrovent/ albuterol q 4 – 6 hours as needed  3. Corticosteroids: SoluMedrol to continue current doses today, no taper today  4. ID/Antibiotics: Cefepime  5. Cardiac/HTN: Optimize CHF mngmnt, optimize HD   6. GI: Rx/ prophylaxis c PPI/H2B  7. Heme: Rx/VT prophylaxis c SQH/SCD/ASA follow H/H closely  8. Aspiration precautions at all times  9, PT/mobilize  Discussed with managing team

## 2021-01-27 NOTE — PROGRESS NOTE ADULT - PROBLEM SELECTOR PLAN 2
- Known to Dr. Harmon; Renal following; unknown etiology at this time but likely DKD. overall worsening clinical picture, progressed to ARF and now requires HD- initiated 1/26/21.   - Cr 4.43 today   - Renal US shows atrophic kidneys, no WILBER.   - Nephrotic w/u non-contributory thus far, pending ANCA   - s/p 2L removal intra-dialysis today at bedside   - Monitor Strict I/Os

## 2021-01-27 NOTE — PROGRESS NOTE ADULT - SUBJECTIVE AND OBJECTIVE BOX
Interventional, Pulmonary, Critical, Chest Special Procedures. fro Faith Mckeon    Pt was seen and fully examined by myself.     Time spent with patient in minutes: 115    Patient is a 86y old  Female who presents with a chief complaint of progressively worsening SOB and cough x 3 days (27 Jan 2021 11:38). Asked to manage this high risk pt patience/post HD. The patient tolerated target 2000cc, received 2U PRBCS. At the time of note the pt reports feeling better, tolerating HF nasal prongs with better effort today, pain free. R CW HD catheter site egress clean, no active bleeding..     HPI:  86 yr old F, POOR HISTORIAN/early dementia, former heavy smoker with PMHx HTN, hyperlipidemia, COPD, hypothyroidism, Stage IV CKD (baseline Cr ~3.0), anemia of chronic disease, CAD s/p CABG 2015 Dr. Blunt, chronic diastolic CHF(EF:55% by Echo 10/2020), renal artery stenosis s/p renal artery stent >20yrs ago, Carotid artery stenosis, PAD, who presents to [facility] ED 1/15/21 c/o progressively worsening SOB and cough x 3 days. Patient reports she can barely walk 10 feet prior to having to stop and rest. Patient further admits to chronic bilateral LE edema and significant orthopnea and she has been sleeping upright in a chair the past week. Patient also admits to chills and a nonproductive cough over the past few days. Patient was instructed to increase her Lasix 80mg PO daily dose to Lasix 80mg PO BID and start Metalazone 10mg PO BID prior to each dose by her cardiologist Dr. Horvath. Patient denies any chest pain, dizziness, palpitations, recent travel or sick contacts. Patient endorsing compliance w/ medications however daughter states that compliance with medication has been questionable over the last few months, as patient is becoming more forgetful.    In [facility] ED, BP: 170/68, HR: 80, RR:26, Temp: 98.4F, O2 sat: 80% on RA, improved to 99% with 10L NRB. EKG revealed NSR@67BPM with incomplete LBBB, TWI Lead I, AVL (similar to prior EKG 10/2020). CXR prelim read consistent with alveolar edema, bilateral patchy opacities/consolidation.    Labs notable for: WBC 14.5 with left shift, H/H 9.8/30.6, D-dimer 491, BUN/Cr 56/3.54, , CRP 4.86, Ferritin 180, Procalcitonin 0.29, Lactate 2.4, Troponin T 0.05, BNP 64,671, Initial COVID PCR negative.    Patient treated with Lasix 80mg IV x 1 dose, SL NTG 0.4mg PO x 1 dose, Decadron 6mg IV x 1 dose, Ceftriaxone 1g IV x 1 dose and Azithromycin 500mg IV x 1 dose.  Patient now admitted to cardiac telemetry for management of acute on chronic diastolic CHF exacerbation in setting of suspected CAP.    **Of note: Patient had a fall ~3 weeks ago for which she was admitted to [facility], CT imaging was negative as per daughter and fall thought to be secondary to hypotension.   (15 Mark 2021 21:30)      REVIEW OF SYSTEMS:  Constitutional: (+) weight change, (-) fever,  (-) chills, (+) fatigue, () night sweats  Eyes: (-) discharge, () eye pain, () visual change  ENT:  (-) hearing difficulty, () vertigo, () sinus pain,  () throat pain, () epistaxis, () dysphagia, () hoarseness  Neck: (-) pain, () stiffness, () swelling  Respiratory: (+) dry cough, () wheezing, () hemoptysis      Cardiovascular: (-) chest pain, ()palpitations, () dizziness   Gastrointestinal: (-) abdominal pain, () nausea, () vomiting, () hematemesis, () diarrhea,  () constipation, () melena  Genitourinary:  (-) dysuria, () frequency, () hematuria, () incontinence      Neurologic: (-) headache, () memory loss, () loss of strength, () numbness, () tremor     Skin: (-) itching, () burning, () rash, () lesions   Lymphatic: () enlarged lymph nodes  Endocrine: (-) hair loss, () temperature intolerance         Musculoskeletal: (-) back pain, () joint pain,  () extremity pain  Psychiatric: (-) visual change, () auditory change, () depression, () anxiety, () suicidal  Sleep: (+) disorder, () insomnia, () sleep deprivation  Heme/Lymph: () easy bruising, () bleeding gums            Allergy and Immunologic: () hives, () eczema  PAST MEDICAL & SURGICAL HISTORY:  Essential hypertension  Hypertension    Type 2 diabetes mellitus  Diabetes    Hyperlipidemia  Hyperlipidemia    Disorder of kidney and ureter  Renal insufficiency    Peripheral vascular disease  PVD (peripheral vascular disease)    Anxiety state  Anxiety    Atherosclerosis of renal artery  with resulting stent placement    Atherosclerosis of renal artery  Renal artery stenosis      FAMILY HISTORY:  Family history of ischemic heart disease  Family history of coronary artery disease.      SOCIAL HISTORY:      - Tobacco     - ETOH    Allergies    No Known Allergies    Intolerances      Vital Signs Last 24 Hrs  T(C): 35.8 (27 Jan 2021 13:40), Max: 37.1 (27 Jan 2021 10:25)  T(F): 96.4 (27 Jan 2021 13:40), Max: 98.7 (27 Jan 2021 10:25)  HR: 67 (27 Jan 2021 13:40) (52 - 78)  BP: 147/63 (27 Jan 2021 13:40) (132/67 - 179/74)  BP(mean): 89 (27 Jan 2021 12:26) (89 - 112)  RR: 18 (27 Jan 2021 13:40) (16 - 61)  SpO2: 100% (27 Jan 2021 13:40) (77% - 100%)    01-26 @ 07:01 - 01-27 @ 07:00  --------------------------------------------------------  IN: 0 mL / OUT: 2050 mL / NET: -2050 mL    01-27 @ 07:01 - 01-27 @ 14:46  --------------------------------------------------------  IN: 1600 mL / OUT: 3000 mL / NET: -1400 mL          MEDICATIONS:  MEDICATIONS  (STANDING):  albuterol/ipratropium for Nebulization 3 milliLiter(s) Nebulizer every 6 hours  amLODIPine   Tablet 10 milliGRAM(s) Oral daily  aspirin enteric coated 81 milliGRAM(s) Oral daily  atorvastatin 20 milliGRAM(s) Oral at bedtime  budesonide  80 MICROgram(s)/formoterol 4.5 MICROgram(s) Inhaler 2 Puff(s) Inhalation two times a day  cefepime   IVPB 1000 milliGRAM(s) IV Intermittent every 24 hours  dextrose 40% Gel 15 Gram(s) Oral once  dextrose 5%. 1000 milliLiter(s) (50 mL/Hr) IV Continuous <Continuous>  dextrose 5%. 1000 milliLiter(s) (100 mL/Hr) IV Continuous <Continuous>  dextrose 50% Injectable 25 Gram(s) IV Push once  dextrose 50% Injectable 12.5 Gram(s) IV Push once  dextrose 50% Injectable 25 Gram(s) IV Push once  diazepam    Tablet 1 milliGRAM(s) Oral daily  donepezil 5 milliGRAM(s) Oral at bedtime  fluticasone propionate 50 MICROgram(s)/spray Nasal Spray 1 Spray(s) Both Nostrils two times a day  glucagon  Injectable 1 milliGRAM(s) IntraMuscular once  heparin   Injectable 5000 Unit(s) SubCutaneous every 12 hours  influenza  Vaccine (HIGH DOSE) 0.7 milliLiter(s) IntraMuscular once  insulin lispro (ADMELOG) corrective regimen sliding scale   SubCutaneous Before meals and at bedtime  insulin lispro Injectable (ADMELOG) 4 Unit(s) SubCutaneous three times a day with meals  levothyroxine 50 MICROGram(s) Oral daily  methylPREDNISolone sodium succinate Injectable 20 milliGRAM(s) IV Push every 8 hours  metoprolol tartrate 50 milliGRAM(s) Oral every 12 hours  nystatin    Suspension [id number] Unit(s) Oral four times a day  senna 1 Tablet(s) Oral daily  sevelamer carbonate 1600 milliGRAM(s) Oral three times a day    MEDICATIONS  (PRN):  acetaminophen   Tablet .. 650 milliGRAM(s) Oral every 6 hours PRN Moderate Pain (4 - 6)  guaiFENesin   Syrup  (Sugar-Free) 100 milliGRAM(s) Oral every 6 hours PRN Cough      PHYSICAL EXAM:  Uncomfortable, no immediate distress  Muscle atrophy, developed,  Eyes: PERRLA, EOMI, -conjunctivitis, -scleritis   Head: no focal deficit, normocephalic,  no trauma  ENMT: moist tongue with white lesions, + thrush, -nasal discharge, -hoarseness, normal hearing, + cough, -hemoptysis, trachea midline  Neck: supple, - lymphadenopathy,  -masses, -JVD  Respiratory: bilateral diminished breath sounds, -wheezing, less  rhonchi,  rales, + less crackles lower lungs, better peripheral air entry  Chest:  no more accessory muscle use, -paradoxical breathing  Cardiovascular: irregular rate and sinus rhythm, S1 S2 normal, -S3, -S4, -murmur, -gallop, -rub  Gastrointestinal: soft, nontender, nondistended, normal bowel sounds, no hepatosplenomegaly  Genitourinary: -flank pain, -dysuria, + Cason  Extremities: -clubbing, -cyanosis, -edema    Vascular: peripheral pulses palpable 2+ bilaterally  Neurological: alert, oriented x 3, no focal deficit, -tremor   Skin: warm, dry, -erythema, iv sites intact  Lymph nodes; no cervical, supraclavicular or axillary adenopathy  Psychiatric: cooperative, appropriate mood    DEVICES:  - DENTURES   +IV R / L     - ETUBE   -TRACH   -CTUBE  R / L    LABS:                          7.2    14.23 )-----------( 238      ( 27 Jan 2021 06:23 )             22.1     01-27    125<L>  |  83<L>  |  91<H>  ----------------------------<  85  4.5   |  22  |  4.43<H>    Ca    8.5      27 Jan 2021 06:23  Phos  7.8     01-27  Mg     1.9     01-27    TPro  5.9<L>  /  Alb  2.9<L>  /  TBili  0.5  /  DBili  x   /  AST  25  /  ALT  33  /  AlkPhos  117  01-26    PT/INR - ( 26 Jan 2021 06:37 )   PT: 14.6 sec;   INR: 1.23          PTT - ( 26 Jan 2021 06:37 )  PTT:28.2 sec  RADIOLOGY & ADDITIONAL STUDIES (The following images were personally reviewed):< from: Xray Chest 1 View- PORTABLE-Routine (Xray Chest 1 View- PORTABLE-Routine in AM.) (01.27.21 @ 07:29) >    EXAM:  XR CHEST PORTABLE ROUTINE 1V                          PROCEDURE DATE:  01/27/2021          INTERPRETATION:  PORTABLE CHEST X-RAY    Indication: Shortness of Breath    Bedside examination of the chest dated 1/27/2021 7:29 AM is compared to the previous study of 1/26/2021.    FINDINGS: Again poststernotomy. New right-sided central line has distal portion right atrium. No pneumothorax. Similar extensive bilateral infiltrates. No definite pleural effusion is seen. Cardiomediastinal silhouette obscured by adjacent infiltrates.    IMPRESSION: New right-sided central line in satisfactory position. No pneumothorax. No change bilateral infiltrates.      < end of copied text >

## 2021-01-27 NOTE — PROGRESS NOTE ADULT - PROBLEM SELECTOR PLAN 3
HX Anemia. Baseline Hgb 8-9  - Likely 2/2 to CKD and anemia of chronic disease  - hgb 7.2 today; no active s/sx bleeding   - s/p 1u pRBC transfusion on 1/20 and 2U today (1/27)  - holding IV iron due to treatment for presumed CAP  - Stool for occult blood negative 1/18  - Maintain active T&S (since 1/21)

## 2021-01-28 LAB
ANION GAP SERPL CALC-SCNC: 19 MMOL/L — HIGH (ref 5–17)
BLD GP AB SCN SERPL QL: NEGATIVE — SIGNIFICANT CHANGE UP
BUN SERPL-MCNC: 60 MG/DL — HIGH (ref 7–23)
CALCIUM SERPL-MCNC: 8.3 MG/DL — LOW (ref 8.4–10.5)
CHLORIDE SERPL-SCNC: 90 MMOL/L — LOW (ref 96–108)
CO2 SERPL-SCNC: 23 MMOL/L — SIGNIFICANT CHANGE UP (ref 22–31)
CREAT SERPL-MCNC: 3.27 MG/DL — HIGH (ref 0.5–1.3)
FUNGITELL: <31 PG/ML — SIGNIFICANT CHANGE UP
GALACTOMANNAN AG SERPL-ACNC: <0.5 INDEX — SIGNIFICANT CHANGE UP
GAMMA INTERFERON BACKGROUND BLD IA-ACNC: 0.01 IU/ML — SIGNIFICANT CHANGE UP
GLUCOSE BLDC GLUCOMTR-MCNC: 130 MG/DL — HIGH (ref 70–99)
GLUCOSE BLDC GLUCOMTR-MCNC: 265 MG/DL — HIGH (ref 70–99)
GLUCOSE BLDC GLUCOMTR-MCNC: 301 MG/DL — HIGH (ref 70–99)
GLUCOSE BLDC GLUCOMTR-MCNC: 334 MG/DL — HIGH (ref 70–99)
GLUCOSE BLDC GLUCOMTR-MCNC: 352 MG/DL — HIGH (ref 70–99)
GLUCOSE SERPL-MCNC: 134 MG/DL — HIGH (ref 70–99)
HCT VFR BLD CALC: 35.2 % — SIGNIFICANT CHANGE UP (ref 34.5–45)
HGB BLD-MCNC: 11.6 G/DL — SIGNIFICANT CHANGE UP (ref 11.5–15.5)
M PNEUMO IGM SER-ACNC: 0.38 INDEX — SIGNIFICANT CHANGE UP (ref 0–0.9)
M TB IFN-G BLD-IMP: ABNORMAL
M TB IFN-G CD4+ BCKGRND COR BLD-ACNC: 0 IU/ML — SIGNIFICANT CHANGE UP
M TB IFN-G CD4+CD8+ BCKGRND COR BLD-ACNC: 0 IU/ML — SIGNIFICANT CHANGE UP
MAGNESIUM SERPL-MCNC: 2 MG/DL — SIGNIFICANT CHANGE UP (ref 1.6–2.6)
MCHC RBC-ENTMCNC: 27.3 PG — SIGNIFICANT CHANGE UP (ref 27–34)
MCHC RBC-ENTMCNC: 33 GM/DL — SIGNIFICANT CHANGE UP (ref 32–36)
MCV RBC AUTO: 82.8 FL — SIGNIFICANT CHANGE UP (ref 80–100)
MYCOPLASMA AG SPEC QL: NEGATIVE — SIGNIFICANT CHANGE UP
NRBC # BLD: 0 /100 WBCS — SIGNIFICANT CHANGE UP (ref 0–0)
PHOSPHATE SERPL-MCNC: 5.9 MG/DL — HIGH (ref 2.5–4.5)
PLATELET # BLD AUTO: 188 K/UL — SIGNIFICANT CHANGE UP (ref 150–400)
POTASSIUM SERPL-MCNC: 4.1 MMOL/L — SIGNIFICANT CHANGE UP (ref 3.5–5.3)
POTASSIUM SERPL-SCNC: 4.1 MMOL/L — SIGNIFICANT CHANGE UP (ref 3.5–5.3)
QUANT TB PLUS MITOGEN MINUS NIL: 0.04 IU/ML — SIGNIFICANT CHANGE UP
RBC # BLD: 4.25 M/UL — SIGNIFICANT CHANGE UP (ref 3.8–5.2)
RBC # FLD: 14.6 % — HIGH (ref 10.3–14.5)
RH IG SCN BLD-IMP: POSITIVE — SIGNIFICANT CHANGE UP
SARS-COV-2 RNA SPEC QL NAA+PROBE: SIGNIFICANT CHANGE UP
SODIUM SERPL-SCNC: 132 MMOL/L — LOW (ref 135–145)
WBC # BLD: 15.06 K/UL — HIGH (ref 3.8–10.5)
WBC # FLD AUTO: 15.06 K/UL — HIGH (ref 3.8–10.5)

## 2021-01-28 PROCEDURE — 99232 SBSQ HOSP IP/OBS MODERATE 35: CPT

## 2021-01-28 PROCEDURE — 71045 X-RAY EXAM CHEST 1 VIEW: CPT | Mod: 26

## 2021-01-28 PROCEDURE — 99233 SBSQ HOSP IP/OBS HIGH 50: CPT

## 2021-01-28 RX ORDER — QUETIAPINE FUMARATE 200 MG/1
25 TABLET, FILM COATED ORAL
Refills: 0 | Status: DISCONTINUED | OUTPATIENT
Start: 2021-01-28 | End: 2021-01-29

## 2021-01-28 RX ORDER — INSULIN LISPRO 100/ML
3 VIAL (ML) SUBCUTANEOUS
Refills: 0 | Status: DISCONTINUED | OUTPATIENT
Start: 2021-01-28 | End: 2021-01-28

## 2021-01-28 RX ORDER — INSULIN LISPRO 100/ML
5 VIAL (ML) SUBCUTANEOUS
Refills: 0 | Status: DISCONTINUED | OUTPATIENT
Start: 2021-01-28 | End: 2021-02-02

## 2021-01-28 RX ADMIN — Medication 500000 UNIT(S): at 12:14

## 2021-01-28 RX ADMIN — BUDESONIDE AND FORMOTEROL FUMARATE DIHYDRATE 2 PUFF(S): 160; 4.5 AEROSOL RESPIRATORY (INHALATION) at 08:44

## 2021-01-28 RX ADMIN — CEFEPIME 100 MILLIGRAM(S): 1 INJECTION, POWDER, FOR SOLUTION INTRAMUSCULAR; INTRAVENOUS at 21:12

## 2021-01-28 RX ADMIN — SEVELAMER CARBONATE 1600 MILLIGRAM(S): 2400 POWDER, FOR SUSPENSION ORAL at 21:12

## 2021-01-28 RX ADMIN — MIRTAZAPINE 7.5 MILLIGRAM(S): 45 TABLET, ORALLY DISINTEGRATING ORAL at 21:12

## 2021-01-28 RX ADMIN — Medication 6: at 21:53

## 2021-01-28 RX ADMIN — Medication 20 MILLIGRAM(S): at 06:29

## 2021-01-28 RX ADMIN — HEPARIN SODIUM 5000 UNIT(S): 5000 INJECTION INTRAVENOUS; SUBCUTANEOUS at 06:30

## 2021-01-28 RX ADMIN — SEVELAMER CARBONATE 1600 MILLIGRAM(S): 2400 POWDER, FOR SUSPENSION ORAL at 12:14

## 2021-01-28 RX ADMIN — Medication 650 MILLIGRAM(S): at 06:29

## 2021-01-28 RX ADMIN — Medication 20 MILLIGRAM(S): at 12:14

## 2021-01-28 RX ADMIN — Medication 3 MILLILITER(S): at 06:29

## 2021-01-28 RX ADMIN — AMLODIPINE BESYLATE 10 MILLIGRAM(S): 2.5 TABLET ORAL at 06:29

## 2021-01-28 RX ADMIN — Medication 50 MILLIGRAM(S): at 18:21

## 2021-01-28 RX ADMIN — DONEPEZIL HYDROCHLORIDE 5 MILLIGRAM(S): 10 TABLET, FILM COATED ORAL at 21:12

## 2021-01-28 RX ADMIN — Medication 3 MILLILITER(S): at 12:14

## 2021-01-28 RX ADMIN — Medication 500000 UNIT(S): at 18:21

## 2021-01-28 RX ADMIN — Medication 50 MICROGRAM(S): at 06:30

## 2021-01-28 RX ADMIN — HEPARIN SODIUM 5000 UNIT(S): 5000 INJECTION INTRAVENOUS; SUBCUTANEOUS at 18:21

## 2021-01-28 RX ADMIN — QUETIAPINE FUMARATE 25 MILLIGRAM(S): 200 TABLET, FILM COATED ORAL at 18:21

## 2021-01-28 RX ADMIN — Medication 500000 UNIT(S): at 06:30

## 2021-01-28 RX ADMIN — Medication 20 MILLIGRAM(S): at 21:12

## 2021-01-28 RX ADMIN — SENNA PLUS 1 TABLET(S): 8.6 TABLET ORAL at 12:14

## 2021-01-28 RX ADMIN — ATORVASTATIN CALCIUM 20 MILLIGRAM(S): 80 TABLET, FILM COATED ORAL at 21:12

## 2021-01-28 RX ADMIN — Medication 1 SPRAY(S): at 18:48

## 2021-01-28 RX ADMIN — Medication 8: at 18:45

## 2021-01-28 RX ADMIN — Medication 0.25 MILLIGRAM(S): at 06:29

## 2021-01-28 RX ADMIN — Medication 8: at 13:00

## 2021-01-28 RX ADMIN — Medication 3 UNIT(S): at 18:46

## 2021-01-28 RX ADMIN — Medication 1 SPRAY(S): at 06:30

## 2021-01-28 RX ADMIN — Medication 50 MILLIGRAM(S): at 06:29

## 2021-01-28 RX ADMIN — Medication 81 MILLIGRAM(S): at 12:14

## 2021-01-28 RX ADMIN — Medication 3 MILLILITER(S): at 18:21

## 2021-01-28 RX ADMIN — SEVELAMER CARBONATE 1600 MILLIGRAM(S): 2400 POWDER, FOR SUSPENSION ORAL at 06:29

## 2021-01-28 RX ADMIN — BUDESONIDE AND FORMOTEROL FUMARATE DIHYDRATE 2 PUFF(S): 160; 4.5 AEROSOL RESPIRATORY (INHALATION) at 21:26

## 2021-01-28 NOTE — PROGRESS NOTE ADULT - ASSESSMENT
86F POOR HISTORIAN/early dementia, former heavy smoker PMHx DM, HTN, hyperlipidemia, COPD, hypothyroidism, CKD (baseline Cr ~3.0), anemia of chronic disease, CAD s/p CABG 2015 Dr. Blunt, chronic diastolic CHF(EF:55% by Echo 10/2020), renal artery stenosis s/p renal artery stent >20yrs ago, Carotid artery stenosis, PAD, who presents c/o progressively worsening SOB and cough. Nephrology consulted for NAV.     #Oliguric NAV on CKD3 2/2 ATN with CKD progression from DM nephropathy  S/p HD on 1/26, 1/27  Defer HD today, monitoring UrOut    Electrolytes, volume and bicarb acceptable  BP: uncontrolled- uptitrate lopressor to 75BID  Anaemia- s/p PRBC, Hgb at goal  BMD: on renvela to 1600 TID    Daily weights, strict I&Os, renally dose meds, avoid nephrotoxic agents

## 2021-01-28 NOTE — PROGRESS NOTE ADULT - ATTENDING COMMENTS
NC O2  says she feels a bit better-  still anxious  producing urine- approx 300 ml for 24 hours- good sign  HD catheter exit site no longer oozing  anticipate need for repeat HD tomorrow, 1/29  will review with patient's daughter

## 2021-01-28 NOTE — PROGRESS NOTE ADULT - PROBLEM SELECTOR PLAN 9
Dx 3wks ago @ [facility].   - CTH 1/19 (-) for acute changes.   - Per neurosurgery, cont ASA 81 PO QD; no neuro interventions at this time.     #DEMENTIA  - CONT: Donepezil 5mg PO QD    ## ANXIETY  - psych consulted as anxiety thought to be worsening during hospitalization, appreciate recs.   - c/w Remeron 7.5mg qhs and Xanax 0.25mg BID Dx 3wks ago @ [facility].   - CTH 1/19 (-) for acute changes.   - Per neurosurgery, cont ASA 81 PO QD; no neuro interventions at this time.     #DEMENTIA  - CONT: Donepezil 5mg PO QD    ## ANXIETY  - psych consulted as anxiety thought to be worsening during hospitalization, appreciate recs.   - c/w Remeron 7.5mg qhs and Ativan dc'd as per Dr. Cuevas

## 2021-01-28 NOTE — PROGRESS NOTE ADULT - PROBLEM SELECTOR PLAN 3
HX Anemia. Baseline Hgb 8-9  - Likely 2/2 to CKD and anemia of chronic disease  - s/p 2U PRBC 1/27; also had 1U PRBC 1/20   - hgb 11.6 today;  no active s/sx bleeding   - continue to HOLD  IV iron due to treatment for presumed CAP  - Stool for occult blood negative 1/18  - Maintain active T&S (since 1/21)

## 2021-01-28 NOTE — PROGRESS NOTE ADULT - SUBJECTIVE AND OBJECTIVE BOX
PUD CCM    INTERVAL HPI/OVERNIGHT EVENTS:    Lethargic, sedated - lorazepam discontinued  Haldol prn  patient with hemodialysis catheter in isolation room  now on high flow with 59% and 50 L, nasal pillow  Saturation 98%  BiPAP in room  CXR with improving bilateral infiltrates    All new data reviewed, including VS, lab, imaging, Rx and documentation.    PAST MEDICAL & SURGICAL HISTORY:  Essential hypertension  Hypertension    Type 2 diabetes mellitus  Diabetes    Hyperlipidemia  Hyperlipidemia    Disorder of kidney and ureter  Renal insufficiency    Peripheral vascular disease  PVD (peripheral vascular disease)    Anxiety state  Anxiety    Atherosclerosis of renal artery  with resulting stent placement    Atherosclerosis of renal artery  Renal artery stenosis    FAMILY HISTORY:  Family history of ischemic heart disease  Family history of coronary artery disease.    SOCIAL HISTORY:    REVIEW OF SYSTEMS:  Constitutional: (+) weight change, (-) fever,  (-) chills, (+) fatigue, () night sweats  Eyes: (-) discharge, () eye pain, () visual change  ENT:  (-) hearing difficulty, () vertigo, () sinus pain,  () throat pain, () epistaxis, () dysphagia, () hoarseness  Neck: (-) pain, () stiffness, () swelling  Respiratory: (+) dry cough, () wheezing, () hemoptysis      Cardiovascular: (-) chest pain, ()palpitations, () dizziness   Gastrointestinal: (-) abdominal pain, () nausea, () vomiting, () hematemesis, () diarrhea,  () constipation, () melena  Genitourinary:  (-) dysuria, () frequency, () hematuria, () incontinence      Neurologic: (-) headache, () memory loss, () loss of strength, () numbness, () tremor     Skin: (-) itching, () burning, () rash, () lesions   Lymphatic: () enlarged lymph nodes  Endocrine: (-) hair loss, () temperature intolerance         Musculoskeletal: (-) back pain, () joint pain,  () extremity pain  Psychiatric: (-) visual change, () auditory change, () depression, () anxiety, () suicidal  Sleep: (+) disorder, () insomnia, () sleep deprivation  Heme/Lymph: () easy bruising, () bleeding gums            Allergy and Immunologic: () hives, () eczema    Vital Signs Last 24 Hrs  T(C): 35.9 (26 Jan 2021 09:22), Max: 36.6 (25 Jan 2021 13:28)  T(F): 96.7 (26 Jan 2021 09:22), Max: 97.8 (25 Jan 2021 13:28)  HR: 58 (26 Jan 2021 09:29) (58 - 66)  BP: 183/73 (26 Jan 2021 09:04) (148/70 - 183/73)  BP(mean): 105 (26 Jan 2021 09:04) (100 - 111)  RR: 18 (26 Jan 2021 09:04) (18 - 24)  SpO2: 93% (26 Jan 2021 09:29) (90% - 96%)    I&O's Detail    25 Jan 2021 07:01  -  26 Jan 2021 07:00  --------------------------------------------------------  IN:    IV PiggyBack: 50 mL    Oral Fluid: 220 mL  Total IN: 270 mL    OUT:    Indwelling Catheter - Urethral (mL): 750 mL    Voided (mL): 200 mL  Total OUT: 950 mL    Total NET: -680 mL    PHYSICAL EXAM:  poor appetite due to renal failure  Muscle atrophy, developed, comfortable on nonrebreather, - acute distress; vital signs are monitored continuously  Eyes: PERRLA, EOMI, -conjunctivitis, -scleritis   Head: no focal deficit, normocephalic,  no trauma  ENMT: moist tongue with white lesions, + thrush, -nasal discharge, -hoarseness, normal hearing, + cough, -hemoptysis, trachea midline  Neck: supple, - lymphadenopathy,  -masses, -JVD  Respiratory: bilateral diminished breath sounds, -wheezing, + rhonchi,  rales, + bilateral 50% crackles lower lungs  Chest: -accessory muscle use, -paradoxical breathing  Cardiovascular: irregular rate and sinus rhythm, S1 S2 normal, -S3, -S4, -murmur, -gallop, -rub  Gastrointestinal: soft, nontender, nondistended, normal bowel sounds, no hepatosplenomegaly  Genitourinary: -flank pain, -dysuria, + Cason  Extremities: -clubbing, -cyanosis, -edema    Vascular: peripheral pulses palpable 2+ bilaterally  Neurological: alert, oriented x 3, no focal deficit, -tremor   Skin: warm, dry, -erythema, iv sites intact  Lymph nodes; no cervical, supraclavicular or axillary adenopathy  Psychiatric: cooperative, appropriate mood    MEDICATIONS  (STANDING):  albuterol/ipratropium for Nebulization 3 milliLiter(s) Nebulizer every 6 hours  amLODIPine   Tablet 10 milliGRAM(s) Oral daily  aspirin enteric coated 81 milliGRAM(s) Oral daily  atorvastatin 20 milliGRAM(s) Oral at bedtime  budesonide  80 MICROgram(s)/formoterol 4.5 MICROgram(s) Inhaler 2 Puff(s) Inhalation two times a day  cefepime   IVPB 1000 milliGRAM(s) IV Intermittent every 24 hours  dextrose 40% Gel 15 Gram(s) Oral once  dextrose 5%. 1000 milliLiter(s) (50 mL/Hr) IV Continuous <Continuous>  dextrose 5%. 1000 milliLiter(s) (100 mL/Hr) IV Continuous <Continuous>  dextrose 50% Injectable 25 Gram(s) IV Push once  dextrose 50% Injectable 12.5 Gram(s) IV Push once  dextrose 50% Injectable 25 Gram(s) IV Push once  diazepam    Tablet 1 milliGRAM(s) Oral daily  donepezil 5 milliGRAM(s) Oral at bedtime  fluticasone propionate 50 MICROgram(s)/spray Nasal Spray 1 Spray(s) Both Nostrils two times a day  glucagon  Injectable 1 milliGRAM(s) IntraMuscular once  heparin   Injectable 5000 Unit(s) SubCutaneous every 12 hours  influenza  Vaccine (HIGH DOSE) 0.7 milliLiter(s) IntraMuscular once  insulin glargine Injectable (LANTUS) 5 Unit(s) SubCutaneous at bedtime  insulin lispro (ADMELOG) corrective regimen sliding scale   SubCutaneous Before meals and at bedtime  insulin lispro Injectable (ADMELOG) 5 Unit(s) SubCutaneous three times a day before meals  levothyroxine 50 MICROGram(s) Oral daily  methylPREDNISolone sodium succinate Injectable 20 milliGRAM(s) IV Push every 8 hours  metoprolol tartrate 50 milliGRAM(s) Oral every 12 hours  nystatin    Suspension 552116 Unit(s) Oral four times a day  senna 1 Tablet(s) Oral daily  sevelamer carbonate 1600 milliGRAM(s) Oral three times a day    MEDICATIONS  (PRN):  acetaminophen   Tablet .. 650 milliGRAM(s) Oral every 6 hours PRN Moderate Pain (4 - 6)  guaiFENesin   Syrup  (Sugar-Free) 100 milliGRAM(s) Oral every 6 hours PRN Cough      Allergies    No Known Allergies    Intolerances        LABS:                          hb 7.2 for 2 units                01-26    122<L>  |  78<L>  |  110<H>  ----------------------------<  99  4.6   |  18<L>  |  5.79<H>    Ca    8.9      26 Jan 2021 06:37  Phos  8.2     01-26  Mg     1.6     01-26    TPro  5.9<L>  /  Alb  2.9<L>  /  TBili  0.5  /  DBili  x   /  AST  25  /  ALT  33  /  AlkPhos  117  01-26    PT/INR - ( 26 Jan 2021 06:37 )   PT: 14.6 sec;   INR: 1.23     PTT - ( 26 Jan 2021 06:37 )  PTT:28.2 sec    +DVT prophylaxis  5000 q12  +Sleep   +Nutrition goals  NPO for catheter  -Pain  DENIED  -Decubital ulcer  +GI prophylaxis (PPV, coagulopathy, Hx)  +Aspiration prophylaxis (45 degrees)  Ventilator synchronized  BED SIDE, 10/5  Tracheal cuff pressure  ORONASAL INTERFACE  +Sedation/analgesia stopped once  +ID (phos, CH, mupi, SB)  -Delirium  +Cardiac Beta/ACEI-ARB on hold/ASA low dose/statin  +Prevention  +Education  +Medication reviewed (drug-drug interactions, PDA)  Medical devices  F, IV, nonRB, BiPAP  Discussed with RN, Dr Anderson, [place], Dr Washington     RADIOLOGY & ADDITIONAL STUDIES:    CXR: no change      EXAM:  CT CHEST                          PROCEDURE DATE:  01/19/2021      INTERPRETATION:  CT of the CHEST without intravenous contrast dated 1/19/2021 2:31 PM    INDICATION: Worsening Hypoxia    TECHNIQUE: CT of the chest was performed withoutintravenous contrast.  Intravenous contrast was not used Axial and coronal and sagittal images were produced and reviewed.    PRIOR STUDIES: CT chest 10/14/2020    FINDINGS: Cardiomegaly as before. Dense mitral valve annular calcification. Coronary artery calcifications. Status post CABG. The main pulmonary artery measures 3.1 cm diameter.  No pericardial effusion is seen.  Evaluation of the vasculature is limited without intravenous contrast, but the great vessels are grossly unremarkable.  Evaluation for adenopathy is limited without intravenous contrast. Within that limitation, mild mediastinal lymphadenopathy is seen. No axillary adenopathy. Abandoned epicardial pacer wire. Low-density of cardiac chambers relative to the myocardium suggesting anemia.    Small bilateral pleural effusions are seen. Evaluation of the pulmonary parenchyma demonstrates underlying interstitial fibrosis in the upper and mid zones with bilateral mosaic attenuation. There is now new bilateral groundglass opacities  in the upper and mid and lower zones with thickening of interlobular septa at the bilateral upper zones i.e. crazy paving  appearance.. Coalescence of findings with consolidation seen in the superior segment right lower lobe.    Limited evaluation of the upper abdomen demonstrates radiopaque cholelithiasis.    Evaluation of the osseous structures demonstrates degenerative changes.      IMPRESSION: New bilateral groundglass lung opacities superimposed on chronic interstitial pulmonary fibrosis probably due to chronic hypersensitivity pneumonitis or sarcoid. The groundglass lung opacities could  be due to pulmonary edema, pulmonary hemorrhage, alveolar proteinosis, organizing pneumonia or atypical infection.      Assessment and Plan:    Problem/Plan - 1:  ·  Problem: Acute respiratory failure.  Plan: BiPAP     Problem/Plan - 2:  ·  Problem: Pulmonary fibrosis.      Problem/Plan - 3:  ·  Problem: ILD (interstitial lung disease).  Plan: hopefully responsive to steroids. Continue about  1 mg/kg.      Problem/Plan - 4:  ·  Problem: CKD (chronic kidney disease).  Plan: continue hemodialysis.      Problem/Plan - 5:  ·  Problem: CAD (coronary artery disease).  Plan: on treatment, asymptomatic. HR 64/min.     Problem/Plan - 6:  Problem: COPD (chronic obstructive pulmonary disease). Plan: bronchodilators.     Problem/Plan - 7:  ·  Problem: Pleural effusion. Hemofiltration.     Problem/Plan - 8:  ·  Problem: Type 2 diabetes mellitus.  Plan: endocrine f/u very much appreciated.     Problem/Plan - 9:  ·  Problem: Anemia. Continue PPI.     Problem/Plan - 10:  Problem: Acute on chronic diastolic congestive heart failure. Hemofiltration prn.

## 2021-01-28 NOTE — PROGRESS NOTE ADULT - SUBJECTIVE AND OBJECTIVE BOX
Patient is a 86y Female seen and evaluated at bedside. No plan for HD today. UrOut 300cc. Will monitor. BP uncontrolled- can uptitrate lopressor to 75 BID.    Meds:    acetaminophen   Tablet .. 650 every 6 hours PRN  albuterol/ipratropium for Nebulization 3 every 6 hours  amLODIPine   Tablet 10 daily  aspirin enteric coated 81 daily  atorvastatin 20 at bedtime  budesonide  80 MICROgram(s)/formoterol 4.5 MICROgram(s) Inhaler 2 two times a day  cefepime   IVPB 1000 every 24 hours  dextrose 40% Gel 15 once  dextrose 5%. 1000 <Continuous>  dextrose 5%. 1000 <Continuous>  dextrose 50% Injectable 25 once  dextrose 50% Injectable 12.5 once  dextrose 50% Injectable 25 once  donepezil 5 at bedtime  fluticasone propionate 50 MICROgram(s)/spray Nasal Spray 1 two times a day  glucagon  Injectable 1 once  guaiFENesin   Syrup  (Sugar-Free) 100 every 6 hours PRN  heparin   Injectable 5000 every 12 hours  influenza  Vaccine (HIGH DOSE) 0.7 once  insulin lispro (ADMELOG) corrective regimen sliding scale  Before meals and at bedtime  levothyroxine 50 daily  LORazepam     Tablet 0.25 two times a day  methylPREDNISolone sodium succinate Injectable 20 every 8 hours  metoprolol tartrate 50 every 12 hours  mirtazapine 7.5 at bedtime  nystatin    Suspension 397990 four times a day  senna 1 daily  sevelamer carbonate 1600 three times a day      T(C): , Max: 36.7 (01-27-21 @ 22:00)  T(F): , Max: 98 (01-27-21 @ 22:00)  HR: 61 (01-28-21 @ 12:20)  BP: 153/70 (01-28-21 @ 12:20)  BP(mean): 101 (01-28-21 @ 12:20)  RR: 19 (01-28-21 @ 12:20)  SpO2: 98% (01-28-21 @ 12:20)  Wt(kg): --    01-27 @ 07:01  -  01-28 @ 07:00  --------------------------------------------------------  IN: 1600 mL / OUT: 3300 mL / NET: -1700 mL          Review of Systems:  RESPIRATORY: +shortness of breath  CARDIOVASCULAR: No chest pain  MUSCULOSKELETAL: No leg edema    PHYSICAL EXAM:  GENERAL: well-developed, well nourished, alert, on non HFNC 60%  CHEST/LUNG: Coarse breath sounds bilaterally  HEART: normal S1S2, RRR  ABDOMEN: Soft, Nontender, non distended  EXTREMITIES: trace oedema   ACCESS: RTC- oozing and bandage site      LABS:                        11.6   15.06 )-----------( 188      ( 28 Jan 2021 07:50 )             35.2     01-28    132<L>  |  90<L>  |  60<H>  ----------------------------<  134<H>  4.1   |  23  |  3.27<H>    Ca    8.3<L>      28 Jan 2021 07:50  Phos  5.9     01-28  Mg     2.0     01-28                  RADIOLOGY & ADDITIONAL STUDIES:

## 2021-01-28 NOTE — PROGRESS NOTE ADULT - SUBJECTIVE AND OBJECTIVE BOX
CARDIOLOGY NP PROGRESS NOTE    Subjective:   Remainder ROS otherwise negative.    Overnight Events:     TELEMETRY:    EKG:      VITAL SIGNS:  T(C): 36.7 (01-28-21 @ 05:00), Max: 36.7 (01-27-21 @ 22:00)  HR: 61 (01-28-21 @ 12:20) (52 - 72)  BP: 153/70 (01-28-21 @ 12:20) (143/66 - 182/84)  RR: 19 (01-28-21 @ 12:20) (16 - 20)  SpO2: 98% (01-28-21 @ 12:20) (95% - 100%)  Wt(kg): --    I&O's Summary    27 Jan 2021 07:01  -  28 Jan 2021 07:00  --------------------------------------------------------  IN: 1600 mL / OUT: 3300 mL / NET: -1700 mL          PHYSICAL EXAM:    General: A/ox 3, No acute Distress  Neck: Supple, NO JVD  Cardiac: S1 S2, No M/R/G  Pulmonary: CTAB, Breathing unlabored, No Rhonchi/Rales/Wheezing  Abdomen: Soft, Non -tender, +BS x 4 quads  Extremities: No Rashes, No edema  Neuro: A/o x 3, No focal deficits          LABS:                          11.6   15.06 )-----------( 188      ( 28 Jan 2021 07:50 )             35.2                              01-28    132<L>  |  90<L>  |  60<H>  ----------------------------<  134<H>  4.1   |  23  |  3.27<H>    Ca    8.3<L>      28 Jan 2021 07:50  Phos  5.9     01-28  Mg     2.0     01-28                                CAPILLARY BLOOD GLUCOSE      POCT Blood Glucose.: 301 mg/dL (28 Jan 2021 12:50)  POCT Blood Glucose.: 130 mg/dL (28 Jan 2021 06:25)  POCT Blood Glucose.: 95 mg/dL (27 Jan 2021 21:30)  POCT Blood Glucose.: 87 mg/dL (27 Jan 2021 16:31)            Allergies:  No Known Allergies    MEDICATIONS  (STANDING):  albuterol/ipratropium for Nebulization 3 milliLiter(s) Nebulizer every 6 hours  amLODIPine   Tablet 10 milliGRAM(s) Oral daily  aspirin enteric coated 81 milliGRAM(s) Oral daily  atorvastatin 20 milliGRAM(s) Oral at bedtime  budesonide  80 MICROgram(s)/formoterol 4.5 MICROgram(s) Inhaler 2 Puff(s) Inhalation two times a day  cefepime   IVPB 1000 milliGRAM(s) IV Intermittent every 24 hours  dextrose 40% Gel 15 Gram(s) Oral once  dextrose 5%. 1000 milliLiter(s) (50 mL/Hr) IV Continuous <Continuous>  dextrose 5%. 1000 milliLiter(s) (100 mL/Hr) IV Continuous <Continuous>  dextrose 50% Injectable 25 Gram(s) IV Push once  dextrose 50% Injectable 12.5 Gram(s) IV Push once  dextrose 50% Injectable 25 Gram(s) IV Push once  donepezil 5 milliGRAM(s) Oral at bedtime  fluticasone propionate 50 MICROgram(s)/spray Nasal Spray 1 Spray(s) Both Nostrils two times a day  glucagon  Injectable 1 milliGRAM(s) IntraMuscular once  heparin   Injectable 5000 Unit(s) SubCutaneous every 12 hours  influenza  Vaccine (HIGH DOSE) 0.7 milliLiter(s) IntraMuscular once  insulin lispro (ADMELOG) corrective regimen sliding scale   SubCutaneous Before meals and at bedtime  levothyroxine 50 MICROGram(s) Oral daily  LORazepam     Tablet 0.25 milliGRAM(s) Oral two times a day  methylPREDNISolone sodium succinate Injectable 20 milliGRAM(s) IV Push every 8 hours  metoprolol tartrate 50 milliGRAM(s) Oral every 12 hours  mirtazapine 7.5 milliGRAM(s) Oral at bedtime  nystatin    Suspension 752917 Unit(s) Oral four times a day  senna 1 Tablet(s) Oral daily  sevelamer carbonate 1600 milliGRAM(s) Oral three times a day    MEDICATIONS  (PRN):  acetaminophen   Tablet .. 650 milliGRAM(s) Oral every 6 hours PRN Moderate Pain (4 - 6)  guaiFENesin   Syrup  (Sugar-Free) 100 milliGRAM(s) Oral every 6 hours PRN Cough        DIAGNOSTIC TESTS:        CARDIOLOGY NP PROGRESS NOTE    Subjective:  Received pt awake in bed in NAD on HFNC. Patient states feeling much better and shortness of breath has significantly improved. Denies CP, dizziness/diaphoresis, n/v, palpitations.  Remainder ROS otherwise negative.    Overnight Events: hgb 7.2- received 2UPRBC intra-dialysis; removed 2L during dialysis. Psych c/s for anxiety- ordered for Remeron 7.5mg qhs and Xanax 0.25mg BID. and home Valium dcd. Pre-meal lispro dc'd.       TELEMETRY:    EKG:      VITAL SIGNS:  T(C): 36.7 (01-28-21 @ 05:00), Max: 36.7 (01-27-21 @ 22:00)  HR: 61 (01-28-21 @ 12:20) (52 - 72)  BP: 153/70 (01-28-21 @ 12:20) (143/66 - 182/84)  RR: 19 (01-28-21 @ 12:20) (16 - 20)  SpO2: 98% (01-28-21 @ 12:20) (95% - 100%)  Wt(kg): --    I&O's Summary    27 Jan 2021 07:01  -  28 Jan 2021 07:00  --------------------------------------------------------  IN: 1600 mL / OUT: 3300 mL / NET: -1700 mL          PHYSICAL EXAM:    General: Alert to self, place and time. Disoriented to situation at times. No acute Distress   Neck: Supple, +JVD  Cardiac: S1 S2, No M/R/G  Pulmonary: Diminished breath sounds RLL. Scattered Rhonchi. Breathing unlabored on HFNC,   Abdomen: Soft, Non -tender, +BS x 4 quads  Extremities: No Rashes, No edema  Lines: R IJ tunneled HD Cath- site slightly oozing- dsg in place   Neuro: Alert to self, place and time. Disoriented to situation at times.  No focal deficits          LABS:                          11.6   15.06 )-----------( 188      ( 28 Jan 2021 07:50 )             35.2                              01-28    132<L>  |  90<L>  |  60<H>  ----------------------------<  134<H>  4.1   |  23  |  3.27<H>    Ca    8.3<L>      28 Jan 2021 07:50  Phos  5.9     01-28  Mg     2.0     01-28                                CAPILLARY BLOOD GLUCOSE      POCT Blood Glucose.: 301 mg/dL (28 Jan 2021 12:50)  POCT Blood Glucose.: 130 mg/dL (28 Jan 2021 06:25)  POCT Blood Glucose.: 95 mg/dL (27 Jan 2021 21:30)  POCT Blood Glucose.: 87 mg/dL (27 Jan 2021 16:31)            Allergies:  No Known Allergies    MEDICATIONS  (STANDING):  albuterol/ipratropium for Nebulization 3 milliLiter(s) Nebulizer every 6 hours  amLODIPine   Tablet 10 milliGRAM(s) Oral daily  aspirin enteric coated 81 milliGRAM(s) Oral daily  atorvastatin 20 milliGRAM(s) Oral at bedtime  budesonide  80 MICROgram(s)/formoterol 4.5 MICROgram(s) Inhaler 2 Puff(s) Inhalation two times a day  cefepime   IVPB 1000 milliGRAM(s) IV Intermittent every 24 hours  dextrose 40% Gel 15 Gram(s) Oral once  dextrose 5%. 1000 milliLiter(s) (50 mL/Hr) IV Continuous <Continuous>  dextrose 5%. 1000 milliLiter(s) (100 mL/Hr) IV Continuous <Continuous>  dextrose 50% Injectable 25 Gram(s) IV Push once  dextrose 50% Injectable 12.5 Gram(s) IV Push once  dextrose 50% Injectable 25 Gram(s) IV Push once  donepezil 5 milliGRAM(s) Oral at bedtime  fluticasone propionate 50 MICROgram(s)/spray Nasal Spray 1 Spray(s) Both Nostrils two times a day  glucagon  Injectable 1 milliGRAM(s) IntraMuscular once  heparin   Injectable 5000 Unit(s) SubCutaneous every 12 hours  influenza  Vaccine (HIGH DOSE) 0.7 milliLiter(s) IntraMuscular once  insulin lispro (ADMELOG) corrective regimen sliding scale   SubCutaneous Before meals and at bedtime  levothyroxine 50 MICROGram(s) Oral daily  LORazepam     Tablet 0.25 milliGRAM(s) Oral two times a day  methylPREDNISolone sodium succinate Injectable 20 milliGRAM(s) IV Push every 8 hours  metoprolol tartrate 50 milliGRAM(s) Oral every 12 hours  mirtazapine 7.5 milliGRAM(s) Oral at bedtime  nystatin    Suspension 164817 Unit(s) Oral four times a day  senna 1 Tablet(s) Oral daily  sevelamer carbonate 1600 milliGRAM(s) Oral three times a day    MEDICATIONS  (PRN):  acetaminophen   Tablet .. 650 milliGRAM(s) Oral every 6 hours PRN Moderate Pain (4 - 6)  guaiFENesin   Syrup  (Sugar-Free) 100 milliGRAM(s) Oral every 6 hours PRN Cough        DIAGNOSTIC TESTS:        CARDIOLOGY NP PROGRESS NOTE    Subjective:  Received pt awake in bed in NAD on HFNC. Patient states feeling much better and shortness of breath has significantly improved. Denies CP, dizziness/diaphoresis, n/v, palpitations.  Remainder ROS otherwise negative.    Overnight Events: hgb 7.2- received 2UPRBC intra-dialysis; removed 2L during dialysis. Psych c/s for anxiety- ordered for Remeron 7.5mg qhs and Xanax 0.25mg BID. and home Valium dcd.    TELEMETRY: SB- SR (HR 50s-60s)        VITAL SIGNS:  T(C): 36.7 (01-28-21 @ 05:00), Max: 36.7 (01-27-21 @ 22:00)  HR: 61 (01-28-21 @ 12:20) (52 - 72)  BP: 153/70 (01-28-21 @ 12:20) (143/66 - 182/84)  RR: 19 (01-28-21 @ 12:20) (16 - 20)  SpO2: 98% (01-28-21 @ 12:20) (95% - 100%)  Wt(kg): --    I&O's Summary    27 Jan 2021 07:01  -  28 Jan 2021 07:00  --------------------------------------------------------  IN: 1600 mL / OUT: 3300 mL / NET: -1700 mL          PHYSICAL EXAM:    General: Alert to self, place and time. Disoriented to situation at times. No acute Distress   Neck: Supple, +JVD  Cardiac: S1 S2, No M/R/G  Pulmonary: Diminished breath sounds RLL. Scattered Rhonchi. Breathing unlabored on HFNC,   Abdomen: Soft, Non -tender, +BS x 4 quads  Extremities: No Rashes, No edema  Lines: R IJ tunneled HD Cath- site slightly oozing- dsg in place   Neuro: Alert to self, place and time. Disoriented to situation at times.  No focal deficits          LABS:                          11.6   15.06 )-----------( 188      ( 28 Jan 2021 07:50 )             35.2                              01-28    132<L>  |  90<L>  |  60<H>  ----------------------------<  134<H>  4.1   |  23  |  3.27<H>    Ca    8.3<L>      28 Jan 2021 07:50  Phos  5.9     01-28  Mg     2.0     01-28                                CAPILLARY BLOOD GLUCOSE      POCT Blood Glucose.: 301 mg/dL (28 Jan 2021 12:50)  POCT Blood Glucose.: 130 mg/dL (28 Jan 2021 06:25)  POCT Blood Glucose.: 95 mg/dL (27 Jan 2021 21:30)  POCT Blood Glucose.: 87 mg/dL (27 Jan 2021 16:31)            Allergies:  No Known Allergies    MEDICATIONS  (STANDING):  albuterol/ipratropium for Nebulization 3 milliLiter(s) Nebulizer every 6 hours  amLODIPine   Tablet 10 milliGRAM(s) Oral daily  aspirin enteric coated 81 milliGRAM(s) Oral daily  atorvastatin 20 milliGRAM(s) Oral at bedtime  budesonide  80 MICROgram(s)/formoterol 4.5 MICROgram(s) Inhaler 2 Puff(s) Inhalation two times a day  cefepime   IVPB 1000 milliGRAM(s) IV Intermittent every 24 hours  dextrose 40% Gel 15 Gram(s) Oral once  dextrose 5%. 1000 milliLiter(s) (50 mL/Hr) IV Continuous <Continuous>  dextrose 5%. 1000 milliLiter(s) (100 mL/Hr) IV Continuous <Continuous>  dextrose 50% Injectable 25 Gram(s) IV Push once  dextrose 50% Injectable 12.5 Gram(s) IV Push once  dextrose 50% Injectable 25 Gram(s) IV Push once  donepezil 5 milliGRAM(s) Oral at bedtime  fluticasone propionate 50 MICROgram(s)/spray Nasal Spray 1 Spray(s) Both Nostrils two times a day  glucagon  Injectable 1 milliGRAM(s) IntraMuscular once  heparin   Injectable 5000 Unit(s) SubCutaneous every 12 hours  influenza  Vaccine (HIGH DOSE) 0.7 milliLiter(s) IntraMuscular once  insulin lispro (ADMELOG) corrective regimen sliding scale   SubCutaneous Before meals and at bedtime  levothyroxine 50 MICROGram(s) Oral daily  LORazepam     Tablet 0.25 milliGRAM(s) Oral two times a day  methylPREDNISolone sodium succinate Injectable 20 milliGRAM(s) IV Push every 8 hours  metoprolol tartrate 50 milliGRAM(s) Oral every 12 hours  mirtazapine 7.5 milliGRAM(s) Oral at bedtime  nystatin    Suspension 771962 Unit(s) Oral four times a day  senna 1 Tablet(s) Oral daily  sevelamer carbonate 1600 milliGRAM(s) Oral three times a day    MEDICATIONS  (PRN):  acetaminophen   Tablet .. 650 milliGRAM(s) Oral every 6 hours PRN Moderate Pain (4 - 6)  guaiFENesin   Syrup  (Sugar-Free) 100 milliGRAM(s) Oral every 6 hours PRN Cough        DIAGNOSTIC TESTS:        CARDIOLOGY NP PROGRESS NOTE    Subjective:  Received pt awake in bed in NAD on HFNC. Patient states feeling much better and shortness of breath has significantly improved. Denies CP, dizziness/diaphoresis, n/v, palpitations.  Remainder ROS otherwise negative.    Overnight Events: hgb 7.2- received 2UPRBC intra-dialysis; removed 2L during dialysis. Psych c/s for anxiety- ordered for Remeron 7.5mg qhs and Xanax 0.25mg BID. and home Valium dcd. REPEAT COVID SWAB NEGATIVE    TELEMETRY: SB- SR (HR 50s-60s)        VITAL SIGNS:  T(C): 36.7 (01-28-21 @ 05:00), Max: 36.7 (01-27-21 @ 22:00)  HR: 61 (01-28-21 @ 12:20) (52 - 72)  BP: 153/70 (01-28-21 @ 12:20) (143/66 - 182/84)  RR: 19 (01-28-21 @ 12:20) (16 - 20)  SpO2: 98% (01-28-21 @ 12:20) (95% - 100%)  Wt(kg): --    I&O's Summary    27 Jan 2021 07:01  -  28 Jan 2021 07:00  --------------------------------------------------------  IN: 1600 mL / OUT: 3300 mL / NET: -1700 mL          PHYSICAL EXAM:    General: Alert to self, place and time. Disoriented to situation at times. No acute Distress   Neck: Supple, +JVD  Cardiac: S1 S2, No M/R/G  Pulmonary: Diminished breath sounds RLL. Scattered Rhonchi. Breathing unlabored on HFNC,   Abdomen: Soft, Non -tender, +BS x 4 quads  Extremities: No Rashes, No edema  Lines: R IJ tunneled HD Cath- site slightly oozing- dsg in place   Neuro: Alert to self, place and time. Disoriented to situation at times.  No focal deficits          LABS:                          11.6   15.06 )-----------( 188      ( 28 Jan 2021 07:50 )             35.2                              01-28    132<L>  |  90<L>  |  60<H>  ----------------------------<  134<H>  4.1   |  23  |  3.27<H>    Ca    8.3<L>      28 Jan 2021 07:50  Phos  5.9     01-28  Mg     2.0     01-28                                CAPILLARY BLOOD GLUCOSE      POCT Blood Glucose.: 301 mg/dL (28 Jan 2021 12:50)  POCT Blood Glucose.: 130 mg/dL (28 Jan 2021 06:25)  POCT Blood Glucose.: 95 mg/dL (27 Jan 2021 21:30)  POCT Blood Glucose.: 87 mg/dL (27 Jan 2021 16:31)            Allergies:  No Known Allergies    MEDICATIONS  (STANDING):  albuterol/ipratropium for Nebulization 3 milliLiter(s) Nebulizer every 6 hours  amLODIPine   Tablet 10 milliGRAM(s) Oral daily  aspirin enteric coated 81 milliGRAM(s) Oral daily  atorvastatin 20 milliGRAM(s) Oral at bedtime  budesonide  80 MICROgram(s)/formoterol 4.5 MICROgram(s) Inhaler 2 Puff(s) Inhalation two times a day  cefepime   IVPB 1000 milliGRAM(s) IV Intermittent every 24 hours  dextrose 40% Gel 15 Gram(s) Oral once  dextrose 5%. 1000 milliLiter(s) (50 mL/Hr) IV Continuous <Continuous>  dextrose 5%. 1000 milliLiter(s) (100 mL/Hr) IV Continuous <Continuous>  dextrose 50% Injectable 25 Gram(s) IV Push once  dextrose 50% Injectable 12.5 Gram(s) IV Push once  dextrose 50% Injectable 25 Gram(s) IV Push once  donepezil 5 milliGRAM(s) Oral at bedtime  fluticasone propionate 50 MICROgram(s)/spray Nasal Spray 1 Spray(s) Both Nostrils two times a day  glucagon  Injectable 1 milliGRAM(s) IntraMuscular once  heparin   Injectable 5000 Unit(s) SubCutaneous every 12 hours  influenza  Vaccine (HIGH DOSE) 0.7 milliLiter(s) IntraMuscular once  insulin lispro (ADMELOG) corrective regimen sliding scale   SubCutaneous Before meals and at bedtime  levothyroxine 50 MICROGram(s) Oral daily  LORazepam     Tablet 0.25 milliGRAM(s) Oral two times a day  methylPREDNISolone sodium succinate Injectable 20 milliGRAM(s) IV Push every 8 hours  metoprolol tartrate 50 milliGRAM(s) Oral every 12 hours  mirtazapine 7.5 milliGRAM(s) Oral at bedtime  nystatin    Suspension 638102 Unit(s) Oral four times a day  senna 1 Tablet(s) Oral daily  sevelamer carbonate 1600 milliGRAM(s) Oral three times a day    MEDICATIONS  (PRN):  acetaminophen   Tablet .. 650 milliGRAM(s) Oral every 6 hours PRN Moderate Pain (4 - 6)  guaiFENesin   Syrup  (Sugar-Free) 100 milliGRAM(s) Oral every 6 hours PRN Cough        DIAGNOSTIC TESTS:

## 2021-01-28 NOTE — PROGRESS NOTE ADULT - PROBLEM SELECTOR PLAN 8
- Na 132; asx.   - Switched meds to NS from D5.   - Continue to monitor.   - Renal following, appreciate recs.

## 2021-01-28 NOTE — PROGRESS NOTE ADULT - PROBLEM SELECTOR PLAN 10
- CONT: Levothyroxine 50mg PO QD.     #DM  - Hx of DM. A1c 5.1% on admission.    - CONT: lantus 10 qhs, ISS  - Endocrine following    F: none   E: renal patient   N: dysphagia 2 mechanical soft-thin liquids, not eating a lot and poor appetite. Encourage PO fluids.    DVT ppx: Hep SubQ  Dispo: cardiac telemetry    PT lynnette HENDRICKSON w/on site dialysis

## 2021-01-28 NOTE — PROGRESS NOTE ADULT - ASSESSMENT
ASSESSMENT/PLAN 86 yr old F, POOR HISTORIAN/early dementia, former heavy smoker with PMHx HTN, hyperlipidemia, COPD, hypothyroidism, Stage IV CKD (baseline Cr ~3.0), anemia of chronic disease, CAD s/p CABG 2015 Dr. Blunt, acute on chronic diastolic CHF(EF:55% by Echo 10/2020), renal artery stenosis s/p renal artery stent >20yrs ago, Carotid artery stenosis, PAD, who presents to [facility] ED 1/15/21 c/o progressively worsening SOB and cough x 3 days. Currently on steroids from pneumonitis    1. O2 Continue HF nasal prongs attempt weaning   2. Bronchodilators:  Atrovent/ albuterol q 4 – 6 hours as needed  3. Corticosteroids: SoluMedrol to continue current doses today, no taper today discussed c Dr. Cuevas  4. ID/Antibiotics: Cefepime  5. Cardiac/HTN: Optimize CHF mngmnt, optimize HD   6. GI: Rx/ prophylaxis c PPI/H2B  7. Heme: Rx/VT prophylaxis c SQH/SCD/ASA follow H/H closely  8. Aspiration precautions at all times  9, Avoid sedation in this pt, to protect respiratory drive, benzodiazepines are contraindicated  Discussed with managing team , CCU

## 2021-01-28 NOTE — PROGRESS NOTE ADULT - SUBJECTIVE AND OBJECTIVE BOX
Interventional, Pulmonary, Critical, Chest Special Procedures. For     Pt was seen and fully examined by myself.     Time spent with patient in minutes:78    Patient is a 86y old  Female who presents with a chief complaint of progressively worsening SOB and cough x 3 days (28 Jan 2021 13:44) Events of last 24hrs reviewed, analyzed and discussed c CCU. The patient now somnolent, arousable, denies pain, eupneic on HF, pain free, not really engaging    HPI:  86 yr old F, POOR HISTORIAN/early dementia, former heavy smoker with PMHx HTN, hyperlipidemia, COPD, hypothyroidism, Stage IV CKD (baseline Cr ~3.0), anemia of chronic disease, CAD s/p CABG 2015 Dr. Blunt, chronic diastolic CHF(EF:55% by Echo 10/2020), renal artery stenosis s/p renal artery stent >20yrs ago, Carotid artery stenosis, PAD, who presents to [facility] ED 1/15/21 c/o progressively worsening SOB and cough x 3 days. Patient reports she can barely walk 10 feet prior to having to stop and rest. Patient further admits to chronic bilateral LE edema and significant orthopnea and she has been sleeping upright in a chair the past week. Patient also admits to chills and a nonproductive cough over the past few days. Patient was instructed to increase her Lasix 80mg PO daily dose to Lasix 80mg PO BID and start Metalazone 10mg PO BID prior to each dose by her cardiologist Dr. Horvath. Patient denies any chest pain, dizziness, palpitations, recent travel or sick contacts. Patient endorsing compliance w/ medications however daughter states that compliance with medication has been questionable over the last few months, as patient is becoming more forgetful.    In [facility] ED, BP: 170/68, HR: 80, RR:26, Temp: 98.4F, O2 sat: 80% on RA, improved to 99% with 10L NRB. EKG revealed NSR@67BPM with incomplete LBBB, TWI Lead I, AVL (similar to prior EKG 10/2020). CXR prelim read consistent with alveolar edema, bilateral patchy opacities/consolidation.    Labs notable for: WBC 14.5 with left shift, H/H 9.8/30.6, D-dimer 491, BUN/Cr 56/3.54, , CRP 4.86, Ferritin 180, Procalcitonin 0.29, Lactate 2.4, Troponin T 0.05, BNP 64,671, Initial COVID PCR negative.    Patient treated with Lasix 80mg IV x 1 dose, SL NTG 0.4mg PO x 1 dose, Decadron 6mg IV x 1 dose, Ceftriaxone 1g IV x 1 dose and Azithromycin 500mg IV x 1 dose.  Patient now admitted to cardiac telemetry for management of acute on chronic diastolic CHF exacerbation in setting of suspected CAP.    **Of note: Patient had a fall ~3 weeks ago for which she was admitted to [facility], CT imaging was negative as per daughter and fall thought to be secondary to hypotension.   (15 Mark 2021 21:30)      REVIEW OF SYSTEMS:  Constitutional: (+) weight change, (-) fever,  (-) chills, (+) fatigue, () night sweats  Eyes: (-) discharge, () eye pain, () visual change  ENT:  (-) hearing difficulty, () vertigo, () sinus pain,  () throat pain, () epistaxis, () dysphagia, () hoarseness  Neck: (-) pain, () stiffness, () swelling  Respiratory: (+) dry cough, () wheezing, () hemoptysis      Cardiovascular: (-) chest pain, ()palpitations, () dizziness   Gastrointestinal: (-) abdominal pain, () nausea, () vomiting, () hematemesis, () diarrhea,  () constipation, () melena  Genitourinary:  (-) dysuria, () frequency, () hematuria, () incontinence      Neurologic: (-) headache, () memory loss, () loss of strength, () numbness, () tremor     Skin: (-) itching, () burning, () rash, () lesions   Lymphatic: () enlarged lymph nodes  Endocrine: (-) hair loss, () temperature intolerance         Musculoskeletal: (-) back pain, () joint pain,  () extremity pain  Psychiatric: (-) visual change, () auditory change, () depression, () anxiety, () suicidal  Sleep: (+) disorder, () insomnia, () sleep deprivation  Heme/Lymph: () easy bruising, () bleeding gums            Allergy and Immunologic: () hives, () eczema  PAST MEDICAL & SURGICAL HISTORY:  Essential hypertension  Hypertension    Type 2 diabetes mellitus  Diabetes    Hyperlipidemia  Hyperlipidemia    Disorder of kidney and ureter  Renal insufficiency    Peripheral vascular disease  PVD (peripheral vascular disease)    Anxiety state  Anxiety    Atherosclerosis of renal artery  with resulting stent placement    Atherosclerosis of renal artery  Renal artery stenosis      FAMILY HISTORY:  Family history of ischemic heart disease  Family history of coronary artery disease.      SOCIAL HISTORY:      - Tobacco     - ETOH    Allergies    No Known Allergies    Intolerances      Vital Signs Last 24 Hrs  T(C): 36.7 (28 Jan 2021 05:00), Max: 36.7 (27 Jan 2021 22:00)  T(F): 98 (28 Jan 2021 05:00), Max: 98 (27 Jan 2021 22:00)  HR: 61 (28 Jan 2021 12:20) (52 - 72)  BP: 153/70 (28 Jan 2021 12:20) (143/66 - 182/84)  BP(mean): 101 (28 Jan 2021 12:20) (95 - 122)  RR: 19 (28 Jan 2021 12:20) (16 - 20)  SpO2: 98% (28 Jan 2021 12:20) (95% - 100%)    01-27 @ 07:01  -  01-28 @ 07:00  --------------------------------------------------------  IN: 1600 mL / OUT: 3300 mL / NET: -1700 mL          MEDICATIONS:  MEDICATIONS  (STANDING):  albuterol/ipratropium for Nebulization 3 milliLiter(s) Nebulizer every 6 hours  amLODIPine   Tablet 10 milliGRAM(s) Oral daily  aspirin enteric coated 81 milliGRAM(s) Oral daily  atorvastatin 20 milliGRAM(s) Oral at bedtime  budesonide  80 MICROgram(s)/formoterol 4.5 MICROgram(s) Inhaler 2 Puff(s) Inhalation two times a day  cefepime   IVPB 1000 milliGRAM(s) IV Intermittent every 24 hours  dextrose 40% Gel 15 Gram(s) Oral once  dextrose 5%. 1000 milliLiter(s) (50 mL/Hr) IV Continuous <Continuous>  dextrose 5%. 1000 milliLiter(s) (100 mL/Hr) IV Continuous <Continuous>  dextrose 50% Injectable 25 Gram(s) IV Push once  dextrose 50% Injectable 12.5 Gram(s) IV Push once  dextrose 50% Injectable 25 Gram(s) IV Push once  donepezil 5 milliGRAM(s) Oral at bedtime  fluticasone propionate 50 MICROgram(s)/spray Nasal Spray 1 Spray(s) Both Nostrils two times a day  glucagon  Injectable 1 milliGRAM(s) IntraMuscular once  heparin   Injectable 5000 Unit(s) SubCutaneous every 12 hours  influenza  Vaccine (HIGH DOSE) 0.7 milliLiter(s) IntraMuscular once  insulin lispro (ADMELOG) corrective regimen sliding scale   SubCutaneous Before meals and at bedtime  levothyroxine 50 MICROGram(s) Oral daily  LORazepam     Tablet 0.25 milliGRAM(s) Oral two times a day  methylPREDNISolone sodium succinate Injectable 20 milliGRAM(s) IV Push every 8 hours  metoprolol tartrate 50 milliGRAM(s) Oral every 12 hours  mirtazapine 7.5 milliGRAM(s) Oral at bedtime  nystatin    Suspension 129949 Unit(s) Oral four times a day  senna 1 Tablet(s) Oral daily  sevelamer carbonate 1600 milliGRAM(s) Oral three times a day    MEDICATIONS  (PRN):  acetaminophen   Tablet .. 650 milliGRAM(s) Oral every 6 hours PRN Moderate Pain (4 - 6)  guaiFENesin   Syrup  (Sugar-Free) 100 milliGRAM(s) Oral every 6 hours PRN Cough      PHYSICAL EXAM:  More comfortable, no immediate distress  Muscle atrophy, developed,  Eyes: PERRLA, EOMI, -conjunctivitis, -scleritis   Head: no focal deficit, normocephalic,  no trauma  ENMT: moist tongue with white lesions, + thrush, -nasal discharge, -hoarseness, normal hearing, + cough, -hemoptysis, trachea midline  Neck: supple, - lymphadenopathy,  -masses, -JVD  Respiratory: bilateral diminished breath sounds, -wheezing, less  rhonchi,  rales, + less crackles lower lungs, better peripheral air entry  Chest:  no more accessory muscle use, -paradoxical breathing  Cardiovascular: irregular rate and sinus rhythm, S1 S2 normal, -S3, -S4, -murmur, -gallop, -rub  Gastrointestinal: soft, nontender, nondistended, normal bowel sounds, no hepatosplenomegaly  Genitourinary: -flank pain, -dysuria, + Cason  Extremities: -clubbing, -cyanosis, -edema    Vascular: peripheral pulses palpable 2+ bilaterally  Neurological: alert, oriented x 3, no focal deficit, -tremor   Skin: warm, dry, -erythema, iv sites intact  Lymph nodes; no cervical, supraclavicular or axillary adenopathy  Psychiatric: cooperative, appropriate mood    DEVICES:  - DENTURES   +IV R / L     - ETUBE   -TRACH   -CTUBE  R / L    LABS:                          11.6   15.06 )-----------( 188      ( 28 Jan 2021 07:50 )             35.2     01-28    132<L>  |  90<L>  |  60<H>  ----------------------------<  134<H>  4.1   |  23  |  3.27<H>    Ca    8.3<L>      28 Jan 2021 07:50  Phos  5.9     01-28  Mg     2.0     01-28        RADIOLOGY & ADDITIONAL STUDIES (The following images were personally reviewed): CXR 01/21 some improvement

## 2021-01-28 NOTE — PROGRESS NOTE ADULT - PROBLEM SELECTOR PLAN 1
Becoming more dependant on oxygen to maintain sats low 90's; now on HFNC and tolerating well.  - Likely multifactorial 2/2 CHF, COPD, ILD, and CAP vs pneumonitis.   - LE Duplex 1/19 (-) for DVT.   - No CTA done to r/o PE due to CKD; VQ deferred as likely would not yield diagnostic info given multiple lung processes.   - Repeat COVID swab negative 1/28  - CONT: Methylprednisolone 20mg IV q8hrs per Dr. Cuevas.  - psych consulted as anxiety thought to be contributing factor as well, appreciate recs.   - c/w Remeron 7.5mg qhs and Xanax 0.25mg BID    ## Pneumonia vs radha Pneumonitis  - WBC initially up-trending, possible 2/2 IV steroids. F/u manual diff for bandemia  - WBC 14.23 today   - s/p 4 days of Ceftriaxone + 7 days azithromycin.   - ID consulted – 1/23 DISCONTINUED Zosyn and STARTED Cefepime 1000mg IV q24hrs (1/23-____; duration to be determined).   - ID recs: RVP negative, (+) MSSA, procalcitonin--> 1.08, galactomannan pending, LDH-->474, fungitell pending  - Continue to monitor for fever Becoming more dependant on oxygen to maintain sats low 90's; now on HFNC and tolerating well.  - Likely multifactorial 2/2 CHF, COPD, ILD, and CAP vs pneumonitis.   - LE Duplex 1/19 (-) for DVT.   - No CTA done to r/o PE due to CKD; VQ deferred as likely would not yield diagnostic info given multiple lung processes.   - Repeat COVID swab negative 1/28  - CONT: Methylprednisolone 20mg IV q8hrs per Dr. Cuevas.  - psych consulted as anxiety thought to be contributing factor as well, appreciate recs.   - c/w Remeron 7.5mg qhs and Xanax 0.25mg BID    ## Pneumonia vs radha Pneumonitis  - WBC initially up-trending, possible 2/2 IV steroids. F/u manual diff for bandemia  - WBC 15.06 today   - s/p 4 days of Ceftriaxone + 7 days azithromycin.   - ID consulted – 1/23 DISCONTINUED Zosyn and STARTED Cefepime 1000mg IV q24hrs (1/23-____; duration to be determined).   - ID recs: RVP negative, (+) MSSA, procalcitonin--> 1.08, galactomannan pending, LDH-->474, fungitell negative  - Continue to monitor for fever Becoming more dependant on oxygen to maintain sats low 90's; now on HFNC and tolerating well.  - Likely multifactorial 2/2 CHF, COPD, ILD, and CAP vs pneumonitis.   - LE Duplex 1/19 (-) for DVT.   - No CTA done to r/o PE due to CKD; VQ deferred as likely would not yield diagnostic info given multiple lung processes.   - Repeat COVID swab negative 1/28  - CONT: Methylprednisolone 20mg IV q8hrs per Dr. Cuevas.  - psych consulted as anxiety thought to be contributing factor as well, appreciate recs.   - c/w Remeron 7.5mg qhs; Ativan dc'd as per Dr. Cuevas     ## Pneumonia vs radha Pneumonitis  - WBC initially up-trending, possible 2/2 IV steroids. F/u manual diff for bandemia  - WBC 15.06 today   - s/p 4 days of Ceftriaxone + 7 days azithromycin.   - ID consulted – 1/23 DISCONTINUED Zosyn and STARTED Cefepime 1000mg IV q24hrs (1/23-____; duration to be determined).   - ID recs: RVP negative, (+) MSSA, procalcitonin--> 1.08, galactomannan pending, LDH-->474, fungitell negative  - Continue to monitor for fever

## 2021-01-28 NOTE — PROGRESS NOTE ADULT - PROBLEM SELECTOR PLAN 2
- Known to Dr. Harmon; Renal following; unknown etiology at this time but likely DKD. overall worsening clinical picture, progressed to ARF and now requires HD- initiated 1/26/21.   - Cr  3.27 today  - Renal US shows atrophic kidneys, no WILBER.   - Nephrotic w/u non-contributory thus far, pending ANCA   - s/p 2L removal intra-dialysis 1/27/21. No HD today.   - Monitor Strict I/Os

## 2021-01-28 NOTE — PROGRESS NOTE ADULT - ATTENDING COMMENTS
Pt seen at bedside  Breathing remains labored on high dylan, though 02 sat is 98%  pt very sluggish, unable to participate in ROS on my exam  reportedly small PO intake at lunch time today  given severe hyperglycemia, will restart 5 units lispro tid with meals, please ensure that pt will be eating when giving insulin as pt high risk for hypoglycemia if she does not eat  will follow

## 2021-01-28 NOTE — PROGRESS NOTE ADULT - SUBJECTIVE AND OBJECTIVE BOX
INTERVAL HPI/OVERNIGHT EVENTS:    Patient is a 86y old  Female who presents with a chief complaint of progressively worsening SOB and cough x 3 days (2021 15:43)  Pt was seen and examined the bedside. She ate ensure and banana this morning.   hgb 7.2- received 2UPRBC intra-dialysis.    FSG & Insulin received:    Yesterday:  pre-dinner fs  bedtime fs    Today:  pre-breakfast fs  pre-lunch fs  8  units lispro SS      Pt reports the following symptoms:    CONSTITUTIONAL:  Negative fever or chills  EYES:  Negative  blurry vision or double vision  CARDIOVASCULAR:  Negative for chest pain or palpitations  GASTROINTESTINAL:  Negative for nausea, vomiting, diarrhea, constipation, or abdominal pain  GENITOURINARY:  Negative frequency, urgency or dysuria  NEUROLOGIC:  No headache    MEDICATIONS  (STANDING):  albuterol/ipratropium for Nebulization 3 milliLiter(s) Nebulizer every 6 hours  amLODIPine   Tablet 10 milliGRAM(s) Oral daily  aspirin enteric coated 81 milliGRAM(s) Oral daily  atorvastatin 20 milliGRAM(s) Oral at bedtime  budesonide  80 MICROgram(s)/formoterol 4.5 MICROgram(s) Inhaler 2 Puff(s) Inhalation two times a day  cefepime   IVPB 1000 milliGRAM(s) IV Intermittent every 24 hours  dextrose 40% Gel 15 Gram(s) Oral once  dextrose 5%. 1000 milliLiter(s) (50 mL/Hr) IV Continuous <Continuous>  dextrose 5%. 1000 milliLiter(s) (100 mL/Hr) IV Continuous <Continuous>  dextrose 50% Injectable 25 Gram(s) IV Push once  dextrose 50% Injectable 12.5 Gram(s) IV Push once  dextrose 50% Injectable 25 Gram(s) IV Push once  donepezil 5 milliGRAM(s) Oral at bedtime  fluticasone propionate 50 MICROgram(s)/spray Nasal Spray 1 Spray(s) Both Nostrils two times a day  glucagon  Injectable 1 milliGRAM(s) IntraMuscular once  heparin   Injectable 5000 Unit(s) SubCutaneous every 12 hours  influenza  Vaccine (HIGH DOSE) 0.7 milliLiter(s) IntraMuscular once  insulin lispro (ADMELOG) corrective regimen sliding scale   SubCutaneous Before meals and at bedtime  insulin lispro Injectable (ADMELOG) 3 Unit(s) SubCutaneous three times a day before meals  levothyroxine 50 MICROGram(s) Oral daily  methylPREDNISolone sodium succinate Injectable 20 milliGRAM(s) IV Push every 8 hours  metoprolol tartrate 50 milliGRAM(s) Oral every 12 hours  mirtazapine 7.5 milliGRAM(s) Oral at bedtime  nystatin    Suspension 104280 Unit(s) Oral four times a day  QUEtiapine 25 milliGRAM(s) Oral two times a day  senna 1 Tablet(s) Oral daily  sevelamer carbonate 1600 milliGRAM(s) Oral three times a day    MEDICATIONS  (PRN):  acetaminophen   Tablet .. 650 milliGRAM(s) Oral every 6 hours PRN Moderate Pain (4 - 6)  guaiFENesin   Syrup  (Sugar-Free) 100 milliGRAM(s) Oral every 6 hours PRN Cough      Past medical history reviewed  Family history reviewed  Social history reviewed    PHYSICAL EXAM  Vital Signs Last 24 Hrs  T(C): 36.1 (2021 15:03), Max: 36.7 (2021 22:00)  T(F): 97 (2021 15:03), Max: 98 (2021 22:00)  HR: 70 (2021 17:40) (52 - 72)  BP: 172/74 (2021 17:40) (143/66 - 182/84)  BP(mean): 106 (2021 17:40) (95 - 122)  RR: 19 (2021 12:20) (16 - 20)  SpO2: 100% (2021 17:40) (95% - 100%)    Constitutional: in distress on high flow    HEENT: no proptosis or lid retraction  Neck: no thyromegaly or palpable thyroid nodules   Respiratory: lungs CTAB.  Cardiovascular: regular rhythm, normal S1 and S2  GI: soft, NT/ND, no masses/HSM appreciated.  Neurology: no tremors  Skin: no visible rashes/lesions  Psychiatric: AAO x 3,    LABS:                        11.6   15.06 )-----------( 188      ( 2021 07:50 )             35.2         132<L>  |  90<L>  |  60<H>  ----------------------------<  134<H>  4.1   |  23  |  3.27<H>    Ca    8.3<L>      2021 07:50  Phos  5.9       Mg     2.0               Thyroid Stimulating Hormone, Serum: 1.749 uIU/mL (10-13 @ 05:49)      HbA1C: 5.1 % ( @ 05:27)  5.9 % (10-13 @ 05:49)      CAPILLARY BLOOD GLUCOSE      POCT Blood Glucose.: 352 mg/dL (2021 16:50)  POCT Blood Glucose.: 301 mg/dL (2021 12:50)  POCT Blood Glucose.: 130 mg/dL (2021 06:25)  POCT Blood Glucose.: 95 mg/dL (2021 21:30)      Free Thyroxine, Serum: 0.78 ng/dL (21 @ 06:37)  Cholesterol, Serum: 191 mg/dL (21 @ 05:27)  HDL Cholesterol, Serum: 69 mg/dL (21 @ 05:27)  Triglycerides, Serum: 98 mg/dL (21 @ 05:27)  Cholesterol, Serum: 119 mg/dL (10-13-20 @ 05:49)  HDL Cholesterol, Serum: 51 mg/dL (10-13-20 @ 05:49)  Triglycerides, Serum: 94 mg/dL (10-13-20 @ 05:49)  Direct LDL: 49 mg/dL (10-13-20 @ 05:49)      A/P: 86 yr old F, POOR HISTORIAN/early dementia, former heavy smoker with PMHx HTN, hyperlipidemia, COPD, hypothyroidism, Stage IV CKD (baseline Cr ~3.0), anemia of chronic disease, CAD s/p CABG  Dr. Blunt, chronic diastolic CHF(EF:55% by Echo 10/2020), renal artery stenosis s/p renal artery stent >20yrs ago, Carotid artery stenosis, PAD, who presents to [facility] ED 1/15/21 c/o progressively worsening SOB and cough x 3 days. Currently on steroids from pneumonitis    1.  Steroid induced hyperglycemia  - hba1c 5.1  Cr 5.49 and GFr 20.     Please START lispro 3 U  TID with meals.   Continue lispro moderate dose sliding scale 4 times daily with meals and at bedtime.    Please continue consistent carbohydrate diet.    on solumedrol 20mg Q8H    2. Hypothyroidism  - TSH 1.7. free T4 0.78  Please continue levothyroxine 50mcg once daily for now    3. Hyponatremia - possible SIADH from infectious disease process.  Na 132  Continue to monitor.  Fluid restrict, though pt with poor PO intake.    Will continue to monitor        case seen and discussed with     and updated primary team

## 2021-01-28 NOTE — PROGRESS NOTE ADULT - PROBLEM SELECTOR PLAN 5
Hx of CAD, s/p CABG 2015.   - CONT: ASA 81mg PO QD, Atorvastatin 20mg PO QD, Lopressor 50mg PO BID.     ## HTN  - SBP 140s-160s  - CONT: Amlodipine 10mg PO QD, Lopressor 50mg PO BID.

## 2021-01-29 DIAGNOSIS — J96.01 ACUTE RESPIRATORY FAILURE WITH HYPOXIA: ICD-10-CM

## 2021-01-29 DIAGNOSIS — J43.9 EMPHYSEMA, UNSPECIFIED: ICD-10-CM

## 2021-01-29 DIAGNOSIS — J90 PLEURAL EFFUSION, NOT ELSEWHERE CLASSIFIED: ICD-10-CM

## 2021-01-29 LAB
ANION GAP SERPL CALC-SCNC: 15 MMOL/L — SIGNIFICANT CHANGE UP (ref 5–17)
BUN SERPL-MCNC: 77 MG/DL — HIGH (ref 7–23)
CALCIUM SERPL-MCNC: 9 MG/DL — SIGNIFICANT CHANGE UP (ref 8.4–10.5)
CHLORIDE SERPL-SCNC: 88 MMOL/L — LOW (ref 96–108)
CO2 SERPL-SCNC: 25 MMOL/L — SIGNIFICANT CHANGE UP (ref 22–31)
CREAT SERPL-MCNC: 3.59 MG/DL — HIGH (ref 0.5–1.3)
GLUCOSE BLDC GLUCOMTR-MCNC: 157 MG/DL — HIGH (ref 70–99)
GLUCOSE BLDC GLUCOMTR-MCNC: 161 MG/DL — HIGH (ref 70–99)
GLUCOSE BLDC GLUCOMTR-MCNC: 172 MG/DL — HIGH (ref 70–99)
GLUCOSE BLDC GLUCOMTR-MCNC: 230 MG/DL — HIGH (ref 70–99)
GLUCOSE BLDC GLUCOMTR-MCNC: 297 MG/DL — HIGH (ref 70–99)
GLUCOSE SERPL-MCNC: 155 MG/DL — HIGH (ref 70–99)
HCT VFR BLD CALC: 37.2 % — SIGNIFICANT CHANGE UP (ref 34.5–45)
HGB BLD-MCNC: 12.3 G/DL — SIGNIFICANT CHANGE UP (ref 11.5–15.5)
MAGNESIUM SERPL-MCNC: 2.1 MG/DL — SIGNIFICANT CHANGE UP (ref 1.6–2.6)
MCHC RBC-ENTMCNC: 28.3 PG — SIGNIFICANT CHANGE UP (ref 27–34)
MCHC RBC-ENTMCNC: 33.1 GM/DL — SIGNIFICANT CHANGE UP (ref 32–36)
MCV RBC AUTO: 85.5 FL — SIGNIFICANT CHANGE UP (ref 80–100)
NRBC # BLD: 0 /100 WBCS — SIGNIFICANT CHANGE UP (ref 0–0)
PLATELET # BLD AUTO: 182 K/UL — SIGNIFICANT CHANGE UP (ref 150–400)
POTASSIUM SERPL-MCNC: 4.9 MMOL/L — SIGNIFICANT CHANGE UP (ref 3.5–5.3)
POTASSIUM SERPL-SCNC: 4.9 MMOL/L — SIGNIFICANT CHANGE UP (ref 3.5–5.3)
RBC # BLD: 4.35 M/UL — SIGNIFICANT CHANGE UP (ref 3.8–5.2)
RBC # FLD: 14.6 % — HIGH (ref 10.3–14.5)
SODIUM SERPL-SCNC: 128 MMOL/L — LOW (ref 135–145)
WBC # BLD: 13.88 K/UL — HIGH (ref 3.8–10.5)
WBC # FLD AUTO: 13.88 K/UL — HIGH (ref 3.8–10.5)

## 2021-01-29 PROCEDURE — 71045 X-RAY EXAM CHEST 1 VIEW: CPT | Mod: 26

## 2021-01-29 PROCEDURE — 99233 SBSQ HOSP IP/OBS HIGH 50: CPT

## 2021-01-29 PROCEDURE — 93010 ELECTROCARDIOGRAM REPORT: CPT

## 2021-01-29 PROCEDURE — 99223 1ST HOSP IP/OBS HIGH 75: CPT

## 2021-01-29 RX ORDER — CEFEPIME 1 G/1
1000 INJECTION, POWDER, FOR SOLUTION INTRAMUSCULAR; INTRAVENOUS EVERY 24 HOURS
Refills: 0 | Status: COMPLETED | OUTPATIENT
Start: 2021-01-29 | End: 2021-02-01

## 2021-01-29 RX ADMIN — Medication 500000 UNIT(S): at 13:12

## 2021-01-29 RX ADMIN — Medication 81 MILLIGRAM(S): at 13:11

## 2021-01-29 RX ADMIN — Medication 500000 UNIT(S): at 00:22

## 2021-01-29 RX ADMIN — Medication 3 MILLILITER(S): at 05:56

## 2021-01-29 RX ADMIN — DONEPEZIL HYDROCHLORIDE 5 MILLIGRAM(S): 10 TABLET, FILM COATED ORAL at 21:28

## 2021-01-29 RX ADMIN — BUDESONIDE AND FORMOTEROL FUMARATE DIHYDRATE 2 PUFF(S): 160; 4.5 AEROSOL RESPIRATORY (INHALATION) at 21:29

## 2021-01-29 RX ADMIN — Medication 1 SPRAY(S): at 17:53

## 2021-01-29 RX ADMIN — Medication 20 MILLIGRAM(S): at 13:11

## 2021-01-29 RX ADMIN — Medication 50 MILLIGRAM(S): at 17:53

## 2021-01-29 RX ADMIN — MIRTAZAPINE 7.5 MILLIGRAM(S): 45 TABLET, ORALLY DISINTEGRATING ORAL at 21:28

## 2021-01-29 RX ADMIN — Medication 20 MILLIGRAM(S): at 21:27

## 2021-01-29 RX ADMIN — Medication 50 MICROGRAM(S): at 05:56

## 2021-01-29 RX ADMIN — Medication 5 UNIT(S): at 17:53

## 2021-01-29 RX ADMIN — HEPARIN SODIUM 5000 UNIT(S): 5000 INJECTION INTRAVENOUS; SUBCUTANEOUS at 05:56

## 2021-01-29 RX ADMIN — Medication 500000 UNIT(S): at 05:57

## 2021-01-29 RX ADMIN — SEVELAMER CARBONATE 1600 MILLIGRAM(S): 2400 POWDER, FOR SUSPENSION ORAL at 05:56

## 2021-01-29 RX ADMIN — BUDESONIDE AND FORMOTEROL FUMARATE DIHYDRATE 2 PUFF(S): 160; 4.5 AEROSOL RESPIRATORY (INHALATION) at 08:18

## 2021-01-29 RX ADMIN — Medication 2: at 12:55

## 2021-01-29 RX ADMIN — Medication 1 SPRAY(S): at 06:20

## 2021-01-29 RX ADMIN — Medication 2: at 07:43

## 2021-01-29 RX ADMIN — Medication 500000 UNIT(S): at 23:04

## 2021-01-29 RX ADMIN — QUETIAPINE FUMARATE 25 MILLIGRAM(S): 200 TABLET, FILM COATED ORAL at 06:20

## 2021-01-29 RX ADMIN — Medication 4: at 21:27

## 2021-01-29 RX ADMIN — Medication 6: at 17:52

## 2021-01-29 RX ADMIN — AMLODIPINE BESYLATE 10 MILLIGRAM(S): 2.5 TABLET ORAL at 05:56

## 2021-01-29 RX ADMIN — ATORVASTATIN CALCIUM 20 MILLIGRAM(S): 80 TABLET, FILM COATED ORAL at 21:28

## 2021-01-29 RX ADMIN — Medication 500000 UNIT(S): at 17:53

## 2021-01-29 RX ADMIN — Medication 20 MILLIGRAM(S): at 05:56

## 2021-01-29 RX ADMIN — Medication 3 MILLILITER(S): at 00:22

## 2021-01-29 RX ADMIN — CEFEPIME 100 MILLIGRAM(S): 1 INJECTION, POWDER, FOR SOLUTION INTRAMUSCULAR; INTRAVENOUS at 21:28

## 2021-01-29 RX ADMIN — Medication 3 MILLILITER(S): at 17:53

## 2021-01-29 RX ADMIN — SENNA PLUS 1 TABLET(S): 8.6 TABLET ORAL at 13:12

## 2021-01-29 RX ADMIN — SEVELAMER CARBONATE 1600 MILLIGRAM(S): 2400 POWDER, FOR SUSPENSION ORAL at 21:28

## 2021-01-29 RX ADMIN — Medication 5 UNIT(S): at 12:55

## 2021-01-29 RX ADMIN — Medication 3 MILLILITER(S): at 23:04

## 2021-01-29 RX ADMIN — Medication 50 MILLIGRAM(S): at 05:56

## 2021-01-29 RX ADMIN — HEPARIN SODIUM 5000 UNIT(S): 5000 INJECTION INTRAVENOUS; SUBCUTANEOUS at 17:53

## 2021-01-29 RX ADMIN — SEVELAMER CARBONATE 1600 MILLIGRAM(S): 2400 POWDER, FOR SUSPENSION ORAL at 13:12

## 2021-01-29 NOTE — PROGRESS NOTE ADULT - ASSESSMENT
INTERVAL HPI/OVERNIGHT EVENTS:    ANTIBIOTICS/RELEVANT:    MEDICATIONS  (STANDING):  albuterol/ipratropium for Nebulization 3 milliLiter(s) Nebulizer every 6 hours  amLODIPine   Tablet 10 milliGRAM(s) Oral daily  aspirin enteric coated 81 milliGRAM(s) Oral daily  atorvastatin 20 milliGRAM(s) Oral at bedtime  budesonide  80 MICROgram(s)/formoterol 4.5 MICROgram(s) Inhaler 2 Puff(s) Inhalation two times a day  cefepime   IVPB 1000 milliGRAM(s) IV Intermittent every 24 hours  dextrose 40% Gel 15 Gram(s) Oral once  dextrose 5%. 1000 milliLiter(s) (50 mL/Hr) IV Continuous <Continuous>  dextrose 5%. 1000 milliLiter(s) (100 mL/Hr) IV Continuous <Continuous>  dextrose 50% Injectable 25 Gram(s) IV Push once  dextrose 50% Injectable 12.5 Gram(s) IV Push once  dextrose 50% Injectable 25 Gram(s) IV Push once  donepezil 5 milliGRAM(s) Oral at bedtime  fluticasone propionate 50 MICROgram(s)/spray Nasal Spray 1 Spray(s) Both Nostrils two times a day  glucagon  Injectable 1 milliGRAM(s) IntraMuscular once  heparin   Injectable 5000 Unit(s) SubCutaneous every 12 hours  influenza  Vaccine (HIGH DOSE) 0.7 milliLiter(s) IntraMuscular once  insulin lispro (ADMELOG) corrective regimen sliding scale   SubCutaneous Before meals and at bedtime  insulin lispro Injectable (ADMELOG) 5 Unit(s) SubCutaneous three times a day before meals  levothyroxine 50 MICROGram(s) Oral daily  methylPREDNISolone sodium succinate Injectable 20 milliGRAM(s) IV Push every 8 hours  metoprolol tartrate 50 milliGRAM(s) Oral every 12 hours  mirtazapine 7.5 milliGRAM(s) Oral at bedtime  nystatin    Suspension 588967 Unit(s) Oral four times a day  QUEtiapine 25 milliGRAM(s) Oral two times a day  senna 1 Tablet(s) Oral daily  sevelamer carbonate 1600 milliGRAM(s) Oral three times a day    MEDICATIONS  (PRN):  acetaminophen   Tablet .. 650 milliGRAM(s) Oral every 6 hours PRN Moderate Pain (4 - 6)  guaiFENesin   Syrup  (Sugar-Free) 100 milliGRAM(s) Oral every 6 hours PRN Cough      Allergies    No Known Allergies    Intolerances        Vital Signs Last 24 Hrs  T(C): 35.8 (29 Jan 2021 11:25), Max: 36.3 (28 Jan 2021 18:00)  T(F): 96.4 (29 Jan 2021 11:25), Max: 97.3 (28 Jan 2021 18:00)  HR: 75 (29 Jan 2021 16:18) (50 - 75)  BP: 120/57 (29 Jan 2021 12:50) (120/57 - 180/80)  BP(mean): 82 (29 Jan 2021 12:50) (82 - 117)  RR: 20 (29 Jan 2021 15:41) (16 - 25)  SpO2: 98% (29 Jan 2021 16:18) (96% - 100%)    PHYSICAL EXAM:      Constitutional:    Eyes:    ENMT:    Neck:    Breasts:    Back:    Respiratory:    Cardiovascular:    Gastrointestinal:    Genitourinary:    Rectal:    Extremities:    Vascular:    Neurological:    Skin:    Lymph Nodes:    Musculoskeletal:    Psychiatric:        LABS:                        12.3   13.88 )-----------( 182      ( 29 Jan 2021 06:01 )             37.2     01-29    128<L>  |  88<L>  |  77<H>  ----------------------------<  155<H>  4.9   |  25  |  3.59<H>    Ca    9.0      29 Jan 2021 06:02  Phos  5.9     01-28  Mg     2.1     01-29            MICROBIOLOGY:    RADIOLOGY & ADDITIONAL STUDIES:

## 2021-01-29 NOTE — PROGRESS NOTE ADULT - PROBLEM SELECTOR PLAN 6
- CONT: symbicort inhaler BID and Duonebs  - PRN Robutussin/Benonatate for cough  - not on Home Oxygen. Currently on HFNC 50/60 per Dr Levi.  - c/w Nystatin swish and spit for thrush  - QTc 500.  Unable to Switch Nystatin to Fluconazole 200mg IV - CONT: Symbicort inhaler BID and Duonebs  - PRN Robutussin/Benonatate for cough  - not on Home Oxygen. Wean down HFNC 50/60 -> 40/40  - c/w Nystatin swish and spit for thrush  - QTc 500.  Unable to Switch Nystatin to Fluconazole 200mg IV

## 2021-01-29 NOTE — CONSULT NOTE ADULT - SUBJECTIVE AND OBJECTIVE BOX
PULMONARY SERVICE INITIAL CONSULTATION NOTE **SECOND OPINION**    HPI:  86 yr old F, POOR HISTORIAN/early dementia, former heavy smoker with PMHx HTN, hyperlipidemia, COPD, hypothyroidism, Stage IV CKD (baseline Cr ~3.0), anemia of chronic disease, CAD s/p CABG 2015 Dr. Blunt, chronic diastolic CHF(EF:55% by Echo 10/2020), renal artery stenosis s/p renal artery stent >20yrs ago, Carotid artery stenosis, PAD, who presents to [facility] ED 1/15/21 c/o progressively worsening SOB and cough x 3 days. Patient reports she can barely walk 10 feet prior to having to stop and rest. Patient further admits to chronic bilateral LE edema and significant orthopnea and she has been sleeping upright in a chair the past week. Patient also admits to chills and a nonproductive cough over the past few days. Patient was instructed to increase her Lasix 80mg PO daily dose to Lasix 80mg PO BID and start Metalazone 10mg PO BID prior to each dose by her cardiologist Dr. Horvath. Patient denies any chest pain, dizziness, palpitations, recent travel or sick contacts. Patient endorsing compliance w/ medications however daughter states that compliance with medication has been questionable over the last few months, as patient is becoming more forgetful.    In [facility] ED, BP: 170/68, HR: 80, RR:26, Temp: 98.4F, O2 sat: 80% on RA, improved to 99% with 10L NRB. EKG revealed NSR@67BPM with incomplete LBBB, TWI Lead I, AVL (similar to prior EKG 10/2020). CXR prelim read consistent with alveolar edema, bilateral patchy opacities/consolidation.    Labs notable for: WBC 14.5 with left shift, H/H 9.8/30.6, D-dimer 491, BUN/Cr 56/3.54, , CRP 4.86, Ferritin 180, Procalcitonin 0.29, Lactate 2.4, Troponin T 0.05, BNP 64,671, Initial COVID PCR negative.    Patient treated with Lasix 80mg IV x 1 dose, SL NTG 0.4mg PO x 1 dose, Decadron 6mg IV x 1 dose, Ceftriaxone 1g IV x 1 dose and Azithromycin 500mg IV x 1 dose.  Patient now admitted to cardiac telemetry for management of acute on chronic diastolic CHF exacerbation in setting of suspected CAP.    **Of note: Patient had a fall ~3 weeks ago for which she was admitted to [facility], CT imaging was negative as per daughter and fall thought to be secondary to hypotension.   (15 Mark 2021 21:30)    INTERVAL HPI:  Asked for second opinion by primary team for recs given difficulty weaning from supplemental oxygen in setting of acute hypoxemic resp failure. Hx obtained by daughter bedside. Pt has been with frequent CHF exacerbations and fluid overload, prior admission in October for the same. Patient has been in bed for 7 days without much improvement. Doesn't like wearing supplemental O2. Daughter thinks pt still has a lot of water on the lung. Has been told this in past by Dr. Olmedo and Dr. Cuevas. Never had PFTs in past.     REVIEW OF SYSTEMS:  Constitutional: No fever, weight loss or fatigue  Eyes: No eye pain, visual disturbances, or discharge  ENMT:  No difficulty hearing, tinnitus, vertigo; No sinus or throat pain  Neck: No pain, stiffness or neck swelling  Respiratory: see HPI  Cardiovascular: No chest pain, palpitations, dizziness or leg swelling  Gastrointestinal: No abdominal or epigastric pain. No nausea, vomiting or hematemesis; No diarrhea or constipation. No melena or hematochezia.  Genitourinary: No dysuria, frequency, hematuria or incontinence  Neurological: No headaches, memory loss, loss of strength, numbness or tremors  Skin: No itching, burning, rashes or lesions   Lymph Nodes: No enlarged glands  Endocrine: No heat or cold intolerance; No hair loss  Musculoskeletal: No joint pain or swelling; No muscle, back or extremity pain  Psychiatric: No depression, anxiety, mood swings or difficulty sleeping  Heme/Lymph: No easy bruising or bleeding gums  Allergy and Immunologic: No hives or eczema    PAST MEDICAL & SURGICAL HISTORY:  Essential hypertension  Hypertension    Type 2 diabetes mellitus  Diabetes    Hyperlipidemia  Hyperlipidemia    Disorder of kidney and ureter  Renal insufficiency    Peripheral vascular disease  PVD (peripheral vascular disease)    Anxiety state  Anxiety    Atherosclerosis of renal artery  with resulting stent placement    Atherosclerosis of renal artery  Renal artery stenosis    FAMILY HISTORY:  Family history of ischemic heart disease  Family history of coronary artery disease.    SOCIAL HISTORY:  Smoking Status: [ ] Current, [X Former, [ ] Never  Pack Years: 40py, quit 20yrs ago    MEDICATIONS:  Pulmonary:  albuterol/ipratropium for Nebulization 3 milliLiter(s) Nebulizer every 6 hours  budesonide  80 MICROgram(s)/formoterol 4.5 MICROgram(s) Inhaler 2 Puff(s) Inhalation two times a day  guaiFENesin   Syrup  (Sugar-Free) 100 milliGRAM(s) Oral every 6 hours PRN    Antimicrobials:  cefepime   IVPB 1000 milliGRAM(s) IV Intermittent every 24 hours  nystatin    Suspension 906300 Unit(s) Oral four times a day    Anticoagulants:  aspirin enteric coated 81 milliGRAM(s) Oral daily  heparin   Injectable 5000 Unit(s) SubCutaneous every 12 hours    Onc:    GI/:  senna 1 Tablet(s) Oral daily    Endocrine:  atorvastatin 20 milliGRAM(s) Oral at bedtime  dextrose 40% Gel 15 Gram(s) Oral once  dextrose 50% Injectable 25 Gram(s) IV Push once  dextrose 50% Injectable 12.5 Gram(s) IV Push once  dextrose 50% Injectable 25 Gram(s) IV Push once  glucagon  Injectable 1 milliGRAM(s) IntraMuscular once  insulin lispro (ADMELOG) corrective regimen sliding scale   SubCutaneous Before meals and at bedtime  insulin lispro Injectable (ADMELOG) 5 Unit(s) SubCutaneous three times a day before meals  levothyroxine 50 MICROGram(s) Oral daily  methylPREDNISolone sodium succinate Injectable 20 milliGRAM(s) IV Push every 8 hours    Cardiac:  amLODIPine   Tablet 10 milliGRAM(s) Oral daily  metoprolol tartrate 50 milliGRAM(s) Oral every 12 hours    Other Medications:  acetaminophen   Tablet .. 650 milliGRAM(s) Oral every 6 hours PRN  dextrose 5%. 1000 milliLiter(s) IV Continuous <Continuous>  dextrose 5%. 1000 milliLiter(s) IV Continuous <Continuous>  donepezil 5 milliGRAM(s) Oral at bedtime  fluticasone propionate 50 MICROgram(s)/spray Nasal Spray 1 Spray(s) Both Nostrils two times a day  influenza  Vaccine (HIGH DOSE) 0.7 milliLiter(s) IntraMuscular once  mirtazapine 7.5 milliGRAM(s) Oral at bedtime  sevelamer carbonate 1600 milliGRAM(s) Oral three times a day    Allergies    No Known Allergies    Intolerances    Vital Signs Last 24 Hrs  T(C): 36.6 (29 Jan 2021 17:25), Max: 36.6 (29 Jan 2021 17:25)  T(F): 97.8 (29 Jan 2021 17:25), Max: 97.8 (29 Jan 2021 17:25)  HR: 75 (29 Jan 2021 16:18) (50 - 75)  BP: 120/57 (29 Jan 2021 12:50) (120/57 - 175/80)  BP(mean): 82 (29 Jan 2021 12:50) (82 - 117)  RR: 20 (29 Jan 2021 15:41) (16 - 25)  SpO2: 98% (29 Jan 2021 16:18) (96% - 100%)    01-28 @ 07:01  -  01-29 @ 07:00  --------------------------------------------------------  IN: 0 mL / OUT: 450 mL / NET: -450 mL    PHYSICAL EXAM:  Constitutional: chronically ill appearing  Head: NC/AT  EENT: PERRL, anicteric sclera; oropharynx clear, MMM  Neck: supple, no appreciable JVD  Respiratory: scattered rales  Cardiovascular: +S1/S2, RRR  Gastrointestinal: soft, NT/ND  Extremities: WWP; 2+ pitting LE edema bilaterally; no clubbing or cyanosis  Vascular: 2+ radial pulses B/L  Neurological: awake and alert; ROJAS    LABS:  CBC Full  -  ( 29 Jan 2021 06:01 )  WBC Count : 13.88 K/uL  RBC Count : 4.35 M/uL  Hemoglobin : 12.3 g/dL  Hematocrit : 37.2 %  Platelet Count - Automated : 182 K/uL  Mean Cell Volume : 85.5 fl  Mean Cell Hemoglobin : 28.3 pg  Mean Cell Hemoglobin Concentration : 33.1 gm/dL  Auto Neutrophil # : x  Auto Lymphocyte # : x  Auto Monocyte # : x  Auto Eosinophil # : x  Auto Basophil # : x  Auto Neutrophil % : x  Auto Lymphocyte % : x  Auto Monocyte % : x  Auto Eosinophil % : x  Auto Basophil % : x    01-29    128<L>  |  88<L>  |  77<H>  ----------------------------<  155<H>  4.9   |  25  |  3.59<H>    Ca    9.0      29 Jan 2021 06:02  Phos  5.9     01-28  Mg     2.1     01-29    RADIOLOGY & ADDITIONAL STUDIES (personally reviewed and compared w/ priors):  < from: CT Chest No Cont (01.19.21 @ 14:31) >  IMPRESSION: New bilateral groundglass lung opacities superimposed on chronic interstitial pulmonary fibrosis probably due to chronic hypersensitivity pneumonitis or sarcoid. The groundglass lung opacities could  be due to pulmonary edema, pulmonary hemorrhage, alveolar proteinosis, organizing pneumonia or atypical infection.    < end of copied text >    < from: CT Chest No Cont (10.14.20 @ 15:14) >  Impression:    1. Cardiomegaly. Small bibasilar pleural effusions and interlobular septal thickening compatible with pulmonary venous congestion.  2. More peripheral reticular, groundglass and some patchy areas of peripheral consolidation more pronounced in the upper and midlung zones are noted. There may be some bronchiectasis/bronchiolectasis to suggest pulmonary fibrosis. No honeycomb lung formation. Air-trapping is noted. These findings are not consistent with UIP/IPF. Abbreviated differential includes atypical infectious and/or inflammatory etiologies such as chronic hypersensitivity pneumonia, stage IV sarcoidosis, pneumoconiosis, and subacute and/or chronic sequela of atypical and viral pneumonias such as Covid 19.  3. Mild mediastinal lymphadenopathy.    < end of copied text >

## 2021-01-29 NOTE — PROGRESS NOTE ADULT - ATTENDING COMMENTS
Agree with above  pt seen at bedside  insulin administration reviewed  HD today  remains on HiFlo NC  eating improved but not great  rec'd insulin with lunch today, drank ensure as snack  can continue 5 units lispro tid with meals while pt remains on steroids  can continue levothyroxine 50mcg daily  can continue atorvastatin 20mg daily

## 2021-01-29 NOTE — CONSULT NOTE ADULT - PROBLEM SELECTOR RECOMMENDATION 2
No hx of PFT per daughter and none found in chart; some mild emphysematous changes seen on CT scans in context of prior smoking hx. Do not believe this to be playing a role in her respiratory failure. Mgmt otherwise as above

## 2021-01-29 NOTE — PROGRESS NOTE ADULT - PROBLEM SELECTOR PLAN 8
- Na 132; asx.   - Switched meds to NS from D5.   - Continue to monitor.   - Renal following, appreciate recs. - Na 128 today, pending HD today  - Switched meds to NS from D5.   - Continue to monitor.   - Renal following, appreciate recs.

## 2021-01-29 NOTE — PROGRESS NOTE ADULT - PROBLEM SELECTOR PLAN 9
Dx 3wks ago @ [facility].   - CTH 1/19 (-) for acute changes.   - Per neurosurgery, cont ASA 81 PO QD; no neuro interventions at this time.     #DEMENTIA  - CONT: Donepezil 5mg PO QD    ## ANXIETY  - psych consulted as anxiety thought to be worsening during hospitalization, appreciate recs.   - c/w Remeron 7.5mg qhs and Ativan dc'd as per Dr. Cuevas Dx 3wks ago @ [facility].   - CTH 1/19 (-) for acute changes.   - Per Neurosurgery, cont ASA 81 PO QD; no neuro interventions at this time.     #DEMENTIA  - CONT: Donepezil 5mg PO QD    ## ANXIETY  - Psych consulted as anxiety thought to be worsening during hospitalization, appreciate recs.   - c/w Remeron 7.5mg qhs and Ativan dc'd as per Dr. 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

## 2021-01-29 NOTE — CONSULT NOTE ADULT - ATTENDING COMMENTS
A/P: 86y Female with hx of DM presenting for management of SOB, COPD exacebration, CHF exacerbation now with steroid induced hyperglycemia.     1.  Hyperglycemia  lantus 5 at bedtime, lisro 4 units tid with meals  Lispro moderate dose scale with meals and at bedtime.   Continue consistent carb diet, Nutrition consult  dilute all medications in NS instead of D5 when possible.     2.  Hyperlipidemia - LDL goal < 70  continue atorvastatin 40mg daily    Pt is advised to follow up with me at discharge.  Please call  to schedule an appointment.       Maru Washington MD, PhD  Endocrinology  [address] [place] [address] [address]  [place] [address]  [phone] Tel  [phone] Fax  [email or web address]
Patient seen and examined and evaluation completed by me in person
I independently performed the key portions of the evaluation and management service provided. I agree with the above history, physical, and plan which I have reviewed and edited where appropriate. I find worsening NAV, nephrotic range proteinuria, history of DM and CKD, fluid overload. Hold diuretics. Pulm eval for intrinsic lung disease, consider repeat CT. Consider PE. Follow SCr. Discussed possible need for dialysis with daughter.   See full note. (Patient seen earlier in day.)
Patient seen and examined by me 1/21/2021. She has a history of traumatic subarachnoid hemorrhage after a fall 3 weeks ago. Patient now admitted here for cardiac reasons. Neurologically at baseline. CT head showing no evidence of persistent intracranial hemorrhage. No neurosurgical intervention warranted at this time.    Huy Barrera M.D.
Asked to provide a second opinion as to the etiology of respiratory failure in this elderly former heavy smoker with no known occupational or home exposures, and no long term care from a pulmonologist. The history comes from the daughter. There are no PFTs. When we review the only available imaging, in October we see some intralobular septal thickening, some peripheral reticulations, minimal traction bronchiectasis, and some emphysema. There are also bilateral effusions. On the current scan, this underlying pattern is the same with diffuse overlying ground glass. There may be some mild underlying ILD in this case, but my impression is that the predominant problem now is pulmonary edema due to valvular heart disease, and I would recommend vigorous removal of fluid. Discussed w Dr Meza and Dr Nicolas.

## 2021-01-29 NOTE — CONSULT NOTE ADULT - CONSULT REQUESTED DATE/TIME
22-Jan-2021 11:56
23-Jan-2021 13:04
23-Jan-2021 10:25
17-Jan-2021 09:29
20-Jan-2021 10:00
29-Jan-2021 17:33

## 2021-01-29 NOTE — PROGRESS NOTE ADULT - ASSESSMENT
86F POOR HISTORIAN/early dementia, former heavy smoker PMHx DM, HTN, hyperlipidemia, COPD, hypothyroidism, CKD (baseline Cr ~3.0), anemia of chronic disease, CAD s/p CABG 2015 Dr. Blunt, chronic diastolic CHF(EF:55% by Echo 10/2020), renal artery stenosis s/p renal artery stent >20yrs ago, Carotid artery stenosis, PAD, who presents c/o progressively worsening SOB and cough. Nephrology consulted for NAV.     #Oliguric NAV on CKD3 2/2 ATN with CKD progression from DM nephropathy  S/p HD on 1/26, 1/27  HD today  Dialyzer: Optiflux [id number]         QB: 300 mL/min         QD: 500 mL/min      K bath: 2  Goal UF: 2.5L                Duration: 180 min     Electrolytes, volume and bicarb management with HD  BP: UF with HD  Anaemia- s/p PRBC, Hgb at goal  BMD: on renvela to 1600 TID    Daily weights, strict I&Os, renally dose meds, avoid nephrotoxic agents

## 2021-01-29 NOTE — PROGRESS NOTE ADULT - ATTENDING COMMENTS
Still with high O2 requirements-  remains oliguric  for repeat HD today NAV- no renal recovery-  O2 requirements remain high  still oliguric  for repeat HD today-  reviewed with 5L team  and Dr. Crooks

## 2021-01-29 NOTE — CONSULT NOTE ADULT - PROBLEM SELECTOR RECOMMENDATION 9
Asked for second opinion regarding etiologies of her acute hypoxemic respiratory failure. When comparing her prior CT imaging to this current admission, the most notable finding is a marked increase in the extent of ground glass opacification throughout both lungs without much appreciable change in chronic bilateral pleural effusions or other, more ancillary findings. There is some traction bronchiectasis, peripheral reticular opacities as well as mild emphysema which all could be seen in underlying interstitial lung disease, however many of these findings when considered with the whole clinical picture and the absence of otherwise significant interval change, point toward pulmonary edema and volume overload (such as due to severe MR/LA dilatation and incr filling pressures on echo) as the reason for her hypoxic respiratory failure. Would need to see a follow-up CT scan after aggressive fluid removal/diuresis in order to better gauge if there is an underlying interstitial lung disease.     Recommendations:  - would try aggressive fluid removal with diuresis and dialysis  - pulmonary toilet  - mobility out of bed to chair/dangling bedside  - IS if able  - nebs PRN for wheezes

## 2021-01-29 NOTE — PROGRESS NOTE ADULT - SUBJECTIVE AND OBJECTIVE BOX
CARDIOLOGY NP PROGRESS NOTE    Subjective: Pt seen and examined at bedside. Reports feeling well. Denies chest  Remainder ROS otherwise negative.    Overnight Events:     TELEMETRY: SB 50-70s           VITAL SIGNS:  T(C): 35.8 (01-29-21 @ 11:25), Max: 36.3 (01-28-21 @ 18:00)  HR: 65 (01-29-21 @ 15:41) (50 - 70)  BP: 120/57 (01-29-21 @ 12:50) (120/57 - 180/80)  RR: 20 (01-29-21 @ 15:41) (16 - 25)  SpO2: 100% (01-29-21 @ 15:41) (96% - 100%)  Wt(kg): --    I&O's Summary    28 Jan 2021 07:01  -  29 Jan 2021 07:00  --------------------------------------------------------  IN: 0 mL / OUT: 450 mL / NET: -450 mL          PHYSICAL EXAM:    General: A/ox 3, No acute Distress  Neck: Supple, NO JVD  Cardiac: S1 S2, No M/R/G  Pulmonary: CTAB, Breathing unlabored, No Rhonchi/Rales/Wheezing  Abdomen: Soft, Non -tender, +BS x 4 quads  Extremities: No Rashes, No edema  Neuro: A/o x 3, No focal deficits          LABS:                          12.3   13.88 )-----------( 182      ( 29 Jan 2021 06:01 )             37.2                              01-29    128<L>  |  88<L>  |  77<H>  ----------------------------<  155<H>  4.9   |  25  |  3.59<H>    Ca    9.0      29 Jan 2021 06:02  Phos  5.9     01-28  Mg     2.1     01-29                                CAPILLARY BLOOD GLUCOSE      POCT Blood Glucose.: 157 mg/dL (29 Jan 2021 12:11)  POCT Blood Glucose.: 161 mg/dL (29 Jan 2021 07:52)  POCT Blood Glucose.: 172 mg/dL (29 Jan 2021 06:45)  POCT Blood Glucose.: 265 mg/dL (28 Jan 2021 21:34)  POCT Blood Glucose.: 334 mg/dL (28 Jan 2021 18:32)  POCT Blood Glucose.: 352 mg/dL (28 Jan 2021 16:50)            Allergies:  No Known Allergies    MEDICATIONS  (STANDING):  albuterol/ipratropium for Nebulization 3 milliLiter(s) Nebulizer every 6 hours  amLODIPine   Tablet 10 milliGRAM(s) Oral daily  aspirin enteric coated 81 milliGRAM(s) Oral daily  atorvastatin 20 milliGRAM(s) Oral at bedtime  budesonide  80 MICROgram(s)/formoterol 4.5 MICROgram(s) Inhaler 2 Puff(s) Inhalation two times a day  cefepime   IVPB 1000 milliGRAM(s) IV Intermittent every 24 hours  dextrose 40% Gel 15 Gram(s) Oral once  dextrose 5%. 1000 milliLiter(s) (50 mL/Hr) IV Continuous <Continuous>  dextrose 5%. 1000 milliLiter(s) (100 mL/Hr) IV Continuous <Continuous>  dextrose 50% Injectable 25 Gram(s) IV Push once  dextrose 50% Injectable 12.5 Gram(s) IV Push once  dextrose 50% Injectable 25 Gram(s) IV Push once  donepezil 5 milliGRAM(s) Oral at bedtime  fluticasone propionate 50 MICROgram(s)/spray Nasal Spray 1 Spray(s) Both Nostrils two times a day  glucagon  Injectable 1 milliGRAM(s) IntraMuscular once  heparin   Injectable 5000 Unit(s) SubCutaneous every 12 hours  influenza  Vaccine (HIGH DOSE) 0.7 milliLiter(s) IntraMuscular once  insulin lispro (ADMELOG) corrective regimen sliding scale   SubCutaneous Before meals and at bedtime  insulin lispro Injectable (ADMELOG) 5 Unit(s) SubCutaneous three times a day before meals  levothyroxine 50 MICROGram(s) Oral daily  methylPREDNISolone sodium succinate Injectable 20 milliGRAM(s) IV Push every 8 hours  metoprolol tartrate 50 milliGRAM(s) Oral every 12 hours  mirtazapine 7.5 milliGRAM(s) Oral at bedtime  nystatin    Suspension [id number] Unit(s) Oral four times a day  QUEtiapine 25 milliGRAM(s) Oral two times a day  senna 1 Tablet(s) Oral daily  sevelamer carbonate 1600 milliGRAM(s) Oral three times a day    MEDICATIONS  (PRN):  acetaminophen   Tablet .. 650 milliGRAM(s) Oral every 6 hours PRN Moderate Pain (4 - 6)  guaiFENesin   Syrup  (Sugar-Free) 100 milliGRAM(s) Oral every 6 hours PRN Cough        DIAGNOSTIC TESTS:        CARDIOLOGY NP PROGRESS NOTE    Subjective: Pt seen and examined at bedside. Reports feeling tired today. Feels sob is unchanged. Denies chest lightheadedness, dizziness, palpitations, fever, chills, n/v/d. Remainder ROS otherwise negative.    Overnight Events: Repeat COVID swab negative (swabbed as patient c/o lost sense of taste). HD deferred today. Dr. Cuevas dc'd Ativan as says contributes to respiratory depression- patient noted to be tachypniec for which Seroquel 25mg BID was ordered as d/w Dr. Cuevas. Premeal insulin 3U TID w/ meals added back to regimen. -170s today.    TELEMETRY: SB 50-70s         VITAL SIGNS:  T(C): 35.8 (01-29-21 @ 11:25), Max: 36.3 (01-28-21 @ 18:00)  HR: 65 (01-29-21 @ 15:41) (50 - 70)  BP: 120/57 (01-29-21 @ 12:50) (120/57 - 180/80)  RR: 20 (01-29-21 @ 15:41) (16 - 25)  SpO2: 100% (01-29-21 @ 15:41) (96% - 100%)  Wt(kg): --    I&O's Summary    28 Jan 2021 07:01  -  29 Jan 2021 07:00  --------------------------------------------------------  IN: 0 mL / OUT: 450 mL / NET: -450 mL          PHYSICAL EXAM:    General: Alert to self, place and time. Disoriented to situation at times. No acute Distress   Neck: Supple, +JVD  Cardiac: S1 S2, No M/R/G  Pulmonary: Lungs w/ bibasilar crackles R base. Breathing unlabored on HFNC. No wheeze/rhonchi  Abdomen: Soft, Non -tender, +BS x 4 quads  Extremities: No Rashes, 1+ radha LE edema  Lines: R IJ tunneled HD Cath- site CDI, dsg in place   Neuro: Alert to self, place and time. Disoriented to situation at times.  No focal deficits          LABS:                          12.3   13.88 )-----------( 182      ( 29 Jan 2021 06:01 )             37.2                              01-29    128<L>  |  88<L>  |  77<H>  ----------------------------<  155<H>  4.9   |  25  |  3.59<H>    Ca    9.0      29 Jan 2021 06:02  Phos  5.9     01-28  Mg     2.1     01-29                                CAPILLARY BLOOD GLUCOSE      POCT Blood Glucose.: 157 mg/dL (29 Jan 2021 12:11)  POCT Blood Glucose.: 161 mg/dL (29 Jan 2021 07:52)  POCT Blood Glucose.: 172 mg/dL (29 Jan 2021 06:45)  POCT Blood Glucose.: 265 mg/dL (28 Jan 2021 21:34)  POCT Blood Glucose.: 334 mg/dL (28 Jan 2021 18:32)  POCT Blood Glucose.: 352 mg/dL (28 Jan 2021 16:50)            Allergies:  No Known Allergies    MEDICATIONS  (STANDING):  albuterol/ipratropium for Nebulization 3 milliLiter(s) Nebulizer every 6 hours  amLODIPine   Tablet 10 milliGRAM(s) Oral daily  aspirin enteric coated 81 milliGRAM(s) Oral daily  atorvastatin 20 milliGRAM(s) Oral at bedtime  budesonide  80 MICROgram(s)/formoterol 4.5 MICROgram(s) Inhaler 2 Puff(s) Inhalation two times a day  cefepime   IVPB 1000 milliGRAM(s) IV Intermittent every 24 hours  dextrose 40% Gel 15 Gram(s) Oral once  dextrose 5%. 1000 milliLiter(s) (50 mL/Hr) IV Continuous <Continuous>  dextrose 5%. 1000 milliLiter(s) (100 mL/Hr) IV Continuous <Continuous>  dextrose 50% Injectable 25 Gram(s) IV Push once  dextrose 50% Injectable 12.5 Gram(s) IV Push once  dextrose 50% Injectable 25 Gram(s) IV Push once  donepezil 5 milliGRAM(s) Oral at bedtime  fluticasone propionate 50 MICROgram(s)/spray Nasal Spray 1 Spray(s) Both Nostrils two times a day  glucagon  Injectable 1 milliGRAM(s) IntraMuscular once  heparin   Injectable 5000 Unit(s) SubCutaneous every 12 hours  influenza  Vaccine (HIGH DOSE) 0.7 milliLiter(s) IntraMuscular once  insulin lispro (ADMELOG) corrective regimen sliding scale   SubCutaneous Before meals and at bedtime  insulin lispro Injectable (ADMELOG) 5 Unit(s) SubCutaneous three times a day before meals  levothyroxine 50 MICROGram(s) Oral daily  methylPREDNISolone sodium succinate Injectable 20 milliGRAM(s) IV Push every 8 hours  metoprolol tartrate 50 milliGRAM(s) Oral every 12 hours  mirtazapine 7.5 milliGRAM(s) Oral at bedtime  nystatin    Suspension [id number] Unit(s) Oral four times a day  QUEtiapine 25 milliGRAM(s) Oral two times a day  senna 1 Tablet(s) Oral daily  sevelamer carbonate 1600 milliGRAM(s) Oral three times a day    MEDICATIONS  (PRN):  acetaminophen   Tablet .. 650 milliGRAM(s) Oral every 6 hours PRN Moderate Pain (4 - 6)  guaiFENesin   Syrup  (Sugar-Free) 100 milliGRAM(s) Oral every 6 hours PRN Cough        DIAGNOSTIC TESTS:        CARDIOLOGY NP PROGRESS NOTE    Subjective: Pt seen and examined at bedside. Reports feeling tired today. Feels sob is unchanged. Denies chest lightheadedness, dizziness, palpitations, fever, chills, n/v/d. Remainder ROS otherwise negative.    Overnight Events: Repeat COVID swab negative (swabbed as patient c/o lost sense of taste). HD deferred today. Dr. Cuevas dc'd Ativan as says contributes to respiratory depression- patient noted to be tachypniec for which Seroquel 25mg BID was ordered per Dr. Cuevas, D/c'ed Seroquel today given Hx prolonged QTc. Premeal insulin 3U TID w/ meals added back to regimen. -170s today.     TELEMETRY: SB 50-70s         VITAL SIGNS:  T(C): 35.8 (01-29-21 @ 11:25), Max: 36.3 (01-28-21 @ 18:00)  HR: 65 (01-29-21 @ 15:41) (50 - 70)  BP: 120/57 (01-29-21 @ 12:50) (120/57 - 180/80)  RR: 20 (01-29-21 @ 15:41) (16 - 25)  SpO2: 100% (01-29-21 @ 15:41) (96% - 100%)  Wt(kg): --    I&O's Summary    28 Jan 2021 07:01  -  29 Jan 2021 07:00  --------------------------------------------------------  IN: 0 mL / OUT: 450 mL / NET: -450 mL          PHYSICAL EXAM:    General: Alert to self, place and time. Disoriented to situation at times. No acute Distress   Neck: Supple, +JVD  Cardiac: S1 S2, No M/R/G  Pulmonary: Lungs w/ bibasilar crackles R base. Breathing unlabored on HFNC. No wheeze/rhonchi  Abdomen: Soft, Non -tender, +BS x 4 quads  Extremities: No Rashes, 1+ radha LE edema  Lines: R IJ tunneled HD Cath- site CDI, dsg in place   Neuro: Alert to self, place and time. Disoriented to situation at times.  No focal deficits          LABS:                          12.3   13.88 )-----------( 182      ( 29 Jan 2021 06:01 )             37.2                              01-29    128<L>  |  88<L>  |  77<H>  ----------------------------<  155<H>  4.9   |  25  |  3.59<H>    Ca    9.0      29 Jan 2021 06:02  Phos  5.9     01-28  Mg     2.1     01-29                                CAPILLARY BLOOD GLUCOSE      POCT Blood Glucose.: 157 mg/dL (29 Jan 2021 12:11)  POCT Blood Glucose.: 161 mg/dL (29 Jan 2021 07:52)  POCT Blood Glucose.: 172 mg/dL (29 Jan 2021 06:45)  POCT Blood Glucose.: 265 mg/dL (28 Jan 2021 21:34)  POCT Blood Glucose.: 334 mg/dL (28 Jan 2021 18:32)  POCT Blood Glucose.: 352 mg/dL (28 Jan 2021 16:50)            Allergies:  No Known Allergies    MEDICATIONS  (STANDING):  albuterol/ipratropium for Nebulization 3 milliLiter(s) Nebulizer every 6 hours  amLODIPine   Tablet 10 milliGRAM(s) Oral daily  aspirin enteric coated 81 milliGRAM(s) Oral daily  atorvastatin 20 milliGRAM(s) Oral at bedtime  budesonide  80 MICROgram(s)/formoterol 4.5 MICROgram(s) Inhaler 2 Puff(s) Inhalation two times a day  cefepime   IVPB 1000 milliGRAM(s) IV Intermittent every 24 hours  dextrose 40% Gel 15 Gram(s) Oral once  dextrose 5%. 1000 milliLiter(s) (50 mL/Hr) IV Continuous <Continuous>  dextrose 5%. 1000 milliLiter(s) (100 mL/Hr) IV Continuous <Continuous>  dextrose 50% Injectable 25 Gram(s) IV Push once  dextrose 50% Injectable 12.5 Gram(s) IV Push once  dextrose 50% Injectable 25 Gram(s) IV Push once  donepezil 5 milliGRAM(s) Oral at bedtime  fluticasone propionate 50 MICROgram(s)/spray Nasal Spray 1 Spray(s) Both Nostrils two times a day  glucagon  Injectable 1 milliGRAM(s) IntraMuscular once  heparin   Injectable 5000 Unit(s) SubCutaneous every 12 hours  influenza  Vaccine (HIGH DOSE) 0.7 milliLiter(s) IntraMuscular once  insulin lispro (ADMELOG) corrective regimen sliding scale   SubCutaneous Before meals and at bedtime  insulin lispro Injectable (ADMELOG) 5 Unit(s) SubCutaneous three times a day before meals  levothyroxine 50 MICROGram(s) Oral daily  methylPREDNISolone sodium succinate Injectable 20 milliGRAM(s) IV Push every 8 hours  metoprolol tartrate 50 milliGRAM(s) Oral every 12 hours  mirtazapine 7.5 milliGRAM(s) Oral at bedtime  nystatin    Suspension [id number] Unit(s) Oral four times a day  QUEtiapine 25 milliGRAM(s) Oral two times a day  senna 1 Tablet(s) Oral daily  sevelamer carbonate 1600 milliGRAM(s) Oral three times a day    MEDICATIONS  (PRN):  acetaminophen   Tablet .. 650 milliGRAM(s) Oral every 6 hours PRN Moderate Pain (4 - 6)  guaiFENesin   Syrup  (Sugar-Free) 100 milliGRAM(s) Oral every 6 hours PRN Cough        DIAGNOSTIC TESTS:

## 2021-01-29 NOTE — PROGRESS NOTE ADULT - SUBJECTIVE AND OBJECTIVE BOX
INTERVAL HPI/OVERNIGHT EVENTS:    Patient is a 86y old  Female who presents with a chief complaint of progressively worsening SOB and cough x 3 days (2021 15:43)  Pt was seen and examined the bedside.   On solumedrol 20mg every 8 hours.  Appetite fair.    FSG & Insulin received:    Yesterday:  pre-dinner fs. Lispro 3+8.  bedtime fs. Lispro 6    Today:  pre-breakfast fs. Lispro 2. didn't eat.  pre-lunch fs      Pt reports the following symptoms:    CONSTITUTIONAL:  Negative fever or chills  EYES:  Negative  blurry vision or double vision  CARDIOVASCULAR:  Negative for chest pain or palpitations  GASTROINTESTINAL:  Negative for nausea, vomiting, diarrhea, constipation, or abdominal pain  GENITOURINARY:  Negative frequency, urgency or dysuria  NEUROLOGIC:  No headache      MEDICATIONS  (STANDING):  albuterol/ipratropium for Nebulization 3 milliLiter(s) Nebulizer every 6 hours  amLODIPine   Tablet 10 milliGRAM(s) Oral daily  aspirin enteric coated 81 milliGRAM(s) Oral daily  atorvastatin 20 milliGRAM(s) Oral at bedtime  budesonide  80 MICROgram(s)/formoterol 4.5 MICROgram(s) Inhaler 2 Puff(s) Inhalation two times a day  cefepime   IVPB 1000 milliGRAM(s) IV Intermittent every 24 hours  dextrose 40% Gel 15 Gram(s) Oral once  dextrose 5%. 1000 milliLiter(s) (50 mL/Hr) IV Continuous <Continuous>  dextrose 5%. 1000 milliLiter(s) (100 mL/Hr) IV Continuous <Continuous>  dextrose 50% Injectable 25 Gram(s) IV Push once  dextrose 50% Injectable 12.5 Gram(s) IV Push once  dextrose 50% Injectable 25 Gram(s) IV Push once  donepezil 5 milliGRAM(s) Oral at bedtime  fluticasone propionate 50 MICROgram(s)/spray Nasal Spray 1 Spray(s) Both Nostrils two times a day  glucagon  Injectable 1 milliGRAM(s) IntraMuscular once  heparin   Injectable 5000 Unit(s) SubCutaneous every 12 hours  influenza  Vaccine (HIGH DOSE) 0.7 milliLiter(s) IntraMuscular once  insulin lispro (ADMELOG) corrective regimen sliding scale   SubCutaneous Before meals and at bedtime  insulin lispro Injectable (ADMELOG) 5 Unit(s) SubCutaneous three times a day before meals  levothyroxine 50 MICROGram(s) Oral daily  methylPREDNISolone sodium succinate Injectable 20 milliGRAM(s) IV Push every 8 hours  metoprolol tartrate 50 milliGRAM(s) Oral every 12 hours  mirtazapine 7.5 milliGRAM(s) Oral at bedtime  nystatin    Suspension [id number] Unit(s) Oral four times a day  senna 1 Tablet(s) Oral daily  sevelamer carbonate 1600 milliGRAM(s) Oral three times a day    MEDICATIONS  (PRN):  acetaminophen   Tablet .. 650 milliGRAM(s) Oral every 6 hours PRN Moderate Pain (4 - 6)  guaiFENesin   Syrup  (Sugar-Free) 100 milliGRAM(s) Oral every 6 hours PRN Cough      PHYSICAL EXAM  Vital Signs Last 24 Hrs  T(C): 36.6 (2021 17:25), Max: 36.6 (2021 17:25)  T(F): 97.8 (2021 17:25), Max: 97.8 (2021 17:25)  HR: 75 (2021 16:18) (50 - 75)  BP: 120/57 (2021 12:50) (120/57 - 175/80)  BP(mean): 82 (2021 12:50) (82 - 117)  RR: 20 (2021 15:41) (16 - 25)  SpO2: 98% (2021 16:18) (98% - 100%)    Constitutional: resting comfortably on high flow    HEENT: no proptosis or lid retraction  Neck: no thyromegaly or palpable thyroid nodules   Respiratory: lungs CTAB.  Cardiovascular: regular rhythm, normal S1 and S2  GI: soft, NT/ND, no masses/HSM appreciated.  Neurology: no tremors  Skin: no visible rashes/lesions  Psychiatric: AAO x 3,    LABS:                        12.3   13.88 )-----------( 182      ( 2021 06:01 )             37.2         128<L>  |  88<L>  |  77<H>  ----------------------------<  155<H>  4.9   |  25  |  3.59<H>    Ca    9.0      2021 06:02  Phos  5.9       Mg     2.1               Thyroid Stimulating Hormone, Serum: 1.749 uIU/mL (10-13 @ 05:49)      HbA1C:   CAPILLARY BLOOD GLUCOSE      POCT Blood Glucose.: 297 mg/dL (2021 17:13)  POCT Blood Glucose.: 157 mg/dL (2021 12:11)  POCT Blood Glucose.: 161 mg/dL (2021 07:52)  POCT Blood Glucose.: 172 mg/dL (2021 06:45)  POCT Blood Glucose.: 265 mg/dL (2021 21:34)  POCT Blood Glucose.: 334 mg/dL (2021 18:32)      A/P: 86 yr old F, POOR HISTORIAN/early dementia, former heavy smoker with PMHx HTN, hyperlipidemia, COPD, hypothyroidism, Stage IV CKD (baseline Cr ~3.0), anemia of chronic disease, CAD s/p CABG  Dr. Blunt, chronic diastolic CHF(EF:55% by Echo 10/2020), renal artery stenosis s/p renal artery stent >20yrs ago, Carotid artery stenosis, PAD, who presents [facility] ED [facility]/15/21 c/o progressively worsening SOB and cough x 3 days. Currently on steroids from pneumonitis    1.  Steroid induced hyperglycemia  - hba1c 5.1  Cr 5.49 and GFr 20.     Please continue lispro 5 U  TID with meals.   Continue lispro moderate dose sliding scale 4 times daily with meals and at bedtime.    Please continue consistent carbohydrate diet.    on solumedrol 20mg Q8H    2. Hypothyroidism  - TSH 1.7. free T4 0.78  Please continue levothyroxine 50mcg once daily for now    3. Hyponatremia - possible SIADH from infectious disease process.  Na 128  Continue to monitor.  Fluid restrict, though pt with poor PO intake.    Will continue to monitor        case seen and discussed with     and updated primary team

## 2021-01-29 NOTE — PROGRESS NOTE ADULT - PROBLEM SELECTOR PLAN 4
BNP 57k.   - POCUS by MICU consult 1/22/21 (+) for B-lines throughout.   - TTE 1/20: EF 53%, biatrial enlargement, mild AR, mod-severe MR, mod-severe TR, PASP 70.   - CONT: Lopressor 50mg PO BID.   - Strict I/Os, daily weights. Core measures. BNP 57k.   - POCUS by MICU consult 1/22/21 (+) for B-lines throughout.   - TTE 1/20: EF 53%, biatrial enlargement, mild AR, mod-severe MR, mod-severe TR, PASP 70.   - CONT: Lopressor 50mg PO BID.   - Will discuss w/ Renal regarding possible diuresis on non-HD days  - Strict I/Os, daily weights. Core measures.

## 2021-01-29 NOTE — CONSULT NOTE ADULT - REASON FOR ADMISSION
progressively worsening SOB and cough x 3 days

## 2021-01-29 NOTE — PROGRESS NOTE ADULT - PROBLEM SELECTOR PLAN 1
Becoming more dependant on oxygen to maintain sats low 90's; now on HFNC and tolerating well.  - Likely multifactorial 2/2 CHF, COPD, ILD, and CAP vs pneumonitis.   - LE Duplex 1/19 (-) for DVT.   - No CTA done to r/o PE due to CKD; VQ deferred as likely would not yield diagnostic info given multiple lung processes.   - Repeat COVID swab negative 1/28  - CONT: Methylprednisolone 20mg IV q8hrs per Dr. Cuevas.  - psych consulted as anxiety thought to be contributing factor as well, appreciate recs.   - c/w Remeron 7.5mg qhs; Ativan dc'd as per Dr. Cuevas     ## Pneumonia vs radha Pneumonitis  - WBC initially up-trending, possible 2/2 IV steroids. F/u manual diff for bandemia  - WBC 15.06 today   - s/p 4 days of Ceftriaxone + 7 days azithromycin.   - ID consulted – 1/23 DISCONTINUED Zosyn and STARTED Cefepime 1000mg IV q24hrs (1/23-____; duration to be determined).   - ID recs: RVP negative, (+) MSSA, procalcitonin--> 1.08, galactomannan pending, LDH-->474, fungitell negative  - Continue to monitor for fever Currently on HFNC 50/60 w/ sats %   - Will attempt to wean down to HFNC 40/40, keep SpO2 93-95% per Dr Levi given Hx COPD, ILD, pulm fibrosis. Currently tolerating  - Likely multifactorial 2/2 CHF, COPD, ILD, and CAP vs pneumonitis.   - LE Duplex 1/19 (-) for DVT.   - No CTA done to r/o PE due to CKD; VQ deferred as likely would not yield diagnostic info given multiple lung processes.   - Consider repeat CT Chest non-con this weekend when more euvolemic  - Repeat COVID swab negative 1/28  - CONT: Methylprednisolone 20mg IV q8hrs per Dr. Cuevas.  - Psych consulted as anxiety thought to be contributing factor as well, appreciate recs.   - c/w Remeron 7.5mg qhs; Ativan dc'd as per Dr. Cuevas     ## Pneumonia vs radha Pneumonitis  - WBC initially uptrending, possible 2/2 IV steroids. F/u manual diff for bandemia  - WBC 11.2 today   - s/p 4 days of Ceftriaxone + 7 days azithromycin.   - ID consulted – 1/23 DISCONTINUED Zosyn and STARTED Cefepime 1000mg IV q24hrs (1/23-____; duration to be determined by Dr Resendez).   - ID recs: RVP negative, (+) MSSA, procalcitonin--> 1.08, galactomannan pending, LDH-->474, fungitell negative  - Continue to monitor for fever Currently on HFNC 50/60 w/ sats %   - Will attempt to wean down to HFNC 40/40, keep SpO2 93-95% per Dr Levi given Hx COPD, ILD, pulm fibrosis. Currently tolerating  - Likely multifactorial 2/2 CHF, COPD, ILD, and CAP vs pneumonitis.   - LE Duplex 1/19 (-) for DVT.   - No CTA done to r/o PE due to CKD; VQ deferred as likely would not yield diagnostic info given multiple lung processes.   - Consider repeat CT Chest non-con this weekend when more euvolemic  - Repeat COVID swab negative 1/28  - CONT: Methylprednisolone 20mg IV q8hrs per Dr. Cuevas.  - Psych consulted as anxiety thought to be contributing factor as well, appreciate recs.   - c/w Remeron 7.5mg qhs; Ativan dc'd as per Dr. Cuevas     ## Pneumonia vs radha Pneumonitis  - WBC initially uptrending, possible 2/2 IV steroids. F/u manual diff for bandemia  - WBC 11.2 today   - s/p 4 days of Ceftriaxone + 7 days azithromycin.   - ID consulted – 1/23 DISCONTINUED Zosyn. STARTED Cefepime 1000mg IV q24hrs (1/23-2/1 for 10days course per Dr Resendez).   - ID recs: RVP negative, (+) MSSA, procalcitonin--> 1.08, galactomannan pending, LDH-->474, fungitell negative  - Continue to monitor for fever

## 2021-01-29 NOTE — PROGRESS NOTE ADULT - SUBJECTIVE AND OBJECTIVE BOX
Patient is a 86y Female seen and evaluated at bedside. No change in respiratory status, remains oliguric. Plan for HD today.    Meds:    acetaminophen   Tablet .. 650 every 6 hours PRN  albuterol/ipratropium for Nebulization 3 every 6 hours  amLODIPine   Tablet 10 daily  aspirin enteric coated 81 daily  atorvastatin 20 at bedtime  budesonide  80 MICROgram(s)/formoterol 4.5 MICROgram(s) Inhaler 2 two times a day  cefepime   IVPB 1000 every 24 hours  dextrose 40% Gel 15 once  dextrose 5%. 1000 <Continuous>  dextrose 5%. 1000 <Continuous>  dextrose 50% Injectable 25 once  dextrose 50% Injectable 25 once  dextrose 50% Injectable 12.5 once  donepezil 5 at bedtime  fluticasone propionate 50 MICROgram(s)/spray Nasal Spray 1 two times a day  glucagon  Injectable 1 once  guaiFENesin   Syrup  (Sugar-Free) 100 every 6 hours PRN  heparin   Injectable 5000 every 12 hours  influenza  Vaccine (HIGH DOSE) 0.7 once  insulin lispro (ADMELOG) corrective regimen sliding scale  Before meals and at bedtime  insulin lispro Injectable (ADMELOG) 5 three times a day before meals  levothyroxine 50 daily  methylPREDNISolone sodium succinate Injectable 20 every 8 hours  metoprolol tartrate 50 every 12 hours  mirtazapine 7.5 at bedtime  nystatin    Suspension [id number] four times a day  QUEtiapine 25 two times a day  senna 1 daily  sevelamer carbonate 1600 three times a day      T(C): , Max: 36.3 (01-28-21 @ 18:00)  T(F): , Max: 97.3 (01-28-21 @ 18:00)  HR: 61 (01-29-21 @ 11:25)  BP: 155/69 (01-29-21 @ 11:25)  BP(mean): 99 (01-29-21 @ 11:25)  RR: 18 (01-29-21 @ 11:25)  SpO2: 100% (01-29-21 @ 11:25)  Wt(kg): --    01-28 @ 07:01 - 01-29 @ 07:00  --------------------------------------------------------  IN: 0 mL / OUT: 450 mL / NET: -450 mL    Review of Systems:  RESPIRATORY: +shortness of breath  CARDIOVASCULAR: No chest pain  MUSCULOSKELETAL: No leg edema    PHYSICAL EXAM:  GENERAL: well-developed, well nourished, alert, on non HFNC 60%  CHEST/LUNG: Coarse breath sounds bilaterally  HEART: normal S1S2, RRR  ABDOMEN: Soft, Nontender, non distended  EXTREMITIES: trace oedema   ACCESS: RTC- clean bandage    LABS:                        12.3   13.88 )-----------( 182      ( 29 Jan 2021 06:01 )             37.2     01-29    128<L>  |  88<L>  |  77<H>  ----------------------------<  155<H>  4.9   |  25  |  3.59<H>    Ca    9.0      29 Jan 2021 06:02  Phos  5.9     01-28  Mg     2.1     01-29                  RADIOLOGY & ADDITIONAL STUDIES:

## 2021-01-29 NOTE — PROGRESS NOTE ADULT - SUBJECTIVE AND OBJECTIVE BOX
INTERVAL HPI/OVERNIGHT EVENTS:    ANTIBIOTICS/RELEVANT:    MEDICATIONS  (STANDING):  albuterol/ipratropium for Nebulization 3 milliLiter(s) Nebulizer every 6 hours  amLODIPine   Tablet 10 milliGRAM(s) Oral daily  aspirin enteric coated 81 milliGRAM(s) Oral daily  atorvastatin 20 milliGRAM(s) Oral at bedtime  budesonide  80 MICROgram(s)/formoterol 4.5 MICROgram(s) Inhaler 2 Puff(s) Inhalation two times a day  cefepime   IVPB 1000 milliGRAM(s) IV Intermittent every 24 hours  dextrose 40% Gel 15 Gram(s) Oral once  dextrose 5%. 1000 milliLiter(s) (50 mL/Hr) IV Continuous <Continuous>  dextrose 5%. 1000 milliLiter(s) (100 mL/Hr) IV Continuous <Continuous>  dextrose 50% Injectable 25 Gram(s) IV Push once  dextrose 50% Injectable 12.5 Gram(s) IV Push once  dextrose 50% Injectable 25 Gram(s) IV Push once  donepezil 5 milliGRAM(s) Oral at bedtime  fluticasone propionate 50 MICROgram(s)/spray Nasal Spray 1 Spray(s) Both Nostrils two times a day  glucagon  Injectable 1 milliGRAM(s) IntraMuscular once  heparin   Injectable 5000 Unit(s) SubCutaneous every 12 hours  influenza  Vaccine (HIGH DOSE) 0.7 milliLiter(s) IntraMuscular once  insulin lispro (ADMELOG) corrective regimen sliding scale   SubCutaneous Before meals and at bedtime  insulin lispro Injectable (ADMELOG) 5 Unit(s) SubCutaneous three times a day before meals  levothyroxine 50 MICROGram(s) Oral daily  methylPREDNISolone sodium succinate Injectable 20 milliGRAM(s) IV Push every 8 hours  metoprolol tartrate 50 milliGRAM(s) Oral every 12 hours  mirtazapine 7.5 milliGRAM(s) Oral at bedtime  nystatin    Suspension 194772 Unit(s) Oral four times a day  QUEtiapine 25 milliGRAM(s) Oral two times a day  senna 1 Tablet(s) Oral daily  sevelamer carbonate 1600 milliGRAM(s) Oral three times a day    MEDICATIONS  (PRN):  acetaminophen   Tablet .. 650 milliGRAM(s) Oral every 6 hours PRN Moderate Pain (4 - 6)  guaiFENesin   Syrup  (Sugar-Free) 100 milliGRAM(s) Oral every 6 hours PRN Cough      Allergies    No Known Allergies    Intolerances        Vital Signs Last 24 Hrs  T(C): 35.8 (29 Jan 2021 11:25), Max: 36.3 (28 Jan 2021 18:00)  T(F): 96.4 (29 Jan 2021 11:25), Max: 97.3 (28 Jan 2021 18:00)  HR: 75 (29 Jan 2021 16:18) (50 - 75)  BP: 120/57 (29 Jan 2021 12:50) (120/57 - 180/80)  BP(mean): 82 (29 Jan 2021 12:50) (82 - 117)  RR: 20 (29 Jan 2021 15:41) (16 - 25)  SpO2: 98% (29 Jan 2021 16:18) (96% - 100%)        LABS:                        12.3   13.88 )-----------( 182      ( 29 Jan 2021 06:01 )             37.2     01-29    128<L>  |  88<L>  |  77<H>  ----------------------------<  155<H>  4.9   |  25  |  3.59<H>    Ca    9.0      29 Jan 2021 06:02  Phos  5.9     01-28  Mg     2.1     01-29            MICROBIOLOGY:    RADIOLOGY & ADDITIONAL STUDIES:

## 2021-01-29 NOTE — PROGRESS NOTE ADULT - SUBJECTIVE AND OBJECTIVE BOX
INTERVAL HPI/OVERNIGHT EVENTS:    On HD  Still with SOB  No apparent improvement  Did not get Fluconazole due to prolonged QT     MEDICATIONS  (STANDING):  albuterol/ipratropium for Nebulization 3 milliLiter(s) Nebulizer every 6 hours  amLODIPine   Tablet 10 milliGRAM(s) Oral daily  aspirin enteric coated 81 milliGRAM(s) Oral daily  atorvastatin 20 milliGRAM(s) Oral at bedtime  budesonide  80 MICROgram(s)/formoterol 4.5 MICROgram(s) Inhaler 2 Puff(s) Inhalation two times a day  cefepime   IVPB 1000 milliGRAM(s) IV Intermittent every 24 hours  dextrose 40% Gel 15 Gram(s) Oral once  dextrose 5%. 1000 milliLiter(s) (50 mL/Hr) IV Continuous <Continuous>  dextrose 5%. 1000 milliLiter(s) (100 mL/Hr) IV Continuous <Continuous>  dextrose 50% Injectable 25 Gram(s) IV Push once  dextrose 50% Injectable 12.5 Gram(s) IV Push once  dextrose 50% Injectable 25 Gram(s) IV Push once  donepezil 5 milliGRAM(s) Oral at bedtime  fluticasone propionate 50 MICROgram(s)/spray Nasal Spray 1 Spray(s) Both Nostrils two times a day  glucagon  Injectable 1 milliGRAM(s) IntraMuscular once  heparin   Injectable 5000 Unit(s) SubCutaneous every 12 hours  influenza  Vaccine (HIGH DOSE) 0.7 milliLiter(s) IntraMuscular once  insulin lispro (ADMELOG) corrective regimen sliding scale   SubCutaneous Before meals and at bedtime  insulin lispro Injectable (ADMELOG) 5 Unit(s) SubCutaneous three times a day before meals  levothyroxine 50 MICROGram(s) Oral daily  methylPREDNISolone sodium succinate Injectable 20 milliGRAM(s) IV Push every 8 hours  metoprolol tartrate 50 milliGRAM(s) Oral every 12 hours  mirtazapine 7.5 milliGRAM(s) Oral at bedtime  nystatin    Suspension 259898 Unit(s) Oral four times a day  senna 1 Tablet(s) Oral daily  sevelamer carbonate 1600 milliGRAM(s) Oral three times a day    MEDICATIONS  (PRN):  acetaminophen   Tablet .. 650 milliGRAM(s) Oral every 6 hours PRN Moderate Pain (4 - 6)  guaiFENesin   Syrup  (Sugar-Free) 100 milliGRAM(s) Oral every 6 hours PRN Cough      Allergies    No Known Allergies    EXANM  Vital Signs Last 24 Hrs  T(C): 36.6 (29 Jan 2021 17:25), Max: 36.6 (29 Jan 2021 17:25)  T(F): 97.8 (29 Jan 2021 17:25), Max: 97.8 (29 Jan 2021 17:25)  HR: 75 (29 Jan 2021 16:18) (50 - 75)  BP: 120/57 (29 Jan 2021 12:50) (120/57 - 175/80)  BP(mean): 82 (29 Jan 2021 12:50) (82 - 117)  RR: 20 (29 Jan 2021 15:41) (16 - 25)  SpO2: 98% (29 Jan 2021 16:18) (98% - 100%)  On high flow O2  Calm and nontoxic but with apparent dyspnea  No rash  R chest HD cath with clean exit  CV irregular  Chest with decreased BSs at bases  Abd soft NT.  No diarrhea   LE no edema  No Cason at present      LABS:                        12.3   13.88 )-----------( 182      ( 29 Jan 2021 06:01 )             37.2     01-29    128<L>  |  88<L>  |  77<H>  ----------------------------<  155<H>  4.9   |  25  |  3.59<H>    Ca    9.0      29 Jan 2021 06:02  Phos  5.9     01-28  Mg     2.1     01-29          MICROBIOLOGY:    Sammie (01.27.21 @ 16:14)    Fungitell: <31: Interpretation: The Fungitell assay does not detect certain fungal  species such as the genus Cryptococcus (Han et al. 1991) which  produces very low levels of (1-3)-Beta-D-Glucan. The assay also does  not detect the Zygomycetes such as Absidia, Mucor and Rhizopus  (Gregory et al. 1994) which are not known to produce  (1-3)-Beta-D-Glucan. In addition, the yeast phase of Blastomyces  dermatitidis produces little (1-3)-Beta-D-Glucan and may not be  detected by the assay (Wilfredo et al. 2007).  Reference Range:  Less than 60 pg/mL. Glucan values of less than 60 pg/mL are  interpreted as negative.  Glucan values of 60 to 79 pg/mL are interpreted as indeterminate,  and suggest a possible fungal infection. Additional sampling and  testing of sera is required to interpret the results.  Glucan values of greater than or equal to 80 pg/mL are interpreted  as positive.  Due to the potential for environmental contamination when  transferred to pour-off tubes, which can lead to false positive  results, interpret positive results from samples provided in  pour-off tubes with caution.  Results should be used in conjunction  with clinical findings, and should not form the sole basis for a  diagnosis or treatment decision. The Fungitell test is approved or  cleared for in vitro diagnostic use by the [place] Food and Drug  Administration. Modifications to the approved package insert have  been made and the performance characteristics for these  modifications were determined by [facility].  Ifsample result is greater than 500 pg/mL, physician may order a  titer of the sample. Please contact [facility] if you would  like to order a retest of this sample to obtain an actual value.  Sedimentation Rate, Erythrocyte (01.24.21 @ 06:34)    Sedimentation Rate, Erythrocyte: 92 mm/Hr    Samples are held for 1 week after initial testing date.  ____________________________________________________________  Performed at:  [facility]  [address] [place], [place] [address] [phone]  : Meme Price PhD HCLD([facility])  IA# [id number] pg/mL    Procalcitonin, Serum (01.24.21 @ 06:34)    Procalcitonin, Serum: 1.08: Procalcitonin (PCT) Interpretation (ng/mL) - Diagnosis of systemic  bacterial infection/sepsis  PCT < 0.5: Systemic infection (sepsis) is not likely and risk for  progression to severe systemic infection is low. Local bacterial  infection is possible. If early sepsis is suspected clinically, PCT  should be re-assessed in 6-24 hours.  PCT >/= 0.5 but < 2.0: Systemic infection (sepsis) is possible, but other  conditions are known to elevate PCT as well. Moderate risk for  progression to severe systemic infection. The patient should be closely  monitored both clinically and by re-assessing PCT within 6-24 hours.  PCT >/= 2.0 but < 10.0: Systemic infection (sepsis) is likely, unless  other causes are known. High risk of progression to severe systemic  infection (severe sepsis/septic shock).  PCT >/= 10.0: Important systemic inflammatory response, almost  exclusively due to severe bacterial sepsis or septic shock. High  likelihood of severe sepsis or septic shock. ng/mL    -------------------------  Sedimentation Rate, Erythrocyte (01.24.21 @ 06:34)    Sedimentation Rate, Erythrocyte: 92 mm/Hr            RADIOLOGY & ADDITIONAL STUDIES:    Xray Chest 1 View- PORTABLE-Routine (Xray Chest 1 View- PORTABLE-Routine in AM.) (01.29.21 @ 05:45) >    EXAM:  XR CHEST PORTABLE ROUTINE 1V                          PROCEDURE DATE:  01/29/2021          INTERPRETATION:  Clinical history/reason for exam: Shortness of breath.    Frontal chest.    COMPARISON: January 28, 2021.    Findings/  impression: Stable positioning of support device. Stable cardiomegaly, thoracic aortic and mitral annulus calcification, status post median sternotomy. Bilateral opacities and bony structures are stable.

## 2021-01-29 NOTE — CONSULT NOTE ADULT - ASSESSMENT
87yo woman former smoker w/ extensive medical comorbidities namely CKD4, CAD s/p CABG, HFpEF with severe MR/TR and radiographic signs of emphysema admitted for decompensated CHF and acute hypoxemic respiratory failure. Asked for second opinion given prolonged hypoxemic respiratory failure with difficulty weaning supplemental O2.

## 2021-01-29 NOTE — PROGRESS NOTE ADULT - PROBLEM SELECTOR PLAN 10
- CONT: Levothyroxine 50mg PO QD.     #DM  - Hx of DM. A1c 5.1% on admission.    - CONT: lantus 10 qhs, ISS  - Endocrine following    F: none   E: renal patient   N: dysphagia 2 mechanical soft-thin liquids, not eating a lot and poor appetite. Encourage PO fluids.    DVT ppx: Hep SubQ  Dispo: cardiac telemetry    PT lynnette HENDRICKSON w/on site dialysis - CONT: Levothyroxine 50mg PO QD.     #DM  - Hx of DM. A1c 5.1% on admission.    - CONT: Lispro 3U TID w/ meals, MISS w/ meals and bedtime  - consistent carb diet while on IV solumedrol given hyperglycemia  - Endocrine following    F: none   E: renal patient   N: dysphagia 2 mechanical soft-thin liquids, Ensure TID. Pt not eating a lot and poor appetite.     DVT ppx: Hep SubQ  Dispo: cardiac telemetry  PT lynnette HENDRICKSON w/ on site dialysis

## 2021-01-29 NOTE — PROGRESS NOTE ADULT - PROBLEM SELECTOR PLAN 3
HX Anemia. Baseline Hgb 8-9  - Likely 2/2 to CKD and anemia of chronic disease  - s/p 2U PRBC 1/27; also had 1U PRBC 1/20   - hgb 11.6 today;  no active s/sx bleeding   - continue to HOLD  IV iron due to treatment for presumed CAP  - Stool for occult blood negative 1/18  - Maintain active T&S (since 1/21) HX Anemia. Baseline Hgb 8-9.  - Likely 2/2 to CKD and anemia of chronic disease  - s/p 2U PRBC 1/27; also had 1U PRBC 1/20   - hgb 12.3 today;  no active s/sx bleeding   - continue to HOLD IV iron due to treatment for presumed CAP vs pneumonitis  - Stool for occult blood positive 1/25, reportedly Hx of hemorrhoids  - Maintain active T&S (since 1/21)

## 2021-01-29 NOTE — PROGRESS NOTE ADULT - PROBLEM SELECTOR PLAN 2
- Known to Dr. Harmon; Renal following; unknown etiology at this time but likely DKD. overall worsening clinical picture, progressed to ARF and now requires HD- initiated 1/26/21.   - Cr  3.27 today  - Renal US shows atrophic kidneys, no WILBER.   - Nephrotic w/u non-contributory thus far, pending ANCA   - s/p 2L removal intra-dialysis 1/27/21. No HD today.   - Monitor Strict I/Os - Known to Dr. Harmon; Renal following; unknown etiology at this time but likely DKD. Overall worsening clinical picture, progressed to ARF and now requires HD- initiated 1/26/21.   - Cr  3.59 today  - Renal US shows atrophic kidneys, no WILBER.   - Nephrotic w/u non-contributory thus far, pending ANCA   - s/p dialysis x 2 sessions removed ~3L. Pending HD session today  - Monitor Strict I/Os

## 2021-01-29 NOTE — PROGRESS NOTE ADULT - ASSESSMENT
86F poor historian/early dementia and former heavy smoker with PMHx DM, HTN, hyperlipidemia, COPD, hypothyroidism, CKD (baseline Cr ~3.0), anemia of chronic disease, CAD s/p CABG 2015, chronic diastolic CHF (EF 55% via echo 10/2020), renal artery stenosis (s/p stent 20+ years ag), carotid artery stenosis, PAD presented 1/15/21 w/ progressively worsening SOB and cough, admitted for acute on chronic diastolic HF, found to have worsening renal function and poor urine output for which she was stepped up to CCU and stepped back down to tele 1/23 whose hospital course c/b PNA vs. radha pneumonitis on Cefepime IV and acute hypoxic respiratory failure requiring HFNC and acute renal failure now s/p Tunneled Cath placement w/initiation of HD 1/26 and Anemia requiring total 3U PRBC during hospitalization. ID, Renal and Psych are following.      86F poor historian/early dementia and former heavy smoker with PMHx DM, HTN, hyperlipidemia, COPD, hypothyroidism, CKD (baseline Cr ~3.0), anemia of chronic disease, CAD s/p CABG 2015, chronic diastolic CHF (EF 55% via echo 10/2020), renal artery stenosis (s/p stent 20+ years ag), carotid artery stenosis, PAD presented 1/15/21 w/ progressively worsening SOB and cough. Admitted for acute on chronic diastolic HF, found to have worsening renal function and poor urine output for which she was stepped up to CCU and stepped back down to tele 1/23, whose hospital course c/b PNA vs. radha pneumonitis on Cefepime IV, acute hypoxic respiratory failure requiring HFNC, acute renal failure now s/p Tunneled Cath placement w/ initiation of HD 1/26 and Anemia requiring total 3U PRBC during hospitalization. Pulm, ID, Renal and Psych are following.

## 2021-01-29 NOTE — PROGRESS NOTE ADULT - ASSESSMENT
Acute respiratory failure  Pneumonitis - unclear etiology.  Increased procalcitonin worrisome for bacterial component  No significant clinical response to empiric antibiotics  On HD with worsened CKD  Elevated inflammatory markers  On steroids  Thrush managed with topicals      RECOMMEND  Limit Cefepime to 10 days  Recheck procalcitonin  Check CMV PCR  Consider Rheumatology input

## 2021-01-30 LAB
ALBUMIN SERPL ELPH-MCNC: 2.8 G/DL — LOW (ref 3.3–5)
ALP SERPL-CCNC: 213 U/L — HIGH (ref 40–120)
ALT FLD-CCNC: SIGNIFICANT CHANGE UP (ref 10–45)
ANION GAP SERPL CALC-SCNC: 14 MMOL/L — SIGNIFICANT CHANGE UP (ref 5–17)
ANISOCYTOSIS BLD QL: SLIGHT — SIGNIFICANT CHANGE UP
AST SERPL-CCNC: SIGNIFICANT CHANGE UP (ref 10–40)
BASO STIPL BLD QL SMEAR: SLIGHT — SIGNIFICANT CHANGE UP
BASOPHILS # BLD AUTO: 0.05 K/UL — SIGNIFICANT CHANGE UP (ref 0–0.2)
BASOPHILS NFR BLD AUTO: 0.2 % — SIGNIFICANT CHANGE UP (ref 0–2)
BILIRUB SERPL-MCNC: 0.7 MG/DL — SIGNIFICANT CHANGE UP (ref 0.2–1.2)
BUN SERPL-MCNC: 55 MG/DL — HIGH (ref 7–23)
CALCIUM SERPL-MCNC: 8.8 MG/DL — SIGNIFICANT CHANGE UP (ref 8.4–10.5)
CHLORIDE SERPL-SCNC: 91 MMOL/L — LOW (ref 96–108)
CO2 SERPL-SCNC: 26 MMOL/L — SIGNIFICANT CHANGE UP (ref 22–31)
CREAT SERPL-MCNC: 2.84 MG/DL — HIGH (ref 0.5–1.3)
EOSINOPHIL # BLD AUTO: 0 K/UL — SIGNIFICANT CHANGE UP (ref 0–0.5)
EOSINOPHIL NFR BLD AUTO: 0 % — SIGNIFICANT CHANGE UP (ref 0–6)
GLUCOSE BLDC GLUCOMTR-MCNC: 185 MG/DL — HIGH (ref 70–99)
GLUCOSE BLDC GLUCOMTR-MCNC: 186 MG/DL — HIGH (ref 70–99)
GLUCOSE BLDC GLUCOMTR-MCNC: 273 MG/DL — HIGH (ref 70–99)
GLUCOSE BLDC GLUCOMTR-MCNC: 274 MG/DL — HIGH (ref 70–99)
GLUCOSE SERPL-MCNC: 161 MG/DL — HIGH (ref 70–99)
HCT VFR BLD CALC: 37 % — SIGNIFICANT CHANGE UP (ref 34.5–45)
HGB BLD-MCNC: 12.1 G/DL — SIGNIFICANT CHANGE UP (ref 11.5–15.5)
HYPOCHROMIA BLD QL: SLIGHT — SIGNIFICANT CHANGE UP
IMM GRANULOCYTES NFR BLD AUTO: 1.2 % — SIGNIFICANT CHANGE UP (ref 0–1.5)
LG PLATELETS BLD QL AUTO: PRESENT — SIGNIFICANT CHANGE UP
LYMPHOCYTES # BLD AUTO: 0.6 K/UL — LOW (ref 1–3.3)
LYMPHOCYTES # BLD AUTO: 2 % — LOW (ref 13–44)
LYMPHOCYTES # BLD AUTO: 2.9 % — LOW (ref 13–44)
MAGNESIUM SERPL-MCNC: 2.1 MG/DL — SIGNIFICANT CHANGE UP (ref 1.6–2.6)
MANUAL DIF COMMENT BLD-IMP: SIGNIFICANT CHANGE UP
MANUAL SMEAR VERIFICATION: SIGNIFICANT CHANGE UP
MCHC RBC-ENTMCNC: 27.9 PG — SIGNIFICANT CHANGE UP (ref 27–34)
MCHC RBC-ENTMCNC: 32.7 GM/DL — SIGNIFICANT CHANGE UP (ref 32–36)
MCV RBC AUTO: 85.3 FL — SIGNIFICANT CHANGE UP (ref 80–100)
MICROCYTES BLD QL: SLIGHT — SIGNIFICANT CHANGE UP
MONOCYTES # BLD AUTO: 0.32 K/UL — SIGNIFICANT CHANGE UP (ref 0–0.9)
MONOCYTES NFR BLD AUTO: 1.5 % — LOW (ref 2–14)
MYELOCYTES NFR BLD: 1 % — HIGH
NEUTROPHILS # BLD AUTO: 19.72 K/UL — HIGH (ref 1.8–7.4)
NEUTROPHILS NFR BLD AUTO: 94.2 % — HIGH (ref 43–77)
NEUTROPHILS NFR BLD AUTO: 97 % — HIGH (ref 43–77)
NRBC # BLD: 0 /100 WBCS — SIGNIFICANT CHANGE UP (ref 0–0)
OVALOCYTES BLD QL SMEAR: SLIGHT — SIGNIFICANT CHANGE UP
PHOSPHATE SERPL-MCNC: 3.7 MG/DL — SIGNIFICANT CHANGE UP (ref 2.5–4.5)
PLAT MORPH BLD: ABNORMAL
PLATELET # BLD AUTO: 201 K/UL — SIGNIFICANT CHANGE UP (ref 150–400)
POIKILOCYTOSIS BLD QL AUTO: SLIGHT — SIGNIFICANT CHANGE UP
POLYCHROMASIA BLD QL SMEAR: SLIGHT — SIGNIFICANT CHANGE UP
POTASSIUM SERPL-MCNC: SIGNIFICANT CHANGE UP (ref 3.5–5.3)
POTASSIUM SERPL-SCNC: SIGNIFICANT CHANGE UP (ref 3.5–5.3)
PROCALCITONIN SERPL-MCNC: 0.96 NG/ML — HIGH (ref 0.02–0.1)
PROT SERPL-MCNC: 5.7 G/DL — LOW (ref 6–8.3)
RBC # BLD: 4.34 M/UL — SIGNIFICANT CHANGE UP (ref 3.8–5.2)
RBC # FLD: 15.1 % — HIGH (ref 10.3–14.5)
RBC BLD AUTO: ABNORMAL
SCHISTOCYTES BLD QL AUTO: SLIGHT — SIGNIFICANT CHANGE UP
SODIUM SERPL-SCNC: 131 MMOL/L — LOW (ref 135–145)
SPHEROCYTES BLD QL SMEAR: SLIGHT — SIGNIFICANT CHANGE UP
WBC # BLD: 20.94 K/UL — HIGH (ref 3.8–10.5)
WBC # FLD AUTO: 20.94 K/UL — HIGH (ref 3.8–10.5)

## 2021-01-30 PROCEDURE — 71045 X-RAY EXAM CHEST 1 VIEW: CPT | Mod: 26

## 2021-01-30 RX ORDER — INSULIN GLARGINE 100 [IU]/ML
4 INJECTION, SOLUTION SUBCUTANEOUS AT BEDTIME
Refills: 0 | Status: DISCONTINUED | OUTPATIENT
Start: 2021-01-30 | End: 2021-02-03

## 2021-01-30 RX ORDER — TUBERCULIN PURIFIED PROTEIN DERIVATIVE 5 [IU]/.1ML
5 INJECTION, SOLUTION INTRADERMAL ONCE
Refills: 0 | Status: COMPLETED | OUTPATIENT
Start: 2021-01-30 | End: 2021-01-30

## 2021-01-30 RX ADMIN — Medication 5 UNIT(S): at 17:28

## 2021-01-30 RX ADMIN — Medication 6: at 12:53

## 2021-01-30 RX ADMIN — Medication 1 SPRAY(S): at 05:19

## 2021-01-30 RX ADMIN — SENNA PLUS 1 TABLET(S): 8.6 TABLET ORAL at 10:18

## 2021-01-30 RX ADMIN — Medication 20 MILLIGRAM(S): at 05:19

## 2021-01-30 RX ADMIN — SEVELAMER CARBONATE 1600 MILLIGRAM(S): 2400 POWDER, FOR SUSPENSION ORAL at 22:00

## 2021-01-30 RX ADMIN — CEFEPIME 100 MILLIGRAM(S): 1 INJECTION, POWDER, FOR SOLUTION INTRAMUSCULAR; INTRAVENOUS at 22:00

## 2021-01-30 RX ADMIN — Medication 3 MILLILITER(S): at 10:18

## 2021-01-30 RX ADMIN — Medication 2: at 17:29

## 2021-01-30 RX ADMIN — Medication 50 MILLIGRAM(S): at 17:16

## 2021-01-30 RX ADMIN — BUDESONIDE AND FORMOTEROL FUMARATE DIHYDRATE 2 PUFF(S): 160; 4.5 AEROSOL RESPIRATORY (INHALATION) at 10:17

## 2021-01-30 RX ADMIN — Medication 20 MILLIGRAM(S): at 22:01

## 2021-01-30 RX ADMIN — Medication 81 MILLIGRAM(S): at 10:18

## 2021-01-30 RX ADMIN — Medication 5 UNIT(S): at 12:53

## 2021-01-30 RX ADMIN — Medication 50 MICROGRAM(S): at 05:19

## 2021-01-30 RX ADMIN — DONEPEZIL HYDROCHLORIDE 5 MILLIGRAM(S): 10 TABLET, FILM COATED ORAL at 22:00

## 2021-01-30 RX ADMIN — Medication 6: at 21:58

## 2021-01-30 RX ADMIN — Medication 500000 UNIT(S): at 10:20

## 2021-01-30 RX ADMIN — MIRTAZAPINE 7.5 MILLIGRAM(S): 45 TABLET, ORALLY DISINTEGRATING ORAL at 22:00

## 2021-01-30 RX ADMIN — TUBERCULIN PURIFIED PROTEIN DERIVATIVE 5 UNIT(S): 5 INJECTION, SOLUTION INTRADERMAL at 19:17

## 2021-01-30 RX ADMIN — AMLODIPINE BESYLATE 10 MILLIGRAM(S): 2.5 TABLET ORAL at 05:19

## 2021-01-30 RX ADMIN — SEVELAMER CARBONATE 1600 MILLIGRAM(S): 2400 POWDER, FOR SUSPENSION ORAL at 13:02

## 2021-01-30 RX ADMIN — Medication 50 MILLIGRAM(S): at 05:19

## 2021-01-30 RX ADMIN — INSULIN GLARGINE 4 UNIT(S): 100 INJECTION, SOLUTION SUBCUTANEOUS at 21:58

## 2021-01-30 RX ADMIN — Medication 3 MILLILITER(S): at 17:16

## 2021-01-30 RX ADMIN — Medication 500000 UNIT(S): at 17:16

## 2021-01-30 RX ADMIN — SEVELAMER CARBONATE 1600 MILLIGRAM(S): 2400 POWDER, FOR SUSPENSION ORAL at 05:20

## 2021-01-30 RX ADMIN — Medication 2: at 07:43

## 2021-01-30 RX ADMIN — BUDESONIDE AND FORMOTEROL FUMARATE DIHYDRATE 2 PUFF(S): 160; 4.5 AEROSOL RESPIRATORY (INHALATION) at 22:02

## 2021-01-30 RX ADMIN — Medication 1 SPRAY(S): at 18:26

## 2021-01-30 RX ADMIN — Medication 500000 UNIT(S): at 05:19

## 2021-01-30 RX ADMIN — Medication 3 MILLILITER(S): at 05:19

## 2021-01-30 RX ADMIN — HEPARIN SODIUM 5000 UNIT(S): 5000 INJECTION INTRAVENOUS; SUBCUTANEOUS at 17:16

## 2021-01-30 RX ADMIN — ATORVASTATIN CALCIUM 20 MILLIGRAM(S): 80 TABLET, FILM COATED ORAL at 21:58

## 2021-01-30 RX ADMIN — HEPARIN SODIUM 5000 UNIT(S): 5000 INJECTION INTRAVENOUS; SUBCUTANEOUS at 05:19

## 2021-01-30 RX ADMIN — Medication 20 MILLIGRAM(S): at 13:02

## 2021-01-30 NOTE — PROGRESS NOTE ADULT - SUBJECTIVE AND OBJECTIVE BOX
O/N Events:  LILY/ HDS and Afeb  CXR with sl worsened congestion and ? left pleural effusion though clinically improving   O2 requirement titrated down from HF to 6 L NC, Not in distress   Last HD 1/29 with 1.6 L UF    VITALS  Vital Signs Last 24 Hrs  T(C): 36.6 (30 Jan 2021 14:33), Max: 36.7 (30 Jan 2021 09:07)  T(F): 97.9 (30 Jan 2021 14:33), Max: 98 (30 Jan 2021 09:07)  HR: 75 (30 Jan 2021 14:20) (60 - 77)  BP: 158/70 (30 Jan 2021 14:20) (132/63 - 185/87)  BP(mean): 100 (30 Jan 2021 14:20) (91 - 125)  RR: 17 (30 Jan 2021 14:20) (16 - 20)  SpO2: 100% (30 Jan 2021 14:20) (97% - 100%)    PHYSICAL EXAM  deferred per covid protocol     MEDICATIONS  (STANDING):  albuterol/ipratropium for Nebulization 3 milliLiter(s) Nebulizer every 6 hours  amLODIPine   Tablet 10 milliGRAM(s) Oral daily  aspirin enteric coated 81 milliGRAM(s) Oral daily  atorvastatin 20 milliGRAM(s) Oral at bedtime  budesonide  80 MICROgram(s)/formoterol 4.5 MICROgram(s) Inhaler 2 Puff(s) Inhalation two times a day  cefepime   IVPB 1000 milliGRAM(s) IV Intermittent every 24 hours  dextrose 40% Gel 15 Gram(s) Oral once  dextrose 5%. 1000 milliLiter(s) (50 mL/Hr) IV Continuous <Continuous>  dextrose 5%. 1000 milliLiter(s) (100 mL/Hr) IV Continuous <Continuous>  dextrose 50% Injectable 25 Gram(s) IV Push once  dextrose 50% Injectable 12.5 Gram(s) IV Push once  dextrose 50% Injectable 25 Gram(s) IV Push once  donepezil 5 milliGRAM(s) Oral at bedtime  fluticasone propionate 50 MICROgram(s)/spray Nasal Spray 1 Spray(s) Both Nostrils two times a day  glucagon  Injectable 1 milliGRAM(s) IntraMuscular once  heparin   Injectable 5000 Unit(s) SubCutaneous every 12 hours  influenza  Vaccine (HIGH DOSE) 0.7 milliLiter(s) IntraMuscular once  insulin lispro (ADMELOG) corrective regimen sliding scale   SubCutaneous Before meals and at bedtime  insulin lispro Injectable (ADMELOG) 5 Unit(s) SubCutaneous three times a day before meals  levothyroxine 50 MICROGram(s) Oral daily  methylPREDNISolone sodium succinate Injectable 20 milliGRAM(s) IV Push every 8 hours  metoprolol tartrate 50 milliGRAM(s) Oral every 12 hours  mirtazapine 7.5 milliGRAM(s) Oral at bedtime  nystatin    Suspension [id number] Unit(s) Oral four times a day  senna 1 Tablet(s) Oral daily  sevelamer carbonate 1600 milliGRAM(s) Oral three times a day    MEDICATIONS  (PRN):  acetaminophen   Tablet .. 650 milliGRAM(s) Oral every 6 hours PRN Moderate Pain (4 - 6)  guaiFENesin   Syrup  (Sugar-Free) 100 milliGRAM(s) Oral every 6 hours PRN Cough      LABS                        12.1   20.94 )-----------( 201      ( 30 Jan 2021 10:12 )             37.0     01-30    131<L>  |  91<L>  |  55<H>  ----------------------------<  161<H>  See Note   |  26  |  2.84<H>    Ca    8.8      30 Jan 2021 07:35  Phos  3.7     01-30  Mg     2.1     01-30    TPro  5.7<L>  /  Alb  2.8<L>  /  TBili  0.7  /  DBili  x   /  AST  See Note  /  ALT  See Note  /  AlkPhos  213<H>  01-30    LIVER FUNCTIONS - ( 30 Jan 2021 07:35 )  Alb: 2.8 g/dL / Pro: 5.7 g/dL / ALK PHOS: 213 U/L / ALT: See Note / AST: See Note / GGT: x

## 2021-01-30 NOTE — PROGRESS NOTE ADULT - PROBLEM SELECTOR PLAN 8
- Na 128 today, pending HD today  - Switched meds to NS from D5.   - Continue to monitor.   - Renal following, appreciate recs. - Na 131 today, pending HD today  - Switched meds to NS from D5.   - Continue to monitor.   - Renal following, appreciate recs.

## 2021-01-30 NOTE — PROGRESS NOTE ADULT - PROBLEM SELECTOR PLAN 6
- CONT: Symbicort inhaler BID and Duonebs  - PRN Robutussin/Benonatate for cough  - not on Home Oxygen. Wean down HFNC 50/60 -> 40/40  - c/w Nystatin swish and spit for thrush  - QTc 500.  Unable to Switch Nystatin to Fluconazole 200mg IV - CONT: Symbicort inhaler BID and Duonebs  - PRN Robutussin/Benonatate for cough  - not on Home Oxygen. Wean down HFNC 30/40 to 6L NC O2  - c/w Nystatin swish and spit for thrush  - QTc 500.  Unable to Switch Nystatin to Fluconazole 200mg IV

## 2021-01-30 NOTE — PROGRESS NOTE ADULT - ASSESSMENT
A/p  Nephrology consulted for oliguric ATN on CKD 3 with progressively worsening of volume overload and respiratory distress unresponsive to diuretics requiring renal replacement therapy

## 2021-01-30 NOTE — PROGRESS NOTE ADULT - ASSESSMENT
Appreciate pulmonary second opinion consultation by Dr. Pacheco.  Although patient appears to be clinically dry and has been in negative fluid balance will continue aggressive diuresis and hemodialysis to see if oxygenation improves.

## 2021-01-30 NOTE — PROGRESS NOTE ADULT - PROBLEM SELECTOR PLAN 1
oliguric ATN on CKD 3/ without recovery started on HD 1/26 via [facility] Palindrome   last dialysis Rx 1/29 with 1.6 L UF could not tolerate goal UF  would proceed with HD today to further optimize resp status as below,  Optiflux 160   Qb 350 ml/min   ml/min   UF 2 L   K 2  - please continue to monitor UOP and check for PVR daily   Will follow

## 2021-01-30 NOTE — PROGRESS NOTE ADULT - ASSESSMENT
86F poor historian/early dementia and former heavy smoker with PMHx DM, HTN, hyperlipidemia, COPD, hypothyroidism, CKD stage 3 (baseline Crea ~3.0), anemia of chronic disease, CAD s/p CABG 2015, chronic diastolic CHF (EF 55% via echo 10/2020), renal artery stenosis (s/p stent 20+ years ago), carotid artery stenosis, PAD presented 1/15/21 w/ progressively worsening SOB and cough. Admitted for acute on chronic diastolic HF, found to oliguric ATN on CKD 3 she was stepped up to CCU and stepped back down to tele 1/23, whose hospital course c/b PNA vs. radha pneumonitis on Cefepime IV, acute hypoxic respiratory failure requiring HFNC, acute renal failure now s/p Tunneled Cath placement w/ initiation of HD 1/26 and Anemia requiring total 3U PRBC during hospitalization. Pulm, ID, Renal and Psych are following. 86F poor historian/early dementia and former heavy smoker with PMHx DM, HTN, hyperlipidemia, COPD, hypothyroidism, CKD stage 3 (baseline Crea ~3.0), anemia of chronic disease, CAD s/p CABG 2015, chronic diastolic CHF (EF 55% via echo 10/2020), renal artery stenosis (s/p stent 20+ years ago), carotid artery stenosis, PAD presented 1/15/21 w/ progressively worsening SOB and cough. Admitted for acute on chronic diastolic HF, oliguric ATN on CKD 3 s/p stepped up to CCU and stepped back down to tele 1/23. Hospital course c/b PNA vs. radha pneumonitis on Cefepime IV, acute hypoxic respiratory failure requiring HFNC, now s/p Tunneled Cath placement w/ initiation of HD 1/26 and Anemia requiring total 3U PRBC during hospitalization. Pulm, ID, Renal and Psych are following.

## 2021-01-30 NOTE — PROGRESS NOTE ADULT - PROBLEM SELECTOR PLAN 1
Currently on HFNC 50/60 w/ sats %   - Will attempt to wean down to HFNC 40/40, keep SpO2 93-95% per Dr Levi given Hx COPD, ILD, pulm fibrosis. Currently tolerating  - Likely multifactorial 2/2 CHF, COPD, ILD, and CAP vs pneumonitis.   - LE Duplex 1/19 (-) for DVT.   - No CTA done to r/o PE due to CKD; VQ deferred as likely would not yield diagnostic info given multiple lung processes.   - Consider repeat CT Chest non-con this weekend when more euvolemic  - Repeat COVID swab negative 1/28  - CONT: Methylprednisolone 20mg IV q8hrs per Dr. Cuevas.  - Psych consulted as anxiety thought to be contributing factor as well, appreciate recs.   - c/w Remeron 7.5mg qhs; Ativan dc'd as per Dr. Cuevas     ## Pneumonia vs radha Pneumonitis  - WBC initially uptrending, possible 2/2 IV steroids. F/u manual diff for bandemia  - WBC 11.2 today   - s/p 4 days of Ceftriaxone + 7 days azithromycin.   - ID consulted – 1/23 DISCONTINUED Zosyn. STARTED Cefepime 1000mg IV q24hrs (1/23-2/1 for 10days course per Dr Resendez).   - ID recs: RVP negative, (+) MSSA, procalcitonin--> 1.08, galactomannan pending, LDH-->474, fungitell negative  - Continue to monitor for fever - Likely multifactorial 2/2 CHF, COPD, ILD, and CAP vs pneumonitis.   - Weaned off HFNC this AM 1/30 to NC 6L O2 this AM, currently tolerating well.  - LE Duplex 1/19 (-) for DVT.   - No CTA done to r/o PE due to CKD; VQ deferred as likely would not yield diagnostic info given multiple lung processes.   - Consider repeat CT Chest non-con after the weekend when more euvolemic  - Repeat COVID swab negative 1/28  - CONT: Methylprednisolone 20mg IV q8hrs per Dr. Cuevas.  - Psych consulted as anxiety thought to be contributing factor as well, appreciate recs.   - c/w Remeron 7.5mg qhs; Ativan dc'd as per Dr. Cuevas     ## Pneumonia vs radha Pneumonitis  - WBC initially uptrending, possible 2/2 IV steroids. Manual diff for bandemia  - WBC 11.2 today   - s/p 4 days of Ceftriaxone + 7 days azithromycin.   - ID consulted – 1/23 DISCONTINUED Zosyn. STARTED Cefepime 1000mg IV q24hrs (1/23-2/1 for 10days course per Dr Resendez).   - ID recs: RVP negative, (+) MSSA, procalcitonin--> 1.08, galactomannan pending, LDH-->474, fungitell negative  - Continue to monitor for fever - Likely multifactorial 2/2 CHF, COPD, ILD, and CAP vs pneumonitis.   - Weaned off HFNC this AM 1/30 to NC 6L O2 this AM, currently tolerating well.  - LE Duplex 1/19 (-) for DVT.   - No CTA done to r/o PE due to CKD; VQ deferred as likely would not yield diagnostic info given multiple lung processes.   - Consider repeat CT Chest non-con after the weekend when more euvolemic  - Repeat COVID swab negative 1/28  - CONT: Methylprednisolone 20mg IV q8hrs per Dr. Cuevas.  - Psych consulted as anxiety thought to be contributing factor as well, appreciate recs.   - c/w Remeron 7.5mg qhs; Ativan dc'd as per Dr. Cuevas     ## Pneumonia vs radha Pneumonitis  - WBC uptrending possible 2/2 IV steroids. Manual diff w/o bandemia 1/30  - WBC 13 -> 20 today   - s/p 4 days of Ceftriaxone + 7 days azithromycin.   - ID consulted – 1/23 DISCONTINUED Zosyn. STARTED Cefepime 1000mg IV q24hrs for pneumonitis (1/23-2/1 for 10days course per Dr Resendez).   - ID recs: RVP negative, (+) MSSA, procalcitonin 1.08 -> 0.96, galactomannan negative, LDH-->474, fungitell negative  - Continue to monitor for fever

## 2021-01-30 NOTE — PROGRESS NOTE ADULT - PROBLEM SELECTOR PLAN 2
- Known to Dr. Harmon; Renal following; unknown etiology at this time but likely DKD. Overall worsening clinical picture, progressed to ARF and now requires HD- initiated 1/26/21.   - Cr  3.59 today  - Renal US shows atrophic kidneys, no WILBER.   - Nephrotic w/u non-contributory thus far, pending ANCA   - s/p dialysis x 2 sessions removed ~3L. Pending HD session today  - Monitor Strict I/Os Overall worsening clinical picture, oliguric NAV on CKD stage 3 2/2 ATN with CKD progression from DM.   - F/u Renal recs, known to Dr Nicolas  - HD initiated this admission 1/26/21 via R IJ HD catheter.  - Cr 2.84 today  - Renal US shows atrophic kidneys, no WILBER.   - Nephrotic w/u non-contributory thus far, ANCA negative  - s/p dialysis x 3 sessions, removed ~3L. Pending HD session today 1/30  - Monitor Strict I/Os, daily PVRs  - F/u PPD placement and reading if requires to be discharged to [facility] w/ HD. Quantiferon indeterminate

## 2021-01-30 NOTE — PROGRESS NOTE ADULT - SUBJECTIVE AND OBJECTIVE BOX
CARDIOLOGY NP PROGRESS NOTE    Subjective:   Remainder ROS otherwise negative.    Overnight Events:     TELEMETRY:    EKG:      VITAL SIGNS:  T(C): 36.6 (01-30-21 @ 14:33), Max: 36.7 (01-30-21 @ 09:07)  HR: 75 (01-30-21 @ 14:20) (60 - 77)  BP: 158/70 (01-30-21 @ 14:20) (132/63 - 185/87)  RR: 17 (01-30-21 @ 14:20) (16 - 19)  SpO2: 100% (01-30-21 @ 14:20) (97% - 100%)  Wt(kg): --    I&O's Summary    29 Jan 2021 07:01  -  30 Jan 2021 07:00  --------------------------------------------------------  IN: 600 mL / OUT: 2450 mL / NET: -1850 mL    30 Jan 2021 07:01  -  30 Jan 2021 16:41  --------------------------------------------------------  IN: 730 mL / OUT: 300 mL / NET: 430 mL          PHYSICAL EXAM:    General: A/ox 3, No acute Distress  Neck: Supple, NO JVD  Cardiac: S1 S2, No M/R/G  Pulmonary: CTAB, Breathing unlabored, No Rhonchi/Rales/Wheezing  Abdomen: Soft, Non -tender, +BS x 4 quads  Extremities: No Rashes, No edema  Neuro: A/o x 3, No focal deficits          LABS:                          12.1   20.94 )-----------( 201      ( 30 Jan 2021 10:12 )             37.0                              01-30    131<L>  |  91<L>  |  55<H>  ----------------------------<  161<H>  See Note   |  26  |  2.84<H>    Ca    8.8      30 Jan 2021 07:35  Phos  3.7     01-30  Mg     2.1     01-30    TPro  5.7<L>  /  Alb  2.8<L>  /  TBili  0.7  /  DBili  x   /  AST  See Note  /  ALT  See Note  /  AlkPhos  213<H>  01-30    LIVER FUNCTIONS - ( 30 Jan 2021 07:35 )  Alb: 2.8 g/dL / Pro: 5.7 g/dL / ALK PHOS: 213 U/L / ALT: See Note / AST: See Note / GGT: x                                   CAPILLARY BLOOD GLUCOSE      POCT Blood Glucose.: 273 mg/dL (30 Jan 2021 12:25)  POCT Blood Glucose.: 185 mg/dL (30 Jan 2021 07:13)  POCT Blood Glucose.: 230 mg/dL (29 Jan 2021 21:01)  POCT Blood Glucose.: 297 mg/dL (29 Jan 2021 17:13)            Allergies:  No Known Allergies    MEDICATIONS  (STANDING):  albuterol/ipratropium for Nebulization 3 milliLiter(s) Nebulizer every 6 hours  amLODIPine   Tablet 10 milliGRAM(s) Oral daily  aspirin enteric coated 81 milliGRAM(s) Oral daily  atorvastatin 20 milliGRAM(s) Oral at bedtime  budesonide  80 MICROgram(s)/formoterol 4.5 MICROgram(s) Inhaler 2 Puff(s) Inhalation two times a day  cefepime   IVPB 1000 milliGRAM(s) IV Intermittent every 24 hours  dextrose 40% Gel 15 Gram(s) Oral once  dextrose 5%. 1000 milliLiter(s) (50 mL/Hr) IV Continuous <Continuous>  dextrose 5%. 1000 milliLiter(s) (100 mL/Hr) IV Continuous <Continuous>  dextrose 50% Injectable 25 Gram(s) IV Push once  dextrose 50% Injectable 12.5 Gram(s) IV Push once  dextrose 50% Injectable 25 Gram(s) IV Push once  donepezil 5 milliGRAM(s) Oral at bedtime  fluticasone propionate 50 MICROgram(s)/spray Nasal Spray 1 Spray(s) Both Nostrils two times a day  glucagon  Injectable 1 milliGRAM(s) IntraMuscular once  heparin   Injectable 5000 Unit(s) SubCutaneous every 12 hours  influenza  Vaccine (HIGH DOSE) 0.7 milliLiter(s) IntraMuscular once  insulin glargine Injectable (LANTUS) 4 Unit(s) SubCutaneous at bedtime  insulin lispro (ADMELOG) corrective regimen sliding scale   SubCutaneous Before meals and at bedtime  insulin lispro Injectable (ADMELOG) 5 Unit(s) SubCutaneous three times a day before meals  levothyroxine 50 MICROGram(s) Oral daily  methylPREDNISolone sodium succinate Injectable 20 milliGRAM(s) IV Push every 8 hours  metoprolol tartrate 50 milliGRAM(s) Oral every 12 hours  mirtazapine 7.5 milliGRAM(s) Oral at bedtime  nystatin    Suspension 935102 Unit(s) Oral four times a day  senna 1 Tablet(s) Oral daily  sevelamer carbonate 1600 milliGRAM(s) Oral three times a day    MEDICATIONS  (PRN):  acetaminophen   Tablet .. 650 milliGRAM(s) Oral every 6 hours PRN Moderate Pain (4 - 6)  guaiFENesin   Syrup  (Sugar-Free) 100 milliGRAM(s) Oral every 6 hours PRN Cough        DIAGNOSTIC TESTS:        CARDIOLOGY NP PROGRESS NOTE    Subjective: Pt seen and examined at bedside. Reports feeling better today, sob improving, has ongoing dry cough- although improving. Denies chest pain, lightheadedness, dizziness, palpitations, fever, chills.  Remainder ROS otherwise negative.    Overnight Events: S/p HD session yesterday, net negative 1.8L over last 24hrs. HFNC 40/45 weaned down to 30/40 this AM. Further weaned off HFNC to NC 6L O2 this AM, tolerating well.    TELEMETRY: SR 60s           VITAL SIGNS:  T(C): 36.6 (01-30-21 @ 14:33), Max: 36.7 (01-30-21 @ 09:07)  HR: 75 (01-30-21 @ 14:20) (60 - 77)  BP: 158/70 (01-30-21 @ 14:20) (132/63 - 185/87)  RR: 17 (01-30-21 @ 14:20) (16 - 19)  SpO2: 100% (01-30-21 @ 14:20) (97% - 100%)  Wt(kg): --    I&O's Summary    29 Jan 2021 07:01  -  30 Jan 2021 07:00  --------------------------------------------------------  IN: 600 mL / OUT: 2450 mL / NET: -1850 mL    30 Jan 2021 07:01  -  30 Jan 2021 16:41  --------------------------------------------------------  IN: 730 mL / OUT: 300 mL / NET: 430 mL          PHYSICAL EXAM:    General: Alert to self, place and time. Disoriented to situation at times. No acute Distress   Neck: Supple, improving JVD  Cardiac: S1 S2, No M/R/G  Pulmonary: Lungs w/ bibasilar crackles R base. Breathing unlabored on HFNC. No wheeze/rhonchi  Abdomen: Soft, Non -tender, +BS x 4 quads  Extremities: No Rashes, trace radha LE edema (improving)  Lines: R IJ tunneled HD Cath- site CDI, drsg in place   Neuro: Alert to self, place and time. Disoriented to situation at times.  No focal deficits          LABS:                          12.1   20.94 )-----------( 201      ( 30 Jan 2021 10:12 )             37.0                              01-30    131<L>  |  91<L>  |  55<H>  ----------------------------<  161<H>  See Note   |  26  |  2.84<H>    Ca    8.8      30 Jan 2021 07:35  Phos  3.7     01-30  Mg     2.1     01-30    TPro  5.7<L>  /  Alb  2.8<L>  /  TBili  0.7  /  DBili  x   /  AST  See Note  /  ALT  See Note  /  AlkPhos  213<H>  01-30    LIVER FUNCTIONS - ( 30 Jan 2021 07:35 )  Alb: 2.8 g/dL / Pro: 5.7 g/dL / ALK PHOS: 213 U/L / ALT: See Note / AST: See Note / GGT: x                                   CAPILLARY BLOOD GLUCOSE      POCT Blood Glucose.: 273 mg/dL (30 Jan 2021 12:25)  POCT Blood Glucose.: 185 mg/dL (30 Jan 2021 07:13)  POCT Blood Glucose.: 230 mg/dL (29 Jan 2021 21:01)  POCT Blood Glucose.: 297 mg/dL (29 Jan 2021 17:13)            Allergies:  No Known Allergies    MEDICATIONS  (STANDING):  albuterol/ipratropium for Nebulization 3 milliLiter(s) Nebulizer every 6 hours  amLODIPine   Tablet 10 milliGRAM(s) Oral daily  aspirin enteric coated 81 milliGRAM(s) Oral daily  atorvastatin 20 milliGRAM(s) Oral at bedtime  budesonide  80 MICROgram(s)/formoterol 4.5 MICROgram(s) Inhaler 2 Puff(s) Inhalation two times a day  cefepime   IVPB 1000 milliGRAM(s) IV Intermittent every 24 hours  dextrose 40% Gel 15 Gram(s) Oral once  dextrose 5%. 1000 milliLiter(s) (50 mL/Hr) IV Continuous <Continuous>  dextrose 5%. 1000 milliLiter(s) (100 mL/Hr) IV Continuous <Continuous>  dextrose 50% Injectable 25 Gram(s) IV Push once  dextrose 50% Injectable 12.5 Gram(s) IV Push once  dextrose 50% Injectable 25 Gram(s) IV Push once  donepezil 5 milliGRAM(s) Oral at bedtime  fluticasone propionate 50 MICROgram(s)/spray Nasal Spray 1 Spray(s) Both Nostrils two times a day  glucagon  Injectable 1 milliGRAM(s) IntraMuscular once  heparin   Injectable 5000 Unit(s) SubCutaneous every 12 hours  influenza  Vaccine (HIGH DOSE) 0.7 milliLiter(s) IntraMuscular once  insulin glargine Injectable (LANTUS) 4 Unit(s) SubCutaneous at bedtime  insulin lispro (ADMELOG) corrective regimen sliding scale   SubCutaneous Before meals and at bedtime  insulin lispro Injectable (ADMELOG) 5 Unit(s) SubCutaneous three times a day before meals  levothyroxine 50 MICROGram(s) Oral daily  methylPREDNISolone sodium succinate Injectable 20 milliGRAM(s) IV Push every 8 hours  metoprolol tartrate 50 milliGRAM(s) Oral every 12 hours  mirtazapine 7.5 milliGRAM(s) Oral at bedtime  nystatin    Suspension [id number] Unit(s) Oral four times a day  senna 1 Tablet(s) Oral daily  sevelamer carbonate 1600 milliGRAM(s) Oral three times a day    MEDICATIONS  (PRN):  acetaminophen   Tablet .. 650 milliGRAM(s) Oral every 6 hours PRN Moderate Pain (4 - 6)  guaiFENesin   Syrup  (Sugar-Free) 100 milliGRAM(s) Oral every 6 hours PRN Cough        DIAGNOSTIC TESTS:

## 2021-01-30 NOTE — PROGRESS NOTE ADULT - PROBLEM SELECTOR PLAN 4
BNP 57k.   - POCUS by MICU consult 1/22/21 (+) for B-lines throughout.   - TTE 1/20: EF 53%, biatrial enlargement, mild AR, mod-severe MR, mod-severe TR, PASP 70.   - CONT: Lopressor 50mg PO BID.   - Will discuss w/ Renal regarding possible diuresis on non-HD days  - Strict I/Os, daily weights. Core measures. BNP 57k.   - POCUS by MICU consult 1/22/21 (+) for B-lines throughout.   - TTE 1/20: EF 53%, biatrial enlargement, mild AR, mod-severe MR, mod-severe TR, PASP 70.   - CONT: Lopressor 50mg PO BID.   - Discuss w/ Renal regarding possible diuresis on non-HD days  - Strict I/Os, daily weights. Core measures.

## 2021-01-30 NOTE — PROGRESS NOTE ADULT - ASSESSMENT
ASSESSMENT/PLAN 86 yr old F, POOR HISTORIAN/early dementia, former heavy smoker with PMHx HTN, hyperlipidemia, COPD, hypothyroidism, Stage IV CKD (baseline Cr ~3.0), anemia of chronic disease, CAD s/p CABG 2015 Dr. Blunt, acute on chronic diastolic CHF(EF:55% by Echo 10/2020), renal artery stenosis s/p renal artery stent >20yrs ago, Carotid artery stenosis, PAD, who presents to [facility] ED 1/15/21 c/o progressively worsening SOB and cough x 3 days. Currently on steroids from pneumonitis    1. O2 Continue bow NC titrate to asequate 02 Sat, please humidify  2. Bronchodilators:  Atrovent/ albuterol q 4 – 6 hours as needed  3. Corticosteroids: Recommenf  taper  discussed c Dr. Cuevas  4. ID/Antibiotics: Cefepime  5. Cardiac/HTN: Optimize CHF mngmnt, optimize HD   6. GI: Rx/ prophylaxis c PPI/H2B  7. Heme: Rx/VT prophylaxis c SQH/SCD/ASA follow H/H closely  8. Aspiration precautions at all times  9, Avoid sedation in this pt, to protect respiratory drive, benzodiazepines are contraindicated  Discussed with managing team ,

## 2021-01-30 NOTE — PROGRESS NOTE ADULT - PROBLEM SELECTOR PLAN 3
HX Anemia. Baseline Hgb 8-9.  - Likely 2/2 to CKD and anemia of chronic disease  - s/p 2U PRBC 1/27; also had 1U PRBC 1/20   - hgb 12.3 today;  no active s/sx bleeding   - continue to HOLD IV iron due to treatment for presumed CAP vs pneumonitis  - Stool for occult blood positive 1/25, reportedly Hx of hemorrhoids  - Maintain active T&S (since 1/21) HX Anemia. Baseline Hgb 8-9.  - Likely 2/2 to CKD and anemia of chronic disease  - s/p 2U PRBC 1/27; also had 1U PRBC 1/20   - hgb 12.3 today;  no active s/sx bleeding   - continue to HOLD IV iron due to treatment for presumed CAP vs pneumonitis  - Stool for occult blood positive 1/25, reportedly Hx of hemorrhoids  - Maintain active T&S

## 2021-01-30 NOTE — PROGRESS NOTE ADULT - SUBJECTIVE AND OBJECTIVE BOX
Patient is a 86y old  Female who presents with a chief complaint of progressively worsening SOB and cough x 3 days (29 Jan 2021 18:04)    Vital Signs Last 24 Hrs  T(C): 36.7 (30 Jan 2021 09:07), Max: 36.7 (30 Jan 2021 09:07)  T(F): 98 (30 Jan 2021 09:07), Max: 98 (30 Jan 2021 09:07)  HR: 67 (30 Jan 2021 05:48) (60 - 77)  BP: 185/87 (30 Jan 2021 05:26) (120/57 - 185/87)  BP(mean): 125 (30 Jan 2021 05:26) (82 - 125)  RR: 18 (30 Jan 2021 05:48) (17 - 20)  SpO2: 100% (30 Jan 2021 05:48) (97% - 100%)    acetaminophen   Tablet .. 650 milliGRAM(s) Oral every 6 hours PRN  albuterol/ipratropium for Nebulization 3 milliLiter(s) Nebulizer every 6 hours  amLODIPine   Tablet 10 milliGRAM(s) Oral daily  aspirin enteric coated 81 milliGRAM(s) Oral daily  atorvastatin 20 milliGRAM(s) Oral at bedtime  budesonide  80 MICROgram(s)/formoterol 4.5 MICROgram(s) Inhaler 2 Puff(s) Inhalation two times a day  cefepime   IVPB 1000 milliGRAM(s) IV Intermittent every 24 hours  dextrose 40% Gel 15 Gram(s) Oral once  dextrose 5%. 1000 milliLiter(s) IV Continuous <Continuous>  dextrose 5%. 1000 milliLiter(s) IV Continuous <Continuous>  dextrose 50% Injectable 25 Gram(s) IV Push once  dextrose 50% Injectable 12.5 Gram(s) IV Push once  dextrose 50% Injectable 25 Gram(s) IV Push once  donepezil 5 milliGRAM(s) Oral at bedtime  fluticasone propionate 50 MICROgram(s)/spray Nasal Spray 1 Spray(s) Both Nostrils two times a day  glucagon  Injectable 1 milliGRAM(s) IntraMuscular once  guaiFENesin   Syrup  (Sugar-Free) 100 milliGRAM(s) Oral every 6 hours PRN  heparin   Injectable 5000 Unit(s) SubCutaneous every 12 hours  influenza  Vaccine (HIGH DOSE) 0.7 milliLiter(s) IntraMuscular once  insulin lispro (ADMELOG) corrective regimen sliding scale   SubCutaneous Before meals and at bedtime  insulin lispro Injectable (ADMELOG) 5 Unit(s) SubCutaneous three times a day before meals  levothyroxine 50 MICROGram(s) Oral daily  methylPREDNISolone sodium succinate Injectable 20 milliGRAM(s) IV Push every 8 hours  metoprolol tartrate 50 milliGRAM(s) Oral every 12 hours  mirtazapine 7.5 milliGRAM(s) Oral at bedtime  nystatin    Suspension [id number] Unit(s) Oral four times a day  senna 1 Tablet(s) Oral daily  sevelamer carbonate 1600 milliGRAM(s) Oral three times a day    PHYSICAL EXAM:  uinchanged

## 2021-01-30 NOTE — PROGRESS NOTE ADULT - PROBLEM SELECTOR PLAN 10
- CONT: Levothyroxine 50mg PO QD.     #DM  - Hx of DM. A1c 5.1% on admission.    - CONT: Lispro 3U TID w/ meals, MISS w/ meals and bedtime  - consistent carb diet while on IV solumedrol given hyperglycemia  - Endocrine following    F: none   E: renal patient   N: dysphagia 2 mechanical soft-thin liquids, Ensure TID. Pt not eating a lot and poor appetite.     DVT ppx: Hep SubQ  Dispo: cardiac telemetry  PT lynnette HENDRICKSON w/ on site dialysis - CONT: Levothyroxine 50mg PO QD.     #DM  - Hx of DM. A1c 5.1% on admission.    - Start Lantus 4U qHS. C/w Lispro 3U TID w/ meals, MISS w/ meals and bedtime  - consistent carb diet while on IV solumedrol given hyperglycemia  - Endocrine following    F: none   E: renal patient   N: dysphagia 2 mechanical soft-thin liquids, Ensure TID. Pt not eating a lot and poor appetite.     DVT ppx: Hep SubQ  Dispo: cardiac telemetry  PT lynnette HENDRICKSON w/ on site dialysis once medically stable for DC

## 2021-01-30 NOTE — PROGRESS NOTE ADULT - SUBJECTIVE AND OBJECTIVE BOX
Interventional, Pulmonary, Critical, Chest Special Procedures. for     Pt was seen and fully examined by myself.     Time spent with patient in minutes: 47    Patient is a 86y old  Female who presents with a chief complaint of progressively worsening SOB and cough x 3 days (30 Jan 2021 10:06) Second opinion consultation noted and reviewed. The patient today reports feeling better, eupneic and tolerating NC, pain free when seen,    HPI:  86 yr old F, POOR HISTORIAN/early dementia, former heavy smoker with PMHx HTN, hyperlipidemia, COPD, hypothyroidism, Stage IV CKD (baseline Cr ~3.0), anemia of chronic disease, CAD s/p CABG 2015 Dr. Blunt, chronic diastolic CHF(EF:55% by Echo 10/2020), renal artery stenosis s/p renal artery stent >20yrs ago, Carotid artery stenosis, PAD, who presents to [facility] ED 1/15/21 c/o progressively worsening SOB and cough x 3 days. Patient reports she can barely walk 10 feet prior to having to stop and rest. Patient further admits to chronic bilateral LE edema and significant orthopnea and she has been sleeping upright in a chair the past week. Patient also admits to chills and a nonproductive cough over the past few days. Patient was instructed to increase her Lasix 80mg PO daily dose to Lasix 80mg PO BID and start Metalazone 10mg PO BID prior to each dose by her cardiologist Dr. Horvath. Patient denies any chest pain, dizziness, palpitations, recent travel or sick contacts. Patient endorsing compliance w/ medications however daughter states that compliance with medication has been questionable over the last few months, as patient is becoming more forgetful.    In [facility] ED, BP: 170/68, HR: 80, RR:26, Temp: 98.4F, O2 sat: 80% on RA, improved to 99% with 10L NRB. EKG revealed NSR@67BPM with incomplete LBBB, TWI Lead I, AVL (similar to prior EKG 10/2020). CXR prelim read consistent with alveolar edema, bilateral patchy opacities/consolidation.    Labs notable for: WBC 14.5 with left shift, H/H 9.8/30.6, D-dimer 491, BUN/Cr 56/3.54, , CRP 4.86, Ferritin 180, Procalcitonin 0.29, Lactate 2.4, Troponin T 0.05, BNP 64,671, Initial COVID PCR negative.    Patient treated with Lasix 80mg IV x 1 dose, SL NTG 0.4mg PO x 1 dose, Decadron 6mg IV x 1 dose, Ceftriaxone 1g IV x 1 dose and Azithromycin 500mg IV x 1 dose.  Patient now admitted to cardiac telemetry for management of acute on chronic diastolic CHF exacerbation in setting of suspected CAP.    **Of note: Patient had a fall ~3 weeks ago for which she was admitted to [facility], CT imaging was negative as per daughter and fall thought to be secondary to hypotension.   (15 Mark 2021 21:30)      REVIEW OF SYSTEMS:  Constitutional: (+) weight change, (-) fever,  (-) chills, (+) fatigue, () night sweats  Eyes: (-) discharge, () eye pain, () visual change  ENT:  (-) hearing difficulty, () vertigo, () sinus pain,  () throat pain, () epistaxis, () dysphagia, () hoarseness  Neck: (-) pain, () stiffness, () swelling  Respiratory: (+) dry cough, () wheezing, () hemoptysis      Cardiovascular: (-) chest pain, ()palpitations, () dizziness   Gastrointestinal: (-) abdominal pain, () nausea, () vomiting, () hematemesis, () diarrhea,  () constipation, () melena  Genitourinary:  (-) dysuria, () frequency, () hematuria, () incontinence      Neurologic: (-) headache, () memory loss, () loss of strength, () numbness, () tremor     Skin: (-) itching, () burning, () rash, () lesions   Lymphatic: () enlarged lymph nodes  Endocrine: (-) hair loss, () temperature intolerance         Musculoskeletal: (-) back pain, () joint pain,  () extremity pain  Psychiatric: (-) visual change, () auditory change, () depression, () anxiety, () suicidal  Sleep: (+) disorder, () insomnia, () sleep deprivation  Heme/Lymph: () easy bruising, () bleeding gums            Allergy and Immunologic: () hives, () eczema  PAST MEDICAL & SURGICAL HISTORY:  Essential hypertension  Hypertension    Type 2 diabetes mellitus  Diabetes    Hyperlipidemia  Hyperlipidemia    Disorder of kidney and ureter  Renal insufficiency    Peripheral vascular disease  PVD (peripheral vascular disease)    Anxiety state  Anxiety    Atherosclerosis of renal artery  with resulting stent placement    Atherosclerosis of renal artery  Renal artery stenosis      FAMILY HISTORY:  Family history of ischemic heart disease  Family history of coronary artery disease.      SOCIAL HISTORY:      - Tobacco     - ETOH    Allergies    No Known Allergies    Intolerances      Vital Signs Last 24 Hrs  T(C): 36.6 (30 Jan 2021 14:33), Max: 36.7 (30 Jan 2021 09:07)  T(F): 97.9 (30 Jan 2021 14:33), Max: 98 (30 Jan 2021 09:07)  HR: 75 (30 Jan 2021 14:20) (60 - 77)  BP: 158/70 (30 Jan 2021 14:20) (132/63 - 185/87)  BP(mean): 100 (30 Jan 2021 14:20) (91 - 125)  RR: 17 (30 Jan 2021 14:20) (16 - 20)  SpO2: 100% (30 Jan 2021 14:20) (97% - 100%)    01-29 @ 07:01 - 01-30 @ 07:00  --------------------------------------------------------  IN: 600 mL / OUT: 2450 mL / NET: -1850 mL    01-30 @ 07:01 - 01-30 @ 14:41  --------------------------------------------------------  IN: 730 mL / OUT: 0 mL / NET: 730 mL          MEDICATIONS:  MEDICATIONS  (STANDING):  albuterol/ipratropium for Nebulization 3 milliLiter(s) Nebulizer every 6 hours  amLODIPine   Tablet 10 milliGRAM(s) Oral daily  aspirin enteric coated 81 milliGRAM(s) Oral daily  atorvastatin 20 milliGRAM(s) Oral at bedtime  budesonide  80 MICROgram(s)/formoterol 4.5 MICROgram(s) Inhaler 2 Puff(s) Inhalation two times a day  cefepime   IVPB 1000 milliGRAM(s) IV Intermittent every 24 hours  dextrose 40% Gel 15 Gram(s) Oral once  dextrose 5%. 1000 milliLiter(s) (50 mL/Hr) IV Continuous <Continuous>  dextrose 5%. 1000 milliLiter(s) (100 mL/Hr) IV Continuous <Continuous>  dextrose 50% Injectable 25 Gram(s) IV Push once  dextrose 50% Injectable 12.5 Gram(s) IV Push once  dextrose 50% Injectable 25 Gram(s) IV Push once  donepezil 5 milliGRAM(s) Oral at bedtime  fluticasone propionate 50 MICROgram(s)/spray Nasal Spray 1 Spray(s) Both Nostrils two times a day  glucagon  Injectable 1 milliGRAM(s) IntraMuscular once  heparin   Injectable 5000 Unit(s) SubCutaneous every 12 hours  influenza  Vaccine (HIGH DOSE) 0.7 milliLiter(s) IntraMuscular once  insulin lispro (ADMELOG) corrective regimen sliding scale   SubCutaneous Before meals and at bedtime  insulin lispro Injectable (ADMELOG) 5 Unit(s) SubCutaneous three times a day before meals  levothyroxine 50 MICROGram(s) Oral daily  methylPREDNISolone sodium succinate Injectable 20 milliGRAM(s) IV Push every 8 hours  metoprolol tartrate 50 milliGRAM(s) Oral every 12 hours  mirtazapine 7.5 milliGRAM(s) Oral at bedtime  nystatin    Suspension 604298 Unit(s) Oral four times a day  senna 1 Tablet(s) Oral daily  sevelamer carbonate 1600 milliGRAM(s) Oral three times a day    MEDICATIONS  (PRN):  acetaminophen   Tablet .. 650 milliGRAM(s) Oral every 6 hours PRN Moderate Pain (4 - 6)  guaiFENesin   Syrup  (Sugar-Free) 100 milliGRAM(s) Oral every 6 hours PRN Cough      PHYSICAL EXAM:  More comfortable, no immediate distress  Muscle atrophy, developed,  Eyes: PERRLA, EOMI, -conjunctivitis, -scleritis   Head: no focal deficit, normocephalic,  no trauma  ENMT: moist tongue with white lesions, + thrush, -nasal discharge, -hoarseness, normal hearing, + cough, -hemoptysis, trachea midline  Neck: supple, - lymphadenopathy,  -masses, -JVD  Respiratory: bilateral diminished breath sounds, -wheezing, less  rhonchi,  rales, + less crackles lower lungs, better peripheral air entry  Chest:  no more accessory muscle use, -paradoxical breathing  Cardiovascular: irregular rate and sinus rhythm, S1 S2 normal, -S3, -S4, -murmur, -gallop, -rub  Gastrointestinal: soft, nontender, nondistended, normal bowel sounds, no hepatosplenomegaly  Genitourinary: -flank pain, -dysuria, + Cason  Extremities: -clubbing, -cyanosis, -edema    Vascular: peripheral pulses palpable 2+ bilaterally  Neurological: alert, oriented x 3, no focal deficit, -tremor   Skin: warm, dry, -erythema, iv sites intact  Lymph nodes; no cervical, supraclavicular or axillary adenopathy  Psychiatric: cooperative, appropriate mood  DEVICES:  - DENTURES   +IV R / L     - ETUBE   -TRACH   -CTUBE  R / L    LABS:                          12.1   20.94 )-----------( 201      ( 30 Jan 2021 10:12 )             37.0     01-30    131<L>  |  91<L>  |  55<H>  ----------------------------<  161<H>  See Note   |  26  |  2.84<H>    Ca    8.8      30 Jan 2021 07:35  Phos  3.7     01-30  Mg     2.1     01-30    TPro  5.7<L>  /  Alb  2.8<L>  /  TBili  0.7  /  DBili  x   /  AST  See Note  /  ALT  See Note  /  AlkPhos  213<H>  01-30      RADIOLOGY & ADDITIONAL STUDIES (The following images were personally reviewed):< from: Xray Chest 1 View- PORTABLE-Routine (Xray Chest 1 View- PORTABLE-Routine in AM.) (01.30.21 @ 07:45) >  EXAM:  XR CHEST PORTABLE ROUTINE 1V                          PROCEDURE DATE:  01/30/2021          INTERPRETATION:  Clinical History: Shortness of breath    Frontal examination of the chest demonstrates no interval change lung pathology in comparison to prior examination of the chest 1/29/2021. Cardiomegaly. No interval change position remaining support devices.    IMPRESSION: No interval change lung pathology    < end of copied text >

## 2021-01-30 NOTE — PROGRESS NOTE ADULT - PROBLEM SELECTOR PLAN 9
Dx 3wks ago @ [facility].   - CTH 1/19 (-) for acute changes.   - Per Neurosurgery, cont ASA 81 PO QD; no neuro interventions at this time.     #DEMENTIA  - CONT: Donepezil 5mg PO QD    ## ANXIETY  - Psych consulted as anxiety thought to be worsening during hospitalization, appreciate recs.   - c/w Remeron 7.5mg qhs and Ativan dc'd as per Dr. 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

## 2021-01-31 LAB
ANION GAP SERPL CALC-SCNC: 13 MMOL/L — SIGNIFICANT CHANGE UP (ref 5–17)
BASOPHILS # BLD AUTO: 0.02 K/UL — SIGNIFICANT CHANGE UP (ref 0–0.2)
BASOPHILS NFR BLD AUTO: 0.1 % — SIGNIFICANT CHANGE UP (ref 0–2)
BLD GP AB SCN SERPL QL: NEGATIVE — SIGNIFICANT CHANGE UP
BUN SERPL-MCNC: 46 MG/DL — HIGH (ref 7–23)
CALCIUM SERPL-MCNC: 8.6 MG/DL — SIGNIFICANT CHANGE UP (ref 8.4–10.5)
CHLORIDE SERPL-SCNC: 99 MMOL/L — SIGNIFICANT CHANGE UP (ref 96–108)
CO2 SERPL-SCNC: 24 MMOL/L — SIGNIFICANT CHANGE UP (ref 22–31)
CREAT SERPL-MCNC: 2.29 MG/DL — HIGH (ref 0.5–1.3)
EOSINOPHIL # BLD AUTO: 0.01 K/UL — SIGNIFICANT CHANGE UP (ref 0–0.5)
EOSINOPHIL NFR BLD AUTO: 0 % — SIGNIFICANT CHANGE UP (ref 0–6)
GLUCOSE BLDC GLUCOMTR-MCNC: 123 MG/DL — HIGH (ref 70–99)
GLUCOSE BLDC GLUCOMTR-MCNC: 204 MG/DL — HIGH (ref 70–99)
GLUCOSE BLDC GLUCOMTR-MCNC: 218 MG/DL — HIGH (ref 70–99)
GLUCOSE BLDC GLUCOMTR-MCNC: 315 MG/DL — HIGH (ref 70–99)
GLUCOSE SERPL-MCNC: 138 MG/DL — HIGH (ref 70–99)
HCT VFR BLD CALC: 38.6 % — SIGNIFICANT CHANGE UP (ref 34.5–45)
HGB BLD-MCNC: 12.3 G/DL — SIGNIFICANT CHANGE UP (ref 11.5–15.5)
IMM GRANULOCYTES NFR BLD AUTO: 1.6 % — HIGH (ref 0–1.5)
LYMPHOCYTES # BLD AUTO: 0.79 K/UL — LOW (ref 1–3.3)
LYMPHOCYTES # BLD AUTO: 3.8 % — LOW (ref 13–44)
MAGNESIUM SERPL-MCNC: 1.8 MG/DL — SIGNIFICANT CHANGE UP (ref 1.6–2.6)
MCHC RBC-ENTMCNC: 28.5 PG — SIGNIFICANT CHANGE UP (ref 27–34)
MCHC RBC-ENTMCNC: 31.9 GM/DL — LOW (ref 32–36)
MCV RBC AUTO: 89.4 FL — SIGNIFICANT CHANGE UP (ref 80–100)
MONOCYTES # BLD AUTO: 0.63 K/UL — SIGNIFICANT CHANGE UP (ref 0–0.9)
MONOCYTES NFR BLD AUTO: 3.1 % — SIGNIFICANT CHANGE UP (ref 2–14)
NEUTROPHILS # BLD AUTO: 18.83 K/UL — HIGH (ref 1.8–7.4)
NEUTROPHILS NFR BLD AUTO: 91.4 % — HIGH (ref 43–77)
NRBC # BLD: 0 /100 WBCS — SIGNIFICANT CHANGE UP (ref 0–0)
PHOSPHATE SERPL-MCNC: 2.2 MG/DL — LOW (ref 2.5–4.5)
PLATELET # BLD AUTO: 148 K/UL — LOW (ref 150–400)
POTASSIUM SERPL-MCNC: 4.8 MMOL/L — SIGNIFICANT CHANGE UP (ref 3.5–5.3)
POTASSIUM SERPL-SCNC: 4.8 MMOL/L — SIGNIFICANT CHANGE UP (ref 3.5–5.3)
RBC # BLD: 4.32 M/UL — SIGNIFICANT CHANGE UP (ref 3.8–5.2)
RBC # FLD: 15.4 % — HIGH (ref 10.3–14.5)
RH IG SCN BLD-IMP: POSITIVE — SIGNIFICANT CHANGE UP
SODIUM SERPL-SCNC: 136 MMOL/L — SIGNIFICANT CHANGE UP (ref 135–145)
WBC # BLD: 20.6 K/UL — HIGH (ref 3.8–10.5)
WBC # FLD AUTO: 20.6 K/UL — HIGH (ref 3.8–10.5)

## 2021-01-31 PROCEDURE — 71045 X-RAY EXAM CHEST 1 VIEW: CPT | Mod: 26

## 2021-01-31 RX ADMIN — MIRTAZAPINE 7.5 MILLIGRAM(S): 45 TABLET, ORALLY DISINTEGRATING ORAL at 21:48

## 2021-01-31 RX ADMIN — Medication 5 UNIT(S): at 12:40

## 2021-01-31 RX ADMIN — Medication 50 MILLIGRAM(S): at 05:42

## 2021-01-31 RX ADMIN — SEVELAMER CARBONATE 1600 MILLIGRAM(S): 2400 POWDER, FOR SUSPENSION ORAL at 21:46

## 2021-01-31 RX ADMIN — Medication 3 MILLILITER(S): at 05:42

## 2021-01-31 RX ADMIN — SEVELAMER CARBONATE 1600 MILLIGRAM(S): 2400 POWDER, FOR SUSPENSION ORAL at 05:42

## 2021-01-31 RX ADMIN — BUDESONIDE AND FORMOTEROL FUMARATE DIHYDRATE 2 PUFF(S): 160; 4.5 AEROSOL RESPIRATORY (INHALATION) at 09:38

## 2021-01-31 RX ADMIN — SEVELAMER CARBONATE 1600 MILLIGRAM(S): 2400 POWDER, FOR SUSPENSION ORAL at 12:42

## 2021-01-31 RX ADMIN — Medication 81 MILLIGRAM(S): at 09:39

## 2021-01-31 RX ADMIN — Medication 3 MILLILITER(S): at 09:39

## 2021-01-31 RX ADMIN — Medication 3 MILLILITER(S): at 17:49

## 2021-01-31 RX ADMIN — Medication 8: at 12:41

## 2021-01-31 RX ADMIN — Medication 4: at 17:07

## 2021-01-31 RX ADMIN — INSULIN GLARGINE 4 UNIT(S): 100 INJECTION, SOLUTION SUBCUTANEOUS at 21:47

## 2021-01-31 RX ADMIN — HEPARIN SODIUM 5000 UNIT(S): 5000 INJECTION INTRAVENOUS; SUBCUTANEOUS at 17:10

## 2021-01-31 RX ADMIN — Medication 500000 UNIT(S): at 05:42

## 2021-01-31 RX ADMIN — SENNA PLUS 1 TABLET(S): 8.6 TABLET ORAL at 09:39

## 2021-01-31 RX ADMIN — Medication 500000 UNIT(S): at 00:51

## 2021-01-31 RX ADMIN — Medication 5 UNIT(S): at 17:08

## 2021-01-31 RX ADMIN — Medication 50 MICROGRAM(S): at 05:42

## 2021-01-31 RX ADMIN — Medication 500000 UNIT(S): at 17:14

## 2021-01-31 RX ADMIN — Medication 3 MILLILITER(S): at 00:51

## 2021-01-31 RX ADMIN — Medication 500000 UNIT(S): at 23:06

## 2021-01-31 RX ADMIN — Medication 1 SPRAY(S): at 17:16

## 2021-01-31 RX ADMIN — CEFEPIME 100 MILLIGRAM(S): 1 INJECTION, POWDER, FOR SOLUTION INTRAMUSCULAR; INTRAVENOUS at 21:48

## 2021-01-31 RX ADMIN — AMLODIPINE BESYLATE 10 MILLIGRAM(S): 2.5 TABLET ORAL at 05:41

## 2021-01-31 RX ADMIN — DONEPEZIL HYDROCHLORIDE 5 MILLIGRAM(S): 10 TABLET, FILM COATED ORAL at 21:48

## 2021-01-31 RX ADMIN — Medication 20 MILLIGRAM(S): at 05:42

## 2021-01-31 RX ADMIN — BUDESONIDE AND FORMOTEROL FUMARATE DIHYDRATE 2 PUFF(S): 160; 4.5 AEROSOL RESPIRATORY (INHALATION) at 21:48

## 2021-01-31 RX ADMIN — ATORVASTATIN CALCIUM 20 MILLIGRAM(S): 80 TABLET, FILM COATED ORAL at 21:48

## 2021-01-31 RX ADMIN — Medication 500000 UNIT(S): at 09:39

## 2021-01-31 RX ADMIN — Medication 20 MILLIGRAM(S): at 17:14

## 2021-01-31 RX ADMIN — Medication 50 MILLIGRAM(S): at 17:14

## 2021-01-31 RX ADMIN — Medication 3 MILLILITER(S): at 23:06

## 2021-01-31 RX ADMIN — HEPARIN SODIUM 5000 UNIT(S): 5000 INJECTION INTRAVENOUS; SUBCUTANEOUS at 05:42

## 2021-01-31 RX ADMIN — Medication 4: at 21:46

## 2021-01-31 NOTE — PROGRESS NOTE ADULT - PROBLEM SELECTOR PLAN 8
- Na 131 today, pending HD today  - Switched meds to NS from D5.   - Continue to monitor.   - Renal following, appreciate recs. - improved to 136 1/31  - Switched meds to NS from D5.   - Continue to monitor.   - Renal following, appreciate recs

## 2021-01-31 NOTE — PROGRESS NOTE ADULT - PROBLEM SELECTOR PLAN 9
Dx 3wks ago @ [facility].   - CTH 1/19 (-) for acute changes.   - Per Neurosurgery, cont ASA 81 PO QD; no neuro interventions at this time.     #DEMENTIA  - CONT: Donepezil 5mg PO QD    ## ANXIETY  - Psych consulted as anxiety thought to be worsening during hospitalization, appreciate recs.   - c/w Remeron 7.5mg qhs and Ativan dc'd as per Dr. 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

## 2021-01-31 NOTE — PROGRESS NOTE ADULT - SUBJECTIVE AND OBJECTIVE BOX
Patient is a 86y old  Female who presents with a chief complaint of progressively worsening SOB and cough x 3 days (30 Jan 2021 16:41) whose hospital course was complicated by hypoxemia and worsening renal function    Vital Signs Last 24 Hrs  T(C): 36.4 (31 Jan 2021 09:06), Max: 36.7 (30 Jan 2021 17:49)  T(F): 97.6 (31 Jan 2021 09:06), Max: 98.1 (30 Jan 2021 17:49)  HR: 74 (31 Jan 2021 09:45) (67 - 83)  BP: 159/74 (31 Jan 2021 09:45) (132/63 - 188/83)  BP(mean): 107 (31 Jan 2021 09:45) (91 - 119)  RR: 18 (31 Jan 2021 09:45) (16 - 18)  SpO2: 95% (31 Jan 2021 09:45) (95% - 100%)    acetaminophen   Tablet .. 650 milliGRAM(s) Oral every 6 hours PRN  albuterol/ipratropium for Nebulization 3 milliLiter(s) Nebulizer every 6 hours  amLODIPine   Tablet 10 milliGRAM(s) Oral daily  aspirin enteric coated 81 milliGRAM(s) Oral daily  atorvastatin 20 milliGRAM(s) Oral at bedtime  budesonide  80 MICROgram(s)/formoterol 4.5 MICROgram(s) Inhaler 2 Puff(s) Inhalation two times a day  cefepime   IVPB 1000 milliGRAM(s) IV Intermittent every 24 hours  dextrose 40% Gel 15 Gram(s) Oral once  dextrose 5%. 1000 milliLiter(s) IV Continuous <Continuous>  dextrose 5%. 1000 milliLiter(s) IV Continuous <Continuous>  dextrose 50% Injectable 25 Gram(s) IV Push once  dextrose 50% Injectable 12.5 Gram(s) IV Push once  dextrose 50% Injectable 25 Gram(s) IV Push once  donepezil 5 milliGRAM(s) Oral at bedtime  fluticasone propionate 50 MICROgram(s)/spray Nasal Spray 1 Spray(s) Both Nostrils two times a day  glucagon  Injectable 1 milliGRAM(s) IntraMuscular once  guaiFENesin   Syrup  (Sugar-Free) 100 milliGRAM(s) Oral every 6 hours PRN  heparin   Injectable 5000 Unit(s) SubCutaneous every 12 hours  influenza  Vaccine (HIGH DOSE) 0.7 milliLiter(s) IntraMuscular once  insulin glargine Injectable (LANTUS) 4 Unit(s) SubCutaneous at bedtime  insulin lispro (ADMELOG) corrective regimen sliding scale   SubCutaneous Before meals and at bedtime  insulin lispro Injectable (ADMELOG) 5 Unit(s) SubCutaneous three times a day before meals  levothyroxine 50 MICROGram(s) Oral daily  methylPREDNISolone sodium succinate Injectable 20 milliGRAM(s) IV Push every 8 hours  metoprolol tartrate 50 milliGRAM(s) Oral every 12 hours  mirtazapine 7.5 milliGRAM(s) Oral at bedtime  nystatin    Suspension [id number] Unit(s) Oral four times a day  senna 1 Tablet(s) Oral daily  sevelamer carbonate 1600 milliGRAM(s) Oral three times a day    PHYSICAL EXAM:  Constitutional:  in NAD  ENMT:   mucous membranes are dry  Neck:  no JVD   Respiratory:   basilar crackles scattered rhonchi, no wheezes  Cardiovascular:  RRR  Gastrointestinal:   soft N/T  Genitourinary:  no suprapubic masses or tenderness  Extremities:   no edema  Vascular:  no palpable cords  Neurological:   grossly normal but confused  Psychiatric:  depressed

## 2021-01-31 NOTE — PROGRESS NOTE ADULT - ASSESSMENT
86F poor historian/early dementia and former heavy smoker with PMHx DM, HTN, hyperlipidemia, COPD, hypothyroidism, CKD stage 3 (baseline Crea ~3.0), anemia of chronic disease, CAD s/p CABG 2015, chronic diastolic CHF (EF 55% via echo 10/2020), renal artery stenosis (s/p stent 20+ years ago), carotid artery stenosis, PAD presented 1/15/21 w/ progressively worsening SOB and cough. Admitted for acute on chronic diastolic HF, oliguric ATN on CKD 3 s/p stepped up to CCU and stepped back down to tele 1/23. Hospital course c/b PNA vs. radha pneumonitis on Cefepime IV, acute hypoxic respiratory failure requiring HFNC, now titrated to NC, s/p Tunneled Cath placement w/ initiation of HD starting on 1/26 and Anemia requiring total 3U PRBC during hospitalization. Pulm, ID, Renal and Psych are following. Next HD session 2/1 86F poor historian/early dementia and former heavy smoker with PMHx DM, HTN, hyperlipidemia, COPD, hypothyroidism, CKD stage 3 (baseline Crea ~3.0), anemia of chronic disease, CAD s/p CABG 2015, chronic diastolic CHF (EF 55% via echo 10/2020), renal artery stenosis (s/p stent 20+ years ago), carotid artery stenosis, PAD presented 1/15/21 w/ progressively worsening SOB and cough. Admitted for acute on chronic diastolic HF, oliguric ATN on CKD 3 s/p stepped up to CCU and stepped back down to tele 1/23. Hospital course c/b PNA vs. radha pneumonitis on Cefepime IV, acute hypoxic respiratory failure requiring HFNC and titrated to NC, Tunneled Cath placement 1/26 w/ initiation of HD and Anemia requiring total 3U PRBC during hospitalization. Pulm Dr Cuevas, ID, Renal Dr Nicolas, ID Dr Resendez, Endo service, and Psych service are following. Next HD session 2/1

## 2021-01-31 NOTE — PROGRESS NOTE ADULT - PROBLEM SELECTOR PLAN 7
- CT chest w/o contrast: b/l GGO superimposed on chronic interstitial pulm fibrosis probably d/t chronic hypersensitivity pneumonitis or sarcoid. GGOs could be due to pulmonary edema, pulmonary hemorrhage, alveolar proteinosis, organizing pneumonia or atypical infection.  - CONT: Methylprednisolone taper for pneumonitis (start 1/20 - )  - PRN robitussin/tessalon pearls for cough - CT chest w/o contrast: b/l GGO superimposed on chronic interstitial pulm fibrosis probably d/t chronic hypersensitivity pneumonitis or sarcoid. GGOs could be due to pulmonary edema, pulmonary hemorrhage, alveolar proteinosis, organizing pneumonia or atypical infection.  - Methylprednisolone 20 mg IV q8hrs started on 1/20, taper to 20 mg IV BID starting 1/31/21 for pneumonitis   - PRN robitussin/tessalon pearls for cough

## 2021-01-31 NOTE — PROGRESS NOTE ADULT - PROBLEM SELECTOR PLAN 2
Overall worsening clinical picture, oliguric NAV on CKD stage 3 2/2 ATN with CKD progression from DM.   - F/u Renal recs, known to Dr Nicolas  - HD initiated this admission 1/26/21 via R IJ HD catheter.  - Cr 2.84 today  - Renal US shows atrophic kidneys, no WILBER.   - Nephrotic w/u non-contributory thus far, ANCA negative  - s/p dialysis x 3 sessions, removed ~3L. Pending HD session today 1/30  - Monitor Strict I/Os, daily PVRs  - F/u PPD placement and reading if requires to be discharged to [facility] w/ HD. Quantiferon indeterminate Overall worsening clinical picture, oliguric NAV on CKD stage 3 2/2 ATN with CKD progression from DM.   - F/u Renal recs, known to Dr Nicolas  - HD initiated this admission 1/26/21 via R IJ HD catheter.  - Cr 2.29 1/31  - Renal US: atrophic kidneys, no WILBER.   - Nephrotic w/u non-contributory thus far, ANCA negative  - s/p dialysis x 4 sessions, Last HD session 1/30  -Next session 2/1  - Monitor Strict I/Os, daily PVRs  - PPD placed 1/30, reading on 2/1, Quantiferon indeterminate

## 2021-01-31 NOTE — PROGRESS NOTE ADULT - PROBLEM SELECTOR PLAN 6
- CONT: Symbicort inhaler BID and Duonebs  - PRN Robutussin/Benonatate for cough  - not on Home Oxygen. Wean down HFNC 30/40 to 6L NC O2  - c/w Nystatin swish and spit for thrush  - QTc 500.  Unable to Switch Nystatin to Fluconazole 200mg IV - CONT: Symbicort inhaler BID and Duonebs  - PRN Robutussin/Benonatate for cough  - not on Home Oxygen. Wean down HFNC 30/40 to 4L NC O2  - c/w Nystatin swish and spit for thrush  - QTc 500.  Unable to Switch Nystatin to Fluconazole 200mg IV

## 2021-01-31 NOTE — PROGRESS NOTE ADULT - ASSESSMENT
86 year old female with congestive heart failure pneumonia/pneumonitis and acute on chronic renal insufficiency  She is doing well on nasal cannula at 4 liters with oxygen saturations in the 95 to 98 range.  Would like to consider tapering down steroids if possible.  For HD tomorrow.  Hg is hold ing the the 12 gram range.

## 2021-01-31 NOTE — PROGRESS NOTE ADULT - PROBLEM SELECTOR PLAN 1
- Likely multifactorial 2/2 CHF, COPD, ILD, and CAP vs pneumonitis.   - Weaned off HFNC this AM 1/30 to NC 6L O2 this AM, currently tolerating well.  - LE Duplex 1/19 (-) for DVT.   - No CTA done to r/o PE due to CKD; VQ deferred as likely would not yield diagnostic info given multiple lung processes.   - Consider repeat CT Chest non-con after the weekend when more euvolemic  - Repeat COVID swab negative 1/28  - CONT: Methylprednisolone 20mg IV q8hrs per Dr. Cuevas.  - Psych consulted as anxiety thought to be contributing factor as well, appreciate recs.   - c/w Remeron 7.5mg qhs; Ativan dc'd as per Dr. Cuevas     ## Pneumonia vs radha Pneumonitis  - WBC uptrending possible 2/2 IV steroids. Manual diff w/o bandemia 1/30  - WBC 13 -> 20 today   - s/p 4 days of Ceftriaxone + 7 days azithromycin.   - ID consulted – 1/23 DISCONTINUED Zosyn. STARTED Cefepime 1000mg IV q24hrs for pneumonitis (1/23-2/1 for 10days course per Dr Resendez).   - ID recs: RVP negative, (+) MSSA, procalcitonin 1.08 -> 0.96, galactomannan negative, LDH-->474, fungitell negative  - Continue to monitor for fever - Likely multifactorial 2/2 CHF, COPD, ILD, and CAP vs pneumonitis.   - Weaned off HFNC 1/30, currently satting 95% on 4l NC and tolerating well.  - LE Duplex 1/19 (-) for DVT.   - No CTA done to r/o PE due to CKD; VQ deferred as likely would not yield diagnostic info given multiple lung processes.   - Consider repeat CT Chest non-con when more euvolemic   - Repeat COVID swab negative 1/28  - Methylprednisolone 20mg IV q8hrs->titrated to 20 mg IV q12 hrs starting 1/31  - Psych consulted as anxiety thought to be contributing factor as well, appreciate recs.   - c/w Remeron 7.5mg qhs; Ativan dc'd as per Dr. Cuevas     ## Pneumonia vs radha Pneumonitis  - WBC uptrending possible 2/2 IV steroids. diff w/o bandemia  - WBC 13 -> 20.94 1/30 -> 20.6 1/31  - s/p 4 days of Ceftriaxone + 7 days azithromycin.   - ID consulted – 1/23 DISCONTINUED Zosyn. STARTED Cefepime 1000mg IV q24hrs x 10 days for pneumonitis (1/23-2/1 per Dr Resendez).   - RVP negative, (+) MSSA, procalcitonin 1.08 -> 0.96, galactomannan negative, LDH-->474, fungitell negative  - Continue to monitor for fever

## 2021-01-31 NOTE — PROGRESS NOTE ADULT - SUBJECTIVE AND OBJECTIVE BOX
Interventional, Pulmonary, Critical, Chest Special Procedures.    Pt was seen and fully examined by myself.     Time spent with patient in minutes:    Patient is a 86y old  Female who presents with a chief complaint of progressively worsening SOB and cough x 3 days (31 Jan 2021 10:33)    HPI:  86 yr old F, POOR HISTORIAN/early dementia, former heavy smoker with PMHx HTN, hyperlipidemia, COPD, hypothyroidism, Stage IV CKD (baseline Cr ~3.0), anemia of chronic disease, CAD s/p CABG 2015 Dr. Blunt, chronic diastolic CHF(EF:55% by Echo 10/2020), renal artery stenosis s/p renal artery stent >20yrs ago, Carotid artery stenosis, PAD, who presents to [facility] ED 1/15/21 c/o progressively worsening SOB and cough x 3 days. Patient reports she can barely walk 10 feet prior to having to stop and rest. Patient further admits to chronic bilateral LE edema and significant orthopnea and she has been sleeping upright in a chair the past week. Patient also admits to chills and a nonproductive cough over the past few days. Patient was instructed to increase her Lasix 80mg PO daily dose to Lasix 80mg PO BID and start Metalazone 10mg PO BID prior to each dose by her cardiologist Dr. Horvath. Patient denies any chest pain, dizziness, palpitations, recent travel or sick contacts. Patient endorsing compliance w/ medications however daughter states that compliance with medication has been questionable over the last few months, as patient is becoming more forgetful.    In [facility] ED, BP: 170/68, HR: 80, RR:26, Temp: 98.4F, O2 sat: 80% on RA, improved to 99% with 10L NRB. EKG revealed NSR@67BPM with incomplete LBBB, TWI Lead I, AVL (similar to prior EKG 10/2020). CXR prelim read consistent with alveolar edema, bilateral patchy opacities/consolidation.    Labs notable for: WBC 14.5 with left shift, H/H 9.8/30.6, D-dimer 491, BUN/Cr 56/3.54, , CRP 4.86, Ferritin 180, Procalcitonin 0.29, Lactate 2.4, Troponin T 0.05, BNP 64,671, Initial COVID PCR negative.    Patient treated with Lasix 80mg IV x 1 dose, SL NTG 0.4mg PO x 1 dose, Decadron 6mg IV x 1 dose, Ceftriaxone 1g IV x 1 dose and Azithromycin 500mg IV x 1 dose.  Patient now admitted to cardiac telemetry for management of acute on chronic diastolic CHF exacerbation in setting of suspected CAP.    **Of note: Patient had a fall ~3 weeks ago for which she was admitted to [facility], CT imaging was negative as per daughter and fall thought to be secondary to hypotension.   (15 Mark 2021 21:30)      REVIEW OF SYSTEMS:  Constitutional: No fever, weight loss, chills or fatigue  Eyes: No eye pain, visual disturbances, or discharge  ENMT:  No difficulty hearing, tinnitus, vertigo; No sinus or throat pain. No epistaxis, dysphagia, dysphonia, hoarseness or odynophagia  Neck: No pain, stiffness or neck swelling.  No masses or deformities  Respiratory: No cough, wheezing, hemoptysis  - COPD  - ILD   - PE   - ASTHMA     - PNEUMONIA  Cardiovascular: No chest pain, dysrhythmia, palpitations, dizziness or edema - CAD   - CHF   - HTN  Gastrointestinal: No abdominal or epigastric pain. No nausea, vomiting or hematemesis; No diarrhea or constipation. No melena or hematochezia, Icterus.          Genitourinary: No dysuria, frequency, hematuria or incontinence   - CKD/NAV      - ESRD  Neurological: No headaches, memory loss, loss of strength, numbness or tremors      -DEMENTIA     - STROKE    - SEIZURE  Skin: No itching, burning, rashes or lesions   Lymph Nodes: No enlarged glands  Endocrine: No heat or cold intolerance; No hair loss       - DM     - THYROID DISORDER  Musculoskeletal: No joint pain or swelling; No muscle, back or extremity pain, No edema  Psychiatric: No depression, anxiety, mood swings or difficulty sleeping  Heme/Lymph: No easy bruising or bleeding gums         - ANEMIA      - CANCER   -COAGULOPATHY  Allergy and Immunologic: No hives or eczema    PAST MEDICAL & SURGICAL HISTORY:  Essential hypertension  Hypertension    Type 2 diabetes mellitus  Diabetes    Hyperlipidemia  Hyperlipidemia    Disorder of kidney and ureter  Renal insufficiency    Peripheral vascular disease  PVD (peripheral vascular disease)    Anxiety state  Anxiety    Atherosclerosis of renal artery  with resulting stent placement    Atherosclerosis of renal artery  Renal artery stenosis      FAMILY HISTORY:  Family history of ischemic heart disease  Family history of coronary artery disease.      SOCIAL HISTORY:      - Tobacco     - ETOH    Allergies    No Known Allergies    Intolerances      Vital Signs Last 24 Hrs  T(C): 36.6 (31 Jan 2021 14:41), Max: 36.7 (30 Jan 2021 17:49)  T(F): 97.9 (31 Jan 2021 14:41), Max: 98.1 (30 Jan 2021 17:49)  HR: 82 (31 Jan 2021 14:00) (67 - 83)  BP: 136/64 (31 Jan 2021 14:00) (136/64 - 188/83)  BP(mean): 92 (31 Jan 2021 14:00) (91 - 119)  RR: 18 (31 Jan 2021 14:00) (16 - 18)  SpO2: 96% (31 Jan 2021 14:00) (95% - 100%)    01-30 @ 07:01 - 01-31 @ 07:00  --------------------------------------------------------  IN: 910 mL / OUT: 1400 mL / NET: -490 mL    01-31 @ 07:01 - 01-31 @ 15:14  --------------------------------------------------------  IN: 630 mL / OUT: 200 mL / NET: 430 mL          MEDICATIONS:  MEDICATIONS  (STANDING):  albuterol/ipratropium for Nebulization 3 milliLiter(s) Nebulizer every 6 hours  amLODIPine   Tablet 10 milliGRAM(s) Oral daily  aspirin enteric coated 81 milliGRAM(s) Oral daily  atorvastatin 20 milliGRAM(s) Oral at bedtime  budesonide  80 MICROgram(s)/formoterol 4.5 MICROgram(s) Inhaler 2 Puff(s) Inhalation two times a day  cefepime   IVPB 1000 milliGRAM(s) IV Intermittent every 24 hours  dextrose 40% Gel 15 Gram(s) Oral once  dextrose 5%. 1000 milliLiter(s) (50 mL/Hr) IV Continuous <Continuous>  dextrose 5%. 1000 milliLiter(s) (100 mL/Hr) IV Continuous <Continuous>  dextrose 50% Injectable 25 Gram(s) IV Push once  dextrose 50% Injectable 12.5 Gram(s) IV Push once  dextrose 50% Injectable 25 Gram(s) IV Push once  donepezil 5 milliGRAM(s) Oral at bedtime  fluticasone propionate 50 MICROgram(s)/spray Nasal Spray 1 Spray(s) Both Nostrils two times a day  glucagon  Injectable 1 milliGRAM(s) IntraMuscular once  heparin   Injectable 5000 Unit(s) SubCutaneous every 12 hours  influenza  Vaccine (HIGH DOSE) 0.7 milliLiter(s) IntraMuscular once  insulin glargine Injectable (LANTUS) 4 Unit(s) SubCutaneous at bedtime  insulin lispro (ADMELOG) corrective regimen sliding scale   SubCutaneous Before meals and at bedtime  insulin lispro Injectable (ADMELOG) 5 Unit(s) SubCutaneous three times a day before meals  levothyroxine 50 MICROGram(s) Oral daily  methylPREDNISolone sodium succinate Injectable 20 milliGRAM(s) IV Push every 12 hours  metoprolol tartrate 50 milliGRAM(s) Oral every 12 hours  mirtazapine 7.5 milliGRAM(s) Oral at bedtime  nystatin    Suspension 995965 Unit(s) Oral four times a day  senna 1 Tablet(s) Oral daily  sevelamer carbonate 1600 milliGRAM(s) Oral three times a day    MEDICATIONS  (PRN):  acetaminophen   Tablet .. 650 milliGRAM(s) Oral every 6 hours PRN Moderate Pain (4 - 6)  guaiFENesin   Syrup  (Sugar-Free) 100 milliGRAM(s) Oral every 6 hours PRN Cough      PHYSICAL EXAM:  Comfortable, no distress  Eyes: PERRL, EOM intact; conjunctiva and sclera clear  Head: Normocephalic;  No Trauma  ENMT: No nasal discharge, hoarseness, cough or hemoptysis  Neck: Supple; non tender; no masses or deformities.    No JVD  Respiratory: CTA,  - WHEEZING   - RHONCHI  - RALES  - CRACKLES.  Diminished breath sounds  BILATERAL  RIGHT  LEFT  Cardiovascular: Regular rate and rhythm. S1 and S2 Normal; No murmurs, gallops or rubs     - PPM/AICD  Gastrointestinal: Soft non-tender, non-distended; Normal bowel sounds; No hepatosplenomegaly.     -PEG    -  GT   - DENNIS  Genitourinary: No costovertebral angle tenderness. No dysuria  Extremities: AROM, No clubbing, cyanosis or edema    Vascular: Peripheral pulses palpable 2+ bilaterally  Neurological: Alert and responisve to stimuli   Skin: Warm and dry. No obvious rash  Lymph Nodes: No acute cervical or supraclavicular adenopathy  Psychiatric: Cooperative and appropriate mood    DEVICES:  - DENTURES   +IV R / L     - ETUBE   -TRACH   -CTUBE  R / L    LABS:                          12.3   20.60 )-----------( 148      ( 31 Jan 2021 06:55 )             38.6     01-31    136  |  99  |  46<H>  ----------------------------<  138<H>  4.8   |  24  |  2.29<H>    Ca    8.6      31 Jan 2021 06:55  Phos  2.2     01-31  Mg     1.8     01-31    TPro  5.7<L>  /  Alb  2.8<L>  /  TBili  0.7  /  DBili  x   /  AST  See Note  /  ALT  See Note  /  AlkPhos  213<H>  01-30      RADIOLOGY & ADDITIONAL STUDIES (The following images were personally reviewed):

## 2021-01-31 NOTE — PROGRESS NOTE ADULT - PROBLEM SELECTOR PLAN 3
HX Anemia. Baseline Hgb 8-9.  - Likely 2/2 to CKD and anemia of chronic disease  - s/p 2U PRBC 1/27; also had 1U PRBC 1/20   - hgb 12.3 today;  no active s/sx bleeding   - continue to HOLD IV iron due to treatment for presumed CAP vs pneumonitis  - Stool for occult blood positive 1/25, reportedly Hx of hemorrhoids  - Maintain active T&S HX Anemia. Baseline Hgb 8-9. 2/2 to CKD and anemia of chronic disease  - s/p 2U PRBC 1/27 and 1U PRBC 1/20   - hgb stable in 12's,  no active s/sx bleeding   - continue to HOLD IV iron due to treatment for presumed CAP vs pneumonitis  - Stool for occult blood positive 1/25, reportedly Hx of hemorrhoids  - Maintain active T&S, last 1/31

## 2021-01-31 NOTE — PROGRESS NOTE ADULT - PROBLEM SELECTOR PLAN 10
- CONT: Levothyroxine 50mg PO QD.     #DM  - Hx of DM. A1c 5.1% on admission.    - Start Lantus 4U qHS. C/w Lispro 3U TID w/ meals, MISS w/ meals and bedtime  - consistent carb diet while on IV solumedrol given hyperglycemia  - Endocrine following    F: none   E: renal patient   N: dysphagia 2 mechanical soft-thin liquids, Ensure TID. Pt not eating a lot and poor appetite.     DVT ppx: Hep SubQ  Dispo: cardiac telemetry  PT lynnette HENDRICKSON w/ on site dialysis once medically stable for DC

## 2021-01-31 NOTE — PROGRESS NOTE ADULT - PROBLEM SELECTOR PLAN 4
BNP 57k.   - POCUS by MICU consult 1/22/21 (+) for B-lines throughout.   - TTE 1/20: EF 53%, biatrial enlargement, mild AR, mod-severe MR, mod-severe TR, PASP 70.   - CONT: Lopressor 50mg PO BID.   - Discuss w/ Renal regarding possible diuresis on non-HD days  - Strict I/Os, daily weights. Core measures. BNP 57k.   - POCUS by MICU consult 1/22/21 (+) for B-lines throughout.   - TTE 1/20: EF 53%, biatrial enlargement, mild AR, mod-severe MR, mod-severe TR, PASP 70.   - CONT: Lopressor 50mg PO BID.   - Strict I/Os, daily weights. Core measures.

## 2021-01-31 NOTE — PROGRESS NOTE ADULT - SUBJECTIVE AND OBJECTIVE BOX
Interventional Cardiology PA Adult Progress Note    Subjective Assessment: pt seen and examined at bedside this am. Pt endorsing continued improvement in SOB. pt denies CP, N/V/D, dizziness.     ROS negative except for subjective and HPI   	  MEDICATIONS:  amLODIPine   Tablet 10 milliGRAM(s) Oral daily  metoprolol tartrate 50 milliGRAM(s) Oral every 12 hours    cefepime   IVPB 1000 milliGRAM(s) IV Intermittent every 24 hours  nystatin    Suspension 345141 Unit(s) Oral four times a day    albuterol/ipratropium for Nebulization 3 milliLiter(s) Nebulizer every 6 hours  budesonide  80 MICROgram(s)/formoterol 4.5 MICROgram(s) Inhaler 2 Puff(s) Inhalation two times a day  guaiFENesin   Syrup  (Sugar-Free) 100 milliGRAM(s) Oral every 6 hours PRN    acetaminophen   Tablet .. 650 milliGRAM(s) Oral every 6 hours PRN  donepezil 5 milliGRAM(s) Oral at bedtime  mirtazapine 7.5 milliGRAM(s) Oral at bedtime    senna 1 Tablet(s) Oral daily    atorvastatin 20 milliGRAM(s) Oral at bedtime  dextrose 40% Gel 15 Gram(s) Oral once  dextrose 50% Injectable 25 Gram(s) IV Push once  dextrose 50% Injectable 12.5 Gram(s) IV Push once  dextrose 50% Injectable 25 Gram(s) IV Push once  glucagon  Injectable 1 milliGRAM(s) IntraMuscular once  insulin glargine Injectable (LANTUS) 4 Unit(s) SubCutaneous at bedtime  insulin lispro (ADMELOG) corrective regimen sliding scale   SubCutaneous Before meals and at bedtime  insulin lispro Injectable (ADMELOG) 5 Unit(s) SubCutaneous three times a day before meals  levothyroxine 50 MICROGram(s) Oral daily  methylPREDNISolone sodium succinate Injectable 20 milliGRAM(s) IV Push every 8 hours    aspirin enteric coated 81 milliGRAM(s) Oral daily  dextrose 5%. 1000 milliLiter(s) IV Continuous <Continuous>  dextrose 5%. 1000 milliLiter(s) IV Continuous <Continuous>  fluticasone propionate 50 MICROgram(s)/spray Nasal Spray 1 Spray(s) Both Nostrils two times a day  heparin   Injectable 5000 Unit(s) SubCutaneous every 12 hours  influenza  Vaccine (HIGH DOSE) 0.7 milliLiter(s) IntraMuscular once      	    [PHYSICAL EXAM:  TELEMETRY:  T(C): 36.4 (01-31-21 @ 09:06), Max: 36.7 (01-30-21 @ 17:49)  HR: 74 (01-31-21 @ 09:45) (67 - 83)  BP: 159/74 (01-31-21 @ 09:45) (148/63 - 188/83)  RR: 18 (01-31-21 @ 09:45) (16 - 18)  SpO2: 95% (01-31-21 @ 09:45) (95% - 100%)  Wt(kg): --  I&O's Summary    30 Jan 2021 07:01  -  31 Jan 2021 07:00  --------------------------------------------------------  IN: 910 mL / OUT: 1400 mL / NET: -490 mL    31 Jan 2021 07:01  -  31 Jan 2021 10:33  --------------------------------------------------------  IN: 290 mL / OUT: 0 mL / NET: 290 mL        Cason:  Central/PICC/Mid Line:                                         Appearance: Normal	  HEENT:   Normal oral mucosa, PERRL, EOMI	  Neck: Supple, - JVD, RIJ catheter in place   Cardiovascular: Normal S1 S2, No JVD, No murmurs  Respiratory: Bibasilar crackles, No Rales, Rhonchi, Wheezing	  Gastrointestinal:  Soft, Non-tender, + BS	  Skin: No rashes, No ecchymoses, No cyanosis  Extremities: Normal range of motion, No clubbing, cyanosis or edema  Vascular: Peripheral pulses palpable 2+ bilaterally  Neurologic: Non-focal  Psychiatry: A & O x 2 (person, place), disoriented to time with poor memory       	    ECG:  	  RADIOLOGY:   DIAGNOSTIC TESTING:  [ ] Echocardiogram:  [ ]  Catheterization:  [ ] Stress Test:    [ ] SANJAY  OTHER: 	    LABS:	 	  CARDIAC MARKERS:                                  12.3   20.60 )-----------( 148      ( 31 Jan 2021 06:55 )             38.6     01-31    136  |  99  |  46<H>  ----------------------------<  138<H>  4.8   |  24  |  2.29<H>    Ca    8.6      31 Jan 2021 06:55  Phos  2.2     01-31  Mg     1.8     01-31    TPro  5.7<L>  /  Alb  2.8<L>  /  TBili  0.7  /  DBili  x   /  AST  See Note  /  ALT  See Note  /  AlkPhos  213<H>  01-30    proBNP:   Lipid Profile:   HgA1c:   TSH:       ASSESSMENT/PLAN: 	        DVT ppx:  Dispo:

## 2021-02-01 LAB
ACANTHOCYTES BLD QL SMEAR: SIGNIFICANT CHANGE UP
ANION GAP SERPL CALC-SCNC: 15 MMOL/L — SIGNIFICANT CHANGE UP (ref 5–17)
ANISOCYTOSIS BLD QL: SLIGHT — SIGNIFICANT CHANGE UP
APPEARANCE UR: ABNORMAL
BACTERIA # UR AUTO: PRESENT /HPF
BASOPHILS # BLD AUTO: 0 K/UL — SIGNIFICANT CHANGE UP (ref 0–0.2)
BASOPHILS NFR BLD AUTO: 0 % — SIGNIFICANT CHANGE UP (ref 0–2)
BILIRUB UR-MCNC: NEGATIVE — SIGNIFICANT CHANGE UP
BUN SERPL-MCNC: 80 MG/DL — HIGH (ref 7–23)
CALCIUM SERPL-MCNC: 9.1 MG/DL — SIGNIFICANT CHANGE UP (ref 8.4–10.5)
CHLORIDE SERPL-SCNC: 94 MMOL/L — LOW (ref 96–108)
CO2 SERPL-SCNC: 22 MMOL/L — SIGNIFICANT CHANGE UP (ref 22–31)
COD CRY URNS QL: ABNORMAL /HPF
COLOR SPEC: YELLOW — SIGNIFICANT CHANGE UP
COMMENT - URINE: SIGNIFICANT CHANGE UP
CREAT SERPL-MCNC: 3.05 MG/DL — HIGH (ref 0.5–1.3)
DACRYOCYTES BLD QL SMEAR: SLIGHT — SIGNIFICANT CHANGE UP
DIFF PNL FLD: ABNORMAL
EOSINOPHIL # BLD AUTO: 0 K/UL — SIGNIFICANT CHANGE UP (ref 0–0.5)
EOSINOPHIL NFR BLD AUTO: 0 % — SIGNIFICANT CHANGE UP (ref 0–6)
EPI CELLS # UR: ABNORMAL /HPF (ref 0–5)
GLUCOSE BLDC GLUCOMTR-MCNC: 126 MG/DL — HIGH (ref 70–99)
GLUCOSE BLDC GLUCOMTR-MCNC: 154 MG/DL — HIGH (ref 70–99)
GLUCOSE BLDC GLUCOMTR-MCNC: 191 MG/DL — HIGH (ref 70–99)
GLUCOSE BLDC GLUCOMTR-MCNC: 283 MG/DL — HIGH (ref 70–99)
GLUCOSE SERPL-MCNC: 169 MG/DL — HIGH (ref 70–99)
GLUCOSE UR QL: 100
HCT VFR BLD CALC: 38.3 % — SIGNIFICANT CHANGE UP (ref 34.5–45)
HGB BLD-MCNC: 12.3 G/DL — SIGNIFICANT CHANGE UP (ref 11.5–15.5)
KETONES UR-MCNC: ABNORMAL MG/DL
LEUKOCYTE ESTERASE UR-ACNC: NEGATIVE — SIGNIFICANT CHANGE UP
LYMPHOCYTES # BLD AUTO: 0 % — LOW (ref 13–44)
LYMPHOCYTES # BLD AUTO: 0 K/UL — LOW (ref 1–3.3)
MACROCYTES BLD QL: SLIGHT — SIGNIFICANT CHANGE UP
MAGNESIUM SERPL-MCNC: 1.9 MG/DL — SIGNIFICANT CHANGE UP (ref 1.6–2.6)
MANUAL SMEAR VERIFICATION: SIGNIFICANT CHANGE UP
MCHC RBC-ENTMCNC: 27.8 PG — SIGNIFICANT CHANGE UP (ref 27–34)
MCHC RBC-ENTMCNC: 32.1 GM/DL — SIGNIFICANT CHANGE UP (ref 32–36)
MCV RBC AUTO: 86.5 FL — SIGNIFICANT CHANGE UP (ref 80–100)
MICROCYTES BLD QL: SLIGHT — SIGNIFICANT CHANGE UP
MONOCYTES # BLD AUTO: 0.22 K/UL — SIGNIFICANT CHANGE UP (ref 0–0.9)
MONOCYTES NFR BLD AUTO: 0.9 % — LOW (ref 2–14)
NEUTROPHILS # BLD AUTO: 23.77 K/UL — HIGH (ref 1.8–7.4)
NEUTROPHILS NFR BLD AUTO: 98.2 % — HIGH (ref 43–77)
NITRITE UR-MCNC: POSITIVE
NRBC # BLD: 0 /100 WBCS — SIGNIFICANT CHANGE UP (ref 0–0)
OVALOCYTES BLD QL SMEAR: SLIGHT — SIGNIFICANT CHANGE UP
PH UR: 5.5 — SIGNIFICANT CHANGE UP (ref 5–8)
PLAT MORPH BLD: NORMAL — SIGNIFICANT CHANGE UP
PLATELET # BLD AUTO: 151 K/UL — SIGNIFICANT CHANGE UP (ref 150–400)
POIKILOCYTOSIS BLD QL AUTO: SIGNIFICANT CHANGE UP
POTASSIUM SERPL-MCNC: 5.6 MMOL/L — HIGH (ref 3.5–5.3)
POTASSIUM SERPL-SCNC: 5.6 MMOL/L — HIGH (ref 3.5–5.3)
PROT UR-MCNC: >=300 MG/DL
RBC # BLD: 4.43 M/UL — SIGNIFICANT CHANGE UP (ref 3.8–5.2)
RBC # FLD: 15.5 % — HIGH (ref 10.3–14.5)
RBC BLD AUTO: ABNORMAL
RBC CASTS # UR COMP ASSIST: > 10 /HPF
SCHISTOCYTES BLD QL AUTO: SLIGHT — SIGNIFICANT CHANGE UP
SODIUM SERPL-SCNC: 131 MMOL/L — LOW (ref 135–145)
SP GR SPEC: 1.02 — SIGNIFICANT CHANGE UP (ref 1–1.03)
SPHEROCYTES BLD QL SMEAR: SLIGHT — SIGNIFICANT CHANGE UP
UROBILINOGEN FLD QL: 0.2 E.U./DL — SIGNIFICANT CHANGE UP
VARIANT LYMPHS # BLD: 0.9 % — SIGNIFICANT CHANGE UP (ref 0–6)
WBC # BLD: 24.21 K/UL — HIGH (ref 3.8–10.5)
WBC # FLD AUTO: 24.21 K/UL — HIGH (ref 3.8–10.5)
WBC UR QL: > 10 /HPF

## 2021-02-01 RX ORDER — METOPROLOL TARTRATE 50 MG
25 TABLET ORAL ONCE
Refills: 0 | Status: COMPLETED | OUTPATIENT
Start: 2021-02-01 | End: 2021-02-01

## 2021-02-01 RX ORDER — SODIUM CHLORIDE 0.65 %
1 AEROSOL, SPRAY (ML) NASAL DAILY
Refills: 0 | Status: DISCONTINUED | OUTPATIENT
Start: 2021-02-01 | End: 2021-02-07

## 2021-02-01 RX ORDER — SODIUM ZIRCONIUM CYCLOSILICATE 10 G/10G
10 POWDER, FOR SUSPENSION ORAL ONCE
Refills: 0 | Status: COMPLETED | OUTPATIENT
Start: 2021-02-01 | End: 2021-02-01

## 2021-02-01 RX ORDER — AMLODIPINE BESYLATE 2.5 MG/1
5 TABLET ORAL ONCE
Refills: 0 | Status: DISCONTINUED | OUTPATIENT
Start: 2021-02-01 | End: 2021-02-01

## 2021-02-01 RX ADMIN — Medication 3 MILLILITER(S): at 11:39

## 2021-02-01 RX ADMIN — Medication 20 MILLIGRAM(S): at 17:48

## 2021-02-01 RX ADMIN — SEVELAMER CARBONATE 1600 MILLIGRAM(S): 2400 POWDER, FOR SUSPENSION ORAL at 13:41

## 2021-02-01 RX ADMIN — Medication 81 MILLIGRAM(S): at 11:32

## 2021-02-01 RX ADMIN — SEVELAMER CARBONATE 1600 MILLIGRAM(S): 2400 POWDER, FOR SUSPENSION ORAL at 05:07

## 2021-02-01 RX ADMIN — SEVELAMER CARBONATE 1600 MILLIGRAM(S): 2400 POWDER, FOR SUSPENSION ORAL at 22:40

## 2021-02-01 RX ADMIN — Medication 500000 UNIT(S): at 22:40

## 2021-02-01 RX ADMIN — CEFEPIME 100 MILLIGRAM(S): 1 INJECTION, POWDER, FOR SOLUTION INTRAMUSCULAR; INTRAVENOUS at 22:41

## 2021-02-01 RX ADMIN — INSULIN GLARGINE 4 UNIT(S): 100 INJECTION, SOLUTION SUBCUTANEOUS at 22:39

## 2021-02-01 RX ADMIN — Medication 1 APPLICATORFUL: at 22:41

## 2021-02-01 RX ADMIN — Medication 3 MILLILITER(S): at 22:40

## 2021-02-01 RX ADMIN — HEPARIN SODIUM 5000 UNIT(S): 5000 INJECTION INTRAVENOUS; SUBCUTANEOUS at 05:04

## 2021-02-01 RX ADMIN — MIRTAZAPINE 7.5 MILLIGRAM(S): 45 TABLET, ORALLY DISINTEGRATING ORAL at 22:40

## 2021-02-01 RX ADMIN — Medication 3 MILLILITER(S): at 17:48

## 2021-02-01 RX ADMIN — Medication 500000 UNIT(S): at 05:04

## 2021-02-01 RX ADMIN — Medication 50 MILLIGRAM(S): at 05:04

## 2021-02-01 RX ADMIN — Medication 25 MILLIGRAM(S): at 15:40

## 2021-02-01 RX ADMIN — DONEPEZIL HYDROCHLORIDE 5 MILLIGRAM(S): 10 TABLET, FILM COATED ORAL at 22:39

## 2021-02-01 RX ADMIN — SODIUM ZIRCONIUM CYCLOSILICATE 10 GRAM(S): 10 POWDER, FOR SUSPENSION ORAL at 13:41

## 2021-02-01 RX ADMIN — Medication 5 UNIT(S): at 17:46

## 2021-02-01 RX ADMIN — Medication 2: at 11:32

## 2021-02-01 RX ADMIN — Medication 5 UNIT(S): at 11:32

## 2021-02-01 RX ADMIN — Medication 50 MICROGRAM(S): at 05:04

## 2021-02-01 RX ADMIN — Medication 5 UNIT(S): at 07:36

## 2021-02-01 RX ADMIN — HEPARIN SODIUM 5000 UNIT(S): 5000 INJECTION INTRAVENOUS; SUBCUTANEOUS at 17:48

## 2021-02-01 RX ADMIN — ATORVASTATIN CALCIUM 20 MILLIGRAM(S): 80 TABLET, FILM COATED ORAL at 22:40

## 2021-02-01 RX ADMIN — Medication 500000 UNIT(S): at 11:39

## 2021-02-01 RX ADMIN — Medication 1 SPRAY(S): at 17:47

## 2021-02-01 RX ADMIN — AMLODIPINE BESYLATE 10 MILLIGRAM(S): 2.5 TABLET ORAL at 05:06

## 2021-02-01 RX ADMIN — Medication 20 MILLIGRAM(S): at 05:04

## 2021-02-01 RX ADMIN — Medication 2: at 22:39

## 2021-02-01 RX ADMIN — Medication 6: at 17:47

## 2021-02-01 RX ADMIN — BUDESONIDE AND FORMOTEROL FUMARATE DIHYDRATE 2 PUFF(S): 160; 4.5 AEROSOL RESPIRATORY (INHALATION) at 22:41

## 2021-02-01 RX ADMIN — Medication 50 MILLIGRAM(S): at 17:48

## 2021-02-01 RX ADMIN — BUDESONIDE AND FORMOTEROL FUMARATE DIHYDRATE 2 PUFF(S): 160; 4.5 AEROSOL RESPIRATORY (INHALATION) at 09:13

## 2021-02-01 RX ADMIN — Medication 3 MILLILITER(S): at 05:04

## 2021-02-01 RX ADMIN — SENNA PLUS 1 TABLET(S): 8.6 TABLET ORAL at 11:39

## 2021-02-01 RX ADMIN — Medication 500000 UNIT(S): at 17:47

## 2021-02-01 NOTE — PROGRESS NOTE ADULT - PROBLEM SELECTOR PLAN 1
- Likely multifactorial 2/2 CHF, COPD, ILD, and CAP vs pneumonitis.   - Weaned off HFNC 1/30, currently satting 95% on 4l NC and tolerating well.  - LE Duplex 1/19 (-) for DVT.   - No CTA done to r/o PE due to CKD; VQ deferred as likely would not yield diagnostic info given multiple lung processes.   - Consider repeat CT Chest non-con when more euvolemic   - Repeat COVID swab negative 1/28  - continue to taper Methylprednisolone 20mg IV- ordered qd starting 2/2   - Psych consulted as anxiety thought to be contributing factor as well, appreciate recs.   - c/w Remeron 7.5mg qhs; Ativan dc'd as per Dr. Cuevas     ## Pneumonia vs radha Pneumonitis  - s/p 4 days of Ceftriaxone + 7 days azithromycin.   - ID consulted – 1/23 DISCONTINUED Zosyn. STARTED Cefepime 1000mg IV q24hrs x 10 days for pneumonitis (1/23-2/1 per Dr Resendez).   - RVP negative, (+) MSSA, procalcitonin 1.08 -> 0.96, galactomannan negative, LDH-->474, fungitell negative    #Leukocytosis  - WBC uptrending possible 2/2 IV steroids. diff w/o bandemia  - WBC 24 today; remains afebrile; continue to trend   - Urinalysis sent, follow up results. No Diarrhea as per patient

## 2021-02-01 NOTE — PROGRESS NOTE ADULT - PROBLEM SELECTOR PLAN 6
- CONT: Symbicort inhaler BID and Duonebs  - PRN Robutussin/Benonatate for cough  - not on Home Oxygen. Wean down HFNC 30/40 to 4L NC O2  - c/w Nystatin swish and spit for thrush  - QTc 500.  Unable to Switch Nystatin to Fluconazole 200mg IV

## 2021-02-01 NOTE — CHART NOTE - NSCHARTNOTEFT_GEN_A_CORE
Admitting Diagnosis:   Patient is a 86y old  Female who presents with a chief complaint of progressively worsening SOB and cough x 3 days (01 Feb 2021 13:17)      PAST MEDICAL & SURGICAL HISTORY:  Essential hypertension  Hypertension    Type 2 diabetes mellitus  Diabetes    Hyperlipidemia  Hyperlipidemia    Disorder of kidney and ureter  Renal insufficiency    Peripheral vascular disease  PVD (peripheral vascular disease)    Anxiety state  Anxiety    Atherosclerosis of renal artery  with resulting stent placement    Atherosclerosis of renal artery  Renal artery stenosis        Current Nutrition Order:  dys 2 mech soft thin liquids   Consistent Carbohydrate with evening snack low   1500ml, ensure enlive x3      PO Intake: Good (%) [   ]  Fair (50-75%) [   ] Poor (<25%) [  x ]    GI Issues:   BM 1/30     Pain:  none per flow sheets     Skin Integrity:  1+ L leg edema, Dustin 17  No pressure ulcers     Labs:   02-01    131<L>  |  94<L>  |  80<H>  ----------------------------<  169<H>  5.6<H>   |  22  |  3.05<H>    Ca    9.1      01 Feb 2021 08:38  Phos  2.2     01-31  Mg     1.9     02-01      CAPILLARY BLOOD GLUCOSE      POCT Blood Glucose.: 191 mg/dL (01 Feb 2021 11:20)  POCT Blood Glucose.: 126 mg/dL (01 Feb 2021 07:25)  POCT Blood Glucose.: 218 mg/dL (31 Jan 2021 21:00)  POCT Blood Glucose.: 204 mg/dL (31 Jan 2021 16:51)      Medications:  MEDICATIONS  (STANDING):  albuterol/ipratropium for Nebulization 3 milliLiter(s) Nebulizer every 6 hours  amLODIPine   Tablet 10 milliGRAM(s) Oral daily  aspirin enteric coated 81 milliGRAM(s) Oral daily  atorvastatin 20 milliGRAM(s) Oral at bedtime  budesonide  80 MICROgram(s)/formoterol 4.5 MICROgram(s) Inhaler 2 Puff(s) Inhalation two times a day  cefepime   IVPB 1000 milliGRAM(s) IV Intermittent every 24 hours  dextrose 40% Gel 15 Gram(s) Oral once  dextrose 5%. 1000 milliLiter(s) (50 mL/Hr) IV Continuous <Continuous>  dextrose 5%. 1000 milliLiter(s) (100 mL/Hr) IV Continuous <Continuous>  dextrose 50% Injectable 25 Gram(s) IV Push once  dextrose 50% Injectable 12.5 Gram(s) IV Push once  dextrose 50% Injectable 25 Gram(s) IV Push once  donepezil 5 milliGRAM(s) Oral at bedtime  fluticasone propionate 50 MICROgram(s)/spray Nasal Spray 1 Spray(s) Both Nostrils two times a day  glucagon  Injectable 1 milliGRAM(s) IntraMuscular once  heparin   Injectable 5000 Unit(s) SubCutaneous every 12 hours  influenza  Vaccine (HIGH DOSE) 0.7 milliLiter(s) IntraMuscular once  insulin glargine Injectable (LANTUS) 4 Unit(s) SubCutaneous at bedtime  insulin lispro (ADMELOG) corrective regimen sliding scale   SubCutaneous Before meals and at bedtime  insulin lispro Injectable (ADMELOG) 5 Unit(s) SubCutaneous three times a day before meals  levothyroxine 50 MICROGram(s) Oral daily  methylPREDNISolone sodium succinate Injectable 20 milliGRAM(s) IV Push every 12 hours  metoprolol tartrate 50 milliGRAM(s) Oral every 12 hours  mirtazapine 7.5 milliGRAM(s) Oral at bedtime  nystatin    Suspension [id number] Unit(s) Oral four times a day  senna 1 Tablet(s) Oral daily  sevelamer carbonate 1600 milliGRAM(s) Oral three times a day  sodium zirconium cyclosilicate 10 Gram(s) Oral once    MEDICATIONS  (PRN):  acetaminophen   Tablet .. 650 milliGRAM(s) Oral every 6 hours PRN Moderate Pain (4 - 6)  guaiFENesin   Syrup  (Sugar-Free) 100 milliGRAM(s) Oral every 6 hours PRN Cough          5'2''  pounds +-10%  Weight 119 pounds, BMI 21.9, %EAN=233%     2/1 108.5 pounds   1/22 110 pounds  1/20 109.5 pounds   1/18 102.5 pounds   Varying EMR wts, likely in setting of Fluid Shifts in CHF / ESRD/HD pt with poor PO intake. Wt overall down trended during admission.     Estimated energy needs:   IBW for EER 2/2 Varying EMR wts  Adjusted for age, CHF, Renal; Fluids per team  1250-1500kcal/day 25-30kcal/kg  50-60gm/day 1.0-1.2gm/kg -- recommend upper end of needs, if HD to remain will adjust     Subjective:   86 F, POOR HISTORIAN/early dementia, former heavy smoker with PMHx HTN, hyperlipidemia, COPD, hypothyroidism, Stage IV CKD (baseline Cr ~3.0), anemia of chronic disease, CAD s/p CABG 2015 Dr. Blunt, chronic diastolic CHF(EF:55% by Echo 10/2020), renal artery stenosis s/p renal artery stent >20yrs ago, Carotid artery stenosis, PAD, who presents to [facility] ED 1/15 c/o progressively worsening SOB and cough x3 days. Pt admitted to cardiac telemetry for management of acute on chronic diastolic CHF exacerbation in setting of suspected CAP. Pt s/p diuresis with hospital course complicated by NAV on CKD Stage IV (renal following), hypoxic respiratory failure requiring NRB. Stepped up to CCU for strict fluid status monitoring and potential HD and stepped down to 5LA 1/23. Continuing ID recommendations for infectious work up. Nephrology consulted for NAV, Noted Non oliguric NAV on CKD3 2/2 CRS vs CKD progression; likely DM nephropathy. HD Began 1/26, last session 1/29 with Plan for HD today 2/1. Repeat COVID negative 1/28. PPD 1/30, Pending read 2/1. Anton following for Anxiety.     Spoke with RN/PCA/NP; face to face deferred, pt on exposure COVID precautions. Pt seen on room air through window on 5LA. +NC use per flow sheets noted. Remains ordered for dys 2 mech soft/thins, consCHO with evening snack, low sodium, 1500ml FR, ensure enlivex3/day. Reported pt cont with limited PO intake at this time, per RN pt seems to prefer fruit items on merced. Cont with steroids IVP q12hrs, night time insulin. Last 3 POCT 126 218 204. Phos low, K elevated today, lokemla ordered. BUN/Cr 46/2.29 (trended down since last RD visit). Remeron ordered.   Please see below for nutritions recommendations. D/w team, placed pending order.     Recommendations:  1. Monitor need for NPO Pending Resp Therapies.   2. While PO feasible: recommend low sodium, mech soft (to provide ease during meals).  >> Suggest d/c Consistent Carbohydrate from order, adjust insulin PRN.  >> Suggest change from ensure to Nepro based on Lytes (425kcal/20gm prot per 1 can).  3. Monitor %PO intake and diet tolerance.   >> Cont with Appetite Stimulant.   >> Should pt be noted with s/s of issues swallowing, recommend NPO/SLP.   4. Monitor Skin, Wts daily, GI, GOC.  5. Labs: monitor BMP, CBC, glucose, lytes, trend renal indices, LFTs, POCT.   6. Phos Binder PRN.   7. RD to remain available for additional nutrition interventions as needed.      Education:   NA @ this time    Risk Level: High [  x ] Moderate [   ] Low [   ].

## 2021-02-01 NOTE — PROGRESS NOTE ADULT - PROBLEM SELECTOR PLAN 8
- Na 131 today; remains asx.   - Switched meds to NS from D5.   - Continue to monitor.   - Renal following, appreciate recs

## 2021-02-01 NOTE — PROGRESS NOTE ADULT - SUBJECTIVE AND OBJECTIVE BOX
Interventional, Pulmonary, Critical, Chest Special Procedures.    Pt was seen and fully examined by myself.     Time spent with patient in minutes: 37    Patient is a 86y old  Female who presents with a chief complaint of progressively worsening SOB and cough x 3 days (31 Jan 2021 15:14) The patient c persistent leukocytosis, no obvious source of infection. No newe cough or sob, Pt able to void c no dysuria, had BM no diarrhea, pain free and eupneic on NC.    HPI:  86 yr old F, POOR HISTORIAN/early dementia, former heavy smoker with PMHx HTN, hyperlipidemia, COPD, hypothyroidism, Stage IV CKD (baseline Cr ~3.0), anemia of chronic disease, CAD s/p CABG 2015 Dr. Blunt, chronic diastolic CHF(EF:55% by Echo 10/2020), renal artery stenosis s/p renal artery stent >20yrs ago, Carotid artery stenosis, PAD, who presents to [facility] ED 1/15/21 c/o progressively worsening SOB and cough x 3 days. Patient reports she can barely walk 10 feet prior to having to stop and rest. Patient further admits to chronic bilateral LE edema and significant orthopnea and she has been sleeping upright in a chair the past week. Patient also admits to chills and a nonproductive cough over the past few days. Patient was instructed to increase her Lasix 80mg PO daily dose to Lasix 80mg PO BID and start Metalazone 10mg PO BID prior to each dose by her cardiologist Dr. Horvath. Patient denies any chest pain, dizziness, palpitations, recent travel or sick contacts. Patient endorsing compliance w/ medications however daughter states that compliance with medication has been questionable over the last few months, as patient is becoming more forgetful.    In [facility] ED, BP: 170/68, HR: 80, RR:26, Temp: 98.4F, O2 sat: 80% on RA, improved to 99% with 10L NRB. EKG revealed NSR@67BPM with incomplete LBBB, TWI Lead I, AVL (similar to prior EKG 10/2020). CXR prelim read consistent with alveolar edema, bilateral patchy opacities/consolidation.    Labs notable for: WBC 14.5 with left shift, H/H 9.8/30.6, D-dimer 491, BUN/Cr 56/3.54, , CRP 4.86, Ferritin 180, Procalcitonin 0.29, Lactate 2.4, Troponin T 0.05, BNP 64,671, Initial COVID PCR negative.    Patient treated with Lasix 80mg IV x 1 dose, SL NTG 0.4mg PO x 1 dose, Decadron 6mg IV x 1 dose, Ceftriaxone 1g IV x 1 dose and Azithromycin 500mg IV x 1 dose.  Patient now admitted to cardiac telemetry for management of acute on chronic diastolic CHF exacerbation in setting of suspected CAP.    **Of note: Patient had a fall ~3 weeks ago for which she was admitted to [facility], CT imaging was negative as per daughter and fall thought to be secondary to hypotension.   (15 Mark 2021 21:30)  Constitutional: (+) weight change, (-) fever,  (-) chills, (+) fatigue, () night sweats  Eyes: (-) discharge, () eye pain, () visual change  ENT:  (-) hearing difficulty, () vertigo, () sinus pain,  () throat pain, () epistaxis, () dysphagia, () hoarseness  Neck: (-) pain, () stiffness, () swelling  Respiratory: (+) dry cough, () wheezing, () hemoptysis      Cardiovascular: (-) chest pain, ()palpitations, () dizziness   Gastrointestinal: (-) abdominal pain, () nausea, () vomiting, () hematemesis, () diarrhea,  () constipation, () melena  Genitourinary:  (-) dysuria, () frequency, () hematuria, () incontinence      Neurologic: (-) headache, () memory loss, () loss of strength, () numbness, () tremor     Skin: (-) itching, () burning, () rash, () lesions   Lymphatic: () enlarged lymph nodes  Endocrine: (-) hair loss, () temperature intolerance         Musculoskeletal: (-) back pain, () joint pain,  () extremity pain  Psychiatric: (-) visual change, () auditory change, () depression, () anxiety, () suicidal  Sleep: (+) disorder, () insomnia, () sleep deprivation  Heme/Lymph: () easy bruising, () bleeding gums            Allergy and Immunologic: () hives, () eczema    PAST MEDICAL & SURGICAL HISTORY:  Essential hypertension  Hypertension    Type 2 diabetes mellitus  Diabetes    Hyperlipidemia  Hyperlipidemia    Disorder of kidney and ureter  Renal insufficiency    Peripheral vascular disease  PVD (peripheral vascular disease)    Anxiety state  Anxiety    Atherosclerosis of renal artery  with resulting stent placement    Atherosclerosis of renal artery  Renal artery stenosis      FAMILY HISTORY:  Family history of ischemic heart disease  Family history of coronary artery disease.      SOCIAL HISTORY:      - Tobacco     - ETOH    Allergies    No Known Allergies    Intolerances      Vital Signs Last 24 Hrs  T(C): 36.4 (01 Feb 2021 09:31), Max: 36.6 (31 Jan 2021 14:41)  T(F): 97.5 (01 Feb 2021 09:31), Max: 97.9 (31 Jan 2021 14:41)  HR: 73 (01 Feb 2021 11:55) (65 - 82)  BP: 160/71 (01 Feb 2021 11:55) (136/64 - 180/79)  BP(mean): 102 (01 Feb 2021 11:55) (90 - 119)  RR: 19 (01 Feb 2021 11:55) (17 - 20)  SpO2: 95% (01 Feb 2021 11:55) (93% - 96%)    01-31 @ 07:01 - 02-01 @ 07:00  --------------------------------------------------------  IN: 630 mL / OUT: 350 mL / NET: 280 mL    02-01 @ 07:01 - 02-01 @ 13:17  --------------------------------------------------------  IN: 340 mL / OUT: 200 mL / NET: 140 mL          MEDICATIONS:  MEDICATIONS  (STANDING):  albuterol/ipratropium for Nebulization 3 milliLiter(s) Nebulizer every 6 hours  amLODIPine   Tablet 10 milliGRAM(s) Oral daily  aspirin enteric coated 81 milliGRAM(s) Oral daily  atorvastatin 20 milliGRAM(s) Oral at bedtime  budesonide  80 MICROgram(s)/formoterol 4.5 MICROgram(s) Inhaler 2 Puff(s) Inhalation two times a day  cefepime   IVPB 1000 milliGRAM(s) IV Intermittent every 24 hours  dextrose 40% Gel 15 Gram(s) Oral once  dextrose 5%. 1000 milliLiter(s) (50 mL/Hr) IV Continuous <Continuous>  dextrose 5%. 1000 milliLiter(s) (100 mL/Hr) IV Continuous <Continuous>  dextrose 50% Injectable 25 Gram(s) IV Push once  dextrose 50% Injectable 12.5 Gram(s) IV Push once  dextrose 50% Injectable 25 Gram(s) IV Push once  donepezil 5 milliGRAM(s) Oral at bedtime  fluticasone propionate 50 MICROgram(s)/spray Nasal Spray 1 Spray(s) Both Nostrils two times a day  glucagon  Injectable 1 milliGRAM(s) IntraMuscular once  heparin   Injectable 5000 Unit(s) SubCutaneous every 12 hours  influenza  Vaccine (HIGH DOSE) 0.7 milliLiter(s) IntraMuscular once  insulin glargine Injectable (LANTUS) 4 Unit(s) SubCutaneous at bedtime  insulin lispro (ADMELOG) corrective regimen sliding scale   SubCutaneous Before meals and at bedtime  insulin lispro Injectable (ADMELOG) 5 Unit(s) SubCutaneous three times a day before meals  levothyroxine 50 MICROGram(s) Oral daily  methylPREDNISolone sodium succinate Injectable 20 milliGRAM(s) IV Push every 12 hours  metoprolol tartrate 50 milliGRAM(s) Oral every 12 hours  mirtazapine 7.5 milliGRAM(s) Oral at bedtime  nystatin    Suspension 012649 Unit(s) Oral four times a day  senna 1 Tablet(s) Oral daily  sevelamer carbonate 1600 milliGRAM(s) Oral three times a day  sodium zirconium cyclosilicate 10 Gram(s) Oral once    MEDICATIONS  (PRN):  acetaminophen   Tablet .. 650 milliGRAM(s) Oral every 6 hours PRN Moderate Pain (4 - 6)  guaiFENesin   Syrup  (Sugar-Free) 100 milliGRAM(s) Oral every 6 hours PRN Cough      PHYSICAL EXAM:  More comfortable, no immediate distress  Muscle atrophy, developed,  Eyes: PERRLA, EOMI, -conjunctivitis, -scleritis   Head: no focal deficit, normocephalic,  no trauma  ENMT: moist tongue with white lesions, + thrush, -nasal discharge, -hoarseness, normal hearing, + cough, -hemoptysis, trachea midline  Neck: supple, - lymphadenopathy,  -masses, -JVD  Respiratory: bilateral diminished breath sounds, -wheezing, less  rhonchi,  rales, + less crackles lower lungs, better peripheral air entry  Chest:  no more accessory muscle use, -paradoxical breathing  Cardiovascular: irregular rate and sinus rhythm, S1 S2 normal, -S3, -S4, -murmur, -gallop, -rub  Gastrointestinal: soft, nontender, nondistended, normal bowel sounds, no hepatosplenomegaly  Genitourinary: -flank pain, -dysuria, + Cason  Extremities: -clubbing, -cyanosis, -edema    Vascular: peripheral pulses palpable 2+ bilaterally  Neurological: alert, oriented x 3, no focal deficit, -tremor   Skin: warm, dry, -erythema, iv sites intact  Lymph nodes; no cervical, supraclavicular or axillary adenopathy  Psychiatric: cooperative, appropriate mood    DEVICES:  - DENTURES   +IV R / L     - ETUBE   -TRACH   -CTUBE  R / L    LABS:                          12.3   24.21 )-----------( 151      ( 01 Feb 2021 08:38 )             38.3     02-01    131<L>  |  94<L>  |  80<H>  ----------------------------<  169<H>  5.6<H>   |  22  |  3.05<H>    Ca    9.1      01 Feb 2021 08:38  Phos  2.2     01-31  Mg     1.9     02-01        RADIOLOGY & ADDITIONAL STUDIES (The following images were personally reviewed):

## 2021-02-01 NOTE — PROGRESS NOTE ADULT - ASSESSMENT
ASSESSMENT/PLAN 86 yr old F, POOR HISTORIAN/early dementia, former heavy smoker with PMHx HTN, hyperlipidemia, COPD, hypothyroidism, Stage IV CKD (baseline Cr ~3.0), anemia of chronic disease, CAD s/p CABG 2015 Dr. Blunt, acute on chronic diastolic CHF(EF:55% by Echo 10/2020), renal artery stenosis s/p renal artery stent >20yrs ago, Carotid artery stenosis, PAD, who presents to [facility] ED 1/15/21 c/o progressively worsening SOB and cough x 3 days. Currently on steroids from pneumonitis.    1. O2 Continue NC titrate to adequate 02 Sat, please humidify  2. Bronchodilators:  Atrovent/ albuterol q 4 – 6 hours as needed  3. Corticosteroids: Recommend continue taper  discussed c Dr. Cuevas  4. ID/Antibiotics: Cefepime  5. Cardiac/HTN: Optimize CHF mngmnt, optimize HD   6. GI: Rx/ prophylaxis c PPI/H2B  7. Heme: Rx/VT prophylaxis c SQH/SCD/ASA follow H/H closely  8. Aspiration precautions at all times  9, Avoid sedation in this pt, to protect respiratory drive, benzodiazepines are contraindicated  Discussed with managing team ,

## 2021-02-01 NOTE — PROGRESS NOTE ADULT - PROBLEM SELECTOR PLAN 2
Overall worsening clinical picture, oliguric NAV on CKD stage 3 2/2 ATN with CKD progression from DM.   - F/u Renal recs, known to Dr Nicolas  - HD initiated this admission 1/26/21 via R IJ HD catheter.  - Cr 3.05 today  - Renal US: atrophic kidneys, no WILBER.   - Nephrotic w/u non-contributory thus far, ANCA negative  - s/p dialysis x 4 sessions, Last HD session 1/30  - Next session 2/2  - Monitor Strict I/Os, daily PVRs  - PPD placed 1/30, reading on 2/1, Quantiferon indeterminate

## 2021-02-01 NOTE — PROGRESS NOTE ADULT - SUBJECTIVE AND OBJECTIVE BOX
CARDIOLOGY NP PROGRESS NOTE    Subjective:  Received pt awake in bed in NAD. States "feeling much better". Denies CP, dizziness/diaphoresis, n/v, palpitations.  Remainder ROS otherwise negative.    Overnight Events: Tolerating 4L NC well.  No acute events overnight     TELEMETRY: SR (HR 60s-70s)      VITAL SIGNS:  T(C): 36.3 (02-01-21 @ 14:46), Max: 36.4 (01-31-21 @ 17:17)  HR: 78 (02-01-21 @ 16:07) (65 - 81)  BP: 160/72 (02-01-21 @ 16:07) (138/63 - 180/79)  RR: 17 (02-01-21 @ 16:07) (17 - 21)  SpO2: 93% (02-01-21 @ 16:07) (93% - 96%)  Wt(kg): --    I&O's Summary    31 Jan 2021 07:01  -  01 Feb 2021 07:00  --------------------------------------------------------  IN: 630 mL / OUT: 350 mL / NET: 280 mL    01 Feb 2021 07:01  -  01 Feb 2021 16:47  --------------------------------------------------------  IN: 520 mL / OUT: 200 mL / NET: 320 mL          PHYSICAL EXAM:    General: Alert to self, place and time. Disoriented to situation at times. No acute Distress   Neck: Supple, (-) JVD  Cardiac: S1 S2, No M/R/G  Pulmonary: Lungs w/ bibasilar crackles. Breathing unlabored on 4L NC.   Abdomen: Soft, Non -tender, +BS x 4 quads  Extremities: No Rashes, 1+ radha LE edema  Lines: R IJ tunneled HD Cath- site CDI, dsg in place   Neuro: Alert to self, place and time. Disoriented to situation at times.  No focal deficits          LABS:                          12.3   24.21 )-----------( 151      ( 01 Feb 2021 08:38 )             38.3                              02-01    131<L>  |  94<L>  |  80<H>  ----------------------------<  169<H>  5.6<H>   |  22  |  3.05<H>    Ca    9.1      01 Feb 2021 08:38  Phos  2.2     01-31  Mg     1.9     02-01                                CAPILLARY BLOOD GLUCOSE      POCT Blood Glucose.: 191 mg/dL (01 Feb 2021 11:20)  POCT Blood Glucose.: 126 mg/dL (01 Feb 2021 07:25)  POCT Blood Glucose.: 218 mg/dL (31 Jan 2021 21:00)  POCT Blood Glucose.: 204 mg/dL (31 Jan 2021 16:51)            Allergies:  No Known Allergies    MEDICATIONS  (STANDING):  albuterol/ipratropium for Nebulization 3 milliLiter(s) Nebulizer every 6 hours  amLODIPine   Tablet 10 milliGRAM(s) Oral daily  aspirin enteric coated 81 milliGRAM(s) Oral daily  atorvastatin 20 milliGRAM(s) Oral at bedtime  budesonide  80 MICROgram(s)/formoterol 4.5 MICROgram(s) Inhaler 2 Puff(s) Inhalation two times a day  cefepime   IVPB 1000 milliGRAM(s) IV Intermittent every 24 hours  dextrose 40% Gel 15 Gram(s) Oral once  dextrose 5%. 1000 milliLiter(s) (50 mL/Hr) IV Continuous <Continuous>  dextrose 5%. 1000 milliLiter(s) (100 mL/Hr) IV Continuous <Continuous>  dextrose 50% Injectable 25 Gram(s) IV Push once  dextrose 50% Injectable 12.5 Gram(s) IV Push once  dextrose 50% Injectable 25 Gram(s) IV Push once  donepezil 5 milliGRAM(s) Oral at bedtime  fluticasone propionate 50 MICROgram(s)/spray Nasal Spray 1 Spray(s) Both Nostrils two times a day  glucagon  Injectable 1 milliGRAM(s) IntraMuscular once  heparin   Injectable 5000 Unit(s) SubCutaneous every 12 hours  influenza  Vaccine (HIGH DOSE) 0.7 milliLiter(s) IntraMuscular once  insulin glargine Injectable (LANTUS) 4 Unit(s) SubCutaneous at bedtime  insulin lispro (ADMELOG) corrective regimen sliding scale   SubCutaneous Before meals and at bedtime  insulin lispro Injectable (ADMELOG) 5 Unit(s) SubCutaneous three times a day before meals  levothyroxine 50 MICROGram(s) Oral daily  methylPREDNISolone sodium succinate Injectable 20 milliGRAM(s) IV Push once  metoprolol tartrate 50 milliGRAM(s) Oral every 12 hours  mirtazapine 7.5 milliGRAM(s) Oral at bedtime  nystatin    Suspension [id number] Unit(s) Oral four times a day  senna 1 Tablet(s) Oral daily  sevelamer carbonate 1600 milliGRAM(s) Oral three times a day  sodium chloride 0.65% Nasal 1 Spray(s) Both Nostrils daily    MEDICATIONS  (PRN):  acetaminophen   Tablet .. 650 milliGRAM(s) Oral every 6 hours PRN Moderate Pain (4 - 6)  guaiFENesin   Syrup  (Sugar-Free) 100 milliGRAM(s) Oral every 6 hours PRN Cough        DIAGNOSTIC TESTS:

## 2021-02-01 NOTE — PROGRESS NOTE ADULT - PROBLEM SELECTOR PLAN 7
- CT chest w/o contrast: b/l GGO superimposed on chronic interstitial pulm fibrosis probably d/t chronic hypersensitivity pneumonitis or sarcoid. GGOs could be due to pulmonary edema, pulmonary hemorrhage, alveolar proteinosis, organizing pneumonia or atypical infection.  - continue to taper Methylprednisolone 20mg IV- ordered qd starting 2/2  - PRN robitussin/tessalon pearls for cough

## 2021-02-01 NOTE — PROGRESS NOTE ADULT - PROBLEM SELECTOR PLAN 9
Dx 3wks ago @ [facility].   - CTH 1/19 (-) for acute changes.   - Per Neurosurgery, cont ASA 81 PO QD; no neuro interventions at this time.     #DEMENTIA  - CONT: Donepezil 5mg PO QD    ## ANXIETY  - Psych consulted as anxiety thought to be worsening during hospitalization, appreciate recs.   - c/w Remeron 7.5mg qhs and Ativan dc'd as per Dr. 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

## 2021-02-01 NOTE — PROGRESS NOTE ADULT - PROBLEM SELECTOR PLAN 3
HX Anemia. Baseline Hgb 8-9. 2/2 to CKD and anemia of chronic disease  - s/p 2U PRBC 1/27 and 1U PRBC 1/20   - hgb stable in 12's,  no active s/sx bleeding   - continue to HOLD IV iron due to treatment for presumed CAP vs pneumonitis  - Stool for occult blood positive 1/25, reportedly Hx of hemorrhoids  - Maintain active T&S, last 1/31

## 2021-02-01 NOTE — PROGRESS NOTE ADULT - ATTENDING COMMENTS
Pt seen at bedside  Agree with above  steroid administration reviewed  insulin administration reviewed  limited PO intake at times  otherwise improving  planned for steroid taper to once daily tomorrow  continue 4 units lantus at bedtime, lispro 5 units tid with meals, sliding scale  statin  will follow

## 2021-02-01 NOTE — PROGRESS NOTE ADULT - ASSESSMENT
86F poor historian/early dementia and former heavy smoker with PMHx DM, HTN, hyperlipidemia, COPD, hypothyroidism, CKD stage 3 (baseline Crea ~3.0), anemia of chronic disease, CAD s/p CABG 2015, chronic diastolic CHF (EF 55% via echo 10/2020), renal artery stenosis (s/p stent 20+ years ago), carotid artery stenosis, PAD presented 1/15/21 w/ progressively worsening SOB and cough. Admitted for acute on chronic diastolic HF, oliguric ATN on CKD 3 s/p stepped up to CCU and stepped back down to tele 1/23. Hospital course c/b PNA vs. radha pneumonitis on Cefepime IV, acute hypoxic respiratory failure requiring HFNC and titrated to NC, Tunneled Cath placement 1/26 w/ initiation of HD and Anemia requiring total 3U PRBC during hospitalization. Pulm Dr Cuevas, Renal Dr Nicolas, ID Dr Resendez, Endo and Psych are following.

## 2021-02-01 NOTE — PROGRESS NOTE ADULT - SUBJECTIVE AND OBJECTIVE BOX
INTERVAL HPI/OVERNIGHT EVENTS:    Patient is a 86y old  Female who presents with a chief complaint of progressively worsening SOB and cough x 3 days (2021 13:17)  pt was seen and examined at the bedside PCA reports poor appetite.   on solumedrol 20mg Q12h.     FSG & Insulin received:    Yesterday:  pre-dinner fs  nutritional lispro 5  units +4   units lispro SS  bedtime fs  lantus  4 units + 4   units lispro SS    Today:  pre-breakfast fs  nutritional lispro 5 units  pre-lunch fs  nutritional lispro  5 units+  2 units lispro SS    CONSTITUTIONAL:  Negative fever or chills  EYES:  Negative  blurry vision or double vision  CARDIOVASCULAR:  Negative for chest pain or palpitations  GASTROINTESTINAL:  Negative for nausea, vomiting, diarrhea, constipation, or abdominal pain  GENITOURINARY:  Negative frequency, urgency or dysuria  NEUROLOGIC:  No headache      MEDICATIONS  (STANDING):  albuterol/ipratropium for Nebulization 3 milliLiter(s) Nebulizer every 6 hours  amLODIPine   Tablet 10 milliGRAM(s) Oral daily  aspirin enteric coated 81 milliGRAM(s) Oral daily  atorvastatin 20 milliGRAM(s) Oral at bedtime  budesonide  80 MICROgram(s)/formoterol 4.5 MICROgram(s) Inhaler 2 Puff(s) Inhalation two times a day  cefepime   IVPB 1000 milliGRAM(s) IV Intermittent every 24 hours  dextrose 40% Gel 15 Gram(s) Oral once  dextrose 5%. 1000 milliLiter(s) (50 mL/Hr) IV Continuous <Continuous>  dextrose 5%. 1000 milliLiter(s) (100 mL/Hr) IV Continuous <Continuous>  dextrose 50% Injectable 25 Gram(s) IV Push once  dextrose 50% Injectable 12.5 Gram(s) IV Push once  dextrose 50% Injectable 25 Gram(s) IV Push once  donepezil 5 milliGRAM(s) Oral at bedtime  fluticasone propionate 50 MICROgram(s)/spray Nasal Spray 1 Spray(s) Both Nostrils two times a day  glucagon  Injectable 1 milliGRAM(s) IntraMuscular once  heparin   Injectable 5000 Unit(s) SubCutaneous every 12 hours  influenza  Vaccine (HIGH DOSE) 0.7 milliLiter(s) IntraMuscular once  insulin glargine Injectable (LANTUS) 4 Unit(s) SubCutaneous at bedtime  insulin lispro (ADMELOG) corrective regimen sliding scale   SubCutaneous Before meals and at bedtime  insulin lispro Injectable (ADMELOG) 5 Unit(s) SubCutaneous three times a day before meals  levothyroxine 50 MICROGram(s) Oral daily  methylPREDNISolone sodium succinate Injectable 20 milliGRAM(s) IV Push every 12 hours  metoprolol tartrate 50 milliGRAM(s) Oral every 12 hours  mirtazapine 7.5 milliGRAM(s) Oral at bedtime  nystatin    Suspension 768506 Unit(s) Oral four times a day  senna 1 Tablet(s) Oral daily  sevelamer carbonate 1600 milliGRAM(s) Oral three times a day    MEDICATIONS  (PRN):  acetaminophen   Tablet .. 650 milliGRAM(s) Oral every 6 hours PRN Moderate Pain (4 - 6)  guaiFENesin   Syrup  (Sugar-Free) 100 milliGRAM(s) Oral every 6 hours PRN Cough      Past medical history reviewed  Family history reviewed  Social history reviewed    PHYSICAL EXAM  Vital Signs Last 24 Hrs  T(C): 36.4 (2021 09:31), Max: 36.6 (2021 14:41)  T(F): 97.5 (2021 09:31), Max: 97.9 (2021 14:41)  HR: 80 (2021 13:55) (65 - 81)  BP: 171/76 (2021 13:55) (138/63 - 180/79)  BP(mean): 109 (2021 13:55) (90 - 119)  RR: 21 (2021 13:55) (17 - 21)  SpO2: 96% (2021 13:55) (93% - 96%)    Constitutional: resting comfortably on high flow    HEENT: no proptosis or lid retraction  Neck: no thyromegaly or palpable thyroid nodules   Respiratory: lungs CTAB.  Cardiovascular: regular rhythm, normal S1 and S2  GI: soft, NT/ND, no masses/HSM appreciated.  Neurology: no tremors  Skin: no visible rashes/lesions  Psychiatric: AAO x 3,    LABS:                        12.3   24.21 )-----------( 151      ( 2021 08:38 )             38.3     02-    131<L>  |  94<L>  |  80<H>  ----------------------------<  169<H>  5.6<H>   |  22  |  3.05<H>    Ca    9.1      2021 08:38  Phos  2.2       Mg     1.9               Thyroid Stimulating Hormone, Serum: 1.749 uIU/mL (10-13 @ 05:49)      HbA1C: 5.1 % ( @ 05:27)  5.9 % (10-13 @ 05:49)      CAPILLARY BLOOD GLUCOSE      POCT Blood Glucose.: 191 mg/dL (2021 11:20)  POCT Blood Glucose.: 126 mg/dL (2021 07:25)  POCT Blood Glucose.: 218 mg/dL (2021 21:00)  POCT Blood Glucose.: 204 mg/dL (2021 16:51)      Free Thyroxine, Serum: 0.78 ng/dL (21 @ 06:37)  Cholesterol, Serum: 191 mg/dL (21 @ 05:27)  HDL Cholesterol, Serum: 69 mg/dL (21 @ 05:27)  Triglycerides, Serum: 98 mg/dL (21 @ 05:27)  Cholesterol, Serum: 119 mg/dL (10-13-20 @ 05:49)  HDL Cholesterol, Serum: 51 mg/dL (10-13-20 @ 05:49)  Triglycerides, Serum: 94 mg/dL (10-13-20 @ 05:49)  Direct LDL: 49 mg/dL (10-13-20 @ 05:49)    A/P: 86 yr old F, POOR HISTORIAN/early dementia, former heavy smoker with PMHx HTN, hyperlipidemia, COPD, hypothyroidism, Stage IV CKD (baseline Cr ~3.0), anemia of chronic disease, CAD s/p CABG  Dr. Blunt, chronic diastolic CHF(EF:55% by Echo 10/2020), renal artery stenosis s/p renal artery stent >20yrs ago, Carotid artery stenosis, PAD, who presents to [facility] ED 1/15/21 c/o progressively worsening SOB and cough x 3 days. Currently on steroids from pneumonitis    1.  Steroid induced hyperglycemia  - hba1c 5.1  Cr 5.49 and GFr 20.     Please continue lispro 5 U  TID with meals.   Continue lispro moderate dose sliding scale 4 times daily with meals and at bedtime.    Please continue consistent carbohydrate diet.    on solumedrol 20mg Q12h    2. Hypothyroidism  - TSH 1.7. free T4 0.78  Please continue levothyroxine 50mcg once daily for now    3. Hyponatremia - possible SIADH from infectious disease process.  Na 131  Continue to monitor.  Fluid restrict, though pt with poor PO intake.    Will continue to monitor        case seen and discussed with     and updated primary team       INTERVAL HPI/OVERNIGHT EVENTS:    Patient is a 86y old  Female who presents with a chief complaint of progressively worsening SOB and cough x 3 days (2021 13:17)  pt was seen and examined at the bedside PCA reports poor appetite.   on solumedrol 20mg Q12h.     FSG & Insulin received:    Yesterday:  pre-dinner fs  nutritional lispro 5  units +4   units lispro SS  bedtime fs  lantus  4 units + 4   units lispro SS    Today:  pre-breakfast fs  nutritional lispro 5 units  pre-lunch fs  nutritional lispro  5 units+  2 units lispro SS    CONSTITUTIONAL:  Negative fever or chills  EYES:  Negative  blurry vision or double vision  CARDIOVASCULAR:  Negative for chest pain or palpitations  GASTROINTESTINAL:  Negative for nausea, vomiting, diarrhea, constipation, or abdominal pain  GENITOURINARY:  Negative frequency, urgency or dysuria  NEUROLOGIC:  No headache      MEDICATIONS  (STANDING):  albuterol/ipratropium for Nebulization 3 milliLiter(s) Nebulizer every 6 hours  amLODIPine   Tablet 10 milliGRAM(s) Oral daily  aspirin enteric coated 81 milliGRAM(s) Oral daily  atorvastatin 20 milliGRAM(s) Oral at bedtime  budesonide  80 MICROgram(s)/formoterol 4.5 MICROgram(s) Inhaler 2 Puff(s) Inhalation two times a day  cefepime   IVPB 1000 milliGRAM(s) IV Intermittent every 24 hours  dextrose 40% Gel 15 Gram(s) Oral once  dextrose 5%. 1000 milliLiter(s) (50 mL/Hr) IV Continuous <Continuous>  dextrose 5%. 1000 milliLiter(s) (100 mL/Hr) IV Continuous <Continuous>  dextrose 50% Injectable 25 Gram(s) IV Push once  dextrose 50% Injectable 12.5 Gram(s) IV Push once  dextrose 50% Injectable 25 Gram(s) IV Push once  donepezil 5 milliGRAM(s) Oral at bedtime  fluticasone propionate 50 MICROgram(s)/spray Nasal Spray 1 Spray(s) Both Nostrils two times a day  glucagon  Injectable 1 milliGRAM(s) IntraMuscular once  heparin   Injectable 5000 Unit(s) SubCutaneous every 12 hours  influenza  Vaccine (HIGH DOSE) 0.7 milliLiter(s) IntraMuscular once  insulin glargine Injectable (LANTUS) 4 Unit(s) SubCutaneous at bedtime  insulin lispro (ADMELOG) corrective regimen sliding scale   SubCutaneous Before meals and at bedtime  insulin lispro Injectable (ADMELOG) 5 Unit(s) SubCutaneous three times a day before meals  levothyroxine 50 MICROGram(s) Oral daily  methylPREDNISolone sodium succinate Injectable 20 milliGRAM(s) IV Push every 12 hours  metoprolol tartrate 50 milliGRAM(s) Oral every 12 hours  mirtazapine 7.5 milliGRAM(s) Oral at bedtime  nystatin    Suspension 787811 Unit(s) Oral four times a day  senna 1 Tablet(s) Oral daily  sevelamer carbonate 1600 milliGRAM(s) Oral three times a day    MEDICATIONS  (PRN):  acetaminophen   Tablet .. 650 milliGRAM(s) Oral every 6 hours PRN Moderate Pain (4 - 6)  guaiFENesin   Syrup  (Sugar-Free) 100 milliGRAM(s) Oral every 6 hours PRN Cough      Past medical history reviewed  Family history reviewed  Social history reviewed    PHYSICAL EXAM  Vital Signs Last 24 Hrs  T(C): 36.4 (2021 09:31), Max: 36.6 (2021 14:41)  T(F): 97.5 (2021 09:31), Max: 97.9 (2021 14:41)  HR: 80 (2021 13:55) (65 - 81)  BP: 171/76 (2021 13:55) (138/63 - 180/79)  BP(mean): 109 (2021 13:55) (90 - 119)  RR: 21 (2021 13:55) (17 - 21)  SpO2: 96% (2021 13:55) (93% - 96%)    Constitutional: resting comfortably on high flow    HEENT: no proptosis or lid retraction  Neck: no thyromegaly or palpable thyroid nodules   Respiratory: lungs CTAB.  Cardiovascular: regular rhythm, normal S1 and S2  GI: soft, NT/ND, no masses/HSM appreciated.  Neurology: no tremors  Skin: no visible rashes/lesions  Psychiatric: AAO x 3,    LABS:                        12.3   24.21 )-----------( 151      ( 2021 08:38 )             38.3     02-    131<L>  |  94<L>  |  80<H>  ----------------------------<  169<H>  5.6<H>   |  22  |  3.05<H>    Ca    9.1      2021 08:38  Phos  2.2       Mg     1.9               Thyroid Stimulating Hormone, Serum: 1.749 uIU/mL (10-13 @ 05:49)      HbA1C: 5.1 % ( @ 05:27)  5.9 % (10-13 @ 05:49)      CAPILLARY BLOOD GLUCOSE      POCT Blood Glucose.: 191 mg/dL (2021 11:20)  POCT Blood Glucose.: 126 mg/dL (2021 07:25)  POCT Blood Glucose.: 218 mg/dL (2021 21:00)  POCT Blood Glucose.: 204 mg/dL (2021 16:51)      Free Thyroxine, Serum: 0.78 ng/dL (21 @ 06:37)  Cholesterol, Serum: 191 mg/dL (21 @ 05:27)  HDL Cholesterol, Serum: 69 mg/dL (21 @ 05:27)  Triglycerides, Serum: 98 mg/dL (21 @ 05:27)  Cholesterol, Serum: 119 mg/dL (10-13-20 @ 05:49)  HDL Cholesterol, Serum: 51 mg/dL (10-13-20 @ 05:49)  Triglycerides, Serum: 94 mg/dL (10-13-20 @ 05:49)  Direct LDL: 49 mg/dL (10-13-20 @ 05:49)    A/P: 86 yr old F, POOR HISTORIAN/early dementia, former heavy smoker with PMHx HTN, hyperlipidemia, COPD, hypothyroidism, Stage IV CKD (baseline Cr ~3.0), anemia of chronic disease, CAD s/p CABG  Dr. Blunt, chronic diastolic CHF(EF:55% by Echo 10/2020), renal artery stenosis s/p renal artery stent >20yrs ago, Carotid artery stenosis, PAD, who presents to [facility] ED 1/15/21 c/o progressively worsening SOB and cough x 3 days. Currently on steroids from pneumonitis    1.  Steroid induced hyperglycemia  - hba1c 5.1  Cr 5.49 and GFr 20.     Please continue lispro   TID with meals.   Continue lispro moderate dose sliding scale 4 times daily with meals and at bedtime.    Please continue consistent carbohydrate diet.    on solumedrol 20mg Q12h    2. Hypothyroidism  - TSH 1.7. free T4 0.78  Please continue levothyroxine 50mcg once daily for now    3. Hyponatremia - possible SIADH from infectious disease process.  Na 131  Continue to monitor.  Fluid restrict, though pt with poor PO intake.    Will continue to monitor        case seen and discussed with     and updated primary team       INTERVAL HPI/OVERNIGHT EVENTS:    Patient is a 86y old  Female who presents with a chief complaint of progressively worsening SOB and cough x 3 days (2021 13:17)  pt was seen and examined at the bedside PCA reports poor appetite.   on solumedrol 20mg Q12h.     FSG & Insulin received:    Yesterday:  pre-dinner fs  nutritional lispro 5  units +4   units lispro SS  bedtime fs  lantus  4 units + 4   units lispro SS    Today:  pre-breakfast fs  nutritional lispro 5 units  pre-lunch fs  nutritional lispro  5 units+  2 units lispro SS    CONSTITUTIONAL:  Negative fever or chills  EYES:  Negative  blurry vision or double vision  CARDIOVASCULAR:  Negative for chest pain or palpitations  GASTROINTESTINAL:  Negative for nausea, vomiting, diarrhea, constipation, or abdominal pain  GENITOURINARY:  Negative frequency, urgency or dysuria  NEUROLOGIC:  No headache      MEDICATIONS  (STANDING):  albuterol/ipratropium for Nebulization 3 milliLiter(s) Nebulizer every 6 hours  amLODIPine   Tablet 10 milliGRAM(s) Oral daily  aspirin enteric coated 81 milliGRAM(s) Oral daily  atorvastatin 20 milliGRAM(s) Oral at bedtime  budesonide  80 MICROgram(s)/formoterol 4.5 MICROgram(s) Inhaler 2 Puff(s) Inhalation two times a day  cefepime   IVPB 1000 milliGRAM(s) IV Intermittent every 24 hours  dextrose 40% Gel 15 Gram(s) Oral once  dextrose 5%. 1000 milliLiter(s) (50 mL/Hr) IV Continuous <Continuous>  dextrose 5%. 1000 milliLiter(s) (100 mL/Hr) IV Continuous <Continuous>  dextrose 50% Injectable 25 Gram(s) IV Push once  dextrose 50% Injectable 12.5 Gram(s) IV Push once  dextrose 50% Injectable 25 Gram(s) IV Push once  donepezil 5 milliGRAM(s) Oral at bedtime  fluticasone propionate 50 MICROgram(s)/spray Nasal Spray 1 Spray(s) Both Nostrils two times a day  glucagon  Injectable 1 milliGRAM(s) IntraMuscular once  heparin   Injectable 5000 Unit(s) SubCutaneous every 12 hours  influenza  Vaccine (HIGH DOSE) 0.7 milliLiter(s) IntraMuscular once  insulin glargine Injectable (LANTUS) 4 Unit(s) SubCutaneous at bedtime  insulin lispro (ADMELOG) corrective regimen sliding scale   SubCutaneous Before meals and at bedtime  insulin lispro Injectable (ADMELOG) 5 Unit(s) SubCutaneous three times a day before meals  levothyroxine 50 MICROGram(s) Oral daily  methylPREDNISolone sodium succinate Injectable 20 milliGRAM(s) IV Push every 12 hours  metoprolol tartrate 50 milliGRAM(s) Oral every 12 hours  mirtazapine 7.5 milliGRAM(s) Oral at bedtime  nystatin    Suspension [id number] Unit(s) Oral four times a day  senna 1 Tablet(s) Oral daily  sevelamer carbonate 1600 milliGRAM(s) Oral three times a day    MEDICATIONS  (PRN):  acetaminophen   Tablet .. 650 milliGRAM(s) Oral every 6 hours PRN Moderate Pain (4 - 6)  guaiFENesin   Syrup  (Sugar-Free) 100 milliGRAM(s) Oral every 6 hours PRN Cough      Past medical history reviewed  Family history reviewed  Social history reviewed    PHYSICAL EXAM  Vital Signs Last 24 Hrs  T(C): 36.4 (2021 09:31), Max: 36.6 (2021 14:41)  T(F): 97.5 (2021 09:31), Max: 97.9 (2021 14:41)  HR: 80 (2021 13:55) (65 - 81)  BP: 171/76 (2021 13:55) (138/63 - 180/79)  BP(mean): 109 (2021 13:55) (90 - 119)  RR: 21 (2021 13:55) (17 - 21)  SpO2: 96% (2021 13:55) (93% - 96%)    Constitutional: resting comfortably on high flow    HEENT: no proptosis or lid retraction  Neck: no thyromegaly or palpable thyroid nodules   Respiratory: lungs CTAB.  Cardiovascular: regular rhythm, normal S1 and S2  GI: soft, NT/ND, no masses/HSM appreciated.  Neurology: no tremors  Skin: no visible rashes/lesions  Psychiatric: AAO x 3,    LABS:                        12.3   24.21 )-----------( 151      ( 2021 08:38 )             38.3     02-    131<L>  |  94<L>  |  80<H>  ----------------------------<  169<H>  5.6<H>   |  22  |  3.05<H>    Ca    9.1      2021 08:38  Phos  2.2       Mg     1.9               Thyroid Stimulating Hormone, Serum: 1.749 uIU/mL (10-13 @ 05:49)      HbA1C: 5.1 % ( @ 05:27)  5.9 % (10-13 @ 05:49)      CAPILLARY BLOOD GLUCOSE      POCT Blood Glucose.: 191 mg/dL (2021 11:20)  POCT Blood Glucose.: 126 mg/dL (2021 07:25)  POCT Blood Glucose.: 218 mg/dL (2021 21:00)  POCT Blood Glucose.: 204 mg/dL (2021 16:51)      Free Thyroxine, Serum: 0.78 ng/dL (21 @ 06:37)  Cholesterol, Serum: 191 mg/dL (21 @ 05:27)  HDL Cholesterol, Serum: 69 mg/dL (21 @ 05:27)  Triglycerides, Serum: 98 mg/dL (21 @ 05:27)  Cholesterol, Serum: 119 mg/dL (10-13-20 @ 05:49)  HDL Cholesterol, Serum: 51 mg/dL (10-13-20 @ 05:49)  Triglycerides, Serum: 94 mg/dL (10-13-20 @ 05:49)  Direct LDL: 49 mg/dL (10-13-20 @ 05:49)    A/P: 86 yr old F, POOR HISTORIAN/early dementia, former heavy smoker with PMHx HTN, hyperlipidemia, COPD, hypothyroidism, Stage IV CKD (baseline Cr ~3.0), anemia of chronic disease, CAD s/p CABG  Dr. Blunt, chronic diastolic CHF(EF:55% by Echo 10/2020), renal artery stenosis s/p renal artery stent >20yrs ago, Carotid artery stenosis, PAD, who presents to [facility] ED 1/15/21 c/o progressively worsening SOB and cough x 3 days. Currently on steroids from pneumonitis    1.  Steroid induced hyperglycemia  - hba1c 5.1  Cr 5.49 and GFr 20.   please c/w Lantus 4 U at bedtime.   Please continue lispro 5  TID with meals.   Continue lispro moderate dose sliding scale 4 times daily with meals and at bedtime.    Please continue consistent carbohydrate diet.    on solumedrol 20mg Q12h    2. Hypothyroidism  - TSH 1.7. free T4 0.78  Please continue levothyroxine 50mcg once daily for now    3. Hyponatremia - possible SIADH from infectious disease process.  Na 131  Continue to monitor.  Fluid restrict, though pt with poor PO intake.    Will continue to monitor        case seen and discussed with     and updated primary team

## 2021-02-01 NOTE — PROGRESS NOTE ADULT - PROBLEM SELECTOR PLAN 5
Hx of CAD, s/p CABG 2015.   - CONT: ASA 81mg PO QD, Atorvastatin 20mg PO QD, Lopressor 50mg PO BID.     ## HTN  - SBP 160s-170s  - CONT: Amlodipine 10mg PO QD, Lopressor 50mg PO BID.

## 2021-02-01 NOTE — PROGRESS NOTE ADULT - PROBLEM SELECTOR PLAN 10
- CONT: Levothyroxine 50mg PO QD.     #DM  - Hx of DM. A1c 5.1% on admission.    - c/w Lantus 4U qHS. C/w Lispro 5U TID w/ meals, MISS w/ meals and bedtime  - consistent carb diet while on IV solumedrol given hyperglycemia  - Endocrine following    F: none   E: renal patient   N: dysphagia 2 mechanical soft-thin liquids, Ensure TID. Pt not eating a lot and poor appetite.     DVT ppx: Hep SubQ  Dispo: cardiac telemetry  PT lynnette HENDRICKSON w/ on site dialysis once medically stable for DC

## 2021-02-02 LAB
ANION GAP SERPL CALC-SCNC: 14 MMOL/L — SIGNIFICANT CHANGE UP (ref 5–17)
BASOPHILS # BLD AUTO: 0.01 K/UL — SIGNIFICANT CHANGE UP (ref 0–0.2)
BASOPHILS NFR BLD AUTO: 0 % — SIGNIFICANT CHANGE UP (ref 0–2)
BUN SERPL-MCNC: 104 MG/DL — HIGH (ref 7–23)
CALCIUM SERPL-MCNC: 8.7 MG/DL — SIGNIFICANT CHANGE UP (ref 8.4–10.5)
CHLORIDE SERPL-SCNC: 90 MMOL/L — LOW (ref 96–108)
CO2 SERPL-SCNC: 25 MMOL/L — SIGNIFICANT CHANGE UP (ref 22–31)
CREAT SERPL-MCNC: 3.76 MG/DL — HIGH (ref 0.5–1.3)
EOSINOPHIL # BLD AUTO: 0.02 K/UL — SIGNIFICANT CHANGE UP (ref 0–0.5)
EOSINOPHIL NFR BLD AUTO: 0.1 % — SIGNIFICANT CHANGE UP (ref 0–6)
GLUCOSE BLDC GLUCOMTR-MCNC: 128 MG/DL — HIGH (ref 70–99)
GLUCOSE BLDC GLUCOMTR-MCNC: 152 MG/DL — HIGH (ref 70–99)
GLUCOSE BLDC GLUCOMTR-MCNC: 220 MG/DL — HIGH (ref 70–99)
GLUCOSE BLDC GLUCOMTR-MCNC: 86 MG/DL — SIGNIFICANT CHANGE UP (ref 70–99)
GLUCOSE SERPL-MCNC: 123 MG/DL — HIGH (ref 70–99)
HCT VFR BLD CALC: 35.9 % — SIGNIFICANT CHANGE UP (ref 34.5–45)
HGB BLD-MCNC: 11.7 G/DL — SIGNIFICANT CHANGE UP (ref 11.5–15.5)
IMM GRANULOCYTES NFR BLD AUTO: 0.9 % — SIGNIFICANT CHANGE UP (ref 0–1.5)
LYMPHOCYTES # BLD AUTO: 0.7 K/UL — LOW (ref 1–3.3)
LYMPHOCYTES # BLD AUTO: 3.3 % — LOW (ref 13–44)
MAGNESIUM SERPL-MCNC: 1.8 MG/DL — SIGNIFICANT CHANGE UP (ref 1.6–2.6)
MCHC RBC-ENTMCNC: 28.3 PG — SIGNIFICANT CHANGE UP (ref 27–34)
MCHC RBC-ENTMCNC: 32.6 GM/DL — SIGNIFICANT CHANGE UP (ref 32–36)
MCV RBC AUTO: 86.9 FL — SIGNIFICANT CHANGE UP (ref 80–100)
MONOCYTES # BLD AUTO: 0.61 K/UL — SIGNIFICANT CHANGE UP (ref 0–0.9)
MONOCYTES NFR BLD AUTO: 2.9 % — SIGNIFICANT CHANGE UP (ref 2–14)
NEUTROPHILS # BLD AUTO: 19.7 K/UL — HIGH (ref 1.8–7.4)
NEUTROPHILS NFR BLD AUTO: 92.8 % — HIGH (ref 43–77)
NRBC # BLD: 0 /100 WBCS — SIGNIFICANT CHANGE UP (ref 0–0)
PHOSPHATE SERPL-MCNC: 2.3 MG/DL — LOW (ref 2.5–4.5)
PLATELET # BLD AUTO: 136 K/UL — LOW (ref 150–400)
POTASSIUM SERPL-MCNC: 4.9 MMOL/L — SIGNIFICANT CHANGE UP (ref 3.5–5.3)
POTASSIUM SERPL-SCNC: 4.9 MMOL/L — SIGNIFICANT CHANGE UP (ref 3.5–5.3)
RBC # BLD: 4.13 M/UL — SIGNIFICANT CHANGE UP (ref 3.8–5.2)
RBC # FLD: 15.9 % — HIGH (ref 10.3–14.5)
SODIUM SERPL-SCNC: 129 MMOL/L — LOW (ref 135–145)
WBC # BLD: 21.24 K/UL — HIGH (ref 3.8–10.5)
WBC # FLD AUTO: 21.24 K/UL — HIGH (ref 3.8–10.5)

## 2021-02-02 PROCEDURE — 90935 HEMODIALYSIS ONE EVALUATION: CPT

## 2021-02-02 PROCEDURE — 99233 SBSQ HOSP IP/OBS HIGH 50: CPT

## 2021-02-02 PROCEDURE — 74019 RADEX ABDOMEN 2 VIEWS: CPT | Mod: 26

## 2021-02-02 RX ORDER — INSULIN LISPRO 100/ML
4 VIAL (ML) SUBCUTANEOUS
Refills: 0 | Status: DISCONTINUED | OUTPATIENT
Start: 2021-02-02 | End: 2021-02-04

## 2021-02-02 RX ORDER — SEVELAMER CARBONATE 2400 MG/1
800 POWDER, FOR SUSPENSION ORAL THREE TIMES A DAY
Refills: 0 | Status: DISCONTINUED | OUTPATIENT
Start: 2021-02-02 | End: 2021-02-07

## 2021-02-02 RX ORDER — INSULIN LISPRO 100/ML
3 VIAL (ML) SUBCUTANEOUS
Refills: 0 | Status: DISCONTINUED | OUTPATIENT
Start: 2021-02-02 | End: 2021-02-02

## 2021-02-02 RX ORDER — METOPROLOL TARTRATE 50 MG
50 TABLET ORAL EVERY 8 HOURS
Refills: 0 | Status: DISCONTINUED | OUTPATIENT
Start: 2021-02-02 | End: 2021-02-03

## 2021-02-02 RX ADMIN — AMLODIPINE BESYLATE 10 MILLIGRAM(S): 2.5 TABLET ORAL at 07:32

## 2021-02-02 RX ADMIN — Medication 500000 UNIT(S): at 07:32

## 2021-02-02 RX ADMIN — BUDESONIDE AND FORMOTEROL FUMARATE DIHYDRATE 2 PUFF(S): 160; 4.5 AEROSOL RESPIRATORY (INHALATION) at 15:53

## 2021-02-02 RX ADMIN — HEPARIN SODIUM 5000 UNIT(S): 5000 INJECTION INTRAVENOUS; SUBCUTANEOUS at 17:49

## 2021-02-02 RX ADMIN — Medication 81 MILLIGRAM(S): at 15:53

## 2021-02-02 RX ADMIN — INSULIN GLARGINE 4 UNIT(S): 100 INJECTION, SOLUTION SUBCUTANEOUS at 22:02

## 2021-02-02 RX ADMIN — Medication 2: at 17:22

## 2021-02-02 RX ADMIN — BUDESONIDE AND FORMOTEROL FUMARATE DIHYDRATE 2 PUFF(S): 160; 4.5 AEROSOL RESPIRATORY (INHALATION) at 21:52

## 2021-02-02 RX ADMIN — Medication 50 MICROGRAM(S): at 07:32

## 2021-02-02 RX ADMIN — Medication 50 MILLIGRAM(S): at 07:32

## 2021-02-02 RX ADMIN — Medication 1 SPRAY(S): at 17:23

## 2021-02-02 RX ADMIN — Medication 3 MILLILITER(S): at 17:24

## 2021-02-02 RX ADMIN — SENNA PLUS 1 TABLET(S): 8.6 TABLET ORAL at 15:53

## 2021-02-02 RX ADMIN — ATORVASTATIN CALCIUM 20 MILLIGRAM(S): 80 TABLET, FILM COATED ORAL at 22:04

## 2021-02-02 RX ADMIN — Medication 10 MILLIGRAM(S): at 19:16

## 2021-02-02 RX ADMIN — Medication 4: at 22:03

## 2021-02-02 RX ADMIN — Medication 1 SPRAY(S): at 07:32

## 2021-02-02 RX ADMIN — Medication 3 MILLILITER(S): at 07:32

## 2021-02-02 RX ADMIN — Medication 5 UNIT(S): at 07:33

## 2021-02-02 RX ADMIN — SEVELAMER CARBONATE 800 MILLIGRAM(S): 2400 POWDER, FOR SUSPENSION ORAL at 15:53

## 2021-02-02 RX ADMIN — Medication 1 APPLICATORFUL: at 22:03

## 2021-02-02 RX ADMIN — Medication 1 SPRAY(S): at 15:54

## 2021-02-02 RX ADMIN — Medication 50 MILLIGRAM(S): at 22:02

## 2021-02-02 RX ADMIN — MIRTAZAPINE 7.5 MILLIGRAM(S): 45 TABLET, ORALLY DISINTEGRATING ORAL at 22:02

## 2021-02-02 RX ADMIN — Medication 500000 UNIT(S): at 15:54

## 2021-02-02 RX ADMIN — Medication 20 MILLIGRAM(S): at 07:33

## 2021-02-02 RX ADMIN — SEVELAMER CARBONATE 1600 MILLIGRAM(S): 2400 POWDER, FOR SUSPENSION ORAL at 07:32

## 2021-02-02 RX ADMIN — HEPARIN SODIUM 5000 UNIT(S): 5000 INJECTION INTRAVENOUS; SUBCUTANEOUS at 07:32

## 2021-02-02 RX ADMIN — Medication 650 MILLIGRAM(S): at 18:05

## 2021-02-02 RX ADMIN — Medication 5 UNIT(S): at 17:23

## 2021-02-02 RX ADMIN — DONEPEZIL HYDROCHLORIDE 5 MILLIGRAM(S): 10 TABLET, FILM COATED ORAL at 22:02

## 2021-02-02 NOTE — PROGRESS NOTE ADULT - SUBJECTIVE AND OBJECTIVE BOX
INTERVAL HPI/OVERNIGHT EVENTS:    Patient is a 86y old  Female who presents with a chief complaint of progressively worsening SOB and cough x 3 days (2021 13:17)  pt was seen and examined at the bedside PCA reports poor appetite. HD today.  on solumedrol 20mg now daily.    FSG & Insulin received:    Yesterday:  pre-dinner fs  nutritional lispro 5  units + 6   units lispro SS  bedtime fs  lantus  4 units + 2 units lispro SS    Today:  pre-breakfast fs  nutritional lispro 5 units  pre-lunch fs  nutritional lispro  5 units+  2 units lispro SS    CONSTITUTIONAL:  Negative fever or chills  EYES:  Negative  blurry vision or double vision  CARDIOVASCULAR:  Negative for chest pain or palpitations  GASTROINTESTINAL:  Negative for nausea, vomiting, diarrhea, constipation, or abdominal pain  GENITOURINARY:  Negative frequency, urgency or dysuria  NEUROLOGIC:  No headache    MEDICATIONS  (STANDING):  albuterol/ipratropium for Nebulization 3 milliLiter(s) Nebulizer every 6 hours  amLODIPine   Tablet 10 milliGRAM(s) Oral daily  aspirin enteric coated 81 milliGRAM(s) Oral daily  atorvastatin 20 milliGRAM(s) Oral at bedtime  budesonide  80 MICROgram(s)/formoterol 4.5 MICROgram(s) Inhaler 2 Puff(s) Inhalation two times a day  clotrimazole 2% Vaginal Cream 1 Applicatorful Vaginal at bedtime  dextrose 40% Gel 15 Gram(s) Oral once  dextrose 5%. 1000 milliLiter(s) (50 mL/Hr) IV Continuous <Continuous>  dextrose 5%. 1000 milliLiter(s) (100 mL/Hr) IV Continuous <Continuous>  dextrose 50% Injectable 25 Gram(s) IV Push once  dextrose 50% Injectable 12.5 Gram(s) IV Push once  dextrose 50% Injectable 25 Gram(s) IV Push once  donepezil 5 milliGRAM(s) Oral at bedtime  fluticasone propionate 50 MICROgram(s)/spray Nasal Spray 1 Spray(s) Both Nostrils two times a day  glucagon  Injectable 1 milliGRAM(s) IntraMuscular once  heparin   Injectable 5000 Unit(s) SubCutaneous every 12 hours  influenza  Vaccine (HIGH DOSE) 0.7 milliLiter(s) IntraMuscular once  insulin glargine Injectable (LANTUS) 4 Unit(s) SubCutaneous at bedtime  insulin lispro (ADMELOG) corrective regimen sliding scale   SubCutaneous Before meals and at bedtime  insulin lispro Injectable (ADMELOG) 5 Unit(s) SubCutaneous three times a day before meals  levothyroxine 50 MICROGram(s) Oral daily  metoprolol tartrate 50 milliGRAM(s) Oral every 8 hours  mirtazapine 7.5 milliGRAM(s) Oral at bedtime  nystatin    Suspension [id number] Unit(s) Oral four times a day  senna 1 Tablet(s) Oral daily  sevelamer carbonate 800 milliGRAM(s) Oral three times a day  sodium chloride 0.65% Nasal 1 Spray(s) Both Nostrils daily    MEDICATIONS  (PRN):  acetaminophen   Tablet .. 650 milliGRAM(s) Oral every 6 hours PRN Moderate Pain (4 - 6)  guaiFENesin   Syrup  (Sugar-Free) 100 milliGRAM(s) Oral every 6 hours PRN Cough      PHYSICAL EXAM  Vital Signs Last 24 Hrs  T(C): 35.9 (2021 17:44), Max: 36.9 (2021 04:30)  T(F): 96.7 (2021 17:44), Max: 98.4 (2021 04:30)  HR: 90 (2021 17:45) (67 - 90)  BP: 165/72 (2021 17:45) (151/56 - 178/73)  BP(mean): 104 (2021 17:45) (101 - 113)  RR: 19 (2021 13:15) (17 - 19)  SpO2: 93% (2021 17:45) (93% - 98%)    Constitutional: resting comfortably on NC  HEENT: no proptosis or lid retraction  Neck: no thyromegaly or palpable thyroid nodules   Respiratory: lungs CTAB.  Cardiovascular: regular rhythm, normal S1 and S2  GI: soft, NT/ND, no masses/HSM appreciated.  Neurology: no tremors  Skin: no visible rashes/lesions  Psychiatric: AAO x 3,    LABS:                        11.7   21.24 )-----------( 136      ( 2021 07:54 )             35.9     02-    129<L>  |  90<L>  |  104<H>  ----------------------------<  123<H>  4.9   |  25  |  3.76<H>    Ca    8.7      2021 07:54  Phos  2.3     -  Mg     1.8             Urinalysis Basic - ( 2021 17:59 )    Color: Yellow / Appearance: SL Cloudy / S.025 / pH: x  Gluc: x / Ketone: Trace mg/dL  / Bili: Negative / Urobili: 0.2 E.U./dL   Blood: x / Protein: >=300 mg/dL / Nitrite: POSITIVE   Leuk Esterase: NEGATIVE / RBC: > 10 /HPF / WBC > 10 /HPF   Sq Epi: x / Non Sq Epi: Moderate /HPF / Bacteria: Present /HPF      Thyroid Stimulating Hormone, Serum: 1.749 uIU/mL (10-13 @ 05:49)      HbA1C:   CAPILLARY BLOOD GLUCOSE      POCT Blood Glucose.: 152 mg/dL (2021 16:59)  POCT Blood Glucose.: 86 mg/dL (2021 11:49)  POCT Blood Glucose.: 128 mg/dL (2021 07:07)  POCT Blood Glucose.: 154 mg/dL (2021 21:40)    A/P: 86 yr old F, POOR HISTORIAN/early dementia, former heavy smoker with PMHx HTN, hyperlipidemia, COPD, hypothyroidism, Stage IV CKD (baseline Cr ~3.0), anemia of chronic disease, CAD s/p CABG  Dr. Blunt, chronic diastolic CHF(EF:55% by Echo 10/2020), renal artery stenosis s/p renal artery stent >20yrs ago, Carotid artery stenosis, PAD, who presents to [facility] ED 1/15/21 c/o progressively worsening SOB and cough x 3 days. Currently on steroids from pneumonitis    1.  Steroid induced hyperglycemia  - hba1c 5.1  Cr 5.49 and GFr 20.   please c/w Lantus 4 U at bedtime.   Please decrease lispro to 3  TID with meals.   Continue lispro moderate dose sliding scale 4 times daily with meals and at bedtime.    Please continue consistent carbohydrate diet.    on solumedrol 20mg Q12h    2. Hypothyroidism  - TSH 1.7. free T4 0.78  Please continue levothyroxine 50mcg once daily for now    3. Hyponatremia - possible SIADH from infectious disease process.  Na 131  Continue to monitor.  Fluid restrict, though pt with poor PO intake.    Will continue to monitor        case seen and discussed with     and updated primary team

## 2021-02-02 NOTE — PROGRESS NOTE ADULT - PROBLEM SELECTOR PLAN 2
Overall worsening clinical picture, oliguric NAV on CKD stage 3 2/2 ATN with CKD progression from DM.   - F/u Renal recs, known to Dr Nicolas  - HD initiated this admission 1/26/21 via R IJ HD catheter.  - Cr 3.76 today  - Renal US: atrophic kidneys, no WILBER.   - Nephrotic w/u non-contributory thus far, ANCA negative  - s/p dialysis x 4 sessions, Last HD session today 2/2  - Monitor Strict I/Os, daily PVRs  - PPD placed 1/30, reading on 2/1 NEGATIVE 0 induration as Quantiferon indeterminate

## 2021-02-02 NOTE — PROGRESS NOTE ADULT - ASSESSMENT
86F POOR HISTORIAN/early dementia, former heavy smoker PMHx DM, HTN, hyperlipidemia, COPD, hypothyroidism, CKD (baseline Cr ~3.0), anemia of chronic disease, CAD, renal artery stenosis s/p renal artery stent >20yrs ago, Carotid artery stenosis, PAD, who presents c/o progressively worsening SOB and cough. Nephrology consulted for NAV on CKD.      #CKD now ESRD started on hemodialysis   HD today 2/2    Dialyzer: Optiflux [id number]         QB: 300 mL/min         QD: 500 mL/min      K bath: 2  Goal UF: 3L                Duration: 180 min     Electrolytes, volume and bicarb management with HD  BP: UF with HD  Anemia and BMD labs acceptable, Renvela cut in half to 800mg TID w/meals given downtrending Phos     Thank you for the opportunity to participate in the care of your patient. The nephrology service remains available to assist with any questions or concerns. Please feel free to reach us by paging the on-call nephrology fellow for urgent issues or as below.     Tobi Thompson M.D.   PGY-4, Nephrology Fellow   C: [phone]   P: [phone]

## 2021-02-02 NOTE — PROGRESS NOTE ADULT - ATTENDING COMMENTS
new ESRD, egfr 15's early 2020. 3L UF goal today, provided BP maintains >100 sys, sometimes drops BPs on dialysis, so far examined at bedside during dialysis and tolerating very well, HD via tunneled dialysis catheter.  Says she's urinating however appreciable mild cognitive impairment on exam.

## 2021-02-02 NOTE — PROGRESS NOTE ADULT - SUBJECTIVE AND OBJECTIVE BOX
Patient is a 86y Female seen and evaluated at bedside.   No change in respiratory status. BP elevated. No complaints. Plan for HD today.    Meds:    acetaminophen   Tablet .. 650 every 6 hours PRN  albuterol/ipratropium for Nebulization 3 every 6 hours  amLODIPine   Tablet 10 daily  aspirin enteric coated 81 daily  atorvastatin 20 at bedtime  budesonide  80 MICROgram(s)/formoterol 4.5 MICROgram(s) Inhaler 2 two times a day  clotrimazole 2% Vaginal Cream 1 at bedtime  dextrose 40% Gel 15 once  dextrose 5%. 1000 <Continuous>  dextrose 5%. 1000 <Continuous>  dextrose 50% Injectable 25 once  dextrose 50% Injectable 12.5 once  dextrose 50% Injectable 25 once  donepezil 5 at bedtime  fluticasone propionate 50 MICROgram(s)/spray Nasal Spray 1 two times a day  glucagon  Injectable 1 once  guaiFENesin   Syrup  (Sugar-Free) 100 every 6 hours PRN  heparin   Injectable 5000 every 12 hours  influenza  Vaccine (HIGH DOSE) 0.7 once  insulin glargine Injectable (LANTUS) 4 at bedtime  insulin lispro (ADMELOG) corrective regimen sliding scale  Before meals and at bedtime  insulin lispro Injectable (ADMELOG) 5 three times a day before meals  levothyroxine 50 daily  methylPREDNISolone sodium succinate Injectable 20 daily  metoprolol tartrate 50 every 8 hours  mirtazapine 7.5 at bedtime  nystatin    Suspension [id number] four times a day  senna 1 daily  sevelamer carbonate 1600 three times a day  sodium chloride 0.65% Nasal 1 daily      T(C): , Max: 36.9 (21 @ 04:30)  T(F): , Max: 98.4 (21 @ 04:30)  HR: 70 (21 @ 10:16)  BP: 151/56 (21 @ 10:16)  BP(mean): 113 (21 @ 07:45)  RR: 17 (21 @ 10:16)  SpO2: 96% (21 @ 10:16)  Wt(kg): --     @ 07:01  -   @ 07:00  --------------------------------------------------------  IN: 700 mL / OUT: 600 mL / NET: 100 mL          Review of Systems:  RESPIRATORY: no shortness of breath  CARDIOVASCULAR: No chest pain  MUSCULOSKELETAL: No leg edema    PHYSICAL EXAM:  GENERAL: well-developed, well nourished, alert, on NC 2L O2   CHEST/LUNG: Coarse breath sounds bilaterally  HEART: normal S1S2, RRR  ABDOMEN: Soft, Nontender, non distended  EXTREMITIES: trace edema   ACCESS: +RTC c/d/i     LABS:                        11.7   21.24 )-----------( 136      ( 2021 07:54 )             35.9     02-02    129<L>  |  90<L>  |  104<H>  ----------------------------<  123<H>  4.9   |  25  |  3.76<H>    Ca    8.7      2021 07:54  Phos  2.3     02-02  Mg     1.8     02-02          Urinalysis Basic - ( 2021 17:59 )    Color: Yellow / Appearance: SL Cloudy / S.025 / pH: x  Gluc: x / Ketone: Trace mg/dL  / Bili: Negative / Urobili: 0.2 E.U./dL   Blood: x / Protein: >=300 mg/dL / Nitrite: POSITIVE   Leuk Esterase: NEGATIVE / RBC: > 10 /HPF / WBC > 10 /HPF   Sq Epi: x / Non Sq Epi: Moderate /HPF / Bacteria: Present /HPF            RADIOLOGY & ADDITIONAL STUDIES:

## 2021-02-02 NOTE — PROGRESS NOTE ADULT - ASSESSMENT
Pneumonitis with increased ESR and increased procalcitonin   Possible bacterial component   S/P Antibiotic course  Leukocytosis with neutrophilia with toxic granulation on blood smear - this is more typical of infection rather than steroid effect  Reported yeast vaginitis      RECOMMEND  No antibiotics for now  Submit urine for culture   Topicals for yeas vaginitis/perineal candida   Check Fungitell level

## 2021-02-02 NOTE — PROGRESS NOTE ADULT - SUBJECTIVE AND OBJECTIVE BOX
INTERVAL HPI/OVERNIGHT EVENTS:    On HD  Less O2 requirements   No diarrhea        MEDICATIONS  (STANDING):  albuterol/ipratropium for Nebulization 3 milliLiter(s) Nebulizer every 6 hours  amLODIPine   Tablet 10 milliGRAM(s) Oral daily  aspirin enteric coated 81 milliGRAM(s) Oral daily  atorvastatin 20 milliGRAM(s) Oral at bedtime  budesonide  80 MICROgram(s)/formoterol 4.5 MICROgram(s) Inhaler 2 Puff(s) Inhalation two times a day  clotrimazole 2% Vaginal Cream 1 Applicatorful Vaginal at bedtime  dextrose 40% Gel 15 Gram(s) Oral once  dextrose 5%. 1000 milliLiter(s) (50 mL/Hr) IV Continuous <Continuous>  dextrose 5%. 1000 milliLiter(s) (100 mL/Hr) IV Continuous <Continuous>  dextrose 50% Injectable 25 Gram(s) IV Push once  dextrose 50% Injectable 12.5 Gram(s) IV Push once  dextrose 50% Injectable 25 Gram(s) IV Push once  donepezil 5 milliGRAM(s) Oral at bedtime  fluticasone propionate 50 MICROgram(s)/spray Nasal Spray 1 Spray(s) Both Nostrils two times a day  glucagon  Injectable 1 milliGRAM(s) IntraMuscular once  heparin   Injectable 5000 Unit(s) SubCutaneous every 12 hours  influenza  Vaccine (HIGH DOSE) 0.7 milliLiter(s) IntraMuscular once  insulin glargine Injectable (LANTUS) 4 Unit(s) SubCutaneous at bedtime  insulin lispro (ADMELOG) corrective regimen sliding scale   SubCutaneous Before meals and at bedtime  insulin lispro Injectable (ADMELOG) 5 Unit(s) SubCutaneous three times a day before meals  levothyroxine 50 MICROGram(s) Oral daily  methylPREDNISolone sodium succinate Injectable 20 milliGRAM(s) IV Push daily  metoprolol tartrate 50 milliGRAM(s) Oral every 8 hours  mirtazapine 7.5 milliGRAM(s) Oral at bedtime  nystatin    Suspension [id number] Unit(s) Oral four times a day  senna 1 Tablet(s) Oral daily  sevelamer carbonate 800 milliGRAM(s) Oral three times a day  sodium chloride 0.65% Nasal 1 Spray(s) Both Nostrils daily    MEDICATIONS  (PRN):  acetaminophen   Tablet .. 650 milliGRAM(s) Oral every 6 hours PRN Moderate Pain (4 - 6)  guaiFENesin   Syrup  (Sugar-Free) 100 milliGRAM(s) Oral every 6 hours PRN Cough      Allergies    No Known Allergies          Vital Signs Last 24 Hrs  T(C): 36.8 (2021 10:35), Max: 36.9 (2021 04:30)  T(F): 98.3 (2021 10:35), Max: 98.4 (2021 04:30)  HR: 70 (2021 10:16) (67 - 80)  BP: 151/56 (2021 10:16) (151/56 - 178/73)  BP(mean): 113 (2021 07:45) (104 - 113)  RR: 17 (2021 10:16) (17 - 21)  SpO2: 96% (2021 10:16) (93% - 97%)    PHYSICAL EXAM:      Constitutional:    Eyes:    ENMT:    Neck:    Breasts:    Back:    Respiratory:    Cardiovascular:    Gastrointestinal:    Genitourinary:    Rectal:    Extremities:    Vascular:    Neurological:    Skin:    Lymph Nodes:    Musculoskeletal:    Psychiatric:        LABS:                        11.7   21.24 )-----------( 136      ( 2021 07:54 )             35.9     02-02    129<L>  |  90<L>  |  104<H>  ----------------------------<  123<H>  4.9   |  25  |  3.76<H>    Ca    8.7      2021 07:54  Phos  2.3     02-02  Mg     1.8     02-02        Urinalysis Basic - ( 2021 17:59 )    Color: Yellow / Appearance: SL Cloudy / S.025 / pH: x  Gluc: x / Ketone: Trace mg/dL  / Bili: Negative / Urobili: 0.2 E.U./dL   Blood: x / Protein: >=300 mg/dL / Nitrite: POSITIVE   Leuk Esterase: NEGATIVE / RBC: > 10 /HPF / WBC > 10 /HPF   Sq Epi: x / Non Sq Epi: Moderate /HPF / Bacteria: Present /HPF        MICROBIOLOGY:    RADIOLOGY & ADDITIONAL STUDIES: INTERVAL HPI/OVERNIGHT EVENTS:    On HD  Less O2 requirements   No diarrhea  Completed 10 d course of Cefepime        MEDICATIONS  (STANDING):  albuterol/ipratropium for Nebulization 3 milliLiter(s) Nebulizer every 6 hours  amLODIPine   Tablet 10 milliGRAM(s) Oral daily  aspirin enteric coated 81 milliGRAM(s) Oral daily  atorvastatin 20 milliGRAM(s) Oral at bedtime  budesonide  80 MICROgram(s)/formoterol 4.5 MICROgram(s) Inhaler 2 Puff(s) Inhalation two times a day  clotrimazole 2% Vaginal Cream 1 Applicatorful Vaginal at bedtime  dextrose 40% Gel 15 Gram(s) Oral once  dextrose 5%. 1000 milliLiter(s) (50 mL/Hr) IV Continuous <Continuous>  dextrose 5%. 1000 milliLiter(s) (100 mL/Hr) IV Continuous <Continuous>  dextrose 50% Injectable 25 Gram(s) IV Push once  dextrose 50% Injectable 12.5 Gram(s) IV Push once  dextrose 50% Injectable 25 Gram(s) IV Push once  donepezil 5 milliGRAM(s) Oral at bedtime  fluticasone propionate 50 MICROgram(s)/spray Nasal Spray 1 Spray(s) Both Nostrils two times a day  glucagon  Injectable 1 milliGRAM(s) IntraMuscular once  heparin   Injectable 5000 Unit(s) SubCutaneous every 12 hours  influenza  Vaccine (HIGH DOSE) 0.7 milliLiter(s) IntraMuscular once  insulin glargine Injectable (LANTUS) 4 Unit(s) SubCutaneous at bedtime  insulin lispro (ADMELOG) corrective regimen sliding scale   SubCutaneous Before meals and at bedtime  insulin lispro Injectable (ADMELOG) 5 Unit(s) SubCutaneous three times a day before meals  levothyroxine 50 MICROGram(s) Oral daily  methylPREDNISolone sodium succinate Injectable 20 milliGRAM(s) IV Push daily  metoprolol tartrate 50 milliGRAM(s) Oral every 8 hours  mirtazapine 7.5 milliGRAM(s) Oral at bedtime  nystatin    Suspension [id number] Unit(s) Oral four times a day  senna 1 Tablet(s) Oral daily  sevelamer carbonate 800 milliGRAM(s) Oral three times a day  sodium chloride 0.65% Nasal 1 Spray(s) Both Nostrils daily    MEDICATIONS  (PRN):  acetaminophen   Tablet .. 650 milliGRAM(s) Oral every 6 hours PRN Moderate Pain (4 - 6)  guaiFENesin   Syrup  (Sugar-Free) 100 milliGRAM(s) Oral every 6 hours PRN Cough      Allergies    No Known Allergies      EXAM  Vital Signs Last 24 Hrs  T(C): 36.8 (2021 10:35), Max: 36.9 (2021 04:30)  T(F): 98.3 (2021 10:35), Max: 98.4 (2021 04:30)  HR: 70 (2021 10:16) (67 - 80)  BP: 151/56 (2021 10:16) (151/56 - 178/73)  BP(mean): 113 (2021 07:45) (104 - 113)  RR: 17 (2021 10:16) (17 - 21)  SpO2: 96% (2021 10:16) (93% - 97%)  Awake and alert and responding appropriately   On HD now  NCO2 without distress  No rash  No thrush  RR  Chest with right HD cath - exit benign   Breath sounds appear clear with decrease at bases  Abd soft ND NT  LEs no edema        LABS:                        11.7   21.24 )-----------( 136      ( 2021 07:54 )             35.9     02-02    129<L>  |  90<L>  |  104<H>  ----------------------------<  123<H>  4.9   |  25  |  3.76<H>    Ca    8.7      2021 07:54  Phos  2.3     02-02  Mg     1.8     02-02        Urinalysis Basic - ( 2021 17:59 )    Color: Yellow / Appearance: SL Cloudy / S.025 / pH: x  Gluc: x / Ketone: Trace mg/dL  / Bili: Negative / Urobili: 0.2 E.U./dL   Blood: x / Protein: >=300 mg/dL / Nitrite: POSITIVE   Leuk Esterase: NEGATIVE / RBC: > 10 /HPF / WBC > 10 /HPF   Sq Epi: x / Non Sq Epi: Moderate /HPF / Bacteria: Present /HPF      Procalcitonin, Serum (21 @ 07:35)    Procalcitonin, Serum: 0.96: Procalcitonin (PCT) Interpretation (ng/mL) - Diagnosis of systemic  bacterial infection/sepsis  PCT < 0.5: Systemic infection (sepsis) is not likely and risk for  progression to severe systemic infection is low. Local bacterial  infection is possible. If early sepsis is suspected clinically, PCT  should be re-assessed in 6-24 hours.  PCT >/= 0.5 but < 2.0: Systemic infection (sepsis) is possible, but other  conditions are known to elevate PCT as well. Moderate risk for  progression to severe systemic infection. The patient should be closely  monitored both clinically and by re-assessing PCT within 6-24 hours.  PCT >/= 2.0 but < 10.0: Systemic infection (sepsis) is likely, unless  other causes are known. High risk of progression to severe systemic  infection (severe sepsis/septic shock).  PCT >/= 10.0: Important systemic inflammatory response, almost  exclusively due to severe bacterial sepsis or septic shock. High  likelihood of severe sepsis or septic shock. ng/mL    Sedimentation Rate, Erythrocyte (21 @ 06:34)    Sedimentation Rate, Erythrocyte: 92 mm/Hr          RADIOLOGY & ADDITIONAL STUDIES:    Xray Chest 1 View- PORTABLE-Routine (Xray Chest 1 View- PORTABLE-Routine in AM.) (21 @ 08:17) >    EXAM:  XR CHEST PORTABLE ROUTINE 1V                          PROCEDURE DATE:  2021          INTERPRETATION:  Clinical History: Shortness of breath    Frontal examination of the chest demonstrates cardiomegaly. No interval change lung pathology in comparison to prior examination of the chest 2021. Bilateral infiltrates. No interval change position remaining support devices. Status post sternotomy. Elevation right hemidiaphragm.    IMPRESSION: No interval change lung pathology

## 2021-02-02 NOTE — PROGRESS NOTE ADULT - PROBLEM SELECTOR PLAN 5
Hx of CAD, s/p CABG 2015.   - CONT: ASA 81mg PO QD, Atorvastatin 20mg PO QD, Lopressor 50mg PO BID.     ## HTN  - SBP 160s-170s  - Lopressor 50mg BID increased to TID   - CONT: Amlodipine 10mg PO QD,

## 2021-02-02 NOTE — PROGRESS NOTE ADULT - PROBLEM SELECTOR PLAN 4
BNP 57k.   - POCUS by MICU consult 1/22/21 (+) for B-lines throughout.   - TTE 1/20: EF 53%, biatrial enlargement, mild AR, mod-severe MR, mod-severe TR, PASP 70.   - Lopressor 50mg BID increased to TID   - net neg 10L since admission  - Strict I/Os, daily weights. Core measures.

## 2021-02-02 NOTE — PROGRESS NOTE ADULT - SUBJECTIVE AND OBJECTIVE BOX
CARDIOLOGY NP PROGRESS NOTE    Subjective:  Received pt awake in bed in NAD. States feeling well. Denies CP, dizziness/diaphoresis, n/v, palpitations.  Remainder ROS otherwise negative.    Overnight Events:  Weaned to 2L NC. PPD negative.     TELEMETRY: SB- SR (HR 40s-70s) w/frequent PVCs and PACs     VITAL SIGNS:  T(C): 36.8 (02-02-21 @ 10:35), Max: 36.9 (02-02-21 @ 04:30)  HR: 70 (02-02-21 @ 10:16) (67 - 80)  BP: 151/56 (02-02-21 @ 10:16) (151/56 - 178/73)  RR: 17 (02-02-21 @ 10:16) (17 - 21)  SpO2: 96% (02-02-21 @ 10:16) (93% - 97%)  Wt(kg): --    I&O's Summary    01 Feb 2021 07:01  -  02 Feb 2021 07:00  --------------------------------------------------------  IN: 700 mL / OUT: 600 mL / NET: 100 mL          PHYSICAL EXAM:    General: Alert to self, place and time. Disoriented to situation at times. No acute Distress   Neck: Supple, (-) JVD  Cardiac: S1 S2, No M/R/G  Pulmonary: Diminished bibasilar breath sounds w/ bibasilar crackles. Breathing unlabored on 2L NC.   Abdomen: Soft, Non -tender, +BS x 4 quads  Extremities: No Rashes, trace edema BL LE, improved.   Lines: R IJ tunneled HD Cath- site CDI, dsg in place   Neuro: Alert to self, place and time. Disoriented to situation at times.  No focal deficits            LABS:                          11.7   21.24 )-----------( 136      ( 02 Feb 2021 07:54 )             35.9                              02-02    129<L>  |  90<L>  |  104<H>  ----------------------------<  123<H>  4.9   |  25  |  3.76<H>    Ca    8.7      02 Feb 2021 07:54  Phos  2.3     02-02  Mg     1.8     02-02                                CAPILLARY BLOOD GLUCOSE      POCT Blood Glucose.: 86 mg/dL (02 Feb 2021 11:49)  POCT Blood Glucose.: 128 mg/dL (02 Feb 2021 07:07)  POCT Blood Glucose.: 154 mg/dL (01 Feb 2021 21:40)  POCT Blood Glucose.: 283 mg/dL (01 Feb 2021 17:33)            Allergies:  No Known Allergies    MEDICATIONS  (STANDING):  albuterol/ipratropium for Nebulization 3 milliLiter(s) Nebulizer every 6 hours  amLODIPine   Tablet 10 milliGRAM(s) Oral daily  aspirin enteric coated 81 milliGRAM(s) Oral daily  atorvastatin 20 milliGRAM(s) Oral at bedtime  budesonide  80 MICROgram(s)/formoterol 4.5 MICROgram(s) Inhaler 2 Puff(s) Inhalation two times a day  clotrimazole 2% Vaginal Cream 1 Applicatorful Vaginal at bedtime  dextrose 40% Gel 15 Gram(s) Oral once  dextrose 5%. 1000 milliLiter(s) (50 mL/Hr) IV Continuous <Continuous>  dextrose 5%. 1000 milliLiter(s) (100 mL/Hr) IV Continuous <Continuous>  dextrose 50% Injectable 25 Gram(s) IV Push once  dextrose 50% Injectable 12.5 Gram(s) IV Push once  dextrose 50% Injectable 25 Gram(s) IV Push once  donepezil 5 milliGRAM(s) Oral at bedtime  fluticasone propionate 50 MICROgram(s)/spray Nasal Spray 1 Spray(s) Both Nostrils two times a day  glucagon  Injectable 1 milliGRAM(s) IntraMuscular once  heparin   Injectable 5000 Unit(s) SubCutaneous every 12 hours  influenza  Vaccine (HIGH DOSE) 0.7 milliLiter(s) IntraMuscular once  insulin glargine Injectable (LANTUS) 4 Unit(s) SubCutaneous at bedtime  insulin lispro (ADMELOG) corrective regimen sliding scale   SubCutaneous Before meals and at bedtime  insulin lispro Injectable (ADMELOG) 5 Unit(s) SubCutaneous three times a day before meals  levothyroxine 50 MICROGram(s) Oral daily  methylPREDNISolone sodium succinate Injectable 20 milliGRAM(s) IV Push daily  metoprolol tartrate 50 milliGRAM(s) Oral every 8 hours  mirtazapine 7.5 milliGRAM(s) Oral at bedtime  nystatin    Suspension [id number] Unit(s) Oral four times a day  senna 1 Tablet(s) Oral daily  sevelamer carbonate 800 milliGRAM(s) Oral three times a day  sodium chloride 0.65% Nasal 1 Spray(s) Both Nostrils daily    MEDICATIONS  (PRN):  acetaminophen   Tablet .. 650 milliGRAM(s) Oral every 6 hours PRN Moderate Pain (4 - 6)  guaiFENesin   Syrup  (Sugar-Free) 100 milliGRAM(s) Oral every 6 hours PRN Cough        DIAGNOSTIC TESTS:

## 2021-02-02 NOTE — PROGRESS NOTE ADULT - PROBLEM SELECTOR PLAN 9
Dx 3wks ago @ [facility].   - CTH 1/19 (-) for acute changes.   - Per Neurosurgery, cont ASA 81 PO QD; no neuro interventions at this time.     #DEMENTIA  - CONT: Donepezil 5mg PO QD    ## ANXIETY  - Psych consulted as anxiety thought to be worsening during hospitalization, appreciate recs.   - c/w Remeron 7.5mg qhs and Ativan dc'd as per Dr. 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

## 2021-02-02 NOTE — PROGRESS NOTE ADULT - PROBLEM SELECTOR PLAN 1
- Likely multifactorial 2/2 CHF, COPD, ILD, and CAP vs pneumonitis.   - Weaned off HFNC 1/30, currently satting 95% on 2L NC and tolerating well.  - LE Duplex 1/19 (-) for DVT.   - No CTA done to r/o PE due to CKD; VQ deferred as likely would not yield diagnostic info given multiple lung processes.   - Consider repeat CT Chest non-con when more euvolemic   - Repeat COVID swab negative 1/28  - continue to taper Methylprednisolone 20mg IV-(now 20mg IV qd)  - Psych consulted as anxiety thought to be contributing factor as well, appreciate recs.   - c/w Remeron 7.5mg qhs; Ativan dc'd as per Dr. Cuevas     ## Pneumonia vs radha Pneumonitis  - s/p 4 days of Ceftriaxone + 7 days azithromycin.   - ID consulted – 1/23 DISCONTINUED Zosyn. Now s/p 10 days (end date 2/1- Cefepime 1000mg IV q24hrs for pneumonitis (per Dr Resendez).   - RVP negative, (+) MSSA, procalcitonin 1.08 -> 0.96, galactomannan negative, LDH-->474, fungitell negative    #Leukocytosis  - WBC uptrending possible 2/2 IV steroids. diff w/o bandemia  - WBC downtrend to 21.24 today; remains afebrile; continue to trend   - Urinalysis sent, positive for yeast- ordered for Monistat x 3 days.   - No Diarrhea as per patient

## 2021-02-02 NOTE — PROGRESS NOTE ADULT - ASSESSMENT
ASSESSMENT/PLAN 86 yr old F, POOR HISTORIAN/early dementia, former heavy smoker with PMHx HTN, hyperlipidemia, COPD, hypothyroidism, Stage IV CKD (baseline Cr ~3.0), anemia of chronic disease, CAD s/p CABG 2015 Dr. Blunt, acute on chronic diastolic CHF(EF:55% by Echo 10/2020), renal artery stenosis s/p renal artery stent >20yrs ago, Carotid artery stenosis, PAD, who presents to [facility] ED 1/15/21 c/o progressively worsening SOB and cough x 3 days. Currently on steroids from pneumonitis.    1. O2 Continue NC titrate to adequate 02 Sat, please humidify  2. Bronchodilators:  Atrovent/ albuterol q 4 – 6 hours as needed  3. Corticosteroids: Recommend continue taper, add Pulmicort nebs discussed c CCU  4. ID/Antibiotics: no off  5. Cardiac/HTN: Optimize CHF mngmnt, optimize HD   6. GI: Rx/ prophylaxis c PPI/H2B  7. Heme: Rx/VT prophylaxis c SQH/SCD/ASA follow H/H closely  8. Aspiration precautions at all times, mobilize, OOB, chest PT  9, Avoid sedation in this pt, to protect respiratory drive, benzodiazepines are contraindicated  Discussed with managing team

## 2021-02-02 NOTE — PROGRESS NOTE ADULT - PROBLEM SELECTOR PLAN 8
- Na 129 today; remains asx.   - Switched meds to NS from D5.   - Continue to monitor.   - Renal following, appreciate recs

## 2021-02-02 NOTE — PROGRESS NOTE ADULT - PROBLEM SELECTOR PLAN 3
HX Anemia. Baseline Hgb 8-9. 2/2 to CKD and anemia of chronic disease  - s/p 2U PRBC 1/27 and 1U PRBC 1/20   - hgb stable in 11's,  no active s/sx bleeding   - continue to HOLD IV iron due to treatment for presumed CAP vs pneumonitis  - Stool for occult blood positive 1/25, reportedly Hx of hemorrhoids  - Maintain active T&S, last 1/31

## 2021-02-02 NOTE — PROGRESS NOTE ADULT - PROBLEM SELECTOR PLAN 6
- CONT: Symbicort inhaler BID and Duonebs  - PRN Robutussin/Benonatate for cough  - not on Home Oxygen. Wean down HFNC 30/40 to 2L NC O2  - c/w Nystatin swish and spit for thrush  - QTc 500.  Unable to Switch Nystatin to Fluconazole 200mg IV

## 2021-02-02 NOTE — PROGRESS NOTE ADULT - SUBJECTIVE AND OBJECTIVE BOX
Interventional, Pulmonary, Critical, Chest Special Procedures.    Pt was seen and fully examined by myself.     Time spent with patient in minutes:37    Patient is a 86y old  Female who presents with a chief complaint of progressively worsening SOB and cough x 3 days (02 Feb 2021 13:03) The patient reports feeling better today.  No new cough or sob, Pt able to void c no dysuria, had BM no diarrhea, pain free and eupneic on NC.    HPI:  86 yr old F, POOR HISTORIAN/early dementia, former heavy smoker with PMHx HTN, hyperlipidemia, COPD, hypothyroidism, Stage IV CKD (baseline Cr ~3.0), anemia of chronic disease, CAD s/p CABG 2015 Dr. Blunt, chronic diastolic CHF(EF:55% by Echo 10/2020), renal artery stenosis s/p renal artery stent >20yrs ago, Carotid artery stenosis, PAD, who presents to [facility] ED 1/15/21 c/o progressively worsening SOB and cough x 3 days. Patient reports she can barely walk 10 feet prior to having to stop and rest. Patient further admits to chronic bilateral LE edema and significant orthopnea and she has been sleeping upright in a chair the past week. Patient also admits to chills and a nonproductive cough over the past few days. Patient was instructed to increase her Lasix 80mg PO daily dose to Lasix 80mg PO BID and start Metalazone 10mg PO BID prior to each dose by her cardiologist Dr. Horvath. Patient denies any chest pain, dizziness, palpitations, recent travel or sick contacts. Patient endorsing compliance w/ medications however daughter states that compliance with medication has been questionable over the last few months, as patient is becoming more forgetful.    In [facility] ED, BP: 170/68, HR: 80, RR:26, Temp: 98.4F, O2 sat: 80% on RA, improved to 99% with 10L NRB. EKG revealed NSR@67BPM with incomplete LBBB, TWI Lead I, AVL (similar to prior EKG 10/2020). CXR prelim read consistent with alveolar edema, bilateral patchy opacities/consolidation.    Labs notable for: WBC 14.5 with left shift, H/H 9.8/30.6, D-dimer 491, BUN/Cr 56/3.54, , CRP 4.86, Ferritin 180, Procalcitonin 0.29, Lactate 2.4, Troponin T 0.05, BNP 64,671, Initial COVID PCR negative.    Patient treated with Lasix 80mg IV x 1 dose, SL NTG 0.4mg PO x 1 dose, Decadron 6mg IV x 1 dose, Ceftriaxone 1g IV x 1 dose and Azithromycin 500mg IV x 1 dose.  Patient now admitted to cardiac telemetry for management of acute on chronic diastolic CHF exacerbation in setting of suspected CAP.    **Of note: Patient had a fall ~3 weeks ago for which she was admitted to [facility], CT imaging was negative as per daughter and fall thought to be secondary to hypotension.   (15 Mark 2021 21:30)      REVIEW OF SYSTEMS:  **Of note: Patient had a fall ~3 weeks ago for which she was admitted to [facility], CT imaging was negative as per daughter and fall thought to be secondary to hypotension.   (15 Mark 2021 21:30)  Constitutional: (+) weight change, (-) fever,  (-) chills, (+) fatigue, () night sweats  Eyes: (-) discharge, () eye pain, () visual change  ENT:  (-) hearing difficulty, () vertigo, () sinus pain,  () throat pain, () epistaxis, () dysphagia, () hoarseness  Neck: (-) pain, () stiffness, () swelling  Respiratory: (+) dry cough, () wheezing, () hemoptysis      Cardiovascular: (-) chest pain, ()palpitations, () dizziness   Gastrointestinal: (-) abdominal pain, () nausea, () vomiting, () hematemesis, () diarrhea,  () constipation, () melena  Genitourinary:  (-) dysuria, () frequency, () hematuria, () incontinence      Neurologic: (-) headache, () memory loss, () loss of strength, () numbness, () tremor     Skin: (-) itching, () burning, () rash, () lesions   Lymphatic: () enlarged lymph nodes  Endocrine: (-) hair loss, () temperature intolerance         Musculoskeletal: (-) back pain, () joint pain,  () extremity pain  Psychiatric: (-) visual change, () auditory change, () depression, () anxiety, () suicidal  Sleep: (+) disorder, () insomnia, () sleep deprivation  Heme/Lymph: () easy bruising, () bleeding gums            Allergy and Immunologic: () hives, () eczema    PAST MEDICAL & SURGICAL HISTORY:  Essential hypertension  Hypertension    Type 2 diabetes mellitus  Diabetes    Hyperlipidemia  Hyperlipidemia    Disorder of kidney and ureter  Renal insufficiency    Peripheral vascular disease  PVD (peripheral vascular disease)    Anxiety state  Anxiety    Atherosclerosis of renal artery  with resulting stent placement    Atherosclerosis of renal artery  Renal artery stenosis      FAMILY HISTORY:  Family history of ischemic heart disease  Family history of coronary artery disease.      SOCIAL HISTORY:      - Tobacco     - ETOH    Allergies    No Known Allergies    Intolerances      Vital Signs Last 24 Hrs  T(C): 36.3 (02 Feb 2021 13:15), Max: 36.9 (02 Feb 2021 04:30)  T(F): 97.4 (02 Feb 2021 13:15), Max: 98.4 (02 Feb 2021 04:30)  HR: 74 (02 Feb 2021 13:15) (67 - 76)  BP: 153/70 (02 Feb 2021 13:15) (151/56 - 178/73)  BP(mean): 101 (02 Feb 2021 13:15) (101 - 113)  RR: 19 (02 Feb 2021 13:15) (17 - 19)  SpO2: 98% (02 Feb 2021 13:15) (93% - 98%)    02-01 @ 07:01 - 02-02 @ 07:00  --------------------------------------------------------  IN: 700 mL / OUT: 600 mL / NET: 100 mL    02-02 @ 07:01  -  02-02 @ 17:41  --------------------------------------------------------  IN: 600 mL / OUT: 3200 mL / NET: -2600 mL          MEDICATIONS:  MEDICATIONS  (STANDING):  albuterol/ipratropium for Nebulization 3 milliLiter(s) Nebulizer every 6 hours  amLODIPine   Tablet 10 milliGRAM(s) Oral daily  aspirin enteric coated 81 milliGRAM(s) Oral daily  atorvastatin 20 milliGRAM(s) Oral at bedtime  budesonide  80 MICROgram(s)/formoterol 4.5 MICROgram(s) Inhaler 2 Puff(s) Inhalation two times a day  clotrimazole 2% Vaginal Cream 1 Applicatorful Vaginal at bedtime  dextrose 40% Gel 15 Gram(s) Oral once  dextrose 5%. 1000 milliLiter(s) (50 mL/Hr) IV Continuous <Continuous>  dextrose 5%. 1000 milliLiter(s) (100 mL/Hr) IV Continuous <Continuous>  dextrose 50% Injectable 25 Gram(s) IV Push once  dextrose 50% Injectable 12.5 Gram(s) IV Push once  dextrose 50% Injectable 25 Gram(s) IV Push once  donepezil 5 milliGRAM(s) Oral at bedtime  fluticasone propionate 50 MICROgram(s)/spray Nasal Spray 1 Spray(s) Both Nostrils two times a day  glucagon  Injectable 1 milliGRAM(s) IntraMuscular once  heparin   Injectable 5000 Unit(s) SubCutaneous every 12 hours  influenza  Vaccine (HIGH DOSE) 0.7 milliLiter(s) IntraMuscular once  insulin glargine Injectable (LANTUS) 4 Unit(s) SubCutaneous at bedtime  insulin lispro (ADMELOG) corrective regimen sliding scale   SubCutaneous Before meals and at bedtime  insulin lispro Injectable (ADMELOG) 5 Unit(s) SubCutaneous three times a day before meals  levothyroxine 50 MICROGram(s) Oral daily  metoprolol tartrate 50 milliGRAM(s) Oral every 8 hours  mirtazapine 7.5 milliGRAM(s) Oral at bedtime  nystatin    Suspension 608449 Unit(s) Oral four times a day  senna 1 Tablet(s) Oral daily  sevelamer carbonate 800 milliGRAM(s) Oral three times a day  sodium chloride 0.65% Nasal 1 Spray(s) Both Nostrils daily    MEDICATIONS  (PRN):  acetaminophen   Tablet .. 650 milliGRAM(s) Oral every 6 hours PRN Moderate Pain (4 - 6)  guaiFENesin   Syrup  (Sugar-Free) 100 milliGRAM(s) Oral every 6 hours PRN Cough      PHYSICAL EXAM:  More comfortable, no immediate distress  Muscle atrophy, developed,  Eyes: PERRLA, EOMI, -conjunctivitis, -scleritis   Head: no focal deficit, normocephalic,  no trauma  ENMT: moist tongue with white lesions, + thrush, -nasal discharge, -hoarseness, normal hearing, + cough, -hemoptysis, trachea midline  Neck: supple, - lymphadenopathy,  -masses, -JVD  Respiratory: bilateral diminished breath sounds, -wheezing, less  rhonchi,  rales, + less crackles lower lungs, better peripheral air entry  Chest:  no more accessory muscle use, -paradoxical breathing  Cardiovascular: irregular rate and sinus rhythm, S1 S2 normal, -S3, -S4, -murmur, -gallop, -rub  Gastrointestinal: soft, nontender, nondistended, normal bowel sounds, no hepatosplenomegaly  Genitourinary: -flank pain, -dysuria, + Cason  Extremities: -clubbing, -cyanosis, -edema    Vascular: peripheral pulses palpable 2+ bilaterally  Neurological: alert, oriented x 3, no focal deficit, -tremor   Skin: warm, dry, -erythema, iv sites intact  Lymph nodes; no cervical, supraclavicular or axillary adenopathy  Psychiatric: cooperative, appropriate mood  DEVICES:  - DENTURES   +IV R / L     - ETUBE   -TRACH   -CTUBE  R / L    LABS:                          11.7   21.24 )-----------( 136      ( 02 Feb 2021 07:54 )             35.9     02-02    129<L>  |  90<L>  |  104<H>  ----------------------------<  123<H>  4.9   |  25  |  3.76<H>    Ca    8.7      02 Feb 2021 07:54  Phos  2.3     02-02  Mg     1.8     02-02        RADIOLOGY & ADDITIONAL STUDIES (The following images were personally reviewed):

## 2021-02-03 LAB
ANION GAP SERPL CALC-SCNC: 14 MMOL/L — SIGNIFICANT CHANGE UP (ref 5–17)
BLD GP AB SCN SERPL QL: NEGATIVE — SIGNIFICANT CHANGE UP
BUN SERPL-MCNC: 65 MG/DL — HIGH (ref 7–23)
CALCIUM SERPL-MCNC: 8.2 MG/DL — LOW (ref 8.4–10.5)
CHLORIDE SERPL-SCNC: 96 MMOL/L — SIGNIFICANT CHANGE UP (ref 96–108)
CMV DNA CSF QL NAA+PROBE: SIGNIFICANT CHANGE UP
CO2 SERPL-SCNC: 27 MMOL/L — SIGNIFICANT CHANGE UP (ref 22–31)
CREAT SERPL-MCNC: 2.84 MG/DL — HIGH (ref 0.5–1.3)
GLUCOSE BLDC GLUCOMTR-MCNC: 105 MG/DL — HIGH (ref 70–99)
GLUCOSE BLDC GLUCOMTR-MCNC: 118 MG/DL — HIGH (ref 70–99)
GLUCOSE BLDC GLUCOMTR-MCNC: 215 MG/DL — HIGH (ref 70–99)
GLUCOSE BLDC GLUCOMTR-MCNC: 76 MG/DL — SIGNIFICANT CHANGE UP (ref 70–99)
GLUCOSE SERPL-MCNC: 73 MG/DL — SIGNIFICANT CHANGE UP (ref 70–99)
HCT VFR BLD CALC: 33 % — LOW (ref 34.5–45)
HGB BLD-MCNC: 10.7 G/DL — LOW (ref 11.5–15.5)
MAGNESIUM SERPL-MCNC: 1.6 MG/DL — SIGNIFICANT CHANGE UP (ref 1.6–2.6)
MCHC RBC-ENTMCNC: 28.2 PG — SIGNIFICANT CHANGE UP (ref 27–34)
MCHC RBC-ENTMCNC: 32.4 GM/DL — SIGNIFICANT CHANGE UP (ref 32–36)
MCV RBC AUTO: 86.8 FL — SIGNIFICANT CHANGE UP (ref 80–100)
NRBC # BLD: 0 /100 WBCS — SIGNIFICANT CHANGE UP (ref 0–0)
PHOSPHATE SERPL-MCNC: 2.6 MG/DL — SIGNIFICANT CHANGE UP (ref 2.5–4.5)
PLATELET # BLD AUTO: 105 K/UL — LOW (ref 150–400)
POTASSIUM SERPL-MCNC: 4.2 MMOL/L — SIGNIFICANT CHANGE UP (ref 3.5–5.3)
POTASSIUM SERPL-SCNC: 4.2 MMOL/L — SIGNIFICANT CHANGE UP (ref 3.5–5.3)
RBC # BLD: 3.8 M/UL — SIGNIFICANT CHANGE UP (ref 3.8–5.2)
RBC # FLD: 16.1 % — HIGH (ref 10.3–14.5)
RH IG SCN BLD-IMP: POSITIVE — SIGNIFICANT CHANGE UP
SODIUM SERPL-SCNC: 137 MMOL/L — SIGNIFICANT CHANGE UP (ref 135–145)
WBC # BLD: 21.3 K/UL — HIGH (ref 3.8–10.5)
WBC # FLD AUTO: 21.3 K/UL — HIGH (ref 3.8–10.5)

## 2021-02-03 PROCEDURE — 99233 SBSQ HOSP IP/OBS HIGH 50: CPT

## 2021-02-03 RX ORDER — CARVEDILOL PHOSPHATE 80 MG/1
12.5 CAPSULE, EXTENDED RELEASE ORAL EVERY 12 HOURS
Refills: 0 | Status: DISCONTINUED | OUTPATIENT
Start: 2021-02-03 | End: 2021-02-07

## 2021-02-03 RX ADMIN — Medication 1 ENEMA: at 21:00

## 2021-02-03 RX ADMIN — SEVELAMER CARBONATE 800 MILLIGRAM(S): 2400 POWDER, FOR SUSPENSION ORAL at 13:56

## 2021-02-03 RX ADMIN — Medication 4 UNIT(S): at 13:54

## 2021-02-03 RX ADMIN — Medication 3 MILLILITER(S): at 11:13

## 2021-02-03 RX ADMIN — Medication 4: at 17:05

## 2021-02-03 RX ADMIN — DONEPEZIL HYDROCHLORIDE 5 MILLIGRAM(S): 10 TABLET, FILM COATED ORAL at 21:53

## 2021-02-03 RX ADMIN — Medication 3 MILLILITER(S): at 06:44

## 2021-02-03 RX ADMIN — Medication 50 MILLIGRAM(S): at 06:45

## 2021-02-03 RX ADMIN — SENNA PLUS 1 TABLET(S): 8.6 TABLET ORAL at 11:13

## 2021-02-03 RX ADMIN — Medication 20 MILLIGRAM(S): at 17:06

## 2021-02-03 RX ADMIN — Medication 20 MILLIGRAM(S): at 06:43

## 2021-02-03 RX ADMIN — Medication 1 APPLICATORFUL: at 21:50

## 2021-02-03 RX ADMIN — Medication 4 UNIT(S): at 17:05

## 2021-02-03 RX ADMIN — BUDESONIDE AND FORMOTEROL FUMARATE DIHYDRATE 2 PUFF(S): 160; 4.5 AEROSOL RESPIRATORY (INHALATION) at 08:50

## 2021-02-03 RX ADMIN — AMLODIPINE BESYLATE 10 MILLIGRAM(S): 2.5 TABLET ORAL at 06:43

## 2021-02-03 RX ADMIN — HEPARIN SODIUM 5000 UNIT(S): 5000 INJECTION INTRAVENOUS; SUBCUTANEOUS at 17:06

## 2021-02-03 RX ADMIN — Medication 1 SPRAY(S): at 17:06

## 2021-02-03 RX ADMIN — Medication 3 MILLILITER(S): at 17:06

## 2021-02-03 RX ADMIN — BUDESONIDE AND FORMOTEROL FUMARATE DIHYDRATE 2 PUFF(S): 160; 4.5 AEROSOL RESPIRATORY (INHALATION) at 21:49

## 2021-02-03 RX ADMIN — SEVELAMER CARBONATE 800 MILLIGRAM(S): 2400 POWDER, FOR SUSPENSION ORAL at 06:43

## 2021-02-03 RX ADMIN — Medication 500000 UNIT(S): at 06:43

## 2021-02-03 RX ADMIN — Medication 81 MILLIGRAM(S): at 11:13

## 2021-02-03 RX ADMIN — Medication 500000 UNIT(S): at 11:13

## 2021-02-03 RX ADMIN — ATORVASTATIN CALCIUM 20 MILLIGRAM(S): 80 TABLET, FILM COATED ORAL at 21:53

## 2021-02-03 RX ADMIN — Medication 1 SPRAY(S): at 11:13

## 2021-02-03 RX ADMIN — Medication 50 MICROGRAM(S): at 06:43

## 2021-02-03 RX ADMIN — SEVELAMER CARBONATE 800 MILLIGRAM(S): 2400 POWDER, FOR SUSPENSION ORAL at 21:53

## 2021-02-03 RX ADMIN — Medication 500000 UNIT(S): at 17:06

## 2021-02-03 RX ADMIN — MIRTAZAPINE 7.5 MILLIGRAM(S): 45 TABLET, ORALLY DISINTEGRATING ORAL at 21:53

## 2021-02-03 RX ADMIN — CARVEDILOL PHOSPHATE 12.5 MILLIGRAM(S): 80 CAPSULE, EXTENDED RELEASE ORAL at 17:06

## 2021-02-03 RX ADMIN — Medication 1 ENEMA: at 00:00

## 2021-02-03 NOTE — PROGRESS NOTE ADULT - PROBLEM SELECTOR PLAN 1
- Likely multifactorial 2/2 CHF, COPD, ILD, and CAP vs pneumonitis.   - Weaned off HFNC 1/30 and NC currently satting 95% on RA and tolerating well.  - LE Duplex 1/19 (-) for DVT.   - No CTA done to r/o PE due to CKD; VQ deferred as likely would not yield diagnostic info given multiple lung processes.   - Repeat COVID swab negative 1/28  - continue to taper Methylprednisolone; ordered for Prednisone 20mg BID today and as d/w Dr. Cuevas discharge on Prednisone 20mg qd   - Psych consulted as anxiety thought to be contributing factor as well, appreciate recs.   - c/w Remeron 7.5mg qhs; Ativan dc'd as per Dr. Cuevas     ## Pneumonia vs radha Pneumonitis  - s/p 4 days of Ceftriaxone + 7 days azithromycin.   - ID consulted – 1/23 DISCONTINUED Zosyn. Now s/p 10 days (end date 2/1- Cefepime 1000mg IV q24hrs for pneumonitis (per Dr Resendez).   - RVP negative, (+) MSSA, procalcitonin 1.08 -> 0.96, galactomannan negative, LDH-->474, fungitell negative    #Leukocytosis  - WBC uptrending possible 2/2 IV steroids. diff w/o bandemia  - WBC stable 21.3 today; remains afebrile; continue to trend   - Urinalysis sent, positive for yeast- ordered for Monistat x 3 days.   - pending urine culture   - No Diarrhea as per patient

## 2021-02-03 NOTE — PROGRESS NOTE ADULT - SUBJECTIVE AND OBJECTIVE BOX
Interventional, Pulmonary, Critical, Chest Special Procedures.    Pt was seen and fully examined by myself.     Time spent with patient in minutes:36    Patient is a 86y old  Female who presents with a chief complaint of progressively worsening SOB and cough x 3 days (03 Feb 2021 12:49) The patient now eupneic on RA c Sat 94-95    HPI:  86 yr old F, POOR HISTORIAN/early dementia, former heavy smoker with PMHx HTN, hyperlipidemia, COPD, hypothyroidism, Stage IV CKD (baseline Cr ~3.0), anemia of chronic disease, CAD s/p CABG 2015 Dr. Blunt, chronic diastolic CHF(EF:55% by Echo 10/2020), renal artery stenosis s/p renal artery stent >20yrs ago, Carotid artery stenosis, PAD, who presents to [facility] ED 1/15/21 c/o progressively worsening SOB and cough x 3 days. Patient reports she can barely walk 10 feet prior to having to stop and rest. Patient further admits to chronic bilateral LE edema and significant orthopnea and she has been sleeping upright in a chair the past week. Patient also admits to chills and a nonproductive cough over the past few days. Patient was instructed to increase her Lasix 80mg PO daily dose to Lasix 80mg PO BID and start Metalazone 10mg PO BID prior to each dose by her cardiologist Dr. Horvath. Patient denies any chest pain, dizziness, palpitations, recent travel or sick contacts. Patient endorsing compliance w/ medications however daughter states that compliance with medication has been questionable over the last few months, as patient is becoming more forgetful.    In [facility] ED, BP: 170/68, HR: 80, RR:26, Temp: 98.4F, O2 sat: 80% on RA, improved to 99% with 10L NRB. EKG revealed NSR@67BPM with incomplete LBBB, TWI Lead I, AVL (similar to prior EKG 10/2020). CXR prelim read consistent with alveolar edema, bilateral patchy opacities/consolidation.    Labs notable for: WBC 14.5 with left shift, H/H 9.8/30.6, D-dimer 491, BUN/Cr 56/3.54, , CRP 4.86, Ferritin 180, Procalcitonin 0.29, Lactate 2.4, Troponin T 0.05, BNP 64,671, Initial COVID PCR negative.    Patient treated with Lasix 80mg IV x 1 dose, SL NTG 0.4mg PO x 1 dose, Decadron 6mg IV x 1 dose, Ceftriaxone 1g IV x 1 dose and Azithromycin 500mg IV x 1 dose.  Patient now admitted to cardiac telemetry for management of acute on chronic diastolic CHF exacerbation in setting of suspected CAP.    **Of note: Patient had a fall ~3 weeks ago for which she was admitted to [facility], CT imaging was negative as per daughter and fall thought to be secondary to hypotension.   (15 Mark 2021 21:30)      REVIEW OF SYSTEMS:  Constitutional: No fever, weight loss, chills or fatigue  Eyes: No eye pain, visual disturbances, or discharge  ENMT:  No difficulty hearing, tinnitus, vertigo; No sinus or throat pain. No epistaxis, dysphagia, dysphonia, hoarseness or odynophagia  Neck: No pain, stiffness or neck swelling.  No masses or deformities  Respiratory: No cough, wheezing, hemoptysis  - COPD  - ILD   - PE   - ASTHMA     - PNEUMONIA  Cardiovascular: No chest pain, dysrhythmia, palpitations, dizziness or edema - CAD   - CHF   - HTN  Gastrointestinal: No abdominal or epigastric pain. No nausea, vomiting or hematemesis; No diarrhea or constipation. No melena or hematochezia, Icterus.          Genitourinary: No dysuria, frequency, hematuria or incontinence   - CKD/NAV      - ESRD  Neurological: No headaches, memory loss, loss of strength, numbness or tremors      -DEMENTIA     - STROKE    - SEIZURE  Skin: No itching, burning, rashes or lesions   Lymph Nodes: No enlarged glands  Endocrine: No heat or cold intolerance; No hair loss       - DM     - THYROID DISORDER  Musculoskeletal: No joint pain or swelling; No muscle, back or extremity pain, No edema  Psychiatric: No depression, anxiety, mood swings or difficulty sleeping  Heme/Lymph: No easy bruising or bleeding gums         - ANEMIA      - CANCER   -COAGULOPATHY  Allergy and Immunologic: No hives or eczema    PAST MEDICAL & SURGICAL HISTORY:  Essential hypertension  Hypertension    Type 2 diabetes mellitus  Diabetes    Hyperlipidemia  Hyperlipidemia    Disorder of kidney and ureter  Renal insufficiency    Peripheral vascular disease  PVD (peripheral vascular disease)    Anxiety state  Anxiety    Atherosclerosis of renal artery  with resulting stent placement    Atherosclerosis of renal artery  Renal artery stenosis      FAMILY HISTORY:  Family history of ischemic heart disease  Family history of coronary artery disease.      SOCIAL HISTORY:      - Tobacco     - ETOH    Allergies    No Known Allergies    Intolerances      Vital Signs Last 24 Hrs  T(C): 36.5 (03 Feb 2021 17:24), Max: 36.8 (03 Feb 2021 07:00)  T(F): 97.7 (03 Feb 2021 17:24), Max: 98.2 (03 Feb 2021 07:00)  HR: 68 (03 Feb 2021 18:42) (63 - 84)  BP: 131/61 (03 Feb 2021 18:42) (131/61 - 160/71)  BP(mean): 88 (03 Feb 2021 18:42) (88 - 108)  RR: 20 (03 Feb 2021 18:42) (16 - 30)  SpO2: 93% (03 Feb 2021 18:42) (93% - 100%)    02-02 @ 07:01  -  02-03 @ 07:00  --------------------------------------------------------  IN: 1135 mL / OUT: 3200 mL / NET: -2065 mL    02-03 @ 07:01  -  02-03 @ 20:09  --------------------------------------------------------  IN: 200 mL / OUT: 0 mL / NET: 200 mL          MEDICATIONS:  MEDICATIONS  (STANDING):  albuterol/ipratropium for Nebulization 3 milliLiter(s) Nebulizer every 6 hours  amLODIPine   Tablet 10 milliGRAM(s) Oral daily  aspirin enteric coated 81 milliGRAM(s) Oral daily  atorvastatin 20 milliGRAM(s) Oral at bedtime  budesonide  80 MICROgram(s)/formoterol 4.5 MICROgram(s) Inhaler 2 Puff(s) Inhalation two times a day  carvedilol 12.5 milliGRAM(s) Oral every 12 hours  clotrimazole 2% Vaginal Cream 1 Applicatorful Vaginal at bedtime  dextrose 40% Gel 15 Gram(s) Oral once  dextrose 5%. 1000 milliLiter(s) (50 mL/Hr) IV Continuous <Continuous>  dextrose 5%. 1000 milliLiter(s) (100 mL/Hr) IV Continuous <Continuous>  dextrose 50% Injectable 25 Gram(s) IV Push once  dextrose 50% Injectable 12.5 Gram(s) IV Push once  dextrose 50% Injectable 25 Gram(s) IV Push once  donepezil 5 milliGRAM(s) Oral at bedtime  fluticasone propionate 50 MICROgram(s)/spray Nasal Spray 1 Spray(s) Both Nostrils two times a day  glucagon  Injectable 1 milliGRAM(s) IntraMuscular once  heparin   Injectable 5000 Unit(s) SubCutaneous every 12 hours  influenza  Vaccine (HIGH DOSE) 0.7 milliLiter(s) IntraMuscular once  insulin lispro (ADMELOG) corrective regimen sliding scale   SubCutaneous Before meals and at bedtime  insulin lispro Injectable (ADMELOG) 4 Unit(s) SubCutaneous three times a day with meals  levothyroxine 50 MICROGram(s) Oral daily  mirtazapine 7.5 milliGRAM(s) Oral at bedtime  nystatin    Suspension 036463 Unit(s) Oral four times a day  predniSONE   Tablet 20 milliGRAM(s) Oral two times a day  senna 1 Tablet(s) Oral daily  sevelamer carbonate 800 milliGRAM(s) Oral three times a day  sodium chloride 0.65% Nasal 1 Spray(s) Both Nostrils daily    MEDICATIONS  (PRN):  acetaminophen   Tablet .. 650 milliGRAM(s) Oral every 6 hours PRN Moderate Pain (4 - 6)  guaiFENesin   Syrup  (Sugar-Free) 100 milliGRAM(s) Oral every 6 hours PRN Cough      PHYSICAL EXAM:  Comfortable, no distress  Eyes: PERRL, EOM intact; conjunctiva and sclera clear  Head: Normocephalic;  No Trauma  ENMT: No nasal discharge, hoarseness, cough or hemoptysis  Neck: Supple; non tender; no masses or deformities.    No JVD  Respiratory: CTA,  - WHEEZING   - RHONCHI  - RALES  - CRACKLES.  Diminished breath sounds  BILATERAL  RIGHT  LEFT  Cardiovascular: Regular rate and rhythm. S1 and S2 Normal; No murmurs, gallops or rubs     - PPM/AICD  Gastrointestinal: Soft non-tender, non-distended; Normal bowel sounds; No hepatosplenomegaly.     -PEG    -  GT   - DENNIS  Genitourinary: No costovertebral angle tenderness. No dysuria  Extremities: AROM, No clubbing, cyanosis or edema    Vascular: Peripheral pulses palpable 2+ bilaterally  Neurological: Alert and responisve to stimuli   Skin: Warm and dry. No obvious rash  Lymph Nodes: No acute cervical or supraclavicular adenopathy  Psychiatric: Cooperative and appropriate mood    DEVICES:  - DENTURES   +IV R / L     - ETUBE   -TRACH   -CTUBE  R / L    LABS:                          10.7   21.30 )-----------( 105      ( 03 Feb 2021 06:38 )             33.0     02-03    137  |  96  |  65<H>  ----------------------------<  73  4.2   |  27  |  2.84<H>    Ca    8.2<L>      03 Feb 2021 06:38  Phos  2.6     02-03  Mg     1.6     02-03        RADIOLOGY & ADDITIONAL STUDIES (The following images were personally reviewed):< from: Xray Abdomen 2 Views (02.02.21 @ 19:34) >  EXAM:  XR ABDOMEN 2V                          PROCEDURE DATE:  02/02/2021          INTERPRETATION:  Supine and upright abdominal radiograph from February 2, 2021 7:09 PM.    INDICATION: Abdominal pain.    COMPARISON: Correlation with chest radiograph January 31, 2020.    FINDINGS: No evidence of bowel obstruction. No free air. Moderate stool burden. Intact median sternotomy wires. Levoscoliotic curvature lower lumbar spine and degenerative changes. Central venous catheter tip overlies right atrium. Aortic knob calcification. Decreased interstitial opacities in visualized lung bases. Surgical clips project over left lung base.    IMPRESSION: No bowel obstruction. Moderate stool burden.    < end of copied text >

## 2021-02-03 NOTE — PROGRESS NOTE ADULT - PROBLEM SELECTOR PLAN 3
HX Anemia. Baseline Hgb 8-9. 2/2 to CKD and anemia of chronic disease  - s/p 2U PRBC 1/27 and 1U PRBC 1/20   - hgb stable 10.7 today,  no active s/sx bleeding   - continue to HOLD IV iron due to treatment for presumed CAP vs pneumonitis  - Stool for occult blood positive 1/25, reportedly Hx of hemorrhoids  - Maintain active T&S, last 1/31 HX Anemia. Baseline Hgb 8-9. 2/2 to CKD and anemia of chronic disease  - s/p 2U PRBC 1/27 and 1U PRBC 1/20   - hgb stable 10.7 today,  no active s/sx bleeding   - as per Dr. Crooks- transfuse if hgb < 8.5  - continue to HOLD IV iron due to treatment for presumed CAP vs pneumonitis  - Stool for occult blood positive 1/25, reportedly Hx of hemorrhoids  - Maintain active T&S, last 2/3

## 2021-02-03 NOTE — PROGRESS NOTE ADULT - PROBLEM SELECTOR PLAN 5
Hx of CAD, s/p CABG 2015.   - CONT: ASA 81mg PO QD, Atorvastatin 20mg PO QD  - Metoprolol dc'd and Coreg 12.5mg BID started; up-titrate as tolerated     ## HTN  - SBP 140s-160s  - Metoprolol dc'd and Coreg 12.5mg BID started; up-titrate as tolerated   - CONT: Amlodipine 10mg PO QD,

## 2021-02-03 NOTE — PROGRESS NOTE ADULT - PROBLEM SELECTOR PLAN 2
Overall worsening clinical picture, oliguric NAV on CKD stage 3 2/2 ATN with CKD progression from DM.   - F/u Renal recs, known to Dr Harmon  - HD initiated this admission 1/26/21 via R IJ HD catheter.  - Cr 2.84 today  - Renal US: atrophic kidneys, no WILBER.   - Nephrotic w/u non-contributory thus far, ANCA negative  - Last HD session 2/2; follow up w/Dr. Harmon number of dialysis sessions/week on discharge   - Monitor Strict I/Os, daily PVRs  - PPD placed 1/30, reading on 2/1 NEGATIVE 0 induration as Quantiferon indeterminate

## 2021-02-03 NOTE — PROGRESS NOTE ADULT - PROBLEM SELECTOR PLAN 7
- CT chest w/o contrast: b/l GGO superimposed on chronic interstitial pulm fibrosis probably d/t chronic hypersensitivity pneumonitis or sarcoid. GGOs could be due to pulmonary edema, pulmonary hemorrhage, alveolar proteinosis, organizing pneumonia or atypical infection.  - continue to taper Methylprednisolone; ordered for Prednisone 20mg BID today and as d/w Dr. Cuevas discharge on Prednisone 20mg qd   - PRN robitussin/tessalon pearls for cough

## 2021-02-03 NOTE — PROGRESS NOTE ADULT - ATTENDING COMMENTS
new ESRD, egfr 15's (CKD IV/V) early 2020. Tolerating dialysis well, TTS via tunneled dialysis catheter. HTN well controlled. CXR yesterday pre HD still with some congestion vs interstitial lung disease. Plt downtrending, does not seem to correlate to dialysis sessions/dialyzer rxn  Will need permanent dialysis access (AVF or AVG), please consult vascular surgery if she will be here for several days so they can assess her and get vein mapping done, she can f/u with them for access creation as an outpt, would not like it to extend her hospitalization.

## 2021-02-03 NOTE — PROGRESS NOTE ADULT - PROBLEM SELECTOR PLAN 6
- CONT: Symbicort inhaler BID and Duonebs  - PRN Robutussin/Benonatate for cough  - not on Home Oxygen. Wean down HFNC 30/40 to 2L NC O2 AND now on RA  - c/w Nystatin swish and spit for thrush  - QTc 500.  Unable to Switch Nystatin to Fluconazole 200mg IV

## 2021-02-03 NOTE — PROGRESS NOTE ADULT - SUBJECTIVE AND OBJECTIVE BOX
CARDIOLOGY NP PROGRESS NOTE    Subjective: Received pt awake in bed in NAD. States feeling well; had BM overnight and no longer has abdominal pain. Denies CP, dizziness/diaphoresis, n/v, palpitations.  Remainder ROS otherwise negative.    Overnight Events:  S/P Abdominal pain, Abdominal X-ray wet read with significant stool burden in rectum, no dilated loops to suggest obstruction. Ordered Fleet enema for severe constipation and pain for which she had a BM; weaned off oxygen and is now on RA w/SpO2  < 95%.     TELEMETRY: SR (HR 70s-90s) w/PVCs and PACs       VITAL SIGNS:  T(C): 36.7 (02-03-21 @ 09:55), Max: 36.8 (02-03-21 @ 07:00)  HR: 63 (02-03-21 @ 08:41) (63 - 90)  BP: 149/66 (02-03-21 @ 08:41) (140/87 - 165/72)  RR: 18 (02-03-21 @ 08:41) (16 - 19)  SpO2: 100% (02-03-21 @ 08:41) (91% - 100%)  Wt(kg): --    I&O's Summary    02 Feb 2021 07:01  -  03 Feb 2021 07:00  --------------------------------------------------------  IN: 1135 mL / OUT: 3200 mL / NET: -2065 mL    03 Feb 2021 07:01  -  03 Feb 2021 12:15  --------------------------------------------------------  IN: 200 mL / OUT: 0 mL / NET: 200 mL          PHYSICAL EXAM:    General: Alert to self, place and time. Disoriented to situation at times. No acute Distress   Neck: Supple, (-) JVD  Cardiac: S1 S2, No M/R/G  Pulmonary: Diminished bibasilar breath sounds w/ bibasilar crackles. Breathing unlabored on 2L NC.   Abdomen: Soft, Non -tender, +BS x 4 quads  Extremities: No Rashes, trace edema BL LE, improved.   Lines: R IJ tunneled HD Cath- site CDI, dsg in place   Neuro: Alert to self, place and time. Disoriented to situation at times.  No focal deficits          LABS:                          10.7   21.30 )-----------( 105      ( 03 Feb 2021 06:38 )             33.0                              02-03    137  |  96  |  65<H>  ----------------------------<  73  4.2   |  27  |  2.84<H>    Ca    8.2<L>      03 Feb 2021 06:38  Phos  2.6     02-03  Mg     1.6     02-03                                CAPILLARY BLOOD GLUCOSE      POCT Blood Glucose.: 118 mg/dL (03 Feb 2021 11:28)  POCT Blood Glucose.: 76 mg/dL (03 Feb 2021 07:39)  POCT Blood Glucose.: 220 mg/dL (02 Feb 2021 21:47)  POCT Blood Glucose.: 152 mg/dL (02 Feb 2021 16:59)            Allergies:  No Known Allergies    MEDICATIONS  (STANDING):  albuterol/ipratropium for Nebulization 3 milliLiter(s) Nebulizer every 6 hours  amLODIPine   Tablet 10 milliGRAM(s) Oral daily  aspirin enteric coated 81 milliGRAM(s) Oral daily  atorvastatin 20 milliGRAM(s) Oral at bedtime  budesonide  80 MICROgram(s)/formoterol 4.5 MICROgram(s) Inhaler 2 Puff(s) Inhalation two times a day  carvedilol 12.5 milliGRAM(s) Oral every 12 hours  clotrimazole 2% Vaginal Cream 1 Applicatorful Vaginal at bedtime  dextrose 40% Gel 15 Gram(s) Oral once  dextrose 5%. 1000 milliLiter(s) (50 mL/Hr) IV Continuous <Continuous>  dextrose 5%. 1000 milliLiter(s) (100 mL/Hr) IV Continuous <Continuous>  dextrose 50% Injectable 25 Gram(s) IV Push once  dextrose 50% Injectable 12.5 Gram(s) IV Push once  dextrose 50% Injectable 25 Gram(s) IV Push once  donepezil 5 milliGRAM(s) Oral at bedtime  fluticasone propionate 50 MICROgram(s)/spray Nasal Spray 1 Spray(s) Both Nostrils two times a day  glucagon  Injectable 1 milliGRAM(s) IntraMuscular once  heparin   Injectable 5000 Unit(s) SubCutaneous every 12 hours  influenza  Vaccine (HIGH DOSE) 0.7 milliLiter(s) IntraMuscular once  insulin glargine Injectable (LANTUS) 4 Unit(s) SubCutaneous at bedtime  insulin lispro (ADMELOG) corrective regimen sliding scale   SubCutaneous Before meals and at bedtime  insulin lispro Injectable (ADMELOG) 4 Unit(s) SubCutaneous three times a day with meals  levothyroxine 50 MICROGram(s) Oral daily  mirtazapine 7.5 milliGRAM(s) Oral at bedtime  nystatin    Suspension 001747 Unit(s) Oral four times a day  predniSONE   Tablet 20 milliGRAM(s) Oral two times a day  senna 1 Tablet(s) Oral daily  sevelamer carbonate 800 milliGRAM(s) Oral three times a day  sodium chloride 0.65% Nasal 1 Spray(s) Both Nostrils daily    MEDICATIONS  (PRN):  acetaminophen   Tablet .. 650 milliGRAM(s) Oral every 6 hours PRN Moderate Pain (4 - 6)  guaiFENesin   Syrup  (Sugar-Free) 100 milliGRAM(s) Oral every 6 hours PRN Cough        DIAGNOSTIC TESTS:        CARDIOLOGY NP PROGRESS NOTE    Subjective: Received pt awake in bed in NAD. States feeling well; had BM overnight and no longer has abdominal pain. Denies CP, dizziness/diaphoresis, n/v, palpitations.  Remainder ROS otherwise negative.    Overnight Events:  S/P Abdominal pain, Abdominal X-ray No bowel obstruction. Moderate stool burden. Ordered Fleet enema for severe constipation and pain for which she had a BM; weaned off oxygen and is now on RA w/SpO2  < 95%.     TELEMETRY: SR (HR 70s-90s) w/PVCs and PACs       VITAL SIGNS:  T(C): 36.7 (02-03-21 @ 09:55), Max: 36.8 (02-03-21 @ 07:00)  HR: 63 (02-03-21 @ 08:41) (63 - 90)  BP: 149/66 (02-03-21 @ 08:41) (140/87 - 165/72)  RR: 18 (02-03-21 @ 08:41) (16 - 19)  SpO2: 100% (02-03-21 @ 08:41) (91% - 100%)  Wt(kg): --    I&O's Summary    02 Feb 2021 07:01  -  03 Feb 2021 07:00  --------------------------------------------------------  IN: 1135 mL / OUT: 3200 mL / NET: -2065 mL    03 Feb 2021 07:01  -  03 Feb 2021 12:15  --------------------------------------------------------  IN: 200 mL / OUT: 0 mL / NET: 200 mL          PHYSICAL EXAM:    General: Alert to self, place and time. Disoriented to situation at times. No acute Distress   Neck: Supple, (-) JVD  Cardiac: S1 S2, No M/R/G  Pulmonary: Diminished bibasilar breath sounds w/ bibasilar crackles. Breathing unlabored on 2L NC.   Abdomen: Soft, Non -tender, +BS x 4 quads  Extremities: No Rashes, trace edema BL LE, improved.   Lines: R IJ tunneled HD Cath- site CDI, dsg in place   Neuro: Alert to self, place and time. Disoriented to situation at times.  No focal deficits          LABS:                          10.7   21.30 )-----------( 105      ( 03 Feb 2021 06:38 )             33.0                              02-03    137  |  96  |  65<H>  ----------------------------<  73  4.2   |  27  |  2.84<H>    Ca    8.2<L>      03 Feb 2021 06:38  Phos  2.6     02-03  Mg     1.6     02-03                                CAPILLARY BLOOD GLUCOSE      POCT Blood Glucose.: 118 mg/dL (03 Feb 2021 11:28)  POCT Blood Glucose.: 76 mg/dL (03 Feb 2021 07:39)  POCT Blood Glucose.: 220 mg/dL (02 Feb 2021 21:47)  POCT Blood Glucose.: 152 mg/dL (02 Feb 2021 16:59)            Allergies:  No Known Allergies    MEDICATIONS  (STANDING):  albuterol/ipratropium for Nebulization 3 milliLiter(s) Nebulizer every 6 hours  amLODIPine   Tablet 10 milliGRAM(s) Oral daily  aspirin enteric coated 81 milliGRAM(s) Oral daily  atorvastatin 20 milliGRAM(s) Oral at bedtime  budesonide  80 MICROgram(s)/formoterol 4.5 MICROgram(s) Inhaler 2 Puff(s) Inhalation two times a day  carvedilol 12.5 milliGRAM(s) Oral every 12 hours  clotrimazole 2% Vaginal Cream 1 Applicatorful Vaginal at bedtime  dextrose 40% Gel 15 Gram(s) Oral once  dextrose 5%. 1000 milliLiter(s) (50 mL/Hr) IV Continuous <Continuous>  dextrose 5%. 1000 milliLiter(s) (100 mL/Hr) IV Continuous <Continuous>  dextrose 50% Injectable 25 Gram(s) IV Push once  dextrose 50% Injectable 12.5 Gram(s) IV Push once  dextrose 50% Injectable 25 Gram(s) IV Push once  donepezil 5 milliGRAM(s) Oral at bedtime  fluticasone propionate 50 MICROgram(s)/spray Nasal Spray 1 Spray(s) Both Nostrils two times a day  glucagon  Injectable 1 milliGRAM(s) IntraMuscular once  heparin   Injectable 5000 Unit(s) SubCutaneous every 12 hours  influenza  Vaccine (HIGH DOSE) 0.7 milliLiter(s) IntraMuscular once  insulin glargine Injectable (LANTUS) 4 Unit(s) SubCutaneous at bedtime  insulin lispro (ADMELOG) corrective regimen sliding scale   SubCutaneous Before meals and at bedtime  insulin lispro Injectable (ADMELOG) 4 Unit(s) SubCutaneous three times a day with meals  levothyroxine 50 MICROGram(s) Oral daily  mirtazapine 7.5 milliGRAM(s) Oral at bedtime  nystatin    Suspension 630485 Unit(s) Oral four times a day  predniSONE   Tablet 20 milliGRAM(s) Oral two times a day  senna 1 Tablet(s) Oral daily  sevelamer carbonate 800 milliGRAM(s) Oral three times a day  sodium chloride 0.65% Nasal 1 Spray(s) Both Nostrils daily    MEDICATIONS  (PRN):  acetaminophen   Tablet .. 650 milliGRAM(s) Oral every 6 hours PRN Moderate Pain (4 - 6)  guaiFENesin   Syrup  (Sugar-Free) 100 milliGRAM(s) Oral every 6 hours PRN Cough        DIAGNOSTIC TESTS:

## 2021-02-03 NOTE — PROGRESS NOTE ADULT - PROBLEM SELECTOR PLAN 8
- Na 137 today; remains asx.   - Switched meds to NS from D5.   - Continue to monitor.   - Renal following, appreciate recs

## 2021-02-03 NOTE — PROGRESS NOTE ADULT - ASSESSMENT
86F poor historian/early dementia and former heavy smoker with PMHx DM, HTN, hyperlipidemia, COPD, hypothyroidism, CKD stage 3 (baseline Crea ~3.0), anemia of chronic disease, CAD s/p CABG 2015, chronic diastolic CHF (EF 55% via echo 10/2020), renal artery stenosis (s/p stent 20+ years ago), carotid artery stenosis, PAD presented 1/15/21 w/ progressively worsening SOB and cough. Admitted for acute on chronic diastolic HF, oliguric ATN on CKD 3 s/p stepped up to CCU and stepped back down to tele 1/23. Hospital course c/b PNA vs. radha pneumonitis on Cefepime IV, acute hypoxic respiratory failure requiring HFNC and titrated to RA, Tunneled Cath placement 1/26 w/ initiation of HD and Anemia requiring total 3U PRBC during hospitalization. Pulm Dr Cuevas, Renal Dr Nicolas, ID Dr Resendez, Endo and Psych are following. Plan for Home w/outpatient dialysis Friday 2/5.  86F poor historian/early dementia and former heavy smoker with PMHx DM, HTN, hyperlipidemia, COPD, hypothyroidism, CKD stage 3 (baseline Crea ~3.0), anemia of chronic disease, CAD s/p CABG 2015, chronic diastolic CHF (EF 55% via echo 10/2020), renal artery stenosis (s/p stent 20+ years ago), carotid artery stenosis, PAD presented 1/15/21 w/ progressively worsening SOB and cough. Admitted for acute on chronic diastolic HF, oliguric ATN on CKD 3 s/p stepped up to CCU and stepped back down to tele 1/23. Hospital course c/b PNA vs. radha pneumonitis on Cefepime IV, acute hypoxic respiratory failure requiring HFNC and titrated to RA, Tunneled Cath placement 1/26 w/ initiation of HD and Anemia requiring total 3U PRBC during hospitalization. Pulm Dr Cuevas, Renal Dr Nicolas, ID Dr Resendez, Endo and Psych are following. Plan for Home w/outpatient dialysis Saturday 2/6/21.

## 2021-02-03 NOTE — PROGRESS NOTE ADULT - ASSESSMENT
ASSESSMENT/PLAN 86 yr old F, POOR HISTORIAN/early dementia, former heavy smoker with PMHx HTN, hyperlipidemia, COPD, hypothyroidism, Stage IV CKD (baseline Cr ~3.0), anemia of chronic disease, CAD s/p CABG 2015 Dr. Blunt, acute on chronic diastolic CHF(EF:55% by Echo 10/2020), renal artery stenosis s/p renal artery stent >20yrs ago, Carotid artery stenosis, PAD, who presents to [facility] ED 1/15/21 c/o progressively worsening SOB and cough x 3 days. Currently on steroids from pneumonitis.    1. O2 Continue NC titrate to adequate 02 Sat, please humidify  2. Bronchodilators:  Atrovent/ albuterol q 4 – 6 hours as needed  3. Corticosteroids: Recommend continue taper to off , add Pulmicort nebs discussed c CCU  4. ID/Antibiotics: no off  5. Cardiac/HTN: Optimize CHF mngmnt, optimize HD   6. GI: Rx/ prophylaxis c PPI/H2B  7. Heme: Rx/VT prophylaxis c SQH/SCD/ASA follow H/H closely  8. Aspiration precautions at all times, mobilize, OOB, chest PT  9, Avoid sedation in this pt, to protect respiratory drive, benzodiazepines are contraindicated  Discussed with managing team

## 2021-02-03 NOTE — PROGRESS NOTE ADULT - ASSESSMENT
86F POOR HISTORIAN/early dementia, former heavy smoker PMHx DM, HTN, hyperlipidemia, COPD, hypothyroidism, CKD (baseline Cr ~3.0), anemia of chronic disease, CAD, renal artery stenosis s/p renal artery stent >20yrs ago, Carotid artery stenosis, PAD, who presents c/o progressively worsening SOB and cough. Nephrology consulted for NAV on CKD.      #CKD5 now ESRD started on hemodialysis   #thrombocytopenia   SOB secondary to volume overload vs underlying lung disease   previously hypotensive on hemodialysis, now tolerating well w/o episodes of hypotension   thrombocytopenia unlikely secondary to dialysis given timeline, consider Heme consult, work-up and management per primary team   consider Vascular consult for AVF creation in-patient vs out-patient   HD tomorrow 2/4 per schedule   Electrolytes, volume and bicarb management with HD  BP: UF with HD  Anemia and BMD labs acceptable, Renvela 800mg TID w/meals (lowered 2/2 secondary to shypoPhos)     Thank you for the opportunity to participate in the care of your patient. The nephrology service remains available to assist with any questions or concerns. Please feel free to reach us by paging the on-call nephrology fellow for urgent issues or as below.     Tobi Thompson M.D.   PGY-4, Nephrology Fellow   C: [phone]   P: [phone] 86F POOR HISTORIAN/early dementia, former heavy smoker PMHx DM, HTN, hyperlipidemia, COPD, hypothyroidism, CKD (baseline Cr ~3.0), anemia of chronic disease, CAD, renal artery stenosis s/p renal artery stent >20yrs ago, Carotid artery stenosis, PAD, who presents c/o progressively worsening SOB and cough. Nephrology consulted for NAV on CKD.      #CKD5 now ESRD started on hemodialysis   #thrombocytopenia   SOB secondary to volume overload vs underlying lung disease   previously hypotensive on hemodialysis, now tolerating well w/o episodes of hypotension   thrombocytopenia unlikely secondary to dialysis given timeline, consider Heme consult, work-up and management per primary team   consider Vascular consult for AVF creation in-patient vs out-patient   HD tomorrow 2/4 per schedule   Electrolytes, volume and bicarb management with HD  BP: UF with hemodialysis  Anemia and BMD labs acceptable, Renvela 800mg TID w/meals (lowered 2/2 secondary to hypoPhos)     Thank you for the opportunity to participate in the care of your patient. The nephrology service remains available to assist with any questions or concerns. Please feel free to reach us by paging the on-call nephrology fellow for urgent issues or as below.     Tobi Thompson M.D.   PGY-4, Nephrology Fellow   C: [phone]   P: [phone]

## 2021-02-03 NOTE — PROGRESS NOTE ADULT - PROBLEM SELECTOR PLAN 10
- CONT: Levothyroxine 50mg PO QD.     #DM  - Hx of DM. A1c 5.1% on admission.    - c/w Lantus 4U qHS.  Lispro decreased to 3U TID w/ meals, MISS w/ meals and bedtime  - consistent carb diet while on IV solumedrol given hyperglycemia  - Endocrine following    F: none   E: renal patient   N: dysphagia 2 mechanical soft-thin liquids, Ensure TID. Pt not eating a lot and poor appetite.     DVT ppx: Hep SubQ  Dispo: cardiac telemetry  PT lynnette HENDRICKSON w/ on site dialysis once medically stable for DC HOWEVER family wants to take pt home- plan to be dc home w/outpatient dialysis

## 2021-02-03 NOTE — PROGRESS NOTE ADULT - PROBLEM SELECTOR PLAN 4
BNP 57k.   - POCUS by MICU consult 1/22/21 (+) for B-lines throughout.   - TTE 1/20: EF 53%, biatrial enlargement, mild AR, mod-severe MR, mod-severe TR, PASP 70.   - Metoprolol dc'd and Coreg 12.5mg BID started; up-titrate as tolerated   - net neg 12L since admission  - Strict I/Os, daily weights. Core measures.

## 2021-02-03 NOTE — PROGRESS NOTE ADULT - PROBLEM SELECTOR PLAN 9
Dx 3wks ago @ [facility].   - CTH 1/19 (-) for acute changes.   - Per Neurosurgery, cont ASA 81 PO QD; no neuro interventions at this time.     #DEMENTIA  - CONT: Donepezil 5mg PO QD    ## ANXIETY  - Psych consulted as anxiety thought to be worsening during hospitalization, appreciate recs.   - c/w Remeron 7.5mg qhs and Ativan dc'd as per Dr. 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

## 2021-02-03 NOTE — PROGRESS NOTE ADULT - SUBJECTIVE AND OBJECTIVE BOX
Patient is a 86y Female seen and evaluated at bedside.   No change in respiratory status, remains on 2L NC. BP elevated. No complaints. S/p hemodialysis yesterday 2/5L UF. Plan for HD tomorrow.    Meds:    acetaminophen   Tablet .. 650 every 6 hours PRN  albuterol/ipratropium for Nebulization 3 every 6 hours  amLODIPine   Tablet 10 daily  aspirin enteric coated 81 daily  atorvastatin 20 at bedtime  budesonide  80 MICROgram(s)/formoterol 4.5 MICROgram(s) Inhaler 2 two times a day  carvedilol 12.5 every 12 hours  clotrimazole 2% Vaginal Cream 1 at bedtime  dextrose 40% Gel 15 once  dextrose 5%. 1000 <Continuous>  dextrose 5%. 1000 <Continuous>  dextrose 50% Injectable 25 once  dextrose 50% Injectable 12.5 once  dextrose 50% Injectable 25 once  donepezil 5 at bedtime  fluticasone propionate 50 MICROgram(s)/spray Nasal Spray 1 two times a day  glucagon  Injectable 1 once  guaiFENesin   Syrup  (Sugar-Free) 100 every 6 hours PRN  heparin   Injectable 5000 every 12 hours  influenza  Vaccine (HIGH DOSE) 0.7 once  insulin glargine Injectable (LANTUS) 4 at bedtime  insulin lispro (ADMELOG) corrective regimen sliding scale  Before meals and at bedtime  insulin lispro Injectable (ADMELOG) 4 three times a day with meals  levothyroxine 50 daily  mirtazapine 7.5 at bedtime  nystatin    Suspension [id number] four times a day  predniSONE   Tablet 20 two times a day  senna 1 daily  sevelamer carbonate 800 three times a day  sodium chloride 0.65% Nasal 1 daily      T(C): , Max: 36.8 (21 @ 07:00)  T(F): , Max: 98.2 (21 @ 07:00)  HR: 63 (21 @ 08:41)  BP: 149/66 (21 @ 08:41)  BP(mean): 95 (21 @ 08:41)  RR: 18 (21 @ 08:41)  SpO2: 100% (21 @ 08:41)  Wt(kg): --     @ 07:01  -   @ 07:00  --------------------------------------------------------  IN: 1135 mL / OUT: 3200 mL / NET: -5 mL     @ 07:01  -   @ 12:49  --------------------------------------------------------  IN: 200 mL / OUT: 0 mL / NET: 200 mL        Review of Systems:  RESPIRATORY: no shortness of breath  CARDIOVASCULAR: No chest pain  MUSCULOSKELETAL: No leg edema    PHYSICAL EXAM:  GENERAL: well-developed, well nourished, alert, on NC 2L O2   CHEST/LUNG: Coarse breath sounds bilaterally  HEART: normal S1S2, RRR  ABDOMEN: Soft, Nontender, non distended  EXTREMITIES: trace edema   ACCESS: +RTC c/d/i       LABS:                        10.7   21.30 )-----------( 105      ( 2021 06:38 )             33.0     02-03    137  |  96  |  65<H>  ----------------------------<  73  4.2   |  27  |  2.84<H>    Ca    8.2<L>      2021 06:38  Phos  2.6     02-03  Mg     1.6     02-03          Urinalysis Basic - ( 2021 17:59 )    Color: Yellow / Appearance: SL Cloudy / S.025 / pH: x  Gluc: x / Ketone: Trace mg/dL  / Bili: Negative / Urobili: 0.2 E.U./dL   Blood: x / Protein: >=300 mg/dL / Nitrite: POSITIVE   Leuk Esterase: NEGATIVE / RBC: > 10 /HPF / WBC > 10 /HPF   Sq Epi: x / Non Sq Epi: Moderate /HPF / Bacteria: Present /HPF            RADIOLOGY & ADDITIONAL STUDIES:           Patient is a 86y Female seen and evaluated at bedside.   No change in respiratory status, remains on 2L NC. BP elevated. No complaints. S/p hemodialysis yesterday 2/5L UF. Plan for HD tomorrow. Plan for discharge Saturday after hemodialysis with out-patient hemodialysis next Tuesday     Meds:    acetaminophen   Tablet .. 650 every 6 hours PRN  albuterol/ipratropium for Nebulization 3 every 6 hours  amLODIPine   Tablet 10 daily  aspirin enteric coated 81 daily  atorvastatin 20 at bedtime  budesonide  80 MICROgram(s)/formoterol 4.5 MICROgram(s) Inhaler 2 two times a day  carvedilol 12.5 every 12 hours  clotrimazole 2% Vaginal Cream 1 at bedtime  dextrose 40% Gel 15 once  dextrose 5%. 1000 <Continuous>  dextrose 5%. 1000 <Continuous>  dextrose 50% Injectable 25 once  dextrose 50% Injectable 12.5 once  dextrose 50% Injectable 25 once  donepezil 5 at bedtime  fluticasone propionate 50 MICROgram(s)/spray Nasal Spray 1 two times a day  glucagon  Injectable 1 once  guaiFENesin   Syrup  (Sugar-Free) 100 every 6 hours PRN  heparin   Injectable 5000 every 12 hours  influenza  Vaccine (HIGH DOSE) 0.7 once  insulin glargine Injectable (LANTUS) 4 at bedtime  insulin lispro (ADMELOG) corrective regimen sliding scale  Before meals and at bedtime  insulin lispro Injectable (ADMELOG) 4 three times a day with meals  levothyroxine 50 daily  mirtazapine 7.5 at bedtime  nystatin    Suspension [id number] four times a day  predniSONE   Tablet 20 two times a day  senna 1 daily  sevelamer carbonate 800 three times a day  sodium chloride 0.65% Nasal 1 daily      T(C): , Max: 36.8 (21 @ 07:00)  T(F): , Max: 98.2 (21 @ 07:00)  HR: 63 (21 @ 08:41)  BP: 149/66 (21 @ 08:41)  BP(mean): 95 (21 @ 08:41)  RR: 18 (21 @ 08:41)  SpO2: 100% (21 @ 08:41)  Wt(kg): --     @ 07: @ 07:00  --------------------------------------------------------  IN: 1135 mL / OUT: 3200 mL / NET: -2065 mL     @ 07: @ 12:49  --------------------------------------------------------  IN: 200 mL / OUT: 0 mL / NET: 200 mL        Review of Systems:  RESPIRATORY: no shortness of breath  CARDIOVASCULAR: No chest pain  MUSCULOSKELETAL: No leg edema    PHYSICAL EXAM:  GENERAL: well-developed, well nourished, alert, on NC 2L O2   CHEST/LUNG: Coarse breath sounds bilaterally  HEART: normal S1S2, RRR  ABDOMEN: Soft, Nontender, non distended  EXTREMITIES: trace edema   ACCESS: +RTC c/d/i       LABS:                        10.7   21.30 )-----------( 105      ( 2021 06:38 )             33.0     02-03    137  |  96  |  65<H>  ----------------------------<  73  4.2   |  27  |  2.84<H>    Ca    8.2<L>      2021 06:38  Phos  2.6     02-03  Mg     1.6     02-03          Urinalysis Basic - ( 2021 17:59 )    Color: Yellow / Appearance: SL Cloudy / S.025 / pH: x  Gluc: x / Ketone: Trace mg/dL  / Bili: Negative / Urobili: 0.2 E.U./dL   Blood: x / Protein: >=300 mg/dL / Nitrite: POSITIVE   Leuk Esterase: NEGATIVE / RBC: > 10 /HPF / WBC > 10 /HPF   Sq Epi: x / Non Sq Epi: Moderate /HPF / Bacteria: Present /HPF            RADIOLOGY & ADDITIONAL STUDIES:

## 2021-02-04 LAB
ANION GAP SERPL CALC-SCNC: 14 MMOL/L — SIGNIFICANT CHANGE UP (ref 5–17)
BUN SERPL-MCNC: 93 MG/DL — HIGH (ref 7–23)
CALCIUM SERPL-MCNC: 8.3 MG/DL — LOW (ref 8.4–10.5)
CHLORIDE SERPL-SCNC: 94 MMOL/L — LOW (ref 96–108)
CO2 SERPL-SCNC: 27 MMOL/L — SIGNIFICANT CHANGE UP (ref 22–31)
CREAT SERPL-MCNC: 3.49 MG/DL — HIGH (ref 0.5–1.3)
FUNGITELL: <31 PG/ML — SIGNIFICANT CHANGE UP
GLUCOSE BLDC GLUCOMTR-MCNC: 137 MG/DL — HIGH (ref 70–99)
GLUCOSE BLDC GLUCOMTR-MCNC: 179 MG/DL — HIGH (ref 70–99)
GLUCOSE BLDC GLUCOMTR-MCNC: 226 MG/DL — HIGH (ref 70–99)
GLUCOSE BLDC GLUCOMTR-MCNC: 293 MG/DL — HIGH (ref 70–99)
GLUCOSE SERPL-MCNC: 149 MG/DL — HIGH (ref 70–99)
HCT VFR BLD CALC: 30.5 % — LOW (ref 34.5–45)
HGB BLD-MCNC: 9.6 G/DL — LOW (ref 11.5–15.5)
MAGNESIUM SERPL-MCNC: 1.6 MG/DL — SIGNIFICANT CHANGE UP (ref 1.6–2.6)
MCHC RBC-ENTMCNC: 27.5 PG — SIGNIFICANT CHANGE UP (ref 27–34)
MCHC RBC-ENTMCNC: 31.5 GM/DL — LOW (ref 32–36)
MCV RBC AUTO: 87.4 FL — SIGNIFICANT CHANGE UP (ref 80–100)
NRBC # BLD: 0 /100 WBCS — SIGNIFICANT CHANGE UP (ref 0–0)
PLATELET # BLD AUTO: 89 K/UL — LOW (ref 150–400)
POTASSIUM SERPL-MCNC: 4.6 MMOL/L — SIGNIFICANT CHANGE UP (ref 3.5–5.3)
POTASSIUM SERPL-SCNC: 4.6 MMOL/L — SIGNIFICANT CHANGE UP (ref 3.5–5.3)
RBC # BLD: 3.49 M/UL — LOW (ref 3.8–5.2)
RBC # FLD: 16.3 % — HIGH (ref 10.3–14.5)
SARS-COV-2 RNA SPEC QL NAA+PROBE: NEGATIVE — SIGNIFICANT CHANGE UP
SODIUM SERPL-SCNC: 135 MMOL/L — SIGNIFICANT CHANGE UP (ref 135–145)
WBC # BLD: 10.75 K/UL — HIGH (ref 3.8–10.5)
WBC # FLD AUTO: 10.75 K/UL — HIGH (ref 3.8–10.5)

## 2021-02-04 PROCEDURE — 74019 RADEX ABDOMEN 2 VIEWS: CPT | Mod: 26

## 2021-02-04 PROCEDURE — 71250 CT THORAX DX C-: CPT | Mod: 26

## 2021-02-04 PROCEDURE — 99233 SBSQ HOSP IP/OBS HIGH 50: CPT

## 2021-02-04 PROCEDURE — 90935 HEMODIALYSIS ONE EVALUATION: CPT

## 2021-02-04 RX ORDER — POLYETHYLENE GLYCOL 3350 17 G/17G
17 POWDER, FOR SOLUTION ORAL DAILY
Refills: 0 | Status: DISCONTINUED | OUTPATIENT
Start: 2021-02-04 | End: 2021-02-07

## 2021-02-04 RX ORDER — INSULIN LISPRO 100/ML
6 VIAL (ML) SUBCUTANEOUS
Refills: 0 | Status: DISCONTINUED | OUTPATIENT
Start: 2021-02-04 | End: 2021-02-07

## 2021-02-04 RX ORDER — BUDESONIDE, MICRONIZED 100 %
0.5 POWDER (GRAM) MISCELLANEOUS
Refills: 0 | Status: DISCONTINUED | OUTPATIENT
Start: 2021-02-04 | End: 2021-02-07

## 2021-02-04 RX ORDER — INSULIN LISPRO 100/ML
4 VIAL (ML) SUBCUTANEOUS
Refills: 0 | Status: DISCONTINUED | OUTPATIENT
Start: 2021-02-04 | End: 2021-02-07

## 2021-02-04 RX ORDER — BUDESONIDE, MICRONIZED 100 %
0.5 POWDER (GRAM) MISCELLANEOUS
Refills: 0 | Status: DISCONTINUED | OUTPATIENT
Start: 2021-02-04 | End: 2021-02-04

## 2021-02-04 RX ADMIN — Medication 500000 UNIT(S): at 18:18

## 2021-02-04 RX ADMIN — ATORVASTATIN CALCIUM 20 MILLIGRAM(S): 80 TABLET, FILM COATED ORAL at 21:58

## 2021-02-04 RX ADMIN — HEPARIN SODIUM 5000 UNIT(S): 5000 INJECTION INTRAVENOUS; SUBCUTANEOUS at 05:44

## 2021-02-04 RX ADMIN — Medication 650 MILLIGRAM(S): at 19:44

## 2021-02-04 RX ADMIN — POLYETHYLENE GLYCOL 3350 17 GRAM(S): 17 POWDER, FOR SOLUTION ORAL at 18:30

## 2021-02-04 RX ADMIN — Medication 3 MILLILITER(S): at 18:18

## 2021-02-04 RX ADMIN — Medication 500000 UNIT(S): at 05:44

## 2021-02-04 RX ADMIN — CARVEDILOL PHOSPHATE 12.5 MILLIGRAM(S): 80 CAPSULE, EXTENDED RELEASE ORAL at 05:44

## 2021-02-04 RX ADMIN — Medication 20 MILLIGRAM(S): at 18:18

## 2021-02-04 RX ADMIN — Medication 4 UNIT(S): at 18:19

## 2021-02-04 RX ADMIN — AMLODIPINE BESYLATE 10 MILLIGRAM(S): 2.5 TABLET ORAL at 05:44

## 2021-02-04 RX ADMIN — Medication 81 MILLIGRAM(S): at 11:27

## 2021-02-04 RX ADMIN — SEVELAMER CARBONATE 800 MILLIGRAM(S): 2400 POWDER, FOR SUSPENSION ORAL at 21:59

## 2021-02-04 RX ADMIN — Medication 4: at 18:20

## 2021-02-04 RX ADMIN — MIRTAZAPINE 7.5 MILLIGRAM(S): 45 TABLET, ORALLY DISINTEGRATING ORAL at 21:58

## 2021-02-04 RX ADMIN — Medication 20 MILLIGRAM(S): at 05:44

## 2021-02-04 RX ADMIN — Medication 1 SPRAY(S): at 18:23

## 2021-02-04 RX ADMIN — HEPARIN SODIUM 5000 UNIT(S): 5000 INJECTION INTRAVENOUS; SUBCUTANEOUS at 18:22

## 2021-02-04 RX ADMIN — Medication 1 APPLICATORFUL: at 22:00

## 2021-02-04 RX ADMIN — Medication 2: at 12:12

## 2021-02-04 RX ADMIN — DONEPEZIL HYDROCHLORIDE 5 MILLIGRAM(S): 10 TABLET, FILM COATED ORAL at 21:59

## 2021-02-04 RX ADMIN — Medication 50 MICROGRAM(S): at 05:44

## 2021-02-04 RX ADMIN — Medication 3 MILLILITER(S): at 05:44

## 2021-02-04 RX ADMIN — Medication 3 MILLILITER(S): at 23:49

## 2021-02-04 RX ADMIN — Medication 6: at 21:51

## 2021-02-04 RX ADMIN — SEVELAMER CARBONATE 800 MILLIGRAM(S): 2400 POWDER, FOR SUSPENSION ORAL at 05:44

## 2021-02-04 RX ADMIN — Medication 0.5 MILLIGRAM(S): at 22:00

## 2021-02-04 RX ADMIN — Medication 3 MILLILITER(S): at 11:27

## 2021-02-04 RX ADMIN — Medication 1 SPRAY(S): at 11:28

## 2021-02-04 RX ADMIN — CARVEDILOL PHOSPHATE 12.5 MILLIGRAM(S): 80 CAPSULE, EXTENDED RELEASE ORAL at 18:18

## 2021-02-04 RX ADMIN — Medication 650 MILLIGRAM(S): at 14:49

## 2021-02-04 RX ADMIN — SENNA PLUS 1 TABLET(S): 8.6 TABLET ORAL at 11:27

## 2021-02-04 RX ADMIN — Medication 4 UNIT(S): at 12:09

## 2021-02-04 RX ADMIN — Medication 500000 UNIT(S): at 23:49

## 2021-02-04 RX ADMIN — BUDESONIDE AND FORMOTEROL FUMARATE DIHYDRATE 2 PUFF(S): 160; 4.5 AEROSOL RESPIRATORY (INHALATION) at 11:27

## 2021-02-04 NOTE — PROGRESS NOTE ADULT - ASSESSMENT
86F POOR HISTORIAN/early dementia, former heavy smoker PMHx DM, HTN, hyperlipidemia, COPD, hypothyroidism, CKD (baseline Cr ~3.0), anemia of chronic disease, CAD, renal artery stenosis s/p renal artery stent >20yrs ago, Carotid artery stenosis, PAD, who presents c/o progressively worsening SOB and cough. Nephrology consulted for NAV on CKD.      #CKD5 now ESRD started on hemodialysis   #thrombocytopenia   SOB secondary to volume overload vs underlying lung disease   previously hypotensive on hemodialysis, now tolerating well w/o episodes of hypotension   thrombocytopenia unlikely secondary to dialysis given timeline, consider Heme consult, work-up and management per primary team   consider Vascular consult for AVF creation in-patient vs out-patient   HD today 2/4 per schedule   Electrolytes, volume and bicarb management with HD  BP: UF with hemodialysis  Anemia and BMD labs acceptable, Renvela 800mg TID w/meals (lowered 2/2 secondary to hypoPhos)     Thank you for the opportunity to participate in the care of your patient. The nephrology service remains available to assist with any questions or concerns. Please feel free to reach us by paging the on-call nephrology fellow for urgent issues or as below.     Tobi Thompson M.D.   PGY-4, Nephrology Fellow   C: [phone]   P: [phone]

## 2021-02-04 NOTE — PROGRESS NOTE ADULT - ATTENDING COMMENTS
new ESRD progressed from egfr 15's (CKD IV/V) early 2020. HD TTS via tunneled dialysis catheter however today we had to cut treatment short due to pelvic pain. Plt continue to downtrend, does not seem to correlate to dialysis sessions/dialyzer rxn  Will need permanent dialysis access (AVF or AVG), please consult vascular surgery if she will be here for several days so they can assess her and get vein mapping done, she can f/u with them for access creation as an outpt, would not like it to extend her hospitalization.

## 2021-02-04 NOTE — PROGRESS NOTE ADULT - ASSESSMENT
Patient is a 86y old  Female who presents with a chief complaint of progressively worsening SOB and cough x 3 days (03 Feb 2021 20:08)    Vital Signs Last 24 Hrs  T(C): 36.3 (04 Feb 2021 05:10), Max: 36.7 (03 Feb 2021 09:55)  T(F): 97.3 (04 Feb 2021 05:10), Max: 98.1 (03 Feb 2021 09:55)  HR: 72 (04 Feb 2021 05:02) (63 - 84)  BP: 166/71 (04 Feb 2021 05:02) (131/61 - 177/83)  BP(mean): 102 (04 Feb 2021 05:02) (88 - 119)  RR: 20 (04 Feb 2021 05:02) (17 - 32)  SpO2: 95% (04 Feb 2021 05:02) (93% - 100%)    acetaminophen   Tablet .. 650 milliGRAM(s) Oral every 6 hours PRN  albuterol/ipratropium for Nebulization 3 milliLiter(s) Nebulizer every 6 hours  amLODIPine   Tablet 10 milliGRAM(s) Oral daily  aspirin enteric coated 81 milliGRAM(s) Oral daily  atorvastatin 20 milliGRAM(s) Oral at bedtime  budesonide  80 MICROgram(s)/formoterol 4.5 MICROgram(s) Inhaler 2 Puff(s) Inhalation two times a day  carvedilol 12.5 milliGRAM(s) Oral every 12 hours  clotrimazole 2% Vaginal Cream 1 Applicatorful Vaginal at bedtime  dextrose 40% Gel 15 Gram(s) Oral once  dextrose 5%. 1000 milliLiter(s) IV Continuous <Continuous>  dextrose 5%. 1000 milliLiter(s) IV Continuous <Continuous>  dextrose 50% Injectable 25 Gram(s) IV Push once  dextrose 50% Injectable 12.5 Gram(s) IV Push once  dextrose 50% Injectable 25 Gram(s) IV Push once  donepezil 5 milliGRAM(s) Oral at bedtime  fluticasone propionate 50 MICROgram(s)/spray Nasal Spray 1 Spray(s) Both Nostrils two times a day  glucagon  Injectable 1 milliGRAM(s) IntraMuscular once  guaiFENesin   Syrup  (Sugar-Free) 100 milliGRAM(s) Oral every 6 hours PRN  heparin   Injectable 5000 Unit(s) SubCutaneous every 12 hours  influenza  Vaccine (HIGH DOSE) 0.7 milliLiter(s) IntraMuscular once  insulin lispro (ADMELOG) corrective regimen sliding scale   SubCutaneous Before meals and at bedtime  insulin lispro Injectable (ADMELOG) 4 Unit(s) SubCutaneous three times a day with meals  levothyroxine 50 MICROGram(s) Oral daily  mirtazapine 7.5 milliGRAM(s) Oral at bedtime  nystatin    Suspension 361332 Unit(s) Oral four times a day  predniSONE   Tablet 20 milliGRAM(s) Oral two times a day  senna 1 Tablet(s) Oral daily  sevelamer carbonate 800 milliGRAM(s) Oral three times a day  sodium chloride 0.65% Nasal 1 Spray(s) Both Nostrils daily    PHYSICAL EXAM: - unchanged.

## 2021-02-04 NOTE — PROGRESS NOTE ADULT - SUBJECTIVE AND OBJECTIVE BOX
Interventional, Pulmonary, Critical, Chest Special Procedures.    Pt was seen and fully examined by myself.     Time spent with patient in minutes:37    Patient is a 86y old  Female who presents with a chief complaint of progressively worsening SOB and cough x 3 days (04 Feb 2021 13:44) The patient seen in HD tolerating well, eupneic on RA, anxious.    HPI:  86 yr old F, POOR HISTORIAN/early dementia, former heavy smoker with PMHx HTN, hyperlipidemia, COPD, hypothyroidism, Stage IV CKD (baseline Cr ~3.0), anemia of chronic disease, CAD s/p CABG 2015 Dr. Blunt, chronic diastolic CHF(EF:55% by Echo 10/2020), renal artery stenosis s/p renal artery stent >20yrs ago, Carotid artery stenosis, PAD, who presents to [facility] ED 1/15/21 c/o progressively worsening SOB and cough x 3 days. Patient reports she can barely walk 10 feet prior to having to stop and rest. Patient further admits to chronic bilateral LE edema and significant orthopnea and she has been sleeping upright in a chair the past week. Patient also admits to chills and a nonproductive cough over the past few days. Patient was instructed to increase her Lasix 80mg PO daily dose to Lasix 80mg PO BID and start Metalazone 10mg PO BID prior to each dose by her cardiologist Dr. Horvath. Patient denies any chest pain, dizziness, palpitations, recent travel or sick contacts. Patient endorsing compliance w/ medications however daughter states that compliance with medication has been questionable over the last few months, as patient is becoming more forgetful.    In [facility] ED, BP: 170/68, HR: 80, RR:26, Temp: 98.4F, O2 sat: 80% on RA, improved to 99% with 10L NRB. EKG revealed NSR@67BPM with incomplete LBBB, TWI Lead I, AVL (similar to prior EKG 10/2020). CXR prelim read consistent with alveolar edema, bilateral patchy opacities/consolidation.    Labs notable for: WBC 14.5 with left shift, H/H 9.8/30.6, D-dimer 491, BUN/Cr 56/3.54, , CRP 4.86, Ferritin 180, Procalcitonin 0.29, Lactate 2.4, Troponin T 0.05, BNP 64,671, Initial COVID PCR negative.    Patient treated with Lasix 80mg IV x 1 dose, SL NTG 0.4mg PO x 1 dose, Decadron 6mg IV x 1 dose, Ceftriaxone 1g IV x 1 dose and Azithromycin 500mg IV x 1 dose.  Patient now admitted to cardiac telemetry for management of acute on chronic diastolic CHF exacerbation in setting of suspected CAP.    **Of note: Patient had a fall ~3 weeks ago for which she was admitted to [facility], CT imaging was negative as per daughter and fall thought to be secondary to hypotension.   (15 Mark 2021 21:30)      REVIEW OF SYSTEMS:  Constitutional: No fever, weight loss, chills or fatigue  Eyes: No eye pain, visual disturbances, or discharge  ENMT:  No difficulty hearing, tinnitus, vertigo; No sinus or throat pain. No epistaxis, dysphagia, dysphonia, hoarseness or odynophagia  Neck: No pain, stiffness or neck swelling.  No masses or deformities  Respiratory: No cough, wheezing, hemoptysis  - COPD  - ILD   - PE   - ASTHMA     - PNEUMONIA  Cardiovascular: No chest pain, dysrhythmia, palpitations, dizziness or edema - CAD   - CHF   - HTN  Gastrointestinal: No abdominal or epigastric pain. No nausea, vomiting or hematemesis; No diarrhea or constipation. No melena or hematochezia, Icterus.          Genitourinary: No dysuria, frequency, hematuria or incontinence   - CKD/NAV      - ESRD  Neurological: No headaches, memory loss, loss of strength, numbness or tremors      -DEMENTIA     - STROKE    - SEIZURE  Skin: No itching, burning, rashes or lesions   Lymph Nodes: No enlarged glands  Endocrine: No heat or cold intolerance; No hair loss       - DM     - THYROID DISORDER  Musculoskeletal: No joint pain or swelling; No muscle, back or extremity pain, No edema  Psychiatric: No depression, anxiety, mood swings or difficulty sleeping  Heme/Lymph: No easy bruising or bleeding gums         - ANEMIA      - CANCER   -COAGULOPATHY  Allergy and Immunologic: No hives or eczema    PAST MEDICAL & SURGICAL HISTORY:  Essential hypertension  Hypertension    Type 2 diabetes mellitus  Diabetes    Hyperlipidemia  Hyperlipidemia    Disorder of kidney and ureter  Renal insufficiency    Peripheral vascular disease  PVD (peripheral vascular disease)    Anxiety state  Anxiety    Atherosclerosis of renal artery  with resulting stent placement    Atherosclerosis of renal artery  Renal artery stenosis      FAMILY HISTORY:  Family history of ischemic heart disease  Family history of coronary artery disease.      SOCIAL HISTORY:      - Tobacco     - ETOH    Allergies    No Known Allergies    Intolerances      Vital Signs Last 24 Hrs  T(C): 36.3 (04 Feb 2021 13:05), Max: 36.5 (03 Feb 2021 17:24)  T(F): 97.4 (04 Feb 2021 13:05), Max: 97.7 (03 Feb 2021 17:24)  HR: 73 (04 Feb 2021 13:05) (64 - 84)  BP: 125/63 (04 Feb 2021 13:05) (125/63 - 177/83)  BP(mean): 96 (04 Feb 2021 11:39) (88 - 119)  RR: 22 (04 Feb 2021 13:05) (17 - 42)  SpO2: 99% (04 Feb 2021 13:05) (90% - 99%)    02-03 @ 07:01  -  02-04 @ 07:00  --------------------------------------------------------  IN: 318 mL / OUT: 0 mL / NET: 318 mL    02-04 @ 07:01  -  02-04 @ 14:26  --------------------------------------------------------  IN: 75 mL / OUT: 200 mL / NET: -125 mL          MEDICATIONS:  MEDICATIONS  (STANDING):  albuterol/ipratropium for Nebulization 3 milliLiter(s) Nebulizer every 6 hours  amLODIPine   Tablet 10 milliGRAM(s) Oral daily  aspirin enteric coated 81 milliGRAM(s) Oral daily  atorvastatin 20 milliGRAM(s) Oral at bedtime  buDESOnide    Inhalation Suspension 0.5 milliGRAM(s) Inhalation two times a day  budesonide  80 MICROgram(s)/formoterol 4.5 MICROgram(s) Inhaler 2 Puff(s) Inhalation two times a day  carvedilol 12.5 milliGRAM(s) Oral every 12 hours  clotrimazole 2% Vaginal Cream 1 Applicatorful Vaginal at bedtime  dextrose 40% Gel 15 Gram(s) Oral once  dextrose 5%. 1000 milliLiter(s) (50 mL/Hr) IV Continuous <Continuous>  dextrose 5%. 1000 milliLiter(s) (100 mL/Hr) IV Continuous <Continuous>  dextrose 50% Injectable 25 Gram(s) IV Push once  dextrose 50% Injectable 12.5 Gram(s) IV Push once  dextrose 50% Injectable 25 Gram(s) IV Push once  donepezil 5 milliGRAM(s) Oral at bedtime  fluticasone propionate 50 MICROgram(s)/spray Nasal Spray 1 Spray(s) Both Nostrils two times a day  glucagon  Injectable 1 milliGRAM(s) IntraMuscular once  heparin   Injectable 5000 Unit(s) SubCutaneous every 12 hours  influenza  Vaccine (HIGH DOSE) 0.7 milliLiter(s) IntraMuscular once  insulin lispro (ADMELOG) corrective regimen sliding scale   SubCutaneous Before meals and at bedtime  insulin lispro Injectable (ADMELOG) 4 Unit(s) SubCutaneous three times a day with meals  levothyroxine 50 MICROGram(s) Oral daily  mirtazapine 7.5 milliGRAM(s) Oral at bedtime  nystatin    Suspension 810627 Unit(s) Oral four times a day  predniSONE   Tablet 20 milliGRAM(s) Oral two times a day  senna 1 Tablet(s) Oral daily  sevelamer carbonate 800 milliGRAM(s) Oral three times a day  sodium chloride 0.65% Nasal 1 Spray(s) Both Nostrils daily    MEDICATIONS  (PRN):  acetaminophen   Tablet .. 650 milliGRAM(s) Oral every 6 hours PRN Moderate Pain (4 - 6)  guaiFENesin   Syrup  (Sugar-Free) 100 milliGRAM(s) Oral every 6 hours PRN Cough      PHYSICAL EXAM:  More comfortable, no immediate distress  Muscle atrophy, developed,  Eyes: PERRLA, EOMI, -conjunctivitis, -scleritis   Head: no focal deficit, normocephalic,  no trauma  ENMT: moist tongue with white lesions, + thrush, -nasal discharge, -hoarseness, normal hearing, + cough, -hemoptysis, trachea midline  Neck: supple, - lymphadenopathy,  -masses, -JVD  Respiratory: bilateral diminished breath sounds, -wheezing, less  rhonchi,  rales, + less crackles lower lungs, better peripheral air entry  Chest:  no more accessory muscle use, -paradoxical breathing  Cardiovascular: irregular rate and sinus rhythm, S1 S2 normal, -S3, -S4, -murmur, -gallop, -rub  Gastrointestinal: soft, nontender, nondistended, normal bowel sounds, no hepatosplenomegaly  Genitourinary: -flank pain, -dysuria, + Cason  Extremities: -clubbing, -cyanosis, -edema    Vascular: peripheral pulses palpable 2+ bilaterally  Neurological: alert, oriented x 3, no focal deficit, -tremor   Skin: warm, dry, -erythema, iv sites intact    DEVICES:  - DENTURES   +IV R / L     - ETUBE   -TRACH   -CTUBE  R / L    LABS:                          9.6    10.75 )-----------( 89       ( 04 Feb 2021 06:40 )             30.5     02-04    135  |  94<L>  |  93<H>  ----------------------------<  149<H>  4.6   |  27  |  3.49<H>    Ca    8.3<L>      04 Feb 2021 06:40  Phos  2.6     02-03  Mg     1.6     02-04        RADIOLOGY & ADDITIONAL STUDIES (The following images were personally reviewed):

## 2021-02-04 NOTE — PROGRESS NOTE ADULT - PROBLEM SELECTOR PLAN 2
Would like to repeat CT scan of lungs to assess of pathology now that she is off of oxygen and any acute processes are resolved.

## 2021-02-04 NOTE — PROGRESS NOTE ADULT - SUBJECTIVE AND OBJECTIVE BOX
CARDIOLOGY NP PROGRESS NOTE    Subjective:   Remainder ROS otherwise negative.    Overnight Events:     TELEMETRY:    EKG:      VITAL SIGNS:  T(C): 36.3 (02-04-21 @ 13:05), Max: 36.5 (02-03-21 @ 17:24)  HR: 73 (02-04-21 @ 13:05) (64 - 84)  BP: 125/63 (02-04-21 @ 13:05) (125/63 - 177/83)  RR: 22 (02-04-21 @ 13:05) (17 - 42)  SpO2: 99% (02-04-21 @ 13:05) (90% - 99%)  Wt(kg): --    I&O's Summary    03 Feb 2021 07:01  -  04 Feb 2021 07:00  --------------------------------------------------------  IN: 318 mL / OUT: 0 mL / NET: 318 mL    04 Feb 2021 07:01  -  04 Feb 2021 13:44  --------------------------------------------------------  IN: 75 mL / OUT: 200 mL / NET: -125 mL          PHYSICAL EXAM:    General: A/ox 3, No acute Distress  Neck: Supple, NO JVD  Cardiac: S1 S2, No M/R/G  Pulmonary: CTAB, Breathing unlabored, No Rhonchi/Rales/Wheezing  Abdomen: Soft, Non -tender, +BS x 4 quads  Extremities: No Rashes, No edema  Neuro: A/o x 3, No focal deficits          LABS:                          9.6    10.75 )-----------( 89       ( 04 Feb 2021 06:40 )             30.5                              02-04    135  |  94<L>  |  93<H>  ----------------------------<  149<H>  4.6   |  27  |  3.49<H>    Ca    8.3<L>      04 Feb 2021 06:40  Phos  2.6     02-03  Mg     1.6     02-04                                CAPILLARY BLOOD GLUCOSE      POCT Blood Glucose.: 179 mg/dL (04 Feb 2021 12:00)  POCT Blood Glucose.: 137 mg/dL (04 Feb 2021 07:32)  POCT Blood Glucose.: 105 mg/dL (03 Feb 2021 21:14)  POCT Blood Glucose.: 215 mg/dL (03 Feb 2021 16:43)            Allergies:  No Known Allergies    MEDICATIONS  (STANDING):  albuterol/ipratropium for Nebulization 3 milliLiter(s) Nebulizer every 6 hours  amLODIPine   Tablet 10 milliGRAM(s) Oral daily  aspirin enteric coated 81 milliGRAM(s) Oral daily  atorvastatin 20 milliGRAM(s) Oral at bedtime  budesonide  80 MICROgram(s)/formoterol 4.5 MICROgram(s) Inhaler 2 Puff(s) Inhalation two times a day  carvedilol 12.5 milliGRAM(s) Oral every 12 hours  clotrimazole 2% Vaginal Cream 1 Applicatorful Vaginal at bedtime  dextrose 40% Gel 15 Gram(s) Oral once  dextrose 5%. 1000 milliLiter(s) (50 mL/Hr) IV Continuous <Continuous>  dextrose 5%. 1000 milliLiter(s) (100 mL/Hr) IV Continuous <Continuous>  dextrose 50% Injectable 25 Gram(s) IV Push once  dextrose 50% Injectable 12.5 Gram(s) IV Push once  dextrose 50% Injectable 25 Gram(s) IV Push once  donepezil 5 milliGRAM(s) Oral at bedtime  fluticasone propionate 50 MICROgram(s)/spray Nasal Spray 1 Spray(s) Both Nostrils two times a day  glucagon  Injectable 1 milliGRAM(s) IntraMuscular once  heparin   Injectable 5000 Unit(s) SubCutaneous every 12 hours  influenza  Vaccine (HIGH DOSE) 0.7 milliLiter(s) IntraMuscular once  insulin lispro (ADMELOG) corrective regimen sliding scale   SubCutaneous Before meals and at bedtime  insulin lispro Injectable (ADMELOG) 4 Unit(s) SubCutaneous three times a day with meals  levothyroxine 50 MICROGram(s) Oral daily  mirtazapine 7.5 milliGRAM(s) Oral at bedtime  nystatin    Suspension 164177 Unit(s) Oral four times a day  predniSONE   Tablet 20 milliGRAM(s) Oral two times a day  senna 1 Tablet(s) Oral daily  sevelamer carbonate 800 milliGRAM(s) Oral three times a day  sodium chloride 0.65% Nasal 1 Spray(s) Both Nostrils daily    MEDICATIONS  (PRN):  acetaminophen   Tablet .. 650 milliGRAM(s) Oral every 6 hours PRN Moderate Pain (4 - 6)  guaiFENesin   Syrup  (Sugar-Free) 100 milliGRAM(s) Oral every 6 hours PRN Cough        DIAGNOSTIC TESTS:        CARDIOLOGY NP PROGRESS NOTE    Subjective:  Pt seen and examined at bedside. Reports feeling much better, currently laying almost flat in bed, on room air and satting 97%. Denies chest pain, sob, cough, lightheadedness, dizziness, palpitations, fever, chills.  Remainder ROS otherwise negative.    Overnight Events: None    TELEMETRY: SR 60-70s, occasional PVCs         VITAL SIGNS:  T(C): 36.3 (02-04-21 @ 13:05), Max: 36.5 (02-03-21 @ 17:24)  HR: 73 (02-04-21 @ 13:05) (64 - 84)  BP: 125/63 (02-04-21 @ 13:05) (125/63 - 177/83)  RR: 22 (02-04-21 @ 13:05) (17 - 42)  SpO2: 99% (02-04-21 @ 13:05) (90% - 99%)  Wt(kg): --    I&O's Summary    03 Feb 2021 07:01  -  04 Feb 2021 07:00  --------------------------------------------------------  IN: 318 mL / OUT: 0 mL / NET: 318 mL    04 Feb 2021 07:01  -  04 Feb 2021 13:44  --------------------------------------------------------  IN: 75 mL / OUT: 200 mL / NET: -125 mL          PHYSICAL EXAM:    General: Alert to self, place and time. Disoriented to situation at times. No acute Distress   Neck: Supple, no JVD  Cardiac: S1 S2, No M/R/G  Pulmonary: Lungs w/ R basilar crackles. Breathing unlabored on RA   Abdomen: Soft, Non -tender, +BS x 4 quads  Extremities: No Rashes, trace edema BL LE, improved.   Lines: R IJ tunneled HD Cath- site CDI, dsg in place   Neuro: Alert to self, place and time. Disoriented to situation at times.  No focal deficits          LABS:                          9.6    10.75 )-----------( 89       ( 04 Feb 2021 06:40 )             30.5                              02-04    135  |  94<L>  |  93<H>  ----------------------------<  149<H>  4.6   |  27  |  3.49<H>    Ca    8.3<L>      04 Feb 2021 06:40  Phos  2.6     02-03  Mg     1.6     02-04                                CAPILLARY BLOOD GLUCOSE      POCT Blood Glucose.: 179 mg/dL (04 Feb 2021 12:00)  POCT Blood Glucose.: 137 mg/dL (04 Feb 2021 07:32)  POCT Blood Glucose.: 105 mg/dL (03 Feb 2021 21:14)  POCT Blood Glucose.: 215 mg/dL (03 Feb 2021 16:43)            Allergies:  No Known Allergies    MEDICATIONS  (STANDING):  albuterol/ipratropium for Nebulization 3 milliLiter(s) Nebulizer every 6 hours  amLODIPine   Tablet 10 milliGRAM(s) Oral daily  aspirin enteric coated 81 milliGRAM(s) Oral daily  atorvastatin 20 milliGRAM(s) Oral at bedtime  budesonide  80 MICROgram(s)/formoterol 4.5 MICROgram(s) Inhaler 2 Puff(s) Inhalation two times a day  carvedilol 12.5 milliGRAM(s) Oral every 12 hours  clotrimazole 2% Vaginal Cream 1 Applicatorful Vaginal at bedtime  dextrose 40% Gel 15 Gram(s) Oral once  dextrose 5%. 1000 milliLiter(s) (50 mL/Hr) IV Continuous <Continuous>  dextrose 5%. 1000 milliLiter(s) (100 mL/Hr) IV Continuous <Continuous>  dextrose 50% Injectable 25 Gram(s) IV Push once  dextrose 50% Injectable 12.5 Gram(s) IV Push once  dextrose 50% Injectable 25 Gram(s) IV Push once  donepezil 5 milliGRAM(s) Oral at bedtime  fluticasone propionate 50 MICROgram(s)/spray Nasal Spray 1 Spray(s) Both Nostrils two times a day  glucagon  Injectable 1 milliGRAM(s) IntraMuscular once  heparin   Injectable 5000 Unit(s) SubCutaneous every 12 hours  influenza  Vaccine (HIGH DOSE) 0.7 milliLiter(s) IntraMuscular once  insulin lispro (ADMELOG) corrective regimen sliding scale   SubCutaneous Before meals and at bedtime  insulin lispro Injectable (ADMELOG) 4 Unit(s) SubCutaneous three times a day with meals  levothyroxine 50 MICROGram(s) Oral daily  mirtazapine 7.5 milliGRAM(s) Oral at bedtime  nystatin    Suspension 684805 Unit(s) Oral four times a day  predniSONE   Tablet 20 milliGRAM(s) Oral two times a day  senna 1 Tablet(s) Oral daily  sevelamer carbonate 800 milliGRAM(s) Oral three times a day  sodium chloride 0.65% Nasal 1 Spray(s) Both Nostrils daily    MEDICATIONS  (PRN):  acetaminophen   Tablet .. 650 milliGRAM(s) Oral every 6 hours PRN Moderate Pain (4 - 6)  guaiFENesin   Syrup  (Sugar-Free) 100 milliGRAM(s) Oral every 6 hours PRN Cough        DIAGNOSTIC TESTS:

## 2021-02-04 NOTE — PROGRESS NOTE ADULT - PROBLEM SELECTOR PLAN 10
- CONT: Levothyroxine 50mg PO QD.     #DM  - Hx of DM. A1c 5.1% on admission.    - c/w Lantus 4U qHS.  Lispro decreased to 3U TID w/ meals, MISS w/ meals and bedtime  - consistent carb diet while on IV solumedrol given hyperglycemia  - Endocrine following    F: none   E: renal patient   N: dysphagia 2 mechanical soft-thin liquids, Ensure TID. Pt not eating a lot and poor appetite.     DVT ppx: Hep SubQ  Dispo: cardiac telemetry  PT lynnette HENDRICKSON w/ on site dialysis once medically stable for DC HOWEVER family wants to take pt home- plan to be dc home w/outpatient dialysis - CONT: Levothyroxine 50mg PO QD.     #DM  - Hx of DM. A1c 5.1% on admission.    - c/w Lantus 4U qHS.  Lispro decreased to 3U TID w/ meals, MISS w/ meals and bedtime  - consistent carb diet while on steroids given hyperglycemia  - Endocrine following    F: none   E: renal patient   N: dysphagia 2 mechanical soft-thin liquids, Ensure TID.   DVT ppx: Hep SubQ  Dispo: cardiac telemetry  PT lynnette HENDRICKSON w/ on site dialysis once medically stable for DC, HOWEVER family wants to take pt home- plan to be dc home w/ home care w/ outpatient dialysis

## 2021-02-04 NOTE — PROGRESS NOTE ADULT - ASSESSMENT
ASSESSMENT/PLAN 86 yr old F, POOR HISTORIAN/early dementia, former heavy smoker with PMHx HTN, hyperlipidemia, COPD, hypothyroidism, Stage IV CKD (baseline Cr ~3.0), anemia of chronic disease, CAD s/p CABG 2015 Dr. Blunt, acute on chronic diastolic CHF(EF:55% by Echo 10/2020), renal artery stenosis s/p renal artery stent >20yrs ago, Carotid artery stenosis, PAD, who presents to [facility] ED 1/15/21 c/o progressively worsening SOB and cough x 3 days. Currently on steroids for pneumonitis.    1. O2 Continue NC titrate to adequate 02 Sat, please humidify  2. Bronchodilators:  Atrovent/ albuterol q 4 – 6 hours as needed  3. Corticosteroids: Recommend continue taper, add Pulmicort nebs discussed c CCU  4. ID/Antibiotics: no off  5. Cardiac/HTN: Optimize CHF mngmnt, optimize HD, repeat TTE   6. GI: Rx/ prophylaxis c PPI/H2B  7. Heme: Rx/VT prophylaxis c SQH/SCD/ASA follow H/H closely  8. Aspiration precautions at all times, mobilize, OOB, chest PT  9, Avoid sedation in this pt, to protect respiratory drive, benzodiazepines are contraindicated  10.Repeat CT chest after HD  Discussed with managing team CCU

## 2021-02-04 NOTE — PROGRESS NOTE ADULT - PROBLEM SELECTOR PLAN 1
- Likely multifactorial 2/2 CHF, COPD, ILD, and CAP vs pneumonitis.   - Weaned off HFNC 1/30 and NC currently satting 95% on RA and tolerating well.  - LE Duplex 1/19 (-) for DVT.   - No CTA done to r/o PE due to CKD; VQ deferred as likely would not yield diagnostic info given multiple lung processes.   - Repeat COVID swab negative 1/28  - continue to taper Methylprednisolone; ordered for Prednisone 20mg BID today and as d/w Dr. Cuevas discharge on Prednisone 20mg qd   - Psych consulted as anxiety thought to be contributing factor as well, appreciate recs.   - c/w Remeron 7.5mg qhs; Ativan dc'd as per Dr. Cuevas     ## Pneumonia vs radha Pneumonitis  - s/p 4 days of Ceftriaxone + 7 days azithromycin.   - ID consulted – 1/23 DISCONTINUED Zosyn. Now s/p 10 days (end date 2/1- Cefepime 1000mg IV q24hrs for pneumonitis (per Dr Resendez).   - RVP negative, (+) MSSA, procalcitonin 1.08 -> 0.96, galactomannan negative, LDH-->474, fungitell negative    #Leukocytosis  - WBC uptrending possible 2/2 IV steroids. diff w/o bandemia  - WBC stable 21.3 today; remains afebrile; continue to trend   - Urinalysis sent, positive for yeast- ordered for Monistat x 3 days.   - pending urine culture   - No Diarrhea as per patient - Likely multifactorial 2/2 CHF, COPD, ILD, and radha pneumonitis.   - Weaned off HFNC 1/30 and NC, currently satting 95% on RA and tolerating well.  - LE Duplex 1/19 (-) for DVT.   - No CTA done to r/o PE due to CKD; VQ deferred as likely would not yield diagnostic info given multiple lung processes.   - Repeat COVID swab negative 1/28  - c/w Prednisone 20mg BID today and plan to further taper down to 20mg qd tomorrow 2/5 as d/w Dr. Anderson  - Psych consulted as anxiety thought to be contributing factor as well, appreciate recs.   - c/w Remeron 7.5mg qhs; Ativan dc'd as per Dr. Cuevas     ## Pneumonia vs radha Pneumonitis  - s/p 4 days of Ceftriaxone + 7 days azithromycin.   - ID consulted – 1/23 DISCONTINUED Zosyn. Now s/p 10 days (end date 2/1- Cefepime 1000mg IV q24hrs for pneumonitis (per Dr Resendez).   - RVP negative, (+) MSSA, procalcitonin 1.08 -> 0.96, galactomannan negative, LDH-->474, Fungitell negative    #Leukocytosis  - WBC uptrending possible 2/2 IV steroids. diff w/o bandemia  - WBC improved 21 -> 10.75 today; remains afebrile; continue to trend   - Urinalysis positive for yeast- ordered for Monistat x 3 days.   - F/u urine culture results  - No Diarrhea as per patient

## 2021-02-04 NOTE — PROGRESS NOTE ADULT - PROBLEM SELECTOR PLAN 3
HX Anemia. Baseline Hgb 8-9. 2/2 to CKD and anemia of chronic disease  - s/p 2U PRBC 1/27 and 1U PRBC 1/20   - hgb stable 10.7 today,  no active s/sx bleeding   - as per Dr. Crooks- transfuse if hgb < 8.5  - continue to HOLD IV iron due to treatment for presumed CAP vs pneumonitis  - Stool for occult blood positive 1/25, reportedly Hx of hemorrhoids  - Maintain active T&S, last 2/3 HX Anemia. Baseline Hgb 8-9. 2/2 to CKD and anemia of chronic disease  - s/p 2U PRBC 1/27 and 1U PRBC 1/20   - hgb stable 9.6 today,  no active s/sx bleeding   - as per Dr. Crooks- transfuse if hgb < 8.5  - continue to HOLD IV iron due to treatment for presumed CAP vs pneumonitis  - Stool for occult blood positive 1/25, reportedly Hx of hemorrhoids  - Maintain active T&S, last 2/3

## 2021-02-04 NOTE — PROGRESS NOTE ADULT - PROBLEM SELECTOR PLAN 5
Hx of CAD, s/p CABG 2015.   - CONT: ASA 81mg PO QD, Atorvastatin 20mg PO QD  - Metoprolol dc'd and Coreg 12.5mg BID started; up-titrate as tolerated     ## HTN  - SBP 140s-160s  - Metoprolol dc'd and Coreg 12.5mg BID started; up-titrate as tolerated   - CONT: Amlodipine 10mg PO QD, Hx of CAD, s/p CABG 2015.   - CONT: ASA 81mg PO QD, Atorvastatin 20mg PO QD  - c/w Coreg 12.5mg BID      ## HTN  - -170s  - Metoprolol dc'd and Coreg 12.5mg BID started   - CONT: Amlodipine 10mg PO QD  - Per Renal, may consider starting ACE/ARB for BP control

## 2021-02-04 NOTE — PROGRESS NOTE ADULT - PROBLEM SELECTOR PLAN 2
Overall worsening clinical picture, oliguric NAV on CKD stage 3 2/2 ATN with CKD progression from DM.   - F/u Renal recs, known to Dr Harmon  - HD initiated this admission 1/26/21 via R IJ HD catheter.  - Cr 2.84 today  - Renal US: atrophic kidneys, no WILBER.   - Nephrotic w/u non-contributory thus far, ANCA negative  - Last HD session 2/2; follow up w/Dr. Harmon number of dialysis sessions/week on discharge   - Monitor Strict I/Os, daily PVRs  - PPD placed 1/30, reading on 2/1 NEGATIVE 0 induration as Quantiferon indeterminate Oliguric NAV on CKD stage 3 2/2 ATN with CKD, likely progression from DM.   - F/u Renal recs, known to Dr Harmon  - HD initiated this admission 1/26/21 via R IJ HD catheter.  - Cr 3.49 today  - Renal US: atrophic kidneys, no WILBER.   - Nephrotic w/u non-contributory thus far, ANCA negative  - Last HD session 2/4; follow up w/Dr. Harmon number of dialysis sessions/week on discharge   - Monitor Strict I/Os, daily PVRs  - PPD read NEGATIVE 0 induration on 2/1

## 2021-02-04 NOTE — PROGRESS NOTE ADULT - PROBLEM SELECTOR PLAN 7
- CT chest w/o contrast: b/l GGO superimposed on chronic interstitial pulm fibrosis probably d/t chronic hypersensitivity pneumonitis or sarcoid. GGOs could be due to pulmonary edema, pulmonary hemorrhage, alveolar proteinosis, organizing pneumonia or atypical infection.  - continue to taper Methylprednisolone; ordered for Prednisone 20mg BID today and as d/w Dr. Cuevas discharge on Prednisone 20mg qd   - PRN robitussin/tessalon pearls for cough - CT chest w/o contrast: b/l GGO superimposed on chronic interstitial pulm fibrosis probably d/t chronic hypersensitivity pneumonitis or sarcoid. GGOs could be due to pulmonary edema, pulmonary hemorrhage, alveolar proteinosis, organizing pneumonia or atypical infection.  - continue to taper Methylprednisolone; ordered for Prednisone 20mg BID today and as d/w Dr. Cuevas discharge on Prednisone 20mg qd   - Pending repeat CT Chest as pt now appears euvolemic to eval lung pathology  - PRN robitussin/tessalon pearls for cough - CT chest w/o contrast: b/l GGO superimposed on chronic interstitial pulm fibrosis probably d/t chronic hypersensitivity pneumonitis or sarcoid. GGOs could be due to pulmonary edema, pulmonary hemorrhage, alveolar proteinosis, organizing pneumonia or atypical infection.  - Switch Symbicort INH to Pulmicort INH 0.5mg BID per Dr Anderson  - c/w Prednisone 20mg BID today, plan to decrease to Prednisone 20mg qd tomorrow  - Pending repeat CT Chest as pt now appears euvolemic to eval lung pathology  - PRN robitussin/tessalon pearls for cough

## 2021-02-04 NOTE — PROGRESS NOTE ADULT - PROBLEM SELECTOR PLAN 9
Dx 3wks ago @ [facility].   - CTH 1/19 (-) for acute changes.   - Per Neurosurgery, cont ASA 81 PO QD; no neuro interventions at this time.     #DEMENTIA  - CONT: Donepezil 5mg PO QD    ## ANXIETY  - Psych consulted as anxiety thought to be worsening during hospitalization, appreciate recs.   - c/w Remeron 7.5mg qhs and Ativan dc'd as per Dr. Cuevsa  - D/c Seroquel given Hx of prolonged QTc 499ms

## 2021-02-04 NOTE — PROGRESS NOTE ADULT - SUBJECTIVE AND OBJECTIVE BOX
Patient is a 86y Female seen and evaluated at bedside.   Off oxygen now. BP elevated. No complaints. Denies CP SOB. Plan for HD today. Plan for discharge Saturday after hemodialysis with out-patient hemodialysis next Tuesday     Meds:    acetaminophen   Tablet .. 650 every 6 hours PRN  albuterol/ipratropium for Nebulization 3 every 6 hours  amLODIPine   Tablet 10 daily  aspirin enteric coated 81 daily  atorvastatin 20 at bedtime  budesonide  80 MICROgram(s)/formoterol 4.5 MICROgram(s) Inhaler 2 two times a day  carvedilol 12.5 every 12 hours  clotrimazole 2% Vaginal Cream 1 at bedtime  dextrose 40% Gel 15 once  dextrose 5%. 1000 <Continuous>  dextrose 5%. 1000 <Continuous>  dextrose 50% Injectable 25 once  dextrose 50% Injectable 12.5 once  dextrose 50% Injectable 25 once  donepezil 5 at bedtime  fluticasone propionate 50 MICROgram(s)/spray Nasal Spray 1 two times a day  glucagon  Injectable 1 once  guaiFENesin   Syrup  (Sugar-Free) 100 every 6 hours PRN  heparin   Injectable 5000 every 12 hours  influenza  Vaccine (HIGH DOSE) 0.7 once  insulin lispro (ADMELOG) corrective regimen sliding scale  Before meals and at bedtime  insulin lispro Injectable (ADMELOG) 4 three times a day with meals  levothyroxine 50 daily  mirtazapine 7.5 at bedtime  nystatin    Suspension [id number] four times a day  predniSONE   Tablet 20 two times a day  senna 1 daily  sevelamer carbonate 800 three times a day  sodium chloride 0.65% Nasal 1 daily      T(C): , Max: 36.5 (02-03-21 @ 17:24)  T(F): , Max: 97.7 (02-03-21 @ 17:24)  HR: 74 (02-04-21 @ 11:39)  BP: 143/67 (02-04-21 @ 11:39)  BP(mean): 96 (02-04-21 @ 11:39)  RR: 42 (02-04-21 @ 11:39)  SpO2: 90% (02-04-21 @ 11:39)  Wt(kg): --    02-03 @ 07:01  -  02-04 @ 07:00  --------------------------------------------------------  IN: 318 mL / OUT: 0 mL / NET: 318 mL    02-04 @ 07:01  -  02-04 @ 11:42  --------------------------------------------------------  IN: 75 mL / OUT: 200 mL / NET: -125 mL          Review of Systems:  RESPIRATORY: no shortness of breath  CARDIOVASCULAR: No chest pain  MUSCULOSKELETAL: No leg edema    PHYSICAL EXAM:  GENERAL: well-developed, well nourished, alert, on RA   CHEST/LUNG: Coarse breath sounds bilaterally  HEART: normal S1S2, RRR  ABDOMEN: Soft, Nontender, non distended  EXTREMITIES: trace edema   ACCESS: +RTC c/d/i       LABS:                        9.6    10.75 )-----------( 89       ( 04 Feb 2021 06:40 )             30.5     02-04    135  |  94<L>  |  93<H>  ----------------------------<  149<H>  4.6   |  27  |  3.49<H>    Ca    8.3<L>      04 Feb 2021 06:40  Phos  2.6     02-03  Mg     1.6     02-04                  RADIOLOGY & ADDITIONAL STUDIES:

## 2021-02-04 NOTE — PROGRESS NOTE ADULT - SUBJECTIVE AND OBJECTIVE BOX
Patient was seen and evaluated on dialysis. Complaining of intermittent lower abdominal/vaginal pain. Unlikely related to hemodialysis. Primary team aware.     Dialyzer: Optiflux [id number]  QB: 400 mL/min  QD: 500 mL/min  K bath: 2  Goal UF: 2.5L  Duration: 180 min    Patient is tolerating the procedure well.   Continue full hemodialysis treatment as prescribed. Patient was seen and evaluated on dialysis. Complaining of intermittent lower abdominal/vaginal pain. Unlikely related to hemodialysis. Primary team aware.     Dialyzer: Optiflux [id number]  QB: 400 mL/min  QD: 500 mL/min  K bath: 2  Goal UF: only 0.5L tolerated secondary to BP   Duration: 180 min    Patient is tolerating the procedure well.   Continue full hemodialysis treatment as prescribed.

## 2021-02-04 NOTE — PROGRESS NOTE ADULT - PROBLEM SELECTOR PLAN 4
BNP 57k.   - POCUS by MICU consult 1/22/21 (+) for B-lines throughout.   - TTE 1/20: EF 53%, biatrial enlargement, mild AR, mod-severe MR, mod-severe TR, PASP 70.   - Metoprolol dc'd and Coreg 12.5mg BID started; up-titrate as tolerated   - net neg 12L since admission  - Strict I/Os, daily weights. Core measures. BNP 57k.   - POCUS by MICU consult 1/22/21 (+) for B-lines throughout.   - TTE 1/20: EF 53%, biatrial enlargement, mild AR, mod-severe MR, mod-severe TR, PASP 70.   - Pending repeat ECHO to reassess mod-severe MR/TR now that pt euvolemic  - Metoprolol dc'd and Coreg 12.5mg BID started; up-titrate as tolerated   - net neg 12L since admission  - Strict I/Os, daily weights. Core measures.

## 2021-02-04 NOTE — PROGRESS NOTE ADULT - ASSESSMENT
86F poor historian/early dementia and former heavy smoker with PMHx DM, HTN, hyperlipidemia, COPD, hypothyroidism, CKD stage 3 (baseline Crea ~3.0), anemia of chronic disease, CAD s/p CABG 2015, chronic diastolic CHF (EF 55% via echo 10/2020), renal artery stenosis (s/p stent 20+ years ago), carotid artery stenosis, PAD presented 1/15/21 w/ progressively worsening SOB and cough. Admitted for acute on chronic diastolic HF, oliguric ATN on CKD 3 s/p stepped up to CCU and stepped back down to tele 1/23. Hospital course c/b PNA vs. radha pneumonitis on Cefepime IV, acute hypoxic respiratory failure requiring HFNC and titrated to RA, Tunneled Cath placement 1/26 w/ initiation of HD and Anemia requiring total 3U PRBC during hospitalization. Pulm Dr Cuevas, Renal Dr Nicolas, ID Dr Resendez, Endo and Psych are following. Plan for Home w/outpatient dialysis Saturday 2/6/21.  86F poor historian/early dementia and former heavy smoker with PMHx DM, HTN, hyperlipidemia, COPD, hypothyroidism, CKD stage 3 (baseline Crea ~3.0), anemia of chronic disease, CAD s/p CABG 2015, chronic diastolic CHF (EF 55% via echo 10/2020), renal artery stenosis (s/p stent 20+ years ago), carotid artery stenosis, PAD presented 1/15/21 w/ progressively worsening SOB and cough. Admitted for acute on chronic diastolic HF, oliguric ATN on CKD 3 s/p stepped up to CCU and stepped back down to tele 1/23. Hospital course c/b radha pneumonitis on Cefepime IV, acute hypoxic respiratory failure requiring HFNC and titrated to RA, Tunneled Cath placement 1/26 w/ initiation of HD and Anemia requiring total 3U PRBC during hospitalization. Pulm Dr Cuevas, Renal Dr Nicolas, ID Dr Resendez, Endo and Psych are following. Plan for d/c Home w/ outpatient dialysis, currently awaiting acceptance.

## 2021-02-04 NOTE — PROGRESS NOTE ADULT - PROBLEM SELECTOR PLAN 8
- Na 137 today; remains asx.   - Switched meds to NS from D5.   - Continue to monitor.   - Renal following, appreciate recs - Na 135 today; remains asx.   - Switched meds to NS from D5.   - Continue to monitor.   - Renal following, appreciate recs

## 2021-02-05 LAB
ANION GAP SERPL CALC-SCNC: 13 MMOL/L — SIGNIFICANT CHANGE UP (ref 5–17)
ANISOCYTOSIS BLD QL: SLIGHT — SIGNIFICANT CHANGE UP
APPEARANCE UR: CLEAR — SIGNIFICANT CHANGE UP
BACTERIA # UR AUTO: PRESENT /HPF
BASO STIPL BLD QL SMEAR: PRESENT — SIGNIFICANT CHANGE UP
BASOPHILS # BLD AUTO: 0 K/UL — SIGNIFICANT CHANGE UP (ref 0–0.2)
BASOPHILS NFR BLD AUTO: 0 % — SIGNIFICANT CHANGE UP (ref 0–2)
BILIRUB UR-MCNC: NEGATIVE — SIGNIFICANT CHANGE UP
BUN SERPL-MCNC: 61 MG/DL — HIGH (ref 7–23)
CALCIUM SERPL-MCNC: 8.1 MG/DL — LOW (ref 8.4–10.5)
CHLORIDE SERPL-SCNC: 97 MMOL/L — SIGNIFICANT CHANGE UP (ref 96–108)
CO2 SERPL-SCNC: 29 MMOL/L — SIGNIFICANT CHANGE UP (ref 22–31)
COD CRY URNS QL: ABNORMAL /HPF
COLOR SPEC: YELLOW — SIGNIFICANT CHANGE UP
COMMENT - URINE: SIGNIFICANT CHANGE UP
CREAT SERPL-MCNC: 2.33 MG/DL — HIGH (ref 0.5–1.3)
DIFF PNL FLD: ABNORMAL
EOSINOPHIL # BLD AUTO: 0 K/UL — SIGNIFICANT CHANGE UP (ref 0–0.5)
EOSINOPHIL NFR BLD AUTO: 0 % — SIGNIFICANT CHANGE UP (ref 0–6)
EPI CELLS # UR: ABNORMAL /HPF (ref 0–5)
GLUCOSE BLDC GLUCOMTR-MCNC: 166 MG/DL — HIGH (ref 70–99)
GLUCOSE BLDC GLUCOMTR-MCNC: 257 MG/DL — HIGH (ref 70–99)
GLUCOSE BLDC GLUCOMTR-MCNC: 259 MG/DL — HIGH (ref 70–99)
GLUCOSE BLDC GLUCOMTR-MCNC: 295 MG/DL — HIGH (ref 70–99)
GLUCOSE SERPL-MCNC: 196 MG/DL — HIGH (ref 70–99)
GLUCOSE UR QL: 100
HCT VFR BLD CALC: 31.8 % — LOW (ref 34.5–45)
HGB BLD-MCNC: 10 G/DL — LOW (ref 11.5–15.5)
KETONES UR-MCNC: NEGATIVE — SIGNIFICANT CHANGE UP
LEUKOCYTE ESTERASE UR-ACNC: ABNORMAL
LYMPHOCYTES # BLD AUTO: 0.19 K/UL — LOW (ref 1–3.3)
LYMPHOCYTES # BLD AUTO: 1.7 % — LOW (ref 13–44)
MACROCYTES BLD QL: SLIGHT — SIGNIFICANT CHANGE UP
MAGNESIUM SERPL-MCNC: 1.7 MG/DL — SIGNIFICANT CHANGE UP (ref 1.6–2.6)
MANUAL SMEAR VERIFICATION: SIGNIFICANT CHANGE UP
MCHC RBC-ENTMCNC: 28.2 PG — SIGNIFICANT CHANGE UP (ref 27–34)
MCHC RBC-ENTMCNC: 31.4 GM/DL — LOW (ref 32–36)
MCV RBC AUTO: 89.8 FL — SIGNIFICANT CHANGE UP (ref 80–100)
MONOCYTES # BLD AUTO: 0 K/UL — SIGNIFICANT CHANGE UP (ref 0–0.9)
MONOCYTES NFR BLD AUTO: 0 % — LOW (ref 2–14)
NEUTROPHILS # BLD AUTO: 10.73 K/UL — HIGH (ref 1.8–7.4)
NEUTROPHILS NFR BLD AUTO: 98.3 % — HIGH (ref 43–77)
NITRITE UR-MCNC: NEGATIVE — SIGNIFICANT CHANGE UP
PH UR: 7 — SIGNIFICANT CHANGE UP (ref 5–8)
PHOSPHATE SERPL-MCNC: 3 MG/DL — SIGNIFICANT CHANGE UP (ref 2.5–4.5)
PLAT MORPH BLD: ABNORMAL
PLATELET # BLD AUTO: 93 K/UL — LOW (ref 150–400)
POIKILOCYTOSIS BLD QL AUTO: SLIGHT — SIGNIFICANT CHANGE UP
POLYCHROMASIA BLD QL SMEAR: SLIGHT — SIGNIFICANT CHANGE UP
POTASSIUM SERPL-MCNC: 4.9 MMOL/L — SIGNIFICANT CHANGE UP (ref 3.5–5.3)
POTASSIUM SERPL-SCNC: 4.9 MMOL/L — SIGNIFICANT CHANGE UP (ref 3.5–5.3)
PROT UR-MCNC: 100 MG/DL
RBC # BLD: 3.54 M/UL — LOW (ref 3.8–5.2)
RBC # FLD: 16.3 % — HIGH (ref 10.3–14.5)
RBC BLD AUTO: ABNORMAL
RBC CASTS # UR COMP ASSIST: ABNORMAL /HPF
SCHISTOCYTES BLD QL AUTO: SLIGHT — SIGNIFICANT CHANGE UP
SODIUM SERPL-SCNC: 139 MMOL/L — SIGNIFICANT CHANGE UP (ref 135–145)
SP GR SPEC: 1.02 — SIGNIFICANT CHANGE UP (ref 1–1.03)
SPHEROCYTES BLD QL SMEAR: SLIGHT — SIGNIFICANT CHANGE UP
UROBILINOGEN FLD QL: 0.2 E.U./DL — SIGNIFICANT CHANGE UP
WBC # BLD: 10.92 K/UL — HIGH (ref 3.8–10.5)
WBC # FLD AUTO: 10.92 K/UL — HIGH (ref 3.8–10.5)
WBC UR QL: > 10 /HPF

## 2021-02-05 PROCEDURE — 99233 SBSQ HOSP IP/OBS HIGH 50: CPT

## 2021-02-05 PROCEDURE — 93306 TTE W/DOPPLER COMPLETE: CPT | Mod: 26

## 2021-02-05 RX ORDER — ALTEPLASE 100 MG
2 KIT INTRAVENOUS ONCE
Refills: 0 | Status: COMPLETED | OUTPATIENT
Start: 2021-02-05 | End: 2021-02-05

## 2021-02-05 RX ORDER — SIMETHICONE 80 MG/1
80 TABLET, CHEWABLE ORAL
Refills: 0 | Status: DISCONTINUED | OUTPATIENT
Start: 2021-02-05 | End: 2021-02-07

## 2021-02-05 RX ORDER — ALTEPLASE 100 MG
2 KIT INTRAVENOUS ONCE
Refills: 0 | Status: DISCONTINUED | OUTPATIENT
Start: 2021-02-05 | End: 2021-02-05

## 2021-02-05 RX ORDER — CEFTRIAXONE 500 MG/1
1000 INJECTION, POWDER, FOR SOLUTION INTRAMUSCULAR; INTRAVENOUS ONCE
Refills: 0 | Status: COMPLETED | OUTPATIENT
Start: 2021-02-05 | End: 2021-02-05

## 2021-02-05 RX ORDER — CEFTRIAXONE 500 MG/1
1000 INJECTION, POWDER, FOR SOLUTION INTRAMUSCULAR; INTRAVENOUS EVERY 24 HOURS
Refills: 0 | Status: DISCONTINUED | OUTPATIENT
Start: 2021-02-06 | End: 2021-02-06

## 2021-02-05 RX ORDER — CEFTRIAXONE 500 MG/1
INJECTION, POWDER, FOR SOLUTION INTRAMUSCULAR; INTRAVENOUS
Refills: 0 | Status: DISCONTINUED | OUTPATIENT
Start: 2021-02-05 | End: 2021-02-06

## 2021-02-05 RX ADMIN — Medication 1 SPRAY(S): at 17:30

## 2021-02-05 RX ADMIN — SEVELAMER CARBONATE 800 MILLIGRAM(S): 2400 POWDER, FOR SUSPENSION ORAL at 06:50

## 2021-02-05 RX ADMIN — Medication 500000 UNIT(S): at 11:42

## 2021-02-05 RX ADMIN — ALTEPLASE 2 MILLIGRAM(S): KIT at 18:40

## 2021-02-05 RX ADMIN — Medication 1 SPRAY(S): at 11:40

## 2021-02-05 RX ADMIN — Medication 3 MILLILITER(S): at 11:46

## 2021-02-05 RX ADMIN — Medication 0.5 MILLIGRAM(S): at 11:48

## 2021-02-05 RX ADMIN — Medication 3 MILLILITER(S): at 06:50

## 2021-02-05 RX ADMIN — AMLODIPINE BESYLATE 10 MILLIGRAM(S): 2.5 TABLET ORAL at 06:50

## 2021-02-05 RX ADMIN — Medication 50 MICROGRAM(S): at 06:50

## 2021-02-05 RX ADMIN — DONEPEZIL HYDROCHLORIDE 5 MILLIGRAM(S): 10 TABLET, FILM COATED ORAL at 22:55

## 2021-02-05 RX ADMIN — HEPARIN SODIUM 5000 UNIT(S): 5000 INJECTION INTRAVENOUS; SUBCUTANEOUS at 06:50

## 2021-02-05 RX ADMIN — SENNA PLUS 1 TABLET(S): 8.6 TABLET ORAL at 11:46

## 2021-02-05 RX ADMIN — Medication 6: at 21:30

## 2021-02-05 RX ADMIN — Medication 6 UNIT(S): at 11:50

## 2021-02-05 RX ADMIN — MIRTAZAPINE 7.5 MILLIGRAM(S): 45 TABLET, ORALLY DISINTEGRATING ORAL at 22:55

## 2021-02-05 RX ADMIN — Medication 4 UNIT(S): at 07:18

## 2021-02-05 RX ADMIN — Medication 20 MILLIGRAM(S): at 06:50

## 2021-02-05 RX ADMIN — SEVELAMER CARBONATE 800 MILLIGRAM(S): 2400 POWDER, FOR SUSPENSION ORAL at 11:47

## 2021-02-05 RX ADMIN — Medication 650 MILLIGRAM(S): at 15:21

## 2021-02-05 RX ADMIN — CARVEDILOL PHOSPHATE 12.5 MILLIGRAM(S): 80 CAPSULE, EXTENDED RELEASE ORAL at 18:27

## 2021-02-05 RX ADMIN — Medication 81 MILLIGRAM(S): at 11:42

## 2021-02-05 RX ADMIN — SIMETHICONE 80 MILLIGRAM(S): 80 TABLET, CHEWABLE ORAL at 18:27

## 2021-02-05 RX ADMIN — POLYETHYLENE GLYCOL 3350 17 GRAM(S): 17 POWDER, FOR SOLUTION ORAL at 11:42

## 2021-02-05 RX ADMIN — Medication 3 MILLILITER(S): at 18:27

## 2021-02-05 RX ADMIN — Medication 500000 UNIT(S): at 06:50

## 2021-02-05 RX ADMIN — Medication 0.5 MILLIGRAM(S): at 21:43

## 2021-02-05 RX ADMIN — Medication 500000 UNIT(S): at 18:27

## 2021-02-05 RX ADMIN — SEVELAMER CARBONATE 800 MILLIGRAM(S): 2400 POWDER, FOR SUSPENSION ORAL at 22:55

## 2021-02-05 RX ADMIN — Medication 1 SPRAY(S): at 06:51

## 2021-02-05 RX ADMIN — Medication 6: at 11:52

## 2021-02-05 RX ADMIN — Medication 2: at 07:17

## 2021-02-05 RX ADMIN — CEFTRIAXONE 1000 MILLIGRAM(S): 500 INJECTION, POWDER, FOR SOLUTION INTRAMUSCULAR; INTRAVENOUS at 16:45

## 2021-02-05 RX ADMIN — Medication 6 UNIT(S): at 17:01

## 2021-02-05 RX ADMIN — ATORVASTATIN CALCIUM 20 MILLIGRAM(S): 80 TABLET, FILM COATED ORAL at 22:55

## 2021-02-05 RX ADMIN — HEPARIN SODIUM 5000 UNIT(S): 5000 INJECTION INTRAVENOUS; SUBCUTANEOUS at 18:27

## 2021-02-05 RX ADMIN — Medication 500000 UNIT(S): at 22:55

## 2021-02-05 RX ADMIN — Medication 6: at 17:15

## 2021-02-05 RX ADMIN — Medication 3 MILLILITER(S): at 22:55

## 2021-02-05 RX ADMIN — CARVEDILOL PHOSPHATE 12.5 MILLIGRAM(S): 80 CAPSULE, EXTENDED RELEASE ORAL at 06:50

## 2021-02-05 NOTE — PROGRESS NOTE ADULT - PROBLEM SELECTOR PLAN 1
- Likely multifactorial 2/2 CHF, COPD, ILD, and radha pneumonitis.   - Weaned off HFNC 1/30 and NC, currently satting 95% on RA and tolerating well.  - LE Duplex 1/19 (-) for DVT.   - No CTA done to r/o PE due to CKD; VQ deferred as likely would not yield diagnostic info given multiple lung processes.   - Repeat COVID swab negative 1/28  - c/w Prednisone 20mg BID today and plan to further taper down to 20mg qd tomorrow 2/5 as d/w Dr. Anderson  - Psych consulted as anxiety thought to be contributing factor as well, appreciate recs.   - c/w Remeron 7.5mg qhs; Ativan dc'd as per Dr. Cuevas     ## Pneumonia vs radha Pneumonitis  - s/p 4 days of Ceftriaxone + 7 days azithromycin.   - ID consulted – 1/23 DISCONTINUED Zosyn. Now s/p 10 days (end date 2/1- Cefepime 1000mg IV q24hrs for pneumonitis (per Dr Resendez).   - RVP negative, (+) MSSA, procalcitonin 1.08 -> 0.96, galactomannan negative, LDH-->474, Fungitell negative    #Leukocytosis  - WBC elevated possible 2/2 steroids. Diff w/o bandemia  - WBC improved 21 -> 10.75; remains afebrile; continue to trend   - Urinalysis positive for yeast- s/p Monistat x 3 days.   - F/u urine culture results - Likely multifactorial 2/2 CHF, COPD, ILD, and radha pneumonitis.   - Weaned off HFNC 1/30 and NC, currently satting 95% on RA and tolerating well.  - LE Duplex 1/19 (-) for DVT.   - No CTA done to r/o PE due to CKD; VQ deferred as likely would not yield diagnostic info given multiple lung processes.   - Repeat COVID swab negative 1/28  - Plan further taper down Prednisone 20mg qd today 2/5 as d/w Dr. Anderson  - Psych consulted as anxiety thought to be contributing factor as well, appreciate recs.   - c/w Remeron 7.5mg qhs; Ativan dc'd as per Dr. Cuevas     ## Pneumonia vs radha Pneumonitis  - s/p 4 days of Ceftriaxone + 7 days azithromycin.   - ID consulted – 1/23 DISCONTINUED Zosyn. Now s/p 10 days (end date 2/1- Cefepime 1000mg IV q24hrs for pneumonitis (per Dr Resendez).   - RVP negative, (+) MSSA, procalcitonin 1.08 -> 0.96, galactomannan negative, LDH-->474, Fungitell negative    #Leukocytosis  - WBC elevated possible 2/2 steroids. Diff w/o bandemia  - WBC improved 21 -> 10.75; remains afebrile; continue to trend   - Urinalysis positive for yeast- s/p Monistat x 3 days.   - F/u urine culture results - Likely multifactorial 2/2 CHF, COPD, ILD, and radha pneumonitis.   - Weaned off HFNC 1/30 and NC. Intermittently found w/ SpO2 86% on RA per RN, will require home oxygen set up for PRN SOB as outpatient given multifactorial lung disease  - LE Duplex 1/19 (-) for DVT.   - No CTA done to r/o PE due to CKD; VQ deferred as likely would not yield diagnostic info given multiple lung processes.   - Repeat COVID swab negative 1/28  - Plan further taper down Prednisone 20mg qd today 2/5 as d/w Dr. Anderson  - Psych consulted as anxiety thought to be contributing factor as well, appreciate recs.   - c/w Remeron 7.5mg qhs; Ativan dc'd as per Dr. Cuevas     ## Pneumonia vs radha Pneumonitis  - s/p 4 days of Ceftriaxone + 7 days azithromycin.   - ID consulted – 1/23 DISCONTINUED Zosyn. Now s/p 10 days (end date 2/1- Cefepime 1000mg IV q24hrs for pneumonitis (per Dr Resendez).   - RVP negative, (+) MSSA, procalcitonin 1.08 -> 0.96, galactomannan negative, LDH-->474, Fungitell negative    #Leukocytosis  - WBC elevated possible 2/2 steroids. Diff w/o bandemia  - WBC improved 21 -> 10.75; remains afebrile; continue to trend   - Urinalysis positive for yeast- s/p Monistat x 3 days.   - F/u urine culture results

## 2021-02-05 NOTE — PROGRESS NOTE ADULT - PROBLEM SELECTOR PLAN 7
- CT chest w/o contrast: b/l GGO superimposed on chronic interstitial pulm fibrosis probably d/t chronic hypersensitivity pneumonitis or sarcoid. GGOs could be due to pulmonary edema, pulmonary hemorrhage, alveolar proteinosis, organizing pneumonia or atypical infection.  - Switch Symbicort INH to Pulmicort INH 0.5mg BID per Dr Anderson  - c/w Prednisone 20mg BID today, plan to decrease to Prednisone 20mg qd tomorrow  - Pending repeat CT Chest as pt now appears euvolemic to eval lung pathology  - PRN robitussin/tessalon pearls for cough - CT chest w/o contrast: b/l GGO superimposed on chronic interstitial pulm fibrosis probably d/t chronic hypersensitivity pneumonitis or sarcoid. GGOs could be due to pulmonary edema, pulmonary hemorrhage, alveolar proteinosis, organizing pneumonia or atypical infection.  -Repeat CT Chest post in euvolemic state shows resolution of pulmonary edema pattern. Igor GGO w/ mosaic attenuation pattern. Differential diagnosis includes superimposed infectious and/or inflammatory etiologies and abbreviated differential includes atypical infection such as Covid 19, ARDS, pulmonary hemorrhage and acute on chronic interstitial pneumonitis.  - Switch Symbicort INH to Pulmicort INH 0.5mg BID per Dr Anderson  - Will further taper down to Prednisone 20mg qd today 2/5  - PRN robitussin/tessalon pearls for cough

## 2021-02-05 NOTE — PROGRESS NOTE BEHAVIORAL HEALTH - NSBHLOC_PSY_A_CORE
Lethargic, arousable to verbal stimulus

## 2021-02-05 NOTE — PROGRESS NOTE ADULT - PROBLEM SELECTOR PLAN 4
BNP 57k.   - POCUS by MICU consult 1/22/21 (+) for B-lines throughout.   - TTE 1/20: EF 53%, biatrial enlargement, mild AR, mod-severe MR, mod-severe TR, PASP 70.   - Pending repeat ECHO to reassess mod-severe MR/TR now that pt euvolemic  - Metoprolol dc'd and Coreg 12.5mg BID started; up-titrate as tolerated   - net neg 12L since admission  - Strict I/Os, daily weights. Core measures.

## 2021-02-05 NOTE — PROGRESS NOTE BEHAVIORAL HEALTH - NSBHADMITCOUNSEL_PSY_A_CORE
risks and benefits of treatment options/client/family/caregiver education

## 2021-02-05 NOTE — PROGRESS NOTE BEHAVIORAL HEALTH - NSBHFUPINTERVALCCFT_PSY_A_CORE
Patient seen at bedside resting, in no distress, resting upon approach, but is alert with verbal stimulation. Dialysis in process. Continues to express desire to go home. States that mood is 'happy', no longer sad, anxious or feeling afraid. She endorses feeling tired, but also states that she has been sleeping during the night. Feels that health is improving, saying that she feels better, not in any pain or having any difficulty breathing. Oriented to self and location, but was unable to recognize dialysis machine or what it was for, and date
Patient seen at bedside, awake watching tv in no distress. Is pleasant upon approach, smiling occasionally. States that she feels 'happy' today, acknowledged that she wasn't feeling good yesterday when she declined to talk to writer. Is future oriented, anticipating going home soon, hoping that it is today. Denies having any complaints and feels that health is getting better overall. Feels that she has adequate care at home in form of her son and daughter. Denies sxs of depression and anxiety, no longer feeling fearful.
Patient seen at bedside resting, in no distress, is cooperative upon approach. She states that sleep was poor throughout the night and now she is tired during the day, wanting to nap. After a few responses she stated that she wanted to sleep now. She reports feeling very sad and unhappy that she has been in the hospital for weeks and wants to go home. Denies pain and feels that breathing is better today. Is anticipating seeing her daughter today. No reported si/hi, a/vh or paranoia.
Patient seen at bedside resting, in no distress, resting upon approach, but is alert with verbal stimulation. Remembers writer from yesterday 'its been a day since I saw you.' Reports feeling very tired though she slept throughout the night. She continues to endorse feelings of sadness and 'fear' asking 'when am I gonna get outta here? Am I going to get better?' and states 'I just want to go home, I miss my family.' Denies any pain or discomfort at this time. Endorses feeling alone in the hospital.  No si/hi, no a/v h or paranoia
Patient seen at bedside, was observed interacting with nursing staff. Pt appeared pleasant, smiling broadly and appearing to be in good spirits. She acknowledges feeling 'happy' and being in a good mood today. Denying any feelings of sadness or anxiety. Future oriented, anticipating going home tomorrow. Sleep and appetite are reported as fair and she states that she will drink Ensure and eat regular meals once she is at home. Overall she feels that her health has improved and has no complaints.

## 2021-02-05 NOTE — PROGRESS NOTE BEHAVIORAL HEALTH - ORIENTATION OTHER
Pt aware that she in the hospital but didn't know which one, aware of month but not day of week or year, could not recall her year of birth or age.
Pt aware that she in the hospital but
Pt aware that she in the hospital but didn't know which one, aware of month but not day of week or year, could not recall her year of birth or age.

## 2021-02-05 NOTE — PROGRESS NOTE ADULT - PROBLEM SELECTOR PLAN 3
HX Anemia. Baseline Hgb 8-9. 2/2 to CKD and anemia of chronic disease  - s/p 2U PRBC 1/27 and 1U PRBC 1/20   - hgb stable 9.6 today,  no active s/sx bleeding   - as per Dr. Crooks- transfuse if hgb < 8.5  - continue to HOLD IV iron due to treatment for presumed CAP vs pneumonitis  - Stool for occult blood positive 1/25, reportedly Hx of hemorrhoids  - Maintain active T&S, last 2/3 HX Anemia. Baseline Hgb 8-9. 2/2 to CKD and anemia of chronic disease  - s/p 2U PRBC 1/27 and 1U PRBC 1/20   - hgb stable 10 today,  no active s/sx bleeding   - as per Dr. Crooks- transfuse if hgb < 8.5  - continue to HOLD IV iron due to treatment for presumed CAP vs pneumonitis  - Stool for occult blood positive 1/25, reportedly Hx of hemorrhoids  - Maintain active T&S, last 2/3

## 2021-02-05 NOTE — PROGRESS NOTE ADULT - PROBLEM SELECTOR PLAN 10
- CONT: Levothyroxine 50mg PO QD.     #DM  - Hx of DM. A1c 5.1% on admission.    - c/w Lantus 4U qHS.  Lispro decreased to 3U TID w/ meals, MISS w/ meals and bedtime  - consistent carb diet while on steroids given hyperglycemia  - Endocrine following    F: none   E: renal patient   N: dysphagia 2 mechanical soft-thin liquids, Ensure TID.   DVT ppx: Hep SubQ  Dispo: cardiac telemetry  PT lynnette HENDRICKSON w/ on site dialysis once medically stable for DC, HOWEVER family wants to take pt home- plan to be dc home w/ home care w/ outpatient dialysis - CONT: Levothyroxine 50mg PO QD.     #DM  - Hx of DM. A1c 5.1% on admission.    - c/w Lantus 4U qHS.  Lispro decreased to 3U TID w/ meals, MISS w/ meals and bedtime  - consistent carb diet while on steroids given hyperglycemia  - Endocrine following    F: none   E: renal patient   N: dysphagia 2 mechanical soft-thin liquids, Ensure TID.   DVT ppx: Hep SubQ  Dispo: cardiac telemetry  PT lynnette HENDRICKSON w/ on site dialysis once medically stable for DC, HOWEVER family wants to take pt home- plan to be dc home tomorrow 2/6 w/ home care w/ outpatient dialysis set up

## 2021-02-05 NOTE — PROGRESS NOTE BEHAVIORAL HEALTH - NSBHFUPINTERVALHXFT_PSY_A_CORE
since last progress note, Seroquel has been discontinued. Continues to receive Remeron 7.5mg qhs
No acute events overnight. Plan for discharge Saturday.
Ativan 0.25mg BID discontinued and Seroquel 25mg BID started by primary team due to concerns of respiratory depression. Remeron 7.5mg qhs continued
Pt received Remeron 7.5mg last night and Ativan 0.25mg last night
No acute events overnight. Plan for discharge Saturday following dialysis.

## 2021-02-05 NOTE — PROGRESS NOTE ADULT - PROBLEM SELECTOR PLAN 2
Oliguric NAV on CKD stage 3 2/2 ATN with CKD, likely progression from DM.   - F/u Renal recs, known to Dr Harmon  - HD initiated this admission 1/26/21 via R IJ HD catheter.  - Cr 3.49 today  - Renal US: atrophic kidneys, no WILBER.   - Nephrotic w/u non-contributory thus far, ANCA negative  - Last HD session 2/4; follow up w/Dr. Harmon number of dialysis sessions/week on discharge   - Monitor Strict I/Os, daily PVRs  - PPD read NEGATIVE 0 induration on 2/1 Oliguric NAV on CKD stage 3 2/2 ATN with CKD, likely progression from DM.   - F/u Renal recs, known to Dr Harmon  - HD initiated this admission 1/26/21 via R IJ HD catheter.  - Cr 2.33 today  - Renal US: atrophic kidneys, no WILBER.   - Nephrotic w/u non-contributory thus far, ANCA negative  - Last HD session 2/4; follow up w/Dr. Harmon number of dialysis sessions/week on discharge   - Monitor Strict I/Os, daily PVRs  - PPD read NEGATIVE 0 induration on 2/1

## 2021-02-05 NOTE — PROGRESS NOTE ADULT - PROBLEM SELECTOR PLAN 6
- Switch Symbicort INH to Pulmicort INH 0.5mg BID per Dr Anderson  - c/w Duonebs standing q6h  - PRN Robutussin/Benonatate for cough  - not on Home Oxygen. Wean down HFNC 30/40 to 2L NC O2 AND now on RA  - c/w Nystatin swish and spit for thrush  - QTc 500.  Unable to Switch Nystatin to Fluconazole - Switched Symbicort INH to Pulmicort INH 0.5mg BID per Dr Anderson  - c/w Duonebs standing q6h  - PRN Robutussin/Benonatate for cough  - not on Home Oxygen. Wean down HFNC 30/40 to 2L NC O2 AND now on RA  - c/w Nystatin swish and spit for thrush  - QTc 500.  Unable to Switch Nystatin to Fluconazole

## 2021-02-05 NOTE — PROGRESS NOTE BEHAVIORAL HEALTH - SUMMARY
Patient is a 85y/o single, domiciled with family, retired / female with medical history significant for HTN, Hyperlipidemia, COPD, Hypothyroidism, Anemia, CAD s/p CABG, PAD, Stage IV CKD, former heavy smoker. Psychiatric hx of anxiety, not currently in treatment and dementia- currently on Aricept 5mg qd. Patient noted to be depressed and expressing c/o of both sadness and anxiety due to medical problems and being in the hospital. She acknowledges changes in appetite and sleep. Demonstrated moderate cognitive impairment and has minimal understand of her treatment and care in the hospital. Is amenable to medications to address her concerns.  Pt noted to be quite sad and depressed today, also lethargic c/o poor sleep.     Plan  -Continue mirtazepine 7.5mg QHS to target sxs of anxiety, depression and low appetite  -Continue lorazepam 0.25mg BID to target sxs of anxiety  -Continue Aricept 5mg daily  -[employer] team contacted to provide additional support and therapy
Patient is a 87y/o single, domiciled with family, retired / female with medical history significant for HTN, Hyperlipidemia, COPD, Hypothyroidism, Anemia, CAD s/p CABG, PAD, Stage IV CKD, former heavy smoker. Psychiatric hx of anxiety, not currently in treatment and dementia- currently on Aricept 5mg qd. Patient noted to be depressed and expressing c/o of both sadness and anxiety due to medical problems and being in the hospital. She acknowledges changes in appetite and sleep. Demonstrated moderate cognitive impairment and has minimal understand of her treatment and care in the hospital. Is amenable to medications to address her concerns.  Patient's initial c/o sadness and fear are no longer concerns as she reports improvement in mood and objectively appears well related and with appropriate affect.  She remains future oriented and has no complaints.     Plan  -Continue mirtazepine 7.5mg QHS to target sxs of anxiety, depression and low appetite and discharge on this medication.  -Continue Aricept 5mg daily  -Psych will sign off
Patient is a 87y/o single, domiciled with family, retired / female with medical history significant for HTN, Hyperlipidemia, COPD, Hypothyroidism, Anemia, CAD s/p CABG, PAD, Stage IV CKD, former heavy smoker. Psychiatric hx of anxiety, not currently in treatment and dementia- currently on Aricept 5mg qd. Patient noted to be depressed and expressing c/o of both sadness and anxiety due to medical problems and being in the hospital. She acknowledges changes in appetite and sleep. Demonstrated moderate cognitive impairment and has minimal understand of her treatment and care in the hospital. Is amenable to medications to address her concerns.  Pt remains sad and depressed, but reports improvement in sleep last night    Plan  -Recommend discontinuing Seroquel 25mg BID taking into consideration pt's elderly status, increased risk of aspiration pneumonia and sedation properties   -Continue mirtazepine 7.5mg QHS to target sxs of anxiety, depression and low appetite  -Continue Aricept 5mg daily  -[facility] team contacted to provide additional support and therapy  -lorazepam 0.25mg BID to target sxs of anxiety discontinued on 1/29/21
Patient is a 87y/o single, domiciled with family, retired / female with medical history significant for HTN, Hyperlipidemia, COPD, Hypothyroidism, Anemia, CAD s/p CABG, PAD, Stage IV CKD, former heavy smoker. Psychiatric hx of anxiety, not currently in treatment and dementia- currently on Aricept 5mg qd. Patient noted to be depressed and expressing c/o of both sadness and anxiety due to medical problems and being in the hospital. She acknowledges changes in appetite and sleep. Demonstrated moderate cognitive impairment and has minimal understand of her treatment and care in the hospital. Is amenable to medications to address her concerns.  Pt mood notably improved compared to prior presentations. Reports improvement in mood, is future oriented, no longer c/o of prior sxs of sadness and anxiety. Remeron appears to be effective in lifting mood sxs, increasing appetite and sleep.    Plan  -Continue mirtazepine 7.5mg QHS to target sxs of anxiety, depression and low appetite  -Continue Aricept 5mg daily
Patient is a 87y/o single, domiciled with family, retired / female with medical history significant for HTN, Hyperlipidemia, COPD, Hypothyroidism, Anemia, CAD s/p CABG, PAD, Stage IV CKD, former heavy smoker. Psychiatric hx of anxiety, not currently in treatment and dementia- currently on Aricept 5mg qd. Patient noted to be depressed and expressing c/o of both sadness and anxiety due to medical problems and being in the hospital. She acknowledges changes in appetite and sleep. Demonstrated moderate cognitive impairment and has minimal understand of her treatment and care in the hospital. Is amenable to medications to address her concerns.  Pt remains on Remeron 7.5mg, reports some improvement in mood feeling 'happy' and denying any sxs of anxiety or depression. Appears sad with low mood. Will continue current regimen and continue to follow.    Plan  -Continue mirtazepine 7.5mg QHS to target sxs of anxiety, depression and low appetite  -Continue Aricept 5mg daily

## 2021-02-05 NOTE — PROGRESS NOTE ADULT - ASSESSMENT
ASSESSMENT/PLAN 86 yr old F, POOR HISTORIAN/early dementia, former heavy smoker with PMHx HTN, hyperlipidemia, COPD, hypothyroidism, Stage IV CKD (baseline Cr ~3.0), anemia of chronic disease, CAD s/p CABG 2015 Dr. Blunt, acute on chronic diastolic CHF(EF:55% by Echo 10/2020), renal artery stenosis s/p renal artery stent >20yrs ago, Carotid artery stenosis, PAD, who presents to [facility] ED 1/15/21 c/o progressively worsening SOB and cough x 3 days. Currently on steroids taper for pneumonitis.    1. O2 Continue NC titrate to adequate 02 Sat, please humidify  2. Bronchodilators:  Atrovent/ albuterol q 4 – 6 hours as needed  3. Corticosteroids: Recommend continue prednisone 20mg daily, , +Pulmicort nebs discussed c CCU  4. ID/Antibiotics: now off  5. Cardiac/HTN: Optimize CHF mngmnt, optimize HD,    6. GI: Rx/ prophylaxis c PPI/H2B  7. Heme: Rx/VT prophylaxis c SQH/SCD/ASA follow H/H closely  8. Aspiration precautions at all times, mobilize, OOB, chest PT  9, Avoid sedation in this pt, to protect respiratory drive, benzodiazepines are contraindicated  10.Rehab  Discussed with managing team CCU

## 2021-02-05 NOTE — PROGRESS NOTE BEHAVIORAL HEALTH - NSBHCONSFOLLOWNEEDS_PSY_A_CORE
Patient needs further psychiatric safety assessment prior to discharge
no psychiatric contraindications to discharge

## 2021-02-05 NOTE — PROGRESS NOTE ADULT - ASSESSMENT
86 year old with improving pulmonary status requiring hemodialysis  Plan for discharge to home tomorrow after dialysis and continued outpatient dialysis.

## 2021-02-05 NOTE — PROGRESS NOTE ADULT - PROBLEM SELECTOR PLAN 9
Dx 3wks ago @ [facility].   - CTH 1/19 (-) for acute changes.   - Per Neurosurgery, cont ASA 81 PO QD; no neuro interventions at this time.     #DEMENTIA  - CONT: Donepezil 5mg PO QD    ## ANXIETY  - Psych consulted as anxiety thought to be worsening during hospitalization, appreciate recs.   - c/w Remeron 7.5mg qhs and Ativan dc'd as per Dr. 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

## 2021-02-05 NOTE — PROGRESS NOTE ADULT - SUBJECTIVE AND OBJECTIVE BOX
Patient is a 86y old  Female who presents with a chief complaint of progressively worsening SOB and cough x 3 days (04 Feb 2021 14:56) whose hospital course has been complicated by NAV on chronic kidney disease    Vital Signs Last 24 Hrs  T(C): 36.3 (05 Feb 2021 04:30), Max: 36.5 (04 Feb 2021 09:42)  T(F): 97.4 (05 Feb 2021 04:30), Max: 97.7 (04 Feb 2021 09:42)  HR: 62 (05 Feb 2021 06:00) (62 - 77)  BP: 148/66 (05 Feb 2021 06:00) (125/63 - 155/70)  BP(mean): 95 (05 Feb 2021 06:00) (89 - 101)  RR: 22 (05 Feb 2021 06:00) (18 - 42)  SpO2: 99% (05 Feb 2021 06:00) (90% - 100%)    acetaminophen   Tablet .. 650 milliGRAM(s) Oral every 6 hours PRN  albuterol/ipratropium for Nebulization 3 milliLiter(s) Nebulizer every 6 hours  amLODIPine   Tablet 10 milliGRAM(s) Oral daily  aspirin enteric coated 81 milliGRAM(s) Oral daily  atorvastatin 20 milliGRAM(s) Oral at bedtime  buDESOnide    Inhalation Suspension 0.5 milliGRAM(s) Inhalation two times a day  carvedilol 12.5 milliGRAM(s) Oral every 12 hours  dextrose 40% Gel 15 Gram(s) Oral once  dextrose 5%. 1000 milliLiter(s) IV Continuous <Continuous>  dextrose 5%. 1000 milliLiter(s) IV Continuous <Continuous>  dextrose 50% Injectable 25 Gram(s) IV Push once  dextrose 50% Injectable 12.5 Gram(s) IV Push once  dextrose 50% Injectable 25 Gram(s) IV Push once  donepezil 5 milliGRAM(s) Oral at bedtime  fluticasone propionate 50 MICROgram(s)/spray Nasal Spray 1 Spray(s) Both Nostrils two times a day  glucagon  Injectable 1 milliGRAM(s) IntraMuscular once  guaiFENesin   Syrup  (Sugar-Free) 100 milliGRAM(s) Oral every 6 hours PRN  heparin   Injectable 5000 Unit(s) SubCutaneous every 12 hours  influenza  Vaccine (HIGH DOSE) 0.7 milliLiter(s) IntraMuscular once  insulin lispro (ADMELOG) corrective regimen sliding scale   SubCutaneous Before meals and at bedtime  insulin lispro Injectable (ADMELOG) 6 Unit(s) SubCutaneous with lunch  insulin lispro Injectable (ADMELOG) 4 Unit(s) SubCutaneous with breakfast  insulin lispro Injectable (ADMELOG) 6 Unit(s) SubCutaneous with dinner  levothyroxine 50 MICROGram(s) Oral daily  mirtazapine 7.5 milliGRAM(s) Oral at bedtime  nystatin    Suspension 765321 Unit(s) Oral four times a day  polyethylene glycol 3350 17 Gram(s) Oral daily  predniSONE   Tablet 20 milliGRAM(s) Oral two times a day  senna 1 Tablet(s) Oral daily  sevelamer carbonate 800 milliGRAM(s) Oral three times a day  sodium chloride 0.65% Nasal 1 Spray(s) Both Nostrils daily    PHYSICAL EXAM: in NAD  Constitutional:   comfortable  Eyes:  sclera anicteric  ENMT: mmm  Neck:  no JVD  Respiratory: few scattered rhonchi  Cardiovascular:  RRR  Gastrointestinal:  soft, N/T  Genitourinary:  no suprapubic masses or tenderness  Extremities:  no edema  Vascular:  no cords  Neurological: grossly normal

## 2021-02-05 NOTE — PROGRESS NOTE ADULT - SUBJECTIVE AND OBJECTIVE BOX
INTERVAL HPI/OVERNIGHT EVENTS:    Patient is a 86y old  Female who presents with a chief complaint of progressively worsening SOB and cough x 3 days (2021 12:05)  No acute events  pt starting on abx for presumed UTI  improved PO intake and appetite  steroid taper ongoing, now on predinsone 2mg daily  insulin administration reviewed.     Pt reports the following symptoms:    CONSTITUTIONAL:  Negative fever or chills, feels well, good appetite  EYES:  Negative  blurry vision or double vision  CARDIOVASCULAR:  Negative for chest pain or palpitations  RESPIRATORY:  Negative for cough, wheezing, or SOB   GASTROINTESTINAL:  Negative for nausea, vomiting, diarrhea, constipation, or abdominal pain  GENITOURINARY:  Negative frequency, urgency or dysuria  NEUROLOGIC:  No headache, confusion, dizziness, lightheadedness    MEDICATIONS  (STANDING):  albuterol/ipratropium for Nebulization 3 milliLiter(s) Nebulizer every 6 hours  amLODIPine   Tablet 10 milliGRAM(s) Oral daily  aspirin enteric coated 81 milliGRAM(s) Oral daily  atorvastatin 20 milliGRAM(s) Oral at bedtime  buDESOnide    Inhalation Suspension 0.5 milliGRAM(s) Inhalation two times a day  carvedilol 12.5 milliGRAM(s) Oral every 12 hours  cefTRIAXone   IVPB      dextrose 40% Gel 15 Gram(s) Oral once  dextrose 5%. 1000 milliLiter(s) (50 mL/Hr) IV Continuous <Continuous>  dextrose 5%. 1000 milliLiter(s) (100 mL/Hr) IV Continuous <Continuous>  dextrose 50% Injectable 25 Gram(s) IV Push once  dextrose 50% Injectable 12.5 Gram(s) IV Push once  dextrose 50% Injectable 25 Gram(s) IV Push once  donepezil 5 milliGRAM(s) Oral at bedtime  fluticasone propionate 50 MICROgram(s)/spray Nasal Spray 1 Spray(s) Both Nostrils two times a day  glucagon  Injectable 1 milliGRAM(s) IntraMuscular once  heparin   Injectable 5000 Unit(s) SubCutaneous every 12 hours  influenza  Vaccine (HIGH DOSE) 0.7 milliLiter(s) IntraMuscular once  insulin lispro (ADMELOG) corrective regimen sliding scale   SubCutaneous Before meals and at bedtime  insulin lispro Injectable (ADMELOG) 6 Unit(s) SubCutaneous with lunch  insulin lispro Injectable (ADMELOG) 4 Unit(s) SubCutaneous with breakfast  insulin lispro Injectable (ADMELOG) 6 Unit(s) SubCutaneous with dinner  levothyroxine 50 MICROGram(s) Oral daily  mirtazapine 7.5 milliGRAM(s) Oral at bedtime  nystatin    Suspension 553526 Unit(s) Oral four times a day  polyethylene glycol 3350 17 Gram(s) Oral daily  senna 1 Tablet(s) Oral daily  sevelamer carbonate 800 milliGRAM(s) Oral three times a day  simethicone 80 milliGRAM(s) Chew two times a day  sodium chloride 0.65% Nasal 1 Spray(s) Both Nostrils daily    MEDICATIONS  (PRN):  acetaminophen   Tablet .. 650 milliGRAM(s) Oral every 6 hours PRN Moderate Pain (4 - 6)  guaiFENesin   Syrup  (Sugar-Free) 100 milliGRAM(s) Oral every 6 hours PRN Cough      Past medical history reviewed  Family history reviewed  Social history reviewed    PHYSICAL EXAM  Vital Signs Last 24 Hrs  T(C): 36.8 (2021 18:09), Max: 36.8 (2021 18:09)  T(F): 98.2 (2021 18:09), Max: 98.2 (2021 18:09)  HR: 72 (2021 18:34) (62 - 77)  BP: 132/70 (2021 18:34) (132/62 - 148/66)  BP(mean): 95 (2021 06:00) (89 - 95)  RR: 20 (2021 18:34) (20 - 24)  SpO2: 94% (2021 18:34) (90% - 100%)    Constitutional: wn/wd in NAD.   HEENT: NCAT, MMM, OP clear, EOMI, no proptosis or lid retraction  Neck: no thyromegaly or palpable thyroid nodules   Respiratory: lungs CTAB.  Cardiovascular: regular rhythm, normal S1 and S2, no audible murmurs, no peripheral edema  GI: soft, NT/ND, no masses/HSM appreciated.  Neurology: no tremors, DTR 2+  Skin: no visible rashes/lesions  Psychiatric: AAO x 3, normal affect/mood.    LABS:                        10.0   10.92 )-----------( 93       ( 2021 06:37 )             31.8     02-05    139  |  97  |  61<H>  ----------------------------<  196<H>  4.9   |  29  |  2.33<H>    Ca    8.1<L>      2021 06:37  Phos  3.0     -  Mg     1.7     -        Urinalysis Basic - ( 2021 15:54 )    Color: Yellow / Appearance: Clear / S.020 / pH: x  Gluc: x / Ketone: NEGATIVE  / Bili: Negative / Urobili: 0.2 E.U./dL   Blood: x / Protein: 100 mg/dL / Nitrite: NEGATIVE   Leuk Esterase: Trace / RBC: 5-10 /HPF / WBC > 10 /HPF   Sq Epi: x / Non Sq Epi: Moderate /HPF / Bacteria: Present /HPF      Thyroid Stimulating Hormone, Serum: 1.749 uIU/mL (10-13 @ 05:49)      HbA1C: 5.1 % ( 05:27)  5.9 % (10-13 @ 05:49)      CAPILLARY BLOOD GLUCOSE      POCT Blood Glucose.: 257 mg/dL (2021 16:59)  POCT Blood Glucose.: 295 mg/dL (2021 11:45)  POCT Blood Glucose.: 166 mg/dL (2021 07:08)  POCT Blood Glucose.: 293 mg/dL (2021 20:56)      Free Thyroxine, Serum: 0.78 ng/dL (21 @ 06:37)  Cholesterol, Serum: 191 mg/dL (21 @ 05:27)  HDL Cholesterol, Serum: 69 mg/dL (21 @ 05:27)  Triglycerides, Serum: 98 mg/dL (21 @ 05:27)  LDL Cholesterol Calculated: 102 mg/dL (21 @ 05:27)  Cholesterol, Serum: 119 mg/dL (10-13-20 @ 05:49)  HDL Cholesterol, Serum: 51 mg/dL (10-13-20 @ 05:49)  Triglycerides, Serum: 94 mg/dL (10-13-20 @ 05:49)  Direct LDL: 49 mg/dL (10-13-20 @ 05:49)

## 2021-02-05 NOTE — CHART NOTE - NSCHARTNOTEFT_GEN_A_CORE
Admitting Diagnosis:   Patient is a 86y old  Female who presents with a chief complaint of progressively worsening SOB and cough x 3 days (05 Feb 2021 12:05)      PAST MEDICAL & SURGICAL HISTORY:  Essential hypertension  Hypertension    Type 2 diabetes mellitus  Diabetes    Hyperlipidemia  Hyperlipidemia    Disorder of kidney and ureter  Renal insufficiency    Peripheral vascular disease  PVD (peripheral vascular disease)    Anxiety state  Anxiety    Atherosclerosis of renal artery  with resulting stent placement    Atherosclerosis of renal artery  Renal artery stenosis        Current Nutrition Order:  Dys 2 mech soft thin liquids   low sodium  Consistent Carbohydrate (+evening snack)   1500mlFR      PO Intake: Good (%) [   ]  Fair (50-75%) [  50% ] Poor (<25%) [   ]    GI Issues:   +BM 2/3 2/4   >> ordered for senna Miralax     Pain:  none per flow sheets     Skin Integrity:  letitia 16   1+ L leg edema  No pressure ulces     Labs:   02-05    139  |  97  |  61<H>  ----------------------------<  196<H>  4.9   |  29  |  2.33<H>    Ca    8.1<L>      05 Feb 2021 06:37  Phos  3.0     02-05  Mg     1.7     02-05      CAPILLARY BLOOD GLUCOSE      POCT Blood Glucose.: 295 mg/dL (05 Feb 2021 11:45)  POCT Blood Glucose.: 166 mg/dL (05 Feb 2021 07:08)  POCT Blood Glucose.: 293 mg/dL (04 Feb 2021 20:56)  POCT Blood Glucose.: 226 mg/dL (04 Feb 2021 18:12)      Medications:  MEDICATIONS  (STANDING):  albuterol/ipratropium for Nebulization 3 milliLiter(s) Nebulizer every 6 hours  amLODIPine   Tablet 10 milliGRAM(s) Oral daily  aspirin enteric coated 81 milliGRAM(s) Oral daily  atorvastatin 20 milliGRAM(s) Oral at bedtime  buDESOnide    Inhalation Suspension 0.5 milliGRAM(s) Inhalation two times a day  carvedilol 12.5 milliGRAM(s) Oral every 12 hours  dextrose 40% Gel 15 Gram(s) Oral once  dextrose 5%. 1000 milliLiter(s) (50 mL/Hr) IV Continuous <Continuous>  dextrose 5%. 1000 milliLiter(s) (100 mL/Hr) IV Continuous <Continuous>  dextrose 50% Injectable 25 Gram(s) IV Push once  dextrose 50% Injectable 12.5 Gram(s) IV Push once  dextrose 50% Injectable 25 Gram(s) IV Push once  donepezil 5 milliGRAM(s) Oral at bedtime  fluticasone propionate 50 MICROgram(s)/spray Nasal Spray 1 Spray(s) Both Nostrils two times a day  glucagon  Injectable 1 milliGRAM(s) IntraMuscular once  heparin   Injectable 5000 Unit(s) SubCutaneous every 12 hours  influenza  Vaccine (HIGH DOSE) 0.7 milliLiter(s) IntraMuscular once  insulin lispro (ADMELOG) corrective regimen sliding scale   SubCutaneous Before meals and at bedtime  insulin lispro Injectable (ADMELOG) 6 Unit(s) SubCutaneous with lunch  insulin lispro Injectable (ADMELOG) 4 Unit(s) SubCutaneous with breakfast  insulin lispro Injectable (ADMELOG) 6 Unit(s) SubCutaneous with dinner  levothyroxine 50 MICROGram(s) Oral daily  mirtazapine 7.5 milliGRAM(s) Oral at bedtime  nystatin    Suspension 945158 Unit(s) Oral four times a day  polyethylene glycol 3350 17 Gram(s) Oral daily  senna 1 Tablet(s) Oral daily  sevelamer carbonate 800 milliGRAM(s) Oral three times a day  sodium chloride 0.65% Nasal 1 Spray(s) Both Nostrils daily    MEDICATIONS  (PRN):  acetaminophen   Tablet .. 650 milliGRAM(s) Oral every 6 hours PRN Moderate Pain (4 - 6)  guaiFENesin   Syrup  (Sugar-Free) 100 milliGRAM(s) Oral every 6 hours PRN Cough          5'2''  pounds +-10%  Weight 119 pounds, BMI 21.9, %UWP=477%     2/2 108.4 pounds   2/1 108.5 pounds   1/22 110 pounds  1/20 109.5 pounds   1/18 102.5 pounds   Varying EMR wts, likely in setting of Fluid Shifts in CHF / ESRD/HD pt with poor PO intake. Wt overall down trended during admission.     MALNUTRITION NOTE 2/5  >> PO intake: pt with overall limited PO intake during admission in setting of resp distress, BiPAP use. Assume pt consuming less then ~75% EER during admission   >> Wt loss: pt unsure of UBW however feels she has lost during admission, assume in part d/t fluid shifts and in part d/t poor PO.   >> NFPE: mild wasting noted to triceps.     Estimated energy needs:   IBW for EER 2/2 Varying EMR wts  Adjusted for age, CHF, Renal, [place]; Fluids per team  1500-1750kcal/day 30-35kcal/kg  60-70gm/day 1.2-1.4gm/kg    Subjective:   86 F, POOR HISTORIAN/early dementia, former heavy smoker with PMHx HTN, hyperlipidemia, COPD, hypothyroidism, Stage IV CKD (baseline Cr ~3.0), anemia of chronic disease, CAD s/p CABG 2015 Dr. Blunt, chronic diastolic CHF(EF:55% by Echo 10/2020), renal artery stenosis s/p renal artery stent >20yrs ago, Carotid artery stenosis, PAD, who [facility] ED [facility]/15 c/o progressively worsening SOB and cough x3 days. Pt admitted to cardiac telemetry for management of acute on chronic diastolic CHF exacerbation in setting of suspected CAP. Pt s/p diuresis with hospital course complicated by NAV on CKD Stage IV (renal following), hypoxic respiratory failure requiring NRB. Stepped up to CCU for strict fluid status monitoring and potential HD and stepped down to 5LA 1/23. Continuing ID recommendations for infectious work up. Nephrology consulted for NAV, Noted Non oliguric NAV on CKD3 2/2 CRS vs CKD progression; likely DM nephropathy. HD Began 1/26, last session 2/4 - short session per IDR. Repeat COVID negative 1/28, 2/4. PPD negiatve 2/1. D/C is pending HD set up at this time, likely to have HD prior to D/C on 2/6. Noted pt does not want D/C to [facility].     Spoke with RN/NP/Pt. Pt seen alert on RA on 5LA. RN reports PO intake has improved, pt consuming ~50% of meals. Pt confirms she is with better PO intake at this time. Pt reports eating well PTA however diet recall not provided. Pt seen with Ensure at bedside, reports she has been drinking and does like them however feels they are very sweet. Will take 1-2/day. Denies issues chewing/swallowing. Pt happy to finally be eating. Pt remains on steroids now PO. Last 3 POCT 166 293 226. K/Phos WDL.   Please see below for nutritions recommendations. D/w team.    Recommendations:  1. Mech Soft, Low na diet.   >> Add Consistent Carbohydrate PRN Pending BG/POCT.  >> Monitor Lytes, add Renal PRN.  2. Monitor %PO intake and diet tolerance.   >> Cont with Appetite Stimulant.   >> Suggest Nepro x2/day (425kcal 20gm prot per 1 can).   >> Should pt be noted with s/s of issues swallowing, recommend NPO/SLP.   3. Monitor Skin, Wts daily, GI, GOC.  4. Labs: monitor BMP, CBC, glucose, lytes, trend renal indices, LFTs, POCT.   5. Phos Binder PRN.   6. RD to remain available for additional nutrition interventions as needed.      Education:   Encouraged PO intake during meals & reviewed importance- reviewed protein foods. Understanding stated, provide additional motivation as needed.     Risk Level: High [  x ] Moderate [   ] Low [   ].

## 2021-02-05 NOTE — PROGRESS NOTE ADULT - PROBLEM SELECTOR PLAN 8
- Na 135 today; remains asx.   - Switched meds to NS from D5.   - Continue to monitor.   - Renal following, appreciate recs

## 2021-02-05 NOTE — PROGRESS NOTE ADULT - SUBJECTIVE AND OBJECTIVE BOX
Interventional, Pulmonary, Critical, Chest Special Procedures.    Pt was seen and fully examined by myself.     Time spent with patient in minutes:36    Patient is a 86y old  Female who presents with a chief complaint of progressively worsening SOB and cough x 3 days (05 Feb 2021 12:05) The patient reports feeling "good" today, eupneic and pain free when seen. better appetite.    HPI:  86 yr old F, POOR HISTORIAN/early dementia, former heavy smoker with PMHx HTN, hyperlipidemia, COPD, hypothyroidism, Stage IV CKD (baseline Cr ~3.0), anemia of chronic disease, CAD s/p CABG 2015 Dr. Blunt, chronic diastolic CHF(EF:55% by Echo 10/2020), renal artery stenosis s/p renal artery stent >20yrs ago, Carotid artery stenosis, PAD, who presents to [facility] ED 1/15/21 c/o progressively worsening SOB and cough x 3 days. Patient reports she can barely walk 10 feet prior to having to stop and rest. Patient further admits to chronic bilateral LE edema and significant orthopnea and she has been sleeping upright in a chair the past week. Patient also admits to chills and a nonproductive cough over the past few days. Patient was instructed to increase her Lasix 80mg PO daily dose to Lasix 80mg PO BID and start Metalazone 10mg PO BID prior to each dose by her cardiologist Dr. Horvath. Patient denies any chest pain, dizziness, palpitations, recent travel or sick contacts. Patient endorsing compliance w/ medications however daughter states that compliance with medication has been questionable over the last few months, as patient is becoming more forgetful.    In [facility] ED, BP: 170/68, HR: 80, RR:26, Temp: 98.4F, O2 sat: 80% on RA, improved to 99% with 10L NRB. EKG revealed NSR@67BPM with incomplete LBBB, TWI Lead I, AVL (similar to prior EKG 10/2020). CXR prelim read consistent with alveolar edema, bilateral patchy opacities/consolidation.    Labs notable for: WBC 14.5 with left shift, H/H 9.8/30.6, D-dimer 491, BUN/Cr 56/3.54, , CRP 4.86, Ferritin 180, Procalcitonin 0.29, Lactate 2.4, Troponin T 0.05, BNP 64,671, Initial COVID PCR negative.    Patient treated with Lasix 80mg IV x 1 dose, SL NTG 0.4mg PO x 1 dose, Decadron 6mg IV x 1 dose, Ceftriaxone 1g IV x 1 dose and Azithromycin 500mg IV x 1 dose.  Patient now admitted to cardiac telemetry for management of acute on chronic diastolic CHF exacerbation in setting of suspected CAP.    **Of note: Patient had a fall ~3 weeks ago for which she was admitted to [facility], CT imaging was negative as per daughter and fall thought to be secondary to hypotension.   (15 Mark 2021 21:30)      REVIEW OF SYSTEMS:  Constitutional: No fever, weight loss, chills or fatigue  Eyes: No eye pain, visual disturbances, or discharge  ENMT:  No difficulty hearing, tinnitus, vertigo; No sinus or throat pain. No epistaxis, dysphagia, dysphonia, hoarseness or odynophagia  Neck: No pain, stiffness or neck swelling.  No masses or deformities  Respiratory: No cough, wheezing, hemoptysis  - COPD  - ILD   - PE   - ASTHMA     - PNEUMONIA  Cardiovascular: No chest pain, dysrhythmia, palpitations, dizziness or edema - CAD   - CHF   - HTN  Gastrointestinal: No abdominal or epigastric pain. No nausea, vomiting or hematemesis; No diarrhea or constipation. No melena or hematochezia, Icterus.          Genitourinary: No dysuria, frequency, hematuria or incontinence   - CKD/NAV      - ESRD  Neurological: No headaches, memory loss, loss of strength, numbness or tremors      -DEMENTIA     - STROKE    - SEIZURE  Skin: No itching, burning, rashes or lesions   Lymph Nodes: No enlarged glands  Endocrine: No heat or cold intolerance; No hair loss       - DM     - THYROID DISORDER  Musculoskeletal: No joint pain or swelling; No muscle, back or extremity pain, No edema  Psychiatric: No depression, anxiety, mood swings or difficulty sleeping  Heme/Lymph: No easy bruising or bleeding gums         - ANEMIA      - CANCER   -COAGULOPATHY  Allergy and Immunologic: No hives or eczema    PAST MEDICAL & SURGICAL HISTORY:  Essential hypertension  Hypertension    Type 2 diabetes mellitus  Diabetes    Hyperlipidemia  Hyperlipidemia    Disorder of kidney and ureter  Renal insufficiency    Peripheral vascular disease  PVD (peripheral vascular disease)    Anxiety state  Anxiety    Atherosclerosis of renal artery  with resulting stent placement    Atherosclerosis of renal artery  Renal artery stenosis      FAMILY HISTORY:  Family history of ischemic heart disease  Family history of coronary artery disease.      SOCIAL HISTORY:      - Tobacco     - ETOH    Allergies    No Known Allergies    Intolerances      Vital Signs Last 24 Hrs  T(C): 36.8 (05 Feb 2021 18:09), Max: 36.8 (05 Feb 2021 18:09)  T(F): 98.2 (05 Feb 2021 18:09), Max: 98.2 (05 Feb 2021 18:09)  HR: 72 (05 Feb 2021 18:34) (62 - 77)  BP: 132/70 (05 Feb 2021 18:34) (132/62 - 148/66)  BP(mean): 95 (05 Feb 2021 06:00) (89 - 95)  RR: 20 (05 Feb 2021 18:34) (20 - 24)  SpO2: 94% (05 Feb 2021 18:34) (90% - 100%)    02-04 @ 07:01  -  02-05 @ 07:00  --------------------------------------------------------  IN: 75 mL / OUT: 900 mL / NET: -825 mL    02-05 @ 07:01  -  02-05 @ 20:15  --------------------------------------------------------  IN: 440 mL / OUT: 0 mL / NET: 440 mL          MEDICATIONS:  MEDICATIONS  (STANDING):  albuterol/ipratropium for Nebulization 3 milliLiter(s) Nebulizer every 6 hours  amLODIPine   Tablet 10 milliGRAM(s) Oral daily  aspirin enteric coated 81 milliGRAM(s) Oral daily  atorvastatin 20 milliGRAM(s) Oral at bedtime  buDESOnide    Inhalation Suspension 0.5 milliGRAM(s) Inhalation two times a day  carvedilol 12.5 milliGRAM(s) Oral every 12 hours  cefTRIAXone   IVPB      dextrose 40% Gel 15 Gram(s) Oral once  dextrose 5%. 1000 milliLiter(s) (50 mL/Hr) IV Continuous <Continuous>  dextrose 5%. 1000 milliLiter(s) (100 mL/Hr) IV Continuous <Continuous>  dextrose 50% Injectable 25 Gram(s) IV Push once  dextrose 50% Injectable 12.5 Gram(s) IV Push once  dextrose 50% Injectable 25 Gram(s) IV Push once  donepezil 5 milliGRAM(s) Oral at bedtime  fluticasone propionate 50 MICROgram(s)/spray Nasal Spray 1 Spray(s) Both Nostrils two times a day  glucagon  Injectable 1 milliGRAM(s) IntraMuscular once  heparin   Injectable 5000 Unit(s) SubCutaneous every 12 hours  influenza  Vaccine (HIGH DOSE) 0.7 milliLiter(s) IntraMuscular once  insulin lispro (ADMELOG) corrective regimen sliding scale   SubCutaneous Before meals and at bedtime  insulin lispro Injectable (ADMELOG) 6 Unit(s) SubCutaneous with lunch  insulin lispro Injectable (ADMELOG) 4 Unit(s) SubCutaneous with breakfast  insulin lispro Injectable (ADMELOG) 6 Unit(s) SubCutaneous with dinner  levothyroxine 50 MICROGram(s) Oral daily  mirtazapine 7.5 milliGRAM(s) Oral at bedtime  nystatin    Suspension 369843 Unit(s) Oral four times a day  polyethylene glycol 3350 17 Gram(s) Oral daily  senna 1 Tablet(s) Oral daily  sevelamer carbonate 800 milliGRAM(s) Oral three times a day  simethicone 80 milliGRAM(s) Chew two times a day  sodium chloride 0.65% Nasal 1 Spray(s) Both Nostrils daily    MEDICATIONS  (PRN):  acetaminophen   Tablet .. 650 milliGRAM(s) Oral every 6 hours PRN Moderate Pain (4 - 6)  guaiFENesin   Syrup  (Sugar-Free) 100 milliGRAM(s) Oral every 6 hours PRN Cough      PHYSICAL EXAM:  More comfortable, no immediate distress  Muscle atrophy, developed,  Eyes: PERRLA, EOMI, -conjunctivitis, -scleritis   Head: no focal deficit, normocephalic,  no trauma  ENMT: moist tongue with white lesions, + thrush, -nasal discharge, -hoarseness, normal hearing, + cough, -hemoptysis, trachea midline  Neck: supple, - lymphadenopathy,  -masses, -JVD  Respiratory: bilateral diminished breath sounds, -wheezing, less  rhonchi,  rales, + less crackles lower lungs, better peripheral air entry  Chest:  no more accessory muscle use, -paradoxical breathing  Cardiovascular: irregular rate and sinus rhythm, S1 S2 normal, -S3, -S4, -murmur, -gallop, -rub  Gastrointestinal: soft, nontender, nondistended, normal bowel sounds, no hepatosplenomegaly  Genitourinary: -flank pain, -dysuria, + Cason  Extremities: -clubbing, -cyanosis, -edema    Vascular: peripheral pulses palpable 2+ bilaterally  Neurological: alert, oriented x 3, no focal deficit, -tremor   Skin: warm, dry, -erythema, iv sites intact    DEVICES:  - DENTURES   +IV R / L     - ETUBE   -TRACH   -CTUBE  R / L    LABS:                          10.0   10.92 )-----------( 93       ( 05 Feb 2021 06:37 )             31.8     02-05    139  |  97  |  61<H>  ----------------------------<  196<H>  4.9   |  29  |  2.33<H>    Ca    8.1<L>      05 Feb 2021 06:37  Phos  3.0     02-05  Mg     1.7     02-05        RADIOLOGY & ADDITIONAL STUDIES (The following images were personally reviewed):ad< from: CT Chest No Cont (02.04.21 @ 17:10) >  EXAM:  CT CHEST                          PROCEDURE DATE:  02/04/2021          INTERPRETATION:  CT SCAN OF CHEST    History: History of HFpEF, NAV on CKD s/p HD, COPD, ILD, anemia of chronic disease. Shortness of breath.    Technique: CT scan of chest performed from lung apices through lung bases. Axial, coronal, and sagittal multiplanar reformatted images were produced. Thin section axial images and axial MIPS were also produced. Intravenous contrast material was not administered, as ordered.    Comparison: CT chest 1/19/2021 and 10/14/2020.    Findings: Exam limited due to respiratory motion.    Lungs and large airways: Since 1/19/2021, interval decrease in bilateral diffuse airspace opacification. Persistent bilateral diffuse groundglass opacities, intralobular thickening, and cylindrical bronchiectasis is noted noted. Persistent peripheral pulmonary fibrosis pattern with interlobular septal thickening traction bronchiectasis and bronchiolectasis. Study is limited due to respiratory motion degradation.  Moderate centrolobular and mild paraseptal emphysema.    Pleura:  Interval resolution in mild bilateral pleural effusion.    Mediastinum and hilar regions: Persistent increased number of top normal size mediastinal lymph nodes despite the limitations of this noncontrast examination.    Heart and pericardium:  Cardiomegaly is again noted. Trace pericardial effusion. Post operative changes of CABG. Dense mitral valve annular calcification is again noted.    Vessels:  Status post internal jugular venous central line placement with tip terminating at the cavoatrial junction. Severe calcified mural plaque formation noted throughout the thoracic aorta.    Chest wall and lower neck:  Median sternotomy wires noted.    Upper abdomen: Two large radiopaque gallstones measuring up to 1.8 cm within the gallbladder are again noted. Simple renal cyst within the superior pole of the right kidney measuring 2.4 x 1.7 cm.    Bones: Degenerative changes thoracic spine.      Impression:      1. Interval resolution of probable pulmonary edema pattern with resolved bibasilar effusions and perihilar airspace opacities.    2. New diffuse bilateral groundglass opacities with some bilateral mosaic attenuation pattern. Differential diagnosis includes superimposed infectious and/or inflammatory etiologies and abbreviated differential includes atypical infection such as Covid 19, ARDS, pulmonary hemorrhage and acute on chronic interstitial pneumonitis.    3. Additional chronic findings as described above.    < end of copied text >

## 2021-02-05 NOTE — PROGRESS NOTE BEHAVIORAL HEALTH - NSBHATTESTSEENBY_PSY_A_CORE
NP without Attending Psychiatrist

## 2021-02-05 NOTE — PROGRESS NOTE BEHAVIORAL HEALTH - SECONDARY DX1
Neurocognitive disorder

## 2021-02-05 NOTE — PROGRESS NOTE ADULT - SUBJECTIVE AND OBJECTIVE BOX
CARDIOLOGY NP PROGRESS NOTE    Subjective: Pt seen and examined at bedside. Reports feeling well. Reports intermittent pelvic pain yesterday, now resolved. Denies chest pain, sob, lightheadedness, dizziness, palpitations.  Remainder ROS otherwise negative.    Overnight Events: S/p HD session yesterday, only tolerated 800cc removal 2/2 hypotension during HD. C/o abd pain during HD. Returned to room and c/o leg/pelvic pain, bladder scan w/ 140cc. Abd xray performed shows moderate stool, started Miralax PO.    TELEMETRY:  SR 60s, occasional PVCs         VITAL SIGNS:  T(C): 36.3 (02-05-21 @ 04:30), Max: 36.5 (02-04-21 @ 16:05)  HR: 62 (02-05-21 @ 10:30) (62 - 77)  BP: 141/62 (02-05-21 @ 10:30) (125/63 - 155/70)  RR: 20 (02-05-21 @ 10:30) (20 - 32)  SpO2: 100% (02-05-21 @ 10:30) (93% - 100%)  Wt(kg): --    I&O's Summary    04 Feb 2021 07:01  -  05 Feb 2021 07:00  --------------------------------------------------------  IN: 75 mL / OUT: 900 mL / NET: -825 mL    05 Feb 2021 07:01  -  05 Feb 2021 12:05  --------------------------------------------------------  IN: 100 mL / OUT: 0 mL / NET: 100 mL          PHYSICAL EXAM:    General: Alert to self, place and time. Disoriented to situation at times. No acute Distress   Neck: Supple, no JVD  Cardiac: S1 S2, No M/R/G  Pulmonary: Lungs CTA radha. Breathing unlabored on 2L NC   Abdomen: Soft, Non -tender, +BS x 4 quads  Extremities: No Rashes, No further edema    Lines: R IJ tunneled HD Cath- site CDI, dsg in place   Neuro: Alert to self, place and time. Disoriented to situation at times.  No focal deficits          LABS:                          10.0   10.92 )-----------( 93       ( 05 Feb 2021 06:37 )             31.8                              02-05    139  |  97  |  61<H>  ----------------------------<  196<H>  4.9   |  29  |  2.33<H>    Ca    8.1<L>      05 Feb 2021 06:37  Phos  3.0     02-05  Mg     1.7     02-05                                CAPILLARY BLOOD GLUCOSE      POCT Blood Glucose.: 295 mg/dL (05 Feb 2021 11:45)  POCT Blood Glucose.: 166 mg/dL (05 Feb 2021 07:08)  POCT Blood Glucose.: 293 mg/dL (04 Feb 2021 20:56)  POCT Blood Glucose.: 226 mg/dL (04 Feb 2021 18:12)            Allergies:  No Known Allergies    MEDICATIONS  (STANDING):  albuterol/ipratropium for Nebulization 3 milliLiter(s) Nebulizer every 6 hours  amLODIPine   Tablet 10 milliGRAM(s) Oral daily  aspirin enteric coated 81 milliGRAM(s) Oral daily  atorvastatin 20 milliGRAM(s) Oral at bedtime  buDESOnide    Inhalation Suspension 0.5 milliGRAM(s) Inhalation two times a day  carvedilol 12.5 milliGRAM(s) Oral every 12 hours  dextrose 40% Gel 15 Gram(s) Oral once  dextrose 5%. 1000 milliLiter(s) (50 mL/Hr) IV Continuous <Continuous>  dextrose 5%. 1000 milliLiter(s) (100 mL/Hr) IV Continuous <Continuous>  dextrose 50% Injectable 25 Gram(s) IV Push once  dextrose 50% Injectable 12.5 Gram(s) IV Push once  dextrose 50% Injectable 25 Gram(s) IV Push once  donepezil 5 milliGRAM(s) Oral at bedtime  fluticasone propionate 50 MICROgram(s)/spray Nasal Spray 1 Spray(s) Both Nostrils two times a day  glucagon  Injectable 1 milliGRAM(s) IntraMuscular once  heparin   Injectable 5000 Unit(s) SubCutaneous every 12 hours  influenza  Vaccine (HIGH DOSE) 0.7 milliLiter(s) IntraMuscular once  insulin lispro (ADMELOG) corrective regimen sliding scale   SubCutaneous Before meals and at bedtime  insulin lispro Injectable (ADMELOG) 6 Unit(s) SubCutaneous with lunch  insulin lispro Injectable (ADMELOG) 4 Unit(s) SubCutaneous with breakfast  insulin lispro Injectable (ADMELOG) 6 Unit(s) SubCutaneous with dinner  levothyroxine 50 MICROGram(s) Oral daily  mirtazapine 7.5 milliGRAM(s) Oral at bedtime  nystatin    Suspension 149015 Unit(s) Oral four times a day  polyethylene glycol 3350 17 Gram(s) Oral daily  predniSONE   Tablet 20 milliGRAM(s) Oral two times a day  senna 1 Tablet(s) Oral daily  sevelamer carbonate 800 milliGRAM(s) Oral three times a day  sodium chloride 0.65% Nasal 1 Spray(s) Both Nostrils daily    MEDICATIONS  (PRN):  acetaminophen   Tablet .. 650 milliGRAM(s) Oral every 6 hours PRN Moderate Pain (4 - 6)  guaiFENesin   Syrup  (Sugar-Free) 100 milliGRAM(s) Oral every 6 hours PRN Cough        DIAGNOSTIC TESTS:        CARDIOLOGY NP PROGRESS NOTE    Subjective: Pt seen and examined at bedside. Reports feeling well. Reports intermittent pelvic pain yesterday, now resolved. Denies chest pain, sob, lightheadedness, dizziness, palpitations.  Remainder ROS otherwise negative.    Overnight Events: S/p HD session yesterday, only tolerated 800cc removal 2/2 hypotension during HD. C/o abd pain during HD. Returned to room and c/o leg/pelvic pain, bladder scan w/ 140cc. Abd xray performed shows moderate stool, started Miralax PO.    TELEMETRY:  SR 60s, occasional PVCs         VITAL SIGNS:  T(C): 36.3 (02-05-21 @ 04:30), Max: 36.5 (02-04-21 @ 16:05)  HR: 62 (02-05-21 @ 10:30) (62 - 77)  BP: 141/62 (02-05-21 @ 10:30) (125/63 - 155/70)  RR: 20 (02-05-21 @ 10:30) (20 - 32)  SpO2: 100% (02-05-21 @ 10:30) (93% - 100%)  Wt(kg): --    I&O's Summary    04 Feb 2021 07:01  -  05 Feb 2021 07:00  --------------------------------------------------------  IN: 75 mL / OUT: 900 mL / NET: -825 mL    05 Feb 2021 07:01  -  05 Feb 2021 12:05  --------------------------------------------------------  IN: 100 mL / OUT: 0 mL / NET: 100 mL          PHYSICAL EXAM:    General: Alert to self, place and time. Disoriented to situation at times. No acute Distress   Neck: Supple, no JVD  Cardiac: S1 S2, No M/R/G  Pulmonary: Lungs CTA radha. Breathing unlabored on 2L NC   Abdomen: Soft, Non -tender, +BS x 4 quads  Extremities: No Rashes, No further edema    Lines: R IJ tunneled HD Cath- site CDI, dsg in place   Neuro: Alert to self, place and time. Disoriented to situation at times.  No focal deficits          LABS:                          10.0   10.92 )-----------( 93       ( 05 Feb 2021 06:37 )             31.8                              02-05    139  |  97  |  61<H>  ----------------------------<  196<H>  4.9   |  29  |  2.33<H>    Ca    8.1<L>      05 Feb 2021 06:37  Phos  3.0     02-05  Mg     1.7     02-05                                CAPILLARY BLOOD GLUCOSE      POCT Blood Glucose.: 295 mg/dL (05 Feb 2021 11:45)  POCT Blood Glucose.: 166 mg/dL (05 Feb 2021 07:08)  POCT Blood Glucose.: 293 mg/dL (04 Feb 2021 20:56)  POCT Blood Glucose.: 226 mg/dL (04 Feb 2021 18:12)            Allergies:  No Known Allergies    MEDICATIONS  (STANDING):  albuterol/ipratropium for Nebulization 3 milliLiter(s) Nebulizer every 6 hours  amLODIPine   Tablet 10 milliGRAM(s) Oral daily  aspirin enteric coated 81 milliGRAM(s) Oral daily  atorvastatin 20 milliGRAM(s) Oral at bedtime  buDESOnide    Inhalation Suspension 0.5 milliGRAM(s) Inhalation two times a day  carvedilol 12.5 milliGRAM(s) Oral every 12 hours  dextrose 40% Gel 15 Gram(s) Oral once  dextrose 5%. 1000 milliLiter(s) (50 mL/Hr) IV Continuous <Continuous>  dextrose 5%. 1000 milliLiter(s) (100 mL/Hr) IV Continuous <Continuous>  dextrose 50% Injectable 25 Gram(s) IV Push once  dextrose 50% Injectable 12.5 Gram(s) IV Push once  dextrose 50% Injectable 25 Gram(s) IV Push once  donepezil 5 milliGRAM(s) Oral at bedtime  fluticasone propionate 50 MICROgram(s)/spray Nasal Spray 1 Spray(s) Both Nostrils two times a day  glucagon  Injectable 1 milliGRAM(s) IntraMuscular once  heparin   Injectable 5000 Unit(s) SubCutaneous every 12 hours  influenza  Vaccine (HIGH DOSE) 0.7 milliLiter(s) IntraMuscular once  insulin lispro (ADMELOG) corrective regimen sliding scale   SubCutaneous Before meals and at bedtime  insulin lispro Injectable (ADMELOG) 6 Unit(s) SubCutaneous with lunch  insulin lispro Injectable (ADMELOG) 4 Unit(s) SubCutaneous with breakfast  insulin lispro Injectable (ADMELOG) 6 Unit(s) SubCutaneous with dinner  levothyroxine 50 MICROGram(s) Oral daily  mirtazapine 7.5 milliGRAM(s) Oral at bedtime  nystatin    Suspension 746256 Unit(s) Oral four times a day  polyethylene glycol 3350 17 Gram(s) Oral daily  predniSONE   Tablet 20 milliGRAM(s) Oral two times a day  senna 1 Tablet(s) Oral daily  sevelamer carbonate 800 milliGRAM(s) Oral three times a day  sodium chloride 0.65% Nasal 1 Spray(s) Both Nostrils daily    MEDICATIONS  (PRN):  acetaminophen   Tablet .. 650 milliGRAM(s) Oral every 6 hours PRN Moderate Pain (4 - 6)  guaiFENesin   Syrup  (Sugar-Free) 100 milliGRAM(s) Oral every 6 hours PRN Cough        DIAGNOSTIC TESTS:     < from: CT Chest No Cont (02.04.21 @ 17:10) >  Impression:    1. Interval resolution of probable pulmonary edema pattern with resolved bibasilar effusions and perihilar airspace opacities.    2. New diffuse bilateral groundglass opacities with some bilateral mosaic attenuation pattern. Differential diagnosis includes superimposed infectious and/or inflammatory etiologies and abbreviated differential includes atypical infection such as Covid 19, ARDS, pulmonary hemorrhage and acute on chronic interstitial pneumonitis.    3. Additional chronic findings as described above.    < end of copied text >

## 2021-02-05 NOTE — PROGRESS NOTE BEHAVIORAL HEALTH - NSBHROSSYSTEMS_PSY_ALL_CORE
Constitutional Symptoms.../Cardiovascular.../Endocrine...
Constitutional Symptoms.../Cardiovascular.../Endocrine...
Constitutional Symptoms.../Respiratory...
Constitutional Symptoms.../Respiratory...
Constitutional Symptoms.../Cardiovascular.../Endocrine...

## 2021-02-05 NOTE — PROGRESS NOTE ADULT - ATTENDING COMMENTS
A/P 86yFemale with hx of DM presenting for management of respiratory distress, now with improved respiratory status, but found to have UTI, steroid induced hyperglycemia    1.  DM: type 2, controlled, exacerbated by steroids  continue lispro 6 units tid with meals.  Continue lispro moderate dose scale with meals and at bedtime.   Continue consistent carb diet  FSG Goal 100-180    2.  Hyperlipidemia - goal LDL < 70  continue statin      Pt is advised to follow up with me at discharge by calling .      Maru Washington MD, PhD  Endocrinology  [address] [place] [address] [address]  [place] [address]  [phone] Tel  [phone] Fax  [email or web address]

## 2021-02-05 NOTE — PROGRESS NOTE BEHAVIORAL HEALTH - NSBHFUPREASONCONS_PSY_A_CORE
anxiety/depression/sleep disturbance/med management

## 2021-02-05 NOTE — PROGRESS NOTE ADULT - PROBLEM SELECTOR PLAN 5
Hx of CAD, s/p CABG 2015.   - CONT: ASA 81mg PO QD, Atorvastatin 20mg PO QD  - c/w Coreg 12.5mg BID      ## HTN  - -170s  - Metoprolol dc'd and Coreg 12.5mg BID started   - CONT: Amlodipine 10mg PO QD  - Per Renal, may consider starting ACE/ARB for BP control

## 2021-02-06 LAB
-  AMPICILLIN: SIGNIFICANT CHANGE UP
-  CIPROFLOXACIN: SIGNIFICANT CHANGE UP
-  LEVOFLOXACIN: SIGNIFICANT CHANGE UP
-  NITROFURANTOIN: SIGNIFICANT CHANGE UP
-  TETRACYCLINE: SIGNIFICANT CHANGE UP
-  VANCOMYCIN: SIGNIFICANT CHANGE UP
ANION GAP SERPL CALC-SCNC: 12 MMOL/L — SIGNIFICANT CHANGE UP (ref 5–17)
ANISOCYTOSIS BLD QL: SLIGHT — SIGNIFICANT CHANGE UP
APTT BLD: 26.9 SEC — LOW (ref 27.5–35.5)
BASOPHILS # BLD AUTO: 0.02 K/UL — SIGNIFICANT CHANGE UP (ref 0–0.2)
BASOPHILS NFR BLD AUTO: 0.1 % — SIGNIFICANT CHANGE UP (ref 0–2)
BUN SERPL-MCNC: 99 MG/DL — HIGH (ref 7–23)
CALCIUM SERPL-MCNC: 8.2 MG/DL — LOW (ref 8.4–10.5)
CHLORIDE SERPL-SCNC: 91 MMOL/L — LOW (ref 96–108)
CO2 SERPL-SCNC: 29 MMOL/L — SIGNIFICANT CHANGE UP (ref 22–31)
CREAT SERPL-MCNC: 3.06 MG/DL — HIGH (ref 0.5–1.3)
EOSINOPHIL # BLD AUTO: 0.24 K/UL — SIGNIFICANT CHANGE UP (ref 0–0.5)
EOSINOPHIL NFR BLD AUTO: 1.6 % — SIGNIFICANT CHANGE UP (ref 0–6)
GLUCOSE BLDC GLUCOMTR-MCNC: 117 MG/DL — HIGH (ref 70–99)
GLUCOSE BLDC GLUCOMTR-MCNC: 122 MG/DL — HIGH (ref 70–99)
GLUCOSE BLDC GLUCOMTR-MCNC: 89 MG/DL — SIGNIFICANT CHANGE UP (ref 70–99)
GLUCOSE BLDC GLUCOMTR-MCNC: 92 MG/DL — SIGNIFICANT CHANGE UP (ref 70–99)
GLUCOSE SERPL-MCNC: 121 MG/DL — HIGH (ref 70–99)
HCT VFR BLD CALC: 30.6 % — LOW (ref 34.5–45)
HGB BLD-MCNC: 9.8 G/DL — LOW (ref 11.5–15.5)
HYPOCHROMIA BLD QL: SIGNIFICANT CHANGE UP
IMM GRANULOCYTES NFR BLD AUTO: 0.6 % — SIGNIFICANT CHANGE UP (ref 0–1.5)
INR BLD: 0.99 — SIGNIFICANT CHANGE UP (ref 0.88–1.16)
LYMPHOCYTES # BLD AUTO: 0.34 K/UL — LOW (ref 1–3.3)
LYMPHOCYTES # BLD AUTO: 1 % — LOW (ref 13–44)
LYMPHOCYTES # BLD AUTO: 2.2 % — LOW (ref 13–44)
MACROCYTES BLD QL: SLIGHT — SIGNIFICANT CHANGE UP
MAGNESIUM SERPL-MCNC: 1.5 MG/DL — LOW (ref 1.6–2.6)
MANUAL SMEAR VERIFICATION: SIGNIFICANT CHANGE UP
MCHC RBC-ENTMCNC: 28.2 PG — SIGNIFICANT CHANGE UP (ref 27–34)
MCHC RBC-ENTMCNC: 32 GM/DL — SIGNIFICANT CHANGE UP (ref 32–36)
MCV RBC AUTO: 87.9 FL — SIGNIFICANT CHANGE UP (ref 80–100)
METHOD TYPE: SIGNIFICANT CHANGE UP
MONOCYTES # BLD AUTO: 0.2 K/UL — SIGNIFICANT CHANGE UP (ref 0–0.9)
MONOCYTES NFR BLD AUTO: 1.3 % — LOW (ref 2–14)
MONOCYTES NFR BLD AUTO: 2 % — SIGNIFICANT CHANGE UP (ref 2–14)
NEUTROPHILS # BLD AUTO: 14.27 K/UL — HIGH (ref 1.8–7.4)
NEUTROPHILS NFR BLD AUTO: 94.2 % — HIGH (ref 43–77)
NEUTROPHILS NFR BLD AUTO: 97 % — HIGH (ref 43–77)
NRBC # BLD: 0 /100 WBCS — SIGNIFICANT CHANGE UP (ref 0–0)
OVALOCYTES BLD QL SMEAR: SLIGHT — SIGNIFICANT CHANGE UP
PHOSPHATE SERPL-MCNC: 3.6 MG/DL — SIGNIFICANT CHANGE UP (ref 2.5–4.5)
PLAT MORPH BLD: ABNORMAL
PLATELET # BLD AUTO: 98 K/UL — LOW (ref 150–400)
POIKILOCYTOSIS BLD QL AUTO: SLIGHT — SIGNIFICANT CHANGE UP
POLYCHROMASIA BLD QL SMEAR: SLIGHT — SIGNIFICANT CHANGE UP
POTASSIUM SERPL-MCNC: 5.8 MMOL/L — HIGH (ref 3.5–5.3)
POTASSIUM SERPL-SCNC: 5.8 MMOL/L — HIGH (ref 3.5–5.3)
PROCALCITONIN SERPL-MCNC: 0.76 NG/ML — HIGH (ref 0.02–0.1)
PROTHROM AB SERPL-ACNC: 11.9 SEC — SIGNIFICANT CHANGE UP (ref 10.6–13.6)
RBC # BLD: 3.48 M/UL — LOW (ref 3.8–5.2)
RBC # FLD: 16.4 % — HIGH (ref 10.3–14.5)
RBC BLD AUTO: ABNORMAL
SODIUM SERPL-SCNC: 132 MMOL/L — LOW (ref 135–145)
WBC # BLD: 15.16 K/UL — HIGH (ref 3.8–10.5)
WBC # FLD AUTO: 15.16 K/UL — HIGH (ref 3.8–10.5)

## 2021-02-06 PROCEDURE — 90935 HEMODIALYSIS ONE EVALUATION: CPT

## 2021-02-06 RX ADMIN — CARVEDILOL PHOSPHATE 12.5 MILLIGRAM(S): 80 CAPSULE, EXTENDED RELEASE ORAL at 07:09

## 2021-02-06 RX ADMIN — Medication 1 TABLET(S): at 17:29

## 2021-02-06 RX ADMIN — POLYETHYLENE GLYCOL 3350 17 GRAM(S): 17 POWDER, FOR SOLUTION ORAL at 12:56

## 2021-02-06 RX ADMIN — Medication 500000 UNIT(S): at 12:56

## 2021-02-06 RX ADMIN — CARVEDILOL PHOSPHATE 12.5 MILLIGRAM(S): 80 CAPSULE, EXTENDED RELEASE ORAL at 17:28

## 2021-02-06 RX ADMIN — AMLODIPINE BESYLATE 10 MILLIGRAM(S): 2.5 TABLET ORAL at 04:48

## 2021-02-06 RX ADMIN — HEPARIN SODIUM 5000 UNIT(S): 5000 INJECTION INTRAVENOUS; SUBCUTANEOUS at 17:29

## 2021-02-06 RX ADMIN — SEVELAMER CARBONATE 800 MILLIGRAM(S): 2400 POWDER, FOR SUSPENSION ORAL at 21:25

## 2021-02-06 RX ADMIN — Medication 50 MICROGRAM(S): at 07:09

## 2021-02-06 RX ADMIN — Medication 0.5 MILLIGRAM(S): at 21:25

## 2021-02-06 RX ADMIN — SIMETHICONE 80 MILLIGRAM(S): 80 TABLET, CHEWABLE ORAL at 17:28

## 2021-02-06 RX ADMIN — ATORVASTATIN CALCIUM 20 MILLIGRAM(S): 80 TABLET, FILM COATED ORAL at 21:24

## 2021-02-06 RX ADMIN — Medication 3 MILLILITER(S): at 07:09

## 2021-02-06 RX ADMIN — Medication 0.5 MILLIGRAM(S): at 08:46

## 2021-02-06 RX ADMIN — Medication 500000 UNIT(S): at 17:28

## 2021-02-06 RX ADMIN — Medication 81 MILLIGRAM(S): at 12:56

## 2021-02-06 RX ADMIN — Medication 6 UNIT(S): at 12:56

## 2021-02-06 RX ADMIN — Medication 650 MILLIGRAM(S): at 08:47

## 2021-02-06 RX ADMIN — HEPARIN SODIUM 5000 UNIT(S): 5000 INJECTION INTRAVENOUS; SUBCUTANEOUS at 07:08

## 2021-02-06 RX ADMIN — Medication 4 UNIT(S): at 07:08

## 2021-02-06 RX ADMIN — SIMETHICONE 80 MILLIGRAM(S): 80 TABLET, CHEWABLE ORAL at 07:09

## 2021-02-06 RX ADMIN — Medication 500000 UNIT(S): at 07:09

## 2021-02-06 RX ADMIN — Medication 20 MILLIGRAM(S): at 07:09

## 2021-02-06 RX ADMIN — Medication 650 MILLIGRAM(S): at 18:23

## 2021-02-06 RX ADMIN — Medication 1 SPRAY(S): at 07:09

## 2021-02-06 RX ADMIN — Medication 3 MILLILITER(S): at 12:57

## 2021-02-06 RX ADMIN — SEVELAMER CARBONATE 800 MILLIGRAM(S): 2400 POWDER, FOR SUSPENSION ORAL at 13:46

## 2021-02-06 RX ADMIN — Medication 3 MILLILITER(S): at 17:29

## 2021-02-06 RX ADMIN — Medication 1 SPRAY(S): at 17:31

## 2021-02-06 RX ADMIN — SENNA PLUS 1 TABLET(S): 8.6 TABLET ORAL at 12:56

## 2021-02-06 RX ADMIN — MIRTAZAPINE 7.5 MILLIGRAM(S): 45 TABLET, ORALLY DISINTEGRATING ORAL at 21:25

## 2021-02-06 RX ADMIN — SEVELAMER CARBONATE 800 MILLIGRAM(S): 2400 POWDER, FOR SUSPENSION ORAL at 07:09

## 2021-02-06 RX ADMIN — DONEPEZIL HYDROCHLORIDE 5 MILLIGRAM(S): 10 TABLET, FILM COATED ORAL at 21:24

## 2021-02-06 RX ADMIN — Medication 1 SPRAY(S): at 12:58

## 2021-02-06 NOTE — PROGRESS NOTE ADULT - NSHPATTENDINGPLANDISCUSS_GEN_ALL_CORE
House staff and family.
NP [facility] team
family and housestaff
[facility] team, INDIO Mcneil
Cardiology PA
Cardiology PA
Cardiology team INDIO Aiken, INDIO Mcneil
family, housestaff and patient
team
[facility] team NP Karrie
[facility] team, INDIO Yoon, INDIO Mcneil
Cardiology team, INDIO Zapien
team
team
[facility] team, DR NASH
Cardiology PA
Dr. Colon, [facility] cardiology team
team
patient, family and housestaff
patient, family, renal attending Dr. Harmon
Housestaff, patient and family

## 2021-02-06 NOTE — PROGRESS NOTE ADULT - PROBLEM SELECTOR PROBLEM 2
Acute respiratory failure with hypoxia
CAP (community acquired pneumonia)
Pulmonary fibrosis
CKD (chronic kidney disease)
CKD (chronic kidney disease)
NAV (acute kidney injury)
Acute respiratory failure with hypoxia
CKD (chronic kidney disease)
Essential hypertension
CAP (community acquired pneumonia)
NAV (acute kidney injury)
Pleural effusion, bilateral
CAP (community acquired pneumonia)
Acute respiratory failure with hypoxia
CKD (chronic kidney disease)
CKD (chronic kidney disease)
Acute respiratory failure with hypoxia
CAP (community acquired pneumonia)
CKD (chronic kidney disease)
CAD (coronary artery disease)
CKD (chronic kidney disease)
Acute respiratory failure with hypoxia
CAP (community acquired pneumonia)
CKD (chronic kidney disease)
NAV (acute kidney injury)
CKD (chronic kidney disease)
CAP (community acquired pneumonia)

## 2021-02-06 NOTE — PROGRESS NOTE ADULT - ASSESSMENT
86F POOR HISTORIAN/early dementia, former heavy smoker PMHx DM, HTN, hyperlipidemia, COPD, hypothyroidism, CKD (baseline Cr ~3.0), anemia of chronic disease, CAD, renal artery stenosis s/p renal artery stent >20yrs ago, Carotid artery stenosis, PAD, who presents c/o progressively worsening SOB and cough. Nephrology consulted for NAV on CKD.      #CKD5 now ESRD started on hemodialysis   #thrombocytopenia   SOB secondary to volume overload vs underlying lung disease   previously hypotensive on hemodialysis, now tolerating well w/o episodes of hypotension   thrombocytopenia unlikely secondary to dialysis given timeline, consider Heme consult, work-up and management per primary team   consider Vascular consult for AVF creation in-patient vs out-patient   HD today 2/6 per schedule then discharge   Electrolytes, volume and bicarb management with HD  BP: UF with hemodialysis  Anemia and BMD labs acceptable, Renvela 800mg TID w/meals (lowered 2/2 secondary to hypoPhos)     Patient was seen and evaluated on dialysis.   Dialyzer: Optiflux [id number]  QB: 400 mL/min  QD: 500 mL/min  K bath: 2  Goal UF: only 1L tolerated secondary to BP (pt received AM BP meds)   Duration: 180 min    Patient is tolerating the procedure well.   Continue full hemodialysis treatment as prescribed.    Thank you for the opportunity to participate in the care of your patient. The nephrology service remains available to assist with any questions or concerns. Please feel free to reach us by paging the on-call nephrology fellow for urgent issues or as below.     Tobi Thompson M.D.   PGY-4, Nephrology Fellow   C: [phone]   P: [phone]

## 2021-02-06 NOTE — PROGRESS NOTE ADULT - PROBLEM SELECTOR PLAN 4
BNP 57k.   - POCUS by MICU consult 1/22/21 (+) for B-lines throughout.   - TTE 1/20: EF 53%, biatrial enlargement, mild AR, mod-severe MR, mod-severe TR, PASP 70.   - Repeat ECHO noting EF 52%, moderately dilated LA, mod MR, mild AI/TR. Catheter seen in the right atrium. Vague linear mobile echodensity noted in the right atrium adjacent to the catheter. Differential includes fibrinous strand vs thrombus vs vegetation.   - Per Dr Crooks, vague echogenicity likely 2/2 HD catheter thrombus, which is commonly seen per Renal Dr Nicolas, now s/p TPA injection into catheter. Low suspicion for vegetation/endocarditis as pt is non-toxic appearing, afebrile. No plan for SANJAY at this time  - Metoprolol dc'd and Coreg 12.5mg BID started; up-titrate as tolerated   - net neg 12L since admission  - Strict I/Os, daily weights. Core measures.

## 2021-02-06 NOTE — PROGRESS NOTE ADULT - SUBJECTIVE AND OBJECTIVE BOX
Patient is a 86y Female seen and evaluated at bedside.   Remains off oxygen. BP elevated. No complaints. Denies CP SOB. Plan for HD today. Plan for discharge today after hemodialysis with out-patient hemodialysis next Tuesday     Meds:    acetaminophen   Tablet .. 650 every 6 hours PRN  albuterol/ipratropium for Nebulization 3 every 6 hours  amLODIPine   Tablet 10 daily  aspirin enteric coated 81 daily  atorvastatin 20 at bedtime  buDESOnide    Inhalation Suspension 0.5 two times a day  carvedilol 12.5 every 12 hours  cefTRIAXone   IVPB    cefTRIAXone   IVPB 1000 every 24 hours  dextrose 40% Gel 15 once  dextrose 5%. 1000 <Continuous>  dextrose 5%. 1000 <Continuous>  dextrose 50% Injectable 25 once  dextrose 50% Injectable 12.5 once  dextrose 50% Injectable 25 once  donepezil 5 at bedtime  fluticasone propionate 50 MICROgram(s)/spray Nasal Spray 1 two times a day  glucagon  Injectable 1 once  guaiFENesin   Syrup  (Sugar-Free) 100 every 6 hours PRN  heparin   Injectable 5000 every 12 hours  influenza  Vaccine (HIGH DOSE) 0.7 once  insulin lispro (ADMELOG) corrective regimen sliding scale  Before meals and at bedtime  insulin lispro Injectable (ADMELOG) 6 with lunch  insulin lispro Injectable (ADMELOG) 4 with breakfast  insulin lispro Injectable (ADMELOG) 6 with dinner  levothyroxine 50 daily  mirtazapine 7.5 at bedtime  nystatin    Suspension [id number] four times a day  polyethylene glycol 3350 17 daily  predniSONE   Tablet 20 daily  senna 1 daily  sevelamer carbonate 800 three times a day  simethicone 80 two times a day  sodium chloride 0.65% Nasal 1 daily      T(C): , Max: 36.9 (21 @ 21:11)  T(F): , Max: 98.5 (21 @ 21:11)  HR: 71 (21 @ 09:15)  BP: 127/66 (21 @ 09:15)  BP(mean): 88 (21 @ 09:15)  RR: 20 (21 @ 09:15)  SpO2: 97% (21 @ 09:15)  Wt(kg): --     @ 07:01  -   @ 07:00  --------------------------------------------------------  IN: 440 mL / OUT: 0 mL / NET: 440 mL              Review of Systems:  RESPIRATORY: no shortness of breath  CARDIOVASCULAR: No chest pain  MUSCULOSKELETAL: No leg edema    PHYSICAL EXAM:  GENERAL: well-developed, well nourished, alert, on RA   CHEST/LUNG: Coarse breath sounds bilaterally  HEART: normal S1S2, RRR  ABDOMEN: Soft, Nontender, non distended  EXTREMITIES: trace edema   ACCESS: +RTC c/d/i           LABS:                        9.8    15.16 )-----------( 98       ( 2021 10:16 )             30.6     02-06    132<L>  |  91<L>  |  99<H>  ----------------------------<  121<H>  5.8<H>   |  29  |  3.06<H>    Ca    8.2<L>      2021 10:16  Phos  3.6     02-06  Mg     1.5     02-06        PT/INR - ( 2021 10:16 )   PT: 11.9 sec;   INR: 0.99          PTT - ( 2021 10:16 )  PTT:26.9 sec  Urinalysis Basic - ( 2021 15:54 )    Color: Yellow / Appearance: Clear / S.020 / pH: x  Gluc: x / Ketone: NEGATIVE  / Bili: Negative / Urobili: 0.2 E.U./dL   Blood: x / Protein: 100 mg/dL / Nitrite: NEGATIVE   Leuk Esterase: Trace / RBC: 5-10 /HPF / WBC > 10 /HPF   Sq Epi: x / Non Sq Epi: Moderate /HPF / Bacteria: Present /HPF            RADIOLOGY & ADDITIONAL STUDIES:

## 2021-02-06 NOTE — PROGRESS NOTE ADULT - PROBLEM SELECTOR PROBLEM 7
Pulmonary fibrosis
Anemia
Pulmonary fibrosis
Pleural effusion
Pulmonary fibrosis
Pulmonary fibrosis
Anemia
Pulmonary fibrosis
Anemia
Pulmonary fibrosis
Anemia
Anemia
Pulmonary fibrosis
Anemia
CAD (coronary artery disease)
Pulmonary fibrosis
Pulmonary fibrosis
CAD (coronary artery disease)
Pulmonary fibrosis
Anemia
Pulmonary fibrosis

## 2021-02-06 NOTE — PROGRESS NOTE ADULT - PROBLEM SELECTOR PLAN 3
HX Anemia. Baseline Hgb 8-9. 2/2 to CKD and anemia of chronic disease  - s/p 2U PRBC 1/27 and 1U PRBC 1/20   - hgb stable 9.8 today,  no active s/sx bleeding   - as per Dr. Crooks- transfuse if hgb < 8  - continue to HOLD IV iron due to treatment for presumed CAP vs pneumonitis  - Stool for occult blood positive 1/25, reportedly Hx of hemorrhoids  - Maintain active T&S, last 2/3

## 2021-02-06 NOTE — PROGRESS NOTE ADULT - PROBLEM SELECTOR PLAN 9
Dx 3wks ago @ [facility].   - CTH 1/19 (-) for acute changes.   - Per Neurosurgery, cont ASA 81 PO QD; no neuro interventions at this time.     #DEMENTIA  - CONT: Donepezil 5mg PO QD    ## ANXIETY  - Psych consulted as anxiety thought to be worsening during hospitalization, appreciate recs.   - c/w Remeron 7.5mg qhs and Ativan dc'd as per Dr. 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

## 2021-02-06 NOTE — PROGRESS NOTE ADULT - PROBLEM SELECTOR PLAN 7
- CT chest w/o contrast: b/l GGO superimposed on chronic interstitial pulm fibrosis probably d/t chronic hypersensitivity pneumonitis or sarcoid. GGOs could be due to pulmonary edema, pulmonary hemorrhage, alveolar proteinosis, organizing pneumonia or atypical infection.  -Repeat CT Chest post in euvolemic state shows resolution of pulmonary edema pattern. Igor GGO w/ mosaic attenuation pattern. Differential diagnosis includes superimposed infectious and/or inflammatory etiologies and abbreviated differential includes atypical infection such as Covid 19, ARDS, pulmonary hemorrhage and acute on chronic interstitial pneumonitis.  - Switch Symbicort INH to Pulmicort INH 0.5mg BID per Dr Anderson  - c/w Prednisone 20mg qd, will require script for 1 month per Dr Anderson  - PRN robitussin/tessalon pearls for cough

## 2021-02-06 NOTE — PROGRESS NOTE ADULT - ATTENDING COMMENTS
small thrombus noted on tip of dialysis catheter earlier this week, no issues with hemodialysis today

## 2021-02-06 NOTE — PROGRESS NOTE ADULT - PROBLEM SELECTOR PLAN 8
- Na 132 today; remains asx.   - Switched meds to NS from D5.   - Continue to monitor.   - Renal following, appreciate recs

## 2021-02-06 NOTE — PROGRESS NOTE ADULT - PROBLEM SELECTOR PROBLEM 8
Hyponatremia
Type 2 diabetes mellitus
History of subarachnoid hemorrhage
History of subarachnoid hemorrhage
Hyponatremia
Dementia
Hyponatremia
History of subarachnoid hemorrhage
Hyponatremia
History of subarachnoid hemorrhage
Hyponatremia
Hyponatremia
History of subarachnoid hemorrhage
Hyponatremia
COPD (chronic obstructive pulmonary disease)
Hyponatremia
Dementia
Acute on chronic diastolic congestive heart failure
Hyponatremia
Hyponatremia

## 2021-02-06 NOTE — PROGRESS NOTE ADULT - PROBLEM SELECTOR PLAN 1
- Likely multifactorial 2/2 CHF, COPD, ILD, and radha pneumonitis.   - Weaned off HFNC 1/30 and NC. Intermittently found w/ SpO2 86% on RA per RN, will require home oxygen set up for PRN SOB as outpatient given multifactorial lung disease  - LE Duplex 1/19 (-) for DVT.   - No CTA done to r/o PE due to CKD; VQ deferred as likely would not yield diagnostic info given multiple lung processes.   - Repeat COVID swab negative 1/28  - c/w Prednisone 20mg qd. Will require prescription at discharge for at least 1 month per Dr Anderson  - Psych consulted as anxiety thought to be contributing factor as well, appreciate recs.   - c/w Remeron 7.5mg qhs; Ativan dc'd as per Dr. Cuevas     ## Pneumonia vs radha Pneumonitis  - s/p 4 days of Ceftriaxone + 7 days azithromycin.   - ID consulted – 1/23 DISCONTINUED Zosyn. S/p 10 days (end date 2/1- Cefepime 1000mg IV q24hrs for pneumonitis (per Dr Resendez).   - RVP negative, (+) MSSA, procalcitonin 1.08 -> 0.96, galactomannan negative, LDH-->474, Fungitell negative    #Leukocytosis  - WBC elevated possible 2/2 steroids. Diff w/o bandemia  - WBC 21 -> 10.75 -> 15; remains afebrile; continue to trend   - Urinalysis positive for yeast- s/p Monistat x 3 days.   - Urine culture noting enterococcus faecalis 50k and yeast.   - Will treat for UTI given symptoms of pelvic pain/dysuria. Switched CTX to Augmentin 500/125mg BID for 7 days course given UCx sensitivity

## 2021-02-06 NOTE — PROGRESS NOTE ADULT - PROBLEM SELECTOR PROBLEM 6
COPD (chronic obstructive pulmonary disease)
CAD (coronary artery disease)
CKD (chronic kidney disease), stage IV
COPD (chronic obstructive pulmonary disease)
Essential hypertension
Acute on chronic diastolic congestive heart failure
COPD (chronic obstructive pulmonary disease)
CAD (coronary artery disease)
COPD (chronic obstructive pulmonary disease)
CAD (coronary artery disease)
COPD (chronic obstructive pulmonary disease)
CAD (coronary artery disease)
COPD (chronic obstructive pulmonary disease)
COPD (chronic obstructive pulmonary disease)
Anemia
CAD (coronary artery disease)
COPD (chronic obstructive pulmonary disease)
COPD (chronic obstructive pulmonary disease)
CAD (coronary artery disease)
COPD (chronic obstructive pulmonary disease)

## 2021-02-06 NOTE — PROGRESS NOTE ADULT - PROBLEM SELECTOR PROBLEM 1
Acute on chronic diastolic congestive heart failure
Acute respiratory failure
NAV (acute kidney injury)
Acute respiratory failure with hypoxia
Hypoxia
Acute respiratory failure with hypoxia
Acute on chronic diastolic congestive heart failure
Acute respiratory failure with hypoxia
NAV (acute kidney injury)
Acute on chronic diastolic congestive heart failure
Acute respiratory failure with hypoxia
Acute on chronic diastolic congestive heart failure
Acute on chronic diastolic congestive heart failure
Acute respiratory failure with hypoxia
Acute respiratory failure with hypoxia
Acute on chronic diastolic congestive heart failure
Acute respiratory failure with hypoxia
Acute on chronic diastolic congestive heart failure
Acute respiratory failure with hypoxia
Acute on chronic diastolic congestive heart failure
Acute on chronic diastolic congestive heart failure
Hypoxia
Acute on chronic diastolic congestive heart failure
Acute respiratory failure with hypoxia

## 2021-02-06 NOTE — PROGRESS NOTE ADULT - NUTRITIONAL ASSESSMENT
This patient has been assessed with a concern for Malnutrition and has been determined to have a diagnosis/diagnoses of Mild protein-calorie malnutrition.    This patient is being managed with:   Diet Dysphagia 2 Mechanical Soft-Thin Liquids-  Consistent Carbohydrate {Evening Snack} (CSTCHOSN)  1500mL Fluid Restriction ([id number])  Low Sodium  Supplement Feeding Modality:  Oral  Nepro Cans or Servings Per Day:  1       Frequency:  Two Times a day  Entered: Feb 5 2021  1:14PM    Diet Dysphagia 2 Mechanical Soft-Thin Liquids-  1500mL Fluid Restriction ([id number])  Low Sodium  Supplement Feeding Modality:  Oral  Nepro Cans or Servings Per Day:  1       Frequency:  Daily  Entered: Feb 1 2021  1:41PM    The following pending diet order is being considered for treatment of Mild protein-calorie malnutrition:  Diet Mechanical Soft-  Consistent Carbohydrate {Evening Snack} (CSTCHOSN)  1500mL Fluid Restriction ([id number])  Low Sodium  Supplement Feeding Modality:  Oral  Nepro Cans or Servings Per Day:  1       Frequency:  Two Times a day  Entered: Jan 28 2021  5:20PM

## 2021-02-06 NOTE — PROGRESS NOTE ADULT - PROBLEM SELECTOR PLAN 6
- Switched Symbicort INH to Pulmicort INH 0.5mg BID per Dr Anderson  - c/w Duonebs standing q6h  - PRN Robutussin/Benonatate for cough  - Desat to 86% on RA. Will require home O2 set up given multiple pulm comorbidities: COPD, ILD  - c/w Nystatin swish and spit for thrush  - QTc 500.  Unable to Switch Nystatin to Fluconazole

## 2021-02-06 NOTE — PROGRESS NOTE ADULT - SUBJECTIVE AND OBJECTIVE BOX
Interventional, Pulmonary, Critical, Chest Special Procedures.    Pt was seen and fully examined by myself.     Time spent with patient in minutes:77    Patient is a 86y old  Female who presents with a chief complaint of progressively worsening SOB and cough x 3 days (05 Feb 2021 20:17) The patient reports feeling worse today, generalized discomfort, lower back pain, albeit eupneic on RA.    HPI:  86 yr old F, POOR HISTORIAN/early dementia, former heavy smoker with PMHx HTN, hyperlipidemia, COPD, hypothyroidism, Stage IV CKD (baseline Cr ~3.0), anemia of chronic disease, CAD s/p CABG 2015 Dr. Blunt, chronic diastolic CHF(EF:55% by Echo 10/2020), renal artery stenosis s/p renal artery stent >20yrs ago, Carotid artery stenosis, PAD, who presents to [facility] ED 1/15/21 c/o progressively worsening SOB and cough x 3 days. Patient reports she can barely walk 10 feet prior to having to stop and rest. Patient further admits to chronic bilateral LE edema and significant orthopnea and she has been sleeping upright in a chair the past week. Patient also admits to chills and a nonproductive cough over the past few days. Patient was instructed to increase her Lasix 80mg PO daily dose to Lasix 80mg PO BID and start Metalazone 10mg PO BID prior to each dose by her cardiologist Dr. Horvath. Patient denies any chest pain, dizziness, palpitations, recent travel or sick contacts. Patient endorsing compliance w/ medications however daughter states that compliance with medication has been questionable over the last few months, as patient is becoming more forgetful.    In [facility] ED, BP: 170/68, HR: 80, RR:26, Temp: 98.4F, O2 sat: 80% on RA, improved to 99% with 10L NRB. EKG revealed NSR@67BPM with incomplete LBBB, TWI Lead I, AVL (similar to prior EKG 10/2020). CXR prelim read consistent with alveolar edema, bilateral patchy opacities/consolidation.    Labs notable for: WBC 14.5 with left shift, H/H 9.8/30.6, D-dimer 491, BUN/Cr 56/3.54, , CRP 4.86, Ferritin 180, Procalcitonin 0.29, Lactate 2.4, Troponin T 0.05, BNP 64,671, Initial COVID PCR negative.    Patient treated with Lasix 80mg IV x 1 dose, SL NTG 0.4mg PO x 1 dose, Decadron 6mg IV x 1 dose, Ceftriaxone 1g IV x 1 dose and Azithromycin 500mg IV x 1 dose.  Patient now admitted to cardiac telemetry for management of acute on chronic diastolic CHF exacerbation in setting of suspected CAP.    **Of note: Patient had a fall ~3 weeks ago for which she was admitted to [facility], CT imaging was negative as per daughter and fall thought to be secondary to hypotension.   (15 Mark 2021 21:30)      REVIEW OF SYSTEMS:  Constitutional: No fever, weight loss, chills or fatigue  Eyes: No eye pain, visual disturbances, or discharge  ENMT:  No difficulty hearing, tinnitus, vertigo; No sinus or throat pain. No epistaxis, dysphagia, dysphonia, hoarseness or odynophagia  Neck: No pain, stiffness or neck swelling.  No masses or deformities  Respiratory: No cough, wheezing, hemoptysis  - COPD  - ILD   - PE   - ASTHMA     - PNEUMONIA  Cardiovascular: No chest pain, dysrhythmia, palpitations, dizziness or edema - CAD   - CHF   - HTN  Gastrointestinal: No abdominal or epigastric pain. No nausea, vomiting or hematemesis; No diarrhea or constipation. No melena or hematochezia, Icterus.          Genitourinary: No dysuria, frequency, hematuria or incontinence   - CKD/NAV      - ESRD  Neurological: No headaches, memory loss, loss of strength, numbness or tremors      -DEMENTIA     - STROKE    - SEIZURE  Skin: No itching, burning, rashes or lesions   Lymph Nodes: No enlarged glands  Endocrine: No heat or cold intolerance; No hair loss       - DM     - THYROID DISORDER  Musculoskeletal: No joint pain or swelling; No muscle, back or extremity pain, No edema  Psychiatric: No depression, anxiety, mood swings or difficulty sleeping  Heme/Lymph: No easy bruising or bleeding gums         - ANEMIA      - CANCER   -COAGULOPATHY  Allergy and Immunologic: No hives or eczema    PAST MEDICAL & SURGICAL HISTORY:  Essential hypertension  Hypertension    Type 2 diabetes mellitus  Diabetes    Hyperlipidemia  Hyperlipidemia    Disorder of kidney and ureter  Renal insufficiency    Peripheral vascular disease  PVD (peripheral vascular disease)    Anxiety state  Anxiety    Atherosclerosis of renal artery  with resulting stent placement    Atherosclerosis of renal artery  Renal artery stenosis      FAMILY HISTORY:  Family history of ischemic heart disease  Family history of coronary artery disease.      SOCIAL HISTORY:      - Tobacco     - ETOH    Allergies    No Known Allergies    Intolerances      Vital Signs Last 24 Hrs  T(C): 36.5 (06 Feb 2021 09:15), Max: 36.9 (05 Feb 2021 21:11)  T(F): 97.7 (06 Feb 2021 09:15), Max: 98.5 (05 Feb 2021 21:11)  HR: 71 (06 Feb 2021 09:15) (65 - 74)  BP: 127/66 (06 Feb 2021 09:15) (127/66 - 171/75)  BP(mean): 88 (06 Feb 2021 09:15) (88 - 108)  RR: 20 (06 Feb 2021 09:15) (14 - 20)  SpO2: 97% (06 Feb 2021 09:15) (90% - 97%)    02-05 @ 07:01  -  02-06 @ 07:00  --------------------------------------------------------  IN: 440 mL / OUT: 0 mL / NET: 440 mL          MEDICATIONS:  MEDICATIONS  (STANDING):  albuterol/ipratropium for Nebulization 3 milliLiter(s) Nebulizer every 6 hours  amLODIPine   Tablet 10 milliGRAM(s) Oral daily  aspirin enteric coated 81 milliGRAM(s) Oral daily  atorvastatin 20 milliGRAM(s) Oral at bedtime  buDESOnide    Inhalation Suspension 0.5 milliGRAM(s) Inhalation two times a day  carvedilol 12.5 milliGRAM(s) Oral every 12 hours  cefTRIAXone   IVPB 1000 milliGRAM(s) IV Intermittent every 24 hours  cefTRIAXone   IVPB      dextrose 40% Gel 15 Gram(s) Oral once  dextrose 5%. 1000 milliLiter(s) (50 mL/Hr) IV Continuous <Continuous>  dextrose 5%. 1000 milliLiter(s) (100 mL/Hr) IV Continuous <Continuous>  dextrose 50% Injectable 25 Gram(s) IV Push once  dextrose 50% Injectable 12.5 Gram(s) IV Push once  dextrose 50% Injectable 25 Gram(s) IV Push once  donepezil 5 milliGRAM(s) Oral at bedtime  fluticasone propionate 50 MICROgram(s)/spray Nasal Spray 1 Spray(s) Both Nostrils two times a day  glucagon  Injectable 1 milliGRAM(s) IntraMuscular once  heparin   Injectable 5000 Unit(s) SubCutaneous every 12 hours  influenza  Vaccine (HIGH DOSE) 0.7 milliLiter(s) IntraMuscular once  insulin lispro (ADMELOG) corrective regimen sliding scale   SubCutaneous Before meals and at bedtime  insulin lispro Injectable (ADMELOG) 6 Unit(s) SubCutaneous with lunch  insulin lispro Injectable (ADMELOG) 4 Unit(s) SubCutaneous with breakfast  insulin lispro Injectable (ADMELOG) 6 Unit(s) SubCutaneous with dinner  levothyroxine 50 MICROGram(s) Oral daily  mirtazapine 7.5 milliGRAM(s) Oral at bedtime  nystatin    Suspension [id number] Unit(s) Oral four times a day  polyethylene glycol 3350 17 Gram(s) Oral daily  predniSONE   Tablet 20 milliGRAM(s) Oral daily  senna 1 Tablet(s) Oral daily  sevelamer carbonate 800 milliGRAM(s) Oral three times a day  simethicone 80 milliGRAM(s) Chew two times a day  sodium chloride 0.65% Nasal 1 Spray(s) Both Nostrils daily    MEDICATIONS  (PRN):  acetaminophen   Tablet .. 650 milliGRAM(s) Oral every 6 hours PRN Moderate Pain (4 - 6)  guaiFENesin   Syrup  (Sugar-Free) 100 milliGRAM(s) Oral every 6 hours PRN Cough      PHYSICAL EXAM:  More comfortable, no immediate distress  Muscle atrophy, developed,  Eyes: PERRLA, EOMI, -conjunctivitis, -scleritis   Head: no focal deficit, normocephalic,  no trauma  ENMT: moist tongue with white lesions, + thrush, -nasal discharge, -hoarseness, normal hearing, + cough, -hemoptysis, trachea midline  Neck: supple, - lymphadenopathy,  -masses, -JVD  Respiratory: bilateral diminished breath sounds, -wheezing, less  rhonchi,  rales, + less crackles lower lungs, better peripheral air entry  Chest:  no more accessory muscle use, -paradoxical breathing  Cardiovascular: irregular rate and sinus rhythm, S1 S2 normal, -S3, -S4, -murmur, -gallop, -rub  Gastrointestinal: soft, nontender, nondistended, normal bowel sounds, no hepatosplenomegaly  Genitourinary: -flank pain, -dysuria, + Cason  Extremities: -clubbing, -cyanosis, -edema    Vascular: peripheral pulses palpable 2+ bilaterally  Neurological: alert, oriented x 3, no focal deficit, -tremor   Skin: warm, dry, -erythema, iv sites intact    DEVICES:  - DENTURES   +IV R / L     - ETUBE   -TRACH   -CTUBE  R / L    LABS:                          9.8    15.16 )-----------( 98       ( 06 Feb 2021 10:16 )             30.6     02-06    132<L>  |  91<L>  |  99<H>  ----------------------------<  121<H>  5.8<H>   |  29  |  3.06<H>    Ca    8.2<L>      06 Feb 2021 10:16  Phos  3.6     02-06  Mg     1.5     02-06      PT/INR - ( 06 Feb 2021 10:16 )   PT: 11.9 sec;   INR: 0.99          PTT - ( 06 Feb 2021 10:16 )  PTT:26.9 sec  RADIOLOGY & ADDITIONAL STUDIES (The following images were personally reviewed):

## 2021-02-06 NOTE — PROGRESS NOTE ADULT - SUBJECTIVE AND OBJECTIVE BOX
CARDIOLOGY NP PROGRESS NOTE    Subjective: Pt seen and examined at bedside. Reports feeling intermittent pelvic pain. Denies chest pain, sob, lightheadedness, dizziness, palpitations, fever, chills.  Remainder ROS otherwise negative.    Overnight Events: WBC 11 -> 15 this AM. Repeat Echo noting vague linear mobile echodensity noted in the right atrium adjacent to the HD catheter. Renal discussed w/ IR and s/p TPA into HD catheter last night. Went for HD this AM    TELEMETRY: SR 60-80s, occasional PVCs           VITAL SIGNS:  T(C): 36.5 (02-06-21 @ 09:15), Max: 36.9 (02-05-21 @ 21:11)  HR: 72 (02-06-21 @ 12:45) (67 - 74)  BP: 140/66 (02-06-21 @ 12:45) (127/66 - 171/75)  RR: 22 (02-06-21 @ 12:45) (14 - 22)  SpO2: 94% (02-06-21 @ 12:45) (93% - 97%)  Wt(kg): --    I&O's Summary    05 Feb 2021 07:01  -  06 Feb 2021 07:00  --------------------------------------------------------  IN: 440 mL / OUT: 0 mL / NET: 440 mL          PHYSICAL EXAM:    General: A/ox 3, No acute Distress  Neck: Supple, NO JVD  Cardiac: S1 S2, No M/R/G  Pulmonary: CTAB, Breathing unlabored, No Rhonchi/Rales/Wheezing  Abdomen: Soft, Non -tender, +BS x 4 quads  Extremities: No Rashes, No edema  Neuro: A/o x 3, No focal deficits          LABS:                          9.8    15.16 )-----------( 98       ( 06 Feb 2021 10:16 )             30.6                              02-06    132<L>  |  91<L>  |  99<H>  ----------------------------<  121<H>  5.8<H>   |  29  |  3.06<H>    Ca    8.2<L>      06 Feb 2021 10:16  Phos  3.6     02-06  Mg     1.5     02-06                              PT/INR - ( 06 Feb 2021 10:16 )   PT: 11.9 sec;   INR: 0.99          PTT - ( 06 Feb 2021 10:16 )  PTT:26.9 sec  CAPILLARY BLOOD GLUCOSE      POCT Blood Glucose.: 122 mg/dL (06 Feb 2021 12:54)  POCT Blood Glucose.: 117 mg/dL (06 Feb 2021 07:02)  POCT Blood Glucose.: 259 mg/dL (05 Feb 2021 21:05)  POCT Blood Glucose.: 257 mg/dL (05 Feb 2021 16:59)            Allergies:  No Known Allergies    MEDICATIONS  (STANDING):  albuterol/ipratropium for Nebulization 3 milliLiter(s) Nebulizer every 6 hours  amLODIPine   Tablet 10 milliGRAM(s) Oral daily  amoxicillin  500 milliGRAM(s)/clavulanate 1 Tablet(s) Oral two times a day  aspirin enteric coated 81 milliGRAM(s) Oral daily  atorvastatin 20 milliGRAM(s) Oral at bedtime  buDESOnide    Inhalation Suspension 0.5 milliGRAM(s) Inhalation two times a day  carvedilol 12.5 milliGRAM(s) Oral every 12 hours  dextrose 40% Gel 15 Gram(s) Oral once  dextrose 5%. 1000 milliLiter(s) (50 mL/Hr) IV Continuous <Continuous>  dextrose 5%. 1000 milliLiter(s) (100 mL/Hr) IV Continuous <Continuous>  dextrose 50% Injectable 25 Gram(s) IV Push once  dextrose 50% Injectable 12.5 Gram(s) IV Push once  dextrose 50% Injectable 25 Gram(s) IV Push once  donepezil 5 milliGRAM(s) Oral at bedtime  fluticasone propionate 50 MICROgram(s)/spray Nasal Spray 1 Spray(s) Both Nostrils two times a day  glucagon  Injectable 1 milliGRAM(s) IntraMuscular once  heparin   Injectable 5000 Unit(s) SubCutaneous every 12 hours  influenza  Vaccine (HIGH DOSE) 0.7 milliLiter(s) IntraMuscular once  insulin lispro (ADMELOG) corrective regimen sliding scale   SubCutaneous Before meals and at bedtime  insulin lispro Injectable (ADMELOG) 6 Unit(s) SubCutaneous with lunch  insulin lispro Injectable (ADMELOG) 4 Unit(s) SubCutaneous with breakfast  insulin lispro Injectable (ADMELOG) 6 Unit(s) SubCutaneous with dinner  levothyroxine 50 MICROGram(s) Oral daily  mirtazapine 7.5 milliGRAM(s) Oral at bedtime  nystatin    Suspension 253216 Unit(s) Oral four times a day  polyethylene glycol 3350 17 Gram(s) Oral daily  predniSONE   Tablet 20 milliGRAM(s) Oral daily  senna 1 Tablet(s) Oral daily  sevelamer carbonate 800 milliGRAM(s) Oral three times a day  simethicone 80 milliGRAM(s) Chew two times a day  sodium chloride 0.65% Nasal 1 Spray(s) Both Nostrils daily    MEDICATIONS  (PRN):  acetaminophen   Tablet .. 650 milliGRAM(s) Oral every 6 hours PRN Moderate Pain (4 - 6)  guaiFENesin   Syrup  (Sugar-Free) 100 milliGRAM(s) Oral every 6 hours PRN Cough        DIAGNOSTIC TESTS:        CARDIOLOGY NP PROGRESS NOTE    Subjective: Pt seen and examined at bedside. Reports feeling intermittent pelvic pain. Denies chest pain, sob, lightheadedness, dizziness, palpitations, fever, chills.  Remainder ROS otherwise negative.    Overnight Events: WBC 11 -> 15 this AM. Repeat Echo noting vague linear mobile echodensity noted in the right atrium adjacent to the HD catheter. Differential includes fibrinous strand vs thrombus vs vegetation. Renal discussed w/ IR and s/p TPA into HD catheter last night. Went for HD this AM.    TELEMETRY: SR 60-80s, occasional PVCs           VITAL SIGNS:  T(C): 36.5 (02-06-21 @ 09:15), Max: 36.9 (02-05-21 @ 21:11)  HR: 72 (02-06-21 @ 12:45) (67 - 74)  BP: 140/66 (02-06-21 @ 12:45) (127/66 - 171/75)  RR: 22 (02-06-21 @ 12:45) (14 - 22)  SpO2: 94% (02-06-21 @ 12:45) (93% - 97%)  Wt(kg): --    I&O's Summary    05 Feb 2021 07:01  -  06 Feb 2021 07:00  --------------------------------------------------------  IN: 440 mL / OUT: 0 mL / NET: 440 mL          PHYSICAL EXAM:    General: Alert to self, place and time. Disoriented to situation at times. No acute Distress   Neck: Supple, no JVD  Cardiac: S1 S2, No M/R/G  Pulmonary: Lungs CTA radha. Breathing unlabored on 2L NC   Abdomen: Soft, non-distended, mildly tender to lower abd/pelvic region, +BS x 4 quads  Extremities: No Rashes, No further edema    Lines: R IJ tunneled HD Cath- site CDI, dsg in place   Neuro: Alert to self, place and time. Disoriented to situation at times.  No focal deficits          LABS:                          9.8    15.16 )-----------( 98       ( 06 Feb 2021 10:16 )             30.6                              02-06    132<L>  |  91<L>  |  99<H>  ----------------------------<  121<H>  5.8<H>   |  29  |  3.06<H>    Ca    8.2<L>      06 Feb 2021 10:16  Phos  3.6     02-06  Mg     1.5     02-06                              PT/INR - ( 06 Feb 2021 10:16 )   PT: 11.9 sec;   INR: 0.99          PTT - ( 06 Feb 2021 10:16 )  PTT:26.9 sec  CAPILLARY BLOOD GLUCOSE      POCT Blood Glucose.: 122 mg/dL (06 Feb 2021 12:54)  POCT Blood Glucose.: 117 mg/dL (06 Feb 2021 07:02)  POCT Blood Glucose.: 259 mg/dL (05 Feb 2021 21:05)  POCT Blood Glucose.: 257 mg/dL (05 Feb 2021 16:59)            Allergies:  No Known Allergies    MEDICATIONS  (STANDING):  albuterol/ipratropium for Nebulization 3 milliLiter(s) Nebulizer every 6 hours  amLODIPine   Tablet 10 milliGRAM(s) Oral daily  amoxicillin  500 milliGRAM(s)/clavulanate 1 Tablet(s) Oral two times a day  aspirin enteric coated 81 milliGRAM(s) Oral daily  atorvastatin 20 milliGRAM(s) Oral at bedtime  buDESOnide    Inhalation Suspension 0.5 milliGRAM(s) Inhalation two times a day  carvedilol 12.5 milliGRAM(s) Oral every 12 hours  dextrose 40% Gel 15 Gram(s) Oral once  dextrose 5%. 1000 milliLiter(s) (50 mL/Hr) IV Continuous <Continuous>  dextrose 5%. 1000 milliLiter(s) (100 mL/Hr) IV Continuous <Continuous>  dextrose 50% Injectable 25 Gram(s) IV Push once  dextrose 50% Injectable 12.5 Gram(s) IV Push once  dextrose 50% Injectable 25 Gram(s) IV Push once  donepezil 5 milliGRAM(s) Oral at bedtime  fluticasone propionate 50 MICROgram(s)/spray Nasal Spray 1 Spray(s) Both Nostrils two times a day  glucagon  Injectable 1 milliGRAM(s) IntraMuscular once  heparin   Injectable 5000 Unit(s) SubCutaneous every 12 hours  influenza  Vaccine (HIGH DOSE) 0.7 milliLiter(s) IntraMuscular once  insulin lispro (ADMELOG) corrective regimen sliding scale   SubCutaneous Before meals and at bedtime  insulin lispro Injectable (ADMELOG) 6 Unit(s) SubCutaneous with lunch  insulin lispro Injectable (ADMELOG) 4 Unit(s) SubCutaneous with breakfast  insulin lispro Injectable (ADMELOG) 6 Unit(s) SubCutaneous with dinner  levothyroxine 50 MICROGram(s) Oral daily  mirtazapine 7.5 milliGRAM(s) Oral at bedtime  nystatin    Suspension 267304 Unit(s) Oral four times a day  polyethylene glycol 3350 17 Gram(s) Oral daily  predniSONE   Tablet 20 milliGRAM(s) Oral daily  senna 1 Tablet(s) Oral daily  sevelamer carbonate 800 milliGRAM(s) Oral three times a day  simethicone 80 milliGRAM(s) Chew two times a day  sodium chloride 0.65% Nasal 1 Spray(s) Both Nostrils daily    MEDICATIONS  (PRN):  acetaminophen   Tablet .. 650 milliGRAM(s) Oral every 6 hours PRN Moderate Pain (4 - 6)  guaiFENesin   Syrup  (Sugar-Free) 100 milliGRAM(s) Oral every 6 hours PRN Cough        DIAGNOSTIC TESTS:     < from: TTE Echo Complete w/o Contrast w/ Doppler (02.05.21 @ 12:01) >  CONCLUSIONS:   1. Moderately dilated left atrium.   2. Catheter seen in the right atrium. Vague linear mobile echodensity noted in the right atrium adjacent to the catheter. Differential includes fibrinous strand vs thrombus vs vegetation. Clinical correlation required.   3. Mild aortic regurgitation.   4. The mitral valve is mildly thickened and calcified. Mitral annular calcification noted. The mean transvalvular gradient is 4.00 mmHg at a heart rate of 63 bpm. There is at most moderate mitral regurgitation.   5. There is mild tricuspid regurgitation. Pulmonary hypertension is present. Pulmonary artery systolic pressure (estimated using the tricuspid regurgitant gradient and an estimate of right atrial pressure) is 62 mmHg.   6. The right ventricle is normal in size. Right ventricular systolic function is mildly reduced. RV tissue Doppler S' is 7.00 cm/s (normal >10 cm/s).   7. There is mild concentric left ventricular hypertrophy. The left ventricle is normal in size and systolic function with a calculated ejection fraction of 52%.   8. No pericardial effusion.   9. No prior echo is available for comparison.    < end of copied text >

## 2021-02-06 NOTE — PROGRESS NOTE ADULT - PROBLEM SELECTOR PROBLEM 10
Dementia
Dementia
Hypothyroid
Dementia
Hypothyroid
History of subarachnoid hemorrhage
Cough
Hypothyroid
Acute on chronic diastolic congestive heart failure
Hypothyroid
Hypothyroid

## 2021-02-06 NOTE — PROGRESS NOTE ADULT - ASSESSMENT
ASSESSMENT/PLAN 86 yr old F, POOR HISTORIAN/early dementia, former heavy smoker with PMHx HTN, hyperlipidemia, COPD, hypothyroidism, Stage IV CKD (baseline Cr ~3.0), anemia of chronic disease, CAD s/p CABG 2015 Dr. Blunt, acute on chronic diastolic CHF(EF:55% by Echo 10/2020), renal artery stenosis s/p renal artery stent >20yrs ago, Carotid artery stenosis, Pulmonary HTN, who presents to [facility] ED 1/15/21 c/o progressively worsening SOB and cough x 3 days. Respiratory failure. Currently on steroids taper for pneumonitis. UTI, New leucocytosis    1. O2 Continue NC titrate to adequate 02 Sat, please humidify  2. Bronchodilators:  Atrovent/ albuterol q 4 – 6 hours as needed  3. Corticosteroids: Recommend continue prednisone 20mg daily, , +Pulmicort nebs discussed c CCU  4. ID/Antibiotics: on Rocephin, requested ID feedback in view of TTE findings.  5. Cardiac/HTN: Optimize CHF mngmnt, optimize HD,    6. GI: Rx/ prophylaxis c PPI/H2B  7. Heme: Rx/VT prophylaxis c SQH/SCD/ASA follow H/H closely  8. Aspiration precautions at all times, mobilize, OOB, chest PT  9, Avoid sedation in this pt, to protect respiratory drive, benzodiazepines are contraindicated  10.IR feedback on possible HD catheter migration  Discussed with managing team CCU

## 2021-02-06 NOTE — PROGRESS NOTE ADULT - PROBLEM SELECTOR PLAN 10
- CONT: Levothyroxine 50mg PO QD.     #DM  - Hx of DM. A1c 5.1% on admission.    - c/w Lantus 4U qHS.  Lispro decreased to 3U TID w/ meals, MISS w/ meals and bedtime  - consistent carb diet while on steroids given hyperglycemia  - Endocrine following    F: none   E: renal patient   N: dysphagia 2 mechanical soft-thin liquids, Ensure TID.   DVT ppx: Hep SubQ  Dispo: cardiac telemetry  PT lynnette HENDRICKSON w/ on site dialysis once medically stable for DC, HOWEVER family wants to take pt home- plan to be dc home w/ home care, outpatient dialysis accepted. Requires home O2 set up prior to discharge

## 2021-02-06 NOTE — PROGRESS NOTE ADULT - ASSESSMENT
86F poor historian/early dementia and former heavy smoker with PMHx DM, HTN, hyperlipidemia, COPD, hypothyroidism, CKD stage 3 (baseline Crea ~3.0), anemia of chronic disease, CAD s/p CABG 2015, chronic diastolic CHF (EF 55% via echo 10/2020), renal artery stenosis (s/p stent 20+ years ago), carotid artery stenosis, PAD presented 1/15/21 w/ progressively worsening SOB and cough. Admitted for acute on chronic diastolic HF, oliguric ATN on CKD 3 s/p stepped up to CCU and stepped back down to tele 1/23. Hospital course c/b radha pneumonitis on Cefepime IV, acute hypoxic respiratory failure requiring HFNC and titrated to RA, Tunneled Cath placement 1/26 w/ initiation of HD and Anemia requiring total 3U PRBC during hospitalization. Pulm, Renal Dr Nicolas, ID Dr Resendez, Endo and Psych are following. Plan for d/c Home w/ outpatient dialysis, requires home O2 set up.

## 2021-02-06 NOTE — PROGRESS NOTE ADULT - PROBLEM SELECTOR PLAN 2
Oliguric NAV on CKD stage 3 2/2 ATN with CKD, likely progression from DM.   - F/u Renal recs, known to Dr Nicolas  - HD initiated this admission 1/26/21 via R IJ HD catheter.  - Cr 3.06 today  - Renal US: atrophic kidneys, no WILBER.   - Nephrotic w/u non-contributory thus far, ANCA negative  - Last HD session 2/6; Accepted to outpt dialysis center for T/Th/Sat schedule   - Monitor Strict I/Os, daily PVRs  - PPD read NEGATIVE 0 induration on 2/1

## 2021-02-07 ENCOUNTER — TRANSCRIPTION ENCOUNTER (OUTPATIENT)
Age: 86
End: 2021-02-07

## 2021-02-07 VITALS — TEMPERATURE: 98 F

## 2021-02-07 LAB
ANION GAP SERPL CALC-SCNC: 10 MMOL/L — SIGNIFICANT CHANGE UP (ref 5–17)
BASOPHILS # BLD AUTO: 0.01 K/UL — SIGNIFICANT CHANGE UP (ref 0–0.2)
BASOPHILS NFR BLD AUTO: 0.1 % — SIGNIFICANT CHANGE UP (ref 0–2)
BUN SERPL-MCNC: 53 MG/DL — HIGH (ref 7–23)
CALCIUM SERPL-MCNC: 8.6 MG/DL — SIGNIFICANT CHANGE UP (ref 8.4–10.5)
CHLORIDE SERPL-SCNC: 99 MMOL/L — SIGNIFICANT CHANGE UP (ref 96–108)
CO2 SERPL-SCNC: 28 MMOL/L — SIGNIFICANT CHANGE UP (ref 22–31)
CREAT SERPL-MCNC: 2.3 MG/DL — HIGH (ref 0.5–1.3)
CULTURE RESULTS: SIGNIFICANT CHANGE UP
EOSINOPHIL # BLD AUTO: 0.22 K/UL — SIGNIFICANT CHANGE UP (ref 0–0.5)
EOSINOPHIL NFR BLD AUTO: 2.2 % — SIGNIFICANT CHANGE UP (ref 0–6)
GLUCOSE BLDC GLUCOMTR-MCNC: 113 MG/DL — HIGH (ref 70–99)
GLUCOSE BLDC GLUCOMTR-MCNC: 177 MG/DL — HIGH (ref 70–99)
GLUCOSE SERPL-MCNC: 84 MG/DL — SIGNIFICANT CHANGE UP (ref 70–99)
HCT VFR BLD CALC: 30.2 % — LOW (ref 34.5–45)
HGB BLD-MCNC: 9.3 G/DL — LOW (ref 11.5–15.5)
IMM GRANULOCYTES NFR BLD AUTO: 0.5 % — SIGNIFICANT CHANGE UP (ref 0–1.5)
LYMPHOCYTES # BLD AUTO: 0.67 K/UL — LOW (ref 1–3.3)
LYMPHOCYTES # BLD AUTO: 6.7 % — LOW (ref 13–44)
MAGNESIUM SERPL-MCNC: 1.8 MG/DL — SIGNIFICANT CHANGE UP (ref 1.6–2.6)
MCHC RBC-ENTMCNC: 28.5 PG — SIGNIFICANT CHANGE UP (ref 27–34)
MCHC RBC-ENTMCNC: 30.8 GM/DL — LOW (ref 32–36)
MCV RBC AUTO: 92.6 FL — SIGNIFICANT CHANGE UP (ref 80–100)
MONOCYTES # BLD AUTO: 0.3 K/UL — SIGNIFICANT CHANGE UP (ref 0–0.9)
MONOCYTES NFR BLD AUTO: 3 % — SIGNIFICANT CHANGE UP (ref 2–14)
NEUTROPHILS # BLD AUTO: 8.71 K/UL — HIGH (ref 1.8–7.4)
NEUTROPHILS NFR BLD AUTO: 87.5 % — HIGH (ref 43–77)
NRBC # BLD: 0 /100 WBCS — SIGNIFICANT CHANGE UP (ref 0–0)
ORGANISM # SPEC MICROSCOPIC CNT: SIGNIFICANT CHANGE UP
ORGANISM # SPEC MICROSCOPIC CNT: SIGNIFICANT CHANGE UP
PHOSPHATE SERPL-MCNC: 3.5 MG/DL — SIGNIFICANT CHANGE UP (ref 2.5–4.5)
PLATELET # BLD AUTO: 79 K/UL — LOW (ref 150–400)
POTASSIUM SERPL-MCNC: 4.6 MMOL/L — SIGNIFICANT CHANGE UP (ref 3.5–5.3)
POTASSIUM SERPL-SCNC: 4.6 MMOL/L — SIGNIFICANT CHANGE UP (ref 3.5–5.3)
RBC # BLD: 3.26 M/UL — LOW (ref 3.8–5.2)
RBC # FLD: 16.3 % — HIGH (ref 10.3–14.5)
SODIUM SERPL-SCNC: 137 MMOL/L — SIGNIFICANT CHANGE UP (ref 135–145)
SPECIMEN SOURCE: SIGNIFICANT CHANGE UP
WBC # BLD: 9.96 K/UL — SIGNIFICANT CHANGE UP (ref 3.8–10.5)
WBC # FLD AUTO: 9.96 K/UL — SIGNIFICANT CHANGE UP (ref 3.8–10.5)

## 2021-02-07 PROCEDURE — 97110 THERAPEUTIC EXERCISES: CPT

## 2021-02-07 PROCEDURE — 36430 TRANSFUSION BLD/BLD COMPNT: CPT

## 2021-02-07 PROCEDURE — 80053 COMPREHEN METABOLIC PANEL: CPT

## 2021-02-07 PROCEDURE — 93005 ELECTROCARDIOGRAM TRACING: CPT

## 2021-02-07 PROCEDURE — 87340 HEPATITIS B SURFACE AG IA: CPT

## 2021-02-07 PROCEDURE — 82340 ASSAY OF CALCIUM IN URINE: CPT

## 2021-02-07 PROCEDURE — 86923 COMPATIBILITY TEST ELECTRIC: CPT

## 2021-02-07 PROCEDURE — 82330 ASSAY OF CALCIUM: CPT

## 2021-02-07 PROCEDURE — 85027 COMPLETE CBC AUTOMATED: CPT

## 2021-02-07 PROCEDURE — 84439 ASSAY OF FREE THYROXINE: CPT

## 2021-02-07 PROCEDURE — 97116 GAIT TRAINING THERAPY: CPT

## 2021-02-07 PROCEDURE — 86255 FLUORESCENT ANTIBODY SCREEN: CPT

## 2021-02-07 PROCEDURE — 84484 ASSAY OF TROPONIN QUANT: CPT

## 2021-02-07 PROCEDURE — 76770 US EXAM ABDO BACK WALL COMP: CPT

## 2021-02-07 PROCEDURE — 87449 NOS EACH ORGANISM AG IA: CPT

## 2021-02-07 PROCEDURE — U0005: CPT

## 2021-02-07 PROCEDURE — 82728 ASSAY OF FERRITIN: CPT

## 2021-02-07 PROCEDURE — 87186 SC STD MICRODIL/AGAR DIL: CPT

## 2021-02-07 PROCEDURE — 93971 EXTREMITY STUDY: CPT

## 2021-02-07 PROCEDURE — 86709 HEPATITIS A IGM ANTIBODY: CPT

## 2021-02-07 PROCEDURE — 82436 ASSAY OF URINE CHLORIDE: CPT

## 2021-02-07 PROCEDURE — 86704 HEP B CORE ANTIBODY TOTAL: CPT

## 2021-02-07 PROCEDURE — 83935 ASSAY OF URINE OSMOLALITY: CPT

## 2021-02-07 PROCEDURE — 82550 ASSAY OF CK (CPK): CPT

## 2021-02-07 PROCEDURE — 82272 OCCULT BLD FECES 1-3 TESTS: CPT

## 2021-02-07 PROCEDURE — 99285 EMERGENCY DEPT VISIT HI MDM: CPT | Mod: 25

## 2021-02-07 PROCEDURE — 86900 BLOOD TYPING SEROLOGIC ABO: CPT

## 2021-02-07 PROCEDURE — 84540 ASSAY OF URINE/UREA-N: CPT

## 2021-02-07 PROCEDURE — 86803 HEPATITIS C AB TEST: CPT

## 2021-02-07 PROCEDURE — 84300 ASSAY OF URINE SODIUM: CPT

## 2021-02-07 PROCEDURE — 84165 PROTEIN E-PHORESIS SERUM: CPT

## 2021-02-07 PROCEDURE — 83516 IMMUNOASSAY NONANTIBODY: CPT

## 2021-02-07 PROCEDURE — U0003: CPT

## 2021-02-07 PROCEDURE — 71250 CT THORAX DX C-: CPT

## 2021-02-07 PROCEDURE — 84295 ASSAY OF SERUM SODIUM: CPT

## 2021-02-07 PROCEDURE — 83036 HEMOGLOBIN GLYCOSYLATED A1C: CPT

## 2021-02-07 PROCEDURE — 83540 ASSAY OF IRON: CPT

## 2021-02-07 PROCEDURE — 84466 ASSAY OF TRANSFERRIN: CPT

## 2021-02-07 PROCEDURE — 86706 HEP B SURFACE ANTIBODY: CPT

## 2021-02-07 PROCEDURE — 82803 BLOOD GASES ANY COMBINATION: CPT

## 2021-02-07 PROCEDURE — 86901 BLOOD TYPING SEROLOGIC RH(D): CPT

## 2021-02-07 PROCEDURE — 83930 ASSAY OF BLOOD OSMOLALITY: CPT

## 2021-02-07 PROCEDURE — 87086 URINE CULTURE/COLONY COUNT: CPT

## 2021-02-07 PROCEDURE — 83874 ASSAY OF MYOGLOBIN: CPT

## 2021-02-07 PROCEDURE — 87640 STAPH A DNA AMP PROBE: CPT

## 2021-02-07 PROCEDURE — 80048 BASIC METABOLIC PNL TOTAL CA: CPT

## 2021-02-07 PROCEDURE — 85007 BL SMEAR W/DIFF WBC COUNT: CPT

## 2021-02-07 PROCEDURE — 83550 IRON BINDING TEST: CPT

## 2021-02-07 PROCEDURE — 85610 PROTHROMBIN TIME: CPT

## 2021-02-07 PROCEDURE — 87324 CLOSTRIDIUM AG IA: CPT

## 2021-02-07 PROCEDURE — 86036 ANCA SCREEN EACH ANTIBODY: CPT

## 2021-02-07 PROCEDURE — 90935 HEMODIALYSIS ONE EVALUATION: CPT

## 2021-02-07 PROCEDURE — 82607 VITAMIN B-12: CPT

## 2021-02-07 PROCEDURE — 94660 CPAP INITIATION&MGMT: CPT

## 2021-02-07 PROCEDURE — 99152 MOD SED SAME PHYS/QHP 5/>YRS: CPT

## 2021-02-07 PROCEDURE — 82570 ASSAY OF URINE CREATININE: CPT

## 2021-02-07 PROCEDURE — C1769: CPT

## 2021-02-07 PROCEDURE — 85730 THROMBOPLASTIN TIME PARTIAL: CPT

## 2021-02-07 PROCEDURE — 74019 RADEX ABDOMEN 2 VIEWS: CPT

## 2021-02-07 PROCEDURE — 83735 ASSAY OF MAGNESIUM: CPT

## 2021-02-07 PROCEDURE — 82553 CREATINE MB FRACTION: CPT

## 2021-02-07 PROCEDURE — 86850 RBC ANTIBODY SCREEN: CPT

## 2021-02-07 PROCEDURE — 76937 US GUIDE VASCULAR ACCESS: CPT

## 2021-02-07 PROCEDURE — 86769 SARS-COV-2 COVID-19 ANTIBODY: CPT

## 2021-02-07 PROCEDURE — 86738 MYCOPLASMA ANTIBODY: CPT

## 2021-02-07 PROCEDURE — 80061 LIPID PANEL: CPT

## 2021-02-07 PROCEDURE — 0225U NFCT DS DNA&RNA 21 SARSCOV2: CPT

## 2021-02-07 PROCEDURE — 86335 IMMUNFIX E-PHORSIS/URINE/CSF: CPT

## 2021-02-07 PROCEDURE — 84133 ASSAY OF URINE POTASSIUM: CPT

## 2021-02-07 PROCEDURE — 96374 THER/PROPH/DIAG INJ IV PUSH: CPT

## 2021-02-07 PROCEDURE — 84132 ASSAY OF SERUM POTASSIUM: CPT

## 2021-02-07 PROCEDURE — 86140 C-REACTIVE PROTEIN: CPT

## 2021-02-07 PROCEDURE — 83605 ASSAY OF LACTIC ACID: CPT

## 2021-02-07 PROCEDURE — 86038 ANTINUCLEAR ANTIBODIES: CPT

## 2021-02-07 PROCEDURE — 84145 PROCALCITONIN (PCT): CPT

## 2021-02-07 PROCEDURE — 36415 COLL VENOUS BLD VENIPUNCTURE: CPT

## 2021-02-07 PROCEDURE — C1750: CPT

## 2021-02-07 PROCEDURE — 93306 TTE W/DOPPLER COMPLETE: CPT

## 2021-02-07 PROCEDURE — P9016: CPT

## 2021-02-07 PROCEDURE — 87641 MR-STAPH DNA AMP PROBE: CPT

## 2021-02-07 PROCEDURE — 83615 LACTATE (LD) (LDH) ENZYME: CPT

## 2021-02-07 PROCEDURE — 85025 COMPLETE CBC W/AUTO DIFF WBC: CPT

## 2021-02-07 PROCEDURE — 87305 ASPERGILLUS AG IA: CPT

## 2021-02-07 PROCEDURE — 94640 AIRWAY INHALATION TREATMENT: CPT

## 2021-02-07 PROCEDURE — 86705 HEP B CORE ANTIBODY IGM: CPT

## 2021-02-07 PROCEDURE — 86160 COMPLEMENT ANTIGEN: CPT

## 2021-02-07 PROCEDURE — 97164 PT RE-EVAL EST PLAN CARE: CPT

## 2021-02-07 PROCEDURE — 36558 INSERT TUNNELED CV CATH: CPT

## 2021-02-07 PROCEDURE — 97161 PT EVAL LOW COMPLEX 20 MIN: CPT

## 2021-02-07 PROCEDURE — 84156 ASSAY OF PROTEIN URINE: CPT

## 2021-02-07 PROCEDURE — 83880 ASSAY OF NATRIURETIC PEPTIDE: CPT

## 2021-02-07 PROCEDURE — 84100 ASSAY OF PHOSPHORUS: CPT

## 2021-02-07 PROCEDURE — 70450 CT HEAD/BRAIN W/O DYE: CPT

## 2021-02-07 PROCEDURE — 96375 TX/PRO/DX INJ NEW DRUG ADDON: CPT

## 2021-02-07 PROCEDURE — 85379 FIBRIN DEGRADATION QUANT: CPT

## 2021-02-07 PROCEDURE — 77001 FLUOROGUIDE FOR VEIN DEVICE: CPT

## 2021-02-07 PROCEDURE — 85652 RBC SED RATE AUTOMATED: CPT

## 2021-02-07 PROCEDURE — 87635 SARS-COV-2 COVID-19 AMP PRB: CPT

## 2021-02-07 PROCEDURE — 99153 MOD SED SAME PHYS/QHP EA: CPT

## 2021-02-07 PROCEDURE — 81001 URINALYSIS AUTO W/SCOPE: CPT

## 2021-02-07 PROCEDURE — 86480 TB TEST CELL IMMUN MEASURE: CPT

## 2021-02-07 PROCEDURE — 82962 GLUCOSE BLOOD TEST: CPT

## 2021-02-07 PROCEDURE — 84105 ASSAY OF URINE PHOSPHORUS: CPT

## 2021-02-07 PROCEDURE — 87899 AGENT NOS ASSAY W/OPTIC: CPT

## 2021-02-07 PROCEDURE — 87040 BLOOD CULTURE FOR BACTERIA: CPT

## 2021-02-07 PROCEDURE — 82746 ASSAY OF FOLIC ACID SERUM: CPT

## 2021-02-07 PROCEDURE — 71045 X-RAY EXAM CHEST 1 VIEW: CPT

## 2021-02-07 RX ORDER — SEVELAMER CARBONATE 2400 MG/1
1 POWDER, FOR SUSPENSION ORAL
Qty: 90 | Refills: 3
Start: 2021-02-07 | End: 2021-06-06

## 2021-02-07 RX ORDER — METOPROLOL TARTRATE 50 MG
1 TABLET ORAL
Qty: 0 | Refills: 0 | DISCHARGE

## 2021-02-07 RX ORDER — AMLODIPINE BESYLATE 2.5 MG/1
1 TABLET ORAL
Qty: 30 | Refills: 3
Start: 2021-02-07 | End: 2021-06-06

## 2021-02-07 RX ORDER — FLUTICASONE PROPIONATE 50 MCG
1 SPRAY, SUSPENSION NASAL
Qty: 0 | Refills: 0 | DISCHARGE
Start: 2021-02-07

## 2021-02-07 RX ORDER — REPAGLINIDE 1 MG/1
1 TABLET ORAL
Qty: 90 | Refills: 0
Start: 2021-02-07 | End: 2021-03-08

## 2021-02-07 RX ORDER — ASPIRIN/CALCIUM CARB/MAGNESIUM 324 MG
1 TABLET ORAL
Qty: 30 | Refills: 3
Start: 2021-02-07 | End: 2021-06-06

## 2021-02-07 RX ORDER — MIRTAZAPINE 45 MG/1
1 TABLET, ORALLY DISINTEGRATING ORAL
Qty: 30 | Refills: 3
Start: 2021-02-07 | End: 2021-06-06

## 2021-02-07 RX ORDER — CARVEDILOL PHOSPHATE 80 MG/1
1 CAPSULE, EXTENDED RELEASE ORAL
Qty: 60 | Refills: 3
Start: 2021-02-07 | End: 2021-06-06

## 2021-02-07 RX ADMIN — Medication 3 MILLILITER(S): at 08:29

## 2021-02-07 RX ADMIN — Medication 0.5 MILLIGRAM(S): at 08:38

## 2021-02-07 RX ADMIN — Medication 500000 UNIT(S): at 06:11

## 2021-02-07 RX ADMIN — Medication 6 UNIT(S): at 12:12

## 2021-02-07 RX ADMIN — Medication 81 MILLIGRAM(S): at 12:11

## 2021-02-07 RX ADMIN — SEVELAMER CARBONATE 800 MILLIGRAM(S): 2400 POWDER, FOR SUSPENSION ORAL at 13:34

## 2021-02-07 RX ADMIN — Medication 50 MICROGRAM(S): at 05:56

## 2021-02-07 RX ADMIN — Medication 3 MILLILITER(S): at 02:12

## 2021-02-07 RX ADMIN — Medication 650 MILLIGRAM(S): at 02:10

## 2021-02-07 RX ADMIN — Medication 500000 UNIT(S): at 12:12

## 2021-02-07 RX ADMIN — Medication 2: at 12:11

## 2021-02-07 RX ADMIN — Medication 1 SPRAY(S): at 12:12

## 2021-02-07 RX ADMIN — Medication 500000 UNIT(S): at 02:12

## 2021-02-07 RX ADMIN — Medication 20 MILLIGRAM(S): at 05:56

## 2021-02-07 RX ADMIN — SEVELAMER CARBONATE 800 MILLIGRAM(S): 2400 POWDER, FOR SUSPENSION ORAL at 05:56

## 2021-02-07 RX ADMIN — SIMETHICONE 80 MILLIGRAM(S): 80 TABLET, CHEWABLE ORAL at 05:57

## 2021-02-07 RX ADMIN — AMLODIPINE BESYLATE 10 MILLIGRAM(S): 2.5 TABLET ORAL at 05:56

## 2021-02-07 RX ADMIN — CARVEDILOL PHOSPHATE 12.5 MILLIGRAM(S): 80 CAPSULE, EXTENDED RELEASE ORAL at 05:57

## 2021-02-07 RX ADMIN — HEPARIN SODIUM 5000 UNIT(S): 5000 INJECTION INTRAVENOUS; SUBCUTANEOUS at 05:56

## 2021-02-07 RX ADMIN — SENNA PLUS 1 TABLET(S): 8.6 TABLET ORAL at 12:11

## 2021-02-07 RX ADMIN — Medication 3 MILLILITER(S): at 13:34

## 2021-02-07 RX ADMIN — POLYETHYLENE GLYCOL 3350 17 GRAM(S): 17 POWDER, FOR SOLUTION ORAL at 12:12

## 2021-02-07 RX ADMIN — Medication 1 TABLET(S): at 05:56

## 2021-02-07 RX ADMIN — Medication 1 SPRAY(S): at 06:10

## 2021-02-07 NOTE — PROGRESS NOTE ADULT - PROVIDER SPECIALTY LIST ADULT
CCU
Cardiology
Cardiology
Critical Care
Endocrinology
Endocrinology
Nephrology
Pulmonology
CCU
Cardiology
Critical Care
Critical Care
Endocrinology
Infectious Disease
Intervent Radiology
Nephrology
Nephrology
Pulmonology
Cardiology
Critical Care
Endocrinology
Endocrinology
Nephrology
Pulmonology
Cardiology
Critical Care
Infectious Disease
Infectious Disease
Pulmonology
Cardiology
Infectious Disease
Cardiology
Critical Care
Cardiology
Cardiology
Intervent Cardiology
Cardiology
Cardiology
Critical Care
Cardiology
Internal Medicine
Intervent Cardiology
Cardiology
Cardiology
Intervent Cardiology
Critical Care
Cardiology

## 2021-02-07 NOTE — DISCHARGE NOTE NURSING/CASE MANAGEMENT/SOCIAL WORK - PATIENT PORTAL LINK FT
Script . You can access the FollowMyHealth Patient Portal offered by [facility] by registering at the following website: [email or web address]. By joining [facility]’s FollowMyHealth portal, you will also be able to view your health information using other applications (apps) compatible with our system.

## 2021-02-07 NOTE — DISCHARGE NOTE NURSING/CASE MANAGEMENT/SOCIAL WORK - NSDCFUADDAPPT_GEN_ALL_CORE_FT
As per office of your Cardiology Provider, Dr. Hermes Crooks, the office will contact patient directly to schedule an appointment within 1 week of discharge.

## 2021-02-07 NOTE — PROGRESS NOTE ADULT - REASON FOR ADMISSION
progressively worsening SOB and cough x 3 days

## 2021-02-07 NOTE — PROGRESS NOTE ADULT - ASSESSMENT
ASSESSMENT/PLAN 86 yr old F, POOR HISTORIAN/early dementia, former heavy smoker with PMHx HTN, hyperlipidemia, COPD, hypothyroidism, Stage IV CKD (baseline Cr ~3.0), anemia of chronic disease, CAD s/p CABG 2015 Dr. Blunt, acute on chronic diastolic CHF(EF:55% by Echo 10/2020), renal artery stenosis s/p renal artery stent >20yrs ago, Carotid artery stenosis, Pulmonary HTN, who presents to [facility] ED 1/15/21 c/o progressively worsening SOB and cough x 3 days. Respiratory failure. Currently on steroids taper for pneumonitis. UTI, New leucocytosis resolved, thrombocytopenia    1. O2 Continue NC titrate to adequate 02 Sat, please humidify  2. Bronchodilators:  Atrovent/ albuterol q 4 – 6 hours as needed  3. Corticosteroids: Recommend continue prednisone 20mg daily, , +Pulmicort nebs discussed c CCU  4. ID/Antibiotics: now Augmentin  ID feedback in view of TTE findings.  5. Cardiac/HTN: Optimize CHF mngmnt, optimize HD,    6. GI: Rx/ prophylaxis c PPI/H2B  7. Heme: Rx/VT prophylaxis c SQH/SCD/ASA follow H/H and plts closely  8. Aspiration precautions at all times, mobilize, OOB, chest PT  9, Avoid sedation in this pt, to protect respiratory drive, benzodiazepines are contraindicated  10.IR feedback on possible HD catheter migration  Discussed with managing team

## 2021-02-07 NOTE — PROGRESS NOTE ADULT - SUBJECTIVE AND OBJECTIVE BOX
Interventional, Pulmonary, Critical, Chest Special Procedures.  Chart entry    Patient is a 86y old  Female who presents with a chief complaint of progressively worsening SOB and cough x 3 days (06 Feb 2021 14:15)    HPI:  86 yr old F, POOR HISTORIAN/early dementia, former heavy smoker with PMHx HTN, hyperlipidemia, COPD, hypothyroidism, Stage IV CKD (baseline Cr ~3.0), anemia of chronic disease, CAD s/p CABG 2015 Dr. Blunt, chronic diastolic CHF(EF:55% by Echo 10/2020), renal artery stenosis s/p renal artery stent >20yrs ago, Carotid artery stenosis, PAD, who presents to [facility] ED 1/15/21 c/o progressively worsening SOB and cough x 3 days. Patient reports she can barely walk 10 feet prior to having to stop and rest. Patient further admits to chronic bilateral LE edema and significant orthopnea and she has been sleeping upright in a chair the past week. Patient also admits to chills and a nonproductive cough over the past few days. Patient was instructed to increase her Lasix 80mg PO daily dose to Lasix 80mg PO BID and start Metalazone 10mg PO BID prior to each dose by her cardiologist Dr. Horvath. Patient denies any chest pain, dizziness, palpitations, recent travel or sick contacts. Patient endorsing compliance w/ medications however daughter states that compliance with medication has been questionable over the last few months, as patient is becoming more forgetful.    In [facility] ED, BP: 170/68, HR: 80, RR:26, Temp: 98.4F, O2 sat: 80% on RA, improved to 99% with 10L NRB. EKG revealed NSR@67BPM with incomplete LBBB, TWI Lead I, AVL (similar to prior EKG 10/2020). CXR prelim read consistent with alveolar edema, bilateral patchy opacities/consolidation.    Labs notable for: WBC 14.5 with left shift, H/H 9.8/30.6, D-dimer 491, BUN/Cr 56/3.54, , CRP 4.86, Ferritin 180, Procalcitonin 0.29, Lactate 2.4, Troponin T 0.05, BNP 64,671, Initial COVID PCR negative.    Patient treated with Lasix 80mg IV x 1 dose, SL NTG 0.4mg PO x 1 dose, Decadron 6mg IV x 1 dose, Ceftriaxone 1g IV x 1 dose and Azithromycin 500mg IV x 1 dose.  Patient now admitted to cardiac telemetry for management of acute on chronic diastolic CHF exacerbation in setting of suspected CAP.    **Of note: Patient had a fall ~3 weeks ago for which she was admitted to [facility], CT imaging was negative as per daughter and fall thought to be secondary to hypotension.   (15 Mark 2021 21:30)      REVIEW OF SYSTEMS:  Constitutional: No fever, weight loss, chills or fatigue  Eyes: No eye pain, visual disturbances, or discharge  ENMT:  No difficulty hearing, tinnitus, vertigo; No sinus or throat pain. No epistaxis, dysphagia, dysphonia, hoarseness or odynophagia  Neck: No pain, stiffness or neck swelling.  No masses or deformities  Respiratory: No cough, wheezing, hemoptysis  - COPD  - ILD   - PE   - ASTHMA     - PNEUMONIA  Cardiovascular: No chest pain, dysrhythmia, palpitations, dizziness or edema - CAD   - CHF   - HTN  Gastrointestinal: No abdominal or epigastric pain. No nausea, vomiting or hematemesis; No diarrhea or constipation. No melena or hematochezia, Icterus.          Genitourinary: No dysuria, frequency, hematuria or incontinence   - CKD/NAV      - ESRD  Neurological: No headaches, memory loss, loss of strength, numbness or tremors      -DEMENTIA     - STROKE    - SEIZURE  Skin: No itching, burning, rashes or lesions   Lymph Nodes: No enlarged glands  Endocrine: No heat or cold intolerance; No hair loss       - DM     - THYROID DISORDER  Musculoskeletal: No joint pain or swelling; No muscle, back or extremity pain, No edema  Psychiatric: No depression, anxiety, mood swings or difficulty sleeping  Heme/Lymph: No easy bruising or bleeding gums         - ANEMIA      - CANCER   -COAGULOPATHY  Allergy and Immunologic: No hives or eczema  PAST MEDICAL & SURGICAL HISTORY:  Essential hypertension  Hypertension    Type 2 diabetes mellitus  Diabetes    Hyperlipidemia  Hyperlipidemia    Disorder of kidney and ureter  Renal insufficiency    Peripheral vascular disease  PVD (peripheral vascular disease)    Anxiety state  Anxiety    Atherosclerosis of renal artery  with resulting stent placement    Atherosclerosis of renal artery  Renal artery stenosis      FAMILY HISTORY:  Family history of ischemic heart disease  Family history of coronary artery disease.      SOCIAL HISTORY:      - Tobacco     - ETOH    Allergies    No Known Allergies    Intolerances      Vital Signs Last 24 Hrs  T(C): 36.3 (07 Feb 2021 09:49), Max: 37.1 (07 Feb 2021 04:47)  T(F): 97.3 (07 Feb 2021 09:49), Max: 98.7 (07 Feb 2021 04:47)  HR: 77 (07 Feb 2021 12:35) (65 - 77)  BP: 134/62 (07 Feb 2021 12:35) (130/62 - 161/56)  BP(mean): 89 (07 Feb 2021 12:35) (89 - 102)  RR: 20 (07 Feb 2021 12:35) (20 - 23)  SpO2: 95% (07 Feb 2021 12:35) (94% - 96%)    02-06 @ 07:01  -  02-07 @ 07:00  --------------------------------------------------------  IN: 600 mL / OUT: 2175 mL / NET: -1575 mL    02-07 @ 07:01 - 02-07 @ 13:30  --------------------------------------------------------  IN: 0 mL / OUT: 1 mL / NET: -1 mL          MEDICATIONS:  MEDICATIONS  (STANDING):  albuterol/ipratropium for Nebulization 3 milliLiter(s) Nebulizer every 6 hours  amLODIPine   Tablet 10 milliGRAM(s) Oral daily  amoxicillin  500 milliGRAM(s)/clavulanate 1 Tablet(s) Oral two times a day  aspirin enteric coated 81 milliGRAM(s) Oral daily  atorvastatin 20 milliGRAM(s) Oral at bedtime  buDESOnide    Inhalation Suspension 0.5 milliGRAM(s) Inhalation two times a day  carvedilol 12.5 milliGRAM(s) Oral every 12 hours  dextrose 40% Gel 15 Gram(s) Oral once  dextrose 5%. 1000 milliLiter(s) (50 mL/Hr) IV Continuous <Continuous>  dextrose 5%. 1000 milliLiter(s) (100 mL/Hr) IV Continuous <Continuous>  dextrose 50% Injectable 25 Gram(s) IV Push once  dextrose 50% Injectable 12.5 Gram(s) IV Push once  dextrose 50% Injectable 25 Gram(s) IV Push once  donepezil 5 milliGRAM(s) Oral at bedtime  fluticasone propionate 50 MICROgram(s)/spray Nasal Spray 1 Spray(s) Both Nostrils two times a day  glucagon  Injectable 1 milliGRAM(s) IntraMuscular once  heparin   Injectable 5000 Unit(s) SubCutaneous every 12 hours  influenza  Vaccine (HIGH DOSE) 0.7 milliLiter(s) IntraMuscular once  insulin lispro (ADMELOG) corrective regimen sliding scale   SubCutaneous Before meals and at bedtime  insulin lispro Injectable (ADMELOG) 6 Unit(s) SubCutaneous with lunch  insulin lispro Injectable (ADMELOG) 4 Unit(s) SubCutaneous with breakfast  insulin lispro Injectable (ADMELOG) 6 Unit(s) SubCutaneous with dinner  levothyroxine 50 MICROGram(s) Oral daily  mirtazapine 7.5 milliGRAM(s) Oral at bedtime  nystatin    Suspension 749016 Unit(s) Oral four times a day  polyethylene glycol 3350 17 Gram(s) Oral daily  predniSONE   Tablet 20 milliGRAM(s) Oral daily  senna 1 Tablet(s) Oral daily  sevelamer carbonate 800 milliGRAM(s) Oral three times a day  simethicone 80 milliGRAM(s) Chew two times a day  sodium chloride 0.65% Nasal 1 Spray(s) Both Nostrils daily    MEDICATIONS  (PRN):  acetaminophen   Tablet .. 650 milliGRAM(s) Oral every 6 hours PRN Moderate Pain (4 - 6)  guaiFENesin   Syrup  (Sugar-Free) 100 milliGRAM(s) Oral every 6 hours PRN Cough      PHYSICAL EXAM:    DEVICES:  - DENTURES   +IV R / L     - ETUBE   -TRACH   -CTUBE  R / L    LABS:                          9.3    9.96  )-----------( 79       ( 07 Feb 2021 06:04 )             30.2     02-07    137  |  99  |  53<H>  ----------------------------<  84  4.6   |  28  |  2.30<H>    Ca    8.6      07 Feb 2021 06:04  Phos  3.5     02-07  Mg     1.8     02-07      PT/INR - ( 06 Feb 2021 10:16 )   PT: 11.9 sec;   INR: 0.99          PTT - ( 06 Feb 2021 10:16 )  PTT:26.9 sec  RADIOLOGY & ADDITIONAL STUDIES (The following images were personally reviewed):

## 2021-02-10 DIAGNOSIS — E78.5 HYPERLIPIDEMIA, UNSPECIFIED: ICD-10-CM

## 2021-02-10 DIAGNOSIS — I50.33 ACUTE ON CHRONIC DIASTOLIC (CONGESTIVE) HEART FAILURE: ICD-10-CM

## 2021-02-10 DIAGNOSIS — I73.9 PERIPHERAL VASCULAR DISEASE, UNSPECIFIED: ICD-10-CM

## 2021-02-10 DIAGNOSIS — D63.8 ANEMIA IN OTHER CHRONIC DISEASES CLASSIFIED ELSEWHERE: ICD-10-CM

## 2021-02-10 DIAGNOSIS — E11.22 TYPE 2 DIABETES MELLITUS WITH DIABETIC CHRONIC KIDNEY DISEASE: ICD-10-CM

## 2021-02-10 DIAGNOSIS — E03.9 HYPOTHYROIDISM, UNSPECIFIED: ICD-10-CM

## 2021-02-10 DIAGNOSIS — F03.90 UNSPECIFIED DEMENTIA WITHOUT BEHAVIORAL DISTURBANCE: ICD-10-CM

## 2021-02-10 DIAGNOSIS — J44.9 CHRONIC OBSTRUCTIVE PULMONARY DISEASE, UNSPECIFIED: ICD-10-CM

## 2021-02-10 DIAGNOSIS — E87.6 HYPOKALEMIA: ICD-10-CM

## 2021-02-10 DIAGNOSIS — I13.0 HYPERTENSIVE HEART AND CHRONIC KIDNEY DISEASE WITH HEART FAILURE AND STAGE 1 THROUGH STAGE 4 CHRONIC KIDNEY DISEASE, OR UNSPECIFIED CHRONIC KIDNEY DISEASE: ICD-10-CM

## 2021-02-10 DIAGNOSIS — N18.4 CHRONIC KIDNEY DISEASE, STAGE 4 (SEVERE): ICD-10-CM

## 2021-02-10 DIAGNOSIS — F41.1 GENERALIZED ANXIETY DISORDER: ICD-10-CM

## 2021-02-10 DIAGNOSIS — Z87.891 PERSONAL HISTORY OF NICOTINE DEPENDENCE: ICD-10-CM

## 2021-02-10 DIAGNOSIS — J18.9 PNEUMONIA, UNSPECIFIED ORGANISM: ICD-10-CM

## 2021-02-10 DIAGNOSIS — I44.7 LEFT BUNDLE-BRANCH BLOCK, UNSPECIFIED: ICD-10-CM

## 2021-02-10 DIAGNOSIS — N17.9 ACUTE KIDNEY FAILURE, UNSPECIFIED: ICD-10-CM

## 2021-02-10 DIAGNOSIS — F41.9 ANXIETY DISORDER, UNSPECIFIED: ICD-10-CM

## 2021-02-10 DIAGNOSIS — I25.10 ATHEROSCLEROTIC HEART DISEASE OF NATIVE CORONARY ARTERY WITHOUT ANGINA PECTORIS: ICD-10-CM

## 2021-02-10 DIAGNOSIS — Z95.1 PRESENCE OF AORTOCORONARY BYPASS GRAFT: ICD-10-CM

## 2021-02-10 DIAGNOSIS — F32.9 MAJOR DEPRESSIVE DISORDER, SINGLE EPISODE, UNSPECIFIED: ICD-10-CM

## 2021-02-10 DIAGNOSIS — Z86.73 PERSONAL HISTORY OF TRANSIENT ISCHEMIC ATTACK (TIA), AND CEREBRAL INFARCTION WITHOUT RESIDUAL DEFICITS: ICD-10-CM

## 2021-02-10 DIAGNOSIS — R09.02 HYPOXEMIA: ICD-10-CM

## 2021-02-23 ENCOUNTER — APPOINTMENT (OUTPATIENT)
Dept: NEPHROLOGY | Facility: CLINIC | Age: 86
End: 2021-02-23

## 2021-04-23 NOTE — ED PROVIDER NOTE - RESPIRATORY DISTRESS
04/23/21 1300   Clinical Encounter Type   Visited With Patient   Latter day Encounters   Latter day Needs Prayer   Sacramental Encounters   Sacrament of Sick-Anointing Anointed YES

## 2021-05-25 NOTE — H&P ADULT - PROBLEM SELECTOR PROBLEM 4
Clinician outreached to the family at 4:10 because they had not yet signed into the video visit.  Clinician left voicemail message for mom requesting a call back.  Clinician indicated that if mom wanted to reschedule for this week clinician has an opening at 4pm this Thursday.  If mom preferred to plan for next week, Tuesday at 4pm, that would be acceptable as well.  Clinician requested mom call clinician's [employer] voice number to respond.    Mother returned clinician's call around 4:30pm and apologized for having forgotten.  A decision was made to reschedule for next Tuesday at 4pm.  
Coronary artery disease

## 2021-10-26 NOTE — PROGRESS NOTE ADULT - ASSESSMENT
86F poor historian/early dementia and former heavy smoker with PMHx DM, HTN, hyperlipidemia, COPD, hypothyroidism, CKD stage 3 (baseline Crea ~3.0), anemia of chronic disease, CAD s/p CABG 2015, chronic diastolic CHF (EF 55% via echo 10/2020), renal artery stenosis (s/p stent 20+ years ago), carotid artery stenosis, PAD presented 1/15/21 w/ progressively worsening SOB and cough. Admitted for acute on chronic diastolic HF, oliguric ATN on CKD 3 s/p stepped up to CCU and stepped back down to tele 1/23. Hospital course c/b radha pneumonitis on Cefepime IV, acute hypoxic respiratory failure requiring HFNC and titrated to RA, Tunneled Cath placement 1/26 w/ initiation of HD and Anemia requiring total 3U PRBC during hospitalization. Pulm Dr Cuevas, Renal Dr Nicolas, ID Dr Resendez, Endo and Psych are following. Plan for d/c Home w/ outpatient dialysis, currently awaiting acceptance. Detail Level: Detailed Quality 130: Documentation Of Current Medications In The Medical Record: Current Medications Documented

## 2022-10-01 NOTE — PATIENT PROFILE ADULT - BILL PAYMENT
Subjective     Shortness of Breath  Severity:  Moderate  Onset quality:  Gradual  Duration:  1 week  Timing:  Constant  Progression:  Worsening  Chronicity:  New  Context: URI    Context comment:  Shortness of breath and coughing.  Was recently seen in the emergency department and started on an antibiotic and steroid without improvement.  History of COPD and CHF.  Relieved by:  Nothing  Worsened by:  Deep breathing and exertion  Ineffective treatments:  Inhaler  Associated symptoms: cough    Associated symptoms: no chest pain, no fever, no hemoptysis, no neck pain, no rash, no sputum production, no vomiting and no wheezing    Risk factors: obesity        Review of Systems   Constitutional: Negative for fever.   Respiratory: Positive for cough and shortness of breath. Negative for hemoptysis, sputum production and wheezing.    Cardiovascular: Negative for chest pain.   Gastrointestinal: Negative for vomiting.   Musculoskeletal: Negative for neck pain.   Skin: Negative for rash.   All other systems reviewed and are negative.      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   • CHF (congestive heart failure) (HCC)    • COPD (chronic obstructive pulmonary disease) (HCC)    • High blood pressure    • Hyperlipidemia    • Pacemaker    • Stage 3 chronic kidney disease (HCC)    • Stroke (HCC)     TIA       Allergies   Allergen Reactions   • Penicillins Rash       Past Surgical History:   Procedure Laterality Date   • BLADDER SURGERY     • CARDIAC ELECTROPHYSIOLOGY PROCEDURE N/A 5/26/2022    Procedure: PPM generator change - dual;  Surgeon: Shiloh Medrano MD;  Location: [facility] INVASIVE LOCATION;  Service: Cardiology;  Laterality: N/A;   • CARDIAC SURGERY     • COLON RESECTION      x 2 small bowel   • HYSTERECTOMY     • PACEMAKER IMPLANTATION     • VAGINAL BIOPSY         Family History   Problem Relation Age of Onset   • Cancer Mother    • Diabetes Father     • Diabetes Sister    • Diabetes Brother        Social History     Socioeconomic History   • Marital status:    Tobacco Use   • Smoking status: Former Smoker     Packs/day: 2.00     Years: 45.00     Pack years: 90.00     Start date: 3/12/1953     Quit date: 3/12/1993     Years since quittin.5   • Smokeless tobacco: Never Used   Vaping Use   • Vaping Use: Never used   Substance and Sexual Activity   • Alcohol use: Not Currently   • Drug use: Never   • Sexual activity: Defer           Objective   Physical Exam  Vitals and nursing note reviewed.   Constitutional:       Appearance: She is obese. She is not ill-appearing.   HENT:      Head: Normocephalic and atraumatic.      Mouth/Throat:      Mouth: Mucous membranes are moist.   Neck:      Vascular: No JVD.   Cardiovascular:      Rate and Rhythm: Normal rate and regular rhythm.   Pulmonary:      Effort: Pulmonary effort is normal.      Breath sounds: Normal breath sounds.   Chest:      Chest wall: No tenderness.   Abdominal:      Palpations: Abdomen is soft.      Tenderness: There is no abdominal tenderness.   Musculoskeletal:      Right lower leg: No tenderness.      Left lower leg: No tenderness.   Skin:     General: Skin is warm and dry.   Neurological:      Mental Status: She is alert and oriented to person, place, and time.   Psychiatric:      Comments: Friendly and cooperative         Procedures           ED Course                                           MDM  Number of Diagnoses or Management Options     Amount and/or Complexity of Data Reviewed  Clinical lab tests: reviewed  Tests in the radiology section of CPT®: reviewed  Tests in the medicine section of CPT®: reviewed  Decide to obtain previous medical records or to obtain history from someone other than the patient: yes  Obtain history from someone other than the patient: yes  Review and summarize past medical records: yes      EKG interpretation: Interpreted by myself.  Possible atrial  pacemaker versus sinus rhythm.  Rate of 63.  Normal P wave LA interval.  Normal QTc interval.  Right bundle branch block.  Left axis deviation.  Left ventricular hypertrophy.  ST segments are nonspecific.  No significant change from old EKG.    On reevaluation the patient was able to ambulate without difficulty around the emergency department on room air without hypoxia tachycardia or respiratory distress.  Work-up is unremarkable.  Symptoms sound akin to bronchitis.  I think she is safe to follow-up with her PCP.  Final diagnoses:   Dyspnea, unspecified type   Cough       ED Disposition  ED Disposition     ED Disposition   Discharge    Condition   Stable    Comment   --             Isaac Love MD  [address]  [place] [id number] [phone]    In 3 days  for recheck    [facility] EMERGENCY DEPARTMENT  [address]  [facility] [address] [phone]    As needed, If symptoms worsen at any time         Medication List      New Prescriptions    dextromethorphan-guaifenesin  MG/5ML liquid  Commonly known as: ROBITUSSIN-DM  Take 5 mL by mouth Every 12 (Twelve) Hours.           Where to Get Your Medications      These medications were sent to [place] [id number] [facility] - [phone]  - [phone] [address] [address]    Phone: [phone]   · dextromethorphan-guaifenesin  MG/5ML liquid          John Lindsay DO  10/01/22 0053     no

## 2022-11-30 NOTE — PROGRESS NOTE ADULT - PROBLEM SELECTOR PLAN 6
From dehydration  Fluids  Now resolved.   - CONT: Symbicort inhaler BID and Duonebs  - PRN Robutussin/Benonatate for cough  - not on Home Oxygen. Wean down HFNC 30/40 to 2L NC O2 AND now on RA  - c/w Nystatin swish and spit for thrush  - QTc 500.  Unable to Switch Nystatin to Fluconazole 200mg IV - CONT: Symbicort inhaler BID and Duonebs  - PRN Robutussin/Benonatate for cough  - not on Home Oxygen. Wean down HFNC 30/40 to 2L NC O2 AND now on RA  - c/w Nystatin swish and spit for thrush  - QTc 500.  Unable to Switch Nystatin to Fluconazole - Switch Symbicort INH to Pulmicort INH 0.5mg BID per Dr Anderson  - c/w Duonebs standing q6h  - PRN Robutussin/Benonatate for cough  - not on Home Oxygen. Wean down HFNC 30/40 to 2L NC O2 AND now on RA  - c/w Nystatin swish and spit for thrush  - QTc 500.  Unable to Switch Nystatin to Fluconazole

## 2023-02-01 NOTE — ED ADULT NURSE NOTE - ED CARDIAC CAPILLARY REFILL
Advance Care Planning   Ambulatory ACP Specialist Patient Outreach    Date:  2/1/2023    ACP Specialist:  Suzy Noble    Outreach call to patient in follow-up to ACP Specialist referral from:  Romayne Macintosh, MD    [x] PCP  [] Provider   [] Ambulatory Care Management [] Other     For:                  [x] Advance Directive Assistance              [] Complete Portable DNR order              [] Complete POST/MOST              [] Code Status Discussion             [] Discuss Goals of Care             [] Early ACP Decision-Making              [] Other (Specify)    Date Referral Received:  1/31/2023    Today's Outreach:  [x] First   [] Second  [] Third       Third outreach made by: [] Phone  [] Email / mail    [] [email or web address]     Intervention:  [] Spoke with Patient   [] Left  requesting return call      Outcome:   Attempted ACP outreach - no answer. Not able to leave  as mailbox is full. Sent ACP outreach by email with ACP information sheets \"What is Advance Care Planning\" and \"How to Choose a Health Care Agent\" attached with ACP Coordinator contact information included. Will attempt outreach again in one week if return call not received. Next Step:   [] ACP scheduled conversation  [x] Outreach again in one week               [x] Email / Mail ACP Info Sheets  [] Email / Mail Advance Directive   [] Closing referral.  Routing closure to referring provider/staff and to ACP Specialist . [] Closure letter mailed to patient with invitation to contact ACP Specialist if / when ready.   Thank you for this referral. 2 seconds or less

## 2023-06-09 NOTE — CONSULT NOTE ADULT - SUBJECTIVE AND OBJECTIVE BOX
NP Note Neurosurgery Dr Barrera     HPI:  86 yr old F, POOR HISTORIAN/early dementia, former heavy smoker with PMHx HTN, hyperlipidemia, COPD, hypothyroidism, Stage IV CKD (baseline Cr ~3.0), anemia of chronic disease, CAD s/p CABG 2015 Dr. Blunt, chronic diastolic CHF(EF:55% by Echo 10/2020), renal artery stenosis s/p renal artery stent >20yrs ago, Carotid artery stenosis, PAD, who presents to [facility] ED 1/15/21 c/o progressively worsening SOB and cough x 3 days. Patient reports she can barely walk 10 feet prior to having to stop and rest. Patient further admits to chronic bilateral LE edema and significant orthopnea and she has been sleeping upright in a chair the past week. Patient also admits to chills and a nonproductive cough over the past few days. Patient was instructed to increase her Lasix 80mg PO daily dose to Lasix 80mg PO BID and start Metalazone 10mg PO BID prior to each dose by her cardiologist Dr. Horvath. Patient denies any chest pain, dizziness, palpitations, recent travel or sick contacts. Patient endorsing compliance w/ medications however daughter states that compliance with medication has been questionable over the last few months, as patient is becoming more forgetful.    In [facility] ED, BP: 170/68, HR: 80, RR:26, Temp: 98.4F, O2 sat: 80% on RA, improved to 99% with 10L NRB. EKG revealed NSR@67BPM with incomplete LBBB, TWI Lead I, AVL (similar to prior EKG 10/2020). CXR prelim read consistent with alveolar edema, bilateral patchy opacities/consolidation.    Labs notable for: WBC 14.5 with left shift, H/H 9.8/30.6, D-dimer 491, BUN/Cr 56/3.54, , CRP 4.86, Ferritin 180, Procalcitonin 0.29, Lactate 2.4, Troponin T 0.05, BNP 64,671, Initial COVID PCR negative.    Patient treated with Lasix 80mg IV x 1 dose, SL NTG 0.4mg PO x 1 dose, Decadron 6mg IV x 1 dose, Ceftriaxone 1g IV x 1 dose and Azithromycin 500mg IV x 1 dose.  Patient now admitted to cardiac telemetry for management of acute on chronic diastolic CHF exacerbation in setting of suspected CAP.    **Of note: Patient had a fall ~3 weeks ago for which she was admitted to [facility], CT imaging was negative as per daughter and fall thought to be secondary to hypotension.   (15 Mark 2021 21:30)    OVERNIGHT EVENTS:  Vital Signs Last 24 Hrs  T(C): 37.1 (20 Jan 2021 08:57), Max: 38 (19 Jan 2021 22:06)  T(F): 98.7 (20 Jan 2021 08:57), Max: 100.4 (19 Jan 2021 22:06)  HR: 70 (20 Jan 2021 08:51) (60 - 97)  BP: 144/64 (20 Jan 2021 08:51) (130/59 - 151/65)  BP(mean): --  RR: 24 (20 Jan 2021 08:51) (22 - 26)  SpO2: 97% (20 Jan 2021 08:51) (95% - 98%)    I&O's Summary    19 Jan 2021 07:01  -  20 Jan 2021 07:00  --------------------------------------------------------  IN: 450 mL / OUT: 1150 mL / NET: -700 mL    20 Jan 2021 07:01  -  20 Jan 2021 11:21  --------------------------------------------------------  IN: 280 mL / OUT: 200 mL / NET: 80 mL        PHYSICAL EXAM:  Neurological:      A&OX3 Cranial nerves intact  ROJAS 5/5 no drift or dysmetria  COPD                                                    DIET: enteral feeds      LABS:                        7.3    10.76 )-----------( 237      ( 20 Jan 2021 07:25 )             23.6     01-20    139  |  98  |  65<H>  ----------------------------<  101<H>  4.0   |  24  |  4.15<H>    Ca    8.8      20 Jan 2021 07:25  Phos  6.0     01-20  Mg     2.0     01-20          CAPILLARY BLOOD GLUCOSE    Drug Levels: [] N/A    CSF Analysis: [] N/A      Allergies    No Known Allergies    Intolerances      MEDICATIONS:  Antibiotics:  azithromycin  IVPB 500 milliGRAM(s) IV Intermittent every 24 hours  piperacillin/tazobactam IVPB.. 2.25 Gram(s) IV Intermittent every 6 hours    Neuro:  acetaminophen   Tablet .. 650 milliGRAM(s) Oral every 6 hours PRN  donepezil 5 milliGRAM(s) Oral at bedtime    Anticoagulation:  heparin   Injectable 5000 Unit(s) SubCutaneous every 12 hours    OTHER:  albuterol/ipratropium for Nebulization 3 milliLiter(s) Nebulizer every 6 hours  amLODIPine   Tablet 10 milliGRAM(s) Oral daily  atorvastatin 20 milliGRAM(s) Oral at bedtime  benzonatate 100 milliGRAM(s) Oral every 8 hours PRN  budesonide  80 MICROgram(s)/formoterol 4.5 MICROgram(s) Inhaler 2 Puff(s) Inhalation two times a day  fluticasone propionate 50 MICROgram(s)/spray Nasal Spray 1 Spray(s) Both Nostrils two times a day  guaiFENesin   Syrup  (Sugar-Free) 100 milliGRAM(s) Oral every 6 hours PRN  influenza  Vaccine (HIGH DOSE) 0.7 milliLiter(s) IntraMuscular once  levothyroxine 50 MICROGram(s) Oral daily  metoprolol tartrate 50 milliGRAM(s) Oral two times a day  sevelamer carbonate 800 milliGRAM(s) Oral three times a day with meals    IVF:    CULTURES:  Culture Results:   No growth at 4 days. (01-15 @ 20:47)  Culture Results:   No growth at 4 days. (01-15 @ 20:47)    RADIOLOGY & ADDITIONAL TESTS:    < from: CT Head No Cont (01.19.21 @ 14:30) >  IMPRESSION:    No acute intracranial hemorrhage or calvarial fracture is demonstrated in this patient with recent trauma.    Advanced small vessel ischemic changes are present throughout the cerebral white matter with several age-indeterminate lacunar infarctions in the basal ganglia nuclei.            < end of copied text >        ASSESSMENT:  86y Female s/p  fall sdh    PLAN:  NEURO:    Monitor neuro status  OT/PT  May resume asa 81mg  if note bleeding or change in mental status obtain stat Head CT  Continue care as per the primary team    Discussed with attending     NP Note Neurosurgery Dr Barrera     HPI:  86 yr old F, POOR HISTORIAN/early dementia, former heavy smoker with PMHx HTN, hyperlipidemia, COPD, hypothyroidism, Stage IV CKD (baseline Cr ~3.0), anemia of chronic disease, CAD s/p CABG 2015 Dr. Blunt, chronic diastolic CHF(EF:55% by Echo 10/2020), renal artery stenosis s/p renal artery stent >20yrs ago, Carotid artery stenosis, PAD, who presents to [facility] ED 1/15/21 c/o progressively worsening SOB and cough x 3 days. Patient reports she can barely walk 10 feet prior to having to stop and rest. Patient further admits to chronic bilateral LE edema and significant orthopnea and she has been sleeping upright in a chair the past week. Patient also admits to chills and a nonproductive cough over the past few days. Patient was instructed to increase her Lasix 80mg PO daily dose to Lasix 80mg PO BID and start Metalazone 10mg PO BID prior to each dose by her cardiologist Dr. Horvath. Patient denies any chest pain, dizziness, palpitations, recent travel or sick contacts. Patient endorsing compliance w/ medications however daughter states that compliance with medication has been questionable over the last few months, as patient is becoming more forgetful.    In [facility] ED, BP: 170/68, HR: 80, RR:26, Temp: 98.4F, O2 sat: 80% on RA, improved to 99% with 10L NRB. EKG revealed NSR@67BPM with incomplete LBBB, TWI Lead I, AVL (similar to prior EKG 10/2020). CXR prelim read consistent with alveolar edema, bilateral patchy opacities/consolidation.    Labs notable for: WBC 14.5 with left shift, H/H 9.8/30.6, D-dimer 491, BUN/Cr 56/3.54, , CRP 4.86, Ferritin 180, Procalcitonin 0.29, Lactate 2.4, Troponin T 0.05, BNP 64,671, Initial COVID PCR negative.    Patient treated with Lasix 80mg IV x 1 dose, SL NTG 0.4mg PO x 1 dose, Decadron 6mg IV x 1 dose, Ceftriaxone 1g IV x 1 dose and Azithromycin 500mg IV x 1 dose.  Patient now admitted to cardiac telemetry for management of acute on chronic diastolic CHF exacerbation in setting of suspected CAP.    **Of note: Patient had a fall ~3 weeks ago for which she was admitted to [facility], CT imaging was negative as per daughter and fall thought to be secondary to hypotension.   (15 Mark 2021 21:30)    OVERNIGHT EVENTS:  Vital Signs Last 24 Hrs  T(C): 37.1 (20 Jan 2021 08:57), Max: 38 (19 Jan 2021 22:06)  T(F): 98.7 (20 Jan 2021 08:57), Max: 100.4 (19 Jan 2021 22:06)  HR: 70 (20 Jan 2021 08:51) (60 - 97)  BP: 144/64 (20 Jan 2021 08:51) (130/59 - 151/65)  BP(mean): --  RR: 24 (20 Jan 2021 08:51) (22 - 26)  SpO2: 97% (20 Jan 2021 08:51) (95% - 98%)    I&O's Summary    19 Jan 2021 07:01  -  20 Jan 2021 07:00  --------------------------------------------------------  IN: 450 mL / OUT: 1150 mL / NET: -700 mL    20 Jan 2021 07:01  -  20 Jan 2021 11:21  --------------------------------------------------------  IN: 280 mL / OUT: 200 mL / NET: 80 mL        PHYSICAL EXAM:  Neurological:      A&OX3 Cranial nerves intact  ROJAS 5/5 no drift or dysmetria  COPD                                                    DIET: enteral feeds      LABS:                        7.3    10.76 )-----------( 237      ( 20 Jan 2021 07:25 )             23.6     01-20    139  |  98  |  65<H>  ----------------------------<  101<H>  4.0   |  24  |  4.15<H>    Ca    8.8      20 Jan 2021 07:25  Phos  6.0     01-20  Mg     2.0     01-20          CAPILLARY BLOOD GLUCOSE    Drug Levels: [] N/A    CSF Analysis: [] N/A      Allergies    No Known Allergies    Intolerances      MEDICATIONS:  Antibiotics:  azithromycin  IVPB 500 milliGRAM(s) IV Intermittent every 24 hours  piperacillin/tazobactam IVPB.. 2.25 Gram(s) IV Intermittent every 6 hours    Neuro:  acetaminophen   Tablet .. 650 milliGRAM(s) Oral every 6 hours PRN  donepezil 5 milliGRAM(s) Oral at bedtime    Anticoagulation:  heparin   Injectable 5000 Unit(s) SubCutaneous every 12 hours    OTHER:  albuterol/ipratropium for Nebulization 3 milliLiter(s) Nebulizer every 6 hours  amLODIPine   Tablet 10 milliGRAM(s) Oral daily  atorvastatin 20 milliGRAM(s) Oral at bedtime  benzonatate 100 milliGRAM(s) Oral every 8 hours PRN  budesonide  80 MICROgram(s)/formoterol 4.5 MICROgram(s) Inhaler 2 Puff(s) Inhalation two times a day  fluticasone propionate 50 MICROgram(s)/spray Nasal Spray 1 Spray(s) Both Nostrils two times a day  guaiFENesin   Syrup  (Sugar-Free) 100 milliGRAM(s) Oral every 6 hours PRN  influenza  Vaccine (HIGH DOSE) 0.7 milliLiter(s) IntraMuscular once  levothyroxine 50 MICROGram(s) Oral daily  metoprolol tartrate 50 milliGRAM(s) Oral two times a day  sevelamer carbonate 800 milliGRAM(s) Oral three times a day with meals    IVF:    CULTURES:  Culture Results:   No growth at 4 days. (01-15 @ 20:47)  Culture Results:   No growth at 4 days. (01-15 @ 20:47)    RADIOLOGY & ADDITIONAL TESTS:    < from: CT Head No Cont (01.19.21 @ 14:30) >  IMPRESSION:    No acute intracranial hemorrhage or calvarial fracture is demonstrated in this patient with recent trauma.    Advanced small vessel ischemic changes are present throughout the cerebral white matter with several age-indeterminate lacunar infarctions in the basal ganglia nuclei.            < end of copied text >        ASSESSMENT:  86y Female s/p  fall SAH    PLAN:  NEURO:    Monitor neuro status  OT/PT  May resume asa 81mg  if note bleeding or change in mental status obtain stat Head CT  Continue care as per the primary team    Discussed with attending     show

## 2023-07-18 NOTE — PROGRESS NOTE ADULT - ASSESSMENT
84 y/o female poor historian with PMHx HTN, HLD, CAD s/p CABG 2015 Dr. Blunt , diastolic CHF, renal artery stenosis s/p renal artery stent >20yrs ago, Carotid artery stenosis, Peripheral artery disease, Anemia w/ PUD,  CKD (baseline creatinine 2.4-3)  who presented to [facility] endorsing shortness of breath cough and LE edema and orthopnea requiring her to sleep in a chair for 2 week. Patient admitted to [facility] for acute on chronic diastolic CHF. Echo 10/12/20 showed  Mild AR, MR, TR. Pulm HTN PASP 47, EF 55%, not worsened from prior. Patient s/p IV diuresis, transitioned to PO diuresis today.   Pt had CT chest showing ground glass opacity concerning for chronic inflammatory process, will continue outpatient work-up at discharge.    Detail Level: Zone Initiate Treatment: Betamethasone 0.05% ointment \\nAAA bid x 2 weeks then bid 2 days per week prn Render In Strict Bullet Format?: No
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
